# Patient Record
Sex: FEMALE | Race: WHITE | NOT HISPANIC OR LATINO | Employment: UNEMPLOYED | URBAN - METROPOLITAN AREA
[De-identification: names, ages, dates, MRNs, and addresses within clinical notes are randomized per-mention and may not be internally consistent; named-entity substitution may affect disease eponyms.]

---

## 2017-12-06 ENCOUNTER — IMMUNIZATION (OUTPATIENT)
Dept: INTERNAL MEDICINE | Facility: CLINIC | Age: 67
End: 2017-12-06
Payer: MEDICARE

## 2017-12-06 PROCEDURE — G0008 ADMIN INFLUENZA VIRUS VAC: HCPCS | Mod: PBBFAC

## 2018-06-04 ENCOUNTER — OFFICE VISIT (OUTPATIENT)
Dept: OPTOMETRY | Facility: CLINIC | Age: 68
End: 2018-06-04
Payer: MEDICARE

## 2018-06-04 DIAGNOSIS — H35.9 MACULAR DISORDER: Primary | ICD-10-CM

## 2018-06-04 DIAGNOSIS — H52.221 REGULAR ASTIGMATISM OF RIGHT EYE: ICD-10-CM

## 2018-06-04 DIAGNOSIS — H25.13 NUCLEAR SCLEROSIS OF BOTH EYES: ICD-10-CM

## 2018-06-04 DIAGNOSIS — H52.4 PRESBYOPIA: ICD-10-CM

## 2018-06-04 DIAGNOSIS — H31.002 RETINAL SCAR OF LEFT EYE: ICD-10-CM

## 2018-06-04 PROCEDURE — 92014 COMPRE OPH EXAM EST PT 1/>: CPT | Mod: S$PBB,,, | Performed by: OPTOMETRIST

## 2018-06-04 PROCEDURE — 99212 OFFICE O/P EST SF 10 MIN: CPT | Mod: PBBFAC | Performed by: OPTOMETRIST

## 2018-06-04 PROCEDURE — 92015 DETERMINE REFRACTIVE STATE: CPT | Mod: ,,, | Performed by: OPTOMETRIST

## 2018-06-04 PROCEDURE — 99999 PR PBB SHADOW E&M-EST. PATIENT-LVL II: CPT | Mod: PBBFAC,,, | Performed by: OPTOMETRIST

## 2018-06-04 NOTE — PROGRESS NOTES
"HPI     Concerns About Ocular Health    Additional comments: Eye exam            Comments   67 yr old WF    Approximate date of last eye examination: 10/06/2016  Name of last eye doctor seen:  Dr. Gallagher  Wears glasses? yes     If yes, wears full-time or part-time?  Mostly for reading, feels she   can see better driving without them.     Present glasses are bifocal / S V Distance / S V Reading:  progressives  Approximate age of present glasses:  5 yrs  Got new glasses following last exam, or subsequently?:  no   Any problem with VA with glasses?  no  Wears CLs?:  no  Headaches? no  Eye pain/discomfort?  no                                                                                       Flashes?  no  Floaters?  no  Diplopia/Double vision?  no  Patient's Ocular History:         Any eye surgeries?  Retinal laser-OS         Any eye injury?  Hx of Corneal Abrasions OU          Any treatment for eye disease?  H/o histoplasmosis  Family history of eye disease?  Late mom-mac degeneration  Significant patient medical history:         1. Diabetes?  no       If yes, IDDM or NIDDM?  n/a   2. HBP?  no              3. Other (describe):  healthy   ! OTC eyedrops currently using:  Refresh, prn OU   ! Prescription eye meds currently using:  no   ! Any history of allergy/adverse reaction to any eye meds used   previously?  no    ! Any history of allergy/adverse reaction to eyedrops used during prior   eye exam(s)? no    ! Any history of allergy/adverse reaction to Novacaine or similar meds?   no   ! Any history of allergy/adverse reaction to Epinephrine or similar meds?   no    ! Patient okay with use of anesthetic eyedrops to check eye pressure? yes            ! Patient okay with use of eyedrops to dilate pupils today? yes    !  Allergies/Medications/Medical History/Family History reviewed today?    yes    PD =   62/58  Desired reading distance =  15.5"              NOTE HISTORY POOR CORRECTABLE VISION IN THE LEFT EYE.       "                                                Last edited by Ileana Bustillos on 6/4/2018  1:08 PM. (History)            Assessment /Plan     For exam results, see Encounter Report.    1. Macular disorder     2. Retinal scar of left eye     3. Nuclear sclerosis of both eyes     4. Regular astigmatism of right eye     5. Presbyopia                    History of poor correctable vision in the left eye, secondary to macular scarring, presumably secondary to histoplasmosis.  Early nuclear sclerotic lens changes in both eyes. Monitor.  Minimal simple hyperopic astigmatism in the right eye.  Poor VA in the left eye.    Presbyopia consistent with age.   New spectacle lens Rx issued for use as desired. Recommend full-time wear.  Recheck in one year, or prior, if ANY apparent problems or vision changes in the right eye.

## 2018-06-04 NOTE — PATIENT INSTRUCTIONS
History of poor correctable vision in the left eye, secondary to macular scarring, presumably secondary to histoplasmosis.  Early nuclear sclerotic lens changes in both eyes. Monitor.  Minimal simple hyperopic astigmatism in the right eye.  Poor VA in the left eye.    Presbyopia consistent with age.   New spectacle lens Rx issued for use as desired. Recommend full-time wear.  Recheck in one year, or prior, if ANY apparent problems or vision changes in the right eye.

## 2018-07-02 ENCOUNTER — HOSPITAL ENCOUNTER (INPATIENT)
Facility: HOSPITAL | Age: 68
LOS: 3 days | Discharge: HOME | DRG: 189 | End: 2018-07-05
Attending: EMERGENCY MEDICINE | Admitting: HOSPITALIST
Payer: MEDICARE

## 2018-07-02 DIAGNOSIS — R09.02 HYPOXIA: ICD-10-CM

## 2018-07-02 DIAGNOSIS — J44.1 COPD WITH ACUTE EXACERBATION: Primary | ICD-10-CM

## 2018-07-02 DIAGNOSIS — J44.9 COPD (CHRONIC OBSTRUCTIVE PULMONARY DISEASE): ICD-10-CM

## 2018-07-02 DIAGNOSIS — I36.9 NONRHEUMATIC TRICUSPID VALVE DISORDER: ICD-10-CM

## 2018-07-02 PROBLEM — R05.9 COUGH IN ADULT: Status: ACTIVE | Noted: 2018-07-02

## 2018-07-02 PROBLEM — R03.0 ELEVATED BLOOD PRESSURE READING: Status: ACTIVE | Noted: 2018-07-02

## 2018-07-02 PROBLEM — J96.21 ACUTE ON CHRONIC RESPIRATORY FAILURE WITH HYPOXIA: Status: ACTIVE | Noted: 2018-07-02

## 2018-07-02 PROBLEM — Z87.891 FORMER HEAVY TOBACCO SMOKER: Chronic | Status: ACTIVE | Noted: 2018-07-02

## 2018-07-02 LAB
ALBUMIN SERPL BCP-MCNC: 4.2 G/DL
ALLENS TEST: ABNORMAL
ALP SERPL-CCNC: 117 U/L
ALT SERPL W/O P-5'-P-CCNC: 31 U/L
ANION GAP SERPL CALC-SCNC: 10 MMOL/L
ANION GAP SERPL CALC-SCNC: 11 MMOL/L
AST SERPL-CCNC: 27 U/L
BASOPHILS # BLD AUTO: 0.06 K/UL
BASOPHILS NFR BLD: 0.8 %
BILIRUB SERPL-MCNC: 0.4 MG/DL
BILIRUB UR QL STRIP: NEGATIVE
BNP SERPL-MCNC: 29 PG/ML
BUN SERPL-MCNC: 10 MG/DL
BUN SERPL-MCNC: 10 MG/DL
CALCIUM SERPL-MCNC: 10 MG/DL
CALCIUM SERPL-MCNC: 9.5 MG/DL
CHLORIDE SERPL-SCNC: 101 MMOL/L
CHLORIDE SERPL-SCNC: 102 MMOL/L
CLARITY UR REFRACT.AUTO: CLEAR
CO2 SERPL-SCNC: 24 MMOL/L
CO2 SERPL-SCNC: 25 MMOL/L
COLOR UR AUTO: YELLOW
CREAT SERPL-MCNC: 0.7 MG/DL
CREAT SERPL-MCNC: 0.8 MG/DL
DELSYS: ABNORMAL
DIFFERENTIAL METHOD: ABNORMAL
EOSINOPHIL # BLD AUTO: 0.3 K/UL
EOSINOPHIL NFR BLD: 3.7 %
ERYTHROCYTE [DISTWIDTH] IN BLOOD BY AUTOMATED COUNT: 13.2 %
EST. GFR  (AFRICAN AMERICAN): >60 ML/MIN/1.73 M^2
EST. GFR  (AFRICAN AMERICAN): >60 ML/MIN/1.73 M^2
EST. GFR  (NON AFRICAN AMERICAN): >60 ML/MIN/1.73 M^2
EST. GFR  (NON AFRICAN AMERICAN): >60 ML/MIN/1.73 M^2
ESTIMATED AVG GLUCOSE: 114 MG/DL
GLUCOSE SERPL-MCNC: 116 MG/DL
GLUCOSE SERPL-MCNC: 252 MG/DL
GLUCOSE UR QL STRIP: NEGATIVE
HBA1C MFR BLD HPLC: 5.6 %
HCO3 UR-SCNC: 27.3 MMOL/L (ref 24–28)
HCT VFR BLD AUTO: 45.4 %
HGB BLD-MCNC: 14.5 G/DL
HGB UR QL STRIP: NEGATIVE
IMM GRANULOCYTES # BLD AUTO: 0.03 K/UL
IMM GRANULOCYTES NFR BLD AUTO: 0.4 %
INR PPP: 1
KETONES UR QL STRIP: NEGATIVE
LACTATE SERPL-SCNC: 1.1 MMOL/L
LEUKOCYTE ESTERASE UR QL STRIP: NEGATIVE
LYMPHOCYTES # BLD AUTO: 1.2 K/UL
LYMPHOCYTES NFR BLD: 15.9 %
MAGNESIUM SERPL-MCNC: 2.3 MG/DL
MCH RBC QN AUTO: 31.2 PG
MCHC RBC AUTO-ENTMCNC: 31.9 G/DL
MCV RBC AUTO: 98 FL
MODE: ABNORMAL
MONOCYTES # BLD AUTO: 0.8 K/UL
MONOCYTES NFR BLD: 11.6 %
NEUTROPHILS # BLD AUTO: 4.9 K/UL
NEUTROPHILS NFR BLD: 67.6 %
NITRITE UR QL STRIP: NEGATIVE
NRBC BLD-RTO: 0 /100 WBC
PCO2 BLDA: 43.6 MMHG (ref 35–45)
PH SMN: 7.4 [PH] (ref 7.35–7.45)
PH UR STRIP: 6 [PH] (ref 5–8)
PHOSPHATE SERPL-MCNC: 3.2 MG/DL
PLATELET # BLD AUTO: 331 K/UL
PMV BLD AUTO: 8.7 FL
PO2 BLDA: 53 MMHG (ref 80–100)
POC BE: 3 MMOL/L
POC SATURATED O2: 87 % (ref 95–100)
POC TCO2: 29 MMOL/L (ref 23–27)
POCT GLUCOSE: 121 MG/DL (ref 70–110)
POCT GLUCOSE: 208 MG/DL (ref 70–110)
POTASSIUM SERPL-SCNC: 3.7 MMOL/L
POTASSIUM SERPL-SCNC: 3.9 MMOL/L
PROCALCITONIN SERPL IA-MCNC: <0.02 NG/ML
PROT SERPL-MCNC: 7.3 G/DL
PROT UR QL STRIP: NEGATIVE
PROTHROMBIN TIME: 10.4 SEC
PROVIDER CREDENTIALS: ABNORMAL
PROVIDER NOTIFIED: ABNORMAL
RBC # BLD AUTO: 4.65 M/UL
SAMPLE: ABNORMAL
SITE: ABNORMAL
SODIUM SERPL-SCNC: 136 MMOL/L
SODIUM SERPL-SCNC: 137 MMOL/L
SP GR UR STRIP: 1.01 (ref 1–1.03)
SP02: 92
TIME NOTIFIED: 1422
TROPONIN I SERPL DL<=0.01 NG/ML-MCNC: <0.006 NG/ML
TSH SERPL DL<=0.005 MIU/L-ACNC: 0.98 UIU/ML
URN SPEC COLLECT METH UR: NORMAL
UROBILINOGEN UR STRIP-ACNC: NEGATIVE EU/DL
VERBAL RESULT READBACK PERFORMED: YES
WBC # BLD AUTO: 7.23 K/UL

## 2018-07-02 PROCEDURE — 25000003 PHARM REV CODE 250: Performed by: HOSPITALIST

## 2018-07-02 PROCEDURE — 83880 ASSAY OF NATRIURETIC PEPTIDE: CPT

## 2018-07-02 PROCEDURE — 84145 PROCALCITONIN (PCT): CPT

## 2018-07-02 PROCEDURE — 93010 ELECTROCARDIOGRAM REPORT: CPT | Mod: ,,, | Performed by: INTERNAL MEDICINE

## 2018-07-02 PROCEDURE — 81003 URINALYSIS AUTO W/O SCOPE: CPT

## 2018-07-02 PROCEDURE — 36415 COLL VENOUS BLD VENIPUNCTURE: CPT

## 2018-07-02 PROCEDURE — 84484 ASSAY OF TROPONIN QUANT: CPT

## 2018-07-02 PROCEDURE — 83036 HEMOGLOBIN GLYCOSYLATED A1C: CPT

## 2018-07-02 PROCEDURE — 36600 WITHDRAWAL OF ARTERIAL BLOOD: CPT

## 2018-07-02 PROCEDURE — 93005 ELECTROCARDIOGRAM TRACING: CPT

## 2018-07-02 PROCEDURE — 80053 COMPREHEN METABOLIC PANEL: CPT

## 2018-07-02 PROCEDURE — 99223 1ST HOSP IP/OBS HIGH 75: CPT | Mod: AI,,, | Performed by: HOSPITALIST

## 2018-07-02 PROCEDURE — 25000242 PHARM REV CODE 250 ALT 637 W/ HCPCS: Performed by: HOSPITALIST

## 2018-07-02 PROCEDURE — 83735 ASSAY OF MAGNESIUM: CPT

## 2018-07-02 PROCEDURE — 99285 EMERGENCY DEPT VISIT HI MDM: CPT | Mod: ,,, | Performed by: EMERGENCY MEDICINE

## 2018-07-02 PROCEDURE — 27000221 HC OXYGEN, UP TO 24 HOURS

## 2018-07-02 PROCEDURE — 25000242 PHARM REV CODE 250 ALT 637 W/ HCPCS: Performed by: EMERGENCY MEDICINE

## 2018-07-02 PROCEDURE — 80048 BASIC METABOLIC PNL TOTAL CA: CPT

## 2018-07-02 PROCEDURE — 11000001 HC ACUTE MED/SURG PRIVATE ROOM

## 2018-07-02 PROCEDURE — 63600175 PHARM REV CODE 636 W HCPCS: Performed by: HOSPITALIST

## 2018-07-02 PROCEDURE — 99285 EMERGENCY DEPT VISIT HI MDM: CPT | Mod: 25

## 2018-07-02 PROCEDURE — 82803 BLOOD GASES ANY COMBINATION: CPT

## 2018-07-02 PROCEDURE — 84443 ASSAY THYROID STIM HORMONE: CPT

## 2018-07-02 PROCEDURE — 63600175 PHARM REV CODE 636 W HCPCS: Performed by: EMERGENCY MEDICINE

## 2018-07-02 PROCEDURE — 82962 GLUCOSE BLOOD TEST: CPT

## 2018-07-02 PROCEDURE — 94640 AIRWAY INHALATION TREATMENT: CPT

## 2018-07-02 PROCEDURE — 85025 COMPLETE CBC W/AUTO DIFF WBC: CPT

## 2018-07-02 PROCEDURE — 84100 ASSAY OF PHOSPHORUS: CPT

## 2018-07-02 PROCEDURE — 85610 PROTHROMBIN TIME: CPT

## 2018-07-02 PROCEDURE — 83605 ASSAY OF LACTIC ACID: CPT

## 2018-07-02 PROCEDURE — 96374 THER/PROPH/DIAG INJ IV PUSH: CPT

## 2018-07-02 PROCEDURE — 99900035 HC TECH TIME PER 15 MIN (STAT)

## 2018-07-02 PROCEDURE — 94761 N-INVAS EAR/PLS OXIMETRY MLT: CPT

## 2018-07-02 RX ORDER — ACETAMINOPHEN 325 MG/1
650 TABLET ORAL EVERY 8 HOURS PRN
Status: DISCONTINUED | OUTPATIENT
Start: 2018-07-02 | End: 2018-07-05 | Stop reason: HOSPADM

## 2018-07-02 RX ORDER — AMOXICILLIN 250 MG
1 CAPSULE ORAL 2 TIMES DAILY
Status: DISCONTINUED | OUTPATIENT
Start: 2018-07-02 | End: 2018-07-05 | Stop reason: HOSPADM

## 2018-07-02 RX ORDER — IPRATROPIUM BROMIDE AND ALBUTEROL SULFATE 2.5; .5 MG/3ML; MG/3ML
3 SOLUTION RESPIRATORY (INHALATION)
Status: COMPLETED | OUTPATIENT
Start: 2018-07-02 | End: 2018-07-02

## 2018-07-02 RX ORDER — ASCORBIC ACID 500 MG
500 TABLET ORAL DAILY
Status: ON HOLD | COMMUNITY
End: 2023-10-18 | Stop reason: HOSPADM

## 2018-07-02 RX ORDER — TIOTROPIUM BROMIDE 18 UG/1
1 CAPSULE ORAL; RESPIRATORY (INHALATION) DAILY
Status: DISCONTINUED | OUTPATIENT
Start: 2018-07-03 | End: 2018-07-05 | Stop reason: HOSPADM

## 2018-07-02 RX ORDER — METHYLPREDNISOLONE SOD SUCC 125 MG
125 VIAL (EA) INJECTION
Status: COMPLETED | OUTPATIENT
Start: 2018-07-02 | End: 2018-07-02

## 2018-07-02 RX ORDER — IBUPROFEN 200 MG
16 TABLET ORAL
Status: DISCONTINUED | OUTPATIENT
Start: 2018-07-02 | End: 2018-07-05 | Stop reason: HOSPADM

## 2018-07-02 RX ORDER — MOXIFLOXACIN HYDROCHLORIDE 400 MG/1
400 TABLET ORAL DAILY
Status: DISCONTINUED | OUTPATIENT
Start: 2018-07-02 | End: 2018-07-05 | Stop reason: HOSPADM

## 2018-07-02 RX ORDER — PREDNISONE 20 MG/1
40 TABLET ORAL DAILY
Status: DISCONTINUED | OUTPATIENT
Start: 2018-07-03 | End: 2018-07-05 | Stop reason: HOSPADM

## 2018-07-02 RX ORDER — GLUCAGON 1 MG
1 KIT INJECTION
Status: DISCONTINUED | OUTPATIENT
Start: 2018-07-02 | End: 2018-07-05 | Stop reason: HOSPADM

## 2018-07-02 RX ORDER — CALCIUM CARBONATE 600 MG
600 TABLET ORAL DAILY
Status: ON HOLD | COMMUNITY
End: 2018-11-07 | Stop reason: SDUPTHER

## 2018-07-02 RX ORDER — RAMELTEON 8 MG/1
8 TABLET ORAL NIGHTLY PRN
Status: DISCONTINUED | OUTPATIENT
Start: 2018-07-02 | End: 2018-07-05 | Stop reason: HOSPADM

## 2018-07-02 RX ORDER — FLUTICASONE FUROATE AND VILANTEROL 100; 25 UG/1; UG/1
1 POWDER RESPIRATORY (INHALATION) DAILY
Status: DISCONTINUED | OUTPATIENT
Start: 2018-07-03 | End: 2018-07-05 | Stop reason: HOSPADM

## 2018-07-02 RX ORDER — PROMETHAZINE HYDROCHLORIDE 25 MG/1
25 TABLET ORAL EVERY 6 HOURS PRN
Status: DISCONTINUED | OUTPATIENT
Start: 2018-07-02 | End: 2018-07-05 | Stop reason: HOSPADM

## 2018-07-02 RX ORDER — IBUPROFEN 200 MG
24 TABLET ORAL
Status: DISCONTINUED | OUTPATIENT
Start: 2018-07-02 | End: 2018-07-05 | Stop reason: HOSPADM

## 2018-07-02 RX ORDER — ONDANSETRON 8 MG/1
8 TABLET, ORALLY DISINTEGRATING ORAL EVERY 8 HOURS PRN
Status: DISCONTINUED | OUTPATIENT
Start: 2018-07-02 | End: 2018-07-05 | Stop reason: HOSPADM

## 2018-07-02 RX ORDER — IPRATROPIUM BROMIDE AND ALBUTEROL SULFATE 2.5; .5 MG/3ML; MG/3ML
3 SOLUTION RESPIRATORY (INHALATION) EVERY 6 HOURS
Status: DISCONTINUED | OUTPATIENT
Start: 2018-07-02 | End: 2018-07-05 | Stop reason: HOSPADM

## 2018-07-02 RX ORDER — ENOXAPARIN SODIUM 100 MG/ML
40 INJECTION SUBCUTANEOUS EVERY 24 HOURS
Status: DISCONTINUED | OUTPATIENT
Start: 2018-07-02 | End: 2018-07-05 | Stop reason: HOSPADM

## 2018-07-02 RX ADMIN — MOXIFLOXACIN HYDROCHLORIDE 400 MG: 400 TABLET, FILM COATED ORAL at 06:07

## 2018-07-02 RX ADMIN — METHYLPREDNISOLONE SODIUM SUCCINATE 125 MG: 125 INJECTION, POWDER, FOR SOLUTION INTRAMUSCULAR; INTRAVENOUS at 02:07

## 2018-07-02 RX ADMIN — IPRATROPIUM BROMIDE AND ALBUTEROL SULFATE 3 ML: .5; 3 SOLUTION RESPIRATORY (INHALATION) at 06:07

## 2018-07-02 RX ADMIN — ENOXAPARIN SODIUM 40 MG: 100 INJECTION SUBCUTANEOUS at 06:07

## 2018-07-02 RX ADMIN — IPRATROPIUM BROMIDE AND ALBUTEROL SULFATE 3 ML: .5; 3 SOLUTION RESPIRATORY (INHALATION) at 02:07

## 2018-07-02 RX ADMIN — IPRATROPIUM BROMIDE AND ALBUTEROL SULFATE 3 ML: .5; 3 SOLUTION RESPIRATORY (INHALATION) at 07:07

## 2018-07-02 NOTE — HPI
This is a 67 y.o. female with hx of retinal hemorrhage who is being admitted to hospital medicine for shortness of breath, suspect COPD exacerbation. She presented to the ED with a complaint of worsening SOB for the past two weeks. The patient reports at benign, she is active, able to walk her dog, until two weeks ago she began having breathing difficulty. Patient states SOB is worsening and associated with some chest discomfort. Report sleeping on 2-3 pillows at night. Per , patient gets SOB when getting out of the car and when going to use the restroom. He notes of patient's decreased physical activity due to SOB. She notes of loss of appetite, congestion, and cough, but denies sore throat, vision changes, fever, chills, dizziness, weakness, or any URI symptoms in the past 2 weeks. Denies any recent travels. Patient has no cardiac history in the past, denies DM, HTN, Asthma hx. Patient is a former smoker for 30 years, 1-2 packs a day, stop 2 years ago. Does not take any medications beside from OTC vitamins.

## 2018-07-02 NOTE — ED NOTES
Pt returned to ED stretcher from restroom, respirations even and unlabored. Stretcher locked and in lowest position, side rails x 2, call bell within reach. Cardiac monitor, pulse ox and BP cuff applied cycling Q 30 minute vitals.  and son at the bedside, updated on wait for further orders. Will continue to monitor and update pt on plan of care.

## 2018-07-02 NOTE — ED PROVIDER NOTES
Encounter Date: 7/2/2018    SCRIBE #1 NOTE: I, Danielle Garcia, am scribing for, and in the presence of,  Dr. Garcia. I have scribed the entire note.       History     Chief Complaint   Patient presents with    Shortness of Breath     Pt presented to the ED via pov. Pt c/o cough, congestion, sob, pt denies fever x 2 weeks.     Cough     Time patient was seen by the provider: 11:39 AM      The patient is a 67 y.o. female with co-morbidities including: retinal hemorrhage, who presents to the ED with a complaint of worsening SOB for the past two weeks. The patient reports at benign, she is active, able to walk her dog, until two weeks ago she began having breathing difficulty. Patient states SOB is worsening and associated with some chest discomfort. Per , patient gets SOB when getting out of the car and when going to use the restroom. He notes of patient's decreased physical activity due to SOB. She notes of loss of appetite, congestion, and cough, but denies sore throat, vision changes, fever, chills, dizziness, weakness, or any URI symptoms in the past 2 weeks. Denies any recent travels. Patient has no cardiac history in the past. Patient is a former smoker for 20 years, 1-2 packs a day.         The history is provided by the patient, the spouse and medical records.     Review of patient's allergies indicates:  No Known Allergies  Past Medical History:   Diagnosis Date    Cataract     History of retinal hemorrhage      Past Surgical History:   Procedure Laterality Date    HAND SURGERY       Family History   Problem Relation Age of Onset    Cancer Mother         colon    Macular degeneration Mother     Stroke Father     Amblyopia Neg Hx     Blindness Neg Hx     Cataracts Neg Hx     Diabetes Neg Hx     Glaucoma Neg Hx     Hypertension Neg Hx     Retinal detachment Neg Hx     Strabismus Neg Hx     Thyroid disease Neg Hx      Social History   Substance Use Topics    Smoking status: Former  Smoker    Smokeless tobacco: Never Used    Alcohol use 1.2 oz/week     2 Glasses of wine per week      Comment: 1-2 glasses a day      Review of Systems   Constitutional: Positive for appetite change (decreased). Negative for chills and fever.   HENT: Positive for congestion. Negative for sore throat.    Eyes: Negative for visual disturbance.   Respiratory: Positive for cough and shortness of breath.    Cardiovascular: Negative for chest pain.   Gastrointestinal: Negative for nausea.   Genitourinary: Negative for dysuria.   Musculoskeletal: Negative for back pain.   Skin: Negative for rash.   Neurological: Negative for dizziness and weakness.   Hematological: Does not bruise/bleed easily.       Physical Exam     Initial Vitals [07/02/18 1124]   BP Pulse Resp Temp SpO2   (!) 143/79 109 17 98.5 °F (36.9 °C) (!) 86 %      MAP       --         Physical Exam    Nursing note and vitals reviewed.  Constitutional: She appears well-developed and well-nourished. No distress.   HENT:   Head: Normocephalic and atraumatic.   Eyes: EOM are normal. Pupils are equal, round, and reactive to light.   Neck: Normal range of motion. Neck supple.   Cardiovascular: Normal rate and regular rhythm.   Pulmonary/Chest: Tachypnea noted. She is in respiratory distress (mild). She has wheezes (bilaterally).   Can not finish complete sentences.    Abdominal: Soft. Bowel sounds are normal. She exhibits no distension. There is no tenderness.   Musculoskeletal: Normal range of motion. She exhibits no edema or tenderness.   Neurological: She is alert and oriented to person, place, and time.   Skin: Skin is warm and dry. No rash noted.   Psychiatric: She has a normal mood and affect.         ED Course   Procedures  Labs Reviewed   CBC W/ AUTO DIFFERENTIAL - Abnormal; Notable for the following:        Result Value    MCH 31.2 (*)     MCHC 31.9 (*)     MPV 8.7 (*)     Lymph% 15.9 (*)     All other components within normal limits   COMPREHENSIVE  METABOLIC PANEL - Abnormal; Notable for the following:     Glucose 116 (*)     All other components within normal limits   ISTAT PROCEDURE - Abnormal; Notable for the following:     POC PO2 53 (*)     POC SATURATED O2 87 (*)     POC TCO2 29 (*)     All other components within normal limits   POCT GLUCOSE - Abnormal; Notable for the following:     POCT Glucose 121 (*)     All other components within normal limits   LACTIC ACID, PLASMA   URINALYSIS, REFLEX TO URINE CULTURE    Narrative:     Preferred Collection Type->Urine, Clean Catch   TROPONIN I   B-TYPE NATRIURETIC PEPTIDE   PROTIME-INR   TSH   MAGNESIUM   PHOSPHORUS   PROCALCITONIN   HEMOGLOBIN A1C   HEMOGLOBIN A1C    Narrative:     ADD ON GHGB PER DR. ELIZABETH GAITAN AT  07/02/2018  16:53 ([facility])  ADD ON PCAL PER DR. ELIZABETH GAITAN AT  07/02/2018  16:55 ([facility])   PROCALCITONIN    Narrative:     ADD ON GHGB PER DR. ELIZABETH GAITAN AT  07/02/2018  16:53 ([facility])  ADD ON PCAL PER DR. ELIZABETH GAITAN AT  07/02/2018  16:55 ([facility]X)   D DIMER, QUANTITATIVE     EKG Readings: (Independently Interpreted)   Initial Reading: No STEMI. Rhythm: Normal Sinus Rhythm. Heart Rate: 99.       Imaging Results          X-Ray Chest PA And Lateral (Final result)  Result time 07/02/18 12:49:09    Final result by Osvaldo Barrera MD (07/02/18 12:49:09)                 Narrative:    EXAMINATION:  XR CHEST PA AND LATERAL    CLINICAL HISTORY:  Cough;    TECHNIQUE:  PA and lateral views of the chest were performed.    COMPARISON:  None    FINDINGS:  Heart size normal.  Ill-defined opacities in the lung apices probably related to parenchymal scarring.  No significant airspace consolidation or pleural effusion identified.  Suggestion of underlying COPD.      Electronically signed by: Osvaldo Barrera MD  Date:    07/02/2018  Time:    12:49                            X-Rays:   Independently Interpreted Readings:   Chest X-Ray: No infiltrates.     Medical Decision Making:   History:   Old Medical Records: I decided  to obtain old medical records.  Initial Assessment:   67 y.o. Female patient with significant PMHx of smoking for 20 years up to 1-2 packs a day, also with second hand smoke from patient's .   Independently Interpreted Test(s):   I have ordered and independently interpreted EKG Reading(s) - see prior notes  Clinical Tests:   Lab Tests: Ordered and Reviewed  Radiological Study: Ordered and Reviewed  Medical Tests: Ordered and Reviewed            Scribe Attestation:   Scribe #1: I performed the above scribed service and the documentation accurately describes the services I performed. I attest to the accuracy of the note.    Attending Attestation:             Attending ED Notes:   2:45 PM  Patient ambulated around ED with pulse O2, patient became short of breath. Pulse O2 dropped to 84%, she became uncomfortable and had to walk slowly back to her ED room.     ABG RA  pH  7.4  pCO2 43  PO2 53   BE 3  Pulse ox 87%    Patient presenting with shortness of breath for the last several weeks though her spouse states that this has been going on longer; patient has not been followed by a physician and has not gone to see 1 for these complaints it is felt that now she is becoming hypoxic and is felt that this is secondary to lung findings which are suggestive of emphysema /COPD it is felt that she is having a probable COPD with exacerbation will treat with breathing treatments and steroids but we will have to admit the patient to the hospital because of her significant hypoxia               Clinical Impression:   The primary encounter diagnosis was COPD with acute exacerbation. Diagnoses of Hypoxia, COPD (chronic obstructive pulmonary disease), and Nonrheumatic tricuspid valve disorder were also pertinent to this visit.      Disposition:   Disposition: Admitted  Condition: Serious                        Kaiden Garcia MD  07/08/18 1959

## 2018-07-02 NOTE — ED NOTES
Hourly rounding completed on patient. Patient has no complaints or questions. Pt is in bed awake and alert. Respirations are even and unlabored. Pt denies chest pain or SOB.    The bed is in low, locked position with side rails upx2. Call light is in reach, and patient is oriented to call light use. Pt states they will call nurse if they need assistance.    Pt remains on 2 L NC and is comfortably

## 2018-07-02 NOTE — ED NOTES
Pt resting on stretcher in NAD, respirations even and unlabored. Stretcher locked and in lowest position, side rails x 2, call bell within reach. Cardiac monitor, pulse ox and BP cuff applied cycling Q 30 minute vitals. Pt offered restroom assistance, pt states no needs at this time.  at the bedside, diet menu provided to patient. Will continue to monitor and update pt on plan of care.

## 2018-07-02 NOTE — SUBJECTIVE & OBJECTIVE
Past Medical History:   Diagnosis Date    Cataract     History of retinal hemorrhage        Past Surgical History:   Procedure Laterality Date    HAND SURGERY         Review of patient's allergies indicates:  No Known Allergies    No current facility-administered medications on file prior to encounter.      Current Outpatient Prescriptions on File Prior to Encounter   Medication Sig    [DISCONTINUED] multivitamin (ONE DAILY MULTIVITAMIN) per tablet Take 1 tablet by mouth once daily.     Family History     Problem Relation (Age of Onset)    Cancer Mother    Macular degeneration Mother    Stroke Father        Social History Main Topics    Smoking status: Former Smoker    Smokeless tobacco: Never Used    Alcohol use 1.2 oz/week     2 Glasses of wine per week      Comment: 1-2 glasses a day     Drug use: No    Sexual activity: Not on file     Review of Systems   Constitutional: Negative for appetite change, chills, fatigue and fever.   HENT: Positive for congestion and sore throat. Negative for sinus pressure and trouble swallowing.    Eyes: Negative for visual disturbance.   Respiratory: Positive for cough, chest tightness and shortness of breath. Negative for wheezing.    Cardiovascular: Negative for chest pain and leg swelling.   Gastrointestinal: Negative for abdominal distention, diarrhea, nausea and vomiting.   Genitourinary: Negative for dysuria and frequency.   Musculoskeletal: Negative for arthralgias, back pain, myalgias and neck pain.   Skin: Negative for pallor and rash.   Neurological: Positive for weakness. Negative for tremors, facial asymmetry and speech difficulty.   Hematological: Negative.    Psychiatric/Behavioral: Negative.  Negative for confusion. The patient is not nervous/anxious.      Objective:     Vital Signs (Most Recent):  Temp: 98.5 °F (36.9 °C) (07/02/18 1124)  Pulse: 86 (07/02/18 1701)  Resp: (!) 22 (07/02/18 1659)  BP: (!) 162/85 (07/02/18 1701)  SpO2: 96 % (07/02/18 1701) Vital  Signs (24h Range):  Temp:  [98.5 °F (36.9 °C)] 98.5 °F (36.9 °C)  Pulse:  [] 86  Resp:  [17-31] 22  SpO2:  [86 %-98 %] 96 %  BP: (140-162)/(79-86) 162/85     Weight: 52.2 kg (115 lb)  Body mass index is 17.49 kg/m².    Physical Exam   Constitutional: She is oriented to person, place, and time. She appears well-developed and well-nourished. No distress.   HENT:   Head: Normocephalic.   Nose: Nose normal.   Mouth/Throat: Oropharynx is clear and moist. No oropharyngeal exudate.   Eyes: Pupils are equal, round, and reactive to light. Right eye exhibits no discharge. Left eye exhibits no discharge. No scleral icterus.   Neck: Normal range of motion. No tracheal deviation present.   Cardiovascular: Normal rate, regular rhythm and normal heart sounds.  Exam reveals no friction rub.    No murmur heard.  Pulmonary/Chest: She is in respiratory distress. She has wheezes. She has rales. She exhibits no tenderness.   On 2L NC   Abdominal: Soft. Bowel sounds are normal. She exhibits no distension. There is no tenderness. There is no guarding.   Musculoskeletal: Normal range of motion. She exhibits no edema, tenderness or deformity.   Lymphadenopathy:     She has no cervical adenopathy.   Neurological: She is alert and oriented to person, place, and time.   Skin: Skin is warm. She is not diaphoretic. No erythema. No pallor.   Psychiatric: She has a normal mood and affect. Judgment and thought content normal.         CRANIAL NERVES     CN III, IV, VI   Pupils are equal, round, and reactive to light.       Significant Labs:   CBC:   Recent Labs  Lab 07/02/18  1205   WBC 7.23   HGB 14.5   HCT 45.4        CMP:   Recent Labs  Lab 07/02/18  1205      K 3.9      CO2 24   *   BUN 10   CREATININE 0.7   CALCIUM 10.0   PROT 7.3   ALBUMIN 4.2   BILITOT 0.4   ALKPHOS 117   AST 27   ALT 31   ANIONGAP 10   EGFRNONAA >60.0     Cardiac Markers:   Recent Labs  Lab 07/02/18  1205   BNP 29     All pertinent labs within  the past 24 hours have been reviewed.  Imaging Results          X-Ray Chest PA And Lateral (Final result)  Result time 07/02/18 12:49:09    Final result by Osvaldo Barrera MD (07/02/18 12:49:09)                 Narrative:    EXAMINATION:  XR CHEST PA AND LATERAL    CLINICAL HISTORY:  Cough;    TECHNIQUE:  PA and lateral views of the chest were performed.    COMPARISON:  None    FINDINGS:  Heart size normal.  Ill-defined opacities in the lung apices probably related to parenchymal scarring.  No significant airspace consolidation or pleural effusion identified.  Suggestion of underlying COPD.      Electronically signed by: Osvaldo Barrera MD  Date:    07/02/2018  Time:    12:49                              Significant Imaging: I have reviewed all pertinent imaging results/findings within the past 24 hours.

## 2018-07-02 NOTE — H&P
[facility] Hospital Medicine  History & Physical    Patient Name: Estefani Fam  MRN: [id number]  Admission Date: 7/2/2018  Attending Physician: Johny Garcia MD  Primary Care Provider: Juan Truong MD    [facility] Medicine Team: [facility] MED A Johny Garcia MD     Patient information was obtained from patient, spouse/SO and ER records.     Subjective:     Principal Problem:Acute on chronic respiratory failure with hypoxia    Chief Complaint:   Chief Complaint   Patient presents with    Shortness of Breath     Pt presented to the ED via pov. Pt c/o cough, congestion, sob, pt denies fever x 2 weeks.     Cough        HPI: This is a 67 y.o. female with hx of retinal hemorrhage who is being admitted to hospital medicine for shortness of breath, suspect COPD exacerbation. She presented to the ED with a complaint of worsening SOB for the past two weeks. The patient reports at benign, she is active, able to walk her dog, until two weeks ago she began having breathing difficulty. Patient states SOB is worsening and associated with some chest discomfort. Report sleeping on 2-3 pillows at night. Per , patient gets SOB when getting out of the car and when going to use the restroom. He notes of patient's decreased physical activity due to SOB. She notes of loss of appetite, congestion, and cough, but denies sore throat, vision changes, fever, chills, dizziness, weakness, or any URI symptoms in the past 2 weeks. Denies any recent travels. Patient has no cardiac history in the past, denies DM, HTN, Asthma hx. Patient is a former smoker for 30 years, 1-2 packs a day, stop 2 years ago. Does not take any medications beside from OTC vitamins.        Past Medical History:   Diagnosis Date    Cataract     History of retinal hemorrhage        Past Surgical History:   Procedure Laterality Date    HAND SURGERY         Review of patient's allergies indicates:  No Known Allergies    No current  facility-administered medications on file prior to encounter.      Current Outpatient Prescriptions on File Prior to Encounter   Medication Sig    [DISCONTINUED] multivitamin (ONE DAILY MULTIVITAMIN) per tablet Take 1 tablet by mouth once daily.     Family History     Problem Relation (Age of Onset)    Cancer Mother    Macular degeneration Mother    Stroke Father        Social History Main Topics    Smoking status: Former Smoker    Smokeless tobacco: Never Used    Alcohol use 1.2 oz/week     2 Glasses of wine per week      Comment: 1-2 glasses a day     Drug use: No    Sexual activity: Not on file     Review of Systems   Constitutional: Negative for appetite change, chills, fatigue and fever.   HENT: Positive for congestion and sore throat. Negative for sinus pressure and trouble swallowing.    Eyes: Negative for visual disturbance.   Respiratory: Positive for cough, chest tightness and shortness of breath. Negative for wheezing.    Cardiovascular: Negative for chest pain and leg swelling.   Gastrointestinal: Negative for abdominal distention, diarrhea, nausea and vomiting.   Genitourinary: Negative for dysuria and frequency.   Musculoskeletal: Negative for arthralgias, back pain, myalgias and neck pain.   Skin: Negative for pallor and rash.   Neurological: Positive for weakness. Negative for tremors, facial asymmetry and speech difficulty.   Hematological: Negative.    Psychiatric/Behavioral: Negative.  Negative for confusion. The patient is not nervous/anxious.      Objective:     Vital Signs (Most Recent):  Temp: 98.5 °F (36.9 °C) (07/02/18 1124)  Pulse: 86 (07/02/18 1701)  Resp: (!) 22 (07/02/18 1659)  BP: (!) 162/85 (07/02/18 1701)  SpO2: 96 % (07/02/18 1701) Vital Signs (24h Range):  Temp:  [98.5 °F (36.9 °C)] 98.5 °F (36.9 °C)  Pulse:  [] 86  Resp:  [17-31] 22  SpO2:  [86 %-98 %] 96 %  BP: (140-162)/(79-86) 162/85     Weight: 52.2 kg (115 lb)  Body mass index is 17.49 kg/m².    Physical Exam    Constitutional: She is oriented to person, place, and time. She appears well-developed and well-nourished. No distress.   HENT:   Head: Normocephalic.   Nose: Nose normal.   Mouth/Throat: Oropharynx is clear and moist. No oropharyngeal exudate.   Eyes: Pupils are equal, round, and reactive to light. Right eye exhibits no discharge. Left eye exhibits no discharge. No scleral icterus.   Neck: Normal range of motion. No tracheal deviation present.   Cardiovascular: Normal rate, regular rhythm and normal heart sounds.  Exam reveals no friction rub.    No murmur heard.  Pulmonary/Chest: She is in respiratory distress. She has wheezes. She has rales. She exhibits no tenderness.   On 2L NC   Abdominal: Soft. Bowel sounds are normal. She exhibits no distension. There is no tenderness. There is no guarding.   Musculoskeletal: Normal range of motion. She exhibits no edema, tenderness or deformity.   Lymphadenopathy:     She has no cervical adenopathy.   Neurological: She is alert and oriented to person, place, and time.   Skin: Skin is warm. She is not diaphoretic. No erythema. No pallor.   Psychiatric: She has a normal mood and affect. Judgment and thought content normal.         CRANIAL NERVES     CN III, IV, VI   Pupils are equal, round, and reactive to light.       Significant Labs:   CBC:   Recent Labs  Lab 07/02/18  1205   WBC 7.23   HGB 14.5   HCT 45.4        CMP:   Recent Labs  Lab 07/02/18  1205      K 3.9      CO2 24   *   BUN 10   CREATININE 0.7   CALCIUM 10.0   PROT 7.3   ALBUMIN 4.2   BILITOT 0.4   ALKPHOS 117   AST 27   ALT 31   ANIONGAP 10   EGFRNONAA >60.0     Cardiac Markers:   Recent Labs  Lab 07/02/18  1205   BNP 29     All pertinent labs within the past 24 hours have been reviewed.  Imaging Results          X-Ray Chest PA And Lateral (Final result)  Result time 07/02/18 12:49:09    Final result by Osvaldo Barrera MD (07/02/18 12:49:09)                 Narrative:     EXAMINATION:  XR CHEST PA AND LATERAL    CLINICAL HISTORY:  Cough;    TECHNIQUE:  PA and lateral views of the chest were performed.    COMPARISON:  None    FINDINGS:  Heart size normal.  Ill-defined opacities in the lung apices probably related to parenchymal scarring.  No significant airspace consolidation or pleural effusion identified.  Suggestion of underlying COPD.      Electronically signed by: Osvaldo Barrera MD  Date:    07/02/2018  Time:    12:49                              Significant Imaging: I have reviewed all pertinent imaging results/findings within the past 24 hours.    Assessment/Plan:     * Acute on chronic respiratory failure with hypoxia    COPD with acute exacerbation  Obstructive lung disease (generalized)  Hypoxia  Cough in adult  - Admitted for shortness of breath, diffuse wheezing, cough that is mildly productive, denies fever. No formal COPD testing but CXR: Suggestion of underlying COPD and long hx of tobacco use lead to COPD/Obstructive lung disease as the leading diagnosis.  - low BNP, no hx of CAD, no LE swell, so CHF exacerbation is low suspicion   - Low suspicion for PE, given no LE swelling no chest pain. Checking D-Dimer  - COPD pathway started advance as tolerated, Nebs Q6, PO steroid, Avalox given productive cough  - Started pt on Breo and Spiriva HERE  - wean down oxygen as tolerated  - suspect will need at least spiriva and albuterol inhalers on D/C  - will need to see pulm in clinic for formal PFT test when stable         Former heavy tobacco smoker    - pt report stopped over the last 2 years  - 60 pack year hx        Elevated blood pressure reading    - BP elevated in ED  - will monitor  - hold anti-hypertensive for now            VTE Risk Mitigation         Ordered     enoxaparin injection 40 mg  Daily      07/02/18 1637     IP VTE HIGH RISK PATIENT  Once      07/02/18 1637             Johny Garcia MD  Department of Hospital Medicine   [facility]

## 2018-07-02 NOTE — ED NOTES
"Pt identifiers checked and accurate with Estefani Fam     Pt reports to ED with complaints of SOB x 2 weeks. Pt reports productive cough, chest tightness and "feeling hot". Pt reports sleeping on cough cushion x 2 weeks, lying down "makes me feel like I am drowning". Pt reports increased swelling to bilateral lower extremities. Pt denies headache, dizziness, N/V/D, fever and chills.     LOC: The patient is awake, alert and aware of environment with an appropriate affect, the patient is oriented x 3 and speaking appropriately.  APPEARANCE: Patient resting comfortably and in no acute distress, patient is clean and well groomed  SKIN: The skin is warm and dry, color consistent with ethnicity, patient has normal skin turgor and moist mucus membranes, skin intact, no breakdown or bruising noted.  MUSCULOSKELETAL: Patient moving all extremities well, no obvious swelling or deformities noted. Pt ambulates unassisted, steady gait   RESPIRATORY: Airway is open and patent, breath sounds coarse throughout all lung fields; respirations are spontaneous, patient has a normal effort and rate, no accessory muscle use noted. Pt oxygen saturation 89% on room air, pt placed on 2 L NC oxygen saturation increased to 93%  CARDIAC: Patient has trace peripheral edema noted to bilateral lower extremities, capillary refill < 3 seconds. Pt reports "chest heaviness"  ABDOMEN: Soft and non tender to palpation, no distention noted. Bowel sounds present x 4  NEUROLOGIC: PERRL, 3 mm bilaterally, eyes open spontaneously, behavior appropriate to situation, follows commands, facial expression symmetrical, purposeful motor response noted, normal sensation in all extremities when touched with a finger.    "

## 2018-07-02 NOTE — ASSESSMENT & PLAN NOTE
COPD with acute exacerbation  Obstructive lung disease (generalized)  Hypoxia  Cough in adult  - Admitted for shortness of breath, diffuse wheezing, cough that is mildly productive, denies fever. No formal COPD testing but CXR: Suggestion of underlying COPD and long hx of tobacco use lead to COPD/Obstructive lung disease as the leading diagnosis.  - low BNP, no hx of CAD, no LE swell, so CHF exacerbation is low suspicion   - Low suspicion for PE, given no LE swelling no chest pain. Checking D-Dimer  - COPD pathway started advance as tolerated, Nebs Q6, PO steroid, Avalox given productive cough  - Started pt on Breo and Spiriva HERE  - wean down oxygen as tolerated  - suspect will need at least spiriva and albuterol inhalers on D/C  - will need to see pulm in clinic for formal PFT test when stable

## 2018-07-02 NOTE — ED NOTES
Hourly rounding completed on patient.  Pt is in bed awake and alert. Pt is resting comfortably and is in no acute distress.  Pt denies chest pain or SOB. Pt has no complaints at this time.     The bed is in low, locked position with side rails upx2. Call light is in reach, and patient is oriented to call light use. Pt states they will call nurse if they need assistance.    Pt remains on 2 L NC

## 2018-07-03 ENCOUNTER — TELEPHONE (OUTPATIENT)
Dept: PHARMACY | Facility: CLINIC | Age: 68
End: 2018-07-03

## 2018-07-03 PROBLEM — I27.20 PULMONARY HTN: Status: ACTIVE | Noted: 2018-07-03

## 2018-07-03 LAB
BASOPHILS # BLD AUTO: 0.03 K/UL
BASOPHILS NFR BLD: 0.4 %
DIASTOLIC DYSFUNCTION: NO
DIFFERENTIAL METHOD: ABNORMAL
EOSINOPHIL # BLD AUTO: 0.1 K/UL
EOSINOPHIL NFR BLD: 0.9 %
ERYTHROCYTE [DISTWIDTH] IN BLOOD BY AUTOMATED COUNT: 13.2 %
ESTIMATED PA SYSTOLIC PRESSURE: 45.45
HCT VFR BLD AUTO: 41 %
HGB BLD-MCNC: 13.2 G/DL
IMM GRANULOCYTES # BLD AUTO: 0.04 K/UL
IMM GRANULOCYTES NFR BLD AUTO: 0.5 %
LYMPHOCYTES # BLD AUTO: 1 K/UL
LYMPHOCYTES NFR BLD: 13 %
MCH RBC QN AUTO: 31.4 PG
MCHC RBC AUTO-ENTMCNC: 32.2 G/DL
MCV RBC AUTO: 98 FL
MONOCYTES # BLD AUTO: 1.1 K/UL
MONOCYTES NFR BLD: 13.7 %
NEUTROPHILS # BLD AUTO: 5.8 K/UL
NEUTROPHILS NFR BLD: 71.5 %
NRBC BLD-RTO: 0 /100 WBC
PLATELET # BLD AUTO: 313 K/UL
PMV BLD AUTO: 9.3 FL
POCT GLUCOSE: 128 MG/DL (ref 70–110)
POCT GLUCOSE: 135 MG/DL (ref 70–110)
POCT GLUCOSE: 99 MG/DL (ref 70–110)
RBC # BLD AUTO: 4.2 M/UL
RETIRED EF AND QEF - SEE NOTES: 65 (ref 55–65)
TRICUSPID VALVE REGURGITATION: ABNORMAL
WBC # BLD AUTO: 8.03 K/UL

## 2018-07-03 PROCEDURE — 97165 OT EVAL LOW COMPLEX 30 MIN: CPT

## 2018-07-03 PROCEDURE — 93306 TTE W/DOPPLER COMPLETE: CPT

## 2018-07-03 PROCEDURE — 99232 SBSQ HOSP IP/OBS MODERATE 35: CPT | Mod: ,,, | Performed by: HOSPITALIST

## 2018-07-03 PROCEDURE — G8988 SELF CARE GOAL STATUS: HCPCS | Mod: CH

## 2018-07-03 PROCEDURE — 36415 COLL VENOUS BLD VENIPUNCTURE: CPT

## 2018-07-03 PROCEDURE — G8978 MOBILITY CURRENT STATUS: HCPCS | Mod: CH

## 2018-07-03 PROCEDURE — G8979 MOBILITY GOAL STATUS: HCPCS | Mod: CH

## 2018-07-03 PROCEDURE — 25000003 PHARM REV CODE 250: Performed by: PHYSICIAN ASSISTANT

## 2018-07-03 PROCEDURE — 85025 COMPLETE CBC W/AUTO DIFF WBC: CPT

## 2018-07-03 PROCEDURE — 94640 AIRWAY INHALATION TREATMENT: CPT

## 2018-07-03 PROCEDURE — G8980 MOBILITY D/C STATUS: HCPCS | Mod: CH

## 2018-07-03 PROCEDURE — 94761 N-INVAS EAR/PLS OXIMETRY MLT: CPT

## 2018-07-03 PROCEDURE — G8989 SELF CARE D/C STATUS: HCPCS | Mod: CH

## 2018-07-03 PROCEDURE — 11000001 HC ACUTE MED/SURG PRIVATE ROOM

## 2018-07-03 PROCEDURE — 63600175 PHARM REV CODE 636 W HCPCS: Performed by: HOSPITALIST

## 2018-07-03 PROCEDURE — 93306 TTE W/DOPPLER COMPLETE: CPT | Mod: 26,,, | Performed by: INTERNAL MEDICINE

## 2018-07-03 PROCEDURE — G8987 SELF CARE CURRENT STATUS: HCPCS | Mod: CH

## 2018-07-03 PROCEDURE — 25000003 PHARM REV CODE 250: Performed by: HOSPITALIST

## 2018-07-03 PROCEDURE — 97161 PT EVAL LOW COMPLEX 20 MIN: CPT

## 2018-07-03 PROCEDURE — 27000221 HC OXYGEN, UP TO 24 HOURS

## 2018-07-03 PROCEDURE — 25000242 PHARM REV CODE 250 ALT 637 W/ HCPCS: Performed by: HOSPITALIST

## 2018-07-03 RX ORDER — BENZONATATE 100 MG/1
100 CAPSULE ORAL 3 TIMES DAILY PRN
Status: DISCONTINUED | OUTPATIENT
Start: 2018-07-03 | End: 2018-07-05 | Stop reason: HOSPADM

## 2018-07-03 RX ADMIN — PREDNISONE 40 MG: 20 TABLET ORAL at 11:07

## 2018-07-03 RX ADMIN — IPRATROPIUM BROMIDE AND ALBUTEROL SULFATE 3 ML: .5; 3 SOLUTION RESPIRATORY (INHALATION) at 12:07

## 2018-07-03 RX ADMIN — IPRATROPIUM BROMIDE AND ALBUTEROL SULFATE 3 ML: .5; 3 SOLUTION RESPIRATORY (INHALATION) at 08:07

## 2018-07-03 RX ADMIN — BENZONATATE 100 MG: 100 CAPSULE ORAL at 09:07

## 2018-07-03 RX ADMIN — FLUTICASONE FUROATE AND VILANTEROL TRIFENATATE 1 PUFF: 100; 25 POWDER RESPIRATORY (INHALATION) at 11:07

## 2018-07-03 RX ADMIN — IPRATROPIUM BROMIDE AND ALBUTEROL SULFATE 3 ML: .5; 3 SOLUTION RESPIRATORY (INHALATION) at 07:07

## 2018-07-03 RX ADMIN — TIOTROPIUM BROMIDE 18 MCG: 18 CAPSULE ORAL; RESPIRATORY (INHALATION) at 11:07

## 2018-07-03 RX ADMIN — MOXIFLOXACIN HYDROCHLORIDE 400 MG: 400 TABLET, FILM COATED ORAL at 05:07

## 2018-07-03 NOTE — PT/OT/SLP EVAL
Occupational Therapy   Evaluation    Name: Estefani Fam  MRN: [id number]  Admitting Diagnosis:  Acute on chronic respiratory failure with hypoxia      Recommendations:     Discharge Recommendations: home  Discharge Equipment Recommendations:  none  Barriers to discharge:  None    History:     Occupational Profile:  Living Environment: Pt lives with her  in a 2 story home (~20 steps to second floor) with 5 steps to enter, bedroom upstairs and bath downstairs. She uses a walk in shower without a seat.   Previous level of function: Pt was independent with all ADL and IADL tasks including driving. She has assist with cleaning home 2x/week. She does not work. She enjoys cooking and taking her dog for a walk.   Roles and Routines: caretaker to self and home, , community dweller, wife, mother, caretaker to pet  Equipment Owned:  none  Assistance upon Discharge: son checks in occasionally, assist from  other than working hours     Past Medical History:   Diagnosis Date    Cataract     History of retinal hemorrhage        Past Surgical History:   Procedure Laterality Date    HAND SURGERY         Subjective     Chief Complaint: SOB  Patient/Family stated goals: to get better  Communicated with: RN prior to session. Alexaal completed with PT.  present for session.   Pain/Comfort:  · Pain Rating 1: 0/10    Patients cultural, spiritual, Restorationist conflicts given the current situation:      Objective:     Patient found with: oxygen, peripheral IV (visi unit)    General Precautions: Standard, fall   Orthopedic Precautions:N/A   Braces: N/A     Occupational Performance:    Bed Mobility:    · Patient completed Rolling/Turning to Left with  independence  · Patient completed Scooting/Bridging with independence  · Patient completed Supine to Sit with independence  · Patient completed Sit to Supine with independence    Functional Mobility/Transfers:  · Patient completed Sit <> Stand Transfer with  independence  with  no assistive device   · Functional Mobility: independent for short household distance without AD, independent or ~10 stairs    Activities of Daily Living:  · Upper Body Dressing: independence to don gown like jacket while seated EOB  · Lower Body Dressing: independence to don socks while seated EOB    Cognitive/Visual Perceptual:  Cognitive/Psychosocial Skills:     -       Oriented to: Person, Place, Time and Situation   -       Follows Commands/attention:Follows multistep  commands  -       Communication: clear/fluent  -       Memory: No Deficits noted  -       Safety awareness/insight to disability: intact   -       Mood/Affect/Coping skills/emotional control: Appropriate to situation  Visual/Perceptual:      -Intact    Physical Exam:  Balance:    -       independent with sitting and standing balance  Postural examination/scapula alignment:    -       Kyphosis  Skin integrity: Visible skin intact  Edema:  None noted  Sensation:    -       Intact  Motor Planning:    -       intact   Dominant hand:    -       R  Upper Extremity Range of Motion (tested in standing):     -       Right Upper Extremity: WFL  -       Left Upper Extremity: WFL  Upper Extremity Strength (tested in standing):    -       Right Upper Extremity: WFL  -       Left Upper Extremity: WFL   Strength:    -       Right Upper Extremity: WFL  -       Left Upper Extremity: WFL  Fine Motor Coordination:    -       Intact  Left hand thumb/finger opposition skills and Right hand thumb/finger opposition skills    Patient left sitting EOB with all lines intact, call button in reach and  present    AMPA 6 Click:  AMPA Total Score: 24    Treatment & Education:  -Edu for OT role and POC  -Edu and demo for pursed lip breathing  -Edu and demo for leaning forearms onto thighs or stable surface when SOB for better lung capacity   -ROM and MMT performed in standing without O2 on--Noted O2 was at 90 after this activity, so seated rest  "break was needed until O2 increased    -rest of session and functional mobility/stairs performed with O2 donned due to this--O2 remained 93-96 for session  -Whiteboard updated   -Questions and concerns addressed within OT scope of practice   Education:    Assessment:     Estefani Fam is a 67 y.o. female with a medical diagnosis of Acute on chronic respiratory failure with hypoxia.  She presents with functioning at her baseline other than SOB with activity--education provided for pursed lip breathing and position compensation technique to avoid SOB. Pt does not have any OT needs at this time.         Clinical Decision Makin.  OT Low:  "Pt evaluation falls under low complexity for evaluation coding due to performance deficits noted in 1-3 areas as stated above and 0 co-morbities affecting current functional status. Data obtained from problem focused assessments. No modifications or assistance was required for completion of evaluation. Only brief occupational profile and history review completed."     Plan:     No OT needs at this time. Please re-consult if status changes.     Time Tracking:     OT Date of Treatment: 18  OT Start Time: 844  OT Stop Time: 903  OT Total Time (min): 19 min    Billable Minutes:Evaluation 19    Sally Orellana OT  7/3/2018    "

## 2018-07-03 NOTE — ASSESSMENT & PLAN NOTE
Pulm htn  Noted on echo with RVH and EMELY  May be contributing to SOB   F/u with pulmonary - may need outpt cardiology as well for RHC

## 2018-07-03 NOTE — PLAN OF CARE
Problem: Physical Therapy Goal  Goal: Physical Therapy Goal  Outcome: Outcome(s) achieved Date Met: 07/03/18  PT eval complete, no goals set, no skilled need for PT at this time

## 2018-07-03 NOTE — PT/OT/SLP EVAL
Physical Therapy Evaluation and Discharge Note    Patient Name:  Estefani Fam   MRN:  [id number]    Recommendations:     Discharge Recommendations:  home   Discharge Equipment Recommendations: none   Barriers to discharge: None    Assessment:     Estefani Fam is a 67 y.o. female admitted with a medical diagnosis of Acute on chronic respiratory failure with hypoxia. .  At this time, patient is functioning at their prior level of function and does not require further acute PT services.     Recent Surgery: * No surgery found *      Plan:     During this hospitalization, patient does not require further acute PT services.  Please re-consult if situation changes.     Plan of Care Reviewed with: patient, spouse    Subjective     Communicated with pt and nurse prior to session.  Patient found supine in bed with spouse present upon PT entry to room, agreeable to evaluation.      Chief Complaint: SOB with activity   Patient comments/goals: to return home  Pain/Comfort:  · Pain Rating 1: 0/10  · Pain Rating Post-Intervention 1: 0/10    Patients cultural, spiritual, Amish conflicts given the current situation: no    Living Environment:  Pt lives with spouse in 2SH with BR/BA on 2nd floor. 5 DEONTE with no HR. 20 steps to 2nd floor with RHR. Walk in shower.   Prior to admission, patients level of function was independent without AD including driving.  Patient has the following equipment: none.  DME owned (not currently used): none.  Upon discharge, patient will have assistance from spouse.    Objective:     Patient found with: oxygen, telemetry, pulse ox (continuous)     O2 sat: at rest on RA 95-97%   With activity on RA 90%   2L with ambulation 92-93%    General Precautions: Standard, fall   Orthopedic Precautions:N/A   Braces: N/A     Exams:  · Cognitive Exam:  Patient is oriented to Person, Place, Time and Situation and follows 100% of multi step commands   · Gross Motor Coordination:  WFL  · Postural Exam:   Patient presented with the following abnormalities:    · -       Rounded shoulders  · -       Forward head  · Sensation:    · -       Intact  · Skin Integrity/Edema:      · -       Skin integrity: Visible skin intact  · -       Edema: None noted BLE  · RLE ROM: WNL  · RLE Strength: WNL  · LLE ROM: WNL  · LLE Strength: WNL    Functional Mobility:  · Bed Mobility:     · Supine to Sit: independence  · Sit to Supine: independence  · Transfers:     · Sit to Stand:  independence with no AD  · Gait: 100 feet without AD with independence with no deviations and no LOB noted  · Balance: pt is able to accept mod to max challenges to standing balance without LOB  · Stairs:  Pt ascended/descended 10 stair(s) with No Assistive Device with right handrail with Independent.     AM-PAC 6 CLICK MOBILITY  Total Score:24       Therapeutic Activities and Exercises:   PT provided pt education on:   -role of PT and POC/DC   -importance of OOB activity   -LE exercises to perform independently   -encourage amb in hallway     Patient left sitting EOB  with call button in reach and spouse present.    GOALS:    Physical Therapy Goals     Not on file          Multidisciplinary Problems (Resolved)        Problem: Physical Therapy Goal    Goal Priority Disciplines Outcome Goal Variances Interventions   Physical Therapy Goal   (Resolved)     PT/OT, PT Outcome(s) achieved                     History:     Past Medical History:   Diagnosis Date    Cataract     History of retinal hemorrhage        Past Surgical History:   Procedure Laterality Date    HAND SURGERY         Clinical Decision Making:     History  Co-morbidities and personal factors that may impact the plan of care Examination  Body Structures and Functions, activity limitations and participation restrictions that may impact the plan of care Clinical Presentation   Decision Making/ Complexity Score   Co-morbidities:   [] Time since onset of injury / illness / exacerbation  [] Status of  current condition  []Patient's cognitive status and safety concerns    [x] Multiple Medical Problems (see med hx)  Personal Factors:   [] Patient's age  [] Prior Level of function   [] Patient's home situation (environment and family support)  [] Patient's level of motivation  [] Expected progression of patient      HISTORY:(criteria)    [x] 89729 - no personal factors/history    [] 16993 - has 1-2 personal factor/comorbidity     [] 79046 - has >3 personal factor/comorbidity     Body Regions:  [] Objective examination findings  [] Head     []  Neck  [x] Trunk   [] Upper Extremity  [x] Lower Extremity    Body Systems:  [] For communication ability, affect, cognition, language, and learning style: the assessment of the ability to make needs known, consciousness, orientation (person, place, and time), expected emotional /behavioral responses, and learning preferences (eg, learning barriers, education  needs)  [] For the neuromuscular system: a general assessment of gross coordinated movement (eg, balance, gait, locomotion, transfers, and transitions) and motor function  (motor control and motor learning)  [] For the musculoskeletal system: the assessment of gross symmetry, gross range of motion, gross strength, height, and weight  [] For the integumentary system: the assessment of pliability(texture), presence of scar formation, skin color, and skin integrity  [x] For cardiovascular/pulmonary system: the assessment of heart rate, respiratory rate, blood pressure, and edema     Activity limitations:    [] Patient's cognitive status and saf ety concerns          [] Status of current condition      [] Weight bearing restriction  [x] Cardiopulmunary Restriction    Participation Restrictions:   [] Goals and goal agreement with the patient     [] Rehab potential (prognosis) and probable outcome      Examination of Body System: (criteria)    [x] 15992 - addressing 1-2 elements    [] 87349 - addressing a total of 3 or more  elements     [] 49574 -  Addressing a total of 4 or more elements         Clinical Presentation: (criteria)  Stable - 76622     On examination of body system using standardized tests and measures patient presents with 1-2 elements from any of the following: body structures and functions, activity limitations, and/or participation restrictions.  Leading to a clinical presentation that is considered stable and/or uncomplicated                              Clinical Decision Making  (Eval Complexity):  Low- 98415     Time Tracking:     PT Received On: 07/03/18  PT Start Time: 0846     PT Stop Time: 0905  PT Total Time (min): 19 min     Billable Minutes: Evaluation 19      Pratima Barroso, PT  07/03/2018

## 2018-07-03 NOTE — ASSESSMENT & PLAN NOTE
COPD with acute exacerbation  Obstructive lung disease (generalized)  Hypoxia  Cough in adult  - Admitted for shortness of breath, diffuse wheezing, cough that is mildly productive, denies fever. No formal COPD testing but CXR: Suggestion of underlying COPD and long hx of tobacco use lead to COPD/Obstructive lung disease as the leading diagnosis. Pt feels improved today   - low BNP, no hx of CAD, no LE swell, so CHF exacerbation is low suspicion   - Low suspicion for PE, given no LE swelling no chest pain.  - COPD pathway started advance as tolerated, Nebs Q6, PO steroid, Avalox given productive cough  - Started pt on Breo and Spiriva   - wean down oxygen as tolerated  - suspect will need at least spiriva and albuterol inhalers on D/C  - will need to see pulm in clinic for formal PFT test when stable; possibly tomorrow

## 2018-07-03 NOTE — ASSESSMENT & PLAN NOTE
- pt report stopped over the last 2 years  - 60 pack year hx  - will need pulm f/u given concern for COPD

## 2018-07-03 NOTE — PLAN OF CARE
ONLY need is possible oxygen  Cleared by therapy  No dme recommended  Future Appointments  Date Time Provider Department Center   7/3/2018 10:15 AM [facility] YASMIN Naqvi     Has ride home when needed    Future Appointments  Date Time Provider Department Center   7/3/2018 10:15 AM [facility] YASMIN Naqvi   7/13/2018 2:00 PM Sharmaine Qureshi, NP [facility] Luc [facility] PCW     PCP booked but able to make NP appt     07/03/18 0947   Discharge Assessment   Assessment Type Discharge Planning Assessment   Confirmed/corrected address and phone number on facesheet? Yes   Assessment information obtained from? Caregiver;Patient   Expected Length of Stay (days) 4   Communicated expected length of stay with patient/caregiver yes   Prior to hospitilization cognitive status: Alert/Oriented   Prior to hospitalization functional status: Independent   Current cognitive status: Alert/Oriented   Current Functional Status: Independent   Lives With spouse   Able to Return to Prior Arrangements yes   Is patient able to care for self after discharge? Yes   Patient's perception of discharge disposition home or selfcare   Readmission Within The Last 30 Days no previous admission in last 30 days   Patient currently being followed by outpatient case management? No   Patient currently receives any other outside agency services? No   Equipment Currently Used at Home none   Do you have any problems affording any of your prescribed medications? No   Is the patient taking medications as prescribed? yes   Does the patient have transportation home? Yes   Does the patient receive services at the [facility]? No   Discharge Plan A Home with family   Discharge Plan B Home with family   Patient/Family In Agreement With Plan yes

## 2018-07-03 NOTE — PROGRESS NOTES
Pt finished eating breakfast before glucose could be taken.  Pt instructed to notify nurse when tray arrives to room so sugar could be taken before eating.

## 2018-07-03 NOTE — PLAN OF CARE
Problem: Occupational Therapy Goal  Goal: Occupational Therapy Goal  Outcome: Outcome(s) achieved Date Met: 07/03/18  OT eval and DC this date. Home, no needs. Sally Orellana OT 7/3/2018

## 2018-07-03 NOTE — PROGRESS NOTES
[facility] Hospital Medicine  Progress Note    Patient Name: Estefani Fam  MRN: [id number]  Patient Class: IP- Inpatient   Admission Date: 7/2/2018  Length of Stay: 1 days  Attending Physician: Jennie Kc MD  Primary Care Provider: Juan Truong MD    [facility] [facility] Jennie Kc MD    Subjective:     Principal Problem:Acute on chronic respiratory failure with hypoxia    HPI:  This is a 67 y.o. female with hx of retinal hemorrhage who is being admitted to hospital medicine for shortness of breath, suspect COPD exacerbation. She presented to the ED with a complaint of worsening SOB for the past two weeks. The patient reports at benign, she is active, able to walk her dog, until two weeks ago she began having breathing difficulty. Patient states SOB is worsening and associated with some chest discomfort. Report sleeping on 2-3 pillows at night. Per , patient gets SOB when getting out of the car and when going to use the restroom. He notes of patient's decreased physical activity due to SOB. She notes of loss of appetite, congestion, and cough, but denies sore throat, vision changes, fever, chills, dizziness, weakness, or any URI symptoms in the past 2 weeks. Denies any recent travels. Patient has no cardiac history in the past, denies DM, HTN, Asthma hx. Patient is a former smoker for 30 years, 1-2 packs a day, stop 2 years ago. Does not take any medications beside from OTC vitamins.        Hospital Course:  No notes on file    Interval History: Pt feels much improved but not quite at baseline. Walking to and from bathroom w/o incident or SOB. Worked with physical therapy - with mild SOB. 93% on RA this AM.   Review of Systems   Constitutional: Negative for appetite change, chills, fatigue and fever.   HENT: Positive for congestion and sore throat. Negative for sinus pressure and trouble swallowing.    Eyes: Negative for visual disturbance.    Respiratory: Positive for cough, chest tightness and shortness of breath. Negative for wheezing.    Cardiovascular: Negative for chest pain and leg swelling.   Gastrointestinal: Negative for abdominal distention, diarrhea, nausea and vomiting.   Genitourinary: Negative for dysuria and frequency.   Musculoskeletal: Negative for arthralgias, back pain, myalgias and neck pain.   Skin: Negative for pallor and rash.   Neurological: Positive for weakness. Negative for tremors, facial asymmetry and speech difficulty.   Hematological: Negative.    Psychiatric/Behavioral: Negative.  Negative for confusion. The patient is not nervous/anxious.      Objective:     Vital Signs (Most Recent):  Temp: 98.2 °F (36.8 °C) (07/03/18 1614)  Pulse: 78 (07/03/18 1614)  Resp: 17 (07/03/18 1614)  BP: (!) 101/48 (07/03/18 1614)  SpO2: 100 % (07/03/18 1614) Vital Signs (24h Range):  Temp:  [97.5 °F (36.4 °C)-98.5 °F (36.9 °C)] 98.2 °F (36.8 °C)  Pulse:  [] 78  Resp:  [14-22] 17  SpO2:  [92 %-100 %] 100 %  BP: (100-156)/(48-86) 101/48     Weight: 52.7 kg (116 lb 2.9 oz)  Body mass index is 17.67 kg/m².  No intake or output data in the 24 hours ending 07/03/18 1744   Physical Exam   Constitutional: She is oriented to person, place, and time. She appears well-developed and well-nourished.   Cardiovascular: Normal rate, regular rhythm and normal heart sounds.  Exam reveals no gallop and no friction rub.    No murmur heard.  Pulmonary/Chest: Effort normal and breath sounds normal. No respiratory distress. She has no wheezes. She has no rales.   Abdominal: Soft. Bowel sounds are normal. She exhibits no distension. There is no tenderness.   Musculoskeletal: Normal range of motion.   Neurological: She is alert and oriented to person, place, and time. She displays normal reflexes. No cranial nerve deficit. Coordination normal.   Skin: Skin is warm and dry.       MELD-Na score: 6 at 7/2/2018  7:13 PM  MELD score: 6 at 7/2/2018  7:13  PM  Calculated from:  Serum Creatinine: 0.8 mg/dL (Rounded to 1) at 7/2/2018  7:13 PM  Serum Sodium: 137 mmol/L at 7/2/2018  7:13 PM  Total Bilirubin: 0.4 mg/dL (Rounded to 1) at 7/2/2018 12:05 PM  INR(ratio): 1 at 7/2/2018 12:05 PM  Age: 67 years    Significant Labs:  CBC:    Recent Labs  Lab 07/02/18  1205 07/03/18  0644   WBC 7.23 8.03   HGB 14.5 13.2   HCT 45.4 41.0    313     CMP:    Recent Labs  Lab 07/02/18  1205 07/02/18  1913    137   K 3.9 3.7    101   CO2 24 25   * 252*   BUN 10 10   CREATININE 0.7 0.8   CALCIUM 10.0 9.5   PROT 7.3  --    ALBUMIN 4.2  --    BILITOT 0.4  --    ALKPHOS 117  --    AST 27  --    ALT 31  --    ANIONGAP 10 11   EGFRNONAA >60.0 >60.0     PTINR:    Recent Labs  Lab 07/02/18  1205   INR 1.0       Significant Procedures:   Dobutamine Stress Test with Color Flow: No results found for this or any previous visit.    2D Echo: No results found for this or any previous visit.    Assessment/Plan:      * Acute on chronic respiratory failure with hypoxia    COPD with acute exacerbation  Obstructive lung disease (generalized)  Hypoxia  Cough in adult  - Admitted for shortness of breath, diffuse wheezing, cough that is mildly productive, denies fever. No formal COPD testing but CXR: Suggestion of underlying COPD and long hx of tobacco use lead to COPD/Obstructive lung disease as the leading diagnosis. Pt feels improved today   - low BNP, no hx of CAD, no LE swell, so CHF exacerbation is low suspicion   - Low suspicion for PE, given no LE swelling no chest pain.  - COPD pathway started advance as tolerated, Nebs Q6, PO steroid, Avalox given productive cough  - Started pt on Breo and Spiriva   - wean down oxygen as tolerated  - suspect will need at least spiriva and albuterol inhalers on D/C  - will need to see pulm in clinic for formal PFT test when stable; possibly tomorrow         Pulmonary HTN    Pulm htn  Noted on echo with RVH and EMELY  May be contributing to SOB    F/u with pulmonary - may need outpt cardiology as well for RHC         Former heavy tobacco smoker    - pt report stopped over the last 2 years  - 60 pack year hx  - will need pulm f/u given concern for COPD        Elevated blood pressure reading    - BP elevated in ED - Now normalized   - will monitor  - hold anti-hypertensive for now            VTE Risk Mitigation         Ordered     enoxaparin injection 40 mg  Daily      07/02/18 1637     IP VTE HIGH RISK PATIENT  Once      07/02/18 1637              Jennie Kc MD  Department of Hospital Medicine   [facility]

## 2018-07-03 NOTE — SUBJECTIVE & OBJECTIVE
Interval History: Pt feels much improved but not quite at baseline. Walking to and from bathroom w/o incident or SOB. Worked with physical therapy - with mild SOB. 93% on RA this AM.   Review of Systems   Constitutional: Negative for appetite change, chills, fatigue and fever.   HENT: Positive for congestion and sore throat. Negative for sinus pressure and trouble swallowing.    Eyes: Negative for visual disturbance.   Respiratory: Positive for cough, chest tightness and shortness of breath. Negative for wheezing.    Cardiovascular: Negative for chest pain and leg swelling.   Gastrointestinal: Negative for abdominal distention, diarrhea, nausea and vomiting.   Genitourinary: Negative for dysuria and frequency.   Musculoskeletal: Negative for arthralgias, back pain, myalgias and neck pain.   Skin: Negative for pallor and rash.   Neurological: Positive for weakness. Negative for tremors, facial asymmetry and speech difficulty.   Hematological: Negative.    Psychiatric/Behavioral: Negative.  Negative for confusion. The patient is not nervous/anxious.      Objective:     Vital Signs (Most Recent):  Temp: 98.2 °F (36.8 °C) (07/03/18 1614)  Pulse: 78 (07/03/18 1614)  Resp: 17 (07/03/18 1614)  BP: (!) 101/48 (07/03/18 1614)  SpO2: 100 % (07/03/18 1614) Vital Signs (24h Range):  Temp:  [97.5 °F (36.4 °C)-98.5 °F (36.9 °C)] 98.2 °F (36.8 °C)  Pulse:  [] 78  Resp:  [14-22] 17  SpO2:  [92 %-100 %] 100 %  BP: (100-156)/(48-86) 101/48     Weight: 52.7 kg (116 lb 2.9 oz)  Body mass index is 17.67 kg/m².  No intake or output data in the 24 hours ending 07/03/18 1744   Physical Exam   Constitutional: She is oriented to person, place, and time. She appears well-developed and well-nourished.   Cardiovascular: Normal rate, regular rhythm and normal heart sounds.  Exam reveals no gallop and no friction rub.    No murmur heard.  Pulmonary/Chest: Effort normal and breath sounds normal. No respiratory distress. She has no wheezes.  She has no rales.   Abdominal: Soft. Bowel sounds are normal. She exhibits no distension. There is no tenderness.   Musculoskeletal: Normal range of motion.   Neurological: She is alert and oriented to person, place, and time. She displays normal reflexes. No cranial nerve deficit. Coordination normal.   Skin: Skin is warm and dry.       MELD-Na score: 6 at 7/2/2018  7:13 PM  MELD score: 6 at 7/2/2018  7:13 PM  Calculated from:  Serum Creatinine: 0.8 mg/dL (Rounded to 1) at 7/2/2018  7:13 PM  Serum Sodium: 137 mmol/L at 7/2/2018  7:13 PM  Total Bilirubin: 0.4 mg/dL (Rounded to 1) at 7/2/2018 12:05 PM  INR(ratio): 1 at 7/2/2018 12:05 PM  Age: 67 years    Significant Labs:  CBC:    Recent Labs  Lab 07/02/18  1205 07/03/18  0644   WBC 7.23 8.03   HGB 14.5 13.2   HCT 45.4 41.0    313     CMP:    Recent Labs  Lab 07/02/18  1205 07/02/18  1913    137   K 3.9 3.7    101   CO2 24 25   * 252*   BUN 10 10   CREATININE 0.7 0.8   CALCIUM 10.0 9.5   PROT 7.3  --    ALBUMIN 4.2  --    BILITOT 0.4  --    ALKPHOS 117  --    AST 27  --    ALT 31  --    ANIONGAP 10 11   EGFRNONAA >60.0 >60.0     PTINR:    Recent Labs  Lab 07/02/18  1205   INR 1.0       Significant Procedures:   Dobutamine Stress Test with Color Flow: No results found for this or any previous visit.    2D Echo: No results found for this or any previous visit.

## 2018-07-04 LAB
ANION GAP SERPL CALC-SCNC: 7 MMOL/L
BASOPHILS # BLD AUTO: 0.06 K/UL
BASOPHILS NFR BLD: 0.7 %
BUN SERPL-MCNC: 14 MG/DL
CALCIUM SERPL-MCNC: 9.2 MG/DL
CHLORIDE SERPL-SCNC: 104 MMOL/L
CO2 SERPL-SCNC: 27 MMOL/L
CREAT SERPL-MCNC: 0.7 MG/DL
DIFFERENTIAL METHOD: ABNORMAL
EOSINOPHIL # BLD AUTO: 0.3 K/UL
EOSINOPHIL NFR BLD: 4.1 %
ERYTHROCYTE [DISTWIDTH] IN BLOOD BY AUTOMATED COUNT: 13.2 %
EST. GFR  (AFRICAN AMERICAN): >60 ML/MIN/1.73 M^2
EST. GFR  (NON AFRICAN AMERICAN): >60 ML/MIN/1.73 M^2
GLUCOSE SERPL-MCNC: 82 MG/DL
HCT VFR BLD AUTO: 42.4 %
HGB BLD-MCNC: 13.5 G/DL
IMM GRANULOCYTES # BLD AUTO: 0.03 K/UL
IMM GRANULOCYTES NFR BLD AUTO: 0.4 %
LYMPHOCYTES # BLD AUTO: 2.1 K/UL
LYMPHOCYTES NFR BLD: 26 %
MCH RBC QN AUTO: 31 PG
MCHC RBC AUTO-ENTMCNC: 31.8 G/DL
MCV RBC AUTO: 98 FL
MONOCYTES # BLD AUTO: 1.1 K/UL
MONOCYTES NFR BLD: 13.1 %
NEUTROPHILS # BLD AUTO: 4.6 K/UL
NEUTROPHILS NFR BLD: 55.7 %
NRBC BLD-RTO: 0 /100 WBC
PLATELET # BLD AUTO: 277 K/UL
PMV BLD AUTO: 9.7 FL
POCT GLUCOSE: 104 MG/DL (ref 70–110)
POTASSIUM SERPL-SCNC: 4.1 MMOL/L
RBC # BLD AUTO: 4.35 M/UL
SODIUM SERPL-SCNC: 138 MMOL/L
WBC # BLD AUTO: 8.23 K/UL

## 2018-07-04 PROCEDURE — 25000003 PHARM REV CODE 250: Performed by: PHYSICIAN ASSISTANT

## 2018-07-04 PROCEDURE — 11000001 HC ACUTE MED/SURG PRIVATE ROOM

## 2018-07-04 PROCEDURE — 25000003 PHARM REV CODE 250: Performed by: HOSPITALIST

## 2018-07-04 PROCEDURE — 85025 COMPLETE CBC W/AUTO DIFF WBC: CPT

## 2018-07-04 PROCEDURE — 25000242 PHARM REV CODE 250 ALT 637 W/ HCPCS: Performed by: HOSPITALIST

## 2018-07-04 PROCEDURE — 27000221 HC OXYGEN, UP TO 24 HOURS

## 2018-07-04 PROCEDURE — 94640 AIRWAY INHALATION TREATMENT: CPT

## 2018-07-04 PROCEDURE — 80048 BASIC METABOLIC PNL TOTAL CA: CPT

## 2018-07-04 PROCEDURE — 99232 SBSQ HOSP IP/OBS MODERATE 35: CPT | Mod: ,,, | Performed by: HOSPITALIST

## 2018-07-04 PROCEDURE — 63600175 PHARM REV CODE 636 W HCPCS: Performed by: HOSPITALIST

## 2018-07-04 PROCEDURE — 36415 COLL VENOUS BLD VENIPUNCTURE: CPT

## 2018-07-04 PROCEDURE — 94761 N-INVAS EAR/PLS OXIMETRY MLT: CPT

## 2018-07-04 RX ADMIN — BENZONATATE 100 MG: 100 CAPSULE ORAL at 08:07

## 2018-07-04 RX ADMIN — IPRATROPIUM BROMIDE AND ALBUTEROL SULFATE 3 ML: .5; 3 SOLUTION RESPIRATORY (INHALATION) at 07:07

## 2018-07-04 RX ADMIN — ENOXAPARIN SODIUM 40 MG: 100 INJECTION SUBCUTANEOUS at 05:07

## 2018-07-04 RX ADMIN — IPRATROPIUM BROMIDE AND ALBUTEROL SULFATE 3 ML: .5; 3 SOLUTION RESPIRATORY (INHALATION) at 01:07

## 2018-07-04 RX ADMIN — FLUTICASONE FUROATE AND VILANTEROL TRIFENATATE 1 PUFF: 100; 25 POWDER RESPIRATORY (INHALATION) at 11:07

## 2018-07-04 RX ADMIN — MOXIFLOXACIN HYDROCHLORIDE 400 MG: 400 TABLET, FILM COATED ORAL at 05:07

## 2018-07-04 RX ADMIN — SENNOSIDES AND DOCUSATE SODIUM 1 TABLET: 8.6; 5 TABLET ORAL at 08:07

## 2018-07-04 RX ADMIN — BENZONATATE 100 MG: 100 CAPSULE ORAL at 11:07

## 2018-07-04 RX ADMIN — PREDNISONE 40 MG: 20 TABLET ORAL at 08:07

## 2018-07-04 RX ADMIN — TIOTROPIUM BROMIDE 18 MCG: 18 CAPSULE ORAL; RESPIRATORY (INHALATION) at 11:07

## 2018-07-04 RX ADMIN — IPRATROPIUM BROMIDE AND ALBUTEROL SULFATE 3 ML: .5; 3 SOLUTION RESPIRATORY (INHALATION) at 06:07

## 2018-07-04 RX ADMIN — IPRATROPIUM BROMIDE AND ALBUTEROL SULFATE 3 ML: .5; 3 SOLUTION RESPIRATORY (INHALATION) at 02:07

## 2018-07-04 NOTE — SUBJECTIVE & OBJECTIVE
Interval History: Pt feels much improved but still SOb with moving around.     Review of Systems   Constitutional: Negative for appetite change, chills, fatigue and fever.   HENT: Positive for congestion and sore throat. Negative for sinus pressure and trouble swallowing.    Eyes: Negative for visual disturbance.   Respiratory: Positive for cough, chest tightness and shortness of breath. Negative for wheezing.    Cardiovascular: Negative for chest pain and leg swelling.   Gastrointestinal: Negative for abdominal distention, diarrhea, nausea and vomiting.   Genitourinary: Negative for dysuria and frequency.   Musculoskeletal: Negative for arthralgias, back pain, myalgias and neck pain.   Skin: Negative for pallor and rash.   Neurological: Positive for weakness. Negative for tremors, facial asymmetry and speech difficulty.   Hematological: Negative.    Psychiatric/Behavioral: Negative.  Negative for confusion. The patient is not nervous/anxious.      Objective:     Vital Signs (Most Recent):  Temp: 97.6 °F (36.4 °C) (07/04/18 0400)  Pulse: 106 (07/04/18 1441)  Resp: 18 (07/04/18 1441)  BP: 119/67 (07/04/18 0400)  SpO2: (!) 93 % (07/04/18 1441) Vital Signs (24h Range):  Temp:  [97.6 °F (36.4 °C)-98.2 °F (36.8 °C)] 97.6 °F (36.4 °C)  Pulse:  [] 106  Resp:  [16-24] 18  SpO2:  [93 %-100 %] 93 %  BP: (101-141)/(48-79) 119/67     Weight: 52.7 kg (116 lb 2.9 oz)  Body mass index is 17.67 kg/m².  No intake or output data in the 24 hours ending 07/04/18 1444   Physical Exam   Constitutional: She is oriented to person, place, and time. She appears well-developed and well-nourished.   Cardiovascular: Normal rate, regular rhythm and normal heart sounds.  Exam reveals no gallop and no friction rub.    No murmur heard.  Pulmonary/Chest: Effort normal and breath sounds normal. No respiratory distress. She has no wheezes. She has no rales.   Abdominal: Soft. Bowel sounds are normal. She exhibits no distension. There is no  tenderness.   Musculoskeletal: Normal range of motion.   Neurological: She is alert and oriented to person, place, and time. She displays normal reflexes. No cranial nerve deficit. Coordination normal.   Skin: Skin is warm and dry.       MELD-Na score: 6 at 7/4/2018  6:40 AM  MELD score: 6 at 7/4/2018  6:40 AM  Calculated from:  Serum Creatinine: 0.7 mg/dL (Rounded to 1) at 7/4/2018  6:40 AM  Serum Sodium: 138 mmol/L (Rounded to 137) at 7/4/2018  6:40 AM  Total Bilirubin: 0.4 mg/dL (Rounded to 1) at 7/2/2018 12:05 PM  INR(ratio): 1 at 7/2/2018 12:05 PM  Age: 67 years    Significant Labs:  CBC:    Recent Labs  Lab 07/03/18  0644 07/04/18  0640   WBC 8.03 8.23   HGB 13.2 13.5   HCT 41.0 42.4    277     CMP:    Recent Labs  Lab 07/02/18  1913 07/04/18  0640    138   K 3.7 4.1    104   CO2 25 27   * 82   BUN 10 14   CREATININE 0.8 0.7   CALCIUM 9.5 9.2   ANIONGAP 11 7*   EGFRNONAA >60.0 >60.0     PTINR:  No results for input(s): INR in the last 48 hours.    Significant Procedures:   Dobutamine Stress Test with Color Flow: No results found for this or any previous visit.    2D Echo: No results found for this or any previous visit.

## 2018-07-04 NOTE — PROGRESS NOTES
[facility] Hospital Medicine  Progress Note    Patient Name: Estefani Fam  MRN: [id number]  Patient Class: IP- Inpatient   Admission Date: 7/2/2018  Length of Stay: 2 days  Attending Physician: Jennie Kc MD  Primary Care Provider: Juan Truong MD    [facility] Medicine Team: [facility] A Jennie Kc MD    Subjective:     Principal Problem:Acute on chronic respiratory failure with hypoxia    HPI:  This is a 67 y.o. female with hx of retinal hemorrhage who is being admitted to hospital medicine for shortness of breath, suspect COPD exacerbation. She presented to the ED with a complaint of worsening SOB for the past two weeks. The patient reports at benign, she is active, able to walk her dog, until two weeks ago she began having breathing difficulty. Patient states SOB is worsening and associated with some chest discomfort. Report sleeping on 2-3 pillows at night. Per , patient gets SOB when getting out of the car and when going to use the restroom. He notes of patient's decreased physical activity due to SOB. She notes of loss of appetite, congestion, and cough, but denies sore throat, vision changes, fever, chills, dizziness, weakness, or any URI symptoms in the past 2 weeks. Denies any recent travels. Patient has no cardiac history in the past, denies DM, HTN, Asthma hx. Patient is a former smoker for 30 years, 1-2 packs a day, stop 2 years ago. Does not take any medications beside from OTC vitamins.        Hospital Course:  No notes on file    Interval History: Pt feels much improved but still SOb with moving around.     Review of Systems   Constitutional: Negative for appetite change, chills, fatigue and fever.   HENT: Positive for congestion and sore throat. Negative for sinus pressure and trouble swallowing.    Eyes: Negative for visual disturbance.   Respiratory: Positive for cough, chest tightness and shortness of breath. Negative for wheezing.    Cardiovascular:  Negative for chest pain and leg swelling.   Gastrointestinal: Negative for abdominal distention, diarrhea, nausea and vomiting.   Genitourinary: Negative for dysuria and frequency.   Musculoskeletal: Negative for arthralgias, back pain, myalgias and neck pain.   Skin: Negative for pallor and rash.   Neurological: Positive for weakness. Negative for tremors, facial asymmetry and speech difficulty.   Hematological: Negative.    Psychiatric/Behavioral: Negative.  Negative for confusion. The patient is not nervous/anxious.      Objective:     Vital Signs (Most Recent):  Temp: 97.6 °F (36.4 °C) (07/04/18 0400)  Pulse: 106 (07/04/18 1441)  Resp: 18 (07/04/18 1441)  BP: 119/67 (07/04/18 0400)  SpO2: (!) 93 % (07/04/18 1441) Vital Signs (24h Range):  Temp:  [97.6 °F (36.4 °C)-98.2 °F (36.8 °C)] 97.6 °F (36.4 °C)  Pulse:  [] 106  Resp:  [16-24] 18  SpO2:  [93 %-100 %] 93 %  BP: (101-141)/(48-79) 119/67     Weight: 52.7 kg (116 lb 2.9 oz)  Body mass index is 17.67 kg/m².  No intake or output data in the 24 hours ending 07/04/18 1444   Physical Exam   Constitutional: She is oriented to person, place, and time. She appears well-developed and well-nourished.   Cardiovascular: Normal rate, regular rhythm and normal heart sounds.  Exam reveals no gallop and no friction rub.    No murmur heard.  Pulmonary/Chest: Effort normal and breath sounds normal. No respiratory distress. She has no wheezes. She has no rales.   Abdominal: Soft. Bowel sounds are normal. She exhibits no distension. There is no tenderness.   Musculoskeletal: Normal range of motion.   Neurological: She is alert and oriented to person, place, and time. She displays normal reflexes. No cranial nerve deficit. Coordination normal.   Skin: Skin is warm and dry.       MELD-Na score: 6 at 7/4/2018  6:40 AM  MELD score: 6 at 7/4/2018  6:40 AM  Calculated from:  Serum Creatinine: 0.7 mg/dL (Rounded to 1) at 7/4/2018  6:40 AM  Serum Sodium: 138 mmol/L (Rounded to 137)  at 7/4/2018  6:40 AM  Total Bilirubin: 0.4 mg/dL (Rounded to 1) at 7/2/2018 12:05 PM  INR(ratio): 1 at 7/2/2018 12:05 PM  Age: 67 years    Significant Labs:  CBC:    Recent Labs  Lab 07/03/18  0644 07/04/18  0640   WBC 8.03 8.23   HGB 13.2 13.5   HCT 41.0 42.4    277     CMP:    Recent Labs  Lab 07/02/18  1913 07/04/18  0640    138   K 3.7 4.1    104   CO2 25 27   * 82   BUN 10 14   CREATININE 0.8 0.7   CALCIUM 9.5 9.2   ANIONGAP 11 7*   EGFRNONAA >60.0 >60.0     PTINR:  No results for input(s): INR in the last 48 hours.    Significant Procedures:   Dobutamine Stress Test with Color Flow: No results found for this or any previous visit.    2D Echo: No results found for this or any previous visit.    Assessment/Plan:      * Acute on chronic respiratory failure with hypoxia    COPD with acute exacerbation  Obstructive lung disease (generalized)  Hypoxia  Cough in adult  - Admitted for shortness of breath, diffuse wheezing, cough that is mildly productive, denies fever. No formal COPD testing but CXR: Suggestion of underlying COPD and long hx of tobacco use lead to COPD/Obstructive lung disease as the leading diagnosis. Pt feels improved today   - low BNP, no hx of CAD, no LE swell, so CHF exacerbation is low suspicion   - Low suspicion for PE, given no LE swelling no chest pain.  - COPD pathway started advance as tolerated, Nebs Q6, PO steroid, Avalox given productive cough  - Started pt on Breo and Spiriva   - wean down oxygen as tolerated  - suspect will need at least spiriva and albuterol inhalers on D/C  - will need to see pulm in clinic for formal PFT test when stable; possibly tomorrow         Pulmonary HTN    Pulm htn  Noted on echo with RVH and EMELY  May be contributing to SOB   F/u with pulmonary - may need outpt cardiology as well for RHC         Former heavy tobacco smoker    - pt report stopped over the last 2 years  - 60 pack year hx  - will need pulm f/u given concern for COPD         Elevated blood pressure reading    - BP elevated in ED - Now normalized   - will monitor  - hold anti-hypertensive for now            VTE Risk Mitigation         Ordered     enoxaparin injection 40 mg  Daily      07/02/18 1637     IP VTE HIGH RISK PATIENT  Once      07/02/18 1637              Jennie Kc MD  Department of Hospital Medicine   [facility]

## 2018-07-04 NOTE — PLAN OF CARE
Problem: Patient Care Overview  Goal: Plan of Care Review  Outcome: Ongoing (interventions implemented as appropriate)  pt able to turn independently while in bed, no new skin breakdown noted. pt lying in bed resting, VSS, no acute distress noted. instructed to call for assistance if needed, call light in reach. will continue to monitor.

## 2018-07-05 VITALS
WEIGHT: 116.19 LBS | TEMPERATURE: 98 F | DIASTOLIC BLOOD PRESSURE: 92 MMHG | RESPIRATION RATE: 23 BRPM | SYSTOLIC BLOOD PRESSURE: 136 MMHG | BODY MASS INDEX: 17.61 KG/M2 | OXYGEN SATURATION: 91 % | HEART RATE: 99 BPM | HEIGHT: 68 IN

## 2018-07-05 PROBLEM — R09.02 HYPOXIA: Status: RESOLVED | Noted: 2018-07-02 | Resolved: 2018-07-05

## 2018-07-05 PROBLEM — J96.21 ACUTE ON CHRONIC RESPIRATORY FAILURE WITH HYPOXIA: Status: RESOLVED | Noted: 2018-07-02 | Resolved: 2018-07-05

## 2018-07-05 LAB
ANION GAP SERPL CALC-SCNC: 7 MMOL/L
BASOPHILS # BLD AUTO: 0.06 K/UL
BASOPHILS NFR BLD: 0.8 %
BUN SERPL-MCNC: 16 MG/DL
CALCIUM SERPL-MCNC: 9.2 MG/DL
CHLORIDE SERPL-SCNC: 102 MMOL/L
CO2 SERPL-SCNC: 27 MMOL/L
CREAT SERPL-MCNC: 0.7 MG/DL
DIFFERENTIAL METHOD: ABNORMAL
EOSINOPHIL # BLD AUTO: 0.3 K/UL
EOSINOPHIL NFR BLD: 4.3 %
ERYTHROCYTE [DISTWIDTH] IN BLOOD BY AUTOMATED COUNT: 13.2 %
EST. GFR  (AFRICAN AMERICAN): >60 ML/MIN/1.73 M^2
EST. GFR  (NON AFRICAN AMERICAN): >60 ML/MIN/1.73 M^2
GLUCOSE SERPL-MCNC: 87 MG/DL
HCT VFR BLD AUTO: 41.2 %
HGB BLD-MCNC: 13.1 G/DL
IMM GRANULOCYTES # BLD AUTO: 0.04 K/UL
IMM GRANULOCYTES NFR BLD AUTO: 0.5 %
LYMPHOCYTES # BLD AUTO: 2.1 K/UL
LYMPHOCYTES NFR BLD: 27.8 %
MCH RBC QN AUTO: 30.5 PG
MCHC RBC AUTO-ENTMCNC: 31.8 G/DL
MCV RBC AUTO: 96 FL
MONOCYTES # BLD AUTO: 0.9 K/UL
MONOCYTES NFR BLD: 12.4 %
NEUTROPHILS # BLD AUTO: 4.1 K/UL
NEUTROPHILS NFR BLD: 54.2 %
NRBC BLD-RTO: 0 /100 WBC
PLATELET # BLD AUTO: 330 K/UL
PMV BLD AUTO: 9.1 FL
POCT GLUCOSE: 106 MG/DL (ref 70–110)
POCT GLUCOSE: 132 MG/DL (ref 70–110)
POTASSIUM SERPL-SCNC: 3.2 MMOL/L
RBC # BLD AUTO: 4.29 M/UL
SODIUM SERPL-SCNC: 136 MMOL/L
WBC # BLD AUTO: 7.51 K/UL

## 2018-07-05 PROCEDURE — 36415 COLL VENOUS BLD VENIPUNCTURE: CPT

## 2018-07-05 PROCEDURE — 25000242 PHARM REV CODE 250 ALT 637 W/ HCPCS: Performed by: HOSPITALIST

## 2018-07-05 PROCEDURE — 94640 AIRWAY INHALATION TREATMENT: CPT

## 2018-07-05 PROCEDURE — 85025 COMPLETE CBC W/AUTO DIFF WBC: CPT

## 2018-07-05 PROCEDURE — 63600175 PHARM REV CODE 636 W HCPCS: Performed by: HOSPITALIST

## 2018-07-05 PROCEDURE — 99239 HOSP IP/OBS DSCHRG MGMT >30: CPT | Mod: ,,, | Performed by: HOSPITALIST

## 2018-07-05 PROCEDURE — 94761 N-INVAS EAR/PLS OXIMETRY MLT: CPT

## 2018-07-05 PROCEDURE — 25000003 PHARM REV CODE 250: Performed by: HOSPITALIST

## 2018-07-05 PROCEDURE — 80048 BASIC METABOLIC PNL TOTAL CA: CPT

## 2018-07-05 RX ORDER — FLUTICASONE FUROATE AND VILANTEROL 100; 25 UG/1; UG/1
1 POWDER RESPIRATORY (INHALATION) DAILY
Qty: 1 EACH | Refills: 1 | Status: SHIPPED | OUTPATIENT
Start: 2018-07-06 | End: 2018-09-12 | Stop reason: SDUPTHER

## 2018-07-05 RX ORDER — IPRATROPIUM BROMIDE AND ALBUTEROL SULFATE 2.5; .5 MG/3ML; MG/3ML
3 SOLUTION RESPIRATORY (INHALATION) EVERY 4 HOURS PRN
Qty: 1 BOX | Refills: 3 | Status: SHIPPED | OUTPATIENT
Start: 2018-07-05 | End: 2018-08-31 | Stop reason: SDUPTHER

## 2018-07-05 RX ORDER — BENZONATATE 100 MG/1
100 CAPSULE ORAL 3 TIMES DAILY PRN
Qty: 30 CAPSULE | Refills: 3 | Status: SHIPPED | OUTPATIENT
Start: 2018-07-05 | End: 2018-07-15

## 2018-07-05 RX ORDER — PREDNISONE 20 MG/1
40 TABLET ORAL DAILY
Qty: 4 TABLET | Refills: 0 | Status: SHIPPED | OUTPATIENT
Start: 2018-07-06 | End: 2018-07-08

## 2018-07-05 RX ORDER — MOXIFLOXACIN HYDROCHLORIDE 400 MG/1
400 TABLET ORAL DAILY
Qty: 2 TABLET | Refills: 0 | Status: SHIPPED | OUTPATIENT
Start: 2018-07-05 | End: 2018-07-06

## 2018-07-05 RX ORDER — TIOTROPIUM BROMIDE 18 UG/1
1 CAPSULE ORAL; RESPIRATORY (INHALATION) DAILY
Qty: 30 CAPSULE | Refills: 3 | Status: SHIPPED | OUTPATIENT
Start: 2018-07-06 | End: 2018-07-06

## 2018-07-05 RX ORDER — ALBUTEROL SULFATE 90 UG/1
2 AEROSOL, METERED RESPIRATORY (INHALATION) EVERY 4 HOURS PRN
Qty: 1 EACH | Refills: 3 | Status: ON HOLD | OUTPATIENT
Start: 2018-07-05 | End: 2019-01-14

## 2018-07-05 RX ADMIN — PREDNISONE 40 MG: 20 TABLET ORAL at 09:07

## 2018-07-05 RX ADMIN — IPRATROPIUM BROMIDE AND ALBUTEROL SULFATE 3 ML: .5; 3 SOLUTION RESPIRATORY (INHALATION) at 12:07

## 2018-07-05 RX ADMIN — SENNOSIDES AND DOCUSATE SODIUM 1 TABLET: 8.6; 5 TABLET ORAL at 09:07

## 2018-07-05 RX ADMIN — FLUTICASONE FUROATE AND VILANTEROL TRIFENATATE 1 PUFF: 100; 25 POWDER RESPIRATORY (INHALATION) at 09:07

## 2018-07-05 RX ADMIN — IPRATROPIUM BROMIDE AND ALBUTEROL SULFATE 3 ML: .5; 3 SOLUTION RESPIRATORY (INHALATION) at 07:07

## 2018-07-05 RX ADMIN — TIOTROPIUM BROMIDE 18 MCG: 18 CAPSULE ORAL; RESPIRATORY (INHALATION) at 09:07

## 2018-07-05 NOTE — PLAN OF CARE
"Per joe at [facility], regarding nebulizer  I sent it to [facility], it will be  mailed to patients home."  md to order oxygen, paged dr butt but got disconnected    88% sat on room air per nurse's note. Oxygen to be ordered-copd asthma, hypoxia, acute on chronic resp failure as dx    Future Appointments  Date Time Provider Department Center   7/13/2018 2:00 PM Sharmaine Qureshi NP [facility] FAJARDO       "  "

## 2018-07-05 NOTE — PROGRESS NOTES
COPD consult- medication assistance for patient without medical insurance     Patient currently started on Breo and Spiriva inh for COPD management/exacerbation    Unable to afford new inhalers due to lack of medication coverage   Spoke with Iliana Liang, pharmacy patient assistance technician, who has contacted the patient and I will mail out cards and information about the program for future assistance with obtaining inhalers.     Please feel free to call me with any additional questions.    Marietta Olivo, Pharm.D ext: [phone]

## 2018-07-05 NOTE — PROGRESS NOTES
Home Oxygen Evaluation    Date Performed: 7/5/2018    1) Patient's Home O2 Sat on room air, while at rest: 88%        If O2 sats on room air at rest are 88% or below, patient qualifies. No additional testing needed. Document N/A in steps 2 and 3. If 89% or above, complete steps 2.      2) Patient's O2 Sat on room air while exercising: n/a        If O2 sats on room air while exercising remain 89% or above patient does not qualify, no further testing needed Document N/A in step 3. If O2 sats on room air while exercising are 88% or below, continue to step 3.      3) Patient's O2 Sat while exercising on O2: n/a at n/a LPM         (Must show improvement from #2 for patients to qualify)    If O2 sats improve on oxygen, patient qualifies for portable oxygen. If not, the patient does not qualify.

## 2018-07-05 NOTE — PROGRESS NOTES
IV removed.  Bleeding controlled.  All belongings gathered by pt.  Discharge papers reviewed and given to pt.  Pt verbalizes understanding.  Awaiting delivery of home O2 to bedside before discharge.

## 2018-07-05 NOTE — PLAN OF CARE
BROOKE assigned to case today 7/5/2018. SW will assist team with DC needs. SW in communication with CM.    BROOKE spoke to Liane at [facility] who stated that the nebulizer orders and O2 order was sent to [facility].     Ly Reina, GOOD  [facility]  [id number]

## 2018-07-06 ENCOUNTER — NURSE TRIAGE (OUTPATIENT)
Dept: ADMINISTRATIVE | Facility: CLINIC | Age: 68
End: 2018-07-06

## 2018-07-06 RX ORDER — LEVOFLOXACIN 500 MG/1
750 TABLET, FILM COATED ORAL DAILY
Qty: 2 TABLET | Refills: 0 | Status: SHIPPED | OUTPATIENT
Start: 2018-07-06 | End: 2018-07-08

## 2018-07-06 NOTE — TELEPHONE ENCOUNTER
Reason for Disposition   Caller has NON-URGENT medication question about med that PCP prescribed and triager unable to answer question    Protocols used: ST MEDICATION QUESTION CALL-A-AH    Pt  states that he is unable to fill Spiriva, avelox, and duoneb because insurance is not covering it. Spoke with Layne at [facility] states that she needs prior authorization or different medicines for Spiriva and Avelox.  Duoneb is covered and will cost $12.22. Osvaldo meyers Transferred to [address] [place] [address] to assist.

## 2018-07-06 NOTE — TELEPHONE ENCOUNTER
"  Reason for Disposition   [1] Prescription not at pharmacy AND [2] was prescribed today by PCP    Answer Assessment - Initial Assessment Questions  1. REASON FOR CALL or QUESTION: "What is your reason for calling today?" or "How can I best  help you?" or "What question do you have that I can help answer?"      Need rx sent to the pharmacy for patient   2. CALLER: Document the source of call. (e.g., laboratory, patient).       Mr. Osvaldo Fam    Protocols used:  PCP CALL - NO TRIAGE-A-    Patient's  called with concerns her spiriva, Duo Neb and flomax are not at the pharmacy. The pharmacy told him that the pcp would have to prescribe them and a nebulizer machine with paper prescriptions. Call transferred to the  to contact hospital team a in hopes of speaking with Dr. Kc or one of her staff.   "

## 2018-07-06 NOTE — PLAN OF CARE
Future Appointments  Date Time Provider Department Center   7/13/2018 2:00 PM Sharmaine Qureshi NP [facility] Luc Naqvi PCW     See all previous notes, oxygen delivered per nurse's note     07/06/18 0781   Final Note   Assessment Type Final Discharge Note   Discharge Disposition Home   Hospital Follow Up  Appt(s) scheduled? Yes   Discharge plans and expectations educations in teach back method with documentation complete? Yes   Right Care Referral Info   Post Acute Recommendation No Care

## 2018-07-07 NOTE — DISCHARGE SUMMARY
[facility] Hospital Medicine  Discharge Summary      Patient Name: Estefani Fam  MRN: [id number]  Admission Date: 7/2/2018  Hospital Length of Stay: 3 days  Discharge Date and Time: 7/5/2018  2:52 PM  Attending Physician: No att. providers found   Discharging Provider: Jennie Kc MD  Primary Care Provider: Juan Truong MD    [facility] Medicine Team: [facility] A Jennie Kc MD    Principal Problem:Acute on chronic respiratory failure with hypoxia     HPI:  This is a 67 y.o. female with hx of retinal hemorrhage who is being admitted to hospital medicine for shortness of breath, suspect COPD exacerbation. She presented to the ED with a complaint of worsening SOB for the past two weeks. The patient reports at benign, she is active, able to walk her dog, until two weeks ago she began having breathing difficulty. Patient states SOB is worsening and associated with some chest discomfort. Report sleeping on 2-3 pillows at night. Per , patient gets SOB when getting out of the car and when going to use the restroom. He notes of patient's decreased physical activity due to SOB. She notes of loss of appetite, congestion, and cough, but denies sore throat, vision changes, fever, chills, dizziness, weakness, or any URI symptoms in the past 2 weeks. Denies any recent travels. Patient has no cardiac history in the past, denies DM, HTN, Asthma hx. Patient is a former smoker for 30 years, 1-2 packs a day, stop 2 years ago. Does not take any medications beside from OTC vitamins.       * No surgery found *      Hospital Course:    * Acute on chronic respiratory failure with hypoxia     COPD with acute exacerbation  Obstructive lung disease (generalized)  Hypoxia  Cough in adult  - Pt was admitted for shortness of breath, diffuse wheezing, cough that is mildly productive, denies fever. No formal COPD testing but CXR: Suggestion of underlying COPD and long hx of tobacco use lead to  "COPD/Obstructive lung disease as the leading diagnosis. Low BNP, no hx of CAD, no LE swell, so CHF exacerbation is low suspicion.  Low suspicion for PE, given no LE swelling no chest pain. COPD pathway started. Pt was started on DuoNebs Q6, prednisone , and avalox given productive cough. Also started pt on Breo and Spiriva. Pt improved close to baseline but noted to desat on walk test thus sent home on oxygen. Pt  will need to see pulm in clinic for formal PFT test when stable. Pt to f/u with pulmonary.         Pulmonary HTN     Pulm htn  Noted on echo with RVH and EMELY  May be contributing to SOB   F/u with pulmonary - may need outpt cardiology as well for RHC           Former heavy tobacco smoker     - pt report stopped over the last 2 years  - 60 pack year hx  - will need pulm f/u given concern for COPD          Elevated blood pressure reading     - BP elevated in ED - Now normalized   - will monitor  - hold anti-hypertensive for now            Consults:     Final Active Diagnoses:    Diagnosis Date Noted POA    Pulmonary HTN [I27.20] 07/03/2018 Unknown    COPD with acute exacerbation [J44.1] 07/02/2018 Yes    Elevated blood pressure reading [R03.0] 07/02/2018 Yes    Former heavy tobacco smoker [Z87.891] 07/02/2018 Not Applicable     Chronic    Cough in adult [R05] 07/02/2018 Yes    Obstructive lung disease (generalized) [J44.9] 07/02/2018 Yes      Problems Resolved During this Admission:    Diagnosis Date Noted Date Resolved POA    PRINCIPAL PROBLEM:  Acute on chronic respiratory failure with hypoxia [J96.21] 07/02/2018 07/05/2018 Yes    Hypoxia [R09.02] 07/02/2018 07/05/2018 Yes      Discharged Condition: good    Disposition: Home or Self Care    Follow Up:    Patient Instructions:     NEBULIZER FOR HOME USE   Order Specific Question Answer Comments   Height: 5' 8" (1.727 m)    Weight: 52.7 kg (116 lb 2.9 oz)    Does patient have medical equipment at home? none    Length of need (1-99 months): 99  " "  Vendor: Other (use comments) [facility]    Expected Date of Delivery: 7/5/2018      NEBULIZER KIT (SUPPLIES) FOR HOME USE   Order Specific Question Answer Comments   Height: 5' 8" (1.727 m)    Weight: 52.7 kg (116 lb 2.9 oz)    Does patient have medical equipment at home? none    Length of need (1-99 months): 99    Mask or Mouthpiece? Mouthpiece    Vendor: Other (use comments) [facility]    Expected Date of Delivery: 7/5/2018      OXYGEN FOR HOME USE   Order Specific Question Answer Comments   Liter Flow 2    Duration With activity    Qualifying SpO2: 88    Testing done at: [facility]    Device home concentrator with portable unit    Length of need (in months): 99 mos    Patient condition with qualifying saturation COPD Pulm htn, acute on chronic repsiratory failure    Height: 5' 8" (1.727 m)    Weight: 52.7 kg (116 lb 2.9 oz)    Does patient have medical equipment at home? none    Alternative treatment measures have been tried or considered and deemed clinically ineffective. Yes    Vendor: Other (use comments) [facility]    Expected Date of Delivery: 7/5/2018      Ambulatory referral to Internal Medicine   Referral Priority: Routine Referral Type: Consultation   Referral Reason: Specialty Services Required    Requested Specialty: Internal Medicine    Number of Visits Requested: 1      Ambulatory Referral to [facility]   Referral Priority: Routine Referral Type: Consultation   Referral Reason: Specialty Services Required    Number of Visits Requested: 1      Ambulatory referral to Pulmonology   Referral Priority: Routine Referral Type: Consultation   Referral Reason: Specialty Services Required    Requested Specialty: Pulmonary Disease    Number of Visits Requested: 1      Ambulatory Referral to Pulmonary Rehab   Referral Priority: Routine Referral Type: Consultation   Referral Reason: Specialty Services Required    Requested Specialty: Pulmonary Disease    Number of Visits Requested: 1  "     Ambulatory referral to Pulmonology   Referral Priority: Routine Referral Type: Consultation   Referral Reason: Specialty Services Required    Requested Specialty: Pulmonary Disease    Number of Visits Requested: 1      Diet Adult Regular     Notify your health care provider if you experience any of the following:  difficulty breathing or increased cough     Activity as tolerated       Medications:  Reconciled Home Medications:      Medication List      START taking these medications    albuterol 90 mcg/actuation inhaler  Inhale 2 puffs into the lungs every 4 (four) hours as needed for Wheezing or Shortness of Breath. Rescue     albuterol-ipratropium 2.5 mg-0.5 mg/3 mL nebulizer solution  Commonly known as:  DUO-NEB  Take 3 mLs by nebulization every 4 (four) hours as needed for Wheezing or Shortness of Breath. Rescue     benzonatate 100 MG capsule  Commonly known as:  TESSALON  Take 1 capsule (100 mg total) by mouth 3 (three) times daily as needed for Cough.     fluticasone-vilanterol 100-25 mcg/dose diskus inhaler  Commonly known as:  BREO  Inhale 1 puff into the lungs once daily. Controller     predniSONE 20 MG tablet  Commonly known as:  DELTASONE  Take 2 tablets (40 mg total) by mouth once daily. for 2 days        CONTINUE taking these medications    calcium carbonate 600 mg calcium (1,500 mg) Tab  Commonly known as:  OS-STEPHANIE  Take 600 mg by mouth once.     CENTRUM 3,500-18-0.4 unit-mg-mg Chew  Generic drug:  multivit-iron-min-folic acid  Take 1 tablet by mouth once daily.     VITAMIN C 1000 MG tablet  Generic drug:  ascorbic acid (vitamin C)  Take 1,000 mg by mouth once daily.        STOP taking these medications    ONE DAILY MULTIVITAMIN per tablet  Generic drug:  multivitamin            Significant Diagnostic Studies: Labs:   BMP:   Recent Labs  Lab 07/05/18 0605   GLU 87      K 3.2*      CO2 27   BUN 16   CREATININE 0.7   CALCIUM 9.2   , CMP   Recent Labs  Lab 07/05/18 0605      K 3.2*       CO2 27   GLU 87   BUN 16   CREATININE 0.7   CALCIUM 9.2   ANIONGAP 7*   ESTGFRAFRICA >60.0   EGFRNONAA >60.0    and CBC   Recent Labs  Lab 07/05/18  0605   WBC 7.51   HGB 13.1   HCT 41.2          Pending Diagnostic Studies:     None        Indwelling Lines/Drains at time of discharge:   Lines/Drains/Airways          No matching active lines, drains, or airways          Time spent on the discharge of patient: >30 minutes  Patient was seen and examined on the date of discharge and determined to be suitable for discharge.         Jennie Kc MD  Department of Hospital Medicine  [facility]

## 2018-07-13 ENCOUNTER — OFFICE VISIT (OUTPATIENT)
Dept: INTERNAL MEDICINE | Facility: CLINIC | Age: 68
End: 2018-07-13
Payer: MEDICARE

## 2018-07-13 VITALS
HEIGHT: 68 IN | SYSTOLIC BLOOD PRESSURE: 110 MMHG | WEIGHT: 128.06 LBS | BODY MASS INDEX: 19.41 KG/M2 | DIASTOLIC BLOOD PRESSURE: 64 MMHG | HEART RATE: 89 BPM | OXYGEN SATURATION: 92 %

## 2018-07-13 DIAGNOSIS — J44.1 COPD WITH ACUTE EXACERBATION: Primary | ICD-10-CM

## 2018-07-13 DIAGNOSIS — I27.20 PULMONARY HTN: ICD-10-CM

## 2018-07-13 DIAGNOSIS — Z87.891 FORMER HEAVY TOBACCO SMOKER: Chronic | ICD-10-CM

## 2018-07-13 DIAGNOSIS — J44.9 CHRONIC OBSTRUCTIVE PULMONARY DISEASE, UNSPECIFIED COPD TYPE: ICD-10-CM

## 2018-07-13 PROCEDURE — 99214 OFFICE O/P EST MOD 30 MIN: CPT | Mod: PBBFAC | Performed by: NURSE PRACTITIONER

## 2018-07-13 PROCEDURE — 99999 PR PBB SHADOW E&M-EST. PATIENT-LVL IV: CPT | Mod: PBBFAC,,, | Performed by: NURSE PRACTITIONER

## 2018-07-13 PROCEDURE — 99214 OFFICE O/P EST MOD 30 MIN: CPT | Mod: S$PBB,,, | Performed by: NURSE PRACTITIONER

## 2018-07-13 RX ORDER — TIOTROPIUM BROMIDE 18 UG/1
18 CAPSULE ORAL; RESPIRATORY (INHALATION) DAILY
Qty: 90 CAPSULE | Refills: 3 | Status: SHIPPED | OUTPATIENT
Start: 2018-07-13 | End: 2018-11-05

## 2018-07-13 NOTE — PROGRESS NOTES
INTERNAL MEDICINE PROGRESS NOTE    CHIEF COMPLAINT     Chief Complaint   Patient presents with    Hospital Follow Up       HPI     Estefani Fam is a 67 y.o. female with COPD, pulmonary HTN, macular disorder, and former smoker who presents for a hospital follow up visit today.    [facility]ation with acute on chronic resp failure.  She presented to the ED with a complaint of worsening SOB for the past two weeks. The patient reports at benign, she is active, able to walk her dog, until two weeks ago she began having breathing difficulty. Patient states SOB is worsening and associated with some chest discomfort. Report sleeping on 2-3 pillows at night. Per , patient gets SOB when getting out of the car and when going to use the restroom. He notes of patient's decreased physical activity due to SOB.Patient is a former smoker for 30 years, 1-2 packs a day, stop 2 years ago.     Acute on chronic respiratory failure with hypoxia     COPD with acute exacerbation  Obstructive lung disease (generalized)  Hypoxia  Cough in adult   - CXR- Suggestion of underlying COPD and long hx of tobacco use lead to COPD/Obstructive lung disease as the leading diagnosis.  -Low BNP, no hx of CAD, no LE swell, so CHF exacerbation is low suspicion.  - Low suspicion for PE, given no LE swelling no chest pain.   - DuoNebs Q6, prednisone , and avalox given productive cough.  - started pt on Breo and Spiriva  -Pt improved close to baseline but noted to desat on walk test thus sent home on oxygen.   -needs pulmonary follow up   Continues nebs 4 times per day.Using Breo and Incruse- does not think it is working as well as spiriva.    Completed antibiotics and prednisone   Feeling much better. Using O2 as needed.     Pulmonary HTN  -Pulm htn  Noted on echo with RVH and EMELY  -may need outpt cardiology as well for RHC     Former heavy tobacco smoker     - pt report stopped over the last 2  years  - 60 pack year hx  - will need pulm f/u given concern for COPD        Nebs at home   O2 at home       Past Medical History:  Past Medical History:   Diagnosis Date    Cataract     History of retinal hemorrhage        Home Medications:  Prior to Admission medications    Medication Sig Start Date End Date Taking? Authorizing Provider   albuterol 90 mcg/actuation inhaler Inhale 2 puffs into the lungs every 4 (four) hours as needed for Wheezing or Shortness of Breath. Rescue 7/5/18   Jennie Kc MD   albuterol-ipratropium (DUO-NEB) 2.5 mg-0.5 mg/3 mL nebulizer solution Take 3 mLs by nebulization every 4 (four) hours as needed for Wheezing or Shortness of Breath. Rescue 7/5/18 7/5/19  Jennie Kc MD   ascorbic acid, vitamin C, (VITAMIN C) 1000 MG tablet Take 1,000 mg by mouth once daily.    Historical Provider, MD   benzonatate (TESSALON) 100 MG capsule Take 1 capsule (100 mg total) by mouth 3 (three) times daily as needed for Cough. 7/5/18 7/15/18  Jennie Kc MD   calcium carbonate (OS-STEPHANIE) 600 mg calcium (1,500 mg) Tab Take 600 mg by mouth once.    Historical Provider, MD   fluticasone-vilanterol (BREO) 100-25 mcg/dose diskus inhaler Inhale 1 puff into the lungs once daily. Controller 7/6/18   Jennie Kc MD   MULTIVIT-IRON-MIN-FOLIC ACID 3,500-18-0.4 UNIT-MG-MG ORAL CHEW Take 1 tablet by mouth once daily.    Historical Provider, MD   umeclidinium (INCRUSE ELLIPTA) 62.5 mcg/actuation DsDv Inhale 1 ampule into the lungs once daily. Controller 7/6/18 8/5/18  Jennie Kc MD       Review of Systems:  Review of Systems   Constitutional: Negative for chills, fatigue and fever.   HENT: Negative for congestion, postnasal drip, rhinorrhea, sinus pain, sinus pressure and sore throat.    Respiratory: Positive for cough, shortness of breath and wheezing.         Feels phlegm in throat - much improved from prior to d/c    Cardiovascular: Negative for chest pain, palpitations and leg swelling.  "  Gastrointestinal: Negative for abdominal pain, blood in stool, constipation, diarrhea, nausea and vomiting.   Genitourinary: Negative for difficulty urinating, pelvic pain, urgency and vaginal pain.   Skin: Negative for rash.   Neurological: Negative for dizziness, light-headedness and headaches.       Health Maintainence:   Immunizations:  Health Maintenance       Date Due Completion Date    Hepatitis C Screening 1950 ---    Lipid Panel 1950 ---    TETANUS VACCINE 07/23/1968 ---    Mammogram 07/23/1990 ---    DEXA SCAN 07/23/1990 ---    Colonoscopy 07/23/2000 ---    Zoster Vaccine 07/23/2010 ---    Pneumococcal (65+) (1 of 2 - PCV13) 07/23/2015 ---    Influenza Vaccine 08/01/2018 12/6/2017           PHYSICAL EXAM     /64 (BP Location: Left arm, Patient Position: Sitting, BP Method: Medium (Manual))   Pulse 89   Ht 5' 8" (1.727 m)   Wt 58.1 kg (128 lb 1.4 oz)   SpO2 (!) 92%   BMI 19.48 kg/m²     Physical Exam   Constitutional: She is oriented to person, place, and time. She appears well-developed and well-nourished.   HENT:   Head: Normocephalic.   Right Ear: External ear normal.   Left Ear: External ear normal.   Nose: Nose normal.   Mouth/Throat: Oropharynx is clear and moist. No oropharyngeal exudate.   Eyes: Pupils are equal, round, and reactive to light.   Neck: Neck supple. No JVD present. No tracheal deviation present. No thyromegaly present.   Cardiovascular: Normal rate, regular rhythm, normal heart sounds and intact distal pulses.  Exam reveals no gallop and no friction rub.    No murmur heard.  Pulmonary/Chest: Effort normal and breath sounds normal. No respiratory distress. She has no wheezes. She has no rales.   Abdominal: Soft. Bowel sounds are normal. She exhibits no distension. There is no tenderness.   Musculoskeletal: Normal range of motion. She exhibits no edema or tenderness.   Lymphadenopathy:     She has no cervical adenopathy.   Neurological: She is alert and oriented " to person, place, and time.   Skin: Skin is warm and dry. No rash noted.   Psychiatric: She has a normal mood and affect. Her behavior is normal.   Vitals reviewed.      LABS     Lab Results   Component Value Date    HGBA1C 5.6 07/02/2018     CMP  Sodium   Date Value Ref Range Status   07/05/2018 136 136 - 145 mmol/L Final     Potassium   Date Value Ref Range Status   07/05/2018 3.2 (L) 3.5 - 5.1 mmol/L Final     Chloride   Date Value Ref Range Status   07/05/2018 102 95 - 110 mmol/L Final     CO2   Date Value Ref Range Status   07/05/2018 27 23 - 29 mmol/L Final     Glucose   Date Value Ref Range Status   07/05/2018 87 70 - 110 mg/dL Final     BUN, Bld   Date Value Ref Range Status   07/05/2018 16 8 - 23 mg/dL Final     Creatinine   Date Value Ref Range Status   07/05/2018 0.7 0.5 - 1.4 mg/dL Final     Calcium   Date Value Ref Range Status   07/05/2018 9.2 8.7 - 10.5 mg/dL Final     Total Protein   Date Value Ref Range Status   07/02/2018 7.3 6.0 - 8.4 g/dL Final     Albumin   Date Value Ref Range Status   07/02/2018 4.2 3.5 - 5.2 g/dL Final     Total Bilirubin   Date Value Ref Range Status   07/02/2018 0.4 0.1 - 1.0 mg/dL Final     Comment:     For infants and newborns, interpretation of results should be based  on gestational age, weight and in agreement with clinical  observations.  Premature Infant recommended reference ranges:  Up to 24 hours.............<8.0 mg/dL  Up to 48 hours............<12.0 mg/dL  3-5 days..................<15.0 mg/dL  6-29 days.................<15.0 mg/dL       Alkaline Phosphatase   Date Value Ref Range Status   07/02/2018 117 55 - 135 U/L Final     AST   Date Value Ref Range Status   07/02/2018 27 10 - 40 U/L Final     ALT   Date Value Ref Range Status   07/02/2018 31 10 - 44 U/L Final     Anion Gap   Date Value Ref Range Status   07/05/2018 7 (L) 8 - 16 mmol/L Final     eGFR if    Date Value Ref Range Status   07/05/2018 >60.0 >60 mL/min/1.73 m^2 Final     eGFR if  non    Date Value Ref Range Status   07/05/2018 >60.0 >60 mL/min/1.73 m^2 Final     Comment:     Calculation used to obtain the estimated glomerular filtration  rate (eGFR) is the CKD-EPI equation.        Lab Results   Component Value Date    WBC 7.51 07/05/2018    HGB 13.1 07/05/2018    HCT 41.2 07/05/2018    MCV 96 07/05/2018     07/05/2018     No results found for: CHOL  No results found for: HDL  No results found for: LDLCALC  No results found for: TRIG  No results found for: CHOLHDL  Lab Results   Component Value Date    TSH 0.984 07/02/2018       ASSESSMENT/PLAN     Estefani Fam is a 67 y.o. female with  Past Medical History:   Diagnosis Date    Cataract     History of retinal hemorrhage      COPD with acute exacerbation- resolved exacerbation. COPD is stable. Cont breo and incruse (will attempt to get insurance coverage of spiriva). May use albuterol inh or neb as needed. O2 as needed. Discussed s/s of exacerbation and action to take. Will refer to pulmonary Dr Harris per pt's request.     -     tiotropium (SPIRIVA) 18 mcg inhalation capsule; Inhale 1 capsule (18 mcg total) into the lungs once daily. Controller  Dispense: 90 capsule; Refill: 3  -     Ambulatory Referral to Pulmonology    Chronic obstructive pulmonary disease, unspecified COPD type- see above. Stable. Cont breo and incruse     Pulmonary HTN- per echo. SOB resolved with treatment of COPD. Will defer cards referral until after Pulmonary testing     Former heavy tobacco smoker- quit years ago. Encouraged to continue cessation     Follow up as needed and with PCP     Patient education provided from Juanita. Patient was counseled on when and how to seek emergent care.       Sharmaine PERAZA, APRN, FNP-c   Department of Internal Medicine - [facility]  1:48 PM

## 2018-08-27 DIAGNOSIS — J44.9 CHRONIC OBSTRUCTIVE PULMONARY DISEASE, UNSPECIFIED COPD TYPE: Primary | ICD-10-CM

## 2018-08-28 ENCOUNTER — HOSPITAL ENCOUNTER (OUTPATIENT)
Dept: PULMONOLOGY | Facility: CLINIC | Age: 68
Discharge: HOME | End: 2018-08-28
Payer: MEDICARE

## 2018-08-28 ENCOUNTER — HOSPITAL ENCOUNTER (OUTPATIENT)
Dept: RADIOLOGY | Facility: HOSPITAL | Age: 68
Discharge: HOME | End: 2018-08-28
Attending: INTERNAL MEDICINE
Payer: MEDICARE

## 2018-08-28 ENCOUNTER — OFFICE VISIT (OUTPATIENT)
Dept: PULMONOLOGY | Facility: CLINIC | Age: 68
End: 2018-08-28
Payer: MEDICARE

## 2018-08-28 VITALS
HEART RATE: 88 BPM | HEIGHT: 67 IN | BODY MASS INDEX: 18.99 KG/M2 | OXYGEN SATURATION: 93 % | WEIGHT: 121 LBS | DIASTOLIC BLOOD PRESSURE: 74 MMHG | SYSTOLIC BLOOD PRESSURE: 122 MMHG

## 2018-08-28 DIAGNOSIS — J44.9 CHRONIC OBSTRUCTIVE PULMONARY DISEASE, UNSPECIFIED COPD TYPE: ICD-10-CM

## 2018-08-28 DIAGNOSIS — J43.2 CENTRILOBULAR EMPHYSEMA: Primary | ICD-10-CM

## 2018-08-28 LAB
PRE FEV1 FVC: 35
PRE FEV1: 0.88
PRE FVC: 2.52
PREDICTED FEV1 FVC: 77
PREDICTED FEV1: 2.49
PREDICTED FVC: 3.21

## 2018-08-28 PROCEDURE — 94010 BREATHING CAPACITY TEST: CPT | Mod: PBBFAC | Performed by: INTERNAL MEDICINE

## 2018-08-28 PROCEDURE — 71046 X-RAY EXAM CHEST 2 VIEWS: CPT | Mod: 26,,, | Performed by: RADIOLOGY

## 2018-08-28 PROCEDURE — 94729 DIFFUSING CAPACITY: CPT | Mod: PBBFAC | Performed by: INTERNAL MEDICINE

## 2018-08-28 PROCEDURE — 99213 OFFICE O/P EST LOW 20 MIN: CPT | Mod: PBBFAC,25 | Performed by: INTERNAL MEDICINE

## 2018-08-28 PROCEDURE — 99999 PR PBB SHADOW E&M-EST. PATIENT-LVL III: CPT | Mod: PBBFAC,,, | Performed by: INTERNAL MEDICINE

## 2018-08-28 PROCEDURE — 71046 X-RAY EXAM CHEST 2 VIEWS: CPT | Mod: TC,FY

## 2018-08-28 RX ORDER — AZITHROMYCIN 250 MG/1
TABLET, FILM COATED ORAL
Qty: 6 TABLET | Refills: 1 | Status: ON HOLD | OUTPATIENT
Start: 2018-08-28 | End: 2018-11-06

## 2018-08-28 RX ORDER — PREDNISONE 10 MG/1
TABLET ORAL
Qty: 60 TABLET | Refills: 0 | Status: SHIPPED | OUTPATIENT
Start: 2018-08-28 | End: 2018-11-05

## 2018-08-28 NOTE — PROGRESS NOTES
Subjective:       Patient ID: Estefani Fam is a 68 y.o. female.    Chief Complaint: COPD and Shortness of Breath    HPI  69 yo ex smoker  (quit 2 years ago) comes for assessment of COPD. Hospitalized with acute COPD exacerbation and says that until 6 months ago she was not aware of any breathing difficulties during her normal activities.  Was hospitalized July 2nd for the first time and has respiratory failure for oxygen. Today she had her first PFT's and they are consistent with severe emphysema: FVC: 2.52 .liters 79% of predicted FEV:0.88 liters 35% of FVC and DLCO: 35%. Chest x-ray shows marked hyper inflation   Review of Systems   Constitutional: Negative.    HENT: Negative.    Eyes: Negative.    Respiratory: Positive for cough and shortness of breath.         Severe COPD newly diagnosed.   Cardiovascular: Negative.    Genitourinary: Negative.    Musculoskeletal: Negative.    Skin: Negative.    Gastrointestinal: Negative.    Neurological: Negative.    Psychiatric/Behavioral: Negative.        Objective:      Physical Exam   Constitutional: She is oriented to person, place, and time. She appears well-developed and well-nourished. No distress.   HENT:   Head: Normocephalic and atraumatic.   Right Ear: External ear normal.   Left Ear: External ear normal.   Nose: Nose normal.   Mouth/Throat: Oropharynx is clear and moist.   Eyes: Conjunctivae and EOM are normal. Pupils are equal, round, and reactive to light.   Neck: Normal range of motion. Neck supple. No JVD present. No thyromegaly present.   Cardiovascular: Normal rate, regular rhythm, normal heart sounds and intact distal pulses. Exam reveals no gallop.   No murmur heard.  Normal 2-D Echo has moderate secondary Pulmonary Hypertension: PAP: 45   Pulmonary/Chest: Breath sounds normal. No stridor. No respiratory distress. She has no wheezes. She has no rales. She exhibits no tenderness.   Decreased air entry    Sa02: 93%   Abdominal: Soft. Bowel sounds are  normal. She exhibits no distension and no mass. There is no tenderness. There is no rebound and no guarding.   Musculoskeletal: Normal range of motion. She exhibits no edema.   Lymphadenopathy:     She has no cervical adenopathy.   Neurological: She is alert and oriented to person, place, and time. She has normal reflexes. She displays normal reflexes. No cranial nerve deficit.   Skin: Skin is warm and dry. No rash noted.   Psychiatric: She has a normal mood and affect. Her behavior is normal. Judgment and thought content normal.   Nursing note and vitals reviewed.        Assessment:       No diagnosis found.    Outpatient Encounter Medications as of 8/28/2018   Medication Sig Dispense Refill    albuterol 90 mcg/actuation inhaler Inhale 2 puffs into the lungs every 4 (four) hours as needed for Wheezing or Shortness of Breath. Rescue 1 each 3    albuterol-ipratropium (DUO-NEB) 2.5 mg-0.5 mg/3 mL nebulizer solution Take 3 mLs by nebulization every 4 (four) hours as needed for Wheezing or Shortness of Breath. Rescue 1 Box 3    ascorbic acid, vitamin C, (VITAMIN C) 1000 MG tablet Take 1,000 mg by mouth once daily.      calcium carbonate (OS-STEPHANIE) 600 mg calcium (1,500 mg) Tab Take 600 mg by mouth once.      fluticasone-vilanterol (BREO) 100-25 mcg/dose diskus inhaler Inhale 1 puff into the lungs once daily. Controller 1 each 1    MULTIVIT-IRON-MIN-FOLIC ACID 3,500-18-0.4 UNIT-MG-MG ORAL CHEW Take 1 tablet by mouth once daily.      tiotropium (SPIRIVA) 18 mcg inhalation capsule Inhale 1 capsule (18 mcg total) into the lungs once daily. Controller 90 capsule 3     No facility-administered encounter medications on file as of 8/28/2018.      No orders of the defined types were placed in this encounter.    Plan:            Pulmonary Emphysema. (Pink Puffer). It is hard to believe that she only presented to medical care in July.  Have suggested a short burst of prednisone, 20 mg x 7 days then 10 mg and encouraged  enrolled to in the pulmonary rehab at [facility]

## 2018-08-31 DIAGNOSIS — J44.1 COPD WITH ACUTE EXACERBATION: Primary | ICD-10-CM

## 2018-08-31 RX ORDER — IPRATROPIUM BROMIDE AND ALBUTEROL SULFATE 2.5; .5 MG/3ML; MG/3ML
3 SOLUTION RESPIRATORY (INHALATION) EVERY 4 HOURS PRN
Qty: 120 VIAL | Refills: 11 | Status: ON HOLD | OUTPATIENT
Start: 2018-08-31 | End: 2019-01-14

## 2018-09-12 DIAGNOSIS — J44.9 CHRONIC OBSTRUCTIVE PULMONARY DISEASE, UNSPECIFIED COPD TYPE: Primary | ICD-10-CM

## 2018-09-12 NOTE — TELEPHONE ENCOUNTER
----- Message from Sherrie Gilmore sent at 9/12/2018 12:31 PM CDT -----  Contact: Pt   [phone]  Rx Refill/Request     Is this a Refill or New Rx:    Refill    Rx Name and Strength:     BREO) 100-25 mcg/dose diskus inhaler  Preferred Pharmacy with phone number:   [facility] [phone]    Communication Preference:   Phone   Additional Information:

## 2018-09-13 RX ORDER — FLUTICASONE FUROATE AND VILANTEROL 100; 25 UG/1; UG/1
1 POWDER RESPIRATORY (INHALATION) DAILY
Qty: 1 EACH | Refills: 11 | Status: ON HOLD | OUTPATIENT
Start: 2018-09-13 | End: 2019-01-18 | Stop reason: HOSPADM

## 2018-10-18 RX ORDER — UMECLIDINIUM 62.5 UG/1
AEROSOL, POWDER ORAL
Qty: 1 EACH | Refills: 6 | Status: ON HOLD | OUTPATIENT
Start: 2018-10-18 | End: 2019-01-18 | Stop reason: SDUPTHER

## 2018-11-05 ENCOUNTER — HOSPITAL ENCOUNTER (INPATIENT)
Facility: HOSPITAL | Age: 68
LOS: 2 days | Discharge: HOME | DRG: 190 | End: 2018-11-07
Attending: EMERGENCY MEDICINE | Admitting: HOSPITALIST
Payer: MEDICARE

## 2018-11-05 ENCOUNTER — TELEPHONE (OUTPATIENT)
Dept: PULMONOLOGY | Facility: CLINIC | Age: 68
End: 2018-11-05

## 2018-11-05 DIAGNOSIS — J44.1 COPD EXACERBATION: ICD-10-CM

## 2018-11-05 DIAGNOSIS — J44.0 COPD (CHRONIC OBSTRUCTIVE PULMONARY DISEASE) WITH ACUTE BRONCHITIS: Primary | ICD-10-CM

## 2018-11-05 DIAGNOSIS — N30.00 ACUTE CYSTITIS WITHOUT HEMATURIA: ICD-10-CM

## 2018-11-05 DIAGNOSIS — R06.02 SHORTNESS OF BREATH: ICD-10-CM

## 2018-11-05 DIAGNOSIS — J20.9 COPD (CHRONIC OBSTRUCTIVE PULMONARY DISEASE) WITH ACUTE BRONCHITIS: Primary | ICD-10-CM

## 2018-11-05 DIAGNOSIS — J20.9 ACUTE BRONCHITIS, UNSPECIFIED ORGANISM: ICD-10-CM

## 2018-11-05 PROBLEM — N39.0 UTI (URINARY TRACT INFECTION): Status: ACTIVE | Noted: 2018-11-05

## 2018-11-05 LAB
ALBUMIN SERPL BCP-MCNC: 4.1 G/DL
ALLENS TEST: ABNORMAL
ALP SERPL-CCNC: 98 U/L
ALT SERPL W/O P-5'-P-CCNC: 39 U/L
ANION GAP SERPL CALC-SCNC: 11 MMOL/L
AST SERPL-CCNC: 42 U/L
BACTERIA #/AREA URNS AUTO: ABNORMAL /HPF
BACTERIA #/AREA URNS AUTO: ABNORMAL /HPF
BASOPHILS # BLD AUTO: 0.08 K/UL
BASOPHILS NFR BLD: 0.9 %
BILIRUB SERPL-MCNC: 0.5 MG/DL
BILIRUB UR QL STRIP: NEGATIVE
BILIRUB UR QL STRIP: NEGATIVE
BUN SERPL-MCNC: 10 MG/DL
CALCIUM SERPL-MCNC: 9.9 MG/DL
CHLORIDE SERPL-SCNC: 102 MMOL/L
CLARITY UR REFRACT.AUTO: ABNORMAL
CLARITY UR REFRACT.AUTO: ABNORMAL
CO2 SERPL-SCNC: 23 MMOL/L
COLOR UR AUTO: YELLOW
COLOR UR AUTO: YELLOW
CREAT SERPL-MCNC: 0.7 MG/DL
CTP QC/QA: YES
DIFFERENTIAL METHOD: ABNORMAL
EOSINOPHIL # BLD AUTO: 0.7 K/UL
EOSINOPHIL NFR BLD: 7.8 %
ERYTHROCYTE [DISTWIDTH] IN BLOOD BY AUTOMATED COUNT: 13.5 %
EST. GFR  (AFRICAN AMERICAN): >60 ML/MIN/1.73 M^2
EST. GFR  (NON AFRICAN AMERICAN): >60 ML/MIN/1.73 M^2
GLUCOSE SERPL-MCNC: 101 MG/DL
GLUCOSE UR QL STRIP: ABNORMAL
GLUCOSE UR QL STRIP: NEGATIVE
HCO3 UR-SCNC: 26.4 MMOL/L (ref 24–28)
HCT VFR BLD AUTO: 42.5 %
HGB BLD-MCNC: 13.9 G/DL
HGB UR QL STRIP: NEGATIVE
HGB UR QL STRIP: NEGATIVE
IMM GRANULOCYTES # BLD AUTO: 0.02 K/UL
IMM GRANULOCYTES NFR BLD AUTO: 0.2 %
KETONES UR QL STRIP: ABNORMAL
KETONES UR QL STRIP: ABNORMAL
LEUKOCYTE ESTERASE UR QL STRIP: ABNORMAL
LEUKOCYTE ESTERASE UR QL STRIP: ABNORMAL
LYMPHOCYTES # BLD AUTO: 1.5 K/UL
LYMPHOCYTES NFR BLD: 17.3 %
MCH RBC QN AUTO: 31.4 PG
MCHC RBC AUTO-ENTMCNC: 32.7 G/DL
MCV RBC AUTO: 96 FL
MICROSCOPIC COMMENT: ABNORMAL
MICROSCOPIC COMMENT: ABNORMAL
MONOCYTES # BLD AUTO: 0.9 K/UL
MONOCYTES NFR BLD: 10.3 %
NEUTROPHILS # BLD AUTO: 5.4 K/UL
NEUTROPHILS NFR BLD: 63.5 %
NITRITE UR QL STRIP: NEGATIVE
NITRITE UR QL STRIP: POSITIVE
NRBC BLD-RTO: 0 /100 WBC
PCO2 BLDA: 40 MMHG (ref 35–45)
PH SMN: 7.43 [PH] (ref 7.35–7.45)
PH UR STRIP: 6 [PH] (ref 5–8)
PH UR STRIP: 7 [PH] (ref 5–8)
PLATELET # BLD AUTO: 360 K/UL
PMV BLD AUTO: 9.7 FL
PO2 BLDA: 69 MMHG (ref 80–100)
POC BE: 2 MMOL/L
POC MOLECULAR INFLUENZA A AGN: NEGATIVE
POC MOLECULAR INFLUENZA B AGN: NEGATIVE
POC SATURATED O2: 94 % (ref 95–100)
POC TCO2: 28 MMOL/L (ref 23–27)
POTASSIUM SERPL-SCNC: 4.4 MMOL/L
PROT SERPL-MCNC: 7.6 G/DL
PROT UR QL STRIP: NEGATIVE
PROT UR QL STRIP: NEGATIVE
RBC # BLD AUTO: 4.43 M/UL
RBC #/AREA URNS AUTO: 1 /HPF (ref 0–4)
SAMPLE: ABNORMAL
SITE: ABNORMAL
SODIUM SERPL-SCNC: 136 MMOL/L
SP GR UR STRIP: 1 (ref 1–1.03)
SP GR UR STRIP: 1.01 (ref 1–1.03)
SQUAMOUS #/AREA URNS AUTO: 2 /HPF
SQUAMOUS #/AREA URNS AUTO: 2 /HPF
URN SPEC COLLECT METH UR: ABNORMAL
URN SPEC COLLECT METH UR: ABNORMAL
WBC # BLD AUTO: 8.51 K/UL
WBC #/AREA URNS AUTO: 82 /HPF (ref 0–5)
WBC #/AREA URNS AUTO: 98 /HPF (ref 0–5)
WBC CLUMPS UR QL AUTO: ABNORMAL

## 2018-11-05 PROCEDURE — 27000221 HC OXYGEN, UP TO 24 HOURS

## 2018-11-05 PROCEDURE — 87186 SC STD MICRODIL/AGAR DIL: CPT

## 2018-11-05 PROCEDURE — 99900035 HC TECH TIME PER 15 MIN (STAT)

## 2018-11-05 PROCEDURE — 85025 COMPLETE CBC W/AUTO DIFF WBC: CPT

## 2018-11-05 PROCEDURE — 25000003 PHARM REV CODE 250: Performed by: STUDENT IN AN ORGANIZED HEALTH CARE EDUCATION/TRAINING PROGRAM

## 2018-11-05 PROCEDURE — 25000242 PHARM REV CODE 250 ALT 637 W/ HCPCS: Performed by: STUDENT IN AN ORGANIZED HEALTH CARE EDUCATION/TRAINING PROGRAM

## 2018-11-05 PROCEDURE — 81001 URINALYSIS AUTO W/SCOPE: CPT

## 2018-11-05 PROCEDURE — 87086 URINE CULTURE/COLONY COUNT: CPT

## 2018-11-05 PROCEDURE — 80053 COMPREHEN METABOLIC PANEL: CPT

## 2018-11-05 PROCEDURE — 96374 THER/PROPH/DIAG INJ IV PUSH: CPT

## 2018-11-05 PROCEDURE — 99285 EMERGENCY DEPT VISIT HI MDM: CPT | Mod: GC,,, | Performed by: EMERGENCY MEDICINE

## 2018-11-05 PROCEDURE — 87077 CULTURE AEROBIC IDENTIFY: CPT

## 2018-11-05 PROCEDURE — 87088 URINE BACTERIA CULTURE: CPT

## 2018-11-05 PROCEDURE — 93005 ELECTROCARDIOGRAM TRACING: CPT

## 2018-11-05 PROCEDURE — 94761 N-INVAS EAR/PLS OXIMETRY MLT: CPT

## 2018-11-05 PROCEDURE — 36600 WITHDRAWAL OF ARTERIAL BLOOD: CPT

## 2018-11-05 PROCEDURE — 94640 AIRWAY INHALATION TREATMENT: CPT

## 2018-11-05 PROCEDURE — 82803 BLOOD GASES ANY COMBINATION: CPT

## 2018-11-05 PROCEDURE — 87086 URINE CULTURE/COLONY COUNT: CPT | Mod: 59

## 2018-11-05 PROCEDURE — 99285 EMERGENCY DEPT VISIT HI MDM: CPT | Mod: 25

## 2018-11-05 PROCEDURE — 81001 URINALYSIS AUTO W/SCOPE: CPT | Mod: 91

## 2018-11-05 PROCEDURE — 11000001 HC ACUTE MED/SURG PRIVATE ROOM

## 2018-11-05 PROCEDURE — 99223 1ST HOSP IP/OBS HIGH 75: CPT | Mod: AI,GC,, | Performed by: HOSPITALIST

## 2018-11-05 PROCEDURE — 63600175 PHARM REV CODE 636 W HCPCS: Performed by: STUDENT IN AN ORGANIZED HEALTH CARE EDUCATION/TRAINING PROGRAM

## 2018-11-05 PROCEDURE — 93010 ELECTROCARDIOGRAM REPORT: CPT | Mod: ,,, | Performed by: INTERNAL MEDICINE

## 2018-11-05 RX ORDER — GLUCAGON 1 MG
1 KIT INJECTION
Status: DISCONTINUED | OUTPATIENT
Start: 2018-11-05 | End: 2018-11-07 | Stop reason: HOSPADM

## 2018-11-05 RX ORDER — IBUPROFEN 200 MG
24 TABLET ORAL
Status: DISCONTINUED | OUTPATIENT
Start: 2018-11-05 | End: 2018-11-07 | Stop reason: HOSPADM

## 2018-11-05 RX ORDER — ENOXAPARIN SODIUM 100 MG/ML
40 INJECTION SUBCUTANEOUS EVERY 24 HOURS
Status: DISCONTINUED | OUTPATIENT
Start: 2018-11-05 | End: 2018-11-07 | Stop reason: HOSPADM

## 2018-11-05 RX ORDER — CIPROFLOXACIN 500 MG/1
500 TABLET ORAL EVERY 12 HOURS
Status: DISCONTINUED | OUTPATIENT
Start: 2018-11-05 | End: 2018-11-06

## 2018-11-05 RX ORDER — IBUPROFEN 200 MG
16 TABLET ORAL
Status: DISCONTINUED | OUTPATIENT
Start: 2018-11-05 | End: 2018-11-07 | Stop reason: HOSPADM

## 2018-11-05 RX ORDER — PREDNISONE 20 MG/1
40 TABLET ORAL DAILY
Status: DISCONTINUED | OUTPATIENT
Start: 2018-11-06 | End: 2018-11-07 | Stop reason: HOSPADM

## 2018-11-05 RX ORDER — CALCIUM CARBONATE 500(1250)
600 TABLET ORAL ONCE
Status: COMPLETED | OUTPATIENT
Start: 2018-11-05 | End: 2018-11-05

## 2018-11-05 RX ORDER — IPRATROPIUM BROMIDE AND ALBUTEROL SULFATE 2.5; .5 MG/3ML; MG/3ML
3 SOLUTION RESPIRATORY (INHALATION) EVERY 6 HOURS
Status: DISCONTINUED | OUTPATIENT
Start: 2018-11-05 | End: 2018-11-07 | Stop reason: HOSPADM

## 2018-11-05 RX ORDER — MOXIFLOXACIN HYDROCHLORIDE 400 MG/1
400 TABLET ORAL DAILY
Status: DISCONTINUED | OUTPATIENT
Start: 2018-11-05 | End: 2018-11-05

## 2018-11-05 RX ORDER — ACETAMINOPHEN 325 MG/1
650 TABLET ORAL EVERY 8 HOURS PRN
Status: DISCONTINUED | OUTPATIENT
Start: 2018-11-05 | End: 2018-11-07 | Stop reason: HOSPADM

## 2018-11-05 RX ORDER — SODIUM CHLORIDE 0.9 % (FLUSH) 0.9 %
5 SYRINGE (ML) INJECTION
Status: DISCONTINUED | OUTPATIENT
Start: 2018-11-05 | End: 2018-11-07 | Stop reason: HOSPADM

## 2018-11-05 RX ORDER — ASCORBIC ACID 500 MG
1000 TABLET ORAL DAILY
Status: DISCONTINUED | OUTPATIENT
Start: 2018-11-06 | End: 2018-11-06

## 2018-11-05 RX ORDER — IPRATROPIUM BROMIDE AND ALBUTEROL SULFATE 2.5; .5 MG/3ML; MG/3ML
3 SOLUTION RESPIRATORY (INHALATION)
Status: COMPLETED | OUTPATIENT
Start: 2018-11-05 | End: 2018-11-05

## 2018-11-05 RX ORDER — FLUTICASONE FUROATE AND VILANTEROL 100; 25 UG/1; UG/1
1 POWDER RESPIRATORY (INHALATION) DAILY
Status: DISCONTINUED | OUTPATIENT
Start: 2018-11-06 | End: 2018-11-07 | Stop reason: HOSPADM

## 2018-11-05 RX ORDER — ALBUTEROL SULFATE 90 UG/1
2 AEROSOL, METERED RESPIRATORY (INHALATION) EVERY 4 HOURS PRN
Status: DISCONTINUED | OUTPATIENT
Start: 2018-11-05 | End: 2018-11-07 | Stop reason: HOSPADM

## 2018-11-05 RX ORDER — METHYLPREDNISOLONE SOD SUCC 125 MG
125 VIAL (EA) INJECTION
Status: COMPLETED | OUTPATIENT
Start: 2018-11-05 | End: 2018-11-05

## 2018-11-05 RX ADMIN — IPRATROPIUM BROMIDE AND ALBUTEROL SULFATE 3 ML: .5; 3 SOLUTION RESPIRATORY (INHALATION) at 07:11

## 2018-11-05 RX ADMIN — MOXIFLOXACIN HYDROCHLORIDE 400 MG: 400 TABLET, FILM COATED ORAL at 04:11

## 2018-11-05 RX ADMIN — IPRATROPIUM BROMIDE AND ALBUTEROL SULFATE 3 ML: .5; 3 SOLUTION RESPIRATORY (INHALATION) at 12:11

## 2018-11-05 RX ADMIN — ENOXAPARIN SODIUM 40 MG: 100 INJECTION SUBCUTANEOUS at 06:11

## 2018-11-05 RX ADMIN — CIPROFLOXACIN 500 MG: 500 TABLET, FILM COATED ORAL at 08:11

## 2018-11-05 RX ADMIN — METHYLPREDNISOLONE SODIUM SUCCINATE 125 MG: 125 INJECTION, POWDER, FOR SOLUTION INTRAMUSCULAR; INTRAVENOUS at 01:11

## 2018-11-05 RX ADMIN — CALCIUM 500 MG: 500 TABLET ORAL at 04:11

## 2018-11-05 RX ADMIN — IPRATROPIUM BROMIDE AND ALBUTEROL SULFATE 3 ML: .5; 3 SOLUTION RESPIRATORY (INHALATION) at 01:11

## 2018-11-05 NOTE — ED NOTES
Estefani Fam, a 68 y.o. female presents to the ED via personal vehicle with CC SOB      Patient identifiers verified verbally with Estefani and correct for Estefani Fam.    LOC/ APPEARANCE: The patient is awake, alert and oriented x 4. Pt is speaking appropriately, no slurred speech. Patient resting comfortably and in no acute distress. Pt is clean and well groomed. No JVD visible. Pt reports pain level of 0. Pt updated on POC. Bed low and locked with side rails up x2, call bell in pt reach.  SKIN: Skin is warm dry and intact, and color is consistent with ethnicity. Capillary refill <3 seconds. No breakdown or brusing visible and mucus membranes moist and acyanotic.  MUSCULOSKELETAL: Full range of motion present in all extremities. Hand  equal and leg strength strong +5 bilaterally.  RESPIRATORY: Airway is open and patent. Respirations-unlabored, regular rate, equal bilaterally on inspiration and expiration. No accessory muscle use noted. Lungs diminished to auscultation in all fields bilaterally anterior and posterior. Cough for one week. Pt states productive yellow sputum. Pt does not require home 02 all the time but PRN. She states SOB has gotten increasingly worse over the last week.   CARDIAC: Patient has regular heart rate.  +1 peripheral edema noted to bilateral feet, and patient has no c/o chest pain. Peripheral pulses present equal and strong throughout.  ABDOMEN: Soft and non-tender to palpation with no distention noted. Normoactive bowel sounds x4 quadrants. Pt has no complaints of abnormal bowel movements, denies blood in stool. Pt reports normal appetite.   NEUROLOGIC: Eyes open spontaneously and facial expression symmetrical. Pt behavior appropriate to situation, and pt follows commands.  Pt reports sensation present in all extremities when touched with a finger. PERRLA  : No complaints of burning but c/o frequency and urgency. No complaints of incontinence.

## 2018-11-05 NOTE — ASSESSMENT & PLAN NOTE
- UA shoes 3+ leukocytes and many bacteria   - UCx pending  - Moxifloxacin for empiric lung infection in COPD exacerbation should also cover   - Will continue to monitor  - Cr 0.7

## 2018-11-05 NOTE — ED PROVIDER NOTES
"Encounter Date: 2018    SCRIBE #1 NOTE: I, Son Emma, am scribing for, and in the presence of,  Dr. Chinchilla . I have scribed the following portions of the note - the EKG reading.       History     Chief Complaint   Patient presents with    Shortness of Breath     hx copd, prn oxygen at home, tx's not working,     Urinary Tract Infection     ????     HPI   67yo F with COPD presents with shortness of breath and cough.  Patient said symptoms gradually started one week ago and acutely worsened early this morning.  She reports cough productive of yellow sputum.  She contacted her pulmonologist's office who recommended she go to the ED for evaluation.  She feels like she has an infection in her lungs or "the beginning of a UTI."  She uses oxygen at home as needed for shortness of breath.  She has had to use her home oxygen more frequently over the past week.  She also reports having to use her home rescue nebulizers more frequently.  She denies chest pain, nausea, vomiting, or diaphoresis.  She denies tobacco use in the past month.      Review of patient's allergies indicates:  No Known Allergies  Past Medical History:   Diagnosis Date    Cataract     History of retinal hemorrhage      Past Surgical History:   Procedure Laterality Date    HAND SURGERY       Family History   Problem Relation Age of Onset    Cancer Mother         colon    Macular degeneration Mother     Stroke Father     Amblyopia Neg Hx     Blindness Neg Hx     Cataracts Neg Hx     Diabetes Neg Hx     Glaucoma Neg Hx     Hypertension Neg Hx     Retinal detachment Neg Hx     Strabismus Neg Hx     Thyroid disease Neg Hx      Social History     Tobacco Use    Smoking status: Former Smoker     Packs/day: 1.00     Years: 36.00     Pack years: 36.00     Types: Cigarettes     Last attempt to quit: 2016     Years since quittin.1    Smokeless tobacco: Never Used   Substance Use Topics    Alcohol use: Yes     Alcohol/week: 1.2 oz "     Types: 2 Glasses of wine per week     Comment: 1-2 glasses a day     Drug use: No     Review of Systems   Constitutional: Negative for chills, fatigue and fever.   HENT: Negative for postnasal drip, rhinorrhea, sore throat and voice change.    Eyes: Negative for visual disturbance.   Respiratory: Positive for cough, shortness of breath and wheezing. Negative for chest tightness and stridor.    Cardiovascular: Negative for chest pain, palpitations and leg swelling.   Gastrointestinal: Negative for abdominal distention, constipation, diarrhea, nausea and vomiting.   Genitourinary: Negative for dysuria.   Musculoskeletal: Negative for back pain and myalgias.   Skin: Negative for color change, rash and wound.   Neurological: Negative for dizziness, syncope, weakness, light-headedness and headaches.   Hematological: Does not bruise/bleed easily.   Psychiatric/Behavioral: The patient is not nervous/anxious.        Physical Exam     Initial Vitals [11/05/18 1147]   BP Pulse Resp Temp SpO2   (!) 145/78 100 (!) 24 97.9 °F (36.6 °C) (!) 91 %      MAP       --         Physical Exam    Nursing note and vitals reviewed.  Constitutional: She appears well-developed and well-nourished. She is not diaphoretic. No distress.   HENT:   Head: Normocephalic and atraumatic.   Right Ear: External ear normal.   Left Ear: External ear normal.   Nose: Nose normal.   Mouth/Throat: Oropharynx is clear and moist. No oropharyngeal exudate.   Eyes: Conjunctivae and EOM are normal. Pupils are equal, round, and reactive to light. No scleral icterus.   Neck: Normal range of motion. Neck supple. No tracheal deviation present.   Cardiovascular: Normal rate, regular rhythm, normal heart sounds and intact distal pulses. Exam reveals no gallop and no friction rub.    No murmur heard.  Pulmonary/Chest: Accessory muscle usage present. No stridor. Tachypnea noted. She is in respiratory distress. She has decreased breath sounds in the right lower field  and the left lower field. She has wheezes in the right upper field, the right middle field, the right lower field, the left upper field and the left middle field. She has no rhonchi. She has no rales.   Abdominal: Soft. Bowel sounds are normal. She exhibits no distension and no mass. There is no tenderness. There is no guarding.   Musculoskeletal: Normal range of motion. She exhibits no edema or tenderness.   Neurological: She is alert and oriented to person, place, and time. She has normal strength. No cranial nerve deficit. GCS score is 15. GCS eye subscore is 4. GCS verbal subscore is 5. GCS motor subscore is 6.   Skin: Skin is warm and dry. Capillary refill takes less than 2 seconds. No erythema. No pallor.   Psychiatric: She has a normal mood and affect. Her behavior is normal. Judgment and thought content normal.         ED Course   Procedures  Labs Reviewed - No data to display  EKG Readings: (Independently Interpreted)   Rhythm: Normal Sinus Rhythm. Heart Rate: 92 bpm . ST Segments: Normal ST Segments. T Waves Flipped: AVL.   Right atrial enlargement, LVH        Imaging Results    None                APC / Resident Notes:   HO-4 MDM  69yo F with COPD presenting with shortness of breath  DDx: COPD exacerbation, pneumonia, pneumothorax, influenza, viral URI, UTI  A/P: Based on history and physical, I suspect the patient is suffering from a COPD exacerbation. The trigger may be infectious vs. Allergy vs. Environmental.  I will obtain a chest x-ray to assess for focal consolidation or other abnormality that would explain the shortness of breath.  I have low suspicion for pneumothorax as the patient has bilateral breath sounds and chest rise.  Her symptoms of productive cough raise suspicion for infectious source.  Likely viral compared to bacterial as with pneumonia one would expect her to be toxic appearing, febrile, and have focal crackles on exam.  I will obtain basic labs as well as a UA to assess for UTI  based on the patient's report she feels symptoms consistent with previous diagnosed UTIs.  We will treat her COPD exacerbation with duonebs and solumedrol.  Will obtain influenza screen to determine if she warrants treatment for flu.  Will follow-up labs and imaging and re-evaluate after steroids and duonebs.  Boogie Richadrs MD, PGY-4  [facility]/P[facility] Resident  12:30 PM 11/05/2018    HO-4 update  Patient feels better on re-evaluation after breathing treatments.  Breath sounds improved, still mild expiratory wheeze but better overall air movement.  Will walk test with portable O2 probe to determine if patient needs admission for further treatment of her COPD exacerbation.  Patient has evidence of UTI based on UA.  No previous urine cultures for susceptibilities.  Based on age and comorbidities, will treat with Keflex.    Boogie Richards MD, PGY-4  2:15 PM 11/05/2018    -4 update  Patient had abrupt and sustained desaturation to 85% while ambulating on home O2.  Will admit for COPD exacerbation requiring further medical management.  Will change antibiotic to moxifloxacin to cover for UTI and COPD exacerbation.  Patient will be admitted to -4 with Dr. Jeffers.  Boogie Richards MD, PGY-4  2:36 PM 11/05/2018           Scribe Attestation:   Scribe #1: I performed the above scribed service and the documentation accurately describes the services I performed. I attest to the accuracy of the note.    Attending Attestation:   Physician Attestation Statement for Resident:  As the supervising MD   Physician Attestation Statement: I have personally seen and examined this patient.   I agree with the above history. -:   As the supervising MD I agree with the above PE.   -: 69 yo W with pmhx COPD on intermittent home O2 presents with productive cough and shortness of breath. On exam patient has tight wheezing but nontoxic.  Chest x-ray without acute process.  She will be treated for acute COPD exacerbation and  bronchitis with steroids and doxycycline.   As the supervising MD I agree with the above treatment, course, plan, and disposition.                       Clinical Impression:   The primary encounter diagnosis was COPD (chronic obstructive pulmonary disease) with acute bronchitis. Diagnoses of Shortness of breath, Acute bronchitis, unspecified organism, COPD exacerbation, and Acute cystitis without hematuria were also pertinent to this visit.                             Boogie Richards MD  Resident  11/05/18 3197

## 2018-11-05 NOTE — ED TRIAGE NOTES
Patient has hx of COPD and has had increase of SOB with cough for past week. Patient reports no change in medication and was told to come in by pulmonologist.

## 2018-11-05 NOTE — HPI
Estefani Fam is a 68 year old female with a medical history of COPD (previous smoker, 30 pack year, quit 2016) who presents to [facility] with complaint of progressive SOB. Pt states that over the last week she has increasingly felt short of breath one exertion that acutely worsened this am. Pt states that this am she noted increased yellow sputum with cough and more shortness of breath at rest. Pt denies any color change in her sputum or blood, just increased amount. Pt states that she has needed to use her home oxygen (2L PRN) almost everyday and her albuterol nebulizer 2-3 times a day over the last week, up fron 1-2 times on a normal day. Pt also states that she has been using her rescue inhaler more often. Pt notes that she does get short term relief with these treatments. Pt also notes that she has had dysuria and minor abd pain over the last few days and feels that she has a UTI.     Pt denies any CP, N/V, fever/chills, sick contacts or any recent changes ta home. Pt denies smoking or second hand smoke exposure.     In ED, pt was started on oxygen, 2L via NC and given 3 duoneb treatments. She states that these have given her relief. She desaturated to 85% on her home oxygen on ambulation and was therefore admitted to  3 for further work up and evaluation. CXR was unremarkable except for chronic COPD related change.

## 2018-11-05 NOTE — ASSESSMENT & PLAN NOTE
- Chronic COPD in ex smoker with 30 pack year history, quit 2016  - Known to pulmonology  - Current home medications include albuterol inhaler, Breo inahler and Ellipta  - Does not take Spiriva 2/2 increased cost   - Home oxygen 2L via NC as needed (normally 10-15 minutes a day after exertion)  - CXR shows no sign of PNA, chronic COPD changes     - Continue scheduled duo-nebs Q6   - Oxygen as needed  - Continue home Breo and Ellipta   - Moxifloxacin   - ABG pending   - Prednisone 40mg PO for 5 days

## 2018-11-05 NOTE — ASSESSMENT & PLAN NOTE
- Chronic COPD in ex smoker with 30 pack year history, quit 2016  - Known to pulmonology  - Current home medications include albuterol inhaler, Breo inahler and Ellipta  - Does not take Spiriva 2/2 increased cost   - Home oxygen 2L via NC as needed (normally 10-15 minutes a day after exertion)  - CXR shows no sign of PNA, chronic COPD changes     - Continue scheduled duo-nebs Q6   - Oxygen as needed  - Continue home Breo and Ellipta   - Levofloxacin   - Prednisone 40mg PO for 5 days

## 2018-11-05 NOTE — TELEPHONE ENCOUNTER
----- Message from Farzad Thao sent at 11/5/2018  9:03 AM CST -----  Contact: pt   Pt would like a call back regarding scheduling same day appt  Pt having difficulty breathing insisted on  Message being sent decline speaking with triage nurse       Pt can be reached at [phone]   11-5-18 I called Mrs Fam to let her know Dr Harris is off Mondays and that his schedule for tomorrow is overbooked. She is coughing and very SOB despite her medicines and oxygen. She feels she may have an infection though she is afebrile. Mrs Fam is thinking about going to ER  and will keep in touch. Cammy Rucker LPN.

## 2018-11-05 NOTE — ED NOTES
Patient walked down to CPOD on RA SATS dropped to 85-86%. MD notified and he states patient will be admitted.

## 2018-11-05 NOTE — SUBJECTIVE & OBJECTIVE
Past Medical History:   Diagnosis Date    Cataract     COPD (chronic obstructive pulmonary disease)     History of retinal hemorrhage        Past Surgical History:   Procedure Laterality Date    HAND SURGERY         Review of patient's allergies indicates:  No Known Allergies    No current facility-administered medications on file prior to encounter.      Current Outpatient Medications on File Prior to Encounter   Medication Sig    albuterol 90 mcg/actuation inhaler Inhale 2 puffs into the lungs every 4 (four) hours as needed for Wheezing or Shortness of Breath. Rescue    albuterol-ipratropium (DUO-NEB) 2.5 mg-0.5 mg/3 mL nebulizer solution Take 3 mLs by nebulization every 4 (four) hours as needed for Wheezing or Shortness of Breath. Rescue    ascorbic acid, vitamin C, (VITAMIN C) 1000 MG tablet Take 1,000 mg by mouth once daily.    azithromycin (ZITHROMAX Z-JERSEY) 250 MG tablet Two Tablets initially then one tablet daily    calcium carbonate (OS-STEPHANIE) 600 mg calcium (1,500 mg) Tab Take 600 mg by mouth once.    fluticasone-vilanterol (BREO) 100-25 mcg/dose diskus inhaler Inhale 1 puff into the lungs once daily. Controller    INCRUSE ELLIPTA 62.5 mcg/actuation DsDv INHALE 1 PUFF BY MOUTH INTO LUNGS ONCE DAILY    MULTIVIT-IRON-MIN-FOLIC ACID 3,500-18-0.4 UNIT-MG-MG ORAL CHEW Take 1 tablet by mouth once daily.    [DISCONTINUED] predniSONE (DELTASONE) 10 MG tablet Use tablets x tablets in AM for 7 days then one table Q AM    [DISCONTINUED] tiotropium (SPIRIVA) 18 mcg inhalation capsule Inhale 1 capsule (18 mcg total) into the lungs once daily. Controller     Family History     Problem Relation (Age of Onset)    Cancer Mother    Macular degeneration Mother    Stroke Father        Tobacco Use    Smoking status: Former Smoker     Packs/day: 1.00     Years: 36.00     Pack years: 36.00     Types: Cigarettes     Last attempt to quit: 2016     Years since quittin.1    Smokeless tobacco: Never Used    Substance and Sexual Activity    Alcohol use: Yes     Alcohol/week: 1.2 oz     Types: 2 Glasses of wine per week     Comment: 1-2 glasses a day     Drug use: No    Sexual activity: Not on file     Review of Systems   Constitutional: Negative for activity change, chills, diaphoresis, fatigue and fever.   HENT: Negative for rhinorrhea, sinus pressure, sinus pain, sneezing, sore throat and voice change.    Eyes: Positive for redness. Negative for photophobia and visual disturbance.   Respiratory: Positive for cough, shortness of breath and wheezing. Negative for chest tightness.    Cardiovascular: Negative for chest pain, palpitations and leg swelling.   Gastrointestinal: Negative for abdominal pain, constipation, diarrhea, nausea and vomiting.   Endocrine: Negative for cold intolerance and heat intolerance.   Genitourinary: Positive for dysuria. Negative for difficulty urinating, frequency and urgency.   Musculoskeletal: Negative for neck pain and neck stiffness.   Skin: Positive for pallor. Negative for color change, rash and wound.   Neurological: Negative for dizziness, weakness, light-headedness and headaches.   Psychiatric/Behavioral: Negative for agitation and confusion.     Objective:     Vital Signs (Most Recent):  Temp: 97.9 °F (36.6 °C) (11/05/18 1147)  Pulse: (!) 115 (11/05/18 1532)  Resp: 20 (11/05/18 1532)  BP: 124/64 (11/05/18 1532)  SpO2: (!) 91 % (11/05/18 1532) Vital Signs (24h Range):  Temp:  [97.9 °F (36.6 °C)] 97.9 °F (36.6 °C)  Pulse:  [] 115  Resp:  [19-29] 20  SpO2:  [91 %-100 %] 91 %  BP: (124-161)/(64-96) 124/64     Weight: 53.1 kg (117 lb)  Body mass index is 17.79 kg/m².    Physical Exam   Constitutional: She appears well-developed and well-nourished. No distress.   HENT:   Head: Normocephalic and atraumatic.   Eyes: Conjunctivae and EOM are normal. Pupils are equal, round, and reactive to light.   Neck: Normal range of motion. Neck supple. No tracheal deviation present.    Cardiovascular: Regular rhythm, normal heart sounds and intact distal pulses.   No murmur heard.  Tachycardic   Pulmonary/Chest: No respiratory distress. She has wheezes. She exhibits no tenderness.   Decreased breath sounds in all lung fields, diffuse wheezes. No acute distress, no use of accessory muscles    Skin: She is not diaphoretic.   Nursing note and vitals reviewed.        CRANIAL NERVES     CN III, IV, VI   Pupils are equal, round, and reactive to light.  Extraocular motions are normal.        Significant Labs:   ABGs: No results for input(s): PH, PCO2, HCO3, POCSATURATED, BE, TOTALHB, COHB, METHB, O2HB, POCFIO2 in the last 48 hours.  BMP:   Recent Labs   Lab 11/05/18  1305         K 4.4      CO2 23   BUN 10   CREATININE 0.7   CALCIUM 9.9     CBC:   Recent Labs   Lab 11/05/18  1305   WBC 8.51   HGB 13.9   HCT 42.5   *     CMP:   Recent Labs   Lab 11/05/18  1305      K 4.4      CO2 23      BUN 10   CREATININE 0.7   CALCIUM 9.9   PROT 7.6   ALBUMIN 4.1   BILITOT 0.5   ALKPHOS 98   AST 42*   ALT 39   ANIONGAP 11   EGFRNONAA >60.0     Lactic Acid: No results for input(s): LACTATE in the last 48 hours.  Respiratory Culture: No results for input(s): GSRESP, RESPIRATORYC in the last 48 hours.  Urine Culture: No results for input(s): LABURIN in the last 48 hours.  Urine Studies:   Recent Labs   Lab 11/05/18  1305   COLORU Yellow   APPEARANCEUA Hazy*   PHUR 7.0   SPECGRAV 1.005   PROTEINUA Negative   GLUCUA Negative   KETONESU Trace*   BILIRUBINUA Negative   OCCULTUA Negative   NITRITE Negative   LEUKOCYTESUR 3+*   WBCUA 98*   BACTERIA Moderate*   SQUAMEPITHEL 2     All pertinent labs within the past 24 hours have been reviewed.    Significant Imaging: CXR: I have reviewed all pertinent results/findings within the past 24 hours and my personal findings are:  chronic changes associated with COPD

## 2018-11-05 NOTE — H&P
[facility] Hospital Medicine  History & Physical    Patient Name: Estefani Fam  MRN: [id number]  Admission Date: 11/5/2018  Attending Physician: Ludwig Jeffers, *   Primary Care Provider: Juan Truong MD    [place] [facility] 4 Farhat Ho MD     Patient information was obtained from patient, spouse/SO, past medical records and ER records.     Subjective:     Principal Problem:COPD (chronic obstructive pulmonary disease) with acute bronchitis    Chief Complaint:   Chief Complaint   Patient presents with    Shortness of Breath     hx copd, prn oxygen at home, tx's not working,     Urinary Tract Infection     ????        HPI: Estefani Fam is a 68 year old female with a medical history of COPD (previous smoker, 30 pack year, quit 2016) who [place] [facility] [place] of [place]B. Pt states that over the last week she has increasingly felt short of breath one exertion that acutely worsened this am. Pt states that this am she noted increased yellow sputum with cough and more shortness of breath at rest. Pt denies any color change in her sputum or blood, just increased amount. Pt states that she has needed to use her home oxygen (2L PRN) almost everyday and her albuterol nebulizer 2-3 times a day over the last week, up fron 1-2 times on a normal day. Pt also states that she has been using her rescue inhaler more often. Pt notes that she does get short term relief with these treatments. Pt also notes that she has had dysuria and minor abd pain over the last few days and feels that she has a UTI.     Pt denies any CP, N/V, fever/chills, sick contacts or any recent changes ta home. Pt denies smoking or second hand smoke exposure.     In ED, pt was started on oxygen, 2L via NC and given 3 duoneb treatments. She states that these have given her relief. She desaturated to 85% on her home oxygen on ambulation and was therefore admitted to  3 for further  work up and evaluation. CXR was unremarkable except for chronic COPD related change.      Past Medical History:   Diagnosis Date    Cataract     COPD (chronic obstructive pulmonary disease)     History of retinal hemorrhage        Past Surgical History:   Procedure Laterality Date    HAND SURGERY         Review of patient's allergies indicates:  No Known Allergies    No current facility-administered medications on file prior to encounter.      Current Outpatient Medications on File Prior to Encounter   Medication Sig    albuterol 90 mcg/actuation inhaler Inhale 2 puffs into the lungs every 4 (four) hours as needed for Wheezing or Shortness of Breath. Rescue    albuterol-ipratropium (DUO-NEB) 2.5 mg-0.5 mg/3 mL nebulizer solution Take 3 mLs by nebulization every 4 (four) hours as needed for Wheezing or Shortness of Breath. Rescue    ascorbic acid, vitamin C, (VITAMIN C) 1000 MG tablet Take 1,000 mg by mouth once daily.    azithromycin (ZITHROMAX Z-JERSEY) 250 MG tablet Two Tablets initially then one tablet daily    calcium carbonate (OS-STEPHANIE) 600 mg calcium (1,500 mg) Tab Take 600 mg by mouth once.    fluticasone-vilanterol (BREO) 100-25 mcg/dose diskus inhaler Inhale 1 puff into the lungs once daily. Controller    INCRUSE ELLIPTA 62.5 mcg/actuation DsDv INHALE 1 PUFF BY MOUTH INTO LUNGS ONCE DAILY    MULTIVIT-IRON-MIN-FOLIC ACID 3,500-18-0.4 UNIT-MG-MG ORAL CHEW Take 1 tablet by mouth once daily.    [DISCONTINUED] predniSONE (DELTASONE) 10 MG tablet Use tablets x tablets in AM for 7 days then one table Q AM    [DISCONTINUED] tiotropium (SPIRIVA) 18 mcg inhalation capsule Inhale 1 capsule (18 mcg total) into the lungs once daily. Controller     Family History     Problem Relation (Age of Onset)    Cancer Mother    Macular degeneration Mother    Stroke Father        Tobacco Use    Smoking status: Former Smoker     Packs/day: 1.00     Years: 36.00     Pack years: 36.00     Types: Cigarettes     Last attempt  to quit: 2016     Years since quittin.1    Smokeless tobacco: Never Used   Substance and Sexual Activity    Alcohol use: Yes     Alcohol/week: 1.2 oz     Types: 2 Glasses of wine per week     Comment: 1-2 glasses a day     Drug use: No    Sexual activity: Not on file     Review of Systems   Constitutional: Negative for activity change, chills, diaphoresis, fatigue and fever.   HENT: Negative for rhinorrhea, sinus pressure, sinus pain, sneezing, sore throat and voice change.    Eyes: Positive for redness. Negative for photophobia and visual disturbance.   Respiratory: Positive for cough, shortness of breath and wheezing. Negative for chest tightness.    Cardiovascular: Negative for chest pain, palpitations and leg swelling.   Gastrointestinal: Negative for abdominal pain, constipation, diarrhea, nausea and vomiting.   Endocrine: Negative for cold intolerance and heat intolerance.   Genitourinary: Positive for dysuria. Negative for difficulty urinating, frequency and urgency.   Musculoskeletal: Negative for neck pain and neck stiffness.   Skin: Positive for pallor. Negative for color change, rash and wound.   Neurological: Negative for dizziness, weakness, light-headedness and headaches.   Psychiatric/Behavioral: Negative for agitation and confusion.     Objective:     Vital Signs (Most Recent):  Temp: 97.9 °F (36.6 °C) (18 1147)  Pulse: (!) 115 (18 1532)  Resp: 20 (18 1532)  BP: 124/64 (18 1532)  SpO2: (!) 91 % (18 1532) Vital Signs (24h Range):  Temp:  [97.9 °F (36.6 °C)] 97.9 °F (36.6 °C)  Pulse:  [] 115  Resp:  [19-29] 20  SpO2:  [91 %-100 %] 91 %  BP: (124-161)/(64-96) 124/64     Weight: 53.1 kg (117 lb)  Body mass index is 17.79 kg/m².    Physical Exam   Constitutional: She appears well-developed and well-nourished. No distress.   HENT:   Head: Normocephalic and atraumatic.   Eyes: Conjunctivae and EOM are normal. Pupils are equal, round, and reactive to light.    Neck: Normal range of motion. Neck supple. No tracheal deviation present.   Cardiovascular: Regular rhythm, normal heart sounds and intact distal pulses.   No murmur heard.  Tachycardic   Pulmonary/Chest: No respiratory distress. She has wheezes. She exhibits no tenderness.   Decreased breath sounds in all lung fields, diffuse wheezes. No acute distress, no use of accessory muscles    Skin: She is not diaphoretic.   Nursing note and vitals reviewed.        CRANIAL NERVES     CN III, IV, VI   Pupils are equal, round, and reactive to light.  Extraocular motions are normal.        Significant Labs:   ABGs: No results for input(s): PH, PCO2, HCO3, POCSATURATED, BE, TOTALHB, COHB, METHB, O2HB, POCFIO2 in the last 48 hours.  BMP:   Recent Labs   Lab 11/05/18  1305         K 4.4      CO2 23   BUN 10   CREATININE 0.7   CALCIUM 9.9     CBC:   Recent Labs   Lab 11/05/18  1305   WBC 8.51   HGB 13.9   HCT 42.5   *     CMP:   Recent Labs   Lab 11/05/18  1305      K 4.4      CO2 23      BUN 10   CREATININE 0.7   CALCIUM 9.9   PROT 7.6   ALBUMIN 4.1   BILITOT 0.5   ALKPHOS 98   AST 42*   ALT 39   ANIONGAP 11   EGFRNONAA >60.0     Lactic Acid: No results for input(s): LACTATE in the last 48 hours.  Respiratory Culture: No results for input(s): GSRESP, RESPIRATORYC in the last 48 hours.  Urine Culture: No results for input(s): LABURIN in the last 48 hours.  Urine Studies:   Recent Labs   Lab 11/05/18  1305   COLORU Yellow   APPEARANCEUA Hazy*   PHUR 7.0   SPECGRAV 1.005   PROTEINUA Negative   GLUCUA Negative   KETONESU Trace*   BILIRUBINUA Negative   OCCULTUA Negative   NITRITE Negative   LEUKOCYTESUR 3+*   WBCUA 98*   BACTERIA Moderate*   SQUAMEPITHEL 2     All pertinent labs within the past 24 hours have been reviewed.    Significant Imaging: CXR: I have reviewed all pertinent results/findings within the past 24 hours and my personal findings are:  chronic changes associated with  COPD    Assessment/Plan:     * COPD (chronic obstructive pulmonary disease) with acute bronchitis    - Chronic COPD in ex smoker with 30 pack year history, quit 2016  - Known to pulmonology  - Current home medications include albuterol inhaler, Breo inahler and Ellipta  - Does not take Spiriva 2/2 increased cost   - Home oxygen 2L via NC as needed (normally 10-15 minutes a day after exertion)  - CXR shows no sign of PNA, chronic COPD changes     - Continue scheduled duo-nebs Q6   - Oxygen as needed  - Continue home Breo and Ellipta   - Ciprofloxacin   - ABG pending   - Prednisone 40mg PO for 5 days       UTI (urinary tract infection)    - UA shoes 3+ leukocytes and many bacteria   - UCx pending  - Cipro for empiric lung infection in COPD exacerbation should also cover   - Will continue to monitor  - Cr 0.7       Pulmonary HTN    - Estimated PA Systolic Pressure: 45.45    7/3/18        Obstructive lung disease (generalized)    - See COPD       Former heavy tobacco smoker    - former smoker, quit 2016         VTE Risk Mitigation (From admission, onward)        Ordered     IP VTE LOW RISK PATIENT  Once      11/05/18 5656             Farhat Ho MD  Department of Hospital Medicine   [facility]

## 2018-11-05 NOTE — ASSESSMENT & PLAN NOTE
- UA shoes 3+ leukocytes and many bacteria   - UCx pending  - Levofloxacin for empiric lung infection in COPD exacerbation should also cover UTI  - Will continue to monitor  - Cr 0.6

## 2018-11-06 LAB
ALBUMIN SERPL BCP-MCNC: 3.5 G/DL
ALP SERPL-CCNC: 89 U/L
ALT SERPL W/O P-5'-P-CCNC: 28 U/L
ANION GAP SERPL CALC-SCNC: 8 MMOL/L
AST SERPL-CCNC: 23 U/L
BACTERIA UR CULT: NORMAL
BACTERIA UR CULT: NORMAL
BASOPHILS # BLD AUTO: 0.03 K/UL
BASOPHILS NFR BLD: 0.3 %
BILIRUB SERPL-MCNC: 0.3 MG/DL
BUN SERPL-MCNC: 10 MG/DL
CALCIUM SERPL-MCNC: 9.7 MG/DL
CHLORIDE SERPL-SCNC: 105 MMOL/L
CO2 SERPL-SCNC: 25 MMOL/L
CREAT SERPL-MCNC: 0.6 MG/DL
DIFFERENTIAL METHOD: ABNORMAL
EOSINOPHIL # BLD AUTO: 0 K/UL
EOSINOPHIL NFR BLD: 0.3 %
ERYTHROCYTE [DISTWIDTH] IN BLOOD BY AUTOMATED COUNT: 13.6 %
EST. GFR  (AFRICAN AMERICAN): >60 ML/MIN/1.73 M^2
EST. GFR  (NON AFRICAN AMERICAN): >60 ML/MIN/1.73 M^2
GLUCOSE SERPL-MCNC: 123 MG/DL
HCT VFR BLD AUTO: 39 %
HGB BLD-MCNC: 12.9 G/DL
IMM GRANULOCYTES # BLD AUTO: 0.04 K/UL
IMM GRANULOCYTES NFR BLD AUTO: 0.4 %
LYMPHOCYTES # BLD AUTO: 1.1 K/UL
LYMPHOCYTES NFR BLD: 10.1 %
MAGNESIUM SERPL-MCNC: 2.1 MG/DL
MCH RBC QN AUTO: 31.6 PG
MCHC RBC AUTO-ENTMCNC: 33.1 G/DL
MCV RBC AUTO: 96 FL
MONOCYTES # BLD AUTO: 1.2 K/UL
MONOCYTES NFR BLD: 11.6 %
NEUTROPHILS # BLD AUTO: 8.3 K/UL
NEUTROPHILS NFR BLD: 77.3 %
NRBC BLD-RTO: 0 /100 WBC
PHOSPHATE SERPL-MCNC: 3.3 MG/DL
PLATELET # BLD AUTO: 340 K/UL
PMV BLD AUTO: 9.2 FL
POTASSIUM SERPL-SCNC: 4.2 MMOL/L
PROT SERPL-MCNC: 6.4 G/DL
RBC # BLD AUTO: 4.08 M/UL
SODIUM SERPL-SCNC: 138 MMOL/L
WBC # BLD AUTO: 10.72 K/UL

## 2018-11-06 PROCEDURE — 83735 ASSAY OF MAGNESIUM: CPT

## 2018-11-06 PROCEDURE — 25000003 PHARM REV CODE 250: Performed by: STUDENT IN AN ORGANIZED HEALTH CARE EDUCATION/TRAINING PROGRAM

## 2018-11-06 PROCEDURE — 94640 AIRWAY INHALATION TREATMENT: CPT

## 2018-11-06 PROCEDURE — 27000221 HC OXYGEN, UP TO 24 HOURS

## 2018-11-06 PROCEDURE — 25000242 PHARM REV CODE 250 ALT 637 W/ HCPCS: Performed by: STUDENT IN AN ORGANIZED HEALTH CARE EDUCATION/TRAINING PROGRAM

## 2018-11-06 PROCEDURE — 94150 VITAL CAPACITY TEST: CPT

## 2018-11-06 PROCEDURE — 94761 N-INVAS EAR/PLS OXIMETRY MLT: CPT

## 2018-11-06 PROCEDURE — 85025 COMPLETE CBC W/AUTO DIFF WBC: CPT

## 2018-11-06 PROCEDURE — 80053 COMPREHEN METABOLIC PANEL: CPT

## 2018-11-06 PROCEDURE — 11000001 HC ACUTE MED/SURG PRIVATE ROOM

## 2018-11-06 PROCEDURE — 36415 COLL VENOUS BLD VENIPUNCTURE: CPT

## 2018-11-06 PROCEDURE — 84100 ASSAY OF PHOSPHORUS: CPT

## 2018-11-06 PROCEDURE — 99232 SBSQ HOSP IP/OBS MODERATE 35: CPT | Mod: ,,, | Performed by: HOSPITALIST

## 2018-11-06 PROCEDURE — 63600175 PHARM REV CODE 636 W HCPCS: Performed by: STUDENT IN AN ORGANIZED HEALTH CARE EDUCATION/TRAINING PROGRAM

## 2018-11-06 PROCEDURE — 94010 BREATHING CAPACITY TEST: CPT

## 2018-11-06 RX ORDER — POLYETHYLENE GLYCOL 3350 17 G/17G
17 POWDER, FOR SOLUTION ORAL DAILY
Status: DISCONTINUED | OUTPATIENT
Start: 2018-11-06 | End: 2018-11-07 | Stop reason: HOSPADM

## 2018-11-06 RX ORDER — TIOTROPIUM BROMIDE 18 UG/1
1 CAPSULE ORAL; RESPIRATORY (INHALATION) DAILY
Status: DISCONTINUED | OUTPATIENT
Start: 2018-11-06 | End: 2018-11-07 | Stop reason: HOSPADM

## 2018-11-06 RX ORDER — ASCORBIC ACID 500 MG
500 TABLET ORAL 2 TIMES DAILY
Status: DISCONTINUED | OUTPATIENT
Start: 2018-11-06 | End: 2018-11-07 | Stop reason: HOSPADM

## 2018-11-06 RX ADMIN — Medication 1 TABLET: at 09:11

## 2018-11-06 RX ADMIN — LEVOFLOXACIN 750 MG: 500 TABLET, FILM COATED ORAL at 02:11

## 2018-11-06 RX ADMIN — PREDNISONE 40 MG: 20 TABLET ORAL at 09:11

## 2018-11-06 RX ADMIN — CIPROFLOXACIN 500 MG: 500 TABLET, FILM COATED ORAL at 09:11

## 2018-11-06 RX ADMIN — IPRATROPIUM BROMIDE AND ALBUTEROL SULFATE 3 ML: .5; 3 SOLUTION RESPIRATORY (INHALATION) at 07:11

## 2018-11-06 RX ADMIN — ENOXAPARIN SODIUM 40 MG: 100 INJECTION SUBCUTANEOUS at 04:11

## 2018-11-06 RX ADMIN — TIOTROPIUM BROMIDE 18 MCG: 18 CAPSULE ORAL; RESPIRATORY (INHALATION) at 04:11

## 2018-11-06 RX ADMIN — IPRATROPIUM BROMIDE AND ALBUTEROL SULFATE 3 ML: .5; 3 SOLUTION RESPIRATORY (INHALATION) at 12:11

## 2018-11-06 RX ADMIN — OXYCODONE HYDROCHLORIDE AND ACETAMINOPHEN 500 MG: 500 TABLET ORAL at 08:11

## 2018-11-06 RX ADMIN — POLYETHYLENE GLYCOL 3350 17 G: 17 POWDER, FOR SOLUTION ORAL at 10:11

## 2018-11-06 RX ADMIN — OXYCODONE HYDROCHLORIDE AND ACETAMINOPHEN 1000 MG: 500 TABLET ORAL at 09:11

## 2018-11-06 RX ADMIN — FLUTICASONE FUROATE AND VILANTEROL TRIFENATATE 1 PUFF: 100; 25 POWDER RESPIRATORY (INHALATION) at 10:11

## 2018-11-06 NOTE — PLAN OF CARE
Problem: Patient Care Overview  Goal: Plan of Care Review  Outcome: Ongoing (interventions implemented as appropriate)  COPD: Pt. Remained on 2L O2-sats wnl. SOB on exertion. Getting Nebs.   VSS. Pt. offered no c/o for this shift.   Skin: no pressure injury or skin breakdown noted.   Activity: Pt. ambulating with a steady gait.   Safety and pt. Care rounds maintained. Wctm.

## 2018-11-06 NOTE — PHARMACY MED REC
"Admission Medication Reconciliation - Pharmacy Consult Note    The home medication history was taken by Tari Macedo, Pharmacy Technician.  Based on information gathered and subsequent review by the clinical pharmacist, the items below may need attention.     You may go to "Admission" then "Reconcile Home Medications" tabs to review and/or act upon these items.     No discrepancies with current MAR and MedRec.     Please address this information as you see fit.  Feel free to contact us if you have any questions or require assistance.    Qian Rosario, PharmD, BCPS, Internal Medicine Clinical Pharmacy Specialist  [phone]                    .    .          "

## 2018-11-06 NOTE — PROGRESS NOTES
[facility] Hospital Medicine  Progress Note    Patient Name: Estefani Fam  MRN: [id number]  Patient Class: IP- Inpatient   Admission Date: 11/5/2018  Length of Stay: 1 days  Attending Physician: Karina Virgen MD  Primary Care Provider: Juan Truong MD    [facility] Medicine Team: [facility] 4 Farhat Ho MD    Subjective:     Principal Problem:COPD (chronic obstructive pulmonary disease) with acute bronchitis    HPI:  Estefani Fam is a 68 year old female with a medical history of COPD (previous smoker, 30 pack year, quit 2016) who presents to [facility] with complaint of progressive SOB. Pt states that over the last week she has increasingly felt short of breath one exertion that acutely worsened this am. Pt states that this am she noted increased yellow sputum with cough and more shortness of breath at rest. Pt denies any color change in her sputum or blood, just increased amount. Pt states that she has needed to use her home oxygen (2L PRN) almost everyday and her albuterol nebulizer 2-3 times a day over the last week, up fron 1-2 times on a normal day. Pt also states that she has been using her rescue inhaler more often. Pt notes that she does get short term relief with these treatments. Pt also notes that she has had dysuria and minor abd pain over the last few days and feels that she has a UTI.     Pt denies any CP, N/V, fever/chills, sick contacts or any recent changes ta home. Pt denies smoking or second hand smoke exposure.     In ED, pt was started on oxygen, 2L via NC and given 3 duoneb treatments. She states that these have given her relief. She desaturated to 85% on her home oxygen on ambulation and was therefore admitted to  3 for further work up and evaluation. CXR was unremarkable except for chronic COPD related change.      Hospital Course:  No notes on file    Interval History: NAEON. States that she is feeling better with less shortness of breath and improved cough  with less production of sputum in the am. Pt states that the duonebs have been helping, will continue papito monitor. Stating well on 2L via NC.     Review of Systems   Constitutional: Negative for activity change, chills, diaphoresis, fatigue and fever.   HENT: Negative for rhinorrhea, sinus pressure, sinus pain, sneezing, sore throat and voice change.    Eyes: Positive for redness. Negative for photophobia and visual disturbance.   Respiratory: Positive for cough, shortness of breath and wheezing. Negative for chest tightness.    Cardiovascular: Negative for chest pain, palpitations and leg swelling.   Gastrointestinal: Negative for abdominal pain, constipation, diarrhea, nausea and vomiting.   Endocrine: Negative for cold intolerance and heat intolerance.   Genitourinary: Positive for dysuria. Negative for difficulty urinating, frequency and urgency.   Musculoskeletal: Negative for neck pain and neck stiffness.   Skin: Positive for pallor. Negative for color change, rash and wound.   Neurological: Negative for dizziness, weakness, light-headedness and headaches.   Psychiatric/Behavioral: Negative for agitation and confusion.     Objective:     Vital Signs (Most Recent):  Temp: 98.2 °F (36.8 °C) (11/06/18 1154)  Pulse: 94 (11/06/18 1236)  Resp: 18 (11/06/18 1236)  BP: 112/67 (11/06/18 1154)  SpO2: (!) 93 % (11/06/18 1236) Vital Signs (24h Range):  Temp:  [98 °F (36.7 °C)-98.2 °F (36.8 °C)] 98.2 °F (36.8 °C)  Pulse:  [] 94  Resp:  [14-22] 18  SpO2:  [91 %-99 %] 93 %  BP: (112-194)/(64-83) 112/67     Weight: 60.8 kg (134 lb 0.6 oz)  Body mass index is 20.38 kg/m².    Physical Exam   Constitutional: She appears well-developed and well-nourished. No distress.   HENT:   Head: Normocephalic and atraumatic.   Eyes: Conjunctivae and EOM are normal. Pupils are equal, round, and reactive to light.   Neck: Normal range of motion. Neck supple. No tracheal deviation present.   Cardiovascular: Regular rhythm, normal heart  sounds and intact distal pulses.   No murmur heard.  Tachycardic   Pulmonary/Chest: No respiratory distress. She has wheezes. She exhibits no tenderness.   Decreased breath sounds in all lung fields, diffuse wheezes. No acute distress, no use of accessory muscles    Skin: She is not diaphoretic.   Nursing note and vitals reviewed.        CRANIAL NERVES     CN III, IV, VI   Pupils are equal, round, and reactive to light.  Extraocular motions are normal.        Significant Labs:   ABGs:   Recent Labs   Lab 11/05/18  1622   PH 7.428   PCO2 40.0   HCO3 26.4   POCSATURATED 94*   BE 2     BMP:   Recent Labs   Lab 11/06/18  0637   *      K 4.2      CO2 25   BUN 10   CREATININE 0.6   CALCIUM 9.7   MG 2.1     CBC:   Recent Labs   Lab 11/05/18  1305 11/06/18  0637   WBC 8.51 10.72   HGB 13.9 12.9   HCT 42.5 39.0   * 340     CMP:   Recent Labs   Lab 11/05/18  1305 11/06/18  0637    138   K 4.4 4.2    105   CO2 23 25    123*   BUN 10 10   CREATININE 0.7 0.6   CALCIUM 9.9 9.7   PROT 7.6 6.4   ALBUMIN 4.1 3.5   BILITOT 0.5 0.3   ALKPHOS 98 89   AST 42* 23   ALT 39 28   ANIONGAP 11 8   EGFRNONAA >60.0 >60.0     Lactic Acid: No results for input(s): LACTATE in the last 48 hours.  Respiratory Culture: No results for input(s): GSRESP, RESPIRATORYC in the last 48 hours.  Urine Culture: No results for input(s): LABURIN in the last 48 hours.  Urine Studies:   Recent Labs   Lab 11/05/18  1715   COLORU Yellow   APPEARANCEUA Hazy*   PHUR 6.0   SPECGRAV 1.010   PROTEINUA Negative   GLUCUA 2+*   KETONESU 1+*   BILIRUBINUA Negative   OCCULTUA Negative   NITRITE Positive*   LEUKOCYTESUR 3+*   RBCUA 1   WBCUA 82*   BACTERIA Few*   SQUAMEPITHEL 2     All pertinent labs within the past 24 hours have been reviewed.    Significant Imaging: CXR: I have reviewed all pertinent results/findings within the past 24 hours and my personal findings are:  chronic changes associated with COPD    Assessment/Plan:       * COPD (chronic obstructive pulmonary disease) with acute bronchitis    - Chronic COPD in ex smoker with 30 pack year history, quit 2016  - Known to pulmonology  - Current home medications include albuterol inhaler, Breo inahler and Ellipta  - Does not take Spiriva 2/2 increased cost   - Home oxygen 2L via NC as needed (normally 10-15 minutes a day after exertion)  - CXR shows no sign of PNA, chronic COPD changes     - Continue scheduled duo-nebs Q6   - Oxygen as needed  - Continue home Breo and Ellipta   - Levofloxacin   - Prednisone 40mg PO for 5 days       UTI (urinary tract infection)    - UA shoes 3+ leukocytes and many bacteria   - UCx pending  - Levofloxacin for empiric lung infection in COPD exacerbation should also cover UTI  - Will continue to monitor  - Cr 0.6       Pulmonary HTN    - Estimated PA Systolic Pressure: 45.45    7/3/18        Obstructive lung disease (generalized)    - See COPD       Former heavy tobacco smoker    - former smoker, quit 2016         VTE Risk Mitigation (From admission, onward)        Ordered     enoxaparin injection 40 mg  Daily      11/05/18 1645     IP VTE LOW RISK PATIENT  Once      11/05/18 1734     Place CHLOÉ hose  Until discontinued      11/05/18 1734     Place sequential compression device  Until discontinued      11/05/18 1734              Farhat Ho MD  Department of Hospital Medicine   [facility]

## 2018-11-06 NOTE — SUBJECTIVE & OBJECTIVE
Interval History: NAEON. States that she is feeling better with less shortness of breath and improved cough with less production of sputum in the am. Pt states that the duonebs have been helping, will continue papito monitor. Stating well on 2L via NC.     Review of Systems   Constitutional: Negative for activity change, chills, diaphoresis, fatigue and fever.   HENT: Negative for rhinorrhea, sinus pressure, sinus pain, sneezing, sore throat and voice change.    Eyes: Positive for redness. Negative for photophobia and visual disturbance.   Respiratory: Positive for cough, shortness of breath and wheezing. Negative for chest tightness.    Cardiovascular: Negative for chest pain, palpitations and leg swelling.   Gastrointestinal: Negative for abdominal pain, constipation, diarrhea, nausea and vomiting.   Endocrine: Negative for cold intolerance and heat intolerance.   Genitourinary: Positive for dysuria. Negative for difficulty urinating, frequency and urgency.   Musculoskeletal: Negative for neck pain and neck stiffness.   Skin: Positive for pallor. Negative for color change, rash and wound.   Neurological: Negative for dizziness, weakness, light-headedness and headaches.   Psychiatric/Behavioral: Negative for agitation and confusion.     Objective:     Vital Signs (Most Recent):  Temp: 98.2 °F (36.8 °C) (11/06/18 1154)  Pulse: 94 (11/06/18 1236)  Resp: 18 (11/06/18 1236)  BP: 112/67 (11/06/18 1154)  SpO2: (!) 93 % (11/06/18 1236) Vital Signs (24h Range):  Temp:  [98 °F (36.7 °C)-98.2 °F (36.8 °C)] 98.2 °F (36.8 °C)  Pulse:  [] 94  Resp:  [14-22] 18  SpO2:  [91 %-99 %] 93 %  BP: (112-194)/(64-83) 112/67     Weight: 60.8 kg (134 lb 0.6 oz)  Body mass index is 20.38 kg/m².    Physical Exam   Constitutional: She appears well-developed and well-nourished. No distress.   HENT:   Head: Normocephalic and atraumatic.   Eyes: Conjunctivae and EOM are normal. Pupils are equal, round, and reactive to light.   Neck: Normal  range of motion. Neck supple. No tracheal deviation present.   Cardiovascular: Regular rhythm, normal heart sounds and intact distal pulses.   No murmur heard.  Tachycardic   Pulmonary/Chest: No respiratory distress. She has wheezes. She exhibits no tenderness.   Decreased breath sounds in all lung fields, diffuse wheezes. No acute distress, no use of accessory muscles    Skin: She is not diaphoretic.   Nursing note and vitals reviewed.        CRANIAL NERVES     CN III, IV, VI   Pupils are equal, round, and reactive to light.  Extraocular motions are normal.        Significant Labs:   ABGs:   Recent Labs   Lab 11/05/18  1622   PH 7.428   PCO2 40.0   HCO3 26.4   POCSATURATED 94*   BE 2     BMP:   Recent Labs   Lab 11/06/18  0637   *      K 4.2      CO2 25   BUN 10   CREATININE 0.6   CALCIUM 9.7   MG 2.1     CBC:   Recent Labs   Lab 11/05/18  1305 11/06/18  0637   WBC 8.51 10.72   HGB 13.9 12.9   HCT 42.5 39.0   * 340     CMP:   Recent Labs   Lab 11/05/18  1305 11/06/18  0637    138   K 4.4 4.2    105   CO2 23 25    123*   BUN 10 10   CREATININE 0.7 0.6   CALCIUM 9.9 9.7   PROT 7.6 6.4   ALBUMIN 4.1 3.5   BILITOT 0.5 0.3   ALKPHOS 98 89   AST 42* 23   ALT 39 28   ANIONGAP 11 8   EGFRNONAA >60.0 >60.0     Lactic Acid: No results for input(s): LACTATE in the last 48 hours.  Respiratory Culture: No results for input(s): GSRESP, RESPIRATORYC in the last 48 hours.  Urine Culture: No results for input(s): LABURIN in the last 48 hours.  Urine Studies:   Recent Labs   Lab 11/05/18  1715   COLORU Yellow   APPEARANCEUA Hazy*   PHUR 6.0   SPECGRAV 1.010   PROTEINUA Negative   GLUCUA 2+*   KETONESU 1+*   BILIRUBINUA Negative   OCCULTUA Negative   NITRITE Positive*   LEUKOCYTESUR 3+*   RBCUA 1   WBCUA 82*   BACTERIA Few*   SQUAMEPITHEL 2     All pertinent labs within the past 24 hours have been reviewed.    Significant Imaging: CXR: I have reviewed all pertinent results/findings within  the past 24 hours and my personal findings are:  chronic changes associated with COPD

## 2018-11-06 NOTE — PLAN OF CARE
Juan Truong MD     Payor: MEDICARE / Plan: MEDICARE PART A & B / Product Type: Government /      Extended Emergency Contact Information  Primary Emergency Contact: Osvaldo Fam  Address: [address] [place] [id number] [facility]  Home Phone: [phone]  Relation: Spouse        11/06/18 3026   Discharge Assessment   Assessment Type Discharge Planning Assessment   Confirmed/corrected address and phone number on facesheet? Yes   Assessment information obtained from? Patient;Medical Record   Expected Length of Stay (days) 3   Communicated expected length of stay with patient/caregiver yes   Prior to hospitilization cognitive status: Alert/Oriented   Prior to hospitalization functional status: Independent   Current cognitive status: Alert/Oriented   Current Functional Status: Needs Assistance   Lives With spouse   Able to Return to Prior Arrangements yes   Is patient able to care for self after discharge? Yes   Patient's perception of discharge disposition home or selfcare;home health   Readmission Within The Last 30 Days no previous admission in last 30 days   Patient currently being followed by outpatient case management? No   Patient currently receives any other outside agency services? No   Equipment Currently Used at Home oxygen   Do you have any problems affording any of your prescribed medications? No   Is the patient taking medications as prescribed? yes   Does the patient have transportation home? Yes   Transportation Available family or friend will provide   Does the patient receive services at the Coumadin Clinic? No   Discharge Plan A Home with family   Discharge Plan B Home with family;Home Health   Patient/Family In Agreement With Plan yes

## 2018-11-07 VITALS
BODY MASS INDEX: 20.32 KG/M2 | HEART RATE: 96 BPM | DIASTOLIC BLOOD PRESSURE: 80 MMHG | TEMPERATURE: 98 F | RESPIRATION RATE: 15 BRPM | WEIGHT: 134.06 LBS | SYSTOLIC BLOOD PRESSURE: 133 MMHG | OXYGEN SATURATION: 98 % | HEIGHT: 68 IN

## 2018-11-07 LAB
ALBUMIN SERPL BCP-MCNC: 3.6 G/DL
ALP SERPL-CCNC: 85 U/L
ALT SERPL W/O P-5'-P-CCNC: 27 U/L
ANION GAP SERPL CALC-SCNC: 10 MMOL/L
AST SERPL-CCNC: 25 U/L
BACTERIA UR CULT: NORMAL
BASOPHILS # BLD AUTO: 0.07 K/UL
BASOPHILS NFR BLD: 0.7 %
BILIRUB SERPL-MCNC: 0.4 MG/DL
BUN SERPL-MCNC: 17 MG/DL
CALCIUM SERPL-MCNC: 9.4 MG/DL
CHLORIDE SERPL-SCNC: 104 MMOL/L
CO2 SERPL-SCNC: 24 MMOL/L
CREAT SERPL-MCNC: 0.8 MG/DL
DIFFERENTIAL METHOD: ABNORMAL
EOSINOPHIL # BLD AUTO: 0.3 K/UL
EOSINOPHIL NFR BLD: 2.8 %
ERYTHROCYTE [DISTWIDTH] IN BLOOD BY AUTOMATED COUNT: 13.6 %
EST. GFR  (AFRICAN AMERICAN): >60 ML/MIN/1.73 M^2
EST. GFR  (NON AFRICAN AMERICAN): >60 ML/MIN/1.73 M^2
GLUCOSE SERPL-MCNC: 98 MG/DL
HCT VFR BLD AUTO: 40.8 %
HGB BLD-MCNC: 13.1 G/DL
IMM GRANULOCYTES # BLD AUTO: 0.06 K/UL
IMM GRANULOCYTES NFR BLD AUTO: 0.6 %
LYMPHOCYTES # BLD AUTO: 2.5 K/UL
LYMPHOCYTES NFR BLD: 24.9 %
MAGNESIUM SERPL-MCNC: 2.4 MG/DL
MCH RBC QN AUTO: 31.7 PG
MCHC RBC AUTO-ENTMCNC: 32.1 G/DL
MCV RBC AUTO: 99 FL
MONOCYTES # BLD AUTO: 1.2 K/UL
MONOCYTES NFR BLD: 11.5 %
NEUTROPHILS # BLD AUTO: 5.9 K/UL
NEUTROPHILS NFR BLD: 59.5 %
NRBC BLD-RTO: 0 /100 WBC
PHOSPHATE SERPL-MCNC: 3.7 MG/DL
PLATELET # BLD AUTO: 365 K/UL
PMV BLD AUTO: 9.2 FL
POTASSIUM SERPL-SCNC: 3.8 MMOL/L
PROT SERPL-MCNC: 6.5 G/DL
RBC # BLD AUTO: 4.13 M/UL
SODIUM SERPL-SCNC: 138 MMOL/L
WBC # BLD AUTO: 9.96 K/UL

## 2018-11-07 PROCEDURE — 25000242 PHARM REV CODE 250 ALT 637 W/ HCPCS: Performed by: STUDENT IN AN ORGANIZED HEALTH CARE EDUCATION/TRAINING PROGRAM

## 2018-11-07 PROCEDURE — 63600175 PHARM REV CODE 636 W HCPCS: Performed by: STUDENT IN AN ORGANIZED HEALTH CARE EDUCATION/TRAINING PROGRAM

## 2018-11-07 PROCEDURE — 85025 COMPLETE CBC W/AUTO DIFF WBC: CPT

## 2018-11-07 PROCEDURE — 63600175 PHARM REV CODE 636 W HCPCS: Performed by: HOSPITALIST

## 2018-11-07 PROCEDURE — 90471 IMMUNIZATION ADMIN: CPT | Performed by: HOSPITALIST

## 2018-11-07 PROCEDURE — 3E02340 INTRODUCTION OF INFLUENZA VACCINE INTO MUSCLE, PERCUTANEOUS APPROACH: ICD-10-PCS | Performed by: HOSPITALIST

## 2018-11-07 PROCEDURE — 36415 COLL VENOUS BLD VENIPUNCTURE: CPT

## 2018-11-07 PROCEDURE — 84100 ASSAY OF PHOSPHORUS: CPT

## 2018-11-07 PROCEDURE — 25000003 PHARM REV CODE 250: Performed by: STUDENT IN AN ORGANIZED HEALTH CARE EDUCATION/TRAINING PROGRAM

## 2018-11-07 PROCEDURE — G0008 ADMIN INFLUENZA VIRUS VAC: HCPCS | Performed by: HOSPITALIST

## 2018-11-07 PROCEDURE — 27000221 HC OXYGEN, UP TO 24 HOURS

## 2018-11-07 PROCEDURE — 94761 N-INVAS EAR/PLS OXIMETRY MLT: CPT

## 2018-11-07 PROCEDURE — 94640 AIRWAY INHALATION TREATMENT: CPT

## 2018-11-07 PROCEDURE — 99239 HOSP IP/OBS DSCHRG MGMT >30: CPT | Mod: GC,,, | Performed by: HOSPITALIST

## 2018-11-07 PROCEDURE — 83735 ASSAY OF MAGNESIUM: CPT

## 2018-11-07 PROCEDURE — 80053 COMPREHEN METABOLIC PANEL: CPT

## 2018-11-07 PROCEDURE — 90662 IIV NO PRSV INCREASED AG IM: CPT | Performed by: HOSPITALIST

## 2018-11-07 RX ORDER — PREDNISONE 20 MG/1
40 TABLET ORAL DAILY
Qty: 6 TABLET | Refills: 0 | Status: ON HOLD | OUTPATIENT
Start: 2018-11-08 | End: 2018-12-30 | Stop reason: HOSPADM

## 2018-11-07 RX ORDER — LEVOFLOXACIN 750 MG/1
750 TABLET ORAL DAILY
Qty: 4 TABLET | Refills: 0 | Status: ON HOLD | OUTPATIENT
Start: 2018-11-08 | End: 2018-12-30 | Stop reason: SDUPTHER

## 2018-11-07 RX ORDER — CALCIUM CARBONATE 600 MG
600 TABLET ORAL DAILY
Refills: 0 | COMMUNITY
Start: 2018-11-07 | End: 2023-10-10

## 2018-11-07 RX ADMIN — Medication 1 TABLET: at 08:11

## 2018-11-07 RX ADMIN — POLYETHYLENE GLYCOL 3350 17 G: 17 POWDER, FOR SOLUTION ORAL at 08:11

## 2018-11-07 RX ADMIN — IPRATROPIUM BROMIDE AND ALBUTEROL SULFATE 3 ML: .5; 3 SOLUTION RESPIRATORY (INHALATION) at 12:11

## 2018-11-07 RX ADMIN — TIOTROPIUM BROMIDE 18 MCG: 18 CAPSULE ORAL; RESPIRATORY (INHALATION) at 08:11

## 2018-11-07 RX ADMIN — IPRATROPIUM BROMIDE AND ALBUTEROL SULFATE 3 ML: .5; 3 SOLUTION RESPIRATORY (INHALATION) at 07:11

## 2018-11-07 RX ADMIN — INFLUENZA A VIRUS A/MICHIGAN/45/2015 X-275 (H1N1) ANTIGEN (FORMALDEHYDE INACTIVATED), INFLUENZA A VIRUS A/SINGAPORE/INFIMH-16-0019/2016 IVR-186 (H3N2) ANTIGEN (FORMALDEHYDE INACTIVATED), AND INFLUENZA B VIRUS B/MARYLAND/15/2016 BX-69A (A B/COLORADO/6/2017-LIKE VIRUS) ANTIGEN (FORMALDEHYDE INACTIVATED) 0.5 ML: 60; 60; 60 INJECTION, SUSPENSION INTRAMUSCULAR at 02:11

## 2018-11-07 RX ADMIN — PREDNISONE 40 MG: 20 TABLET ORAL at 08:11

## 2018-11-07 RX ADMIN — IPRATROPIUM BROMIDE AND ALBUTEROL SULFATE 3 ML: .5; 3 SOLUTION RESPIRATORY (INHALATION) at 01:11

## 2018-11-07 RX ADMIN — LEVOFLOXACIN 750 MG: 500 TABLET, FILM COATED ORAL at 08:11

## 2018-11-07 RX ADMIN — OXYCODONE HYDROCHLORIDE AND ACETAMINOPHEN 500 MG: 500 TABLET ORAL at 08:11

## 2018-11-07 RX ADMIN — FLUTICASONE FUROATE AND VILANTEROL TRIFENATATE 1 PUFF: 100; 25 POWDER RESPIRATORY (INHALATION) at 08:11

## 2018-11-07 NOTE — NURSING
Resting/Walking p. Ox completed-see previous note.   Pt. has O2 at home. However, pt. does not have a portable O2. MD made aware.    Will DC pt. once the O2 arrangements are complete.

## 2018-11-07 NOTE — DISCHARGE SUMMARY
[facility] Hospital Medicine  Discharge Summary      Patient Name: Estefani Fam  MRN: [id number]  Admission Date: 11/5/2018  Hospital Length of Stay: 2 days  Discharge Date and Time:  11/07/2018 4:00 PM  Attending Physician: No att. providers found   Discharging Provider: Black Ramon MD  Primary Care Provider: Juan Truong MD  [facility] Medicine Team: [facility] HOSP MED 4 Black Ramon MD    HPI:   Estefani Fam is a 68 year old female with a medical history of COPD (previous smoker, 30 pack year, quit 2016) who presents to [facility] with complaint of progressive SOB. Pt states that over the last week she has increasingly felt short of breath one exertion that acutely worsened this am. Pt states that this am she noted increased yellow sputum with cough and more shortness of breath at rest. Pt denies any color change in her sputum or blood, just increased amount. Pt states that she has needed to use her home oxygen (2L PRN) almost everyday and her albuterol nebulizer 2-3 times a day over the last week, up fron 1-2 times on a normal day. Pt also states that she has been using her rescue inhaler more often. Pt notes that she does get short term relief with these treatments. Pt also notes that she has had dysuria and minor abd pain over the last few days and feels that she has a UTI.     Pt denies any CP, N/V, fever/chills, sick contacts or any recent changes ta home. Pt denies smoking or second hand smoke exposure.     In ED, pt was started on oxygen, 2L via NC and given 3 duoneb treatments. She states that these have given her relief. She desaturated to 85% on her home oxygen on ambulation and was therefore admitted to  3 for further work up and evaluation. CXR was unremarkable except for chronic COPD related change.      * No surgery found *      Hospital Course:   Admitted for COPD exacerbation and UTI. Treated with Prednisone and Levaquin. Pt already has home O2.     At day of discharge, Pt.  Was seen and examine   Respiratory: + dyspnea on exertion. denies cough and pleurisy  Cardiovascular: denies chest pain, chest pressure/discomfort, dyspnea, exertional chest Gastrointestinal: denies nausea or vomiting, abdominal pain or change in bowel habits  Genitourinary: denies hematuria or dysuria  Musculoskeletal: denies arthralgias or myalgias  Neurological: denies seizures or tremors     PE:  CV: Normal S1, S2  Resp: Lungs CTA Bilaterally, Unlabored. No wheezing   Abdomen: NTND, BS normoactive x4 quads, soft  Extrem: No cyanosis, clubbing, edema.  Skin: No rashes, lesions, ulcers  Neuro: motor strength in tact. No focal deficit   PSYCH: Oriented x3          * COPD (chronic obstructive pulmonary disease) with acute bronchitis    - Chronic COPD in ex smoker with 30 pack year history, quit 2016  - Known to pulmonology  - Current home medications include albuterol inhaler, Breo inahler and Ellipta  - Does not take Spiriva 2/2 increased cost   - Home oxygen 2L via NC as needed (normally 10-15 minutes a day after exertion)  - CXR shows no sign of PNA, chronic COPD changes     -  duo-nebs PRN at home   - Oxygen as needed at home   - Continue home Breo and Ellipta   - Levofloxacin total of 5 days   - Prednisone 40mg PO for 5 days  - f/u in Pulmonary clinic with Dr Harris        UTI (urinary tract infection)    - UA shows 3+ leukocytes and many bacteria   - UCx presumptive e.coli.    - Levofloxacin for empiric lung infection in COPD exacerbation should also cover UTI  - Will continue to monitor       Final Active Diagnoses:    Diagnosis Date Noted POA    PRINCIPAL PROBLEM:  COPD (chronic obstructive pulmonary disease) with acute bronchitis [J44.0, J20.9] 11/05/2018 Yes    UTI (urinary tract infection) [N39.0] 11/05/2018 Unknown    Pulmonary HTN [I27.20] 07/03/2018 Yes    Former heavy tobacco smoker [Z87.891] 07/02/2018 Not Applicable     Chronic    Obstructive lung disease (generalized) [J44.9] 07/02/2018 Yes       Problems Resolved During this Admission:       Discharged Condition: good    Disposition: Home or Self Care    Follow Up:  Follow-up Information     Juan Truong MD In 1 week.    Specialty:  Internal Medicine  Contact information:  [address]  [facility] [address]  [phone]             Jareth Harris MD In 1 week.    Specialty:  Pulmonary Disease  Contact information:  [address] [place] [id number] [phone]             [employer].    Specialty:  DME Provider  Why:  For oxygen needs  Contact information:  [address]  [place] [address] [phone]                 Patient Instructions:      Ambulatory consult to Pulmonology   Referral Priority: Routine Referral Type: Consultation   Referral Reason: Specialty Services Required   Requested Specialty: Pulmonary Disease   Number of Visits Requested: 1       Significant Diagnostic Studies: Labs:   CMP   Recent Labs   Lab 11/06/18  0637 11/07/18  0630    138   K 4.2 3.8    104   CO2 25 24   * 98   BUN 10 17   CREATININE 0.6 0.8   CALCIUM 9.7 9.4   PROT 6.4 6.5   ALBUMIN 3.5 3.6   BILITOT 0.3 0.4   ALKPHOS 89 85   AST 23 25   ALT 28 27   ANIONGAP 8 10   ESTGFRAFRICA >60.0 >60.0   EGFRNONAA >60.0 >60.0    and CBC   Recent Labs   Lab 11/06/18  0637 11/07/18  0630   WBC 10.72 9.96   HGB 12.9 13.1   HCT 39.0 40.8    365*       Pending Diagnostic Studies:     None         Medications:  Reconciled Home Medications:      Medication List      START taking these medications    levoFLOXacin 750 MG tablet  Commonly known as:  LEVAQUIN  Take 1 tablet (750 mg total) by mouth once daily.  Start taking on:  11/8/2018     predniSONE 20 MG tablet  Commonly known as:  DELTASONE  Take 2 tablets (40 mg total) by mouth once daily.  Start taking on:  11/8/2018        CHANGE how you take these medications    albuterol-ipratropium 2.5 mg-0.5 mg/3 mL nebulizer solution  Commonly known as:  DUO-NEB  Take 3 mLs by  nebulization every 4 (four) hours as needed for Wheezing or Shortness of Breath. Rescue  What changed:    · when to take this  · additional instructions     calcium carbonate 600 mg calcium (1,500 mg) Tab  Commonly known as:  OS-STEPHANIE  Take 1 tablet (600 mg total) by mouth once daily. Take Levofloxacin antibiotic at least 2 hours before calcium.  What changed:  additional instructions     multivit-iron-min-folic acid 3,500-18-0.4 unit-mg-mg Chew  Commonly known as:  CENTRUM  Take 1 tablet by mouth once daily. Take Levofloxacin antibiotic at least 2 hours before multivitamin.  What changed:  additional instructions        CONTINUE taking these medications    albuterol 90 mcg/actuation inhaler  Commonly known as:  PROVENTIL/VENTOLIN HFA  Inhale 2 puffs into the lungs every 4 (four) hours as needed for Wheezing or Shortness of Breath. Rescue     fluticasone-vilanterol 100-25 mcg/dose diskus inhaler  Commonly known as:  BREO  Inhale 1 puff into the lungs once daily. Controller     INCRUSE ELLIPTA 62.5 mcg/actuation Dsdv  Generic drug:  umeclidinium  INHALE 1 PUFF BY MOUTH INTO LUNGS ONCE DAILY     VITAMIN C 500 MG tablet  Generic drug:  ascorbic acid (vitamin C)  Take 500 mg by mouth 2 (two) times daily.            Indwelling Lines/Drains at time of discharge:   Lines/Drains/Airways          None          Time spent on the discharge of patient: >35 minutes  Patient was seen and examined on the date of discharge and determined to be suitable for discharge.         Black Ramon MD  Department of Hospital Medicine  [facility]

## 2018-11-07 NOTE — PROGRESS NOTES
Home Oxygen Evaluation    Date Performed: 2018    1) Patient's Home O2 Sat on room air, while at rest: 91%         If O2 sats on room air at rest are 88% or below, patient qualifies. No additional testing needed. Document N/A in steps 2 and 3. If 89% or above, complete steps 2.      2) Patient's O2 Sat on room air while exercisin%         If O2 sats on room air while exercising remain 89% or above patient does not qualify, no further testing needed Document N/A in step 3. If O2 sats on room air while exercising are 88% or below, continue to step 3.      3) Patient's O2 Sat while exercising on O2: 92%  at 2 LPM         (Must show improvement from #2 for patients to qualify)    If O2 sats improve on oxygen, patient qualifies for portable oxygen. If not, the patient does not qualify.

## 2018-11-07 NOTE — PLAN OF CARE
Pt remains in stable condition. VSS. Denied pain. Pt stated she did not have a BM since Sunday and would like a stool softner. MD notified, Miralax ordered daily, dose started on night shift. Free of fall/injury. No acute distress noted at this time. Bed in lowest position, call light in reach. Will cont to monitor, safety maintained.

## 2018-11-07 NOTE — PLAN OF CARE
"Patient ready for discharge,notified by IM4 team that patient needs portable oxygen, spoke with patient in detail, patient stated that she has portable and home oxygen at home but she wants smaller portable tanks.  Notified patient to contact her oxygen provider to see if they can supply her with the smaller tanks but until then I instructed patient to use the portable and home oxygen units she already has.  I asked patient can someone bring her portable oxygen for discharge, patient stated that her  can't handle it.  Offered patient transportation with oxygen, patient refused and stated " I only live 10 minutes away, I will be fine," notified IM4 team, will follow.  "

## 2018-11-07 NOTE — PLAN OF CARE
11/07/18 1332   Final Note   Assessment Type Final Discharge Note   Anticipated Discharge Disposition Home   Hospital Follow Up  Appt(s) scheduled? Yes   Discharge plans and expectations educations in teach back method with documentation complete? Yes   Right Care Referral Info   Post Acute Recommendation No Care

## 2018-11-07 NOTE — NURSING
"Pt. DC to home/self care.   DC instructions reviewed, pt. verbalized the understanding.   IV and Visi removed before the DC.   Pt. left unit on WC with O2 and all personal belongings.   Pts. spouse will provide ride home.   Per pt. "spouse is bringing the O2 tank for ride home"        "

## 2018-11-28 ENCOUNTER — TELEPHONE (OUTPATIENT)
Dept: PULMONOLOGY | Facility: CLINIC | Age: 68
End: 2018-11-28

## 2018-11-28 DIAGNOSIS — J44.1 COPD WITH ACUTE EXACERBATION: Primary | ICD-10-CM

## 2018-11-28 NOTE — TELEPHONE ENCOUNTER
----- Message from Nataly Tian sent at 11/28/2018  9:08 AM CST -----  Contact: Self  .Patient Requesting Sooner Appointment.     Reason for sooner appt.:  [facility]  When is the first available appointment?  Communication Preference: [phone]  Additional Information:  11-28-18 I called Mrs Fam's house and left a message on her machine that Dr Harris can see her tomrrow 11-29 at 11AM. I asked her to call back if she cannot come so I can offer the appt. to someone else.  Cammy Rucker LPN.

## 2018-11-29 ENCOUNTER — HOSPITAL ENCOUNTER (OUTPATIENT)
Dept: PULMONOLOGY | Facility: CLINIC | Age: 68
Discharge: HOME | End: 2018-11-29
Payer: MEDICARE

## 2018-11-29 ENCOUNTER — OFFICE VISIT (OUTPATIENT)
Dept: PULMONOLOGY | Facility: CLINIC | Age: 68
End: 2018-11-29
Payer: MEDICARE

## 2018-11-29 VITALS
OXYGEN SATURATION: 87 % | SYSTOLIC BLOOD PRESSURE: 102 MMHG | DIASTOLIC BLOOD PRESSURE: 74 MMHG | HEART RATE: 103 BPM | WEIGHT: 134 LBS | HEIGHT: 67 IN | BODY MASS INDEX: 21.03 KG/M2

## 2018-11-29 DIAGNOSIS — J44.1 COPD WITH ACUTE EXACERBATION: ICD-10-CM

## 2018-11-29 LAB
PRE FEV1 FVC: 38
PRE FEV1: 0.97
PRE FVC: 2.53
PREDICTED FEV1 FVC: 77
PREDICTED FEV1: 2.49
PREDICTED FVC: 3.21

## 2018-11-29 PROCEDURE — 94010 BREATHING CAPACITY TEST: CPT | Mod: PBBFAC | Performed by: INTERNAL MEDICINE

## 2018-11-29 PROCEDURE — 99213 OFFICE O/P EST LOW 20 MIN: CPT | Mod: PBBFAC,25 | Performed by: INTERNAL MEDICINE

## 2018-11-29 PROCEDURE — 99214 OFFICE O/P EST MOD 30 MIN: CPT | Mod: 25,S$PBB,, | Performed by: INTERNAL MEDICINE

## 2018-11-29 PROCEDURE — 99999 PR PBB SHADOW E&M-EST. PATIENT-LVL III: CPT | Mod: PBBFAC,,, | Performed by: INTERNAL MEDICINE

## 2018-11-29 PROCEDURE — 94010 BREATHING CAPACITY TEST: CPT | Mod: 26,S$PBB,, | Performed by: INTERNAL MEDICINE

## 2018-11-29 NOTE — PROGRESS NOTES
Subjective:      Patient ID: Estefani Fam is a 68 y.o. female.    Chief Complaint: COPD and Shortness of Breath    HPI Follow up for [facility] discharge after being admitted for COPD exacerbation. She feels and looks well. Cooked her own Thanksgiving dinnner  On BREO and Incruse      No flowsheet data found.  Review of Systems   Constitutional: Negative.    HENT: Negative.    Eyes: Negative.    Respiratory: Negative.         Severe Pulmonary Emphysema    Fev-1; 0.97 liters  Just developed clinical symptoms this summer.   Cardiovascular: Negative.    Genitourinary: Negative.    Musculoskeletal: Negative.    Skin: Negative.    Gastrointestinal: Negative.    Neurological: Negative.    Psychiatric/Behavioral: Negative.      Objective:     Physical Exam   Constitutional: She is oriented to person, place, and time. She appears well-developed and well-nourished. No distress.   HENT:   Head: Normocephalic and atraumatic.   Right Ear: External ear normal.   Left Ear: External ear normal.   Nose: Nose normal.   Mouth/Throat: Oropharynx is clear and moist.   Eyes: Conjunctivae and EOM are normal. Pupils are equal, round, and reactive to light.   Neck: Normal range of motion. Neck supple. No JVD present. No thyromegaly present.   Cardiovascular: Normal rate, regular rhythm, normal heart sounds and intact distal pulses. Exam reveals no gallop.   No murmur heard.  Pulmonary/Chest: Breath sounds normal. No stridor. No respiratory distress. She has no wheezes. She has no rales. She exhibits no tenderness.   Markedly decreased air entry No rales or wheezes.    Abdominal: Soft. Bowel sounds are normal. She exhibits no distension and no mass. There is no tenderness. There is no rebound and no guarding.   Musculoskeletal: Normal range of motion. She exhibits no edema.   Lymphadenopathy:     She has no cervical adenopathy.   Neurological: She is alert and oriented to person, place, and time. She has normal reflexes. She displays normal  reflexes. No cranial nerve deficit.   Skin: Skin is warm and dry. No rash noted.   Psychiatric: She has a normal mood and affect. Her behavior is normal. Judgment and thought content normal.   Nursing note and vitals reviewed.      Assessment:     1. COPD with acute exacerbation      Outpatient Encounter Medications as of 11/29/2018   Medication Sig Dispense Refill    albuterol 90 mcg/actuation inhaler Inhale 2 puffs into the lungs every 4 (four) hours as needed for Wheezing or Shortness of Breath. Rescue 1 each 3    albuterol-ipratropium (DUO-NEB) 2.5 mg-0.5 mg/3 mL nebulizer solution Take 3 mLs by nebulization every 4 (four) hours as needed for Wheezing or Shortness of Breath. Rescue (Patient taking differently: Take 3 mLs by nebulization 2 (two) times daily. Rescue) 120 vial 11    ascorbic acid, vitamin C, (VITAMIN C) 500 MG tablet Take 500 mg by mouth 2 (two) times daily.       calcium carbonate (OS-STEPHANIE) 600 mg calcium (1,500 mg) Tab Take 1 tablet (600 mg total) by mouth once daily. Take Levofloxacin antibiotic at least 2 hours before calcium.  0    fluticasone-vilanterol (BREO) 100-25 mcg/dose diskus inhaler Inhale 1 puff into the lungs once daily. Controller 1 each 11    INCRUSE ELLIPTA 62.5 mcg/actuation DsDv INHALE 1 PUFF BY MOUTH INTO LUNGS ONCE DAILY 1 each 6    levoFLOXacin (LEVAQUIN) 750 MG tablet Take 1 tablet (750 mg total) by mouth once daily. 4 tablet 0    MULTIVIT-IRON-MIN-FOLIC ACID 3,500-18-0.4 UNIT-MG-MG ORAL CHEW Take 1 tablet by mouth once daily. Take Levofloxacin antibiotic at least 2 hours before multivitamin.  0    predniSONE (DELTASONE) 20 MG tablet Take 2 tablets (40 mg total) by mouth once daily. 6 tablet 0     No facility-administered encounter medications on file as of 11/29/2018.        Plan:   Stable Pulmonary Emphysema. Encouraged to join the pulmonary rehab program at [facility]. Will sent the latest PFT's and my note  Problem List Items Addressed This Visit     COPD  with acute exacerbation    Overview     67 yo female with emphysema (pink puffer) was hospitalized in early November for exacerbation. The admitting chest x-ray was clear and her WBC was normal. This would favor a viral illness. She is looking well today, cooked her Thanksgiving Dinner and is wanting to go to the [facility] Re[facility]. She has home oxygen and sleeps with 2 liters but does not use on a regular basis when ambulating. It is sitting in the car with her . And she walked from the garage without stopping.          Current Assessment & Plan     Alert and in no distress Resting Sa02; 93%  Dropped to 87% after walking in from the waiting room. PFT;s today; Severe Obstruction Fev-1: 0.97 liters 38% of a normal FVC:2.53 liters. Was0.88 liters on August 28. Stable Severe COPD, Encouraged to use her oxygen for ambulation and attend rehab class.

## 2018-11-29 NOTE — ASSESSMENT & PLAN NOTE
Alert and in no distress Resting Sa02; 93%  Dropped to 87% after walking in from the waiting room. PFT;s today; Severe Obstruction Fev-1: 0.97 liters 38% of a normal FVC:2.53 liters. Was0.88 liters on August 28. Stable Severe COPD, Encouraged to use her oxygen for ambulation and attend rehab class.

## 2018-12-04 DIAGNOSIS — R06.09 DOE (DYSPNEA ON EXERTION): Primary | ICD-10-CM

## 2018-12-17 ENCOUNTER — TELEPHONE (OUTPATIENT)
Dept: INTERNAL MEDICINE | Facility: CLINIC | Age: 68
End: 2018-12-17

## 2018-12-17 NOTE — TELEPHONE ENCOUNTER
----- Message from Josefa Mooney sent at 12/17/2018  3:26 PM CST -----  Contact: [phone]  Patient is returning a call from the office.    Please advise, thanks

## 2018-12-26 ENCOUNTER — TELEPHONE (OUTPATIENT)
Dept: PULMONOLOGY | Facility: CLINIC | Age: 68
End: 2018-12-26

## 2018-12-27 ENCOUNTER — HOSPITAL ENCOUNTER (INPATIENT)
Facility: HOSPITAL | Age: 68
LOS: 4 days | Discharge: HOME | DRG: 192 | End: 2019-01-01
Attending: EMERGENCY MEDICINE | Admitting: HOSPITALIST
Payer: MEDICARE

## 2018-12-27 DIAGNOSIS — J44.1 COPD EXACERBATION: Primary | ICD-10-CM

## 2018-12-27 DIAGNOSIS — R06.02 SOB (SHORTNESS OF BREATH): ICD-10-CM

## 2018-12-27 PROCEDURE — 83880 ASSAY OF NATRIURETIC PEPTIDE: CPT

## 2018-12-27 PROCEDURE — 82962 GLUCOSE BLOOD TEST: CPT

## 2018-12-27 PROCEDURE — 99284 PR EMERGENCY DEPT VISIT,LEVEL IV: ICD-10-PCS | Mod: GC,,, | Performed by: EMERGENCY MEDICINE

## 2018-12-27 PROCEDURE — 99285 EMERGENCY DEPT VISIT HI MDM: CPT | Mod: 25

## 2018-12-27 PROCEDURE — 85025 COMPLETE CBC W/AUTO DIFF WBC: CPT

## 2018-12-27 PROCEDURE — 99284 EMERGENCY DEPT VISIT MOD MDM: CPT | Mod: GC,,, | Performed by: EMERGENCY MEDICINE

## 2018-12-27 PROCEDURE — 80053 COMPREHEN METABOLIC PANEL: CPT

## 2018-12-27 RX ORDER — PREDNISONE 20 MG/1
60 TABLET ORAL
Status: COMPLETED | OUTPATIENT
Start: 2018-12-28 | End: 2018-12-27

## 2018-12-27 RX ORDER — IPRATROPIUM BROMIDE AND ALBUTEROL SULFATE 2.5; .5 MG/3ML; MG/3ML
3 SOLUTION RESPIRATORY (INHALATION)
Status: COMPLETED | OUTPATIENT
Start: 2018-12-28 | End: 2018-12-28

## 2018-12-27 RX ORDER — LEVOFLOXACIN 750 MG/1
750 TABLET ORAL ONCE
Status: COMPLETED | OUTPATIENT
Start: 2018-12-28 | End: 2018-12-28

## 2018-12-27 RX ADMIN — PREDNISONE 60 MG: 20 TABLET ORAL at 11:12

## 2018-12-28 PROBLEM — J44.1 COPD EXACERBATION: Status: ACTIVE | Noted: 2018-12-28

## 2018-12-28 PROBLEM — J44.1 COPD WITH ACUTE EXACERBATION: Status: RESOLVED | Noted: 2018-07-02 | Resolved: 2018-12-28

## 2018-12-28 LAB
ALBUMIN SERPL BCP-MCNC: 3.8 G/DL
ALLENS TEST: NORMAL
ALP SERPL-CCNC: 98 U/L
ALT SERPL W/O P-5'-P-CCNC: 23 U/L
ANION GAP SERPL CALC-SCNC: 9 MMOL/L
ANION GAP SERPL CALC-SCNC: 9 MMOL/L
AST SERPL-CCNC: 24 U/L
BASOPHILS # BLD AUTO: 0.07 K/UL
BASOPHILS NFR BLD: 0.8 %
BILIRUB SERPL-MCNC: 0.3 MG/DL
BNP SERPL-MCNC: 25 PG/ML
BUN SERPL-MCNC: 12 MG/DL
BUN SERPL-MCNC: 14 MG/DL
CALCIUM SERPL-MCNC: 9.3 MG/DL
CALCIUM SERPL-MCNC: 9.5 MG/DL
CHLORIDE SERPL-SCNC: 100 MMOL/L
CHLORIDE SERPL-SCNC: 102 MMOL/L
CO2 SERPL-SCNC: 26 MMOL/L
CO2 SERPL-SCNC: 29 MMOL/L
CREAT SERPL-MCNC: 0.6 MG/DL
CREAT SERPL-MCNC: 0.7 MG/DL
DELSYS: NORMAL
DIFFERENTIAL METHOD: ABNORMAL
EOSINOPHIL # BLD AUTO: 0.5 K/UL
EOSINOPHIL NFR BLD: 6 %
ERYTHROCYTE [DISTWIDTH] IN BLOOD BY AUTOMATED COUNT: 12.8 %
EST. GFR  (AFRICAN AMERICAN): >60 ML/MIN/1.73 M^2
EST. GFR  (AFRICAN AMERICAN): >60 ML/MIN/1.73 M^2
EST. GFR  (NON AFRICAN AMERICAN): >60 ML/MIN/1.73 M^2
EST. GFR  (NON AFRICAN AMERICAN): >60 ML/MIN/1.73 M^2
ESTIMATED AVG GLUCOSE: 108 MG/DL
FLOW: 2
GLUCOSE SERPL-MCNC: 118 MG/DL
GLUCOSE SERPL-MCNC: 198 MG/DL
HBA1C MFR BLD HPLC: 5.4 %
HCO3 UR-SCNC: 24.9 MMOL/L (ref 24–28)
HCT VFR BLD AUTO: 43.4 %
HGB BLD-MCNC: 13.7 G/DL
IMM GRANULOCYTES # BLD AUTO: 0.02 K/UL
IMM GRANULOCYTES NFR BLD AUTO: 0.2 %
LYMPHOCYTES # BLD AUTO: 0.9 K/UL
LYMPHOCYTES NFR BLD: 9.9 %
MAGNESIUM SERPL-MCNC: 1.9 MG/DL
MCH RBC QN AUTO: 31.5 PG
MCHC RBC AUTO-ENTMCNC: 31.6 G/DL
MCV RBC AUTO: 100 FL
MODE: NORMAL
MONOCYTES # BLD AUTO: 0.4 K/UL
MONOCYTES NFR BLD: 4.7 %
NEUTROPHILS # BLD AUTO: 6.7 K/UL
NEUTROPHILS NFR BLD: 78.4 %
NRBC BLD-RTO: 0 /100 WBC
PCO2 BLDA: 44.1 MMHG (ref 35–45)
PH SMN: 7.36 [PH] (ref 7.35–7.45)
PHOSPHATE SERPL-MCNC: 2.4 MG/DL
PLATELET # BLD AUTO: 305 K/UL
PMV BLD AUTO: 9.4 FL
PO2 BLDA: 81 MMHG (ref 80–100)
POC BE: -1 MMOL/L
POC SATURATED O2: 95 % (ref 95–100)
POC TCO2: 26 MMOL/L (ref 23–27)
POCT GLUCOSE: 119 MG/DL (ref 70–110)
POCT GLUCOSE: 163 MG/DL (ref 70–110)
POCT GLUCOSE: 167 MG/DL (ref 70–110)
POCT GLUCOSE: 172 MG/DL (ref 70–110)
POCT GLUCOSE: 174 MG/DL (ref 70–110)
POTASSIUM SERPL-SCNC: 3.4 MMOL/L
POTASSIUM SERPL-SCNC: 4 MMOL/L
PROCALCITONIN SERPL IA-MCNC: 0.02 NG/ML
PROT SERPL-MCNC: 7 G/DL
RBC # BLD AUTO: 4.35 M/UL
SAMPLE: NORMAL
SITE: NORMAL
SODIUM SERPL-SCNC: 137 MMOL/L
SODIUM SERPL-SCNC: 138 MMOL/L
SP02: 97
WBC # BLD AUTO: 8.57 K/UL

## 2018-12-28 PROCEDURE — 36415 COLL VENOUS BLD VENIPUNCTURE: CPT

## 2018-12-28 PROCEDURE — 96361 HYDRATE IV INFUSION ADD-ON: CPT

## 2018-12-28 PROCEDURE — 99223 PR INITIAL HOSPITAL CARE,LEVL III: ICD-10-PCS | Mod: ,,, | Performed by: PHYSICIAN ASSISTANT

## 2018-12-28 PROCEDURE — 63600175 PHARM REV CODE 636 W HCPCS: Performed by: PHYSICIAN ASSISTANT

## 2018-12-28 PROCEDURE — 94640 AIRWAY INHALATION TREATMENT: CPT

## 2018-12-28 PROCEDURE — 83735 ASSAY OF MAGNESIUM: CPT

## 2018-12-28 PROCEDURE — 63600175 PHARM REV CODE 636 W HCPCS: Performed by: STUDENT IN AN ORGANIZED HEALTH CARE EDUCATION/TRAINING PROGRAM

## 2018-12-28 PROCEDURE — 99900035 HC TECH TIME PER 15 MIN (STAT)

## 2018-12-28 PROCEDURE — 84145 PROCALCITONIN (PCT): CPT

## 2018-12-28 PROCEDURE — 25000003 PHARM REV CODE 250: Performed by: PHYSICIAN ASSISTANT

## 2018-12-28 PROCEDURE — 25000003 PHARM REV CODE 250: Performed by: STUDENT IN AN ORGANIZED HEALTH CARE EDUCATION/TRAINING PROGRAM

## 2018-12-28 PROCEDURE — 82803 BLOOD GASES ANY COMBINATION: CPT

## 2018-12-28 PROCEDURE — 36600 WITHDRAWAL OF ARTERIAL BLOOD: CPT

## 2018-12-28 PROCEDURE — 84100 ASSAY OF PHOSPHORUS: CPT

## 2018-12-28 PROCEDURE — 96374 THER/PROPH/DIAG INJ IV PUSH: CPT

## 2018-12-28 PROCEDURE — 25000242 PHARM REV CODE 250 ALT 637 W/ HCPCS: Performed by: PHYSICIAN ASSISTANT

## 2018-12-28 PROCEDURE — 93010 EKG 12-LEAD: ICD-10-PCS | Mod: ,,, | Performed by: INTERNAL MEDICINE

## 2018-12-28 PROCEDURE — 80048 BASIC METABOLIC PNL TOTAL CA: CPT

## 2018-12-28 PROCEDURE — 25000003 PHARM REV CODE 250: Performed by: HOSPITALIST

## 2018-12-28 PROCEDURE — 83036 HEMOGLOBIN GLYCOSYLATED A1C: CPT

## 2018-12-28 PROCEDURE — 99223 1ST HOSP IP/OBS HIGH 75: CPT | Mod: ,,, | Performed by: PHYSICIAN ASSISTANT

## 2018-12-28 PROCEDURE — 25000242 PHARM REV CODE 250 ALT 637 W/ HCPCS: Performed by: STUDENT IN AN ORGANIZED HEALTH CARE EDUCATION/TRAINING PROGRAM

## 2018-12-28 PROCEDURE — 93005 ELECTROCARDIOGRAM TRACING: CPT

## 2018-12-28 PROCEDURE — 20600001 HC STEP DOWN PRIVATE ROOM

## 2018-12-28 PROCEDURE — 93010 ELECTROCARDIOGRAM REPORT: CPT | Mod: ,,, | Performed by: INTERNAL MEDICINE

## 2018-12-28 RX ORDER — SODIUM,POTASSIUM PHOSPHATES 280-250MG
1 POWDER IN PACKET (EA) ORAL
Status: COMPLETED | OUTPATIENT
Start: 2018-12-28 | End: 2018-12-29

## 2018-12-28 RX ORDER — IBUPROFEN 200 MG
16 TABLET ORAL
Status: DISCONTINUED | OUTPATIENT
Start: 2018-12-28 | End: 2019-01-01 | Stop reason: HOSPADM

## 2018-12-28 RX ORDER — LEVOFLOXACIN 750 MG/1
750 TABLET ORAL DAILY
Status: COMPLETED | OUTPATIENT
Start: 2018-12-28 | End: 2019-01-01

## 2018-12-28 RX ORDER — IPRATROPIUM BROMIDE AND ALBUTEROL SULFATE 2.5; .5 MG/3ML; MG/3ML
3 SOLUTION RESPIRATORY (INHALATION) EVERY 4 HOURS
Status: DISCONTINUED | OUTPATIENT
Start: 2018-12-28 | End: 2018-12-28

## 2018-12-28 RX ORDER — ACETAMINOPHEN 325 MG/1
650 TABLET ORAL EVERY 8 HOURS PRN
Status: DISCONTINUED | OUTPATIENT
Start: 2018-12-28 | End: 2019-01-01 | Stop reason: HOSPADM

## 2018-12-28 RX ORDER — DEXTROSE MONOHYDRATE 25 G/50ML
25 INJECTION, SOLUTION INTRAVENOUS
Status: DISCONTINUED | OUTPATIENT
Start: 2018-12-28 | End: 2019-01-01 | Stop reason: HOSPADM

## 2018-12-28 RX ORDER — ACETAMINOPHEN 500 MG
1000 TABLET ORAL EVERY 8 HOURS PRN
Status: DISCONTINUED | OUTPATIENT
Start: 2018-12-28 | End: 2019-01-01 | Stop reason: HOSPADM

## 2018-12-28 RX ORDER — IPRATROPIUM BROMIDE AND ALBUTEROL SULFATE 2.5; .5 MG/3ML; MG/3ML
3 SOLUTION RESPIRATORY (INHALATION) EVERY 4 HOURS PRN
Status: DISCONTINUED | OUTPATIENT
Start: 2018-12-28 | End: 2019-01-01 | Stop reason: HOSPADM

## 2018-12-28 RX ORDER — PANTOPRAZOLE SODIUM 40 MG/1
40 TABLET, DELAYED RELEASE ORAL DAILY
Status: DISCONTINUED | OUTPATIENT
Start: 2018-12-28 | End: 2019-01-01 | Stop reason: HOSPADM

## 2018-12-28 RX ORDER — LANOLIN ALCOHOL/MO/W.PET/CERES
400 CREAM (GRAM) TOPICAL ONCE
Status: COMPLETED | OUTPATIENT
Start: 2018-12-28 | End: 2018-12-28

## 2018-12-28 RX ORDER — DEXTROSE MONOHYDRATE 25 G/50ML
12.5 INJECTION, SOLUTION INTRAVENOUS
Status: DISCONTINUED | OUTPATIENT
Start: 2018-12-28 | End: 2019-01-01 | Stop reason: HOSPADM

## 2018-12-28 RX ORDER — ENOXAPARIN SODIUM 100 MG/ML
40 INJECTION SUBCUTANEOUS EVERY 24 HOURS
Status: CANCELLED | OUTPATIENT
Start: 2018-12-28

## 2018-12-28 RX ORDER — BISACODYL 10 MG
10 SUPPOSITORY, RECTAL RECTAL DAILY PRN
Status: DISCONTINUED | OUTPATIENT
Start: 2018-12-28 | End: 2019-01-01 | Stop reason: HOSPADM

## 2018-12-28 RX ORDER — POLYETHYLENE GLYCOL 3350 17 G/17G
17 POWDER, FOR SOLUTION ORAL DAILY
Status: DISCONTINUED | OUTPATIENT
Start: 2018-12-28 | End: 2019-01-01 | Stop reason: HOSPADM

## 2018-12-28 RX ORDER — PREDNISONE 20 MG/1
60 TABLET ORAL DAILY
Status: COMPLETED | OUTPATIENT
Start: 2018-12-28 | End: 2019-01-01

## 2018-12-28 RX ORDER — LEVALBUTEROL 1.25 MG/.5ML
1.25 SOLUTION, CONCENTRATE RESPIRATORY (INHALATION) EVERY 8 HOURS
Status: DISCONTINUED | OUTPATIENT
Start: 2018-12-28 | End: 2018-12-29

## 2018-12-28 RX ORDER — POTASSIUM CHLORIDE 20 MEQ/15ML
40 SOLUTION ORAL
Status: COMPLETED | OUTPATIENT
Start: 2018-12-28 | End: 2018-12-28

## 2018-12-28 RX ORDER — FLUTICASONE FUROATE AND VILANTEROL 100; 25 UG/1; UG/1
1 POWDER RESPIRATORY (INHALATION) DAILY
Status: DISCONTINUED | OUTPATIENT
Start: 2018-12-28 | End: 2019-01-01 | Stop reason: HOSPADM

## 2018-12-28 RX ORDER — RAMELTEON 8 MG/1
8 TABLET ORAL NIGHTLY PRN
Status: DISCONTINUED | OUTPATIENT
Start: 2018-12-28 | End: 2019-01-01 | Stop reason: HOSPADM

## 2018-12-28 RX ORDER — IBUPROFEN 200 MG
24 TABLET ORAL
Status: DISCONTINUED | OUTPATIENT
Start: 2018-12-28 | End: 2019-01-01 | Stop reason: HOSPADM

## 2018-12-28 RX ORDER — TIOTROPIUM BROMIDE 18 UG/1
1 CAPSULE ORAL; RESPIRATORY (INHALATION) DAILY
Status: DISCONTINUED | OUTPATIENT
Start: 2018-12-28 | End: 2019-01-01 | Stop reason: HOSPADM

## 2018-12-28 RX ORDER — GLUCAGON 1 MG
1 KIT INJECTION
Status: DISCONTINUED | OUTPATIENT
Start: 2018-12-28 | End: 2019-01-01 | Stop reason: HOSPADM

## 2018-12-28 RX ORDER — IPRATROPIUM BROMIDE AND ALBUTEROL SULFATE 2.5; .5 MG/3ML; MG/3ML
3 SOLUTION RESPIRATORY (INHALATION)
Status: COMPLETED | OUTPATIENT
Start: 2018-12-28 | End: 2018-12-28

## 2018-12-28 RX ORDER — ONDANSETRON 8 MG/1
8 TABLET, ORALLY DISINTEGRATING ORAL EVERY 8 HOURS PRN
Status: DISCONTINUED | OUTPATIENT
Start: 2018-12-28 | End: 2019-01-01 | Stop reason: HOSPADM

## 2018-12-28 RX ORDER — ACETAMINOPHEN 325 MG/1
650 TABLET ORAL EVERY 4 HOURS PRN
Status: DISCONTINUED | OUTPATIENT
Start: 2018-12-28 | End: 2019-01-01 | Stop reason: HOSPADM

## 2018-12-28 RX ADMIN — POTASSIUM & SODIUM PHOSPHATES POWDER PACK 280-160-250 MG 1 PACKET: 280-160-250 PACK at 08:12

## 2018-12-28 RX ADMIN — METHYLPREDNISOLONE SODIUM SUCCINATE 60 MG: 40 INJECTION, POWDER, FOR SOLUTION INTRAMUSCULAR; INTRAVENOUS at 03:12

## 2018-12-28 RX ADMIN — IPRATROPIUM BROMIDE AND ALBUTEROL SULFATE 3 ML: .5; 3 SOLUTION RESPIRATORY (INHALATION) at 12:12

## 2018-12-28 RX ADMIN — LEVOFLOXACIN 750 MG: 750 TABLET, FILM COATED ORAL at 12:12

## 2018-12-28 RX ADMIN — LEVALBUTEROL 1.25 MG: 1.25 SOLUTION, CONCENTRATE RESPIRATORY (INHALATION) at 09:12

## 2018-12-28 RX ADMIN — TIOTROPIUM BROMIDE 18 MCG: 18 CAPSULE ORAL; RESPIRATORY (INHALATION) at 12:12

## 2018-12-28 RX ADMIN — LEVOFLOXACIN 750 MG: 750 TABLET, FILM COATED ORAL at 09:12

## 2018-12-28 RX ADMIN — POLYETHYLENE GLYCOL 3350 17 G: 17 POWDER, FOR SOLUTION ORAL at 09:12

## 2018-12-28 RX ADMIN — PREDNISONE 60 MG: 20 TABLET ORAL at 09:12

## 2018-12-28 RX ADMIN — SODIUM CHLORIDE, SODIUM LACTATE, POTASSIUM CHLORIDE, AND CALCIUM CHLORIDE 1000 ML: .6; .31; .03; .02 INJECTION, SOLUTION INTRAVENOUS at 01:12

## 2018-12-28 RX ADMIN — MAGNESIUM OXIDE TAB 400 MG (241.3 MG ELEMENTAL MG) 400 MG: 400 (241.3 MG) TAB at 03:12

## 2018-12-28 RX ADMIN — IPRATROPIUM BROMIDE AND ALBUTEROL SULFATE 3 ML: .5; 3 SOLUTION RESPIRATORY (INHALATION) at 01:12

## 2018-12-28 RX ADMIN — LEVALBUTEROL 1.25 MG: 1.25 SOLUTION, CONCENTRATE RESPIRATORY (INHALATION) at 03:12

## 2018-12-28 RX ADMIN — POTASSIUM & SODIUM PHOSPHATES POWDER PACK 280-160-250 MG 1 PACKET: 280-160-250 PACK at 12:12

## 2018-12-28 RX ADMIN — POTASSIUM CHLORIDE 40 MEQ: 20 SOLUTION ORAL at 02:12

## 2018-12-28 RX ADMIN — POTASSIUM & SODIUM PHOSPHATES POWDER PACK 280-160-250 MG 1 PACKET: 280-160-250 PACK at 05:12

## 2018-12-28 RX ADMIN — PANTOPRAZOLE SODIUM 40 MG: 40 TABLET, DELAYED RELEASE ORAL at 09:12

## 2018-12-28 RX ADMIN — FLUTICASONE FUROATE AND VILANTEROL TRIFENATATE 1 PUFF: 100; 25 POWDER RESPIRATORY (INHALATION) at 12:12

## 2018-12-28 NOTE — HPI
Estefani Fam is a 68F with COPD who presents for evaluation of SOB x 1 week. She took a previously prescribed prescription for azithromycin, but hasn't taken steroids. Her SOB is also refractory to home nebulizer treatments. She has been needing oxygen ATC, prior to this she used it PRN. She has SOB with minimal exertion, no wheeze, but feels very tight. Cough is productive especially in the AM, her symptoms are consistent with prior COPD flares. She has never been intubated or needed BiPAP during prior admissions. She denies fever, LE edema, orthopnea.    ED: CXR clear, no WBC, on 2L NC, BNP 25, given prednisone 60 mg PO with minimal improvement

## 2018-12-28 NOTE — ED PROVIDER NOTES
Encounter Date: 2018    SCRIBE #1 NOTE: I, Marimar Hanson, am scribing for, and in the presence of,  Vinicio Kruse MD. I have scribed the following portions of the note - the Resident attestation.       History     Chief Complaint   Patient presents with    Shortness of Breath     Pt reports SOB X1 week, hx of COPD. Pt also reports productive cough. Pt used home breathing treatment with no relief. Pt's O2 sats 88% on home 2L NC. Pt now at 100% on nebulizer. Pt denies chest pain.      68yoF w/COPD on 2L home O2 presenting with one week of cough productive of thick yellow sputum (change from baseline) and shortness of breath unreleaved by home neb treatments. Patient reports that she completed a z-pantera today that she had at home previously given to her by her pulmonologist but that she did not take any steroids. Denies fever, chills.          Review of patient's allergies indicates:  No Known Allergies  Past Medical History:   Diagnosis Date    Cataract     COPD (chronic obstructive pulmonary disease)     History of retinal hemorrhage      Past Surgical History:   Procedure Laterality Date    HAND SURGERY       Family History   Problem Relation Age of Onset    Cancer Mother         colon    Macular degeneration Mother     Stroke Father     Amblyopia Neg Hx     Blindness Neg Hx     Cataracts Neg Hx     Diabetes Neg Hx     Glaucoma Neg Hx     Hypertension Neg Hx     Retinal detachment Neg Hx     Strabismus Neg Hx     Thyroid disease Neg Hx      Social History     Tobacco Use    Smoking status: Former Smoker     Packs/day: 1.00     Years: 36.00     Pack years: 36.00     Types: Cigarettes     Last attempt to quit: 2016     Years since quittin.3    Smokeless tobacco: Never Used   Substance Use Topics    Alcohol use: Yes     Alcohol/week: 1.2 oz     Types: 2 Glasses of wine per week     Comment: 1-2 glasses a day     Drug use: No     Review of Systems   Constitutional: Negative for  chills and fever.   HENT: Negative for ear pain and sore throat.    Eyes: Negative for pain and visual disturbance.   Respiratory: Positive for cough, shortness of breath and wheezing.    Cardiovascular: Negative for chest pain and leg swelling.   Gastrointestinal: Negative for abdominal pain and nausea.   Genitourinary: Negative for dysuria and hematuria.   Musculoskeletal: Negative for back pain and neck pain.   Skin: Negative for pallor and rash.   Allergic/Immunologic: Negative for environmental allergies and food allergies.   Neurological: Negative for weakness and numbness.   Hematological: Does not bruise/bleed easily.   Psychiatric/Behavioral: Negative for agitation and confusion.       Physical Exam     Initial Vitals [12/27/18 2211]   BP Pulse Resp Temp SpO2   (!) 176/100 (!) 116 16 98.6 °F (37 °C) 100 %      MAP       --         Physical Exam    Nursing note and vitals reviewed.  Constitutional: She appears well-developed and well-nourished.   HENT:   Head: Normocephalic and atraumatic.   Eyes: Conjunctivae and EOM are normal. Pupils are equal, round, and reactive to light.   Neck: Normal range of motion. Neck supple.   Cardiovascular: Regular rhythm, normal heart sounds and intact distal pulses.   Tachycardic to 110s   Pulmonary/Chest: No respiratory distress.   Markedly decreased aeration bilaterally with scant wheezes bilaterally; no crackles heard   Abdominal: Soft. Bowel sounds are normal.   Neurological: She is alert and oriented to person, place, and time.   Skin: Skin is warm and dry.   Psychiatric: She has a normal mood and affect.         ED Course   Procedures  Labs Reviewed   CBC W/ AUTO DIFFERENTIAL - Abnormal; Notable for the following components:       Result Value     (*)     MCH 31.5 (*)     MCHC 31.6 (*)     Lymph # 0.9 (*)     Gran% 78.4 (*)     Lymph% 9.9 (*)     All other components within normal limits   COMPREHENSIVE METABOLIC PANEL - Abnormal; Notable for the following  "components:    Potassium 3.4 (*)     Glucose 118 (*)     All other components within normal limits   B-TYPE NATRIURETIC PEPTIDE          Imaging Results          X-Ray Chest PA And Lateral (Final result)  Result time 12/28/18 00:48:13    Final result by Bong Owens MD (12/28/18 00:48:13)                 Impression:      Stable chronic findings.  No acute abnormality or detrimental change when compared with 11/05/2018.      Electronically signed by: Bong Owens MD  Date:    12/28/2018  Time:    00:48             Narrative:    EXAMINATION:  XR CHEST PA AND LATERAL    CLINICAL HISTORY:  Provided history is "Asthma;  ".    TECHNIQUE:  Frontal and lateral views of the chest were performed.    COMPARISON:  11/05/2018.    FINDINGS:  Cardiac wires overlie the chest.  Lungs are hyperinflated.  There is biapical scarring, similar prior.  Nipple shadow overlies the right lung base.  Lungs are otherwise clear.  No focal consolidation.  No sizable effusion.  No pneumothorax.  No detrimental change.                                       APC / Resident Notes:   HO4 MDM:  68yoF presenting with shortness of breath and productive cough with yellow sputum x1 weeks. Lung exam with decreased aeration with scant wheezes bilaterally diffuseslyly concerning for COPD exacerbation. Given that patient is still having thick sputum despite a z-pantera, will start levaquin and give duonnebs and steroids. Will also check BNP to and CXR to r/o evidence of pulmonary edema.   Polly Chu MD  [facility] PGY-4  12/27/2018 11:50 PM    HO4 Update:  CXR with  No acute abnormality. BNP wnl. WBC also wnl. Repeat lung exam only with minimal improvement, patient still feeling short of breath. Will order more duonebs and admit to medicine for COPD exacerbation.  Polly Chu MD  [facility] Medicine PGY-4  12/28/2018 1:36 AM         Scribe Attestation:   Scribe #1: I performed the above scribed " service and the documentation accurately describes the services I performed. I attest to the accuracy of the note.    Attending Attestation:   Physician Attestation Statement for Resident:  As the supervising MD   Physician Attestation Statement: I have personally seen and examined this patient.   I agree with the above history. -: 68 y.o. female with co-morbidities of COPD presents by EMS for worsening of shortness of breath despite home O2 and Neb treatment   As the supervising MD I agree with the above PE.    As the supervising MD I agree with the above treatment, course, plan, and disposition.          Physician Attestation for Scribe:      Comments: I, Dr. Vinicio Kruse, personally performed the services described in this documentation. All medical record entries made by the scribe were at my direction and in my presence.  I have reviewed the chart and agree that the record reflects my personal performance and is accurate and complete. Vinicio Kruse MD.  1:56 AM 12/28/2018                 Clinical Impression:   The encounter diagnosis was COPD exacerbation.      Disposition:   Disposition: Admitted  Condition: Fair  IM                        Polly Chu MD  Resident  12/28/18 0136       Vinicio Kruse MD  12/28/18 0156

## 2018-12-28 NOTE — ED NOTES
Telemetry Verification   Patient placed on Telemetry Box  Verified with War Room  Box # [id number]   Monitor Tech Nelly   Rate 125   Rhythm ST

## 2018-12-28 NOTE — ED TRIAGE NOTES
Pt presents to ed cc of shortness of breath x 1 week. Pt has hx of copd uses home oxygen prn but over the last three days pt has been using oxygen continuously. Pt denies chest pain, n/v, fever, chills. Received duoneb per ems prior to arrival which pt states greatly improved her work of breathing. Wheezing noted bilaterally. Occasional cough.

## 2018-12-28 NOTE — H&P
[facility] Hospital Medicine  History & Physical    Patient Name: Estefani Fam  MRN: [id number]  Admission Date: 12/27/2018  Attending Physician: Gume Majano MD   Primary Care Provider: Juan Truong MD    [facility] Medicine Team: [facility]  Arian Ramachandran PA-C     Patient information was obtained from patient, past medical records and ER records.     Subjective:     Principal Problem:COPD exacerbation    Chief Complaint:   Chief Complaint   Patient presents with    Shortness of Breath     Pt reports SOB X1 week, hx of COPD. Pt also reports productive cough. Pt used home breathing treatment with no relief. Pt's O2 sats 88% on home 2L NC. Pt now at 100% on nebulizer. Pt denies chest pain.         HPI: Estefani Fam is a 68F with COPD who presents for evaluation of SOB x 1 week. She took a previously prescribed prescription for azithromycin, but hasn't taken steroids. Her SOB is also refractory to home nebulizer treatments. She has been needing oxygen ATC, prior to this she used it PRN. She has SOB with minimal exertion, no wheeze, but feels very tight. Cough is productive especially in the AM, her symptoms are consistent with prior COPD flares. She has never been intubated or needed BiPAP during prior admissions. She denies fever, LE edema, orthopnea.    ED: CXR clear, no WBC, on 2L NC, BNP 25, given prednisone 60 mg PO with minimal improvement     Past Medical History:   Diagnosis Date    Cataract     COPD (chronic obstructive pulmonary disease)     History of retinal hemorrhage        Past Surgical History:   Procedure Laterality Date    HAND SURGERY         Review of patient's allergies indicates:  No Known Allergies    No current facility-administered medications on file prior to encounter.      Current Outpatient Medications on File Prior to Encounter   Medication Sig    albuterol 90 mcg/actuation inhaler Inhale 2 puffs into the lungs every 4 (four) hours as needed for  Wheezing or Shortness of Breath. Rescue    albuterol-ipratropium (DUO-NEB) 2.5 mg-0.5 mg/3 mL nebulizer solution Take 3 mLs by nebulization every 4 (four) hours as needed for Wheezing or Shortness of Breath. Rescue (Patient taking differently: Take 3 mLs by nebulization 2 (two) times daily. Rescue)    ascorbic acid, vitamin C, (VITAMIN C) 500 MG tablet Take 500 mg by mouth 2 (two) times daily.     calcium carbonate (OS-STEPHANIE) 600 mg calcium (1,500 mg) Tab Take 1 tablet (600 mg total) by mouth once daily. Take Levofloxacin antibiotic at least 2 hours before calcium.    fluticasone-vilanterol (BREO) 100-25 mcg/dose diskus inhaler Inhale 1 puff into the lungs once daily. Controller    INCRUSE ELLIPTA 62.5 mcg/actuation DsDv INHALE 1 PUFF BY MOUTH INTO LUNGS ONCE DAILY    levoFLOXacin (LEVAQUIN) 750 MG tablet Take 1 tablet (750 mg total) by mouth once daily.    MULTIVIT-IRON-MIN-FOLIC ACID 3,500-18-0.4 UNIT-MG-MG ORAL CHEW Take 1 tablet by mouth once daily. Take Levofloxacin antibiotic at least 2 hours before multivitamin.    predniSONE (DELTASONE) 20 MG tablet Take 2 tablets (40 mg total) by mouth once daily.     Family History     Problem Relation (Age of Onset)    Cancer Mother    Macular degeneration Mother    Stroke Father        Tobacco Use    Smoking status: Former Smoker     Packs/day: 1.00     Years: 36.00     Pack years: 36.00     Types: Cigarettes     Last attempt to quit: 2016     Years since quittin.3    Smokeless tobacco: Never Used   Substance and Sexual Activity    Alcohol use: Yes     Alcohol/week: 1.2 oz     Types: 2 Glasses of wine per week     Comment: 1-2 glasses a day     Drug use: No    Sexual activity: Not on file     Review of Systems   Constitutional: Negative for chills, fatigue and fever.   HENT: Positive for congestion.    Respiratory: Positive for cough, chest tightness, shortness of breath and wheezing.    Cardiovascular: Negative for chest pain, palpitations and leg  swelling.   Gastrointestinal: Negative for abdominal distention, abdominal pain, nausea and vomiting.   Genitourinary: Negative for difficulty urinating.   Musculoskeletal: Negative for arthralgias and back pain.   Skin: Negative for rash and wound.   Neurological: Negative for dizziness, weakness and headaches.   Psychiatric/Behavioral: Negative for agitation and confusion.     Objective:     Vital Signs (Most Recent):  Temp: 97.8 °F (36.6 °C) (12/28/18 0056)  Pulse: (!) 128 (12/28/18 0236)  Resp: (!) 32 (12/28/18 0236)  BP: 133/60 (12/28/18 0232)  SpO2: (!) 93 % (12/28/18 0236) Vital Signs (24h Range):  Temp:  [97.8 °F (36.6 °C)-98.6 °F (37 °C)] 97.8 °F (36.6 °C)  Pulse:  [105-128] 128  Resp:  [16-32] 32  SpO2:  [91 %-100 %] 93 %  BP: (125-176)/() 133/60        Body mass index is 20.99 kg/m².    Physical Exam   Constitutional: She is oriented to person, place, and time. She appears well-developed and well-nourished. No distress.   HENT:   Head: Normocephalic and atraumatic.   Eyes: EOM are normal.   Cardiovascular: Normal rate and regular rhythm.   No murmur heard.  Pulmonary/Chest: She is in respiratory distress. She has no wheezes.   Pursed lip breathing, SOB with talking, diminished breath sound   Abdominal: Soft. Bowel sounds are normal. She exhibits no distension. There is no tenderness.   Musculoskeletal: Normal range of motion. She exhibits no edema or deformity.   Neurological: She is alert and oriented to person, place, and time. No cranial nerve deficit.   Skin: Skin is warm and dry. She is not diaphoretic. No erythema. No pallor.   Psychiatric: She has a normal mood and affect. Her behavior is normal. Judgment and thought content normal.   Nursing note and vitals reviewed.        CRANIAL NERVES     CN III, IV, VI   Extraocular motions are normal.        Significant Labs:   CBC:   Recent Labs   Lab 12/27/18  2351   WBC 8.57   HGB 13.7   HCT 43.4        CMP:   Recent Labs   Lab 12/27/18  2353       K 3.4*      CO2 29   *   BUN 14   CREATININE 0.6   CALCIUM 9.5   PROT 7.0   ALBUMIN 3.8   BILITOT 0.3   ALKPHOS 98   AST 24   ALT 23   ANIONGAP 9   EGFRNONAA >60.0     Cardiac Markers:   Recent Labs   Lab 12/27/18  2351   BNP 25     Lactic Acid: No results for input(s): LACTATE in the last 48 hours.  Lipase: No results for input(s): LIPASE in the last 48 hours.  TSH:   Recent Labs   Lab 07/02/18  1205   TSH 0.984       Significant Imaging: I have reviewed all pertinent imaging results/findings within the past 24 hours.    Assessment/Plan:     * COPD exacerbation    - COPD pathway initiated  - 1/4 SIRS for HR   - receiving fluids, may need to switch to xopenex given HR  - Satting 97% on 2L NC  -  Prednisone 60mg PO daily to complete a 5 day course   - give additional 60 mg IVP x 1 now  - Start Levaquin 250 mg PO daily to complete a 5 day course  - Continue home breo  - Duonebs q4h while awake and prn  - Incentive spirometry  - Check ABG to establish baseline    Recent Labs   Lab 12/28/18  0235   PH 7.360   PCO2 44.1   PO2 81   HCO3 24.9   POCSATURATED 95   BE -1          VTE Risk Mitigation (From admission, onward)        Ordered     IP VTE LOW RISK PATIENT  Once      12/28/18 0154     Place sequential compression device  Until discontinued      12/28/18 0154     Place CHLOÉ hose  Until discontinued      12/28/18 0154             Arian Ramachandran PA-C  Department of Hospital Medicine   [facility]

## 2018-12-28 NOTE — SUBJECTIVE & OBJECTIVE
Past Medical History:   Diagnosis Date    Cataract     COPD (chronic obstructive pulmonary disease)     History of retinal hemorrhage        Past Surgical History:   Procedure Laterality Date    HAND SURGERY         Review of patient's allergies indicates:  No Known Allergies    No current facility-administered medications on file prior to encounter.      Current Outpatient Medications on File Prior to Encounter   Medication Sig    albuterol 90 mcg/actuation inhaler Inhale 2 puffs into the lungs every 4 (four) hours as needed for Wheezing or Shortness of Breath. Rescue    albuterol-ipratropium (DUO-NEB) 2.5 mg-0.5 mg/3 mL nebulizer solution Take 3 mLs by nebulization every 4 (four) hours as needed for Wheezing or Shortness of Breath. Rescue (Patient taking differently: Take 3 mLs by nebulization 2 (two) times daily. Rescue)    ascorbic acid, vitamin C, (VITAMIN C) 500 MG tablet Take 500 mg by mouth 2 (two) times daily.     calcium carbonate (OS-STEPHANIE) 600 mg calcium (1,500 mg) Tab Take 1 tablet (600 mg total) by mouth once daily. Take Levofloxacin antibiotic at least 2 hours before calcium.    fluticasone-vilanterol (BREO) 100-25 mcg/dose diskus inhaler Inhale 1 puff into the lungs once daily. Controller    INCRUSE ELLIPTA 62.5 mcg/actuation DsDv INHALE 1 PUFF BY MOUTH INTO LUNGS ONCE DAILY    levoFLOXacin (LEVAQUIN) 750 MG tablet Take 1 tablet (750 mg total) by mouth once daily.    MULTIVIT-IRON-MIN-FOLIC ACID 3,500-18-0.4 UNIT-MG-MG ORAL CHEW Take 1 tablet by mouth once daily. Take Levofloxacin antibiotic at least 2 hours before multivitamin.    predniSONE (DELTASONE) 20 MG tablet Take 2 tablets (40 mg total) by mouth once daily.     Family History     Problem Relation (Age of Onset)    Cancer Mother    Macular degeneration Mother    Stroke Father        Tobacco Use    Smoking status: Former Smoker     Packs/day: 1.00     Years: 36.00     Pack years: 36.00     Types: Cigarettes     Last attempt to  quit: 2016     Years since quittin.3    Smokeless tobacco: Never Used   Substance and Sexual Activity    Alcohol use: Yes     Alcohol/week: 1.2 oz     Types: 2 Glasses of wine per week     Comment: 1-2 glasses a day     Drug use: No    Sexual activity: Not on file     Review of Systems   Constitutional: Negative for chills, fatigue and fever.   HENT: Positive for congestion.    Respiratory: Positive for cough, chest tightness, shortness of breath and wheezing.    Cardiovascular: Negative for chest pain, palpitations and leg swelling.   Gastrointestinal: Negative for abdominal distention, abdominal pain, nausea and vomiting.   Genitourinary: Negative for difficulty urinating.   Musculoskeletal: Negative for arthralgias and back pain.   Skin: Negative for rash and wound.   Neurological: Negative for dizziness, weakness and headaches.   Psychiatric/Behavioral: Negative for agitation and confusion.     Objective:     Vital Signs (Most Recent):  Temp: 97.8 °F (36.6 °C) (18 0056)  Pulse: (!) 128 (18 0236)  Resp: (!) 32 (18)  BP: 133/60 (18 0232)  SpO2: (!) 93 % (18) Vital Signs (24h Range):  Temp:  [97.8 °F (36.6 °C)-98.6 °F (37 °C)] 97.8 °F (36.6 °C)  Pulse:  [105-128] 128  Resp:  [16-32] 32  SpO2:  [91 %-100 %] 93 %  BP: (125-176)/() 133/60        Body mass index is 20.99 kg/m².    Physical Exam   Constitutional: She is oriented to person, place, and time. She appears well-developed and well-nourished. No distress.   HENT:   Head: Normocephalic and atraumatic.   Eyes: EOM are normal.   Cardiovascular: Normal rate and regular rhythm.   No murmur heard.  Pulmonary/Chest: She is in respiratory distress. She has no wheezes.   Pursed lip breathing, SOB with talking, diminished breath sound   Abdominal: Soft. Bowel sounds are normal. She exhibits no distension. There is no tenderness.   Musculoskeletal: Normal range of motion. She exhibits no edema or deformity.    Neurological: She is alert and oriented to person, place, and time. No cranial nerve deficit.   Skin: Skin is warm and dry. She is not diaphoretic. No erythema. No pallor.   Psychiatric: She has a normal mood and affect. Her behavior is normal. Judgment and thought content normal.   Nursing note and vitals reviewed.        CRANIAL NERVES     CN III, IV, VI   Extraocular motions are normal.        Significant Labs:   CBC:   Recent Labs   Lab 12/27/18  2351   WBC 8.57   HGB 13.7   HCT 43.4        CMP:   Recent Labs   Lab 12/27/18  2351      K 3.4*      CO2 29   *   BUN 14   CREATININE 0.6   CALCIUM 9.5   PROT 7.0   ALBUMIN 3.8   BILITOT 0.3   ALKPHOS 98   AST 24   ALT 23   ANIONGAP 9   EGFRNONAA >60.0     Cardiac Markers:   Recent Labs   Lab 12/27/18  2351   BNP 25     Lactic Acid: No results for input(s): LACTATE in the last 48 hours.  Lipase: No results for input(s): LIPASE in the last 48 hours.  TSH:   Recent Labs   Lab 07/02/18  1205   TSH 0.984       Significant Imaging: I have reviewed all pertinent imaging results/findings within the past 24 hours.

## 2018-12-28 NOTE — ED NOTES
Pt resting in bed. NAD noted. Side rails up x2. Needs addressed. Wheezing remains noted to right lung fields. Left lung fields diminished.

## 2018-12-28 NOTE — UM SECONDARY REVIEW
Physician Advisor External    Level of Care Issue    Approved Inpatient- 12/28/2018 @ 0822- Per Dr. Sainz, EHR, determination is Inpatient appropriate..

## 2018-12-28 NOTE — ED NOTES
Pt up to bathroom without oxygen. Became extremely short of breath. Room air sat after toileting was 88. Placed back on oxygen with sat returning to 92.

## 2018-12-28 NOTE — PROGRESS NOTES
Pt arrived the floor #353 from the ED. AAOX4. Denies any pain or nausea. C/o SOB with minimal walking. Prn breathing treatment as needed. Bed rest applied. HR: 110s-120s. 2L NC continued, SpO2 96%, RR: 20s-30s. Sepsis alert- paged MARANDA G.  Skin intact. No home meds at the bedside. Fall risk reviewed with pt. Call bell within reach. Side rails X2 up. Spouse at the bedside. No issues or concerns at this moment. Will follow the MD orders and continue to monitor the pt closely.

## 2018-12-28 NOTE — ED NOTES
Patient identifiers for Estefani Fam checked and correct.  LOC: Patient is awake, alert, and aware of environment with an appropriate affect. Patient is oriented x 3 and speaking appropriately.  APPEARANCE: Patient resting comfortably and in no acute distress. Patient is clean and well groomed, patient's clothing is properly fastened.  SKIN: The skin is warm and dry. Patient has normal skin turgor and moist mucus membrances. Skin is intact; no bruising or breakdown noted.  MUSKULOSKELETAL: Patient is moving all extremities well, no obvious deformities noted. Pulses intact.   RESPIRATORY: Airway is open and patent. Respirations are spontaneous and non-labored. Wheezing noted bilaterally and diminished breath sounds bilaterally. Pt complaining of shortness of breath.   CARDIAC: Patient has a normal rate and rhythm. No peripheral edema noted. Capillary refill < 3 seconds.  ABDOMEN: No distention noted. Bowel sounds active in all 4 quadrants. Soft and non-tender upon palpation.  NEUROLOGICAL: PERRL. Facial expression is symmetrical. Hand grasps are equal bilaterally. Normal sensation in all extremities when touched with finger.  Allergies reported: Review of patient's allergies indicates:  No Known Allergies

## 2018-12-28 NOTE — PLAN OF CARE
CM met with patient and son for discharge planning.  Patient states she lives in a two story house with her spouse with 20 steps to climb to get to second floor.  Patient is independent with ADL's.  Patient normally wears O2 PRN only but just prior to hospitalization the patient was wearing O2 24 hours a day for several days.  Plan is to discharge home with continuation of home O2.    Pharmacy:    [id number]  [place] [address] [place] [address] [facility] [place] [address]  Phone: [phone] Fax: [phone]    PCP:  Juan Turong MD    Payor: MEDICARE / Plan: MEDICARE PART A & B / Product Type: [facility] /      12/28/18 1122   Discharge Assessment   Assessment Type Discharge Planning Assessment   Confirmed/corrected address and phone number on facesheet? Yes   Assessment information obtained from? Patient   Expected Length of Stay (days) 3   Communicated expected length of stay with patient/caregiver yes   Prior to hospitilization cognitive status: Alert/Oriented   Prior to hospitalization functional status: Independent   Current cognitive status: Alert/Oriented   Current Functional Status: Independent   Facility Arrived From: Emergency room   Lives With spouse   Able to Return to Prior Arrangements yes   Is patient able to care for self after discharge? Yes   Who are your caregiver(s) and their phone number(s)? Osvaldo Williamson - spouse [phone]   Patient's perception of discharge disposition home or selfcare   Readmission Within the Last 30 Days no previous admission in last 30 days   Patient currently being followed by outpatient case management? No   Patient currently receives any other outside agency services? No   Equipment Currently Used at Home nebulizer;oxygen   Do you have any problems affording any of your prescribed medications? No   Is the patient taking medications as prescribed? yes   Does the patient have transportation  home? Yes   Transportation Anticipated family or friend will provide   Does the patient receive services at the [facility]? No   Discharge Plan A Home with family   Discharge Plan B Home Health   Patient/Family in Agreement with Plan yes

## 2018-12-28 NOTE — ASSESSMENT & PLAN NOTE
- COPD pathway initiated  - 1/4 SIRS for HR   - receiving fluids, may need to switch to xopenex given HR  - Satting 97% on 2L NC  -  Prednisone 60mg PO daily to complete a 5 day course   - give additional 60 mg IVP x 1 now  - Start Levaquin 250 mg PO daily to complete a 5 day course  - Continue home breo  - Duonebs q4h while awake and prn  - Incentive spirometry  - Check ABG to establish baseline    Recent Labs   Lab 12/28/18  0235   PH 7.360   PCO2 44.1   PO2 81   HCO3 24.9   POCSATURATED 95   BE -1

## 2018-12-29 LAB
ANION GAP SERPL CALC-SCNC: 6 MMOL/L
BASOPHILS # BLD AUTO: 0.02 K/UL
BASOPHILS NFR BLD: 0.2 %
BUN SERPL-MCNC: 12 MG/DL
CALCIUM SERPL-MCNC: 9.1 MG/DL
CHLORIDE SERPL-SCNC: 102 MMOL/L
CO2 SERPL-SCNC: 31 MMOL/L
CREAT SERPL-MCNC: 0.6 MG/DL
DIFFERENTIAL METHOD: ABNORMAL
EOSINOPHIL # BLD AUTO: 0 K/UL
EOSINOPHIL NFR BLD: 0.4 %
ERYTHROCYTE [DISTWIDTH] IN BLOOD BY AUTOMATED COUNT: 13.2 %
EST. GFR  (AFRICAN AMERICAN): >60 ML/MIN/1.73 M^2
EST. GFR  (NON AFRICAN AMERICAN): >60 ML/MIN/1.73 M^2
GLUCOSE SERPL-MCNC: 102 MG/DL
HCT VFR BLD AUTO: 38 %
HGB BLD-MCNC: 12.4 G/DL
IMM GRANULOCYTES # BLD AUTO: 0.04 K/UL
IMM GRANULOCYTES NFR BLD AUTO: 0.4 %
LYMPHOCYTES # BLD AUTO: 1.7 K/UL
LYMPHOCYTES NFR BLD: 15.3 %
MAGNESIUM SERPL-MCNC: 2.3 MG/DL
MCH RBC QN AUTO: 31.2 PG
MCHC RBC AUTO-ENTMCNC: 32.6 G/DL
MCV RBC AUTO: 96 FL
MONOCYTES # BLD AUTO: 1.5 K/UL
MONOCYTES NFR BLD: 13.2 %
NEUTROPHILS # BLD AUTO: 7.7 K/UL
NEUTROPHILS NFR BLD: 70.5 %
NRBC BLD-RTO: 0 /100 WBC
PHOSPHATE SERPL-MCNC: 3.9 MG/DL
PLATELET # BLD AUTO: 303 K/UL
PMV BLD AUTO: 9.3 FL
POCT GLUCOSE: 110 MG/DL (ref 70–110)
POCT GLUCOSE: 162 MG/DL (ref 70–110)
POCT GLUCOSE: 170 MG/DL (ref 70–110)
POCT GLUCOSE: 82 MG/DL (ref 70–110)
POTASSIUM SERPL-SCNC: 3.7 MMOL/L
RBC # BLD AUTO: 3.98 M/UL
SODIUM SERPL-SCNC: 139 MMOL/L
WBC # BLD AUTO: 10.97 K/UL

## 2018-12-29 PROCEDURE — G8979 MOBILITY GOAL STATUS: HCPCS | Mod: CH

## 2018-12-29 PROCEDURE — 99232 PR SUBSEQUENT HOSPITAL CARE,LEVL II: ICD-10-PCS | Mod: ,,, | Performed by: HOSPITALIST

## 2018-12-29 PROCEDURE — G8980 MOBILITY D/C STATUS: HCPCS | Mod: CH

## 2018-12-29 PROCEDURE — 80048 BASIC METABOLIC PNL TOTAL CA: CPT

## 2018-12-29 PROCEDURE — 97165 OT EVAL LOW COMPLEX 30 MIN: CPT

## 2018-12-29 PROCEDURE — 25000003 PHARM REV CODE 250: Performed by: HOSPITALIST

## 2018-12-29 PROCEDURE — 25000003 PHARM REV CODE 250: Performed by: PHYSICIAN ASSISTANT

## 2018-12-29 PROCEDURE — 83735 ASSAY OF MAGNESIUM: CPT

## 2018-12-29 PROCEDURE — 94640 AIRWAY INHALATION TREATMENT: CPT

## 2018-12-29 PROCEDURE — 84100 ASSAY OF PHOSPHORUS: CPT

## 2018-12-29 PROCEDURE — 97161 PT EVAL LOW COMPLEX 20 MIN: CPT

## 2018-12-29 PROCEDURE — 99232 SBSQ HOSP IP/OBS MODERATE 35: CPT | Mod: ,,, | Performed by: HOSPITALIST

## 2018-12-29 PROCEDURE — 63600175 PHARM REV CODE 636 W HCPCS: Performed by: PHYSICIAN ASSISTANT

## 2018-12-29 PROCEDURE — 36415 COLL VENOUS BLD VENIPUNCTURE: CPT

## 2018-12-29 PROCEDURE — 94761 N-INVAS EAR/PLS OXIMETRY MLT: CPT

## 2018-12-29 PROCEDURE — 20600001 HC STEP DOWN PRIVATE ROOM

## 2018-12-29 PROCEDURE — G8978 MOBILITY CURRENT STATUS: HCPCS | Mod: CH

## 2018-12-29 PROCEDURE — 25000242 PHARM REV CODE 250 ALT 637 W/ HCPCS: Performed by: PHYSICIAN ASSISTANT

## 2018-12-29 PROCEDURE — 25000242 PHARM REV CODE 250 ALT 637 W/ HCPCS: Performed by: HOSPITALIST

## 2018-12-29 PROCEDURE — 85025 COMPLETE CBC W/AUTO DIFF WBC: CPT

## 2018-12-29 PROCEDURE — 27000221 HC OXYGEN, UP TO 24 HOURS

## 2018-12-29 RX ORDER — GUAIFENESIN 100 MG/5ML
200 SOLUTION ORAL EVERY 4 HOURS PRN
Status: DISCONTINUED | OUTPATIENT
Start: 2018-12-29 | End: 2019-01-01 | Stop reason: HOSPADM

## 2018-12-29 RX ORDER — IPRATROPIUM BROMIDE AND ALBUTEROL SULFATE 2.5; .5 MG/3ML; MG/3ML
3 SOLUTION RESPIRATORY (INHALATION)
Status: DISCONTINUED | OUTPATIENT
Start: 2018-12-29 | End: 2019-01-01 | Stop reason: HOSPADM

## 2018-12-29 RX ORDER — GUAIFENESIN 600 MG/1
600 TABLET, EXTENDED RELEASE ORAL 2 TIMES DAILY
Status: DISCONTINUED | OUTPATIENT
Start: 2018-12-29 | End: 2019-01-01 | Stop reason: HOSPADM

## 2018-12-29 RX ADMIN — LEVALBUTEROL 1.25 MG: 1.25 SOLUTION, CONCENTRATE RESPIRATORY (INHALATION) at 08:12

## 2018-12-29 RX ADMIN — TIOTROPIUM BROMIDE 18 MCG: 18 CAPSULE ORAL; RESPIRATORY (INHALATION) at 08:12

## 2018-12-29 RX ADMIN — PANTOPRAZOLE SODIUM 40 MG: 40 TABLET, DELAYED RELEASE ORAL at 08:12

## 2018-12-29 RX ADMIN — LEVALBUTEROL 1.25 MG: 1.25 SOLUTION, CONCENTRATE RESPIRATORY (INHALATION) at 12:12

## 2018-12-29 RX ADMIN — IPRATROPIUM BROMIDE AND ALBUTEROL SULFATE 3 ML: .5; 3 SOLUTION RESPIRATORY (INHALATION) at 04:12

## 2018-12-29 RX ADMIN — PREDNISONE 60 MG: 20 TABLET ORAL at 08:12

## 2018-12-29 RX ADMIN — POTASSIUM & SODIUM PHOSPHATES POWDER PACK 280-160-250 MG 1 PACKET: 280-160-250 PACK at 08:12

## 2018-12-29 RX ADMIN — FLUTICASONE FUROATE AND VILANTEROL TRIFENATATE 1 PUFF: 100; 25 POWDER RESPIRATORY (INHALATION) at 08:12

## 2018-12-29 RX ADMIN — LEVOFLOXACIN 750 MG: 750 TABLET, FILM COATED ORAL at 08:12

## 2018-12-29 RX ADMIN — GUAIFENESIN 600 MG: 600 TABLET, EXTENDED RELEASE ORAL at 09:12

## 2018-12-29 RX ADMIN — POLYETHYLENE GLYCOL 3350 17 G: 17 POWDER, FOR SOLUTION ORAL at 08:12

## 2018-12-29 RX ADMIN — IPRATROPIUM BROMIDE AND ALBUTEROL SULFATE 3 ML: .5; 3 SOLUTION RESPIRATORY (INHALATION) at 07:12

## 2018-12-29 NOTE — PLAN OF CARE
Problem: Physical Therapy Goal  Goal: Physical Therapy Goal  Outcome: Outcome(s) achieved Date Met: 12/29/18  Pt is independent with mobility and ADLs, does not require further acute skilled therapy intervention. Discharge from PT services and re-consult if pt experiences a change in status.

## 2018-12-29 NOTE — PT/OT/SLP EVAL
Occupational Therapy   Evaluation and Discharge Note    Name: Estefani Fam  MRN: [id number]  Admitting Diagnosis:  COPD exacerbation      Recommendations:     Discharge Recommendations: (home; no OT)  Discharge Equipment Recommendations:  none  Barriers to discharge:  None    History:     Occupational Profile:  Living Environment: lives with spouse in 2  with bed/bath upstairs; full flight of steps to second floor with R HR  Previous level of function: (I) with ADL and ambulation  Roles and Routines: homemaker  Equipment Used at home:  nebulizer, oxygen  Assistance upon Discharge:  can assist as needed    Past Medical History:   Diagnosis Date    Cataract     COPD (chronic obstructive pulmonary disease)     History of retinal hemorrhage        Past Surgical History:   Procedure Laterality Date    HAND SURGERY         Subjective     Chief Complaint: denies  Patient/Family Comments/goals: to go home    Pain/Comfort:  · Pain Rating 1: 0/10  · Pain Rating Post-Intervention 1: 0/10    Patients cultural, spiritual, Hindu conflicts given the current situation: no    Objective:     Communicated with: RN prior to session.  Patient found  telemetry, oxygen upon OT entry to room.    General Precautions: Standard, fall   Orthopedic Precautions:    Braces:       Occupational Performance:    Bed Mobility:    · Independent    Functional Mobility/Transfers:  · Patient completed Sit <> Stand Transfer with independence  with  no assistive device   · Functional Mobility: Independent in room    Activities of Daily Living:  · Feeding:  independence    · Grooming: independence    · Upper Body Dressing: independence    · Lower Body Dressing: independence      Cognitive/Visual Perceptual:  Oriented to: Person, Place, Time and Situation  Follows Commands/attention: Follows multistep  commands  Communication: clear/fluent  Memory:  No Deficits noted  Safety awareness/insight to disability: intact  Coping skills/emotional  "control: Appropriate to situation    Physical Exam:  Postural examination/scapula alignment:    -       No postural abnormalities identified  Sensation:    -       Intact  Upper Extremity Range of Motion:     -       Right Upper Extremity: WFL  -       Left Upper Extremity: WFL  Upper Extremity Strength:    -       Right Upper Extremity: WFL  -       Left Upper Extremity: WFL   Strength:    -       Right Upper Extremity: WFL  -       Left Upper Extremity: WFL  Fine Motor Coordination:    -       Intact  Gross motor coordination:   WFL    AMPAC 6 Click ADL:  AMPAC Total Score: 24    Treatment & Education:  Pt ed on OT POC and discharge  Education:    Patient left up in chair with all lines intact, call button in reach and RN notified    Assessment:     Estefani Fam is a 68 y.o. female with a medical diagnosis of COPD exacerbation. At this time, patient is functioning at their prior level of function and does not require further acute OT services.     Clinical Decision Makin.  OT Low:  "Pt evaluation falls under low complexity for evaluation coding due to performance deficits noted in 1-3 areas as stated above and 0 co-morbities affecting current functional status. Data obtained from problem focused assessments. No modifications or assistance was required for completion of evaluation. Only brief occupational profile and history review completed."     Plan:     During this hospitalization, patient does not require further acute OT services.  Please re-consult if situation changes.    · Plan of Care Reviewed with: patient    This Plan of care has been discussed with the patient who was involved in its development and understands and is in agreement with the identified goals and treatment plan    GOALS:   Multidisciplinary Problems     Occupational Therapy Goals     Not on file          Multidisciplinary Problems (Resolved)        Problem: Occupational Therapy Goal    Goal Priority Disciplines Outcome " Interventions   Occupational Therapy Goal   (Resolved)     OT, PT/OT Outcome(s) achieved                    Time Tracking:     OT Date of Treatment: 12/29/18  OT Start Time: 1136  OT Stop Time: 1144  OT Total Time (min): 8 min    Billable Minutes:Evaluation 8    ALESSIO Harrell  12/29/2018

## 2018-12-29 NOTE — PLAN OF CARE
Problem: Occupational Therapy Goal  Goal: Occupational Therapy Goal  Outcome: Outcome(s) achieved Date Met: 12/29/18  OT bruna completed; no goals established as pt is performing ADL safely and without A. ALESSIO Harrell  12/29/2018

## 2018-12-29 NOTE — ASSESSMENT & PLAN NOTE
- COPD pathway initiated  - improving  - continue current management  - added antitussive therapy prn and scheduled  - continue nebs

## 2018-12-29 NOTE — PT/OT/SLP EVAL
Physical Therapy Evaluation and Discharge    Patient Name:  Estefani Fam   MRN:  [id number]    Co-Eval with OT   Recommendations:     Discharge Recommendations:  (home no needs )   Discharge Equipment Recommendations: none   Barriers to discharge: None    Assessment:     Estefani Fam is a 68 y.o. female admitted with a medical diagnosis of COPD exacerbation.  She presents with the following impairments/functional limitations:  impaired endurance, impaired cardiopulmonary response to activity. Pt is independent with mobility and ADLs. Pt does not require further acute skilled therapy intervention. Discharge from PT services and re-consult if pt experiences a change in status.       Rehab Prognosis: Good;   Recent Surgery: * No surgery found *      Plan:     · Plan of Care: Discharge from acute PT 12/29/2018    Subjective     Chief Complaint: Mild increase in SOB with prolonged ambulation   Patient/Family Comments/goals: to get better and return home   Pain/Comfort:  · Pain Rating 1: 0/10  · Pain Rating Post-Intervention 1: 0/10    Patients cultural, spiritual, Alevism conflicts given the current situation: no    Living Environment:  Pt lives with her  in a 2 STH with bed and bath on 2nd floor, 16 steps to second floor with R HR.   Prior to admission, patients level of function was independent with mobility and ADLs, drives independently .  Equipment used at home: oxygen, nebulizer.  DME owned (not currently used): none.  Upon discharge, patient will have assistance from .    Objective:     Communicated with RN prior to session.  Patient found sitting EOB with  telemetry, oxygen  upon PT entry to room.    General Precautions: Standard, fall   Orthopedic Precautions:N/A   Braces: N/A     Exams:  · Cognitive Exam:  Patient is AAOx4, followed all commands, communicates clearly and fluently  · Gross Motor Coordination:  WFL  · RLE ROM: WFL  · RLE Strength: WFL  · LLE ROM: WFL   · LLE Strength:  WFL    Functional Mobility:  · Bed Mobility:  NT 2/2 pt sitting EOB upon arrival   · Transfers:     · Sit to Stand:  independence with no AD  · Gait: Pt ambulated 300 feet with no AD and independence. Pt with no LOB, no pain, mild increase in SOB with prolonged ambulation on 3 L portable O2. Pt reports 8/10 RPE following ambulation.       Therapeutic Activities and Exercises:   Pt educated on role of PT/POC. Pt verbalized understanding.     AM-PAC 6 CLICK MOBILITY  Total Score:24     Patient left sitting EOB  with all lines intact and call button in reach.    GOALS:   Multidisciplinary Problems     Physical Therapy Goals     Not on file          Multidisciplinary Problems (Resolved)        Problem: Physical Therapy Goal    Goal Priority Disciplines Outcome Goal Variances Interventions   Physical Therapy Goal   (Resolved)     PT, PT/OT Outcome(s) achieved                     History:     Past Medical History:   Diagnosis Date    Cataract     COPD (chronic obstructive pulmonary disease)     History of retinal hemorrhage        Past Surgical History:   Procedure Laterality Date    HAND SURGERY         Clinical Decision Making:     History  Co-morbidities and personal factors that may impact the plan of care Examination  Body Structures and Functions, activity limitations and participation restrictions that may impact the plan of care Clinical Presentation   Decision Making/ Complexity Score   Co-morbidities:   [] Time since onset of injury / illness / exacerbation  [] Status of current condition  []Patient's cognitive status and safety concerns    [] Multiple Medical Problems (see med hx)  Personal Factors:   [] Patient's age  [] Prior Level of function   [] Patient's home situation (environment and family support)  [] Patient's level of motivation  [] Expected progression of patient      HISTORY:(criteria)    [] 50417 - no personal factors/history    [] 56351 - has 1-2 personal factor/comorbidity     [] 54608 -  has >3 personal factor/comorbidity     Body Regions:  [] Objective examination findings  [] Head     []  Neck  [] Trunk   [] Upper Extremity  [] Lower Extremity    Body Systems:  [] For communication ability, affect, cognition, language, and learning style: the assessment of the ability to make needs known, consciousness, orientation (person, place, and time), expected emotional /behavioral responses, and learning preferences (eg, learning barriers, education  needs)  [] For the neuromuscular system: a general assessment of gross coordinated movement (eg, balance, gait, locomotion, transfers, and transitions) and motor function  (motor control and motor learning)  [] For the musculoskeletal system: the assessment of gross symmetry, gross range of motion, gross strength, height, and weight  [] For the integumentary system: the assessment of pliability(texture), presence of scar formation, skin color, and skin integrity  [] For cardiovascular/pulmonary system: the assessment of heart rate, respiratory rate, blood pressure, and edema     Activity limitations:    [] Patient's cognitive status and saf ety concerns          [] Status of current condition      [] Weight bearing restriction  [] Cardiopulmunary Restriction    Participation Restrictions:   [] Goals and goal agreement with the patient     [] Rehab potential (prognosis) and probable outcome      Examination of Body System: (criteria)    [] 93739 - addressing 1-2 elements    [] 51400 - addressing a total of 3 or more elements     [] 39028 -  Addressing a total of 4 or more elements         Clinical Presentation: (criteria)  Choose one     On examination of body system using standardized tests and measures patient presents with 1-2 elements from any of the following: body structures and functions, activity limitations, and/or participation restrictions.  Leading to a clinical presentation that is considered stable and/or  uncomplicated                              Clinical Decision Making  (Eval Complexity):  Low- 08036     Time Tracking:     PT Received On: 12/29/18  PT Start Time: 1135     PT Stop Time: 1143  PT Total Time (min): 8 min     Billable Minutes: Evaluation 8 mins      Amy Sheppard, PT  12/29/2018

## 2018-12-29 NOTE — SUBJECTIVE & OBJECTIVE
Interval History: SOB improved. Able to ambulate without significant SOB but still feels weak. Cough bothersome with deep inspiration. Son at bedside.     Review of Systems   Constitutional: Positive for fatigue. Negative for fever.   Respiratory: Positive for cough and shortness of breath.    Cardiovascular: Negative for chest pain.   Gastrointestinal: Negative for abdominal pain.   Genitourinary: Negative for difficulty urinating.     Objective:     Vital Signs (Most Recent):  Temp: 97.9 °F (36.6 °C) (12/29/18 1148)  Pulse: 88 (12/29/18 1519)  Resp: 18 (12/29/18 1148)  BP: 130/67 (12/29/18 1148)  SpO2: (!) 93 % (12/29/18 1245) Vital Signs (24h Range):  Temp:  [96.6 °F (35.9 °C)-97.9 °F (36.6 °C)] 97.9 °F (36.6 °C)  Pulse:  [] 88  Resp:  [16-24] 18  SpO2:  [91 %-98 %] 93 %  BP: (119-133)/(67-72) 130/67     Weight: 62.6 kg (137 lb 14.4 oz)  Body mass index is 21.6 kg/m².    Intake/Output Summary (Last 24 hours) at 12/29/2018 1537  Last data filed at 12/29/2018 0700  Gross per 24 hour   Intake 960 ml   Output 500 ml   Net 460 ml      Physical Exam   Constitutional: She is oriented to person, place, and time. She appears well-developed and well-nourished. No distress.   Cardiovascular: Normal rate.   Pulmonary/Chest: Effort normal. No respiratory distress.   + wheezes in bases    Abdominal: Soft.   Neurological: She is alert and oriented to person, place, and time.       Significant Labs: All pertinent labs within the past 24 hours have been reviewed.    Significant Imaging: I have reviewed all pertinent imaging results/findings within the past 24 hours.

## 2018-12-29 NOTE — PROGRESS NOTES
[facility] Hospital Medicine  Progress Note    Patient Name: Estefani Fam  MRN: [id number]  Patient Class: IP- Inpatient   Admission Date: 12/27/2018  Length of Stay: 1 days  Attending Physician: Adam Majano MD  Primary Care Provider: Juan Truong MD    [facility] Medicine Team: [facility] HOSP MED G Adam Majano MD    Subjective:     Principal Problem:COPD exacerbation    HPI:  Estefani Fam is a 68F with COPD who presents for evaluation of SOB x 1 week. She took a previously prescribed prescription for azithromycin, but hasn't taken steroids. Her SOB is also refractory to home nebulizer treatments. She has been needing oxygen ATC, prior to this she used it PRN. She has SOB with minimal exertion, no wheeze, but feels very tight. Cough is productive especially in the AM, her symptoms are consistent with prior COPD flares. She has never been intubated or needed BiPAP during prior admissions. She denies fever, LE edema, orthopnea.    ED: CXR clear, no WBC, on 2L NC, BNP 25, given prednisone 60 mg PO with minimal improvement     Hospital Course:  No notes on file    Interval History: SOB improved. Able to ambulate without significant SOB but still feels weak. Cough bothersome with deep inspiration. Son at bedside.     Review of Systems   Constitutional: Positive for fatigue. Negative for fever.   Respiratory: Positive for cough and shortness of breath.    Cardiovascular: Negative for chest pain.   Gastrointestinal: Negative for abdominal pain.   Genitourinary: Negative for difficulty urinating.     Objective:     Vital Signs (Most Recent):  Temp: 97.9 °F (36.6 °C) (12/29/18 1148)  Pulse: 88 (12/29/18 1519)  Resp: 18 (12/29/18 1148)  BP: 130/67 (12/29/18 1148)  SpO2: (!) 93 % (12/29/18 1245) Vital Signs (24h Range):  Temp:  [96.6 °F (35.9 °C)-97.9 °F (36.6 °C)] 97.9 °F (36.6 °C)  Pulse:  [] 88  Resp:  [16-24] 18  SpO2:  [91 %-98 %] 93 %  BP: (119-133)/(67-72) 130/67     Weight: 62.6 kg  (137 lb 14.4 oz)  Body mass index is 21.6 kg/m².    Intake/Output Summary (Last 24 hours) at 12/29/2018 1537  Last data filed at 12/29/2018 0700  Gross per 24 hour   Intake 960 ml   Output 500 ml   Net 460 ml      Physical Exam   Constitutional: She is oriented to person, place, and time. She appears well-developed and well-nourished. No distress.   Cardiovascular: Normal rate.   Pulmonary/Chest: Effort normal. No respiratory distress.   + wheezes in bases    Abdominal: Soft.   Neurological: She is alert and oriented to person, place, and time.       Significant Labs: All pertinent labs within the past 24 hours have been reviewed.    Significant Imaging: I have reviewed all pertinent imaging results/findings within the past 24 hours.    Assessment/Plan:      * COPD exacerbation    - COPD pathway initiated  - improving  - continue current management  - added antitussive therapy prn and scheduled  - continue nebs          VTE Risk Mitigation (From admission, onward)        Ordered     IP VTE LOW RISK PATIENT  Once      12/28/18 0154     Place sequential compression device  Until discontinued      12/28/18 0154     Place CHLOÉ hose  Until discontinued      12/28/18 0154              Adam Majano MD  Department of Hospital Medicine   [facility]

## 2018-12-30 LAB
ANION GAP SERPL CALC-SCNC: 6 MMOL/L
BASOPHILS # BLD AUTO: 0.04 K/UL
BASOPHILS NFR BLD: 0.4 %
BUN SERPL-MCNC: 14 MG/DL
CALCIUM SERPL-MCNC: 8.9 MG/DL
CHLORIDE SERPL-SCNC: 101 MMOL/L
CO2 SERPL-SCNC: 30 MMOL/L
CREAT SERPL-MCNC: 0.6 MG/DL
DIFFERENTIAL METHOD: ABNORMAL
EOSINOPHIL # BLD AUTO: 0.2 K/UL
EOSINOPHIL NFR BLD: 2.1 %
ERYTHROCYTE [DISTWIDTH] IN BLOOD BY AUTOMATED COUNT: 12.9 %
EST. GFR  (AFRICAN AMERICAN): >60 ML/MIN/1.73 M^2
EST. GFR  (NON AFRICAN AMERICAN): >60 ML/MIN/1.73 M^2
GLUCOSE SERPL-MCNC: 100 MG/DL
HCT VFR BLD AUTO: 38.6 %
HGB BLD-MCNC: 12.4 G/DL
IMM GRANULOCYTES # BLD AUTO: 0.03 K/UL
IMM GRANULOCYTES NFR BLD AUTO: 0.3 %
LYMPHOCYTES # BLD AUTO: 2 K/UL
LYMPHOCYTES NFR BLD: 20.8 %
MAGNESIUM SERPL-MCNC: 2.2 MG/DL
MCH RBC QN AUTO: 31.7 PG
MCHC RBC AUTO-ENTMCNC: 32.1 G/DL
MCV RBC AUTO: 99 FL
MONOCYTES # BLD AUTO: 1 K/UL
MONOCYTES NFR BLD: 10.5 %
NEUTROPHILS # BLD AUTO: 6.2 K/UL
NEUTROPHILS NFR BLD: 65.9 %
NRBC BLD-RTO: 0 /100 WBC
PHOSPHATE SERPL-MCNC: 3.7 MG/DL
PLATELET # BLD AUTO: 311 K/UL
PMV BLD AUTO: 9.2 FL
POCT GLUCOSE: 109 MG/DL (ref 70–110)
POCT GLUCOSE: 112 MG/DL (ref 70–110)
POCT GLUCOSE: 163 MG/DL (ref 70–110)
POCT GLUCOSE: 176 MG/DL (ref 70–110)
POTASSIUM SERPL-SCNC: 3.7 MMOL/L
RBC # BLD AUTO: 3.91 M/UL
SODIUM SERPL-SCNC: 137 MMOL/L
WBC # BLD AUTO: 9.43 K/UL

## 2018-12-30 PROCEDURE — 27000221 HC OXYGEN, UP TO 24 HOURS

## 2018-12-30 PROCEDURE — 84100 ASSAY OF PHOSPHORUS: CPT

## 2018-12-30 PROCEDURE — 94640 AIRWAY INHALATION TREATMENT: CPT

## 2018-12-30 PROCEDURE — 25000003 PHARM REV CODE 250: Performed by: HOSPITALIST

## 2018-12-30 PROCEDURE — 85025 COMPLETE CBC W/AUTO DIFF WBC: CPT

## 2018-12-30 PROCEDURE — 94799 UNLISTED PULMONARY SVC/PX: CPT

## 2018-12-30 PROCEDURE — 94761 N-INVAS EAR/PLS OXIMETRY MLT: CPT

## 2018-12-30 PROCEDURE — 99232 PR SUBSEQUENT HOSPITAL CARE,LEVL II: ICD-10-PCS | Mod: ,,, | Performed by: HOSPITALIST

## 2018-12-30 PROCEDURE — 80048 BASIC METABOLIC PNL TOTAL CA: CPT

## 2018-12-30 PROCEDURE — 25000242 PHARM REV CODE 250 ALT 637 W/ HCPCS: Performed by: PHYSICIAN ASSISTANT

## 2018-12-30 PROCEDURE — 99232 SBSQ HOSP IP/OBS MODERATE 35: CPT | Mod: ,,, | Performed by: HOSPITALIST

## 2018-12-30 PROCEDURE — 63600175 PHARM REV CODE 636 W HCPCS: Performed by: PHYSICIAN ASSISTANT

## 2018-12-30 PROCEDURE — 83735 ASSAY OF MAGNESIUM: CPT

## 2018-12-30 PROCEDURE — 25000003 PHARM REV CODE 250: Performed by: PHYSICIAN ASSISTANT

## 2018-12-30 PROCEDURE — 36415 COLL VENOUS BLD VENIPUNCTURE: CPT

## 2018-12-30 PROCEDURE — 20600001 HC STEP DOWN PRIVATE ROOM

## 2018-12-30 PROCEDURE — 99900035 HC TECH TIME PER 15 MIN (STAT)

## 2018-12-30 PROCEDURE — 25000242 PHARM REV CODE 250 ALT 637 W/ HCPCS: Performed by: HOSPITALIST

## 2018-12-30 RX ORDER — PREDNISONE 20 MG/1
60 TABLET ORAL DAILY
Qty: 6 TABLET | Refills: 0 | Status: SHIPPED | OUTPATIENT
Start: 2018-12-31 | End: 2019-01-03

## 2018-12-30 RX ORDER — GUAIFENESIN 600 MG/1
600 TABLET, EXTENDED RELEASE ORAL 2 TIMES DAILY
Status: ON HOLD | COMMUNITY
Start: 2018-12-30 | End: 2019-01-18 | Stop reason: SDUPTHER

## 2018-12-30 RX ORDER — TIOTROPIUM BROMIDE 18 UG/1
1 CAPSULE ORAL; RESPIRATORY (INHALATION) DAILY
Qty: 30 CAPSULE | Refills: 3 | Status: ON HOLD | OUTPATIENT
Start: 2018-12-31 | End: 2019-01-18 | Stop reason: HOSPADM

## 2018-12-30 RX ORDER — GUAIFENESIN 100 MG/5ML
200 SOLUTION ORAL EVERY 4 HOURS PRN
Refills: 0 | COMMUNITY
Start: 2018-12-30 | End: 2019-01-09

## 2018-12-30 RX ORDER — LEVOFLOXACIN 750 MG/1
750 TABLET ORAL DAILY
Qty: 3 TABLET | Refills: 0 | Status: SHIPPED | OUTPATIENT
Start: 2018-12-30 | End: 2019-01-03

## 2018-12-30 RX ADMIN — IPRATROPIUM BROMIDE AND ALBUTEROL SULFATE 3 ML: .5; 3 SOLUTION RESPIRATORY (INHALATION) at 08:12

## 2018-12-30 RX ADMIN — GUAIFENESIN 200 MG: 200 SOLUTION ORAL at 02:12

## 2018-12-30 RX ADMIN — FLUTICASONE FUROATE AND VILANTEROL TRIFENATATE 1 PUFF: 100; 25 POWDER RESPIRATORY (INHALATION) at 08:12

## 2018-12-30 RX ADMIN — IPRATROPIUM BROMIDE AND ALBUTEROL SULFATE 3 ML: .5; 3 SOLUTION RESPIRATORY (INHALATION) at 04:12

## 2018-12-30 RX ADMIN — GUAIFENESIN 600 MG: 600 TABLET, EXTENDED RELEASE ORAL at 08:12

## 2018-12-30 RX ADMIN — PANTOPRAZOLE SODIUM 40 MG: 40 TABLET, DELAYED RELEASE ORAL at 08:12

## 2018-12-30 RX ADMIN — LEVOFLOXACIN 750 MG: 750 TABLET, FILM COATED ORAL at 08:12

## 2018-12-30 RX ADMIN — POLYETHYLENE GLYCOL 3350 17 G: 17 POWDER, FOR SOLUTION ORAL at 08:12

## 2018-12-30 RX ADMIN — TIOTROPIUM BROMIDE 18 MCG: 18 CAPSULE ORAL; RESPIRATORY (INHALATION) at 08:12

## 2018-12-30 RX ADMIN — IPRATROPIUM BROMIDE AND ALBUTEROL SULFATE 3 ML: .5; 3 SOLUTION RESPIRATORY (INHALATION) at 12:12

## 2018-12-30 RX ADMIN — PREDNISONE 60 MG: 20 TABLET ORAL at 08:12

## 2018-12-30 NOTE — SUBJECTIVE & OBJECTIVE
Interval History: SOB improved but still weak with ambulating at times; needs to stop to catch her breath. Feels less congested with scheduled nebs and mucinex.  Son at bedside.     Review of Systems   Constitutional: Positive for fatigue. Negative for fever.   Respiratory: Positive for cough (improved) and shortness of breath.    Cardiovascular: Negative for chest pain.   Gastrointestinal: Negative for abdominal pain.   Genitourinary: Negative for difficulty urinating.     Objective:     Vital Signs (Most Recent):  Temp: 97.6 °F (36.4 °C) (12/30/18 1141)  Pulse: 105 (12/30/18 1400)  Resp: 19 (12/30/18 1220)  BP: 125/66 (12/30/18 1141)  SpO2: 96 % (12/30/18 1220) Vital Signs (24h Range):  Temp:  [97.6 °F (36.4 °C)-98.9 °F (37.2 °C)] 97.6 °F (36.4 °C)  Pulse:  [] 105  Resp:  [16-20] 19  SpO2:  [90 %-98 %] 96 %  BP: (125-144)/(64-94) 125/66     Weight: 62.6 kg (137 lb 14.4 oz)  Body mass index is 21.6 kg/m².    Intake/Output Summary (Last 24 hours) at 12/30/2018 1511  Last data filed at 12/30/2018 0500  Gross per 24 hour   Intake 960 ml   Output --   Net 960 ml      Physical Exam   Constitutional: She is oriented to person, place, and time. She appears well-developed and well-nourished. No distress.   Cardiovascular: Normal rate.   Pulmonary/Chest: Effort normal. No respiratory distress. She has no wheezes. She has no rales.   Less congested. appears more comfortable. No dyspneic with speech.    Abdominal: Soft.   Neurological: She is alert and oriented to person, place, and time.       Significant Labs: All pertinent labs within the past 24 hours have been reviewed.    Significant Imaging: I have reviewed all pertinent imaging results/findings within the past 24 hours.

## 2018-12-30 NOTE — HOSPITAL COURSE
Patient was admitted to hospital medicine for COPD exacerbation. She was unsure of inciting events. She and her son thought that perhaps she waited too long to seek help. She was started on the COPD pathway. IV steroids given in the ER. She improved slowly on levaquin and prednisone. She wears O2 at home at 2L intermittently and has a nebulizer. Patient requiring 3L of NC at the time of dc and advised to use nebulizer PRN. Patient and son advised to seek help sooner in the future should she note similar symptoms starting to arise.

## 2018-12-30 NOTE — PROGRESS NOTES
[facility] Hospital Medicine  Progress Note    Patient Name: Estefani Fam  MRN: [id number]  Patient Class: IP- Inpatient   Admission Date: 12/27/2018  Length of Stay: 2 days  Attending Physician: Adam Majano MD  Primary Care Provider: Juan Truong MD    [facility] Medicine Team: [facility] G Adam Majano MD    Subjective:     Principal Problem:COPD exacerbation    HPI:  Estefani Fam is a 68F with COPD who presents for evaluation of SOB x 1 week. She took a previously prescribed prescription for azithromycin, but hasn't taken steroids. Her SOB is also refractory to home nebulizer treatments. She has been needing oxygen ATC, prior to this she used it PRN. She has SOB with minimal exertion, no wheeze, but feels very tight. Cough is productive especially in the AM, her symptoms are consistent with prior COPD flares. She has never been intubated or needed BiPAP during prior admissions. She denies fever, LE edema, orthopnea.    ED: CXR clear, no WBC, on 2L NC, BNP 25, given prednisone 60 mg PO with minimal improvement     Hospital Course:  No notes on file    Interval History: SOB improved but still weak with ambulating at times; needs to stop to catch her breath. Feels less congested with scheduled nebs and mucinex.  Son at bedside.     Review of Systems   Constitutional: Positive for fatigue. Negative for fever.   Respiratory: Positive for cough (improved) and shortness of breath.    Cardiovascular: Negative for chest pain.   Gastrointestinal: Negative for abdominal pain.   Genitourinary: Negative for difficulty urinating.     Objective:     Vital Signs (Most Recent):  Temp: 97.6 °F (36.4 °C) (12/30/18 1141)  Pulse: 105 (12/30/18 1400)  Resp: 19 (12/30/18 1220)  BP: 125/66 (12/30/18 1141)  SpO2: 96 % (12/30/18 1220) Vital Signs (24h Range):  Temp:  [97.6 °F (36.4 °C)-98.9 °F (37.2 °C)] 97.6 °F (36.4 °C)  Pulse:  [] 105  Resp:  [16-20] 19  SpO2:  [90 %-98 %] 96 %  BP:  (125-144)/(64-94) 125/66     Weight: 62.6 kg (137 lb 14.4 oz)  Body mass index is 21.6 kg/m².    Intake/Output Summary (Last 24 hours) at 12/30/2018 1511  Last data filed at 12/30/2018 0500  Gross per 24 hour   Intake 960 ml   Output --   Net 960 ml      Physical Exam   Constitutional: She is oriented to person, place, and time. She appears well-developed and well-nourished. No distress.   Cardiovascular: Normal rate.   Pulmonary/Chest: Effort normal. No respiratory distress. She has no wheezes. She has no rales.   Less congested. appears more comfortable. No dyspneic with speech.    Abdominal: Soft.   Neurological: She is alert and oriented to person, place, and time.       Significant Labs: All pertinent labs within the past 24 hours have been reviewed.    Significant Imaging: I have reviewed all pertinent imaging results/findings within the past 24 hours.    Assessment/Plan:      * COPD exacerbation    - COPD pathway initiated  - improving  - continue current management  - added antitussive therapy prn and scheduled with improvement in congestion  - patient has O2 at home and a nebulizer; advised to use up to q4h prn; she does require continuous O2 on dc at this time; patient and son aware  - plan to dc with continuous O2 at 3L  - anticipate dc on 12/31         VTE Risk Mitigation (From admission, onward)        Ordered     IP VTE LOW RISK PATIENT  Once      12/28/18 0154     Place sequential compression device  Until discontinued      12/28/18 0154     Place CHLOÉ hose  Until discontinued      12/28/18 0154              Adam Majano MD  Department of Hospital Medicine   [facility]

## 2018-12-30 NOTE — NURSING
Estefani Fam is a 68 y.o.   female patient, who presents for a 6 minute walk test ordered by DR NGUYEN   .  The diagnosis is COPD W/EXACERBATION  .  The patient's BMI is 21.6kg/m2.    Test Results:    The test was  COMPLETED.  The patient stopped  1 times for a total of 10 seconds.  The total time walked was 3 MINUTES.  During walking, the patient reported:   SOB. The patient used  NO OXYGEN during testing.     12/30/2018---------Distance: AROUND MAIN BERYL     O2 Sat % Supplemental Oxygen Heart Rate Blood Pressure Jason Scale   Pre-exercise  (Resting)  94  3  96       During Exercise  74  0  107       Post-exercise    80   0  110         Recovery Time:      Oxygen Qualification:     O2 Sat % Supplemental Oxygen Heart Rate Blood Pressure Jason Scale   Pre-exercise  (Resting)                 During Exercise                  Post-exercise                      Recovery Time:      Performing nurse/tech:      PREVIOUS STUDY:   The patient       CLINICAL INTERPRETATION:  Six minute walk distance is   ( ) with  dyspnea.     Oxygen saturation  improve while breathing supplemental oxygen.

## 2018-12-30 NOTE — ASSESSMENT & PLAN NOTE
- COPD pathway initiated  - improving  - continue current management  - added antitussive therapy prn and scheduled with improvement in congestion  - patient has O2 at home and a nebulizer; advised to use up to q4h prn; she does require continuous O2 on dc at this time; patient and son aware  - plan to dc with continuous O2 at 3L  - anticipate dc on 12/31

## 2018-12-30 NOTE — PLAN OF CARE
Problem: Adult Inpatient Plan of Care  Goal: Plan of Care Review  Outcome: Ongoing (interventions implemented as appropriate)  Plan of care discussed with patient, patient is free form fall, trauma or injury, denies chest pain, sob,and or pain/discomfort. Pt continues to receive breathing tx, abt therapy and is receiving 3 liters of o2 nasal cannula for lower resp infection we also did a 6 min walk today and pt became sob and had to stop once during the walk. All questions addressed. Will continue to monitor.

## 2018-12-30 NOTE — NURSING
Pt had orders to d/c telemetry on 12/28/18 and today I verified those orders with dr. Majano did d/c telemetry.

## 2018-12-31 LAB
ANION GAP SERPL CALC-SCNC: 8 MMOL/L
BASOPHILS # BLD AUTO: 0.04 K/UL
BASOPHILS NFR BLD: 0.5 %
BUN SERPL-MCNC: 13 MG/DL
CALCIUM SERPL-MCNC: 9.1 MG/DL
CHLORIDE SERPL-SCNC: 101 MMOL/L
CO2 SERPL-SCNC: 29 MMOL/L
CREAT SERPL-MCNC: 0.7 MG/DL
DIFFERENTIAL METHOD: ABNORMAL
EOSINOPHIL # BLD AUTO: 0.3 K/UL
EOSINOPHIL NFR BLD: 3.9 %
ERYTHROCYTE [DISTWIDTH] IN BLOOD BY AUTOMATED COUNT: 12.9 %
EST. GFR  (AFRICAN AMERICAN): >60 ML/MIN/1.73 M^2
EST. GFR  (NON AFRICAN AMERICAN): >60 ML/MIN/1.73 M^2
GLUCOSE SERPL-MCNC: 85 MG/DL
HCT VFR BLD AUTO: 39.4 %
HGB BLD-MCNC: 12.4 G/DL
IMM GRANULOCYTES # BLD AUTO: 0.04 K/UL
IMM GRANULOCYTES NFR BLD AUTO: 0.5 %
LYMPHOCYTES # BLD AUTO: 2.4 K/UL
LYMPHOCYTES NFR BLD: 28.1 %
MAGNESIUM SERPL-MCNC: 2.5 MG/DL
MCH RBC QN AUTO: 30.7 PG
MCHC RBC AUTO-ENTMCNC: 31.5 G/DL
MCV RBC AUTO: 98 FL
MONOCYTES # BLD AUTO: 1.1 K/UL
MONOCYTES NFR BLD: 12.7 %
NEUTROPHILS # BLD AUTO: 4.6 K/UL
NEUTROPHILS NFR BLD: 54.3 %
NRBC BLD-RTO: 0 /100 WBC
PHOSPHATE SERPL-MCNC: 3.6 MG/DL
PLATELET # BLD AUTO: 310 K/UL
PMV BLD AUTO: 9 FL
POCT GLUCOSE: 108 MG/DL (ref 70–110)
POCT GLUCOSE: 117 MG/DL (ref 70–110)
POCT GLUCOSE: 141 MG/DL (ref 70–110)
POCT GLUCOSE: 83 MG/DL (ref 70–110)
POTASSIUM SERPL-SCNC: 3.8 MMOL/L
RBC # BLD AUTO: 4.04 M/UL
SODIUM SERPL-SCNC: 138 MMOL/L
WBC # BLD AUTO: 8.52 K/UL

## 2018-12-31 PROCEDURE — 80048 BASIC METABOLIC PNL TOTAL CA: CPT

## 2018-12-31 PROCEDURE — 25000003 PHARM REV CODE 250: Performed by: PHYSICIAN ASSISTANT

## 2018-12-31 PROCEDURE — 36415 COLL VENOUS BLD VENIPUNCTURE: CPT

## 2018-12-31 PROCEDURE — 20600001 HC STEP DOWN PRIVATE ROOM

## 2018-12-31 PROCEDURE — 84100 ASSAY OF PHOSPHORUS: CPT

## 2018-12-31 PROCEDURE — 99232 SBSQ HOSP IP/OBS MODERATE 35: CPT | Mod: ,,, | Performed by: HOSPITALIST

## 2018-12-31 PROCEDURE — 94761 N-INVAS EAR/PLS OXIMETRY MLT: CPT

## 2018-12-31 PROCEDURE — 25000242 PHARM REV CODE 250 ALT 637 W/ HCPCS: Performed by: HOSPITALIST

## 2018-12-31 PROCEDURE — 25000242 PHARM REV CODE 250 ALT 637 W/ HCPCS: Performed by: PHYSICIAN ASSISTANT

## 2018-12-31 PROCEDURE — 83735 ASSAY OF MAGNESIUM: CPT

## 2018-12-31 PROCEDURE — 85025 COMPLETE CBC W/AUTO DIFF WBC: CPT

## 2018-12-31 PROCEDURE — 99232 PR SUBSEQUENT HOSPITAL CARE,LEVL II: ICD-10-PCS | Mod: ,,, | Performed by: HOSPITALIST

## 2018-12-31 PROCEDURE — 25000003 PHARM REV CODE 250: Performed by: HOSPITALIST

## 2018-12-31 PROCEDURE — 63600175 PHARM REV CODE 636 W HCPCS: Performed by: PHYSICIAN ASSISTANT

## 2018-12-31 PROCEDURE — 27000221 HC OXYGEN, UP TO 24 HOURS

## 2018-12-31 PROCEDURE — 94640 AIRWAY INHALATION TREATMENT: CPT

## 2018-12-31 RX ADMIN — FLUTICASONE FUROATE AND VILANTEROL TRIFENATATE 1 PUFF: 100; 25 POWDER RESPIRATORY (INHALATION) at 08:12

## 2018-12-31 RX ADMIN — PANTOPRAZOLE SODIUM 40 MG: 40 TABLET, DELAYED RELEASE ORAL at 08:12

## 2018-12-31 RX ADMIN — LEVOFLOXACIN 750 MG: 750 TABLET, FILM COATED ORAL at 08:12

## 2018-12-31 RX ADMIN — TIOTROPIUM BROMIDE 18 MCG: 18 CAPSULE ORAL; RESPIRATORY (INHALATION) at 08:12

## 2018-12-31 RX ADMIN — PREDNISONE 60 MG: 20 TABLET ORAL at 08:12

## 2018-12-31 RX ADMIN — IPRATROPIUM BROMIDE AND ALBUTEROL SULFATE 3 ML: .5; 3 SOLUTION RESPIRATORY (INHALATION) at 07:12

## 2018-12-31 RX ADMIN — GUAIFENESIN 600 MG: 600 TABLET, EXTENDED RELEASE ORAL at 08:12

## 2018-12-31 RX ADMIN — IPRATROPIUM BROMIDE AND ALBUTEROL SULFATE 3 ML: .5; 3 SOLUTION RESPIRATORY (INHALATION) at 08:12

## 2018-12-31 RX ADMIN — POLYETHYLENE GLYCOL 3350 17 G: 17 POWDER, FOR SOLUTION ORAL at 08:12

## 2018-12-31 RX ADMIN — IPRATROPIUM BROMIDE AND ALBUTEROL SULFATE 3 ML: .5; 3 SOLUTION RESPIRATORY (INHALATION) at 11:12

## 2018-12-31 RX ADMIN — IPRATROPIUM BROMIDE AND ALBUTEROL SULFATE 3 ML: .5; 3 SOLUTION RESPIRATORY (INHALATION) at 04:12

## 2018-12-31 NOTE — PROGRESS NOTES
[facility] Hospital Medicine  Progress Note    Patient Name: Estefani Fam  MRN: [id number]  Patient Class: IP- Inpatient   Admission Date: 12/27/2018  Length of Stay: 3 days  Attending Physician: Adam Majano MD  Primary Care Provider: Juan Truong MD    [facility] Medicine Team: [facility] [place] Gume [place] MD    Subjective:     Principal Problem:COPD exacerbation    HPI:  Estefani Fam is a 68F with COPD who presents for evaluation of SOB x 1 week. She took a previously prescribed prescription for azithromycin, but hasn't taken steroids. Her SOB is also refractory to home nebulizer treatments. She has been needing oxygen ATC, prior to this she used it PRN. She has SOB with minimal exertion, no wheeze, but feels very tight. Cough is productive especially in the AM, her symptoms are consistent with prior COPD flares. She has never been intubated or needed BiPAP during prior admissions. She denies fever, LE edema, orthopnea.    ED: CXR clear, no WBC, on 2L NC, BNP 25, given prednisone 60 mg PO with minimal improvement     Hospital Course:  Patient was admitted to hospital medicine for COPD exacerbation. She was unsure of inciting events. She and her son thought that perhaps she waited too long to seek help. She was started on the COPD pathway. IV steroids given in the ER. She improved slowly on levaquin and prednisone. She wears O2 at home at 2L intermittently and has a nebulizer. Patient requiring 3L of NC at the time of dc and advised to use nebulizer PRN. Patient and son advised to seek help sooner in the future should she note similar symptoms starting to arise.     Interval History: SOB improved but still weak with ambulating at times; needs to stop to catch her breath. Feels less congested with scheduled nebs and mucinex. She is asking to stay another day as she feels she is too fatigued to go home. PT/OT recommend home with no needs.     Review of Systems   Constitutional:  Positive for fatigue. Negative for fever.   Respiratory: Positive for cough (improved) and shortness of breath.    Cardiovascular: Negative for chest pain.   Gastrointestinal: Negative for abdominal pain.   Genitourinary: Negative for difficulty urinating.     Objective:     Vital Signs (Most Recent):  Temp: 98.3 °F (36.8 °C) (12/31/18 1124)  Pulse: 76 (12/31/18 1128)  Resp: 18 (12/31/18 1128)  BP: 137/68 (12/31/18 1124)  SpO2: 96 % (12/31/18 1128) Vital Signs (24h Range):  Temp:  [97.8 °F (36.6 °C)-98.6 °F (37 °C)] 98.3 °F (36.8 °C)  Pulse:  [] 76  Resp:  [18-20] 18  SpO2:  [89 %-98 %] 96 %  BP: (123-138)/(66-84) 137/68     Weight: 62.6 kg (137 lb 14.4 oz)  Body mass index is 21.6 kg/m².    Intake/Output Summary (Last 24 hours) at 12/31/2018 1158  Last data filed at 12/31/2018 0800  Gross per 24 hour   Intake 1860 ml   Output 800 ml   Net 1060 ml      Physical Exam   Constitutional: She is oriented to person, place, and time. She appears well-developed and well-nourished. No distress.   Cardiovascular: Normal rate.   Pulmonary/Chest: Effort normal. No respiratory distress. She has no wheezes. She has no rales.   Less congested. appears more comfortable. No dyspneic with speech.    Abdominal: Soft.   Neurological: She is alert and oriented to person, place, and time.       Significant Labs: All pertinent labs within the past 24 hours have been reviewed.    Significant Imaging: I have reviewed all pertinent imaging results/findings within the past 24 hours.    Assessment/Plan:      * COPD exacerbation    - COPD pathway initiated  - improving  - continue current management  - added antitussive therapy prn and scheduled with improvement in congestion  - patient has O2 at home and a nebulizer; advised to use up to q4h prn; she does require continuous O2 on dc at this time; patient and son aware  - plan to dc with continuous O2 at 3L  - anticipate dc tomorrow.          VTE Risk Mitigation (From admission, onward)         Ordered     IP VTE LOW RISK PATIENT  Once      12/28/18 0154     Place sequential compression device  Until discontinued      12/28/18 0154     Place CHLOÉ hose  Until discontinued      12/28/18 0154              Gume Angeles MD  Department of Hospital Medicine   [facility]

## 2018-12-31 NOTE — SUBJECTIVE & OBJECTIVE
Interval History: SOB improved but still weak with ambulating at times; needs to stop to catch her breath. Feels less congested with scheduled nebs and mucinex. She is asking to stay another day as she feels she is too fatigued to go home. PT/OT recommend home with no needs.     Review of Systems   Constitutional: Positive for fatigue. Negative for fever.   Respiratory: Positive for cough (improved) and shortness of breath.    Cardiovascular: Negative for chest pain.   Gastrointestinal: Negative for abdominal pain.   Genitourinary: Negative for difficulty urinating.     Objective:     Vital Signs (Most Recent):  Temp: 98.3 °F (36.8 °C) (12/31/18 1124)  Pulse: 76 (12/31/18 1128)  Resp: 18 (12/31/18 1128)  BP: 137/68 (12/31/18 1124)  SpO2: 96 % (12/31/18 1128) Vital Signs (24h Range):  Temp:  [97.8 °F (36.6 °C)-98.6 °F (37 °C)] 98.3 °F (36.8 °C)  Pulse:  [] 76  Resp:  [18-20] 18  SpO2:  [89 %-98 %] 96 %  BP: (123-138)/(66-84) 137/68     Weight: 62.6 kg (137 lb 14.4 oz)  Body mass index is 21.6 kg/m².    Intake/Output Summary (Last 24 hours) at 12/31/2018 1158  Last data filed at 12/31/2018 0800  Gross per 24 hour   Intake 1860 ml   Output 800 ml   Net 1060 ml      Physical Exam   Constitutional: She is oriented to person, place, and time. She appears well-developed and well-nourished. No distress.   Cardiovascular: Normal rate.   Pulmonary/Chest: Effort normal. No respiratory distress. She has no wheezes. She has no rales.   Less congested. appears more comfortable. No dyspneic with speech.    Abdominal: Soft.   Neurological: She is alert and oriented to person, place, and time.       Significant Labs: All pertinent labs within the past 24 hours have been reviewed.    Significant Imaging: I have reviewed all pertinent imaging results/findings within the past 24 hours.

## 2018-12-31 NOTE — PLAN OF CARE
Problem: Adult Inpatient Plan of Care  Goal: Plan of Care Review  Outcome: Ongoing (interventions implemented as appropriate)  Ongoing (interventions implemented as appropriate)  Plan of care discussed with patient, patient is free form fall, trauma or injury, denies chest pain, sob,and or pain/discomfort. Pt continues to receive breathing tx, abt therapy and is receiving 3 liters of o2 nasal cannula for lower resp infection. All questions addressed. Will continue to monitor

## 2018-12-31 NOTE — ASSESSMENT & PLAN NOTE
- COPD pathway initiated  - improving  - continue current management  - added antitussive therapy prn and scheduled with improvement in congestion  - patient has O2 at home and a nebulizer; advised to use up to q4h prn; she does require continuous O2 on dc at this time; patient and son aware  - plan to dc with continuous O2 at 3L  - anticipate dc tomorrow.

## 2019-01-01 VITALS
HEIGHT: 67 IN | HEART RATE: 120 BPM | DIASTOLIC BLOOD PRESSURE: 76 MMHG | WEIGHT: 137.88 LBS | RESPIRATION RATE: 20 BRPM | BODY MASS INDEX: 21.64 KG/M2 | OXYGEN SATURATION: 96 % | TEMPERATURE: 98 F | SYSTOLIC BLOOD PRESSURE: 132 MMHG

## 2019-01-01 LAB
POCT GLUCOSE: 121 MG/DL (ref 70–110)
POCT GLUCOSE: 93 MG/DL (ref 70–110)

## 2019-01-01 PROCEDURE — 94640 AIRWAY INHALATION TREATMENT: CPT

## 2019-01-01 PROCEDURE — 94761 N-INVAS EAR/PLS OXIMETRY MLT: CPT

## 2019-01-01 PROCEDURE — 25000242 PHARM REV CODE 250 ALT 637 W/ HCPCS: Performed by: PHYSICIAN ASSISTANT

## 2019-01-01 PROCEDURE — 63600175 PHARM REV CODE 636 W HCPCS: Performed by: PHYSICIAN ASSISTANT

## 2019-01-01 PROCEDURE — 25000242 PHARM REV CODE 250 ALT 637 W/ HCPCS: Performed by: HOSPITALIST

## 2019-01-01 PROCEDURE — 25000003 PHARM REV CODE 250: Performed by: HOSPITALIST

## 2019-01-01 PROCEDURE — 99232 PR SUBSEQUENT HOSPITAL CARE,LEVL II: ICD-10-PCS | Mod: ,,, | Performed by: HOSPITALIST

## 2019-01-01 PROCEDURE — 25000003 PHARM REV CODE 250: Performed by: PHYSICIAN ASSISTANT

## 2019-01-01 PROCEDURE — 99232 SBSQ HOSP IP/OBS MODERATE 35: CPT | Mod: ,,, | Performed by: HOSPITALIST

## 2019-01-01 RX ADMIN — IPRATROPIUM BROMIDE AND ALBUTEROL SULFATE 3 ML: .5; 3 SOLUTION RESPIRATORY (INHALATION) at 09:01

## 2019-01-01 RX ADMIN — LEVOFLOXACIN 750 MG: 750 TABLET, FILM COATED ORAL at 08:01

## 2019-01-01 RX ADMIN — TIOTROPIUM BROMIDE 18 MCG: 18 CAPSULE ORAL; RESPIRATORY (INHALATION) at 08:01

## 2019-01-01 RX ADMIN — PREDNISONE 60 MG: 20 TABLET ORAL at 08:01

## 2019-01-01 RX ADMIN — FLUTICASONE FUROATE AND VILANTEROL TRIFENATATE 1 PUFF: 100; 25 POWDER RESPIRATORY (INHALATION) at 08:01

## 2019-01-01 RX ADMIN — IPRATROPIUM BROMIDE AND ALBUTEROL SULFATE 3 ML: .5; 3 SOLUTION RESPIRATORY (INHALATION) at 03:01

## 2019-01-01 RX ADMIN — GUAIFENESIN 600 MG: 600 TABLET, EXTENDED RELEASE ORAL at 08:01

## 2019-01-01 RX ADMIN — POLYETHYLENE GLYCOL 3350 17 G: 17 POWDER, FOR SOLUTION ORAL at 08:01

## 2019-01-01 RX ADMIN — IPRATROPIUM BROMIDE AND ALBUTEROL SULFATE 3 ML: .5; 3 SOLUTION RESPIRATORY (INHALATION) at 11:01

## 2019-01-01 RX ADMIN — PANTOPRAZOLE SODIUM 40 MG: 40 TABLET, DELAYED RELEASE ORAL at 08:01

## 2019-01-01 NOTE — PLAN OF CARE
Problem: Adult Inpatient Plan of Care  Goal: Plan of Care Review  Outcome: Ongoing (interventions implemented as appropriate)  Patient remains free from falls and injury this shift. Bed in low, locked position with call bell in reach. Patient encouraged to call for assistance when getting out of bed. Patient verbalized understanding. All belongings within reach. VS stable. Afebrile. O2 94-97% on 3L NC. Plan for d/c today with home oxygen. Will continue to monitor.

## 2019-01-01 NOTE — NURSING
D/C instructions and given to pt and family. RXs escribed to pharmacy of choice. Instructions also given on diet, mobility and follow up care. Pt and family verbalizes understandings of instructions. PIV dc'ed per MD order, catheter tip intact. NADMACARIO. VSS. Pt waiting on on pt escort to leave the unit with family.

## 2019-01-01 NOTE — ASSESSMENT & PLAN NOTE
- COPD pathway initiated  - improving  - continue current management  - added antitussive therapy prn and scheduled with improvement in congestion  - patient has O2 at home and a nebulizer; advised to use up to q4h prn; she does require continuous O2 on dc at this time; patient and son aware  - plan to dc with continuous O2 at 3L  - Patient feels back to baseline. Stable for discharge.

## 2019-01-02 NOTE — PLAN OF CARE
Plan is to discharge home with family.       01/02/19 0700   Final Note   Assessment Type Final Discharge Note   Anticipated Discharge Disposition Home   Right Care Referral Info   Post Acute Recommendation No Care

## 2019-01-03 ENCOUNTER — PATIENT OUTREACH (OUTPATIENT)
Dept: ADMINISTRATIVE | Facility: CLINIC | Age: 69
End: 2019-01-03

## 2019-01-03 NOTE — PATIENT INSTRUCTIONS
Discharge Instructions: COPD  You have been diagnosed with chronic obstructive pulmonary disease (COPD). This is a name given to a group of diseases that limit the flow of air in and out of your lungs. This makes it harder to breathe. With COPD, you are also more likely to get lung infections. COPD includes chronic bronchitis and emphysema. COPD is most often caused by heavy, long-term cigarette smoking.  Home care  Quit smoking  · If you smoke, quit. It is the best thing you can do for your COPD and your overall health.  · Join a stop-smoking program. There are even telephone, text message, and Internet programs to help you quit.  · Ask your healthcare provider about medicines or other methods to help you quit.  · Ask family members to quit smoking as well.  · Don't allow people to smoke in your home, in your car, or when they are around you.  Protect yourself from infection  · Wash your hands often. Do your best to keep your hands away from your face. Most germs are spread from your hands to your mouth.  · Get a flu shot every year. Also ask your provider about pneumonia vaccines.  · Avoid crowds. It's especially important to do this in the winter when more people have colds and flu.  · To stay healthy, get enough sleep, exercise regularly, and eat a balanced diet. You should:  ¨ Get about 8 hours of sleep every night.  ¨ Try to exercise for at least 30 minutes on most days.  ¨ Have healthy foods including fruits and vegetables, 100% whole grains, lean meats and fish, and low-fat dairy products. Try to stay away from foods high in fats and sugar.  Take your medicines  Take your medicines exactly as directed. Don't skip doses.  Manage your stress  Stress can make COPD worse. Use this stress management technique:  · Find a quiet place and sit or lie in a comfortable position.  · Close your eyes and perform breathing exercises for several minutes. Ask your provider about the best way to breathe.  Pulmonary  rehabilitation  · Pulmonary rehab can help you feel better. These programs include exercise, breathing techniques, information about COPD, counseling, and help for smokers.  · Ask your provider or your local hospital about programs in your area.  When to call your healthcare provider  Call your provider immediately if you have any of the following:  · Shortness of breath, wheezing, or coughing  · Increased mucus  · Yellow, green, bloody, or smelly mucus  · Fever or chills  · Tightness in your chest that does not go away with rest or medicine  · An irregular heartbeat or a feeling that your heart is beating very fast  · Swollen ankles   Date Last Reviewed: 5/1/2016  © 3093-8322 [employer]. [address] [place] [address], [place] [address]. All rights reserved. This information is not intended as a substitute for professional medical care. Always follow your healthcare professional's instructions.

## 2019-01-08 ENCOUNTER — OFFICE VISIT (OUTPATIENT)
Dept: INTERNAL MEDICINE | Facility: CLINIC | Age: 69
End: 2019-01-08
Payer: MEDICARE

## 2019-01-08 VITALS
HEART RATE: 107 BPM | WEIGHT: 132.06 LBS | BODY MASS INDEX: 20.73 KG/M2 | DIASTOLIC BLOOD PRESSURE: 70 MMHG | OXYGEN SATURATION: 94 % | SYSTOLIC BLOOD PRESSURE: 118 MMHG | HEIGHT: 67 IN

## 2019-01-08 DIAGNOSIS — J44.1 COPD EXACERBATION: Primary | ICD-10-CM

## 2019-01-08 PROCEDURE — 99213 OFFICE O/P EST LOW 20 MIN: CPT | Mod: S$PBB,GC,, | Performed by: STUDENT IN AN ORGANIZED HEALTH CARE EDUCATION/TRAINING PROGRAM

## 2019-01-08 PROCEDURE — 99999 PR PBB SHADOW E&M-EST. PATIENT-LVL IV: CPT | Mod: PBBFAC,GC,, | Performed by: STUDENT IN AN ORGANIZED HEALTH CARE EDUCATION/TRAINING PROGRAM

## 2019-01-08 PROCEDURE — 99214 OFFICE O/P EST MOD 30 MIN: CPT | Mod: PBBFAC | Performed by: STUDENT IN AN ORGANIZED HEALTH CARE EDUCATION/TRAINING PROGRAM

## 2019-01-08 PROCEDURE — 99213 PR OFFICE/OUTPT VISIT, EST, LEVL III, 20-29 MIN: ICD-10-PCS | Mod: S$PBB,GC,, | Performed by: STUDENT IN AN ORGANIZED HEALTH CARE EDUCATION/TRAINING PROGRAM

## 2019-01-08 PROCEDURE — 99999 PR PBB SHADOW E&M-EST. PATIENT-LVL IV: ICD-10-PCS | Mod: PBBFAC,GC,, | Performed by: STUDENT IN AN ORGANIZED HEALTH CARE EDUCATION/TRAINING PROGRAM

## 2019-01-08 NOTE — PROGRESS NOTES
Subjective     Chief Complaint: Hospital Follow Up    History of Present Illness:  Ms. Estefani Fam is a 68 y.o. female with PMHx of severe COPD who presents to clinic for hospital follow up. She was evaluated in August 2018 by pulmonology and had PFT's that are consistent with severe emphysema: FVC: 2.52 .liters 79% of predicted FEV:0.88 liters 35% of FVC and DLCO: 35%. Chest x-ray showed marked hyper inflation. She had one hospitalization prior to PFT's and then twice since with most recent discharge one week ago for COPD exacerbation. She was discharged to complete a 5 day course of levofloxacin and prednisone. She reports using 2L of oxygen at baseline, she has a pulse oximeter at home and states it will drop to 88% but otherwise will stay in the 90's, sometimes up to 99%. She is using her duonebs q6h, incruse ellipta (LAMA) once daily, and her breo once daily. She is on triple therapy and is followed by Dr. Harris in pulmonology. She is up to date with her influenza vaccinations as well as her pneumonia vaccinations.       Review of Systems   Constitutional: Negative for malaise/fatigue and weight loss.   HENT: Negative for congestion and sore throat.    Respiratory: Positive for shortness of breath. Negative for cough, sputum production and wheezing.    Cardiovascular: Negative for chest pain, palpitations, orthopnea, leg swelling and PND.   Musculoskeletal: Negative for back pain and myalgias.   Skin: Negative for rash.   Neurological: Negative for dizziness, weakness and headaches.       PAST HISTORY:     Past Medical History:   Diagnosis Date    Cataract     COPD (chronic obstructive pulmonary disease)     History of retinal hemorrhage        Past Surgical History:   Procedure Laterality Date    HAND SURGERY         Family History   Problem Relation Age of Onset    Cancer Mother         colon    Macular degeneration Mother     Stroke Father     Amblyopia Neg Hx     Blindness Neg Hx      Cataracts Neg Hx     Diabetes Neg Hx     Glaucoma Neg Hx     Hypertension Neg Hx     Retinal detachment Neg Hx     Strabismus Neg Hx     Thyroid disease Neg Hx        Social History     Socioeconomic History    Marital status:      Spouse name: None    Number of children: None    Years of education: None    Highest education level: None   Social Needs    Financial resource strain: None    Food insecurity - worry: None    Food insecurity - inability: None    Transportation needs - medical: None    Transportation needs - non-medical: None   Occupational History    None   Tobacco Use    Smoking status: Former Smoker     Packs/day: 1.00     Years: 36.00     Pack years: 36.00     Types: Cigarettes     Last attempt to quit: 2016     Years since quittin.3    Smokeless tobacco: Never Used   Substance and Sexual Activity    Alcohol use: Yes     Alcohol/week: 1.2 oz     Types: 2 Glasses of wine per week     Comment: 1-2 glasses a day     Drug use: No    Sexual activity: None   Other Topics Concern    None   Social History Narrative    None       MEDICATIONS & ALLERGIES:     Current Outpatient Medications on File Prior to Visit   Medication Sig    albuterol 90 mcg/actuation inhaler Inhale 2 puffs into the lungs every 4 (four) hours as needed for Wheezing or Shortness of Breath. Rescue    albuterol-ipratropium (DUO-NEB) 2.5 mg-0.5 mg/3 mL nebulizer solution Take 3 mLs by nebulization every 4 (four) hours as needed for Wheezing or Shortness of Breath. Rescue (Patient taking differently: Take 3 mLs by nebulization 2 (two) times daily. Rescue)    ascorbic acid, vitamin C, (VITAMIN C) 500 MG tablet Take 500 mg by mouth 2 (two) times daily.     calcium carbonate (OS-STEPHANIE) 600 mg calcium (1,500 mg) Tab Take 1 tablet (600 mg total) by mouth once daily. Take Levofloxacin antibiotic at least 2 hours before calcium.    fluticasone-vilanterol (BREO) 100-25 mcg/dose diskus inhaler Inhale 1 puff  "into the lungs once daily. Controller    guaiFENesin (MUCINEX) 600 mg 12 hr tablet Take 1 tablet (600 mg total) by mouth 2 (two) times daily.    guaifenesin 100 mg/5 ml (ROBITUSSIN) 100 mg/5 mL syrup Take 10 mLs (200 mg total) by mouth every 4 (four) hours as needed for Cough or Congestion.    INCRUSE ELLIPTA 62.5 mcg/actuation DsDv INHALE 1 PUFF BY MOUTH INTO LUNGS ONCE DAILY    MULTIVIT-IRON-MIN-FOLIC ACID 3,500-18-0.4 UNIT-MG-MG ORAL CHEW Take 1 tablet by mouth once daily. Take Levofloxacin antibiotic at least 2 hours before multivitamin.    tiotropium (SPIRIVA) 18 mcg inhalation capsule Inhale 1 capsule (18 mcg total) into the lungs once daily. Controller     No current facility-administered medications on file prior to visit.        Review of patient's allergies indicates:  No Known Allergies    OBJECTIVE:     Vital Signs:  Vitals:    01/08/19 1353   BP: 118/70   BP Location: Left arm   Patient Position: Sitting   BP Method: Medium (Manual)   Pulse: 107   SpO2: (!) 94%   Weight: 59.9 kg (132 lb 0.9 oz)   Height: 5' 7" (1.702 m)       Body mass index is 20.68 kg/m².     Physical Exam:  General:  Well developed, well nourished, no acute distress  Head: Normocephalic, atraumatic  Eyes: PERRL, EOMI, clear sclera  Throat: No posterior pharyngeal erythema or exudate, no tonsillar exudate  Neck: supple, normal ROM, no thyromegaly   CVS:  RRR, S1 and S2 normal, no murmurs, rubs, gallops, 2+ peripheral pulses  Resp:  + bilateral expiratory wheeze  GI:  Abdomen soft, non-tender, non-distended, normoactive bowel sounds  MSK:  No muscle atrophy, cyanosis, peripheral edema   Skin:  No rashes, ulcers, erythema  Neuro:  CNII-XII grossly intact, no focal deficits noted  Psych:  Appropriate mood and affect, normal judgement    Laboratory  Lab Results   Component Value Date    WBC 8.52 12/31/2018    HGB 12.4 12/31/2018    HCT 39.4 12/31/2018    MCV 98 12/31/2018     12/31/2018 "     @[id number](GLU,NA,K,Cl,CO2,BUN,Creatinine,Calcium,MG)@  Lab Results   Component Value Date    INR 1.0 07/02/2018     Lab Results   Component Value Date    HGBA1C 5.4 12/28/2018       ASSESSMENT & PLAN:   Ms. Estefani Fam is a 68 y.o. female  with PMHx of severe COPD who presents to clinic for hospital follow up.     Estefani was seen today for hospital follow up and establish care.    Diagnoses and all orders for this visit:    COPD exacerbation  -     Ambulatory Referral to Pulmonology, will need to follow up with Dr. Harris again  -     Has had 3 exacerbations in the past six months but is currently on triple therapy -- using her duonebs q6h, incruse ellipta (LAMA) once daily, and her breo once daily. She may need daily prednisone if she continues to have frequent exacerbations  -     Discussed goal oxygen saturation of 88 - 92%  -     She is up to date with influenza vaccination as well as both pneumonia vaccinations (states received by Dr. Truong in the last few years)      William Montelongo MD  Internal Medicine PGY3  [facility]  [address] [place] [address]  [phone]  Attending Physician: Dr. Gabriel Pedro

## 2019-01-14 ENCOUNTER — HOSPITAL ENCOUNTER (INPATIENT)
Facility: HOSPITAL | Age: 69
LOS: 2 days | Discharge: HOME | DRG: 190 | End: 2019-01-18
Attending: EMERGENCY MEDICINE | Admitting: INTERNAL MEDICINE
Payer: MEDICARE

## 2019-01-14 DIAGNOSIS — Z87.891 FORMER HEAVY TOBACCO SMOKER: Chronic | ICD-10-CM

## 2019-01-14 DIAGNOSIS — J20.9 COPD (CHRONIC OBSTRUCTIVE PULMONARY DISEASE) WITH ACUTE BRONCHITIS: ICD-10-CM

## 2019-01-14 DIAGNOSIS — J44.9 CHRONIC OBSTRUCTIVE PULMONARY DISEASE, UNSPECIFIED COPD TYPE: ICD-10-CM

## 2019-01-14 DIAGNOSIS — J44.0 COPD (CHRONIC OBSTRUCTIVE PULMONARY DISEASE) WITH ACUTE BRONCHITIS: ICD-10-CM

## 2019-01-14 DIAGNOSIS — Z00.00 HEALTHCARE MAINTENANCE: ICD-10-CM

## 2019-01-14 DIAGNOSIS — R06.02 SHORTNESS OF BREATH: Primary | ICD-10-CM

## 2019-01-14 LAB
ALBUMIN SERPL BCP-MCNC: 4.1 G/DL
ALP SERPL-CCNC: 95 U/L
ALT SERPL W/O P-5'-P-CCNC: 29 U/L
ANION GAP SERPL CALC-SCNC: 8 MMOL/L
AST SERPL-CCNC: 26 U/L
BASOPHILS # BLD AUTO: 0.09 K/UL
BASOPHILS NFR BLD: 0.9 %
BILIRUB SERPL-MCNC: 0.5 MG/DL
BUN SERPL-MCNC: 12 MG/DL
CALCIUM SERPL-MCNC: 10.1 MG/DL
CHLORIDE SERPL-SCNC: 95 MMOL/L
CO2 SERPL-SCNC: 31 MMOL/L
CREAT SERPL-MCNC: 0.6 MG/DL
DIFFERENTIAL METHOD: ABNORMAL
EOSINOPHIL # BLD AUTO: 0.2 K/UL
EOSINOPHIL NFR BLD: 2.1 %
ERYTHROCYTE [DISTWIDTH] IN BLOOD BY AUTOMATED COUNT: 12.6 %
EST. GFR  (AFRICAN AMERICAN): >60 ML/MIN/1.73 M^2
EST. GFR  (NON AFRICAN AMERICAN): >60 ML/MIN/1.73 M^2
ESTIMATED AVG GLUCOSE: 114 MG/DL
GLUCOSE SERPL-MCNC: 127 MG/DL
HBA1C MFR BLD HPLC: 5.6 %
HCT VFR BLD AUTO: 46.4 %
HGB BLD-MCNC: 14.5 G/DL
IMM GRANULOCYTES # BLD AUTO: 0.04 K/UL
IMM GRANULOCYTES NFR BLD AUTO: 0.4 %
LYMPHOCYTES # BLD AUTO: 1.3 K/UL
LYMPHOCYTES NFR BLD: 13.4 %
MCH RBC QN AUTO: 31.3 PG
MCHC RBC AUTO-ENTMCNC: 31.3 G/DL
MCV RBC AUTO: 100 FL
MONOCYTES # BLD AUTO: 0.9 K/UL
MONOCYTES NFR BLD: 8.8 %
NEUTROPHILS # BLD AUTO: 7.2 K/UL
NEUTROPHILS NFR BLD: 74.4 %
NRBC BLD-RTO: 0 /100 WBC
PLATELET # BLD AUTO: 316 K/UL
PMV BLD AUTO: 9.4 FL
POTASSIUM SERPL-SCNC: 4.1 MMOL/L
PROT SERPL-MCNC: 7.4 G/DL
RBC # BLD AUTO: 4.63 M/UL
SODIUM SERPL-SCNC: 134 MMOL/L
WBC # BLD AUTO: 9.65 K/UL

## 2019-01-14 PROCEDURE — 83036 HEMOGLOBIN GLYCOSYLATED A1C: CPT

## 2019-01-14 PROCEDURE — 93010 ELECTROCARDIOGRAM REPORT: CPT | Mod: ,,, | Performed by: INTERNAL MEDICINE

## 2019-01-14 PROCEDURE — 94640 AIRWAY INHALATION TREATMENT: CPT

## 2019-01-14 PROCEDURE — 99291 CRITICAL CARE FIRST HOUR: CPT | Mod: ,,, | Performed by: EMERGENCY MEDICINE

## 2019-01-14 PROCEDURE — 27000221 HC OXYGEN, UP TO 24 HOURS

## 2019-01-14 PROCEDURE — 94644 CONT INHLJ TX 1ST HOUR: CPT

## 2019-01-14 PROCEDURE — 63600175 PHARM REV CODE 636 W HCPCS: Performed by: HOSPITALIST

## 2019-01-14 PROCEDURE — 36600 WITHDRAWAL OF ARTERIAL BLOOD: CPT

## 2019-01-14 PROCEDURE — 93010 EKG 12-LEAD: ICD-10-PCS | Mod: ,,, | Performed by: INTERNAL MEDICINE

## 2019-01-14 PROCEDURE — 94660 CPAP INITIATION&MGMT: CPT

## 2019-01-14 PROCEDURE — 25000242 PHARM REV CODE 250 ALT 637 W/ HCPCS: Performed by: EMERGENCY MEDICINE

## 2019-01-14 PROCEDURE — 85025 COMPLETE CBC W/AUTO DIFF WBC: CPT

## 2019-01-14 PROCEDURE — 99291 CRITICAL CARE FIRST HOUR: CPT | Mod: 25

## 2019-01-14 PROCEDURE — 99223 1ST HOSP IP/OBS HIGH 75: CPT | Mod: AI,,, | Performed by: HOSPITALIST

## 2019-01-14 PROCEDURE — 27000190 HC CPAP FULL FACE MASK W/VALVE

## 2019-01-14 PROCEDURE — 99223 PR INITIAL HOSPITAL CARE,LEVL III: ICD-10-PCS | Mod: AI,,, | Performed by: HOSPITALIST

## 2019-01-14 PROCEDURE — 80053 COMPREHEN METABOLIC PANEL: CPT

## 2019-01-14 PROCEDURE — 82803 BLOOD GASES ANY COMBINATION: CPT

## 2019-01-14 PROCEDURE — G0378 HOSPITAL OBSERVATION PER HR: HCPCS

## 2019-01-14 PROCEDURE — 25000003 PHARM REV CODE 250: Performed by: HOSPITALIST

## 2019-01-14 PROCEDURE — 99291 PR CRITICAL CARE, E/M 30-74 MINUTES: ICD-10-PCS | Mod: ,,, | Performed by: EMERGENCY MEDICINE

## 2019-01-14 PROCEDURE — 93005 ELECTROCARDIOGRAM TRACING: CPT

## 2019-01-14 PROCEDURE — 99900035 HC TECH TIME PER 15 MIN (STAT)

## 2019-01-14 PROCEDURE — 25000242 PHARM REV CODE 250 ALT 637 W/ HCPCS: Performed by: STUDENT IN AN ORGANIZED HEALTH CARE EDUCATION/TRAINING PROGRAM

## 2019-01-14 RX ORDER — SODIUM CHLORIDE 0.9 % (FLUSH) 0.9 %
5 SYRINGE (ML) INJECTION
Status: DISCONTINUED | OUTPATIENT
Start: 2019-01-14 | End: 2019-01-18 | Stop reason: HOSPADM

## 2019-01-14 RX ORDER — GLUCAGON 1 MG
1 KIT INJECTION
Status: DISCONTINUED | OUTPATIENT
Start: 2019-01-14 | End: 2019-01-18 | Stop reason: HOSPADM

## 2019-01-14 RX ORDER — FLUTICASONE FUROATE AND VILANTEROL 100; 25 UG/1; UG/1
1 POWDER RESPIRATORY (INHALATION) DAILY
Status: DISCONTINUED | OUTPATIENT
Start: 2019-01-15 | End: 2019-01-15

## 2019-01-14 RX ORDER — IBUPROFEN 200 MG
24 TABLET ORAL
Status: DISCONTINUED | OUTPATIENT
Start: 2019-01-14 | End: 2019-01-18 | Stop reason: HOSPADM

## 2019-01-14 RX ORDER — DEXTROSE 50 % IN WATER (D50W) INTRAVENOUS SYRINGE
25
Status: DISCONTINUED | OUTPATIENT
Start: 2019-01-14 | End: 2019-01-18 | Stop reason: HOSPADM

## 2019-01-14 RX ORDER — IBUPROFEN 200 MG
16 TABLET ORAL
Status: DISCONTINUED | OUTPATIENT
Start: 2019-01-14 | End: 2019-01-18 | Stop reason: HOSPADM

## 2019-01-14 RX ORDER — GUAIFENESIN 600 MG/1
600 TABLET, EXTENDED RELEASE ORAL 2 TIMES DAILY
Status: DISCONTINUED | OUTPATIENT
Start: 2019-01-14 | End: 2019-01-18 | Stop reason: HOSPADM

## 2019-01-14 RX ORDER — ALBUTEROL SULFATE 2.5 MG/.5ML
15 SOLUTION RESPIRATORY (INHALATION)
Status: COMPLETED | OUTPATIENT
Start: 2019-01-14 | End: 2019-01-14

## 2019-01-14 RX ORDER — DEXTROSE 50 % IN WATER (D50W) INTRAVENOUS SYRINGE
12.5
Status: DISCONTINUED | OUTPATIENT
Start: 2019-01-14 | End: 2019-01-18 | Stop reason: HOSPADM

## 2019-01-14 RX ORDER — ENOXAPARIN SODIUM 100 MG/ML
40 INJECTION SUBCUTANEOUS EVERY 24 HOURS
Status: DISCONTINUED | OUTPATIENT
Start: 2019-01-14 | End: 2019-01-18 | Stop reason: HOSPADM

## 2019-01-14 RX ORDER — DOXYCYCLINE HYCLATE 100 MG
100 TABLET ORAL EVERY 12 HOURS
Status: DISCONTINUED | OUTPATIENT
Start: 2019-01-14 | End: 2019-01-15

## 2019-01-14 RX ORDER — LEVALBUTEROL INHALATION SOLUTION 0.63 MG/3ML
0.63 SOLUTION RESPIRATORY (INHALATION) EVERY 8 HOURS
Status: DISCONTINUED | OUTPATIENT
Start: 2019-01-15 | End: 2019-01-15

## 2019-01-14 RX ORDER — LEVALBUTEROL 1.25 MG/.5ML
1.25 SOLUTION, CONCENTRATE RESPIRATORY (INHALATION) CONTINUOUS
Status: DISCONTINUED | OUTPATIENT
Start: 2019-01-14 | End: 2019-01-14

## 2019-01-14 RX ORDER — TIOTROPIUM BROMIDE 18 UG/1
1 CAPSULE ORAL; RESPIRATORY (INHALATION) DAILY
Status: DISCONTINUED | OUTPATIENT
Start: 2019-01-15 | End: 2019-01-15

## 2019-01-14 RX ORDER — IPRATROPIUM BROMIDE 0.5 MG/2.5ML
1 SOLUTION RESPIRATORY (INHALATION)
Status: COMPLETED | OUTPATIENT
Start: 2019-01-14 | End: 2019-01-14

## 2019-01-14 RX ADMIN — ENOXAPARIN SODIUM 40 MG: 100 INJECTION SUBCUTANEOUS at 11:01

## 2019-01-14 RX ADMIN — LEVALBUTEROL 1.25 MG: 1.25 SOLUTION, CONCENTRATE RESPIRATORY (INHALATION) at 05:01

## 2019-01-14 RX ADMIN — IPRATROPIUM BROMIDE 1 MG: 0.5 SOLUTION RESPIRATORY (INHALATION) at 12:01

## 2019-01-14 RX ADMIN — ALBUTEROL SULFATE 15 MG: 2.5 SOLUTION RESPIRATORY (INHALATION) at 12:01

## 2019-01-14 RX ADMIN — DOXYCYCLINE HYCLATE 100 MG: 100 TABLET, COATED ORAL at 11:01

## 2019-01-14 RX ADMIN — GUAIFENESIN 600 MG: 600 TABLET, EXTENDED RELEASE ORAL at 11:01

## 2019-01-14 NOTE — ED PROVIDER NOTES
Encounter Date: 2019    SCRIBE #1 NOTE: I, Danielle Garcia, am scribing for, and in the presence of,  Dr. Hurst. I have scribed the following portions of the note - the Resident attestation. Other sections scribed: Critcal care.       History     Chief Complaint   Patient presents with    Shortness of Breath     Pt c/o SOB x 5 days ago-gradual worsening.      68F with COPD and recent discharge for COPD exacerbation on 19 presents with shortness of breath. This is her fourth admission for COPD exacerbation in 6 months. During most admission, she was treated with steroids and discharged on levaquin. She wears 2L O2 at home intermittently, is on triple therapy, and uses nebulizer as needed. Does not use BiPAP at home. Patient and  at bedside state that she takes her medications reliably. Since last discharge, she says that her symptoms have not improved significantly and have gotten slightly worse; her  says that he has been trying to get her to come to the hospital for a couple of days. She denies fever, edema, orthopnea, sneezing, sinus pain, hemoptysis.          Review of patient's allergies indicates:  No Known Allergies  Past Medical History:   Diagnosis Date    Cataract     COPD (chronic obstructive pulmonary disease)     History of retinal hemorrhage      Past Surgical History:   Procedure Laterality Date    HAND SURGERY       Family History   Problem Relation Age of Onset    Cancer Mother         colon    Macular degeneration Mother     Stroke Father     Amblyopia Neg Hx     Blindness Neg Hx     Cataracts Neg Hx     Diabetes Neg Hx     Glaucoma Neg Hx     Hypertension Neg Hx     Retinal detachment Neg Hx     Strabismus Neg Hx     Thyroid disease Neg Hx      Social History     Tobacco Use    Smoking status: Former Smoker     Packs/day: 1.00     Years: 36.00     Pack years: 36.00     Types: Cigarettes     Last attempt to quit: 2016     Years since quittin.3     Smokeless tobacco: Never Used   Substance Use Topics    Alcohol use: Yes     Alcohol/week: 1.2 oz     Types: 2 Glasses of wine per week     Comment: 1-2 glasses a day     Drug use: No     Review of Systems   Constitutional: Negative for chills and fever.   HENT: Negative for sneezing, sore throat and voice change.    Eyes: Negative for itching.   Respiratory: Positive for cough and shortness of breath.    Cardiovascular: Negative for chest pain and palpitations.   Gastrointestinal: Negative for abdominal pain, nausea and vomiting.   Genitourinary: Negative for dysuria.   Musculoskeletal: Positive for back pain (Diffuse lower back pain).   Skin: Negative for rash.   Neurological: Negative for seizures, weakness and headaches.   Hematological: Does not bruise/bleed easily.   Psychiatric/Behavioral: Negative for agitation and behavioral problems.       Physical Exam     Initial Vitals [01/14/19 1229]   BP Pulse Resp Temp SpO2   (!) 163/85 (!) 116 (!) 24 97.7 °F (36.5 °C) 97 %      MAP       --         Physical Exam    Constitutional: She appears well-developed and well-nourished. She appears distressed.   HENT:   Head: Normocephalic and atraumatic.   Mouth/Throat: Oropharynx is clear and moist. No oropharyngeal exudate.   Eyes: EOM are normal. Pupils are equal, round, and reactive to light.   Neck: Normal range of motion. No JVD present.   Cardiovascular: Tachycardia present.    No murmur heard.  Pulmonary/Chest: She is in respiratory distress. She has wheezes. She has no rhonchi. She has no rales.   Abdominal: Soft. She exhibits no distension. There is no tenderness.   Musculoskeletal: She exhibits no edema or tenderness.   Neurological: She is alert and oriented to person, place, and time.   Skin: Skin is warm and dry.   Psychiatric: She has a normal mood and affect. Her behavior is normal.         ED Course   Procedures  Labs Reviewed   CBC W/ AUTO DIFFERENTIAL - Abnormal; Notable for the following components:        Result Value     (*)     MCH 31.3 (*)     MCHC 31.3 (*)     Gran% 74.4 (*)     Lymph% 13.4 (*)     All other components within normal limits   COMPREHENSIVE METABOLIC PANEL - Abnormal; Notable for the following components:    Sodium 134 (*)     CO2 31 (*)     Glucose 127 (*)     All other components within normal limits   HEMOGLOBIN A1C   HEMOGLOBIN A1C    Narrative:     add on [facility] #[id number] per Jayson Allen MD @ 15:22  01/14/2019           Imaging Results          X-Ray Chest 1 View (Final result)  Result time 01/14/19 14:20:30   Procedure changed from X-Ray Chest PA And Lateral     Final result by Osvaldo Barrera MD (01/14/19 14:20:30)                 Impression:      See above      Electronically signed by: Osvaldo Barrera MD  Date:    01/14/2019  Time:    14:20             Narrative:    EXAMINATION:  XR CHEST 1 VIEW    CLINICAL HISTORY:  Chest Pain;    TECHNIQUE:  Single frontal view of the chest was performed.    COMPARISON:  No 12/28/2018 ne    FINDINGS:  Heart size normal.  Chronic changes in the lung apices as before.  No significant airspace consolidation or pleural effusion identified.                                 Medical Decision Making:   History:   Old Medical Records: I decided to obtain old medical records.  Initial Assessment:   68F with multiple recent admissions for COPD exacerbation presents with SOB. Likely COPD exacerbation, unclear inciting event. No obvious signs of infection, low suspicion for aspiration. Compliant with medications. Will treat with BiPAP and continuous nebs and order CBC, CMP, CXR.  Clinical Tests:   Lab Tests: Ordered and Reviewed  Radiological Study: Ordered and Reviewed  Medical Tests: Ordered and Reviewed       APC / Resident Notes:   2:58 PM Patient has improved on BiPAP with continuous nebulizers and is now on nasal cannula. Labs and CXR unconcerning for PNA or other acute intrathoracic process. Desatted into the low 80s when ambulating to use commode.  Will order more nebulizer treatments and place in observation.       Scribe Attestation:   Scribe #1: I performed the above scribed service and the documentation accurately describes the services I performed. I attest to the accuracy of the note.    Attending Attestation:   Physician Attestation Statement for Resident:  As the supervising MD   Physician Attestation Statement: I have personally seen and examined this patient.   I agree with the above history. -: COPD exacerbation. Short of breath when taking a couple of steps. She was recently in the hospital for the same. Placed on BIPAP and on continuous nebs. Will anticipate admission. Given 125 mg of solumedrol from EMS.    As the supervising MD I agree with the above PE.    As the supervising MD I agree with the above treatment, course, plan, and disposition.  I have reviewed and agree with the residents interpretation of the following: lab data, x-rays and EKG.        Attending Critical Care:   Critical Care Times:   ==============================================================  · Total Critical Care Time - exclusive of procedural time: 60 minutes.  ==============================================================                 Clinical Impression:   The encounter diagnosis was Shortness of breath.                             Chu Teixeira MD  Resident  01/14/19 8102       Chu Teixeira MD  Resident  01/14/19 1602       Elizabeth Stewart MD  01/14/19 1643

## 2019-01-14 NOTE — ED NOTES
Hourly rounding complete. Patient resting in stretcher and is in NAD at this time. Pt is awake alert and oriented x4, VSS, respirations even and unlabored at this time, remains on NC, tolerating well at this time. Pt denies pain at this time. Pt updated on POC. Family at bedside. Bed low and locked with side rails up x2, call bell in pt reach. Pt voices no needs at this time.

## 2019-01-14 NOTE — ED NOTES
Hourly rounding complete. Patient resting in stretcher and is in NAD at this time. Pt is awake alert and oriented x4, VSS, respirations even and unlabored at this time, taken off BIPAP and switched to 2L NC per ED RT, tolerating well at this time. Pt denies pain at this time. Pt updated on POC. Family at bedside. Bed low and locked with side rails up x2, call bell in pt reach. Pt voices no needs at this time.

## 2019-01-14 NOTE — ED NOTES
Hourly rounding complete. Patient resting in stretcher and is in NAD at this time. Pt is awake alert and oriented x4, VSS, respirations even and unlabored at this time, remains on BIPAP, tolerating well at this time. Pt denies pain at this time. Pt updated on POC. Family at bedside. Bed low and locked with side rails up x2, call bell in pt reach. Pt voices no needs at this time.

## 2019-01-14 NOTE — ED TRIAGE NOTES
"Pt arrived by EMS for shortness of breath. Per , pt has been short of breath x 5 days and has progressively gotten worse, pt on home oxygen. Pt arrived to room 01 on cpap.  states pt is unable to walk without getting short of breath and "gasping" for air. Denies chest pain. 91% on room air, pt placed on bipap, tolerating well. Pt states she has been doing breathing treatments at home and not working   "

## 2019-01-14 NOTE — ED NOTES
ED resident notified of pt SpO2 decreased to 84% while using BSC, O2 incresaed to 4L via NC, SpO2 increased and maintained >94% on 4L O2, tolerating well at this time

## 2019-01-15 LAB
ALLENS TEST: ABNORMAL
ANION GAP SERPL CALC-SCNC: 4 MMOL/L
BASOPHILS # BLD AUTO: 0.03 K/UL
BASOPHILS NFR BLD: 0.3 %
BNP SERPL-MCNC: 25 PG/ML
BUN SERPL-MCNC: 13 MG/DL
CALCIUM SERPL-MCNC: 9.8 MG/DL
CHLORIDE SERPL-SCNC: 97 MMOL/L
CO2 SERPL-SCNC: 36 MMOL/L
CREAT SERPL-MCNC: 0.7 MG/DL
DELSYS: ABNORMAL
DIFFERENTIAL METHOD: ABNORMAL
EOSINOPHIL # BLD AUTO: 0.1 K/UL
EOSINOPHIL NFR BLD: 0.9 %
ERYTHROCYTE [DISTWIDTH] IN BLOOD BY AUTOMATED COUNT: 12.6 %
EST. GFR  (AFRICAN AMERICAN): >60 ML/MIN/1.73 M^2
EST. GFR  (NON AFRICAN AMERICAN): >60 ML/MIN/1.73 M^2
FLOW: 3
GLUCOSE SERPL-MCNC: 86 MG/DL
HCO3 UR-SCNC: 32.9 MMOL/L (ref 24–28)
HCT VFR BLD AUTO: 41.6 %
HGB BLD-MCNC: 12.9 G/DL
IMM GRANULOCYTES # BLD AUTO: 0.07 K/UL
IMM GRANULOCYTES NFR BLD AUTO: 0.8 %
LYMPHOCYTES # BLD AUTO: 1.2 K/UL
LYMPHOCYTES NFR BLD: 13.5 %
MAGNESIUM SERPL-MCNC: 1.9 MG/DL
MCH RBC QN AUTO: 30.4 PG
MCHC RBC AUTO-ENTMCNC: 31 G/DL
MCV RBC AUTO: 98 FL
MODE: ABNORMAL
MONOCYTES # BLD AUTO: 1.3 K/UL
MONOCYTES NFR BLD: 15.5 %
NEUTROPHILS # BLD AUTO: 5.9 K/UL
NEUTROPHILS NFR BLD: 69 %
NRBC BLD-RTO: 0 /100 WBC
PCO2 BLDA: 57.8 MMHG (ref 35–45)
PH SMN: 7.36 [PH] (ref 7.35–7.45)
PLATELET # BLD AUTO: 306 K/UL
PMV BLD AUTO: 9.3 FL
PO2 BLDA: 74 MMHG (ref 80–100)
POC BE: 8 MMOL/L
POC SATURATED O2: 94 % (ref 95–100)
POC TCO2: 35 MMOL/L (ref 23–27)
POTASSIUM SERPL-SCNC: 4 MMOL/L
RBC # BLD AUTO: 4.25 M/UL
SAMPLE: ABNORMAL
SITE: ABNORMAL
SODIUM SERPL-SCNC: 137 MMOL/L
TROPONIN I SERPL DL<=0.01 NG/ML-MCNC: <0.006 NG/ML
WBC # BLD AUTO: 8.58 K/UL

## 2019-01-15 PROCEDURE — 94761 N-INVAS EAR/PLS OXIMETRY MLT: CPT

## 2019-01-15 PROCEDURE — G0378 HOSPITAL OBSERVATION PER HR: HCPCS

## 2019-01-15 PROCEDURE — 85025 COMPLETE CBC W/AUTO DIFF WBC: CPT

## 2019-01-15 PROCEDURE — 83880 ASSAY OF NATRIURETIC PEPTIDE: CPT

## 2019-01-15 PROCEDURE — 99233 SBSQ HOSP IP/OBS HIGH 50: CPT | Mod: ,,, | Performed by: INTERNAL MEDICINE

## 2019-01-15 PROCEDURE — 94640 AIRWAY INHALATION TREATMENT: CPT

## 2019-01-15 PROCEDURE — 99233 PR SUBSEQUENT HOSPITAL CARE,LEVL III: ICD-10-PCS | Mod: ,,, | Performed by: INTERNAL MEDICINE

## 2019-01-15 PROCEDURE — 63600175 PHARM REV CODE 636 W HCPCS: Performed by: HOSPITALIST

## 2019-01-15 PROCEDURE — 25000242 PHARM REV CODE 250 ALT 637 W/ HCPCS: Performed by: HOSPITALIST

## 2019-01-15 PROCEDURE — 83735 ASSAY OF MAGNESIUM: CPT

## 2019-01-15 PROCEDURE — 25000003 PHARM REV CODE 250: Performed by: HOSPITALIST

## 2019-01-15 PROCEDURE — 36415 COLL VENOUS BLD VENIPUNCTURE: CPT

## 2019-01-15 PROCEDURE — 27000221 HC OXYGEN, UP TO 24 HOURS

## 2019-01-15 PROCEDURE — 80048 BASIC METABOLIC PNL TOTAL CA: CPT

## 2019-01-15 PROCEDURE — 84484 ASSAY OF TROPONIN QUANT: CPT

## 2019-01-15 PROCEDURE — 25000003 PHARM REV CODE 250: Performed by: PHYSICIAN ASSISTANT

## 2019-01-15 PROCEDURE — 25000003 PHARM REV CODE 250: Performed by: INTERNAL MEDICINE

## 2019-01-15 RX ORDER — PREDNISONE 20 MG/1
40 TABLET ORAL DAILY
Status: DISCONTINUED | OUTPATIENT
Start: 2019-01-16 | End: 2019-01-18 | Stop reason: HOSPADM

## 2019-01-15 RX ORDER — IPRATROPIUM BROMIDE AND ALBUTEROL SULFATE 2.5; .5 MG/3ML; MG/3ML
3 SOLUTION RESPIRATORY (INHALATION) EVERY 6 HOURS
Status: DISCONTINUED | OUTPATIENT
Start: 2019-01-15 | End: 2019-01-15

## 2019-01-15 RX ORDER — IPRATROPIUM BROMIDE AND ALBUTEROL SULFATE 2.5; .5 MG/3ML; MG/3ML
3 SOLUTION RESPIRATORY (INHALATION) EVERY 6 HOURS
Status: DISCONTINUED | OUTPATIENT
Start: 2019-01-15 | End: 2019-01-16

## 2019-01-15 RX ORDER — POLYETHYLENE GLYCOL 3350 17 G/17G
17 POWDER, FOR SOLUTION ORAL 2 TIMES DAILY PRN
Status: DISCONTINUED | OUTPATIENT
Start: 2019-01-15 | End: 2019-01-18 | Stop reason: HOSPADM

## 2019-01-15 RX ORDER — FLUTICASONE FUROATE AND VILANTEROL 200; 25 UG/1; UG/1
1 POWDER RESPIRATORY (INHALATION) DAILY
Status: DISCONTINUED | OUTPATIENT
Start: 2019-01-16 | End: 2019-01-18 | Stop reason: HOSPADM

## 2019-01-15 RX ORDER — PREDNISONE 20 MG/1
40 TABLET ORAL DAILY
Status: DISCONTINUED | OUTPATIENT
Start: 2019-01-16 | End: 2019-01-15

## 2019-01-15 RX ORDER — AZITHROMYCIN 250 MG/1
500 TABLET, FILM COATED ORAL DAILY
Status: COMPLETED | OUTPATIENT
Start: 2019-01-15 | End: 2019-01-17

## 2019-01-15 RX ADMIN — GUAIFENESIN 600 MG: 600 TABLET, EXTENDED RELEASE ORAL at 09:01

## 2019-01-15 RX ADMIN — LEVALBUTEROL HYDROCHLORIDE 0.63 MG: 0.63 SOLUTION RESPIRATORY (INHALATION) at 04:01

## 2019-01-15 RX ADMIN — DOXYCYCLINE HYCLATE 100 MG: 100 TABLET, COATED ORAL at 09:01

## 2019-01-15 RX ADMIN — LEVALBUTEROL HYDROCHLORIDE 0.63 MG: 0.63 SOLUTION RESPIRATORY (INHALATION) at 09:01

## 2019-01-15 RX ADMIN — AZITHROMYCIN 500 MG: 250 TABLET, FILM COATED ORAL at 05:01

## 2019-01-15 RX ADMIN — LEVALBUTEROL HYDROCHLORIDE 0.63 MG: 0.63 SOLUTION RESPIRATORY (INHALATION) at 01:01

## 2019-01-15 RX ADMIN — POLYETHYLENE GLYCOL 3350 17 G: 17 POWDER, FOR SOLUTION ORAL at 09:01

## 2019-01-15 RX ADMIN — ENOXAPARIN SODIUM 40 MG: 100 INJECTION SUBCUTANEOUS at 05:01

## 2019-01-15 RX ADMIN — FLUTICASONE FUROATE AND VILANTEROL TRIFENATATE 1 PUFF: 100; 25 POWDER RESPIRATORY (INHALATION) at 09:01

## 2019-01-15 RX ADMIN — PREDNISONE 50 MG: 20 TABLET ORAL at 09:01

## 2019-01-15 NOTE — PLAN OF CARE
Problem: Adult Inpatient Plan of Care  Goal: Plan of Care Review  No acute changes on shift. Pt remains on 2 L NC. Sats WNL. Pt continues to complain of GRUBER. Inhalation treatments continued as ordered. Pt denies any other issues. Pt and  updated on POC. WCTM.

## 2019-01-15 NOTE — PLAN OF CARE
01/15/19 1420   Discharge Assessment   Assessment Type Discharge Planning Assessment   Confirmed/corrected address and phone number on facesheet? Yes   Assessment information obtained from? Patient   Expected Length of Stay (days) 3   Communicated expected length of stay with patient/caregiver yes   Prior to hospitilization cognitive status: Alert/Oriented   Prior to hospitalization functional status: Independent   Current cognitive status: Alert/Oriented   Current Functional Status: Independent   Lives With spouse   Able to Return to Prior Arrangements yes   Readmission Within the Last 30 Days no previous admission in last 30 days   Patient currently being followed by outpatient case management? No   Patient currently receives any other outside agency services? No   Equipment Currently Used at Home oxygen;nebulizer   Do you have any problems affording any of your prescribed medications? No   Is the patient taking medications as prescribed? yes   Does the patient have transportation home? Yes   Transportation Anticipated family or friend will provide   Discharge Plan A Home with family   Discharge Plan B Home Health   Patient/Family in Agreement with Plan yes

## 2019-01-15 NOTE — PLAN OF CARE
Problem: Respiratory Compromise COPD (Chronic Obstructive Pulmonary Disease)  Goal: Effective Oxygenation and Ventilation    Intervention: Optimize Oxygenation and Ventilation  Pt has dyspnea & shortness of breath on exertion. Needs O2 & needs to remain calm & rested. Resp Txs done.

## 2019-01-15 NOTE — H&P
Hospital Medicine  History and Physical Exam    Team: [facility] HOSP MED R Jayson Allen MD  Admit Date: 1/14/2019  RENARD 01/17/2018  Principal Problem:  COPD exacerbation   Patient information was obtained from patient, spouse/SO, past medical records and ER records.   Primary care Physician: Juan Truong MD  Code status: Full Code    HPI:   68 F with a PMH of COPD (on continuous home O2) on BREO and ellipta, former smoker with a 50 PPY history, quit 3 years ago presents with recurrent shortness of breath and dry cough. This is her fourth admission in the past 6 months, always presenting with similar symptoms which include shortness of breath, overall fatigue, dry cough and decreased energy while performing ADLs. She denies any fever, chills, recent sick contacts, recent travel. She was discharged from [facility] on 01/1/2019. She was supposed to present today for pulmonary rehab in [facility] but decided to come here due to worsening symptoms. Follow up with Dr. Harris in Pulmonology clinic.       Past Medical History: Patient has a past medical history of Cataract, COPD (chronic obstructive pulmonary disease), and History of retinal hemorrhage.    Past Surgical History: Patient has a past surgical history that includes Hand surgery.    Social History: Patient reports that she quit smoking about 2 years ago. Her smoking use included cigarettes. She has a 36.00 pack-year smoking history. she has never used smokeless tobacco. She reports that she drinks about 1.2 oz of alcohol per week. She reports that she does not use drugs.    Family History: family history includes Cancer in her mother; Macular degeneration in her mother; Stroke in her father.    Medications:   Prior to Admission medications    Medication Sig Start Date End Date Taking? Authorizing Provider   ascorbic acid, vitamin C, (VITAMIN C) 500 MG tablet Take 500 mg by mouth 2 (two) times daily.     Historical Provider, MD   calcium carbonate (OS-STEPHANIE) 600 mg calcium  (1,500 mg) Tab Take 1 tablet (600 mg total) by mouth once daily. Take Levofloxacin antibiotic at least 2 hours before calcium. 11/7/18   Black Ramon MD   fluticasone-vilanterol (BREO) 100-25 mcg/dose diskus inhaler Inhale 1 puff into the lungs once daily. Controller 9/13/18   Jaime Harris MD   guaiFENesin (MUCINEX) 600 mg 12 hr tablet Take 1 tablet (600 mg total) by mouth 2 (two) times daily. 12/30/18   Adam Majano MD   INCRUSE ELLIPTA 62.5 mcg/actuation DsDv INHALE 1 PUFF BY MOUTH INTO LUNGS ONCE DAILY 10/18/18   Jaime Harris MD   MULTIVIT-IRON-MIN-FOLIC ACID 3,500-18-0.4 UNIT-MG-MG ORAL CHEW Take 1 tablet by mouth once daily. Take Levofloxacin antibiotic at least 2 hours before multivitamin. 11/7/18   Black Ramon MD   tiotropium (SPIRIVA) 18 mcg inhalation capsule Inhale 1 capsule (18 mcg total) into the lungs once daily. Controller 12/31/18 12/31/19  Adam Majano MD   albuterol 90 mcg/actuation inhaler Inhale 2 puffs into the lungs every 4 (four) hours as needed for Wheezing or Shortness of Breath. Rescue 7/5/18 1/14/19  Jennie Kc MD   albuterol-ipratropium (DUO-NEB) 2.5 mg-0.5 mg/3 mL nebulizer solution Take 3 mLs by nebulization every 4 (four) hours as needed for Wheezing or Shortness of Breath. Rescue  Patient taking differently: Take 3 mLs by nebulization 2 (two) times daily. Rescue 8/31/18 1/14/19  Jaime Harris MD       Allergies: Patient has No Known Allergies.    ROS    Constitutional: Negative for chills and fever.   HENT: Negative for sneezing, sore throat and voice change.    Eyes: Negative for itching.   Respiratory: Positive for cough and shortness of breath.    Cardiovascular: Negative for chest pain and palpitations.   Gastrointestinal: Negative for abdominal pain, nausea and vomiting.   Genitourinary: Negative for dysuria.   Musculoskeletal: Positive for back pain (Diffuse lower back pain).   Skin: Negative for rash.   Neurological:  Negative for seizures, weakness and headaches.   Hematological: Does not bruise/bleed easily.   Psychiatric/Behavioral: Negative for agitation and behavioral problems.         PEx  Temp:  [97.5 °F (36.4 °C)-97.9 °F (36.6 °C)]   Pulse:  [104-122]   Resp:  [17-26]   BP: (109-173)/(55-85)   SpO2:  [84 %-100 %]   Body mass index is 18.55 kg/m².       Constitutional: She appears well-developed and well-nourished. She appears distressed.   HENT:   Head: Normocephalic and atraumatic.   Mouth/Throat: Oropharynx is clear and moist. No oropharyngeal exudate.   Eyes: EOM are normal. Pupils are equal, round, and reactive to light.   Neck: Normal range of motion. No JVD present.   Cardiovascular: Tachycardia present.    No murmur heard.  Pulmonary/Chest: She is in respiratory distress. She has wheezes. She has no rhonchi. She has no rales.   Abdominal: Soft. She exhibits no distension. There is no tenderness.   Musculoskeletal: She exhibits no edema or tenderness.   Neurological: She is alert and oriented to person, place, and time.   Skin: Skin is warm and dry.   Psychiatric: She has a normal mood and affect. Her behavior is normal.         Recent Labs   Lab 01/14/19  1249   WBC 9.65   HGB 14.5   HCT 46.4        Recent Labs   Lab 01/14/19  1249   *   K 4.1   CL 95   CO2 31*   BUN 12   CREATININE 0.6   *   CALCIUM 10.1     Recent Labs   Lab 01/14/19  1249   ALKPHOS 95   ALT 29   AST 26   ALBUMIN 4.1   PROT 7.4   BILITOT 0.5      No results for input(s): POCTGLUCOSE in the last 168 hours.  No results for input(s): LACTATE in the last 72 hours.     @LABRCNT(CPK:3,CPKMB:3,mb:3,TroponinI:3)  )@  Hemoglobin A1C   Date Value Ref Range Status   01/14/2019 5.6 4.0 - 5.6 % Final     Comment:     ADA Screening Guidelines:  5.7-6.4%  Consistent with prediabetes  >or=6.5%  Consistent with diabetes  High levels of fetal hemoglobin interfere with the HbA1C  assay. Heterozygous hemoglobin variants (HbS, HgC, etc)do  not  "significantly interfere with this assay.   However, presence of multiple variants may affect accuracy.     12/28/2018 5.4 4.0 - 5.6 % Final     Comment:     ADA Screening Guidelines:  5.7-6.4%  Consistent with prediabetes  >or=6.5%  Consistent with diabetes  High levels of fetal hemoglobin interfere with the HbA1C  assay. Heterozygous hemoglobin variants (HbS, HgC, etc)do  not significantly interfere with this assay.   However, presence of multiple variants may affect accuracy.     07/02/2018 5.6 4.0 - 5.6 % Final     Comment:     ADA Screening Guidelines:  5.7-6.4%  Consistent with prediabetes  >or=6.5%  Consistent with diabetes  High levels of fetal hemoglobin interfere with the HbA1C  assay. Heterozygous hemoglobin variants (HbS, HgC, etc)do  not significantly interfere with this assay.   However, presence of multiple variants may affect accuracy.         Active Hospital Problems    Diagnosis  POA    Shortness of breath [R06.02]  Yes    COPD exacerbation [J44.1]  Yes    Pulmonary HTN [I27.20]  Yes      Resolved Hospital Problems   No resolved problems to display.       Overview:      Assessment and Plan:  COPD Exacerbation  - CXR unremarkable  - PO Prednisone   - Started on PO Doxy to prevent recurrent QT prolonging fluroquinolones   - CT chest wo contrast  - pulmonology consult due to   - Xopenex PRN (patiaent reports albuterol makes her "jittery")  - ABG to determine CO2 retention  - cont tiotropium   - cont BREO      High Risk Conditions  COPD Exacerbation     Diet:  regular diet   GI PPx:   DVT PPx:    Anticoagulants   Medication Route Frequency    enoxaparin injection 40 mg Subcutaneous Daily       Goals of Care:   Discharge plan: pending clinical improvement and Pulmonology recs     Time (35 minutes) spent in care of the patient (Greater than 1/2 spent in direct face-to-face contact)   Jayson Allen MD      "

## 2019-01-15 NOTE — PROGRESS NOTES
I have reviewed the notes, assessments, and/or procedures performed by Dr. Montelongo, I concur with her documentation of Estefani Fam.

## 2019-01-16 PROBLEM — J96.21 ACUTE ON CHRONIC RESPIRATORY FAILURE WITH HYPOXIA AND HYPERCAPNIA: Status: ACTIVE | Noted: 2019-01-16

## 2019-01-16 PROBLEM — J96.22 ACUTE ON CHRONIC RESPIRATORY FAILURE WITH HYPOXIA AND HYPERCAPNIA: Status: ACTIVE | Noted: 2019-01-16

## 2019-01-16 LAB
ANION GAP SERPL CALC-SCNC: 5 MMOL/L
BASOPHILS # BLD AUTO: 0.02 K/UL
BASOPHILS NFR BLD: 0.3 %
BUN SERPL-MCNC: 13 MG/DL
CALCIUM SERPL-MCNC: 9.2 MG/DL
CHLORIDE SERPL-SCNC: 99 MMOL/L
CO2 SERPL-SCNC: 32 MMOL/L
CREAT SERPL-MCNC: 0.6 MG/DL
DIFFERENTIAL METHOD: ABNORMAL
EOSINOPHIL # BLD AUTO: 0 K/UL
EOSINOPHIL NFR BLD: 0.3 %
ERYTHROCYTE [DISTWIDTH] IN BLOOD BY AUTOMATED COUNT: 12.6 %
EST. GFR  (AFRICAN AMERICAN): >60 ML/MIN/1.73 M^2
EST. GFR  (NON AFRICAN AMERICAN): >60 ML/MIN/1.73 M^2
GLUCOSE SERPL-MCNC: 116 MG/DL
HCT VFR BLD AUTO: 39.7 %
HGB BLD-MCNC: 12.9 G/DL
IMM GRANULOCYTES # BLD AUTO: 0.04 K/UL
IMM GRANULOCYTES NFR BLD AUTO: 0.7 %
LYMPHOCYTES # BLD AUTO: 0.4 K/UL
LYMPHOCYTES NFR BLD: 6.6 %
MCH RBC QN AUTO: 31.1 PG
MCHC RBC AUTO-ENTMCNC: 32.5 G/DL
MCV RBC AUTO: 96 FL
MONOCYTES # BLD AUTO: 0.2 K/UL
MONOCYTES NFR BLD: 3.5 %
NEUTROPHILS # BLD AUTO: 5.1 K/UL
NEUTROPHILS NFR BLD: 88.6 %
NRBC BLD-RTO: 0 /100 WBC
PLATELET # BLD AUTO: 296 K/UL
PMV BLD AUTO: 9.3 FL
POTASSIUM SERPL-SCNC: 4 MMOL/L
RBC # BLD AUTO: 4.15 M/UL
SODIUM SERPL-SCNC: 136 MMOL/L
WBC # BLD AUTO: 5.79 K/UL

## 2019-01-16 PROCEDURE — 27000221 HC OXYGEN, UP TO 24 HOURS

## 2019-01-16 PROCEDURE — 80048 BASIC METABOLIC PNL TOTAL CA: CPT

## 2019-01-16 PROCEDURE — 99223 1ST HOSP IP/OBS HIGH 75: CPT | Mod: GC,,, | Performed by: INTERNAL MEDICINE

## 2019-01-16 PROCEDURE — 94640 AIRWAY INHALATION TREATMENT: CPT

## 2019-01-16 PROCEDURE — 63600175 PHARM REV CODE 636 W HCPCS: Performed by: PHYSICIAN ASSISTANT

## 2019-01-16 PROCEDURE — 99233 SBSQ HOSP IP/OBS HIGH 50: CPT | Mod: ,,, | Performed by: INTERNAL MEDICINE

## 2019-01-16 PROCEDURE — 99223 PR INITIAL HOSPITAL CARE,LEVL III: ICD-10-PCS | Mod: GC,,, | Performed by: INTERNAL MEDICINE

## 2019-01-16 PROCEDURE — 25000242 PHARM REV CODE 250 ALT 637 W/ HCPCS: Performed by: INTERNAL MEDICINE

## 2019-01-16 PROCEDURE — 85025 COMPLETE CBC W/AUTO DIFF WBC: CPT

## 2019-01-16 PROCEDURE — 20600001 HC STEP DOWN PRIVATE ROOM

## 2019-01-16 PROCEDURE — 63600175 PHARM REV CODE 636 W HCPCS: Performed by: HOSPITALIST

## 2019-01-16 PROCEDURE — 25000003 PHARM REV CODE 250: Performed by: INTERNAL MEDICINE

## 2019-01-16 PROCEDURE — 92610 EVALUATE SWALLOWING FUNCTION: CPT

## 2019-01-16 PROCEDURE — 99233 PR SUBSEQUENT HOSPITAL CARE,LEVL III: ICD-10-PCS | Mod: ,,, | Performed by: INTERNAL MEDICINE

## 2019-01-16 PROCEDURE — 94761 N-INVAS EAR/PLS OXIMETRY MLT: CPT

## 2019-01-16 PROCEDURE — 25000003 PHARM REV CODE 250: Performed by: HOSPITALIST

## 2019-01-16 PROCEDURE — 36415 COLL VENOUS BLD VENIPUNCTURE: CPT

## 2019-01-16 PROCEDURE — 63600175 PHARM REV CODE 636 W HCPCS: Performed by: INTERNAL MEDICINE

## 2019-01-16 RX ORDER — LEVALBUTEROL INHALATION SOLUTION 0.63 MG/3ML
0.63 SOLUTION RESPIRATORY (INHALATION) EVERY 8 HOURS
Status: DISCONTINUED | OUTPATIENT
Start: 2019-01-16 | End: 2019-01-18 | Stop reason: HOSPADM

## 2019-01-16 RX ORDER — TIOTROPIUM BROMIDE 18 UG/1
1 CAPSULE ORAL; RESPIRATORY (INHALATION) DAILY
Status: DISCONTINUED | OUTPATIENT
Start: 2019-01-16 | End: 2019-01-18 | Stop reason: HOSPADM

## 2019-01-16 RX ORDER — PANTOPRAZOLE SODIUM 40 MG/1
40 TABLET, DELAYED RELEASE ORAL DAILY
Status: DISCONTINUED | OUTPATIENT
Start: 2019-01-16 | End: 2019-01-18 | Stop reason: HOSPADM

## 2019-01-16 RX ORDER — FLUTICASONE PROPIONATE 50 MCG
2 SPRAY, SUSPENSION (ML) NASAL DAILY
Status: DISCONTINUED | OUTPATIENT
Start: 2019-01-16 | End: 2019-01-18 | Stop reason: HOSPADM

## 2019-01-16 RX ORDER — LORAZEPAM 2 MG/ML
1 INJECTION INTRAMUSCULAR ONCE
Status: COMPLETED | OUTPATIENT
Start: 2019-01-16 | End: 2019-01-16

## 2019-01-16 RX ADMIN — AZITHROMYCIN 500 MG: 250 TABLET, FILM COATED ORAL at 09:01

## 2019-01-16 RX ADMIN — LORAZEPAM 1 MG: 2 INJECTION, SOLUTION INTRAMUSCULAR; INTRAVENOUS at 01:01

## 2019-01-16 RX ADMIN — IPRATROPIUM BROMIDE AND ALBUTEROL SULFATE 3 ML: .5; 3 SOLUTION RESPIRATORY (INHALATION) at 12:01

## 2019-01-16 RX ADMIN — TIOTROPIUM BROMIDE 18 MCG: 18 CAPSULE ORAL; RESPIRATORY (INHALATION) at 05:01

## 2019-01-16 RX ADMIN — PREDNISONE 40 MG: 20 TABLET ORAL at 09:01

## 2019-01-16 RX ADMIN — IPRATROPIUM BROMIDE AND ALBUTEROL SULFATE 3 ML: .5; 3 SOLUTION RESPIRATORY (INHALATION) at 07:01

## 2019-01-16 RX ADMIN — FLUTICASONE PROPIONATE 100 MCG: 50 SPRAY, METERED NASAL at 02:01

## 2019-01-16 RX ADMIN — GUAIFENESIN 600 MG: 600 TABLET, EXTENDED RELEASE ORAL at 09:01

## 2019-01-16 RX ADMIN — FLUTICASONE FUROATE AND VILANTEROL TRIFENATATE 1 PUFF: 200; 25 POWDER RESPIRATORY (INHALATION) at 09:01

## 2019-01-16 RX ADMIN — ENOXAPARIN SODIUM 40 MG: 100 INJECTION SUBCUTANEOUS at 04:01

## 2019-01-16 RX ADMIN — PANTOPRAZOLE SODIUM 40 MG: 40 TABLET, DELAYED RELEASE ORAL at 03:01

## 2019-01-16 RX ADMIN — METHYLPREDNISOLONE SODIUM SUCCINATE 80 MG: 40 INJECTION, POWDER, FOR SOLUTION INTRAMUSCULAR; INTRAVENOUS at 01:01

## 2019-01-16 RX ADMIN — LEVALBUTEROL HYDROCHLORIDE 0.63 MG: 0.63 SOLUTION RESPIRATORY (INHALATION) at 11:01

## 2019-01-16 RX ADMIN — LEVALBUTEROL HYDROCHLORIDE 0.63 MG: 0.63 SOLUTION RESPIRATORY (INHALATION) at 03:01

## 2019-01-16 RX ADMIN — GUAIFENESIN 600 MG: 600 TABLET, EXTENDED RELEASE ORAL at 08:01

## 2019-01-16 RX ADMIN — LEVALBUTEROL HYDROCHLORIDE 0.63 MG: 0.63 SOLUTION RESPIRATORY (INHALATION) at 09:01

## 2019-01-16 NOTE — PLAN OF CARE
Problem: Respiratory Compromise COPD (Chronic Obstructive Pulmonary Disease)  Goal: Effective Oxygenation and Ventilation  Outcome: Ongoing (interventions implemented as appropriate)  Pt had a breathing treatment & right after it, she had  Large increase in her dyspnea. Breath sounds very muffled & pt was using accessory muscles to breath. Obviously struggling. Turned O2 up to 5, called respiratory tech to come back up & called team MD on call. Received new orders for Solu-Medrol & Ativan/ Pt felt a lot better after that.

## 2019-01-16 NOTE — SUBJECTIVE & OBJECTIVE
Past Medical History:   Diagnosis Date    Cataract     COPD (chronic obstructive pulmonary disease)     History of retinal hemorrhage        Past Surgical History:   Procedure Laterality Date    HAND SURGERY         Review of patient's allergies indicates:  No Known Allergies    Family History     Problem Relation (Age of Onset)    Cancer Mother    Macular degeneration Mother    Stroke Father        Tobacco Use    Smoking status: Former Smoker     Packs/day: 1.00     Years: 36.00     Pack years: 36.00     Types: Cigarettes     Last attempt to quit: 2016     Years since quittin.3    Smokeless tobacco: Never Used   Substance and Sexual Activity    Alcohol use: Yes     Alcohol/week: 1.2 oz     Types: 2 Glasses of wine per week     Comment: 1-2 glasses a day     Drug use: No    Sexual activity: Not on file         Review of Systems   Constitutional: Positive for activity change and appetite change. Negative for fever and unexpected weight change.   HENT: Negative for rhinorrhea.    Respiratory: Positive for cough, shortness of breath and wheezing.    Cardiovascular: Negative for chest pain and leg swelling.   Gastrointestinal: Negative for abdominal distention, nausea and vomiting.        No GERD   Genitourinary: Negative for difficulty urinating and hematuria.   Skin: Negative for rash and wound.   Neurological:        Tremors from albuterol     Objective:     Vital Signs (Most Recent):  Temp: 97.5 °F (36.4 °C) (19)  Pulse: 97 (19)  Resp: 18 (19)  BP: 125/66 (19)  SpO2: (!) 89 % (19) Vital Signs (24h Range):  Temp:  [97.5 °F (36.4 °C)-97.9 °F (36.6 °C)] 97.5 °F (36.4 °C)  Pulse:  [] 97  Resp:  [16-20] 18  SpO2:  [89 %-98 %] 89 %  BP: (125-147)/(66-82) 125/66     Weight: 60.4 kg (133 lb 2.5 oz)  Body mass index is 20.25 kg/m².      Intake/Output Summary (Last 24 hours) at 2019 0854  Last data filed at 1/15/2019 1758  Gross per 24 hour    Intake 450 ml   Output 0 ml   Net 450 ml       Physical Exam   Constitutional: She appears well-developed and well-nourished.   Frail-appearing elderly woman sitting up in bed in NAD   HENT:   Head: Normocephalic and atraumatic.   Mallampati 4   Eyes: No scleral icterus.   Neck: Neck supple.   Cardiovascular: Normal rate and regular rhythm.   No murmur heard.  Pulmonary/Chest: Effort normal.   Mild tachypnea, able to speak in full sentences. Moving minimal air, but no wheezing. Left basilar rhales. 3L O2   Abdominal: Soft. She exhibits no distension.   Lymphadenopathy:     She has no cervical adenopathy.   Skin: Skin is warm and dry. No rash noted.   Vitals reviewed.      O2:  Oxygen Concentration (%): 28 (01/14/19 1245)    Lines/Drains/Airways     Peripheral Intravenous Line                 Peripheral IV - Single Lumen 01/16/19 0100 Anterior;Right Forearm less than 1 day                Significant Labs:    CBC/Anemia Profile:  Recent Labs   Lab 01/14/19  1249 01/15/19  0937 01/16/19  0612   WBC 9.65 8.58 5.79   HGB 14.5 12.9 12.9   HCT 46.4 41.6 39.7    306 296   * 98 96   RDW 12.6 12.6 12.6        Chemistries:  Recent Labs   Lab 01/14/19  1249 01/15/19  0937 01/16/19  0612   * 137 136   K 4.1 4.0 4.0   CL 95 97 99   CO2 31* 36* 32*   BUN 12 13 13   CREATININE 0.6 0.7 0.6   CALCIUM 10.1 9.8 9.2   ALBUMIN 4.1  --   --    PROT 7.4  --   --    BILITOT 0.5  --   --    ALKPHOS 95  --   --    ALT 29  --   --    AST 26  --   --    MG  --  1.9  --      arterial blood gas from admission: 7.36/58/74/33, 94% on 3L    review of BMPs indicates metabolic alkalosis since late December    No respiratory cultures on file    All pertinent labs within the past 24 hours have been reviewed.    Significant Imaging:   CT: I have reviewed all pertinent results/findings within the past 24 hours and my personal findings are:  Emphysematous changes, no focal opacities. Airway thickening. Small hilar and mediastinal  calcifications, but no large adenopathy. Apical scars. Lingular peripheral nodule.  CXR: I have reviewed all pertinent results/findings within the past 24 hours and my personal findings are:  no significant changes since 1/14, flattened hemidiaphragms, no opacities     Echo 7/2018: normal LV & LA size, LVEF 60-65% and normal diastolic function. Enlarged RV with normal function, enlarged RA. TAPSE 2.3cm. Estimated PASP 45mm Hg.    FVC 2.53 (79%)  FEV1  0.97 (39%)  FEV1/FVC 38%

## 2019-01-16 NOTE — ASSESSMENT & PLAN NOTE
COPD with acute exacerbation- GOLD D. FEV1 0.97 (39% predicted). She has had significant decline in functional status over the past few months with difficult to resolve exacerbations which have likely contributed to worsening of her baseline lung function. Of concern is that she has a chronic infection that has failed to be treated with a traditional regimen of antibiotics or chronic aspiration given secretions seen on CT in her large airways.   -Recommend obtaining RVP & sputum cultures. Sputum induction ordered given her inability to expectorate sputum.  -Con't guaifenesin.  -Con't prednisone and azithromycin to complete a 5-day course. Given her failure to improve in the past, she may require a longer than traditional course of prednisone.  -Con't scheduled nebulizers. It is unclear why she struggled with one episode of a Duoneb unless she mucus plugged while mobilizing secretions.   -Recommend swallow evaluation to evaluate for silent aspiration.  -Con't LABA, LAMA, ICS at discharge.

## 2019-01-16 NOTE — NURSING
01/16/19- 0045- Pt called & stated that she just received a breathing treatment & she feels unable to breathe. Breath sounds diminished & tight throughout. Pt states that they changed her treatment & it was a different medicine. Called Resp tech to come back up, Turned O2 up to 5 LPM. Placed call to Team. O2 sat was 94 on 5 liters, but pt was using a lot of effort to breathe. Talker to Dr Garner . New orders received.

## 2019-01-16 NOTE — ASSESSMENT & PLAN NOTE
Acute on chronic respiratory failure due to COPD exacerbation. On 2L HOT.  -Goal SpO2 88-92%  -titrate down O2 as able

## 2019-01-16 NOTE — CONSULTS
[facility]  Pulmonology  Consult Note    Patient Name: Estefani Fam  MRN: [id number]  Admission Date: 1/14/2019  Hospital Length of Stay: 0 days  Code Status: Full Code  Attending Physician: Kyle Ansari MD  Primary Care Provider: Juan Truong MD   Principal Problem: COPD exacerbation    Consults  Subjective:     HPI:  Ms. Fam is a 69 y/o woman admitted with acute on chronic hypercapneic and hypoxic respiratory failure due to COPD. She has had multiple recent admissions, 3 since July. She has had COPD for a few years, but prior to July had never required hospitalization. She has been on 2L HOT for a few months since a recent hospitalization and reports compliance with her LABA, LAMA, ICS regimen at home. She also uses duonebs at home 2-4 times daily, more recently. She has been trying to attend pulmonary rehab as an outpatient but has not been well enough to start. She reports that she feels like her symptoms are unresolved after each exacerbation. Per review of her records, her last 2 exacerbations in November and early January have been treated with prednisone for 5 days and levofloxacin. In July she received prednisone but no antibiotics. In November LAMA was added to her LABA-ICS regimen. She reports dyspnea as soon as she begins to walk and poor PO intake due to dyspnea with eating. She denies fevers, chills, sweats, weight loss despite poor PO intake, and exposure to sick contacts. Her sputum is thick and yellow, which she does not have at baseline, but her cough is mild and at her baseline. She quit smoking about 3 years ago after 1 ppd x 45 years. She is UTD on flu and pneumonia vaccines.    Past Medical History:   Diagnosis Date    Cataract     COPD (chronic obstructive pulmonary disease)     History of retinal hemorrhage        Past Surgical History:   Procedure Laterality Date    HAND SURGERY         Review of patient's allergies indicates:  No Known Allergies    Family  History     Problem Relation (Age of Onset)    Cancer Mother    Macular degeneration Mother    Stroke Father        Tobacco Use    Smoking status: Former Smoker     Packs/day: 1.00     Years: 36.00     Pack years: 36.00     Types: Cigarettes     Last attempt to quit: 2016     Years since quittin.3    Smokeless tobacco: Never Used   Substance and Sexual Activity    Alcohol use: Yes     Alcohol/week: 1.2 oz     Types: 2 Glasses of wine per week     Comment: 1-2 glasses a day     Drug use: No    Sexual activity: Not on file         Review of Systems   Constitutional: Positive for activity change and appetite change. Negative for fever and unexpected weight change.   HENT: Negative for rhinorrhea.    Respiratory: Positive for cough, shortness of breath and wheezing.    Cardiovascular: Negative for chest pain and leg swelling.   Gastrointestinal: Negative for abdominal distention, nausea and vomiting.        No GERD   Genitourinary: Negative for difficulty urinating and hematuria.   Skin: Negative for rash and wound.   Neurological:        Tremors from albuterol     Objective:     Vital Signs (Most Recent):  Temp: 97.5 °F (36.4 °C) (19 08)  Pulse: 97 (19 08)  Resp: 18 (19 08)  BP: 125/66 (19 08)  SpO2: (!) 89 % (19 08) Vital Signs (24h Range):  Temp:  [97.5 °F (36.4 °C)-97.9 °F (36.6 °C)] 97.5 °F (36.4 °C)  Pulse:  [] 97  Resp:  [16-20] 18  SpO2:  [89 %-98 %] 89 %  BP: (125-147)/(66-82) 125/66     Weight: 60.4 kg (133 lb 2.5 oz)  Body mass index is 20.25 kg/m².      Intake/Output Summary (Last 24 hours) at 2019 0845  Last data filed at 1/15/2019 1758  Gross per 24 hour   Intake 450 ml   Output 0 ml   Net 450 ml       Physical Exam   Constitutional: She appears well-developed and well-nourished.   Frail-appearing elderly woman sitting up in bed in NAD   HENT:   Head: Normocephalic and atraumatic.   Mallampati 4   Eyes: No scleral icterus.   Neck: Neck  supple.   Cardiovascular: Normal rate and regular rhythm.   No murmur heard.  Pulmonary/Chest: Effort normal.   Mild tachypnea, able to speak in full sentences. Moving minimal air, but no wheezing. Left basilar rhales. 3L O2   Abdominal: Soft. She exhibits no distension.   Lymphadenopathy:     She has no cervical adenopathy.   Skin: Skin is warm and dry. No rash noted.   Vitals reviewed.      O2:  Oxygen Concentration (%): 28 (01/14/19 1245)    Lines/Drains/Airways     Peripheral Intravenous Line                 Peripheral IV - Single Lumen 01/16/19 0100 Anterior;Right Forearm less than 1 day                Significant Labs:    CBC/Anemia Profile:  Recent Labs   Lab 01/14/19  1249 01/15/19  0937 01/16/19  0612   WBC 9.65 8.58 5.79   HGB 14.5 12.9 12.9   HCT 46.4 41.6 39.7    306 296   * 98 96   RDW 12.6 12.6 12.6        Chemistries:  Recent Labs   Lab 01/14/19  1249 01/15/19  0937 01/16/19  0612   * 137 136   K 4.1 4.0 4.0   CL 95 97 99   CO2 31* 36* 32*   BUN 12 13 13   CREATININE 0.6 0.7 0.6   CALCIUM 10.1 9.8 9.2   ALBUMIN 4.1  --   --    PROT 7.4  --   --    BILITOT 0.5  --   --    ALKPHOS 95  --   --    ALT 29  --   --    AST 26  --   --    MG  --  1.9  --      arterial blood gas from admission: 7.36/58/74/33, 94% on 3L    review of BMPs indicates metabolic alkalosis since late December    No respiratory cultures on file    All pertinent labs within the past 24 hours have been reviewed.    Significant Imaging:   CT: I have reviewed all pertinent results/findings within the past 24 hours and my personal findings are:  Emphysematous changes, no focal opacities. Airway thickening. Small hilar and mediastinal calcifications, but no large adenopathy. Apical scars. Lingular peripheral nodule.  CXR: I have reviewed all pertinent results/findings within the past 24 hours and my personal findings are:  no significant changes since 1/14, flattened hemidiaphragms, no opacities     Echo 7/2018: normal  LV & LA size, LVEF 60-65% and normal diastolic function. Enlarged RV with normal function, enlarged RA. TAPSE 2.3cm. Estimated PASP 45mm Hg.    FVC 2.53 (79%)  FEV1  0.97 (39%)  FEV1/FVC 38%      Assessment/Plan:     * COPD exacerbation    COPD with acute exacerbation- GOLD D. FEV1 0.97 (39% predicted). She has had significant decline in functional status over the past few months with difficult to resolve exacerbations which have likely contributed to worsening of her baseline lung function. Of concern is that she has a chronic infection that has failed to be treated with a traditional regimen of antibiotics or chronic aspiration given secretions seen on CT in her large airways.   -Recommend obtaining RVP & sputum cultures. Sputum induction ordered given her inability to expectorate sputum.  -Con't guaifenesin.  -Con't prednisone and azithromycin to complete a 5-day course. Given her failure to improve in the past, she may require a longer than traditional course of prednisone.  -Con't scheduled nebulizers. It is unclear why she struggled with one episode of a Duoneb unless she mucus plugged while mobilizing secretions.   -Recommend swallow evaluation to evaluate for silent aspiration.  -Con't LABA, LAMA, ICS at discharge.     Acute on chronic respiratory failure with hypoxia and hypercapnia    Acute on chronic respiratory failure due to COPD exacerbation. On 2L HOT.  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  -No PH-specific therapies indicated   -optimize COPD management           Thank you for your consult. I will follow-up with patient. Please contact us if you have any additional questions.     Libby Richards, DO  Pulmonology  [facility]

## 2019-01-16 NOTE — PT/OT/SLP EVAL
"Speech Language Pathology Evaluation  Bedside Swallow    Patient Name:  Estefani Fam   MRN:  [id number]   [id number] A    Admitting Diagnosis: COPD exacerbation    Recommendations:                 General Recommendations:  Modified barium swallow study  Diet recommendations:  Regular, Thin   Aspiration Precautions:  · Fully awake and alert for PO intake  · Fully upright position for PO intake  · Small bites/ sips  · Slow rate of eating/ drinking  · 1 bite/ sip @ a time  · Refrain from talking prior to swallow completion   · Discontinue PO upon: coughing, throat clearing, choking, wet vocal quality, wet breath sounds, watery eyes, reddened facial features  General Precautions: Standard, fall, respiratory    History:     Past Medical History:   Diagnosis Date    Cataract     COPD (chronic obstructive pulmonary disease)     History of retinal hemorrhage      Past Surgical History:   Procedure Laterality Date    HAND SURGERY       Intubation: Per review of pt's medical chart, pt without hx of intubation.     CT Chest: Per review of pt's medical chart, results of CT chest taken 1/14/19 remarkable for the following:  "small volume of endobronchial material within the distal trachea and bilateral main bronchi, left greater than right. Findings may relate to retained mucous secretions, bronchitis, or aspiration."     Chest Xray: Per review of pt's medical chart, results of chest xray taken this service date unremarkable for concern for aspiration.     Prior diet: Per pt, regular consistency solids and thin liquids. Per review of pt's medical chart, medical team ordered pt regular consistency solid diet and thin liquids prior to initiation of this evaluation.     Subjective     "Sometimes it does feel like something is coming back up."     Pain/Comfort:  · Pain Rating 1: 0/10  · Pain Rating Post-Intervention 1: 0/10    Objective:     Oral Musculature Evaluation  · Oral Musculature: WFL  · Dentition: present and " "adequate  · Secretion Management: adequate  · Mucosal Quality: good  · Mandibular Strength and Mobility: WFL  · Oral Labial Strength and Mobility: WFL  · Lingual Strength and Mobility: WFL  · Velar Elevation: WFL  · Buccal Strength and Mobility: WFL  · Volitional Cough: WFL  · Volitional Swallow: WFL  · Voice Prior to PO Intake: Clear, dry     Bedside Swallow Eval:   Consistencies Assessed:  · Thin water- ~6oz via multiple cup and straw sips in isolation and as regular consistency solid liquid wash  · Regular consistency solid henrry cracker- 1 cracker via bites x~4    Oral Phase:   · Appeared WFL    Pharyngeal Phase:   · Spontaneous dry coughing/ throat clearing intermittently noted prior to initiation of PO trials, appeared likely 2/2 underlying medical dx of COPD and pt with "recurrent..dry cough."   · Dry coughing/ throat clearing intermittently appreciated between PO trials. However appeared likely to be additional spontaneous coughing (vs overt clinical sign aspiration), given inc'd frequency of occurrence compared to that noted prior to initiation of PO trials unappreciated and quality remained dry   · No additional overt clinical signs aspiration observed     Treatment:   Prior to initiation of evaluation, review of pt's medical chart remarkable for this hospitalization as pt's 4th admit since July 2018 for COPD exacerbations. Additionally, chest xrays taken during prior admits unremarkable for concern for aspiration. However chest CTs unappreciated during prior admits. Although chest xray taken this hospitalization unremarkable for concern for aspiration, results of chest CT include such concern as potential differential dx.     Pt awake and alert upon entry, sitting fully upright on EOB.  and son present. Education provided re: role of SLP, definition/ risk/ overt clinical signs aspiration, aspiration precautions listed above, and SLP POC pending results of conversation with Dr. Ansari. Pt and " "family verbalized understanding of all education provided and agreement with SLP POC. No further questions.     Results discussed with Dr. Ansari, including concern for reflux given pt's report of "sometimes it does feel like something is coming back up," and pt without hx of intubation and/ or neurological injury potentially concerning for inc'd risk for aspiration. However verbal order received for completion of MBSS to rule out silent aspiration contributing to pt's SOB given results of chest CT as documented above and this as pt's 4th hospitalization since July.     Clinician returned to pt's bedside to provide education re: updated SLP POC and MBSS. Understanding of all education provided and agreement with SLP POC again verbalized. No further questions.     Assessment:     Estefani Fam is a 68 y.o. female with an SLP diagnosis of concern for aspiration per results of chest CT taken 1/14/19.     Goals:   Multidisciplinary Problems     SLP Goals        Problem: SLP Goal    Goal Priority Disciplines Outcome   SLP Goal     SLP Ongoing (interventions implemented as appropriate)   Description:  Speech Language Pathology  Goals expected to be met by 1/23:  1. Pt will participate in MBSS to determine safest, least restrictive diet.                   Plan:     · Patient to be seen:  4 x/week   · Plan of Care expires:  02/14/19  · Plan of Care reviewed with:  patient, spouse, son   · SLP Follow-Up:  Yes       Discharge recommendations:  other (see comments)(Pending progress)     Time Tracking:     SLP Treatment Date:   01/16/19  Speech Start Time:  1152  Speech Stop Time:  1217     Speech Total Time (min):  25 min    Billable Minutes: Eval Swallow and Oral Function 25    ALAN Hummel, CCC-SLP  [phone]  1/16/2019             "

## 2019-01-16 NOTE — PROGRESS NOTES
[facility] Hospital Medicine                                                                     Progress Note     Team: [facility]OS[facility] MED R Kyle Ansari MD   Admit Date: 1/14/2019   Hospital Day: 0  RENARD:  1/17/19  Code status: Full Code   Principal Problem: COPD exacerbation     SUMMARY:     68 F with a hx of COPD (on continuous home O2) on BREO and ellipta, former smoker with a 50 PPY history, quit 3 years ago presents with progressively worsening dyspnea and non productive cough. This is her fourth admission in the past 6 months, always presenting with similar symptoms which include shortness of breath, overall fatigue, dry cough and decreased energy while performing ADLs. She denies any fever, chills, recent sick contacts, recent travel. She was discharged from [facility] on 01/1/2019. Follows up with Dr. Harris in Pulmonology clinic. Acute on chronic hypoxic hypercapnic respiratory failure secondary to COPD exacerbation.     SUBJECTIVE:     Pt was seen and examined at bedside. Apparently had dyspnea overnight post breathing tx. Had to turn O2 to 5L to maintain saturations. Remained HD stable and afebrile. This AM patient in good spirit, and back to her home O2 lvl. Stated she didn't want duonebs and prefers the levalbuterol as this has happened to her in the past. Overall her dyspnea as improving since admission. No nasea or emesis today. Family at bedside updated.     ROS (Positive in Bold, otherwise negative)  Pain Scale: 0 /10   Constitutional: fever, chills, night sweats  CV: chest pain, edema, palpitations  Resp: SOB, cough, sputum production  GI: changes in appetite, nausea, VDC, pain, melena, hematochezia, GERD, hematemesis  : Dysuria, hematuria, urinary urgency, frequency  MSK: arthralgia/myalgia, joint swelling  SKIN: rashes, pruritis, petechiae   Neuro/Psych:  FND, anxiety, depression    OBJECTIVE:     Vitals:  Temp:  [97.5 °F (36.4 °C)-97.9 °F (36.6 °C)]   Pulse:  []   Resp:  [16-20]   BP: (125-142)/(66-82)   SpO2:  [89 %-98 %]      I & O (Last 24H):     Intake/Output Summary (Last 24 hours) at 2019 1433  Last data filed at 1/15/2019 1758  Gross per 24 hour   Intake 450 ml   Output 0 ml   Net 450 ml       GEN:  female  in no acute distress. Nontoxic. Resting in bed. Cooperative.  HEENT: NCAT. PERRL. EOMI. Conjunctivae/corneas clear, sclera Anicteric.  CVS: RRR. Normal s1 s2 no murmur, click, rub or gallop  LUNG: CTAB. Normal respiratory effort.  BS throughout. No wheeze rhochi rales noted. 2L NC.   ABD: Normoactive BS, soft, NT, ND, no masses or organomegaly.  EXT: No edema. No cyanosis. Full ROM.  SKIN: color, texture, turgor normal. No rashes or lesions. No clubbing.  NEURO: Alert, oriented x 4, Spont mvt of all extremities with no focal deficits noted.      All recent labs and imaging has been reviewed.     Recent Results (from the past 24 hour(s))   CBC auto differential    Collection Time: 19  6:12 AM   Result Value Ref Range    WBC 5.79 3.90 - 12.70 K/uL    RBC 4.15 4.00 - 5.40 M/uL    Hemoglobin 12.9 12.0 - 16.0 g/dL    Hematocrit 39.7 37.0 - 48.5 %    MCV 96 82 - 98 fL    MCH 31.1 (H) 27.0 - 31.0 pg    MCHC 32.5 32.0 - 36.0 g/dL    RDW 12.6 11.5 - 14.5 %    Platelets 296 150 - 350 K/uL    MPV 9.3 9.2 - 12.9 fL    Immature Granulocytes 0.7 (H) 0.0 - 0.5 %    Gran # (ANC) 5.1 1.8 - 7.7 K/uL    Immature Grans (Abs) 0.04 0.00 - 0.04 K/uL    Lymph # 0.4 (L) 1.0 - 4.8 K/uL    Mono # 0.2 (L) 0.3 - 1.0 K/uL    Eos # 0.0 0.0 - 0.5 K/uL    Baso # 0.02 0.00 - 0.20 K/uL    nRBC 0 0 /100 WBC    Gran% 88.6 (H) 38.0 - 73.0 %    Lymph% 6.6 (L) 18.0 - 48.0 %    Mono% 3.5 (L) 4.0 - 15.0 %    Eosinophil% 0.3 0.0 - 8.0 %    Basophil% 0.3 0.0 - 1.9 %    Differential Method Automated    Basic metabolic panel    Collection Time: 19  6:12 AM    Result Value Ref Range    Sodium 136 136 - 145 mmol/L    Potassium 4.0 3.5 - 5.1 mmol/L    Chloride 99 95 - 110 mmol/L    CO2 32 (H) 23 - 29 mmol/L    Glucose 116 (H) 70 - 110 mg/dL    BUN, Bld 13 8 - 23 mg/dL    Creatinine 0.6 0.5 - 1.4 mg/dL    Calcium 9.2 8.7 - 10.5 mg/dL    Anion Gap 5 (L) 8 - 16 mmol/L    eGFR if African American >60.0 >60 mL/min/1.73 m^2    eGFR if non African American >60.0 >60 mL/min/1.73 m^2       No results for input(s): POCTGLUCOSE in the last 168 hours.    Hemoglobin A1C   Date Value Ref Range Status   01/14/2019 5.6 4.0 - 5.6 % Final     Comment:     ADA Screening Guidelines:  5.7-6.4%  Consistent with prediabetes  >or=6.5%  Consistent with diabetes  High levels of fetal hemoglobin interfere with the HbA1C  assay. Heterozygous hemoglobin variants (HbS, HgC, etc)do  not significantly interfere with this assay.   However, presence of multiple variants may affect accuracy.     12/28/2018 5.4 4.0 - 5.6 % Final     Comment:     ADA Screening Guidelines:  5.7-6.4%  Consistent with prediabetes  >or=6.5%  Consistent with diabetes  High levels of fetal hemoglobin interfere with the HbA1C  assay. Heterozygous hemoglobin variants (HbS, HgC, etc)do  not significantly interfere with this assay.   However, presence of multiple variants may affect accuracy.     07/02/2018 5.6 4.0 - 5.6 % Final     Comment:     ADA Screening Guidelines:  5.7-6.4%  Consistent with prediabetes  >or=6.5%  Consistent with diabetes  High levels of fetal hemoglobin interfere with the HbA1C  assay. Heterozygous hemoglobin variants (HbS, HgC, etc)do  not significantly interfere with this assay.   However, presence of multiple variants may affect accuracy.          Active Hospital Problems    Diagnosis  POA    *COPD exacerbation [J44.1]  Yes    Acute on chronic respiratory failure with hypoxia and hypercapnia [J96.21, J96.22]  Unknown    Shortness of breath [R06.02]  Yes    Pulmonary HTN [I27.20]  Yes      Resolved  Hospital Problems   No resolved problems to display.        ASSESSMENT AND PLAN:     Acute on chronic hypoxic hypercapnic respiratory failure secondary to COPD exacerbation  Presented now for the 4th time since July 2018 for COPD exacerbation. CXR unremarkable.   - Remains HD stable and afebrile   - Gradually improving since admission  - Continue around the clock levalbuterol neb, patient refused duo-nebs  - Continue ICS / LABA / LAMA  -  to bring him her home inhalers tomorrow to verify   - Prednisone PO, needs a longer course this time  - Azithromycin 500 mg PO for 5 days  - Continue guaifenesin   - CT chest done - no overt consolidations, secretions in trachea, multiple small pulmonary nodules (needs 12 month repeat CT)  - SLP to r/o aspiration, MBSS pending     - Continue O2 therapy, currently back on HOT, goal oxygen 88-92%  - Pulmonary consulted for further mgmt since this is her 4th visit   - Appreciate recs    - RVP and sputum cultures  - Pulmonary rehab when able on d/c with close pulmonary follow up with Dr. Harris   - Cont to avoid triggers for future exacerbation (smoke, mold, allergens etc)      Prophylaxis- Lovenox    Code Status- Full Code     Discharge plan and follow up - d/c home once medically stable    Kyle Ansari MD  Hospital Medicine Staff  Pager [phone]

## 2019-01-16 NOTE — CONSULTS
Consult received. Please collect RVP & respiratory cultures that have been ordered. Full consult note to follow.    Libby Richrads 01/16/2019 6:35 AM  [employer] Pulmonary [facility] Critical Care Fellow

## 2019-01-16 NOTE — PROGRESS NOTES
[facility] Hospital Medicine                                                                     Progress Note     Team: [facility] [place] HOSP MED R Kyle Ansari MD   Admit Date: 1/14/2019   Hospital Day: 0  RENARD:  1/17/19  Code status: Full Code   Principal Problem: COPD exacerbation     SUMMARY:     68 F with a PMH of COPD (on continuous home O2) on BREO and ellipta, former smoker with a 50 PPY history, quit 3 years ago presents with recurrent shortness of breath and dry cough. This is her fourth admission in the past 6 months, always presenting with similar symptoms which include shortness of breath, overall fatigue, dry cough and decreased energy while performing ADLs. She denies any fever, chills, recent sick contacts, recent travel. She was discharged from [facility] on 01/1/2019. Follows up with Dr. Harris in Pulmonology clinic. Admitted for COPD exacerbation.     SUBJECTIVE:     Pt was seen and examined at bedside. Pt had no acute events overnight, and no new complaints this morning. Pt remained hemodynamically stable and afebrile. Dyspnea improving. Continues to have nausea no emesis. Pt denies any fevers, chills, malaise, headaches, chest pain. Family at bedside updated.    ROS (Positive in Bold, otherwise negative)  Pain Scale: 0 /10   Constitutional: fever, chills, night sweats  CV: chest pain, edema, palpitations  Resp: SOB, cough, sputum production  GI: changes in appetite, Nausea, VDC, pain, melena, hematochezia, GERD, hematemesis  : Dysuria, hematuria, urinary urgency, frequency  MSK: arthralgia/myalgia, joint swelling  SKIN: rashes, pruritis, petechiae   Neuro/Psych: FND, anxiety, depression    OBJECTIVE:     Vitals:  Temp:  [97.5 °F (36.4 °C)-98.1 °F (36.7 °C)]   Pulse:  []   Resp:  [16-20]   BP: (112-147)/(56-81)   SpO2:  [91 %-99 %]      I & O (Last 24H):      Intake/Output Summary (Last 24 hours) at 1/15/2019 2132  Last data filed at 1/15/2019 1758  Gross per 24 hour   Intake 450 ml   Output 0 ml   Net 450 ml         GEN:  female  in no acute distress. Nontoxic. Resting in bed. Cooperative.  HEENT: NCAT. PERRL. EOMI. Conjunctivae/corneas clear, sclera Anicteric.  CVS: Tachy RR. Normal s1 s2 no murmur, click, rub or gallop  LUNG: CTAB. Normal respiratory effort.  BS throughout. Mild wheeze and rales noted. No rhonchi. 2L NC.   ABD: Normoactive BS, soft, NT, ND, no masses or organomegaly.  EXT: No edema. No cyanosis. Full ROM.  SKIN: color, texture, turgor normal. No rashes or lesions. No clubbing.  NEURO: Alert, oriented x 4, Spont mvt of all extremities with no focal deficits noted.      All recent labs and imaging has been reviewed.     Recent Results (from the past 24 hour(s))   ISTAT PROCEDURE    Collection Time: 19 10:47 PM   Result Value Ref Range    POC PH 7.364 7.35 - 7.45    POC PCO2 57.8 (HH) 35 - 45 mmHg    POC PO2 74 (L) 80 - 100 mmHg    POC HCO3 32.9 (H) 24 - 28 mmol/L    POC BE 8 -2 to 2 mmol/L    POC SATURATED O2 94 (L) 95 - 100 %    POC TCO2 35 (H) 23 - 27 mmol/L    Sample ARTERIAL     Site RR     Allens Test Pass     DelSys Nasal Can     Mode SPONT     Flow 3    CBC auto differential    Collection Time: 01/15/19  9:37 AM   Result Value Ref Range    WBC 8.58 3.90 - 12.70 K/uL    RBC 4.25 4.00 - 5.40 M/uL    Hemoglobin 12.9 12.0 - 16.0 g/dL    Hematocrit 41.6 37.0 - 48.5 %    MCV 98 82 - 98 fL    MCH 30.4 27.0 - 31.0 pg    MCHC 31.0 (L) 32.0 - 36.0 g/dL    RDW 12.6 11.5 - 14.5 %    Platelets 306 150 - 350 K/uL    MPV 9.3 9.2 - 12.9 fL    Immature Granulocytes 0.8 (H) 0.0 - 0.5 %    Gran # (ANC) 5.9 1.8 - 7.7 K/uL    Immature Grans (Abs) 0.07 (H) 0.00 - 0.04 K/uL    Lymph # 1.2 1.0 - 4.8 K/uL    Mono # 1.3 (H) 0.3 - 1.0 K/uL    Eos # 0.1 0.0 - 0.5 K/uL    Baso # 0.03 0.00 - 0.20 K/uL    nRBC 0 0 /100 WBC    Gran% 69.0 38.0 - 73.0 %     Lymph% 13.5 (L) 18.0 - 48.0 %    Mono% 15.5 (H) 4.0 - 15.0 %    Eosinophil% 0.9 0.0 - 8.0 %    Basophil% 0.3 0.0 - 1.9 %    Differential Method Automated    Basic metabolic panel    Collection Time: 01/15/19  9:37 AM   Result Value Ref Range    Sodium 137 136 - 145 mmol/L    Potassium 4.0 3.5 - 5.1 mmol/L    Chloride 97 95 - 110 mmol/L    CO2 36 (H) 23 - 29 mmol/L    Glucose 86 70 - 110 mg/dL    BUN, Bld 13 8 - 23 mg/dL    Creatinine 0.7 0.5 - 1.4 mg/dL    Calcium 9.8 8.7 - 10.5 mg/dL    Anion Gap 4 (L) 8 - 16 mmol/L    eGFR if African American >60.0 >60 mL/min/1.73 m^2    eGFR if non African American >60.0 >60 mL/min/1.73 m^2   Magnesium    Collection Time: 01/15/19  9:37 AM   Result Value Ref Range    Magnesium 1.9 1.6 - 2.6 mg/dL       No results for input(s): POCTGLUCOSE in the last 168 hours.    Hemoglobin A1C   Date Value Ref Range Status   01/14/2019 5.6 4.0 - 5.6 % Final     Comment:     ADA Screening Guidelines:  5.7-6.4%  Consistent with prediabetes  >or=6.5%  Consistent with diabetes  High levels of fetal hemoglobin interfere with the HbA1C  assay. Heterozygous hemoglobin variants (HbS, HgC, etc)do  not significantly interfere with this assay.   However, presence of multiple variants may affect accuracy.     12/28/2018 5.4 4.0 - 5.6 % Final     Comment:     ADA Screening Guidelines:  5.7-6.4%  Consistent with prediabetes  >or=6.5%  Consistent with diabetes  High levels of fetal hemoglobin interfere with the HbA1C  assay. Heterozygous hemoglobin variants (HbS, HgC, etc)do  not significantly interfere with this assay.   However, presence of multiple variants may affect accuracy.     07/02/2018 5.6 4.0 - 5.6 % Final     Comment:     ADA Screening Guidelines:  5.7-6.4%  Consistent with prediabetes  >or=6.5%  Consistent with diabetes  High levels of fetal hemoglobin interfere with the HbA1C  assay. Heterozygous hemoglobin variants (HbS, HgC, etc)do  not significantly interfere with this assay.   However,  presence of multiple variants may affect accuracy.          Active Hospital Problems    Diagnosis  POA    *COPD exacerbation [J44.1]  Yes    Shortness of breath [R06.02]  Yes    Pulmonary HTN [I27.20]  Yes      Resolved Hospital Problems   No resolved problems to display.        ASSESSMENT AND PLAN:     COPD Exacerbation  Presented now for the 4th time since July 2018 for COPD exacerbation. CXR unremarkable. Gradually improving since admission.   - Continue around the clock duo-nebs  - ICS / LABA  - Prednisone PO, may need a longer course this time  - Azithromycin 500 mg PO for 3 days  - CT chest done - no overt consolidations, secretions in trachea, multiple small pulmonary nodules (needs 12 month repeat CT)  - SLP to r/o aspiration     - Continue O2 therapy, currently back on HOT  - Pulmonary consulted for further mgmt since this is her 4th visit  - Pulmonary rehab on d/c with close pulmonary follow up  - Cont to avoid triggers for exacerbation (smoke, mold, allergens etc)      Prophylaxis- Lovenox    Code Status- Full Code     Discharge plan and follow up - d/c home once medically stable    Kyle Ansari MD  Hospital Medicine Staff  Pager [phone]

## 2019-01-16 NOTE — ASSESSMENT & PLAN NOTE
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  -No PH-specific therapies indicated   -optimize COPD management

## 2019-01-16 NOTE — HPI
Ms. Fam is a 67 y/o woman admitted with acute on chronic hypercapneic and hypoxic respiratory failure due to COPD. She has had multiple recent admissions, 3 since July. She has had COPD for a few years, but prior to July had never required hospitalization. She has been on 2L HOT for a few months since a recent hospitalization and reports compliance with her LABA, LAMA, ICS regimen at home. She also uses duonebs at home 2-4 times daily, more recently. She has been trying to attend pulmonary rehab as an outpatient but has not been well enough to start. She reports that she feels like her symptoms are unresolved after each exacerbation. Per review of her records, her last 2 exacerbations in November and early January have been treated with prednisone for 5 days and levofloxacin. In July she received prednisone but no antibiotics. In November LAMA was added to her LABA-ICS regimen. She reports dyspnea as soon as she begins to walk and poor PO intake due to dyspnea with eating. She denies fevers, chills, sweats, weight loss despite poor PO intake, and exposure to sick contacts. Her sputum is thick and yellow, which she does not have at baseline, but her cough is mild and at her baseline. She quit smoking about 3 years ago after 1 ppd x 45 years. She is UTD on flu and pneumonia vaccines.

## 2019-01-16 NOTE — PLAN OF CARE
Problem: SLP Goal  Goal: SLP Goal  Outcome: Ongoing (interventions implemented as appropriate)  Clinical evaluation of swallow complete. Recommend ongoing regular consistency solids and thin liquids. Per verbal order received from Dr. Ansari, MBSS scheduled for completion tomorrow, 1/17/19, to rule out silent aspiration.     ALAN Hummel, CCC-SLP  [phone]  1/16/2019

## 2019-01-17 LAB
ANION GAP SERPL CALC-SCNC: 7 MMOL/L
BASOPHILS # BLD AUTO: 0.03 K/UL
BASOPHILS NFR BLD: 0.4 %
BUN SERPL-MCNC: 18 MG/DL
CALCIUM SERPL-MCNC: 9 MG/DL
CHLORIDE SERPL-SCNC: 101 MMOL/L
CO2 SERPL-SCNC: 29 MMOL/L
CREAT SERPL-MCNC: 0.6 MG/DL
DIFFERENTIAL METHOD: ABNORMAL
EOSINOPHIL # BLD AUTO: 0.1 K/UL
EOSINOPHIL NFR BLD: 1.5 %
ERYTHROCYTE [DISTWIDTH] IN BLOOD BY AUTOMATED COUNT: 12.7 %
EST. GFR  (AFRICAN AMERICAN): >60 ML/MIN/1.73 M^2
EST. GFR  (NON AFRICAN AMERICAN): >60 ML/MIN/1.73 M^2
GLUCOSE SERPL-MCNC: 87 MG/DL
HCT VFR BLD AUTO: 37.8 %
HGB BLD-MCNC: 12 G/DL
IMM GRANULOCYTES # BLD AUTO: 0.04 K/UL
IMM GRANULOCYTES NFR BLD AUTO: 0.5 %
LYMPHOCYTES # BLD AUTO: 1.8 K/UL
LYMPHOCYTES NFR BLD: 22.3 %
MCH RBC QN AUTO: 31.3 PG
MCHC RBC AUTO-ENTMCNC: 31.7 G/DL
MCV RBC AUTO: 99 FL
MONOCYTES # BLD AUTO: 1 K/UL
MONOCYTES NFR BLD: 12.2 %
NEUTROPHILS # BLD AUTO: 5.1 K/UL
NEUTROPHILS NFR BLD: 63.1 %
NRBC BLD-RTO: 0 /100 WBC
PLATELET # BLD AUTO: 294 K/UL
PMV BLD AUTO: 9.5 FL
POTASSIUM SERPL-SCNC: 3.6 MMOL/L
RBC # BLD AUTO: 3.83 M/UL
SODIUM SERPL-SCNC: 137 MMOL/L
WBC # BLD AUTO: 8.06 K/UL

## 2019-01-17 PROCEDURE — 63600175 PHARM REV CODE 636 W HCPCS: Performed by: INTERNAL MEDICINE

## 2019-01-17 PROCEDURE — 92611 MOTION FLUOROSCOPY/SWALLOW: CPT

## 2019-01-17 PROCEDURE — 63600175 PHARM REV CODE 636 W HCPCS: Performed by: HOSPITALIST

## 2019-01-17 PROCEDURE — 25000242 PHARM REV CODE 250 ALT 637 W/ HCPCS: Performed by: INTERNAL MEDICINE

## 2019-01-17 PROCEDURE — 99233 SBSQ HOSP IP/OBS HIGH 50: CPT | Mod: GC,,, | Performed by: INTERNAL MEDICINE

## 2019-01-17 PROCEDURE — 80048 BASIC METABOLIC PNL TOTAL CA: CPT

## 2019-01-17 PROCEDURE — 94640 AIRWAY INHALATION TREATMENT: CPT

## 2019-01-17 PROCEDURE — 85025 COMPLETE CBC W/AUTO DIFF WBC: CPT

## 2019-01-17 PROCEDURE — 36415 COLL VENOUS BLD VENIPUNCTURE: CPT

## 2019-01-17 PROCEDURE — 99900035 HC TECH TIME PER 15 MIN (STAT)

## 2019-01-17 PROCEDURE — 99233 SBSQ HOSP IP/OBS HIGH 50: CPT | Mod: ,,, | Performed by: INTERNAL MEDICINE

## 2019-01-17 PROCEDURE — 27000221 HC OXYGEN, UP TO 24 HOURS

## 2019-01-17 PROCEDURE — 99233 PR SUBSEQUENT HOSPITAL CARE,LEVL III: ICD-10-PCS | Mod: GC,,, | Performed by: INTERNAL MEDICINE

## 2019-01-17 PROCEDURE — 25000003 PHARM REV CODE 250: Performed by: INTERNAL MEDICINE

## 2019-01-17 PROCEDURE — 94761 N-INVAS EAR/PLS OXIMETRY MLT: CPT

## 2019-01-17 PROCEDURE — 20600001 HC STEP DOWN PRIVATE ROOM

## 2019-01-17 PROCEDURE — 99233 PR SUBSEQUENT HOSPITAL CARE,LEVL III: ICD-10-PCS | Mod: ,,, | Performed by: INTERNAL MEDICINE

## 2019-01-17 PROCEDURE — 25000003 PHARM REV CODE 250: Performed by: HOSPITALIST

## 2019-01-17 RX ORDER — AZITHROMYCIN 250 MG/1
250 TABLET, FILM COATED ORAL DAILY
Status: DISCONTINUED | OUTPATIENT
Start: 2019-01-18 | End: 2019-01-18 | Stop reason: HOSPADM

## 2019-01-17 RX ADMIN — GUAIFENESIN 600 MG: 600 TABLET, EXTENDED RELEASE ORAL at 09:01

## 2019-01-17 RX ADMIN — TIOTROPIUM BROMIDE 18 MCG: 18 CAPSULE ORAL; RESPIRATORY (INHALATION) at 09:01

## 2019-01-17 RX ADMIN — SALINE NASAL SPRAY 1 SPRAY: 1.5 SOLUTION NASAL at 05:01

## 2019-01-17 RX ADMIN — PREDNISONE 40 MG: 20 TABLET ORAL at 09:01

## 2019-01-17 RX ADMIN — FLUTICASONE PROPIONATE 100 MCG: 50 SPRAY, METERED NASAL at 09:01

## 2019-01-17 RX ADMIN — PANTOPRAZOLE SODIUM 40 MG: 40 TABLET, DELAYED RELEASE ORAL at 09:01

## 2019-01-17 RX ADMIN — FLUTICASONE FUROATE AND VILANTEROL TRIFENATATE 1 PUFF: 200; 25 POWDER RESPIRATORY (INHALATION) at 09:01

## 2019-01-17 RX ADMIN — ENOXAPARIN SODIUM 40 MG: 100 INJECTION SUBCUTANEOUS at 05:01

## 2019-01-17 RX ADMIN — LEVALBUTEROL HYDROCHLORIDE 0.63 MG: 0.63 SOLUTION RESPIRATORY (INHALATION) at 08:01

## 2019-01-17 RX ADMIN — SALINE NASAL SPRAY 1 SPRAY: 1.5 SOLUTION NASAL at 09:01

## 2019-01-17 RX ADMIN — AZITHROMYCIN 500 MG: 250 TABLET, FILM COATED ORAL at 09:01

## 2019-01-17 NOTE — PLAN OF CARE
Problem: SLP Goal  Goal: SLP Goal  Speech Language Pathology  Goals expected to be met by 1/24:  1. Pt will tolerate regular consistency solid diet and thin liquids with no overt clinical signs aspiration.   Outcome: Ongoing (interventions implemented as appropriate)    MBSS complete. Recommend ongoing regular consistency solids and thin liquids. Strict aspiration precautions.     ALAN Hummel, CCC-SLP  [phone]  1/17/2019

## 2019-01-17 NOTE — ASSESSMENT & PLAN NOTE
COPD with acute exacerbation- GOLD D. FEV1 0.97 (39% predicted). She has had significant decline in functional status over the past few months with difficult to resolve exacerbations which have likely contributed to worsening of her baseline lung function and exercise tolerance. Of concern is that she has a chronic infection that has failed to be treated with a traditional regimen of antibiotics or chronic aspiration given secretions seen on CT in her large airways.   -Recommend obtaining RVP & sputum cultures if possible.   -Con't guaifenesin.  -Con't azithromycin to complete a 5-day course.   -Recommend a longer than traditional course of prednisone- 40mg daily x 1 week followed by 20mg daily x 1 week, followed by 10mg daily for 1 week.  -Con't scheduled nebulizers. It is unclear why she struggled with one episode of a Duoneb unless she mucus plugged while mobilizing secretions.   -Con't LABA, LAMA, ICS at discharge. Can switch to Trelegy if she prefers.  -Swallow eval pending  -Con't flonase and NAC (patient's  was going to buy OTC per Dr. Ruiz's recommendation)  -Needs CT as an outpatient. Likely would benefit from PT & OT while here and potentially SNF as an outpatient. We discussed with the patient and her  the importance of trying to maximize her exercise capacity as deconditioning is certainly contributing to her dyspnea. Likely she will be stable for discharge tomorrow from a pulmonary standpoint.  -As an outpatient consideration for chronic macrolide or daliresp can be considered to reduce exacerbations.  -Follow up in clinic with Dr. Ruiz in about 4 weeks.

## 2019-01-17 NOTE — PLAN OF CARE
Problem: Adult Inpatient Plan of Care  Goal: Plan of Care Review  Pt remains AAOx4, vital signs are stable. No acute events over night. Pt on 2L per NC satting <92%. Call light and personal belongings within reach. Plan of care reviewed with pt. Will continue to monitor.

## 2019-01-17 NOTE — PLAN OF CARE
Problem: Adult Inpatient Plan of Care  Goal: Plan of Care Review  Outcome: Ongoing (interventions implemented as appropriate)  POC reviewed with the pt. AAO x 4. Pt states she can not produce sputum for culture at this time. No acute events. SR on monitor. Pt on 2L NC sating upper 90's. Will continue to monitor.

## 2019-01-17 NOTE — PLAN OF CARE
Problem: Adult Inpatient Plan of Care  Goal: Plan of Care Review  Outcome: Ongoing (interventions implemented as appropriate)  POC reviewed with the pt and family. Pt remains on 2L NC sating upper 90's. V/S stable. AAO x 4. Pt unable to obtain sputum culture. Will continue to monitor.

## 2019-01-17 NOTE — PROCEDURES
Modified Barium Swallow    Patient Name:  Estefani Fam   8099/8099 A    MRN:  [id number]    Recommendations:     Recommendations:                General Recommendations:  Dysphagia therapy  Diet recommendations:  Regular, Thin   Aspiration Precautions:   · Fully awake and alert for PO intake  · Fully upright position for PO intake  · Small bites/ sips  · Slow rate of eating/ drinking  · 1 bite/ sip @ a time  · Refrain from talking prior to swallow completion   · Discontinue PO upon: coughing, throat clearing, choking, wet vocal quality, wet breath sounds, watery eyes, reddened facial features  General Precautions: Standard, fall, respiratory    Referral     Reason for Referral  Patient was referred for a Modified Barium Swallow Study to assess the efficiency of his/her swallow function, rule out aspiration and make recommendations regarding safe dietary consistencies, effective compensatory strategies, and safe eating environment.     Diagnosis: COPD exacerbation     History:     Past Medical History:   Diagnosis Date    Cataract     COPD (chronic obstructive pulmonary disease)     History of retinal hemorrhage      Objective:     Current Respiratory Status: 01/17/19    Alert: yes    Cooperative: yes    Follows Directions: yes    Visualization  · Patient was seen in the lateral view  · Supplement O2 in place via nasal cannula    Oral Peripheral Examination  · Oral Musculature: WFL  · Dentition: present and adequate  · Secretion Management: adequate  · Mucosal Quality: good  · Mandibular Strength and Mobility: WFL  · Oral Labial Strength and Mobility: WFL  · Lingual Strength and Mobility: WFL  · Velar Elevation: WFL  · Buccal Strength and Mobility: WFL  · Volitional Cough: WFL  · Volitional Swallow: WFL  · Voice Prior to PO Intake: Clear, dry     Consistencies Assessed  · Thin liquid- tsp x1, cup sip x2, straw sip x3 (x1 in isolation, x1 as pureed solid liquid wash, x1 as regular consistency solid liquid  wash)  · Pureed apple sauce- tsp x1  · Regular consistency solid saltine cracker- bite x1 (~1/4 cracker)    Oral Preparation/Oral Phase  · Adequate bolus acceptance without anterior loss  · Timely initiation of bolus manipulation  · Timely mastication of regular consistency solid   · Dec'd bolus control and formation characterized premature spillage to the oropharynx and mild-mod oral cavity residue post swallow  · Adequate BOT retraction without BOT residue    Pharyngeal Phase   · Pooling to the vallecula with pureed and regular consistency solids, with additional spillover to the pyriform with thin liquids, and delayed swallow initiation  · Adequate hyolaryngeal excursion/ elevation and epiglottic inversion   · Thin liquid- Flash penetration during the swallow on cup sip x1 and straw sip x1 appeared 2/2 larger bolus size. Spontaneous double swallow per bolus appeared 2/2 mild-mod oral cavity residue post swallow, beneficial to eliminate intermittent mild residue coating the bottom pyriform surface, immediately lateral to the UES   · Pureed and regular consistency solids- No penetration/ aspiration  · Coughing/ throat clearing noted following majority, if not all, boluses, in the absence of penetration/ aspiration     Cervical Esophageal Phase  · Retrograde flow unappreciated  · Intermittent mild residue coating the bottom pyriform surface, immediately lateral to the UES concerning for atypical UES relaxation for bolus acceptance     Assessment:     Impressions  ·  Pt with mild oropharyngeal dysphagia characterized by intermittent flash penetration of thin liquids upon larger boluses appearing 2/2 dec'd bolus control and formation. However no aspiration observed across consistencies.     Prognosis: Good with consistent implementation of all aspiration precautions and independent completion of dysphagia exercises.     Plan  SLP to follow-up with pt x1 to provide additional education and handout for independent  completion of dysphagia exercises.     Education  Education provided to pt and  re: results of MBSS and ongoing SLP POC.     Goals:   Multidisciplinary Problems     SLP Goals        Problem: SLP Goal    Goal Priority Disciplines Outcome   SLP Goal     SLP Ongoing (interventions implemented as appropriate)   Description:  Speech Language Pathology  Goals expected to be met by 1/24:  1. Pt will tolerate regular consistency solid diet and thin liquids with no overt clinical signs aspiration.                   Plan:   · Patient to be seen:  Therapy Frequency: 4 x/week   · Plan of Care expires:  02/14/19  · Plan of Care reviewed with:  patient, spouse        Discharge recommendations:  other (see comments)(Pending progress)     Time Tracking:   SLP Treatment Date:   01/17/19  Speech Start Time:  1557  Speech Stop Time:  1619     Speech Total Time (min):  22 min    ALAN Hummel, CCC-SLP  [phone]  1/17/2019

## 2019-01-17 NOTE — PROGRESS NOTES
[facility]  Pulmonology  Progress Note    Patient Name: Estefani Fam  MRN: [id number]  Admission Date: 1/14/2019  Hospital Length of Stay: 1 days  Code Status: Full Code  Attending Provider: Kyle Ansari MD  Primary Care Provider: Juan Truong MD   Principal Problem: COPD exacerbation    Subjective:     Interval History: Coughing is a little better, less sputum. Still very dyspneic with any exertion.    Objective:     Vital Signs (Most Recent):  Temp: 97.6 °F (36.4 °C) (01/17/19 1102)  Pulse: 91 (01/17/19 1102)  Resp: 18 (01/17/19 1102)  BP: 127/60 (01/17/19 1102)  SpO2: 95 % (01/17/19 1102) Vital Signs (24h Range):  Temp:  [97.6 °F (36.4 °C)-98.1 °F (36.7 °C)] 97.6 °F (36.4 °C)  Pulse:  [] 91  Resp:  [16-18] 18  SpO2:  [67 %-98 %] 95 %  BP: (113-137)/(56-78) 127/60     Weight: 60.4 kg (133 lb 2.5 oz)  Body mass index is 20.25 kg/m².      Intake/Output Summary (Last 24 hours) at 1/17/2019 1221  Last data filed at 1/16/2019 1943  Gross per 24 hour   Intake 700 ml   Output --   Net 700 ml       Physical Exam   Constitutional: She appears well-developed and well-nourished.   HENT:   Head: Normocephalic and atraumatic.   Eyes: No scleral icterus.   Neck: Neck supple.   Cardiovascular: Normal rate and regular rhythm.   Pulmonary/Chest:   No conversational dyspnea or accessory muscle use, 2L O2, purse-lip breathing at rest. No wheezing or rhales.    Abdominal: Soft. She exhibits no distension.   Musculoskeletal: She exhibits no edema or deformity.   Neurological: She is alert. Coordination normal.   Skin: Skin is warm and dry. No rash noted.   Vitals reviewed.      O2:  Oxygen Concentration (%): 28 (01/14/19 1245)    Lines/Drains/Airways     Peripheral Intravenous Line                 Peripheral IV - Single Lumen 01/16/19 0100 Anterior;Right Forearm 1 day                Significant Labs:    CBC/Anemia Profile:  Recent Labs   Lab 01/16/19  0612 01/17/19  0343   WBC 5.79 8.06   HGB 12.9 12.0    HCT 39.7 37.8    294   MCV 96 99*   RDW 12.6 12.7        Chemistries:  Recent Labs   Lab 01/16/19  0612 01/17/19  0343    137   K 4.0 3.6   CL 99 101   CO2 32* 29   BUN 13 18   CREATININE 0.6 0.6   CALCIUM 9.2 9.0       Respiratory Culture: No results for input(s): GSRESP, RESPIRATORYC in the last 48 hours.  All pertinent labs within the past 24 hours have been reviewed.        Assessment/Plan:     * COPD exacerbation    COPD with acute exacerbation- GOLD D. FEV1 0.97 (39% predicted). She has had significant decline in functional status over the past few months with difficult to resolve exacerbations which have likely contributed to worsening of her baseline lung function and exercise tolerance. Of concern is that she has a chronic infection that has failed to be treated with a traditional regimen of antibiotics or chronic aspiration given secretions seen on CT in her large airways.   -Recommend obtaining RVP & sputum cultures if possible.   -Con't guaifenesin.  -Con't azithromycin to complete a 5-day course.   -Recommend a longer than traditional course of prednisone- 40mg daily x 1 week followed by 20mg daily x 1 week, followed by 10mg daily for 1 week.  -Con't scheduled nebulizers. It is unclear why she struggled with one episode of a Duoneb unless she mucus plugged while mobilizing secretions.   -Con't LABA, LAMA, ICS at discharge. Can switch to Trelegy if she prefers.  -Swallow eval pending  -Con't flonase and NAC (patient's  was going to buy OTC per Dr. Ruiz's recommendation)  -Needs ME as an outpatient. Likely would benefit from PT & OT while here and potentially SNF as an outpatient. We discussed with the patient and her  the importance of trying to maximize her exercise capacity as deconditioning is certainly contributing to her dyspnea. Likely she will be stable for discharge tomorrow from a pulmonary standpoint.  -As an outpatient consideration for chronic macrolide or  daliresp can be considered to reduce exacerbations.  -Follow up in clinic with Dr. Ruiz in about 4 weeks.       Please call with questions.     Libby Richards,   Pulmonology  [facility]

## 2019-01-17 NOTE — SUBJECTIVE & OBJECTIVE
Interval History: Coughing is a little better, less sputum. Still very dyspneic with any exertion.    Objective:     Vital Signs (Most Recent):  Temp: 97.6 °F (36.4 °C) (01/17/19 1102)  Pulse: 91 (01/17/19 1102)  Resp: 18 (01/17/19 1102)  BP: 127/60 (01/17/19 1102)  SpO2: 95 % (01/17/19 1102) Vital Signs (24h Range):  Temp:  [97.6 °F (36.4 °C)-98.1 °F (36.7 °C)] 97.6 °F (36.4 °C)  Pulse:  [] 91  Resp:  [16-18] 18  SpO2:  [67 %-98 %] 95 %  BP: (113-137)/(56-78) 127/60     Weight: 60.4 kg (133 lb 2.5 oz)  Body mass index is 20.25 kg/m².      Intake/Output Summary (Last 24 hours) at 1/17/2019 1221  Last data filed at 1/16/2019 1943  Gross per 24 hour   Intake 700 ml   Output --   Net 700 ml       Physical Exam   Constitutional: She appears well-developed and well-nourished.   HENT:   Head: Normocephalic and atraumatic.   Eyes: No scleral icterus.   Neck: Neck supple.   Cardiovascular: Normal rate and regular rhythm.   Pulmonary/Chest:   No conversational dyspnea or accessory muscle use, 2L O2, purse-lip breathing at rest. No wheezing or rhales.    Abdominal: Soft. She exhibits no distension.   Musculoskeletal: She exhibits no edema or deformity.   Neurological: She is alert. Coordination normal.   Skin: Skin is warm and dry. No rash noted.   Vitals reviewed.      O2:  Oxygen Concentration (%): 28 (01/14/19 1245)    Lines/Drains/Airways     Peripheral Intravenous Line                 Peripheral IV - Single Lumen 01/16/19 0100 Anterior;Right Forearm 1 day                Significant Labs:    CBC/Anemia Profile:  Recent Labs   Lab 01/16/19  0612 01/17/19  0343   WBC 5.79 8.06   HGB 12.9 12.0   HCT 39.7 37.8    294   MCV 96 99*   RDW 12.6 12.7        Chemistries:  Recent Labs   Lab 01/16/19  0612 01/17/19  0343    137   K 4.0 3.6   CL 99 101   CO2 32* 29   BUN 13 18   CREATININE 0.6 0.6   CALCIUM 9.2 9.0       Respiratory Culture: No results for input(s): GSRESP, RESPIRATORYC in the last 48 hours.  All  pertinent labs within the past 24 hours have been reviewed.

## 2019-01-17 NOTE — PROGRESS NOTES
[facility] Hospital Medicine                                                                     Progress Note     Team: [facility] HOSP MED R Kyle Ansari MD   Admit Date: 1/14/2019   Hospital Day: 1  RENARD:  1/17/19  Code status: Full Code   Principal Problem: COPD exacerbation     SUMMARY:     68 F with a hx of COPD (on continuous home O2) on BREO and ellipta, former smoker with a 50 PPY history, quit 3 years ago presents with progressively worsening dyspnea and non productive cough. This is her fourth admission in the past 6 months, always presenting with similar symptoms which include shortness of breath, overall fatigue, dry cough and decreased energy while performing ADLs. She denies any fever, chills, recent sick contacts, recent travel. She was discharged from [facility] on 01/1/2019. Follows up with Dr. Harris in Pulmonology clinic. Acute on chronic hypoxic hypercapnic respiratory failure secondary to COPD exacerbation.     SUBJECTIVE:     Pt was seen and examined at bedside. No issues overnight. HD stable and afebrile. Dyspnea and cough improving. Continues to have GRUBER with minimal exertion. Currently on home O2. MBSS planned for today. Family at bedside updated.     ROS (Positive in Bold, otherwise negative)  Pain Scale: 0 /10   Constitutional: fever, chills, night sweats  CV: chest pain, edema, palpitations  Resp: SOB, cough, GRUBER sputum production  GI: changes in appetite, nausea, VDC, pain, melena, hematochezia, GERD, hematemesis  : Dysuria, hematuria, urinary urgency, frequency  MSK: arthralgia/myalgia, joint swelling  SKIN: rashes, pruritis, petechiae   Neuro/Psych: FND, anxiety, depression    OBJECTIVE:     Vitals:  Temp:  [97.6 °F (36.4 °C)-98.1 °F (36.7 °C)]   Pulse:  []   Resp:  [16-18]   BP: (126-143)/(60-80)   SpO2:  [67 %-97 %]      I & O (Last 24H):      Intake/Output Summary (Last 24 hours) at 2019 0236  Last data filed at 2019 1943  Gross per 24 hour   Intake 700 ml   Output --   Net 700 ml       GEN:  female  in no acute distress. Nontoxic. Resting in bed. Cooperative.  HEENT: NCAT. PERRL. EOMI. Conjunctivae/corneas clear, sclera Anicteric.  CVS: RRR. Normal s1 s2 no murmur, click, rub or gallop  LUNG: CTAB. Normal respiratory effort.  BS throughout. No wheeze rhochi rales noted. 2L NC.   ABD: Normoactive BS, soft, NT, ND, no masses or organomegaly.  EXT: No edema. No cyanosis. Full ROM.  SKIN: color, texture, turgor normal. No rashes or lesions. No clubbing.  NEURO: Alert, oriented x 4, Spont mvt of all extremities with no focal deficits noted.      All recent labs and imaging has been reviewed.     Recent Results (from the past 24 hour(s))   CBC auto differential    Collection Time: 19  3:43 AM   Result Value Ref Range    WBC 8.06 3.90 - 12.70 K/uL    RBC 3.83 (L) 4.00 - 5.40 M/uL    Hemoglobin 12.0 12.0 - 16.0 g/dL    Hematocrit 37.8 37.0 - 48.5 %    MCV 99 (H) 82 - 98 fL    MCH 31.3 (H) 27.0 - 31.0 pg    MCHC 31.7 (L) 32.0 - 36.0 g/dL    RDW 12.7 11.5 - 14.5 %    Platelets 294 150 - 350 K/uL    MPV 9.5 9.2 - 12.9 fL    Immature Granulocytes 0.5 0.0 - 0.5 %    Gran # (ANC) 5.1 1.8 - 7.7 K/uL    Immature Grans (Abs) 0.04 0.00 - 0.04 K/uL    Lymph # 1.8 1.0 - 4.8 K/uL    Mono # 1.0 0.3 - 1.0 K/uL    Eos # 0.1 0.0 - 0.5 K/uL    Baso # 0.03 0.00 - 0.20 K/uL    nRBC 0 0 /100 WBC    Gran% 63.1 38.0 - 73.0 %    Lymph% 22.3 18.0 - 48.0 %    Mono% 12.2 4.0 - 15.0 %    Eosinophil% 1.5 0.0 - 8.0 %    Basophil% 0.4 0.0 - 1.9 %    Differential Method Automated    Basic metabolic panel    Collection Time: 19  3:43 AM   Result Value Ref Range    Sodium 137 136 - 145 mmol/L    Potassium 3.6 3.5 - 5.1 mmol/L    Chloride 101 95 - 110 mmol/L    CO2 29 23 - 29 mmol/L    Glucose 87 70 - 110 mg/dL    BUN, Bld 18 8 - 23 mg/dL    Creatinine  0.6 0.5 - 1.4 mg/dL    Calcium 9.0 8.7 - 10.5 mg/dL    Anion Gap 7 (L) 8 - 16 mmol/L    eGFR if African American >60.0 >60 mL/min/1.73 m^2    eGFR if non African American >60.0 >60 mL/min/1.73 m^2       No results for input(s): POCTGLUCOSE in the last 168 hours.    Hemoglobin A1C   Date Value Ref Range Status   01/14/2019 5.6 4.0 - 5.6 % Final     Comment:     ADA Screening Guidelines:  5.7-6.4%  Consistent with prediabetes  >or=6.5%  Consistent with diabetes  High levels of fetal hemoglobin interfere with the HbA1C  assay. Heterozygous hemoglobin variants (HbS, HgC, etc)do  not significantly interfere with this assay.   However, presence of multiple variants may affect accuracy.     12/28/2018 5.4 4.0 - 5.6 % Final     Comment:     ADA Screening Guidelines:  5.7-6.4%  Consistent with prediabetes  >or=6.5%  Consistent with diabetes  High levels of fetal hemoglobin interfere with the HbA1C  assay. Heterozygous hemoglobin variants (HbS, HgC, etc)do  not significantly interfere with this assay.   However, presence of multiple variants may affect accuracy.     07/02/2018 5.6 4.0 - 5.6 % Final     Comment:     ADA Screening Guidelines:  5.7-6.4%  Consistent with prediabetes  >or=6.5%  Consistent with diabetes  High levels of fetal hemoglobin interfere with the HbA1C  assay. Heterozygous hemoglobin variants (HbS, HgC, etc)do  not significantly interfere with this assay.   However, presence of multiple variants may affect accuracy.          Active Hospital Problems    Diagnosis  POA    *COPD exacerbation [J44.1]  Yes    Acute on chronic respiratory failure with hypoxia and hypercapnia [J96.21, J96.22]  Unknown    Shortness of breath [R06.02]  Yes    Pulmonary HTN [I27.20]  Yes      Resolved Hospital Problems   No resolved problems to display.        ASSESSMENT AND PLAN:     Acute on chronic hypoxic hypercapnic respiratory failure secondary to COPD exacerbation  Presented now for the 4th time since July 2018 for COPD  exacerbation. CXR unremarkable.   - Remains HD stable and afebrile   - Gradually improving since admission  - Pulmonary consulted for further mgmt since this is her 4th visit, appreciate recs   - Continue around the clock levalbuterol neb, patient refused duo-nebs  - Continue ICS / LABA / LAMA  - Checked home meds, on duo-neb nebs, patient doesn't want this anymore   - Prednisone PO, needs a longer course this time - 40mg daily x 1 week followed by 20mg daily x 1 week, followed by 10mg daily for 1 week.  - Azithromycin 500 mg PO for 5 days - end date 1/19  - Continue guaifenesin and Flonase   - CT chest done - no overt consolidations, secretions in trachea, multiple small pulmonary nodules (needs 12 month repeat CT)  - SLP to r/o aspiration, MBSS pending     - Continue O2 therapy, currently back on HOT, goal oxygen 88-92%   - RVP and sputum cultures pending  - PT OT ordered , significant deconditioning from recurrent respiratory failures  - Pulmonary rehab when able on d/c  - Follow up with Dr. Ruiz in 4 weeks  - Cont to avoid triggers for future exacerbation (smoke, mold, allergens etc)  - High risk for osteoporosis, never had DEXA, will need this outpatient       Prophylaxis- Lovenox  Code Status- Full Code     Discharge plan and follow up - d/c home once medically stable    Kyle Ansari MD  Hospital Medicine Staff  Pager [phone]

## 2019-01-18 VITALS
RESPIRATION RATE: 18 BRPM | HEIGHT: 68 IN | DIASTOLIC BLOOD PRESSURE: 71 MMHG | SYSTOLIC BLOOD PRESSURE: 124 MMHG | OXYGEN SATURATION: 96 % | TEMPERATURE: 98 F | BODY MASS INDEX: 20.18 KG/M2 | HEART RATE: 95 BPM | WEIGHT: 133.19 LBS

## 2019-01-18 PROBLEM — J44.1 COPD EXACERBATION: Status: RESOLVED | Noted: 2018-12-28 | Resolved: 2019-01-18

## 2019-01-18 PROBLEM — J96.22 ACUTE ON CHRONIC RESPIRATORY FAILURE WITH HYPOXIA AND HYPERCAPNIA: Status: RESOLVED | Noted: 2019-01-16 | Resolved: 2019-01-18

## 2019-01-18 PROBLEM — J96.21 ACUTE ON CHRONIC RESPIRATORY FAILURE WITH HYPOXIA AND HYPERCAPNIA: Status: RESOLVED | Noted: 2019-01-16 | Resolved: 2019-01-18

## 2019-01-18 PROBLEM — R06.02 SHORTNESS OF BREATH: Status: RESOLVED | Noted: 2019-01-14 | Resolved: 2019-01-18

## 2019-01-18 LAB
ANION GAP SERPL CALC-SCNC: 5 MMOL/L
BASOPHILS # BLD AUTO: 0.05 K/UL
BASOPHILS NFR BLD: 0.7 %
BUN SERPL-MCNC: 12 MG/DL
CALCIUM SERPL-MCNC: 8.8 MG/DL
CHLORIDE SERPL-SCNC: 102 MMOL/L
CO2 SERPL-SCNC: 31 MMOL/L
CREAT SERPL-MCNC: 0.6 MG/DL
DIFFERENTIAL METHOD: ABNORMAL
EOSINOPHIL # BLD AUTO: 0.3 K/UL
EOSINOPHIL NFR BLD: 4.2 %
ERYTHROCYTE [DISTWIDTH] IN BLOOD BY AUTOMATED COUNT: 12.4 %
EST. GFR  (AFRICAN AMERICAN): >60 ML/MIN/1.73 M^2
EST. GFR  (NON AFRICAN AMERICAN): >60 ML/MIN/1.73 M^2
GLUCOSE SERPL-MCNC: 92 MG/DL
HCT VFR BLD AUTO: 39.3 %
HGB BLD-MCNC: 12.7 G/DL
IMM GRANULOCYTES # BLD AUTO: 0.03 K/UL
IMM GRANULOCYTES NFR BLD AUTO: 0.4 %
LYMPHOCYTES # BLD AUTO: 2.4 K/UL
LYMPHOCYTES NFR BLD: 31.1 %
MCH RBC QN AUTO: 31.6 PG
MCHC RBC AUTO-ENTMCNC: 32.3 G/DL
MCV RBC AUTO: 98 FL
MONOCYTES # BLD AUTO: 0.8 K/UL
MONOCYTES NFR BLD: 10.5 %
NEUTROPHILS # BLD AUTO: 4 K/UL
NEUTROPHILS NFR BLD: 53.1 %
NRBC BLD-RTO: 0 /100 WBC
PLATELET # BLD AUTO: 288 K/UL
PMV BLD AUTO: 9.4 FL
POTASSIUM SERPL-SCNC: 3.5 MMOL/L
RBC # BLD AUTO: 4.02 M/UL
SODIUM SERPL-SCNC: 138 MMOL/L
WBC # BLD AUTO: 7.55 K/UL

## 2019-01-18 PROCEDURE — 80048 BASIC METABOLIC PNL TOTAL CA: CPT

## 2019-01-18 PROCEDURE — 87070 CULTURE OTHR SPECIMN AEROBIC: CPT

## 2019-01-18 PROCEDURE — 63600175 PHARM REV CODE 636 W HCPCS: Performed by: INTERNAL MEDICINE

## 2019-01-18 PROCEDURE — 87205 SMEAR GRAM STAIN: CPT

## 2019-01-18 PROCEDURE — 25000003 PHARM REV CODE 250: Performed by: HOSPITALIST

## 2019-01-18 PROCEDURE — 25000003 PHARM REV CODE 250: Performed by: INTERNAL MEDICINE

## 2019-01-18 PROCEDURE — 87632 RESP VIRUS 6-11 TARGETS: CPT

## 2019-01-18 PROCEDURE — 27000221 HC OXYGEN, UP TO 24 HOURS

## 2019-01-18 PROCEDURE — 25000242 PHARM REV CODE 250 ALT 637 W/ HCPCS: Performed by: INTERNAL MEDICINE

## 2019-01-18 PROCEDURE — 97165 OT EVAL LOW COMPLEX 30 MIN: CPT

## 2019-01-18 PROCEDURE — 94761 N-INVAS EAR/PLS OXIMETRY MLT: CPT

## 2019-01-18 PROCEDURE — 85025 COMPLETE CBC W/AUTO DIFF WBC: CPT

## 2019-01-18 PROCEDURE — 36415 COLL VENOUS BLD VENIPUNCTURE: CPT

## 2019-01-18 PROCEDURE — 92526 ORAL FUNCTION THERAPY: CPT

## 2019-01-18 PROCEDURE — 99239 PR HOSPITAL DISCHARGE DAY,>30 MIN: ICD-10-PCS | Mod: ,,, | Performed by: INTERNAL MEDICINE

## 2019-01-18 PROCEDURE — 99239 HOSP IP/OBS DSCHRG MGMT >30: CPT | Mod: ,,, | Performed by: INTERNAL MEDICINE

## 2019-01-18 PROCEDURE — 94640 AIRWAY INHALATION TREATMENT: CPT

## 2019-01-18 RX ORDER — FLUTICASONE FUROATE AND VILANTEROL 200; 25 UG/1; UG/1
1 POWDER RESPIRATORY (INHALATION) DAILY
Qty: 60 EACH | Refills: 11 | Status: SHIPPED | OUTPATIENT
Start: 2019-01-19 | End: 2020-02-17 | Stop reason: SDUPTHER

## 2019-01-18 RX ORDER — POTASSIUM CHLORIDE 750 MG/1
50 CAPSULE, EXTENDED RELEASE ORAL ONCE
Status: COMPLETED | OUTPATIENT
Start: 2019-01-18 | End: 2019-01-18

## 2019-01-18 RX ORDER — FLUTICASONE PROPIONATE 50 MCG
2 SPRAY, SUSPENSION (ML) NASAL DAILY
Qty: 16 G | Refills: 11 | Status: SHIPPED | OUTPATIENT
Start: 2019-01-19 | End: 2020-06-03 | Stop reason: SDUPTHER

## 2019-01-18 RX ORDER — LEVALBUTEROL INHALATION SOLUTION 0.63 MG/3ML
0.63 SOLUTION RESPIRATORY (INHALATION) EVERY 8 HOURS
Qty: 288 ML | Refills: 11 | Status: SHIPPED | OUTPATIENT
Start: 2019-01-18 | End: 2019-01-18

## 2019-01-18 RX ORDER — PREDNISONE 10 MG/1
TABLET ORAL
Qty: 56 TABLET | Refills: 0 | Status: SHIPPED | OUTPATIENT
Start: 2019-01-18 | End: 2019-06-20 | Stop reason: ALTCHOICE

## 2019-01-18 RX ORDER — LEVALBUTEROL INHALATION SOLUTION 0.63 MG/3ML
0.63 SOLUTION RESPIRATORY (INHALATION) EVERY 8 HOURS
Qty: 288 ML | Refills: 11 | Status: SHIPPED | OUTPATIENT
Start: 2019-01-18 | End: 2019-03-07 | Stop reason: ALTCHOICE

## 2019-01-18 RX ORDER — AZITHROMYCIN 250 MG/1
250 TABLET, FILM COATED ORAL DAILY
Qty: 1 TABLET | Refills: 0 | Status: SHIPPED | OUTPATIENT
Start: 2019-01-19 | End: 2019-03-22

## 2019-01-18 RX ORDER — PANTOPRAZOLE SODIUM 40 MG/1
40 TABLET, DELAYED RELEASE ORAL DAILY
Qty: 30 TABLET | Refills: 11 | Status: SHIPPED | OUTPATIENT
Start: 2019-01-19 | End: 2020-02-05 | Stop reason: SDUPTHER

## 2019-01-18 RX ORDER — GUAIFENESIN 600 MG/1
600 TABLET, EXTENDED RELEASE ORAL 2 TIMES DAILY
Qty: 60 TABLET | Refills: 5 | Status: SHIPPED | OUTPATIENT
Start: 2019-01-18 | End: 2021-08-09

## 2019-01-18 RX ADMIN — PREDNISONE 40 MG: 20 TABLET ORAL at 08:01

## 2019-01-18 RX ADMIN — AZITHROMYCIN 250 MG: 250 TABLET, FILM COATED ORAL at 08:01

## 2019-01-18 RX ADMIN — LEVALBUTEROL HYDROCHLORIDE 0.63 MG: 0.63 SOLUTION RESPIRATORY (INHALATION) at 04:01

## 2019-01-18 RX ADMIN — SALINE NASAL SPRAY 1 SPRAY: 1.5 SOLUTION NASAL at 08:01

## 2019-01-18 RX ADMIN — POTASSIUM CHLORIDE 50 MEQ: 750 CAPSULE, EXTENDED RELEASE ORAL at 10:01

## 2019-01-18 RX ADMIN — GUAIFENESIN 600 MG: 600 TABLET, EXTENDED RELEASE ORAL at 08:01

## 2019-01-18 RX ADMIN — TIOTROPIUM BROMIDE 18 MCG: 18 CAPSULE ORAL; RESPIRATORY (INHALATION) at 08:01

## 2019-01-18 RX ADMIN — FLUTICASONE PROPIONATE 100 MCG: 50 SPRAY, METERED NASAL at 08:01

## 2019-01-18 RX ADMIN — FLUTICASONE FUROATE AND VILANTEROL TRIFENATATE 1 PUFF: 200; 25 POWDER RESPIRATORY (INHALATION) at 08:01

## 2019-01-18 RX ADMIN — LEVALBUTEROL HYDROCHLORIDE 0.63 MG: 0.63 SOLUTION RESPIRATORY (INHALATION) at 08:01

## 2019-01-18 RX ADMIN — PANTOPRAZOLE SODIUM 40 MG: 40 TABLET, DELAYED RELEASE ORAL at 08:01

## 2019-01-18 RX ADMIN — LEVALBUTEROL HYDROCHLORIDE 0.63 MG: 0.63 SOLUTION RESPIRATORY (INHALATION) at 12:01

## 2019-01-18 NOTE — PLAN OF CARE
Discharge planning: Called pulmonary clinic to schedule follow up with Dr. Ruiz in 4 weeks. No appointment available.  sent message to nurse requesting appointment in 4 weeks. Fellow clinic appointment scheduled with first available 3/22 as back up plan.

## 2019-01-18 NOTE — PLAN OF CARE
Problem: Adult Inpatient Plan of Care  Goal: Plan of Care Review  Outcome: Ongoing (interventions implemented as appropriate)   01/18/19 9257   Plan of Care Review   Plan of Care Reviewed With patient   Progress no change   Pt AAOx4. No acute events overnight. Denied pain or discomfort. Pt on 2L O2 via NC, sats in 90%'s. Sputum cx collected. Ambulates to bathroom independently. Vitals stable. Safety maintained. Will continue to monitor.

## 2019-01-18 NOTE — PLAN OF CARE
Problem: Occupational Therapy Goal  Goal: Occupational Therapy Goal  Outcome: Outcome(s) achieved Date Met: 01/18/19  Eval and D/C - no needs.    ALESSIO Dominguez

## 2019-01-18 NOTE — PT/OT/SLP EVAL
Occupational Therapy   Evaluation and Discharge Note    Name: Estefani Fam  MRN: [id number]  Admitting Diagnosis:  COPD exacerbation      Recommendations:     Discharge Recommendations: (Home)  Discharge Equipment Recommendations:  none  Barriers to discharge:  None    Assessment:     Estefani Fam is a 68 y.o. female with a medical diagnosis of COPD exacerbation. At this time, patient is functioning at their prior level of function and does not require further acute OT services.     Plan:     During this hospitalization, patient does not require further acute OT services.  Please re-consult if situation changes.    · Plan of Care Reviewed with: patient    Subjective     Chief Complaint: SOB    Occupational Profile:  Living Environment: Pt lives in a 2SH with  - B/B upstairs with ~ 20 steps.  Previous level of function: (I) with ADLs/walking/driving but gets SOB easily.  Equipment Used at home:  oxygen  Assistance upon Discharge:     Pain/Comfort:  · Pain Rating 1: 0/10    Patients cultural, spiritual, Adventism conflicts given the current situation:      Objective:     Communicated with: RN prior to session.  Patient found supine with   upon OT entry to room.    General Precautions: Standard, respiratory   Orthopedic Precautions:    Braces:       Occupational Performance:    Bed Mobility:    · Independent    Functional Mobility/Transfers:  Independent    Activities of Daily Living:  Independent    Physical Exam:  BUE AROM/MMT: WNL    AMPAC 6 Click ADL:  AMPAC Total Score: 24    Treatment & Education:  UE ROM/MMT  Bed mobility training / assessment  Functional mobility assessment  Sit/standing balance assessment  Educated on importance of sitting OOB in bedside chair to promote increased strength, endurance & breathing.  Discussed D/C of OT  Education:    Patient left supine with all lines intact and call button in reach    GOALS:   Multidisciplinary Problems     Occupational Therapy Goals      "Not on file          Multidisciplinary Problems (Resolved)        Problem: Occupational Therapy Goal    Goal Priority Disciplines Outcome Interventions   Occupational Therapy Goal   (Resolved)     OT, PT/OT Outcome(s) achieved                    History:     Past Medical History:   Diagnosis Date    Cataract     COPD (chronic obstructive pulmonary disease)     History of retinal hemorrhage        Past Surgical History:   Procedure Laterality Date    HAND SURGERY         Clinical Decision Makin.  OT Low:  "Pt evaluation falls under low complexity for evaluation coding due to performance deficits noted in 1-3 areas as stated above and 0 co-morbities affecting current functional status. Data obtained from problem focused assessments. No modifications or assistance was required for completion of evaluation. Only brief occupational profile and history review completed."     Time Tracking:     OT Date of Treatment: 19  OT Start Time: 1004  OT Stop Time: 1014  OT Total Time (min): 10 min    Billable Minutes:Evaluation 10    ALESSIO Dominguez  2019    "

## 2019-01-18 NOTE — PLAN OF CARE
Problem: SLP Goal  Goal: SLP Goal  Speech Language Pathology  Goals expected to be met by 1/24:  1. Pt will tolerate regular consistency solid diet and thin liquids with no overt clinical signs aspiration.    Outcome: Outcome(s) achieved Date Met: 01/18/19    Recommend ongoing regular consistency solids and thin liquids. Strict aspiration precautions. No further acute SLP needs at this time. Please re-consult should changes occur.     ALAN Hummel, CCC-SLP  [phone]  1/18/2019

## 2019-01-18 NOTE — NURSING
Discharge paperwork reviewed with pt and spouse. Pt's IV d/c'd; intact. Pt states she has all her personal belongings with her. Transported to front of hospital via wheelchair.

## 2019-01-18 NOTE — PT/OT/SLP PROGRESS
"Speech Language Pathology Treatment/  Discharge Summary    Patient Name:  Estefani Fam   MRN:  [id number]   [id number] A    Admitting Diagnosis: COPD exacerbation    Recommendations:                 General Recommendations:  Follow-up not indicated  Diet recommendations:  Regular, Liquid Diet Level: Thin   Aspiration Precautions:  · Fully awake and alert for PO intake  · Fully upright position for PO intake  · Small bites/ sips  · Slow rate of eating/ drinking  · 1 bite/ sip @ a time  · Refrain from talking prior to swallow completion   · Discontinue PO upon: coughing, throat clearing, choking, wet vocal quality, wet breath sounds, watery eyes, reddened facial features  General Precautions: Standard, respiratory    Subjective     "I didn't sleep last night."     Pain/Comfort:  · Pain Rating 1: 0/10  · Pain Rating Post-Intervention 1: 0/10    Objective:     Has the patient been evaluated by SLP for swallowing?   Yes  Keep patient NPO? No   Current Respiratory Status: nasal cannula      Pt awake and alert upon entry, sitting fully upright on EOB with  present at bedside. Frequent dry, spontaneous throat clearing observed throughout session. Extensive education provided re: definition/ risk of aspiration and penetration and results of MBSS completed yesterday, 1/17/19, including pt without aspiration across thin liquid and regular consistency solid PO trials. However consistent and strict implementation of all aspiration precautions warranted 2/2 mildly dec'd bolus control and flash penetration on 2/6 thin liquid PO trials. Additionally handout for independent completion of dysphagia exercises provided and reviewed should pt wish to attempt to strengthen the swallowing musculature to potentially improve bolus control to reduce/ eliminate occurrence of flash penetration. Additional education provided re: consistent and strict implementation of all aspiration precautions listed above and SLP POC. Encouragement " provided to request SLP re-consult should pt experience any swallowing changes. Pt and  verbalized understanding of all education provided and agreement with SLP POC. No further questions.     Results discussed with Dr. Ansari who verbalized understanding of- and agreement with- SLP POC.     Assessment:     Estefani Fam is a 68 y.o. female with no further acute SLP needs at this time. Please re-consult should changes occur.     Goals:   Multidisciplinary Problems     SLP Goals     Not on file          Multidisciplinary Problems (Resolved)        Problem: SLP Goal    Goal Priority Disciplines Outcome   SLP Goal   (Resolved)     SLP Outcome(s) achieved   Description:  Speech Language Pathology  Goals expected to be met by 1/24:  1. Pt will tolerate regular consistency solid diet and thin liquids with no overt clinical signs aspiration.                   Plan:     · Plan of Care reviewed with:  patient, spouse   · SLP Follow-Up:  No       Discharge recommendations:  other (see comments)(Home)     Time Tracking:     SLP Treatment Date:   01/18/19  Speech Start Time:  1130  Speech Stop Time:  1141     Speech Total Time (min):  11 min    Billable Minutes: Treatment Swallowing Dysfunction 11    ALAN Hummel, CCC-SLP  [phone]  1/18/2019

## 2019-01-21 LAB
BACTERIA SPEC AEROBE CULT: NORMAL
BACTERIA SPEC AEROBE CULT: NORMAL
ENTEROVIRUS: NOT DETECTED
GRAM STN SPEC: NORMAL
HUMAN BOCAVIRUS: NOT DETECTED
HUMAN CORONAVIRUS, COMMON COLD VIRUS: NOT DETECTED
INFLUENZA A - H1N1-09: NOT DETECTED
PARAINFLUENZA: NOT DETECTED
RVP - ADENOVIRUS: NOT DETECTED
RVP - HUMAN METAPNEUMOVIRUS (HMPV): NOT DETECTED
RVP - INFLUENZA A: NOT DETECTED
RVP - INFLUENZA B: NOT DETECTED
RVP - RESPIRATORY SYNCTIAL VIRUS (RSV) A: NOT DETECTED
RVP - RESPIRATORY VIRAL PANEL, SOURCE: NORMAL
RVP - RHINOVIRUS: NOT DETECTED

## 2019-01-21 NOTE — DISCHARGE SUMMARY
[facility]  Discharge Summary  Hospital Medicine    Patient Name: Estefani Fam  YOB: 1950    Admit Date: 1/14/2019    Discharge Date and Time: 1/18/2019  5:41 PM    Discharge Attending Physician: Kyle Ansari MD     Team: [facility] HOSP MED R    Reason for Admission:   Chief Complaint   Patient presents with    Shortness of Breath     Pt c/o SOB x 5 days ago-gradual worsening.        Active Hospital Problems    Diagnosis  POA    Pulmonary HTN [I27.20]  Yes      Resolved Hospital Problems    Diagnosis Date Resolved POA    *COPD exacerbation [J44.1] 01/18/2019 Yes    Acute on chronic respiratory failure with hypoxia and hypercapnia [J96.21, J96.22] 01/18/2019 Yes    Shortness of breath [R06.02] 01/18/2019 Yes       HPI:   68 F with a PMH of COPD (on continuous home O2) on BREO and ellipta, former smoker with a 50 PPY history, quit 3 years ago presents with recurrent shortness of breath and dry cough. This is her fourth admission in the past 6 months, always presenting with similar symptoms which include shortness of breath, overall fatigue, dry cough and decreased energy while performing ADLs. She denies any fever, chills, recent sick contacts, recent travel. She was discharged from [facility] on 01/1/2019. She was supposed to present today for pulmonary rehab in [facility] but decided to come here due to worsening symptoms. Follow up with Dr. Harris in Pulmonology clinic.     Hospital Course:   Admitted to inpatient service for Acute on chronic hypoxic hypercapnic respiratory failure secondary to COPD exacerbation. Presented now for the 4th time since July 2018 for COPD exacerbation. CXR unremarkable. Remains HD stable and afebrile. Started on levalbuterol nebs (she refused duonebs), ICS / LABA / LAMA, prednisone, guaifenesin and Flonase. CT chest done - no overt consolidations, secretions in trachea, multiple small pulmonary nodules (needs 12 month repeat CT). Pulmonary consulted for  further mgmt since this is her 4th visit. Gradually improved with above therapy. Finished Azithromycin 500 mg PO for 5 days. SLP to r/o aspiration, MBSS neg for aspiration. Started PPI for reflux as this can cause COPD exacerbations. Urine cultures, RVP and sputum cultures were neg. PT OT seen patient and rec HH. Overall on d/c day patient feels well. Refilled all her medications and given levalbuterol nebs. Given prolonged steroid taper ( 40mg daily x 1 week followed by 20mg daily x 1 week, followed by 10mg daily for 1 week ). Follow up with Dr. Ruiz in 4.  Weeks. Pulmonary rehab when able on d/c. She needs to avoid triggers for future exacerbation (smoke, mold, allergens etc). High risk for osteoporosis, never had DEXA, will need this outpatient.     Principal Problem: Acute on chronic hypoxic hypercapnic respiratory failure secondary to COPD exacerbation    Other Problems Addressed:  - Chronic hypoxic respiratory failure  - COPD  - Generalized weakness     Procedures Performed: * No surgery found *    Special Care, Treatment, and Services Provided: Na    Consults: Pulmonary     Significant Diagnostic Studies:  No results found for: EF  Hemoglobin A1C   Date Value Ref Range Status   01/14/2019 5.6 4.0 - 5.6 % Final     Comment:     ADA Screening Guidelines:  5.7-6.4%  Consistent with prediabetes  >or=6.5%  Consistent with diabetes  High levels of fetal hemoglobin interfere with the HbA1C  assay. Heterozygous hemoglobin variants (HbS, HgC, etc)do  not significantly interfere with this assay.   However, presence of multiple variants may affect accuracy.     12/28/2018 5.4 4.0 - 5.6 % Final     Comment:     ADA Screening Guidelines:  5.7-6.4%  Consistent with prediabetes  >or=6.5%  Consistent with diabetes  High levels of fetal hemoglobin interfere with the HbA1C  assay. Heterozygous hemoglobin variants (HbS, HgC, etc)do  not significantly interfere with this assay.   However, presence of multiple variants may affect  "accuracy.       CBC:   Recent Labs   Lab 19  0454   WBC 7.55   RBC 4.02   HGB 12.7   HCT 39.3      MCV 98   MCH 31.6*   MCHC 32.3     BMP:   Recent Labs   Lab 01/15/19  0937  19  0454   GLU 86   < > 92      < > 138   K 4.0   < > 3.5   CL 97   < > 102   CO2 36*   < > 31*   BUN 13   < > 12   CREATININE 0.7   < > 0.6   CALCIUM 9.8   < > 8.8   MG 1.9  --   --     < > = values in this interval not displayed.     Microbiology Results (last 7 days)     ** No results found for the last 168 hours. **          Final Diagnoses: Same as principal problem.    Discharged Condition: good  Face to face services were provided on 2019   Time Spent:  I spent > 30 minutes on the discharge, which included reviewing hospital course with patient/family, reviewing discharge medications, and arranging follow-up care.  Physical Exam on 2019:  /71 (BP Location: Left arm, Patient Position: Sitting)   Pulse 95   Temp 98.2 °F (36.8 °C) (Oral)   Resp 18   Ht 5' 8" (1.727 m)   Wt 60.4 kg (133 lb 2.5 oz)   SpO2 96%   Breastfeeding? No   BMI 20.25 kg/m²     GEN:  female  in no acute distress. Nontoxic. Resting in bed. Cooperative.  HEENT: NCAT. PERRL. EOMI. Conjunctivae/corneas clear, sclera Anicteric.  CVS: RRR. Normal s1 s2 no murmur, click, rub or gallop  LUNG: CTAB. Normal respiratory effort.  BS throughout. No wheeze rhochi rales noted. 2L NC.   ABD: Normoactive BS, soft, NT, ND, no masses or organomegaly.  EXT: No edema. No cyanosis. Full ROM.  SKIN: color, texture, turgor normal. No rashes or lesions. No clubbing.  NEURO: Alert, oriented x 4, Spont mvt of all extremities with no focal deficits noted.    Disposition: Home or Self Care    Follow Up Instructions:   Follow-up Information     [facility] In 1 week.    Specialty:  Orthopedics  Contact information:  [address]  [facility] [id number] [phone]  Additional " information:  [facility] [address]           Brooklynn Ruiz MD. Schedule an appointment as soon as possible for a visit on 2/15/2019.    Specialties:  Intensive Care, Pulmonary Disease  Why:  [facility] with Dr. Ruiz also works  Contact information:  [address]  [place] [address]  [phone]             Juan Truong MD In 1 week.    Specialty:  Internal Medicine  Why:  Post hospital follow up   Contact information:  [address]  [place] [address]  [phone]                 Future Appointments   Date Time Provider Department Center   1/31/2019  3:00 PM Miles Jackson MD [facility] IM [facility]   3/22/2019  2:00 PM Mike Mcfarland MD [facility] PULMS[facility]       Medications:  Reconciled Home Medications:      Medication List      START taking these medications    azithromycin 250 MG tablet  Commonly known as:  Z-JERSEY  Take 1 tablet (250 mg total) by mouth once daily.     fluticasone 50 mcg/actuation nasal spray  Commonly known as:  FLONASE  Spray twice (100 mcg total) by Each Nare route once daily.     fluticasone-vilanterol 200-25 mcg/dose Dsdv diskus inhaler  Commonly known as:  BREO  Inhale 1 puff into the lungs once daily. Controller  Replaces:  fluticasone-vilanterol 100-25 mcg/dose diskus inhaler     levalbuterol 0.63 mg/3 mL nebulizer solution  Commonly known as:  XOPENEX  Take 3 mLs (0.63 mg total) by nebulization every 8 (eight) hours. Rescue     pantoprazole 40 MG tablet  Commonly known as:  PROTONIX  Take 1 tablet (40 mg total) by mouth once daily.     predniSONE 10 MG tablet  Commonly known as:  DELTASONE  Take 4 tablets (40 mg) by mouth for 1 week, Then 2 tablets (20 mg) by mouth for 1 week, Then 1 tablet (10 mg) for 1 week, Then ½ tablet (5 mg) for 1 week   Then STOP     sodium chloride 0.65 % nasal spray  Commonly known as:  OCEAN  1 spray by Nasal route 2 (two) times daily.        CONTINUE taking these medications    calcium carbonate 600 mg calcium (1,500 mg)  Tab  Commonly known as:  OS-STEPHANIE  Take 1 tablet (600 mg total) by mouth once daily. Take Levofloxacin antibiotic at least 2 hours before calcium.     guaiFENesin 600 mg 12 hr tablet  Commonly known as:  MUCINEX  Take 1 tablet (600 mg total) by mouth 2 (two) times daily.     multivit-iron-min-folic acid 3,500-18-0.4 unit-mg-mg Chew  Commonly known as:  CENTRUM  Take 1 tablet by mouth once daily. Take Levofloxacin antibiotic at least 2 hours before multivitamin.     umeclidinium 62.5 mcg/actuation Dsdv  Commonly known as:  INCRUSE ELLIPTA  INHALE 1 PUFF BY MOUTH INTO LUNGS ONCE DAILY     VITAMIN C 500 MG tablet  Generic drug:  ascorbic acid (vitamin C)  Take 500 mg by mouth 2 (two) times daily.        STOP taking these medications    albuterol 90 mcg/actuation inhaler  Commonly known as:  PROVENTIL/VENTOLIN HFA     albuterol-ipratropium 2.5 mg-0.5 mg/3 mL nebulizer solution  Commonly known as:  DUO-NEB     fluticasone-vilanterol 100-25 mcg/dose diskus inhaler  Commonly known as:  BREO  Replaced by:  fluticasone-vilanterol 200-25 mcg/dose Dsdv diskus inhaler     tiotropium 18 mcg inhalation capsule  Commonly known as:  SPIRIVA          Discharge Medication List as of 1/18/2019  4:11 PM      START taking these medications    Details   azithromycin (Z-JERSEY) 250 MG tablet Take 1 tablet (250 mg total) by mouth once daily., Starting Sat 1/19/2019, Normal      fluticasone (FLONASE) 50 mcg/actuation nasal spray Spray twice (100 mcg total) by Each Nare route once daily., Starting Sat 1/19/2019, Normal      fluticasone-vilanterol (BREO) 200-25 mcg/dose DsDv diskus inhaler Inhale 1 puff into the lungs once daily. Controller, Starting Sat 1/19/2019, Normal      pantoprazole (PROTONIX) 40 MG tablet Take 1 tablet (40 mg total) by mouth once daily., Starting Sat 1/19/2019, Until Sun 1/19/2020, Normal      predniSONE (DELTASONE) 10 MG tablet Take 4 tablets (40 mg) by mouth for 1 week, Then 2 tablets (20 mg) by mouth for 1 week, Then 1  tablet (10 mg) for 1 week, Then ½ tablet (5 mg) for 1 week   Then STOP, Normal      sodium chloride (OCEAN) 0.65 % nasal spray 1 spray by Nasal route 2 (two) times daily., Starting Fri 1/18/2019, Normal      levalbuterol (XOPENEX) 0.63 mg/3 mL nebulizer solution Take 3 mLs (0.63 mg total) by nebulization every 8 (eight) hours. Rescue, Starting Fri 1/18/2019, Until Sat 1/18/2020, Normal         CONTINUE these medications which have CHANGED    Details   guaiFENesin (MUCINEX) 600 mg 12 hr tablet Take 1 tablet (600 mg total) by mouth 2 (two) times daily., Starting Fri 1/18/2019, Normal      umeclidinium (INCRUSE ELLIPTA) 62.5 mcg/actuation DsDv INHALE 1 PUFF BY MOUTH INTO LUNGS ONCE DAILY, Normal         CONTINUE these medications which have NOT CHANGED    Details   ascorbic acid, vitamin C, (VITAMIN C) 500 MG tablet Take 500 mg by mouth 2 (two) times daily. , Historical Med      calcium carbonate (OS-STEPHANIE) 600 mg calcium (1,500 mg) Tab Take 1 tablet (600 mg total) by mouth once daily. Take Levofloxacin antibiotic at least 2 hours before calcium., Starting Wed 11/7/2018, OTC      MULTIVIT-IRON-MIN-FOLIC ACID 3,500-18-0.4 UNIT-MG-MG ORAL CHEW Take 1 tablet by mouth once daily. Take Levofloxacin antibiotic at least 2 hours before multivitamin., Starting Wed 11/7/2018, No Print         STOP taking these medications       albuterol 90 mcg/actuation inhaler Comments:   Reason for Stopping:         albuterol-ipratropium (DUO-NEB) 2.5 mg-0.5 mg/3 mL nebulizer solution Comments:   Reason for Stopping:         fluticasone-vilanterol (BREO) 100-25 mcg/dose diskus inhaler Comments:   Reason for Stopping:         tiotropium (SPIRIVA) 18 mcg inhalation capsule Comments:   Reason for Stopping:               Discharge Instructions:  - Dicussed with patient > 45 mins    Kyle Ansari MD  Department of Hospital Medicine

## 2019-01-22 ENCOUNTER — PATIENT OUTREACH (OUTPATIENT)
Dept: ADMINISTRATIVE | Facility: CLINIC | Age: 69
End: 2019-01-22

## 2019-01-22 NOTE — PATIENT INSTRUCTIONS
Discharge Instructions: COPD  You have been diagnosed with chronic obstructive pulmonary disease (COPD). This is a name given to a group of diseases that limit the flow of air in and out of your lungs. This makes it harder to breathe. With COPD, you are also more likely to get lung infections. COPD includes chronic bronchitis and emphysema. COPD is most often caused by heavy, long-term cigarette smoking.  Home care  Quit smoking  · If you smoke, quit. It is the best thing you can do for your COPD and your overall health.  · Join a stop-smoking program. There are even telephone, text message, and Internet programs to help you quit.  · Ask your healthcare provider about medicines or other methods to help you quit.  · Ask family members to quit smoking as well.  · Don't allow people to smoke in your home, in your car, or when they are around you.  Protect yourself from infection  · Wash your hands often. Do your best to keep your hands away from your face. Most germs are spread from your hands to your mouth.  · Get a flu shot every year. Also ask your provider about pneumonia vaccines.  · Avoid crowds. It's especially important to do this in the winter when more people have colds and flu.  · To stay healthy, get enough sleep, exercise regularly, and eat a balanced diet. You should:  ¨ Get about 8 hours of sleep every night.  ¨ Try to exercise for at least 30 minutes on most days.  ¨ Have healthy foods including fruits and vegetables, 100% whole grains, lean meats and fish, and low-fat dairy products. Try to stay away from foods high in fats and sugar.  Take your medicines  Take your medicines exactly as directed. Don't skip doses.  Manage your stress  Stress can make COPD worse. Use this stress management technique:  · Find a quiet place and sit or lie in a comfortable position.  · Close your eyes and perform breathing exercises for several minutes. Ask your provider about the best way to breathe.  Pulmonary  rehabilitation  · Pulmonary rehab can help you feel better. These programs include exercise, breathing techniques, information about COPD, counseling, and help for smokers.  · Ask your provider or your local hospital about programs in your area.  When to call your healthcare provider  Call your provider immediately if you have any of the following:  · Shortness of breath, wheezing, or coughing  · Increased mucus  · Yellow, green, bloody, or smelly mucus  · Fever or chills  · Tightness in your chest that does not go away with rest or medicine  · An irregular heartbeat or a feeling that your heart is beating very fast  · Swollen ankles   Date Last Reviewed: 5/1/2016  © 4079-0544 [employer]. [address] [place] [address], [place] [address]. All rights reserved. This information is not intended as a substitute for professional medical care. Always follow your healthcare professional's instructions.

## 2019-01-31 ENCOUNTER — OFFICE VISIT (OUTPATIENT)
Dept: INTERNAL MEDICINE | Facility: CLINIC | Age: 69
End: 2019-01-31
Payer: MEDICARE

## 2019-01-31 ENCOUNTER — IMMUNIZATION (OUTPATIENT)
Dept: PHARMACY | Facility: CLINIC | Age: 69
End: 2019-01-31
Payer: MEDICARE

## 2019-01-31 VITALS
OXYGEN SATURATION: 95 % | DIASTOLIC BLOOD PRESSURE: 60 MMHG | WEIGHT: 134 LBS | HEIGHT: 68 IN | SYSTOLIC BLOOD PRESSURE: 118 MMHG | BODY MASS INDEX: 20.31 KG/M2 | TEMPERATURE: 97 F | HEART RATE: 63 BPM

## 2019-01-31 DIAGNOSIS — Z87.891 FORMER HEAVY TOBACCO SMOKER: Chronic | ICD-10-CM

## 2019-01-31 DIAGNOSIS — J20.9 COPD (CHRONIC OBSTRUCTIVE PULMONARY DISEASE) WITH ACUTE BRONCHITIS: ICD-10-CM

## 2019-01-31 DIAGNOSIS — Z13.6 SCREENING FOR CARDIOVASCULAR CONDITION: ICD-10-CM

## 2019-01-31 DIAGNOSIS — Z12.31 SCREENING MAMMOGRAM, ENCOUNTER FOR: ICD-10-CM

## 2019-01-31 DIAGNOSIS — J44.0 COPD (CHRONIC OBSTRUCTIVE PULMONARY DISEASE) WITH ACUTE BRONCHITIS: ICD-10-CM

## 2019-01-31 DIAGNOSIS — Z78.0 POST-MENOPAUSE: ICD-10-CM

## 2019-01-31 DIAGNOSIS — Z12.11 SCREENING FOR COLON CANCER: ICD-10-CM

## 2019-01-31 DIAGNOSIS — Z23 NEED FOR TETANUS BOOSTER: ICD-10-CM

## 2019-01-31 DIAGNOSIS — I27.20 PULMONARY HTN: ICD-10-CM

## 2019-01-31 PROCEDURE — 90471 IMMUNIZATION ADMIN: CPT | Mod: PBBFAC

## 2019-01-31 PROCEDURE — 90715 TDAP VACCINE 7 YRS/> IM: CPT | Mod: PBBFAC

## 2019-01-31 PROCEDURE — 99214 PR OFFICE/OUTPT VISIT, EST, LEVL IV, 30-39 MIN: ICD-10-PCS | Mod: S$PBB,,, | Performed by: FAMILY MEDICINE

## 2019-01-31 PROCEDURE — 99214 OFFICE O/P EST MOD 30 MIN: CPT | Mod: S$PBB,,, | Performed by: FAMILY MEDICINE

## 2019-01-31 PROCEDURE — 99999 PR PBB SHADOW E&M-EST. PATIENT-LVL V: ICD-10-PCS | Mod: PBBFAC,,, | Performed by: FAMILY MEDICINE

## 2019-01-31 PROCEDURE — 99496 TRANSJ CARE MGMT HIGH F2F 7D: CPT | Mod: PBBFAC | Performed by: FAMILY MEDICINE

## 2019-01-31 PROCEDURE — 99215 OFFICE O/P EST HI 40 MIN: CPT | Mod: PBBFAC | Performed by: FAMILY MEDICINE

## 2019-01-31 PROCEDURE — 99999 PR PBB SHADOW E&M-EST. PATIENT-LVL V: CPT | Mod: PBBFAC,,, | Performed by: FAMILY MEDICINE
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
## 2019-03-04 ENCOUNTER — TELEPHONE (OUTPATIENT)
Dept: INTERNAL MEDICINE | Facility: CLINIC | Age: 69
End: 2019-03-04

## 2019-03-04 NOTE — TELEPHONE ENCOUNTER
"----- Message from Iglesia Jorge sent at 3/4/2019  8:13 AM CST -----  Contact: Patient [phone]  Patient calling stating is needing office to fill out a form for the Medicare Part B, for Insurance, would like to give the information to office that is needed.    Is needing Diagnosis code, "D W O" for for Medicare Part B, fax to josé miguel [phone] and then José Miguel will fax to medicare. Rx  levalbuterol (XOPENEX) 0.63 mg/3 mL nebulizer solution.     Please call an advise  Thank you            "

## 2019-03-07 ENCOUNTER — TELEPHONE (OUTPATIENT)
Dept: INTERNAL MEDICINE | Facility: CLINIC | Age: 69
End: 2019-03-07

## 2019-03-07 DIAGNOSIS — J44.1 COPD EXACERBATION: Primary | ICD-10-CM

## 2019-03-07 RX ORDER — LEVALBUTEROL TARTRATE 45 UG/1
1-2 AEROSOL, METERED ORAL EVERY 4 HOURS PRN
Qty: 15 G | Refills: 3 | Status: SHIPPED | OUTPATIENT
Start: 2019-03-07 | End: 2019-03-21 | Stop reason: ALTCHOICE

## 2019-03-07 NOTE — TELEPHONE ENCOUNTER
"----- Message from Lalita Esquivel sent at 3/7/2019  8:11 AM CST -----  Contact: [phone]  Patient is checking the status of previous message : needing office to fill out a form for the Medicare Part B, for Insurance, would like to give the information to office that is needed.     Is needing Diagnosis code, "D W O" for for Medicare Part B, fax to josé miguel [phone] and then José Miguel will fax to medicare. Rx  levalbuterol (XOPENEX) 0.63 mg/3 mL nebulizer solution.     Please advise, thank you   "

## 2019-03-07 NOTE — TELEPHONE ENCOUNTER
Spoke with pharmacist,  Xopenex nebulizer treatment is not covered, however, Xopenex inhaler is covered with $30 copay.

## 2019-03-07 NOTE — TELEPHONE ENCOUNTER
Rx changed to Xopenex inhaler instead of nebulizer.  Patient's insurance will cover inhaler but not nebulizer.  Rx sent in electronically.  Please notify patient.

## 2019-03-07 NOTE — TELEPHONE ENCOUNTER
Spoke with pt, she is now saying that shew never wanted Xopenex( pt states that she's not even on this meidcation). Pt states that she will get back home and call back.

## 2019-03-08 RX ORDER — PREDNISONE 20 MG/1
40 TABLET ORAL DAILY
Qty: 14 TABLET | Refills: 0 | Status: SHIPPED | OUTPATIENT
Start: 2019-03-08 | End: 2019-03-15

## 2019-03-08 RX ORDER — DOXYCYCLINE HYCLATE 100 MG
100 TABLET ORAL 2 TIMES DAILY
Qty: 10 TABLET | Refills: 0 | Status: SHIPPED | OUTPATIENT
Start: 2019-03-08 | End: 2019-03-13

## 2019-03-08 NOTE — TELEPHONE ENCOUNTER
Rx sent in for doxycycline and prednisone.  Needs urgent care appointment as well.  Please notify.

## 2019-03-08 NOTE — TELEPHONE ENCOUNTER
"----- Message from Irma Becerra sent at 3/8/2019  1:02 PM CST -----  Contact: pt [phone]  Pt called in regards to getting a [employer] form for solution nebulizer. Pt is havingPatient would like to get medical advice.  Symptoms (please be specific):  Copd flare up  How long has patient had these symptoms:    Pharmacy name and phone # (DON'T enter "on file" or "in chart"):    Any drug allergies:    Would the patient rather a call back or a response via [facility]?:    Comments:  Pt would like predniSONE (DELTASONE) 10 MG tablet   [facility] [address] [place] [address] [place] - [address] [facility] [address] [phone] (Phone)  [phone] (Fax)        ----- Message from Lalita Esquivel sent at 3/7/2019  8:11 AM CST -----  Contact: [phone]  Patient is checking the status of previous message : needing office to fill out a form for the Medicare Part B, for Insurance, would like to give the information to office that is needed.     Is needing Diagnosis code, "D W O" for for Medicare Part B, fax to adrián [phone] and then [facility] will fax to medicare. Rx  levalbuterol (XOPENEX) 0.63 mg/3 mL nebulizer solution.      Please advise, thank you   "

## 2019-03-08 NOTE — TELEPHONE ENCOUNTER
Message left for pt to call office  Rx sent in for doxycycline and prednisone.  Needs urgent care appointment as well.  Please notify.

## 2019-03-08 NOTE — TELEPHONE ENCOUNTER
Spoke with pt and she stated that she needs a steroid or antibiotic because shes is have a flare up copd   Pt also states that a form was faxed over from [facility]  will look through paperwork to verify

## 2019-03-12 ENCOUNTER — TELEPHONE (OUTPATIENT)
Dept: INTERNAL MEDICINE | Facility: CLINIC | Age: 69
End: 2019-03-12

## 2019-03-12 NOTE — TELEPHONE ENCOUNTER
----- Message from Mindi Wick sent at 3/12/2019 10:09 AM CDT -----  Contact: [phone]  Patient requesting a call from the office in regards to her solution for  Nebulizer. Please call and advise,Thanks

## 2019-03-12 NOTE — TELEPHONE ENCOUNTER
Spoke with pt notified her we didn't receive anything from the pharmacy she stated she will give pharmacy a call in regards

## 2019-03-15 ENCOUNTER — TELEPHONE (OUTPATIENT)
Dept: INTERNAL MEDICINE | Facility: CLINIC | Age: 69
End: 2019-03-15

## 2019-03-15 NOTE — TELEPHONE ENCOUNTER
Pt spouse stopped by in regards to pts nebulizer med..she states that the pharmacy says they faxed over the form   Attempted to reach out to pts pharmacy to see why we havent received the fax for medicare part b ...was left on hold for 10 minutes   Will try again

## 2019-03-21 ENCOUNTER — OFFICE VISIT (OUTPATIENT)
Dept: INTERNAL MEDICINE | Facility: CLINIC | Age: 69
End: 2019-03-21
Payer: MEDICARE

## 2019-03-21 ENCOUNTER — HOSPITAL ENCOUNTER (OUTPATIENT)
Dept: RADIOLOGY | Facility: HOSPITAL | Age: 69
Discharge: HOME | End: 2019-03-21
Attending: FAMILY MEDICINE
Payer: MEDICARE

## 2019-03-21 VITALS
OXYGEN SATURATION: 96 % | WEIGHT: 136 LBS | HEART RATE: 95 BPM | TEMPERATURE: 98 F | BODY MASS INDEX: 20.61 KG/M2 | SYSTOLIC BLOOD PRESSURE: 110 MMHG | HEIGHT: 68 IN | DIASTOLIC BLOOD PRESSURE: 68 MMHG

## 2019-03-21 DIAGNOSIS — J44.0 COPD (CHRONIC OBSTRUCTIVE PULMONARY DISEASE) WITH ACUTE BRONCHITIS: ICD-10-CM

## 2019-03-21 DIAGNOSIS — J20.9 COPD (CHRONIC OBSTRUCTIVE PULMONARY DISEASE) WITH ACUTE BRONCHITIS: ICD-10-CM

## 2019-03-21 DIAGNOSIS — J44.0 COPD (CHRONIC OBSTRUCTIVE PULMONARY DISEASE) WITH ACUTE BRONCHITIS: Primary | ICD-10-CM

## 2019-03-21 DIAGNOSIS — J20.9 COPD (CHRONIC OBSTRUCTIVE PULMONARY DISEASE) WITH ACUTE BRONCHITIS: Primary | ICD-10-CM

## 2019-03-21 PROCEDURE — 71046 X-RAY EXAM CHEST 2 VIEWS: CPT | Mod: TC

## 2019-03-21 PROCEDURE — 71046 XR CHEST PA AND LATERAL: ICD-10-PCS | Mod: 26,,, | Performed by: RADIOLOGY

## 2019-03-21 PROCEDURE — 99213 OFFICE O/P EST LOW 20 MIN: CPT | Mod: PBBFAC,25 | Performed by: FAMILY MEDICINE

## 2019-03-21 PROCEDURE — 99213 PR OFFICE/OUTPT VISIT, EST, LEVL III, 20-29 MIN: ICD-10-PCS | Mod: S$PBB,,, | Performed by: FAMILY MEDICINE

## 2019-03-21 PROCEDURE — 71046 X-RAY EXAM CHEST 2 VIEWS: CPT | Mod: 26,,, | Performed by: RADIOLOGY

## 2019-03-21 PROCEDURE — 99999 PR PBB SHADOW E&M-EST. PATIENT-LVL III: CPT | Mod: PBBFAC,,, | Performed by: FAMILY MEDICINE

## 2019-03-21 PROCEDURE — 99999 PR PBB SHADOW E&M-EST. PATIENT-LVL III: ICD-10-PCS | Mod: PBBFAC,,, | Performed by: FAMILY MEDICINE

## 2019-03-21 PROCEDURE — 99213 OFFICE O/P EST LOW 20 MIN: CPT | Mod: S$PBB,,, | Performed by: FAMILY MEDICINE

## 2019-03-21 RX ORDER — LEVALBUTEROL INHALATION SOLUTION 0.63 MG/3ML
3 SOLUTION RESPIRATORY (INHALATION) EVERY 8 HOURS PRN
Qty: 3 ML | Refills: 11 | Status: SHIPPED | OUTPATIENT
Start: 2019-03-21 | End: 2019-09-11 | Stop reason: SDUPTHER
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
## 2019-03-22 ENCOUNTER — OFFICE VISIT (OUTPATIENT)
Dept: PULMONOLOGY | Facility: CLINIC | Age: 69
End: 2019-03-22
Payer: MEDICARE

## 2019-03-22 VITALS — WEIGHT: 138.44 LBS | BODY MASS INDEX: 20.98 KG/M2 | HEIGHT: 68 IN | HEART RATE: 94 BPM | OXYGEN SATURATION: 90 %

## 2019-03-22 DIAGNOSIS — J44.0 COPD (CHRONIC OBSTRUCTIVE PULMONARY DISEASE) WITH ACUTE BRONCHITIS: Primary | ICD-10-CM

## 2019-03-22 DIAGNOSIS — J20.9 COPD (CHRONIC OBSTRUCTIVE PULMONARY DISEASE) WITH ACUTE BRONCHITIS: Primary | ICD-10-CM

## 2019-03-22 DIAGNOSIS — R09.82 POST-NASAL DRIP: ICD-10-CM

## 2019-03-22 DIAGNOSIS — J96.11 CHRONIC RESPIRATORY FAILURE WITH HYPOXIA: ICD-10-CM

## 2019-03-22 PROCEDURE — 99214 PR OFFICE/OUTPT VISIT, EST, LEVL IV, 30-39 MIN: ICD-10-PCS | Mod: S$PBB,GC,, | Performed by: HOSPITALIST

## 2019-03-22 PROCEDURE — 99213 OFFICE O/P EST LOW 20 MIN: CPT | Mod: PBBFAC | Performed by: HOSPITALIST

## 2019-03-22 PROCEDURE — 99999 PR PBB SHADOW E&M-EST. PATIENT-LVL III: ICD-10-PCS | Mod: PBBFAC,GC,, | Performed by: HOSPITALIST

## 2019-03-22 PROCEDURE — 99214 OFFICE O/P EST MOD 30 MIN: CPT | Mod: S$PBB,GC,, | Performed by: HOSPITALIST

## 2019-03-22 PROCEDURE — 99999 PR PBB SHADOW E&M-EST. PATIENT-LVL III: CPT | Mod: PBBFAC,GC,, | Performed by: HOSPITALIST

## 2019-03-22 RX ORDER — AZELASTINE 1 MG/ML
1 SPRAY, METERED NASAL 2 TIMES DAILY
Qty: 30 ML | Refills: 3 | Status: SHIPPED | OUTPATIENT
Start: 2019-03-22 | End: 2020-10-05 | Stop reason: SDUPTHER

## 2019-03-22 RX ORDER — AZITHROMYCIN 250 MG/1
250 TABLET, FILM COATED ORAL
Qty: 36 TABLET | Refills: 3 | Status: SHIPPED | OUTPATIENT
Start: 2019-03-22 | End: 2020-02-17 | Stop reason: SDUPTHER

## 2019-03-22 RX ORDER — ACETYLCYSTEINE 600 MG
600 CAPSULE ORAL 2 TIMES DAILY
Qty: 90 CAPSULE | Refills: 3 | COMMUNITY
Start: 2019-03-22 | End: 2023-01-23

## 2019-03-22 NOTE — PROGRESS NOTES
Subjective:       Patient ID: Estefani Fam is a 68 y.o. female.    Chief Complaint: Cough; COPD; and Shortness of Breath    68yoF with hx of COPD (GOLD D) on triple therapy and 2LNC of home O2 presenting to follow up after a hospitalization.  Reports she has been hospitalized 4 times in the last 8 months and usually it was because she was not back to baseline when she was discharge.  During her most recent admission she was discharged on a steroid taper for a month.  She reports feeling back to her baseline.  Two weeks ago, she had some increased dyspnea and she was prescribed Prednisone and Doxycycline with improvement.  She reports a significant component of post nasal drip to her symptoms.  Does not have a significant cough or sputum production even with exacerbations.  She is currently in Pulmonary Rehab.  Had an SLP evaluation during her recent hospitalization, cleared for regular/thin diet.      Review of Systems   Constitutional: Negative for chills, activity change, fatigue and weakness.   HENT: Positive for postnasal drip and rhinorrhea. Negative for nosebleeds, sore throat and trouble swallowing.    Eyes: Negative for redness and itching.   Respiratory: Positive for shortness of breath and dyspnea on extertion. Negative for cough, sputum production, chest tightness, previous hospitialization due to pulmonary problems, pleurisy and somnolence.    Genitourinary: Negative for difficulty urinating and hematuria.   Musculoskeletal: Negative for arthralgias, back pain and gait problem.   Skin: Negative for rash.   Gastrointestinal: Negative for abdominal distention.   Neurological: Negative for dizziness and headaches.   Hematological: Negative for adenopathy. Does not bruise/bleed easily.   Psychiatric/Behavioral: Negative for confusion.       Objective:      Physical Exam   Constitutional: She is oriented to person, place, and time. She appears well-developed and well-nourished.   HENT:   Mouth/Throat:  No oropharyngeal exudate.   Neck: Neck supple. No JVD present.   Cardiovascular: Normal rate.   No murmur heard.  Pulmonary/Chest: Normal expansion and hyperinflation. No stridor. She has decreased breath sounds. She has wheezes. She has no rhonchi.   Abdominal: Soft. There is no guarding.   Musculoskeletal: Normal range of motion. She exhibits no edema.   Neurological: She is alert and oriented to person, place, and time.   Skin: Skin is warm. She is not diaphoretic. No erythema.     Personal Diagnostic Review  Prior PFTs with severe obstruction.    No flowsheet data found.      Assessment:       1. COPD (chronic obstructive pulmonary disease) with acute bronchitis    2. Post-nasal drip        Outpatient Encounter Medications as of 3/22/2019   Medication Sig Dispense Refill    ascorbic acid, vitamin C, (VITAMIN C) 500 MG tablet Take 500 mg by mouth 2 (two) times daily.       calcium carbonate (OS-STEPHANIE) 600 mg calcium (1,500 mg) Tab Take 1 tablet (600 mg total) by mouth once daily. Take Levofloxacin antibiotic at least 2 hours before calcium.  0    fluticasone (FLONASE) 50 mcg/actuation nasal spray Spray twice (100 mcg total) by Each Nare route once daily. 16 g 11    fluticasone-vilanterol (BREO) 200-25 mcg/dose DsDv diskus inhaler Inhale 1 puff into the lungs once daily. Controller 60 each 11    guaiFENesin (MUCINEX) 600 mg 12 hr tablet Take 1 tablet (600 mg total) by mouth 2 (two) times daily. 60 tablet 5    levalbuterol (XOPENEX) 0.63 mg/3 mL nebulizer solution Take 3 mLs (0.63 mg total) by nebulization every 8 (eight) hours as needed for Wheezing. Rescue 3 mL 11    MULTIVIT-IRON-MIN-FOLIC ACID 3,500-18-0.4 UNIT-MG-MG ORAL CHEW Take 1 tablet by mouth once daily. Take Levofloxacin antibiotic at least 2 hours before multivitamin.  0    pantoprazole (PROTONIX) 40 MG tablet Take 1 tablet (40 mg total) by mouth once daily. 30 tablet 11    predniSONE (DELTASONE) 10 MG tablet Take 4 tablets (40 mg) by mouth for  1 week, Then 2 tablets (20 mg) by mouth for 1 week, Then 1 tablet (10 mg) for 1 week, Then ½ tablet (5 mg) for 1 week   Then STOP 56 tablet 0    sodium chloride (OCEAN) 0.65 % nasal spray 1 spray by Nasal route 2 (two) times daily. 88 mL 12    umeclidinium (INCRUSE ELLIPTA) 62.5 mcg/actuation DsDv INHALE 1 PUFF BY MOUTH INTO LUNGS ONCE DAILY 30 each 20    [DISCONTINUED] azithromycin (Z-JERSEY) 250 MG tablet Take 1 tablet (250 mg total) by mouth once daily. 1 tablet 0     No facility-administered encounter medications on file as of 3/22/2019.      No orders of the defined types were placed in this encounter.      Plan:       COPD (chronic obstructive pulmonary disease) with acute bronchitis  GOLD D on triple therapy.  Recurrent exacerbations despite this.  Aggressive control of PND as above.  No GERD.  Will add Azithromycin thrice weekly.  On NAC.  Refer to Pulm Rehab.    Post-nasal drip  On Flonase.  Add Azelastin.    Discussed w/ Dr. Ruiz.  Follow up with Dr. Ruiz in 4 months.    Mike Mcfarland MD  [facility]/Critical [facility] Fellow [facility]

## 2019-03-22 NOTE — ASSESSMENT & PLAN NOTE
GOLD D on triple therapy.  Recurrent exacerbations despite this.  Aggressive control of PND as above.  No GERD.  Will add Azithromycin thrice weekly.  Will also add NAC.  Refer to Pulm Rehab.

## 2019-03-23 PROBLEM — J96.11 CHRONIC RESPIRATORY FAILURE WITH HYPOXIA: Status: ACTIVE | Noted: 2019-03-23

## 2019-03-23 NOTE — PROGRESS NOTES
Problem List Items Addressed This Visit     COPD (chronic obstructive pulmonary disease) with acute bronchitis - Primary    Overview     Continue Incruse, breo, NAC and oxygen for maintenance, control  Add azithromycin as had near exacerbation recently.  Albuterol for rescue and prevention.    [facility]         Current Assessment & Plan     GOLD D on triple therapy.  Recurrent exacerbations despite this.  Aggressive control of PND as above.  No GERD.  Will add Azithromycin thrice weekly.  Will also add NAC.  Refer to Pulm Rehab.         Relevant Orders    Ambulatory Referral to Pulmonary Rehab    Post-nasal drip    Current Assessment & Plan     On Flonase.  Add Azelastin.         Chronic respiratory failure with hypoxia    Overview     Continue supplemental oxygen

## 2019-03-25 ENCOUNTER — TELEPHONE (OUTPATIENT)
Dept: INTERNAL MEDICINE | Facility: CLINIC | Age: 69
End: 2019-03-25

## 2019-03-25 NOTE — TELEPHONE ENCOUNTER
----- Message from Luh Brady sent at 3/25/2019  8:51 AM CDT -----  Contact: self/[phone]  Patient called in regards needing to talk with Dr Jackson medical assistant about a form that [facility] send to the office. Patient would like to know if it has been receive and send back. Please call and advise. Thank you!!!

## 2019-03-25 NOTE — TELEPHONE ENCOUNTER
Spoke with jen from pharmacy and she stated that she will be faxing over the forms pharmacy again had the wrong fax number our direct fax line was given   Spoke with pt and she stated verbalized understanding

## 2019-03-28 ENCOUNTER — TELEPHONE (OUTPATIENT)
Dept: INTERNAL MEDICINE | Facility: CLINIC | Age: 69
End: 2019-03-28

## 2019-03-28 NOTE — TELEPHONE ENCOUNTER
Spoke with pt and she stated verbalized understanding  Notified pt that form had been completed

## 2019-03-28 NOTE — TELEPHONE ENCOUNTER
----- Message from Dorothy Esposito sent at 3/28/2019  8:35 AM CDT -----  Contact: Patient [phone]  Please send completed form back to [facility]    Please call and advise  Thank you

## 2019-04-01 ENCOUNTER — TELEPHONE (OUTPATIENT)
Dept: INTERNAL MEDICINE | Facility: CLINIC | Age: 69
End: 2019-04-01

## 2019-04-01 RX ORDER — METHYLPREDNISOLONE 4 MG/1
TABLET ORAL
Qty: 1 PACKAGE | Refills: 0 | Status: SHIPPED | OUTPATIENT
Start: 2019-04-01 | End: 2019-04-22

## 2019-04-01 NOTE — TELEPHONE ENCOUNTER
Message left for pt to call office  Sent in Rx for medrol dosepak; recommend UC appointment for further evaluation as well.  Please notify patient.

## 2019-04-01 NOTE — TELEPHONE ENCOUNTER
Sent in Rx for medrol dosepak; recommend UC appointment for further evaluation as well.  Please notify patient.

## 2019-04-01 NOTE — TELEPHONE ENCOUNTER
----- Message from Sulema Fernandez sent at 4/1/2019  9:14 AM CDT -----  Contact: self/[phone]  Pt called in regards to getting another found of steroids due to her copd is acting up again.       Please advise

## 2019-04-29 ENCOUNTER — TELEPHONE (OUTPATIENT)
Dept: PULMONOLOGY | Facility: CLINIC | Age: 69
End: 2019-04-29

## 2019-04-29 NOTE — TELEPHONE ENCOUNTER
Spoke with Tila informed her that yes we have received the orders that was faxed over on this patient, that once Dr. Ruiz complete and sign forms we will fax them back over to her.

## 2019-04-29 NOTE — TELEPHONE ENCOUNTER
----- Message from Sherrie Gilmore sent at 4/29/2019  3:11 PM CDT -----  Contact:   Tila G[facility]ens [facility]  / [facility]     Reason for call:  Orders was fax over for the pt to start rehab , call back to verify         Communication Preference:   Phone  [phone]    Additional Information:

## 2019-05-16 ENCOUNTER — TELEPHONE (OUTPATIENT)
Dept: PULMONOLOGY | Facility: CLINIC | Age: 69
End: 2019-05-16

## 2019-05-16 NOTE — TELEPHONE ENCOUNTER
I spoke to Ms Fam to let her know I would call  pulmonary rehab. Pt verbalized understanding.  I spoke to  pulmonary rehab and faxed form over with diagnosis code for pt to continue pulmonary rehab.

## 2019-05-16 NOTE — TELEPHONE ENCOUNTER
----- Message from Sherrie Gilmore sent at 5/16/2019  8:29 AM CDT -----  Contact:    Pt  [phone]  Needs Advice    Reason for call:  Pt  Pulmonary rehab nurse needs na diagnose code in order for the pt to continue therapy .... Call back to discuss         Communication Preference:   Phone     Additional Information:

## 2019-06-20 ENCOUNTER — OFFICE VISIT (OUTPATIENT)
Dept: PULMONOLOGY | Facility: CLINIC | Age: 69
End: 2019-06-20
Payer: MEDICARE

## 2019-06-20 VITALS
BODY MASS INDEX: 21.42 KG/M2 | SYSTOLIC BLOOD PRESSURE: 122 MMHG | DIASTOLIC BLOOD PRESSURE: 77 MMHG | OXYGEN SATURATION: 93 % | HEART RATE: 89 BPM | WEIGHT: 141.31 LBS | HEIGHT: 68 IN

## 2019-06-20 DIAGNOSIS — J43.2 CENTRILOBULAR EMPHYSEMA: ICD-10-CM

## 2019-06-20 DIAGNOSIS — J96.11 CHRONIC RESPIRATORY FAILURE WITH HYPOXIA: Primary | ICD-10-CM

## 2019-06-20 DIAGNOSIS — R05.9 COUGH IN ADULT: ICD-10-CM

## 2019-06-20 DIAGNOSIS — I27.20 PULMONARY HTN: ICD-10-CM

## 2019-06-20 PROBLEM — J44.9 OBSTRUCTIVE LUNG DISEASE (GENERALIZED): Status: RESOLVED | Noted: 2018-07-02 | Resolved: 2019-06-20

## 2019-06-20 PROBLEM — J20.9 COPD (CHRONIC OBSTRUCTIVE PULMONARY DISEASE) WITH ACUTE BRONCHITIS: Status: RESOLVED | Noted: 2018-11-05 | Resolved: 2019-06-20

## 2019-06-20 PROBLEM — J44.0 COPD (CHRONIC OBSTRUCTIVE PULMONARY DISEASE) WITH ACUTE BRONCHITIS: Status: RESOLVED | Noted: 2018-11-05 | Resolved: 2019-06-20

## 2019-06-20 PROCEDURE — 99999 PR PBB SHADOW E&M-EST. PATIENT-LVL III: CPT | Mod: PBBFAC,,, | Performed by: INTERNAL MEDICINE

## 2019-06-20 PROCEDURE — 99213 OFFICE O/P EST LOW 20 MIN: CPT | Mod: PBBFAC | Performed by: INTERNAL MEDICINE

## 2019-06-20 PROCEDURE — 99214 PR OFFICE/OUTPT VISIT, EST, LEVL IV, 30-39 MIN: ICD-10-PCS | Mod: S$PBB,,, | Performed by: INTERNAL MEDICINE

## 2019-06-20 PROCEDURE — 99999 PR PBB SHADOW E&M-EST. PATIENT-LVL III: ICD-10-PCS | Mod: PBBFAC,,, | Performed by: INTERNAL MEDICINE

## 2019-06-20 PROCEDURE — 99214 OFFICE O/P EST MOD 30 MIN: CPT | Mod: S$PBB,,, | Performed by: INTERNAL MEDICINE

## 2019-06-20 NOTE — PROGRESS NOTES
"Subjective:       Patient ID: Estefani Fam is a 68 y.o. female.    Chief Complaint: COPD    68 year old with a history of emphysema with frequent exacerbations.  On maximum therapy with incruse and breo.  Azithromycin and NAC has been added since last exacerbation was in January.  Has been doing well since last hospitalization.  Participating in pulmonary rehab.  Cough is present but non-productive.  Weight is stable, appetite is good.      Review of Systems   Constitutional: Positive for weight gain (20 # in the past two years).   HENT: Negative for trouble swallowing.    Respiratory: Negative for sputum production.    Gastrointestinal: Negative for acid reflux.       Objective:       Vitals:    06/20/19 0833   BP: 122/77   BP Location: Left arm   Patient Position: Sitting   Pulse: 89   SpO2: (!) 93%  Comment: 2.0   Weight: 64.1 kg (141 lb 5 oz)   Height: 5' 8" (1.727 m)     Physical Exam   Constitutional: She appears well-developed and well-nourished.   Cardiovascular: Normal rate and regular rhythm.   Pulmonary/Chest: Normal expansion. She has no wheezes. She has no rales.   Neurological: She is alert.   Skin: Skin is warm and dry.   Psychiatric: She has a normal mood and affect.        Personal Diagnostic Review  CT of chest performed on 1/14/2019 without contrast revealed stable pulmonary nodules and emphysemaatous changes..  No flowsheet data found.      Assessment:       1. Chronic respiratory failure with hypoxia    2. Cough in adult    3. Pulmonary HTN    4. Centrilobular emphysema        Outpatient Encounter Medications as of 6/20/2019   Medication Sig Dispense Refill    acetylcysteine 600 mg Cap Take 1 capsule (600 mg total) by mouth 2 (two) times daily. 90 capsule 3    ascorbic acid, vitamin C, (VITAMIN C) 500 MG tablet Take 500 mg by mouth 2 (two) times daily.       azelastine (ASTELIN) 137 mcg (0.1 %) nasal spray 1 spray (137 mcg total) by Nasal route 2 (two) times daily. 30 mL 3    " azithromycin (Z-JERSEY) 250 MG tablet Take 1 tablet (250 mg total) by mouth every Mon, Wed, Fri. 36 tablet 3    calcium carbonate (OS-STEPHANIE) 600 mg calcium (1,500 mg) Tab Take 1 tablet (600 mg total) by mouth once daily. Take Levofloxacin antibiotic at least 2 hours before calcium.  0    fluticasone (FLONASE) 50 mcg/actuation nasal spray Spray twice (100 mcg total) by Each Nare route once daily. 16 g 11    fluticasone-vilanterol (BREO) 200-25 mcg/dose DsDv diskus inhaler Inhale 1 puff into the lungs once daily. Controller 60 each 11    guaiFENesin (MUCINEX) 600 mg 12 hr tablet Take 1 tablet (600 mg total) by mouth 2 (two) times daily. 60 tablet 5    levalbuterol (XOPENEX) 0.63 mg/3 mL nebulizer solution Take 3 mLs (0.63 mg total) by nebulization every 8 (eight) hours as needed for Wheezing. Rescue 3 mL 11    MULTIVIT-IRON-MIN-FOLIC ACID 3,500-18-0.4 UNIT-MG-MG ORAL CHEW Take 1 tablet by mouth once daily. Take Levofloxacin antibiotic at least 2 hours before multivitamin.  0    pantoprazole (PROTONIX) 40 MG tablet Take 1 tablet (40 mg total) by mouth once daily. 30 tablet 11    sodium chloride (OCEAN) 0.65 % nasal spray 1 spray by Nasal route 2 (two) times daily. 88 mL 12    umeclidinium (INCRUSE ELLIPTA) 62.5 mcg/actuation DsDv INHALE 1 PUFF BY MOUTH INTO LUNGS ONCE DAILY 30 each 20    [DISCONTINUED] predniSONE (DELTASONE) 10 MG tablet Take 4 tablets (40 mg) by mouth for 1 week, Then 2 tablets (20 mg) by mouth for 1 week, Then 1 tablet (10 mg) for 1 week, Then ½ tablet (5 mg) for 1 week   Then STOP 56 tablet 0     No facility-administered encounter medications on file as of 6/20/2019.      Orders Placed This Encounter   Procedures    Spirometry without Bronchodilator     Standing Status:   Future     Standing Expiration Date:   6/20/2020    DLCO-Carbon Monoxide Diffusing Capacity     Standing Status:   Future     Standing Expiration Date:   6/19/2020    Stress test, pulmonary     Standing Status:   Future      Standing Expiration Date:   6/19/2020     Plan:     Problem List Items Addressed This Visit     Cough in adult    Overview     Secondary to COPD on NAC and zmax TIW         Pulmonary HTN    Overview     secondary         Chronic respiratory failure with hypoxia - Primary    Overview     Continue supplemental oxygen         Relevant Orders    Spirometry without Bronchodilator    DLCO-Carbon Monoxide Diffusing Capacity    Stress test, pulmonary    Centrilobular emphysema    Overview     Doing well.  Last exacerbation 1/2019.  Continue incruse, BREO, NAC and zmax TIW for control  Albuterol for rescue and prevention         Relevant Orders    Spirometry without Bronchodilator    DLCO-Carbon Monoxide Diffusing Capacity    Stress test, pulmonary

## 2019-07-08 ENCOUNTER — TELEPHONE (OUTPATIENT)
Dept: INTERNAL MEDICINE | Facility: CLINIC | Age: 69
End: 2019-07-08

## 2019-07-08 NOTE — TELEPHONE ENCOUNTER
Spoke with pt, she has excerebration of COPD and would like steroids. She called her pulmonologist and was told to contact PCP. Appt was made for tomorrow morning for UC visit.

## 2019-07-08 NOTE — TELEPHONE ENCOUNTER
----- Message from Yarelis Coreas sent at 7/8/2019  8:29 AM CDT -----  Contact: Pt   Pt would like to be called back regarding health concerns. Pt feels she need to be prescribed steroids for her possible COPD Episode.     Pt can be reached at [phone].

## 2019-07-08 NOTE — TELEPHONE ENCOUNTER
Please call patient and enquire what her concerns are.  Would recommend an urgent care appointment for evaluation prior to prescription of steroids.  Can also follow up with her pulmonologist.  Please notify.

## 2019-07-09 ENCOUNTER — OFFICE VISIT (OUTPATIENT)
Dept: INTERNAL MEDICINE | Facility: CLINIC | Age: 69
End: 2019-07-09
Attending: FAMILY MEDICINE
Payer: MEDICARE

## 2019-07-09 ENCOUNTER — TELEPHONE (OUTPATIENT)
Dept: INTERNAL MEDICINE | Facility: CLINIC | Age: 69
End: 2019-07-09

## 2019-07-09 VITALS
WEIGHT: 140.13 LBS | HEIGHT: 68 IN | OXYGEN SATURATION: 94 % | BODY MASS INDEX: 21.24 KG/M2 | SYSTOLIC BLOOD PRESSURE: 124 MMHG | HEART RATE: 86 BPM | DIASTOLIC BLOOD PRESSURE: 64 MMHG | TEMPERATURE: 98 F

## 2019-07-09 DIAGNOSIS — J44.1 COPD EXACERBATION: Primary | ICD-10-CM

## 2019-07-09 DIAGNOSIS — J43.2 CENTRILOBULAR EMPHYSEMA: ICD-10-CM

## 2019-07-09 DIAGNOSIS — J96.11 CHRONIC RESPIRATORY FAILURE WITH HYPOXIA: ICD-10-CM

## 2019-07-09 PROCEDURE — 99213 PR OFFICE/OUTPT VISIT, EST, LEVL III, 20-29 MIN: ICD-10-PCS | Mod: S$PBB,,, | Performed by: FAMILY MEDICINE

## 2019-07-09 PROCEDURE — 99213 OFFICE O/P EST LOW 20 MIN: CPT | Mod: PBBFAC | Performed by: FAMILY MEDICINE

## 2019-07-09 PROCEDURE — 99999 PR PBB SHADOW E&M-EST. PATIENT-LVL III: ICD-10-PCS | Mod: PBBFAC,,, | Performed by: FAMILY MEDICINE

## 2019-07-09 PROCEDURE — 99213 OFFICE O/P EST LOW 20 MIN: CPT | Mod: S$PBB,,, | Performed by: FAMILY MEDICINE

## 2019-07-09 PROCEDURE — 99999 PR PBB SHADOW E&M-EST. PATIENT-LVL III: CPT | Mod: PBBFAC,,, | Performed by: FAMILY MEDICINE

## 2019-07-09 RX ORDER — METHYLPREDNISOLONE 4 MG/1
TABLET ORAL
Qty: 1 PACKAGE | Refills: 0 | Status: SHIPPED | OUTPATIENT
Start: 2019-07-09 | End: 2019-07-18

## 2019-07-09 NOTE — PROGRESS NOTES
Subjective:       Patient ID: Estefani Fam is a 68 y.o. female.    Chief Complaint: needs steroid for POCD    HPI  Review of Systems   Constitutional: Negative for chills, fatigue, fever and unexpected weight change.   HENT: Negative for congestion and trouble swallowing.    Eyes: Negative for redness and visual disturbance.   Respiratory: Positive for cough, chest tightness and shortness of breath.    Cardiovascular: Negative for chest pain, palpitations and leg swelling.   Gastrointestinal: Negative for abdominal pain and blood in stool.   Genitourinary: Negative for difficulty urinating, hematuria and menstrual problem.   Musculoskeletal: Negative for arthralgias, back pain, gait problem, joint swelling, myalgias and neck pain.   Skin: Negative for color change and rash.   Neurological: Negative for tremors, speech difficulty, weakness, numbness and headaches.   Hematological: Negative for adenopathy. Does not bruise/bleed easily.   Psychiatric/Behavioral: Negative for behavioral problems, confusion and sleep disturbance. The patient is not nervous/anxious.        Objective:      Physical Exam   Constitutional: She is oriented to person, place, and time. She appears well-developed and well-nourished. No distress.   HENT:   Head: Normocephalic.   Right Ear: Tympanic membrane, external ear and ear canal normal.   Left Ear: Tympanic membrane, external ear and ear canal normal.   Nose: Nose normal.   Mouth/Throat: Oropharynx is clear and moist. No oropharyngeal exudate.   Eyes: Conjunctivae are normal. Right eye exhibits no discharge. Left eye exhibits no discharge. No scleral icterus.   Neck: Normal range of motion. Neck supple. No thyromegaly present.   Cardiovascular: Normal rate, regular rhythm and intact distal pulses. Exam reveals distant heart sounds. Exam reveals no gallop and no friction rub.   No murmur heard.  Pulmonary/Chest: Effort normal. No respiratory distress. She has decreased breath sounds.  She has no wheezes. She has no rales. She exhibits no tenderness.   Abdominal: Soft. There is no tenderness.   Musculoskeletal: She exhibits no edema.   Lymphadenopathy:     She has no cervical adenopathy.   Neurological: She is alert and oriented to person, place, and time.   Skin: Skin is warm and dry. No rash noted. She is not diaphoretic.   Nursing note and vitals reviewed.      Assessment:       1. COPD exacerbation    2. Centrilobular emphysema    3. Chronic respiratory failure with hypoxia        Plan:     Medication List with Changes/Refills   New Medications    METHYLPREDNISOLONE (MEDROL DOSEPACK) 4 MG TABLET    use as directed   Current Medications    ACETYLCYSTEINE 600 MG CAP    Take 1 capsule (600 mg total) by mouth 2 (two) times daily.    ASCORBIC ACID, VITAMIN C, (VITAMIN C) 500 MG TABLET    Take 500 mg by mouth 2 (two) times daily.     AZELASTINE (ASTELIN) 137 MCG (0.1 %) NASAL SPRAY    1 spray (137 mcg total) by Nasal route 2 (two) times daily.    AZITHROMYCIN (Z-JERSEY) 250 MG TABLET    Take 1 tablet (250 mg total) by mouth every Mon, Wed, Fri.    CALCIUM CARBONATE (OS-STEPHANIE) 600 MG CALCIUM (1,500 MG) TAB    Take 1 tablet (600 mg total) by mouth once daily. Take Levofloxacin antibiotic at least 2 hours before calcium.    FLUTICASONE (FLONASE) 50 MCG/ACTUATION NASAL SPRAY    Spray twice (100 mcg total) by Each Nare route once daily.    FLUTICASONE-VILANTEROL (BREO) 200-25 MCG/DOSE DSDV DISKUS INHALER    Inhale 1 puff into the lungs once daily. Controller    GUAIFENESIN (MUCINEX) 600 MG 12 HR TABLET    Take 1 tablet (600 mg total) by mouth 2 (two) times daily.    LEVALBUTEROL (XOPENEX) 0.63 MG/3 ML NEBULIZER SOLUTION    Take 3 mLs (0.63 mg total) by nebulization every 8 (eight) hours as needed for Wheezing. Rescue    MULTIVIT-IRON-MIN-FOLIC ACID 3,500-18-0.4 UNIT-MG-MG ORAL CHEW    Take 1 tablet by mouth once daily. Take Levofloxacin antibiotic at least 2 hours before multivitamin.    PANTOPRAZOLE  (PROTONIX) 40 MG TABLET    Take 1 tablet (40 mg total) by mouth once daily.    SODIUM CHLORIDE (OCEAN) 0.65 % NASAL SPRAY    1 spray by Nasal route 2 (two) times daily.    UMECLIDINIUM (INCRUSE ELLIPTA) 62.5 MCG/ACTUATION DSDV    INHALE 1 PUFF BY MOUTH INTO LUNGS ONCE DAILY     Estefani was seen today for needs steroid for pocd.    Diagnoses and all orders for this visit:    COPD exacerbation    Centrilobular emphysema    Chronic respiratory failure with hypoxia    Other orders  -     methylPREDNISolone (MEDROL DOSEPACK) 4 mg tablet; use as directed      See meds, orders, follow up, routing and instructions sections of encounter.  A 68-year-old new patient with a COPD exacerbation.  She called her PCP today   stating she needed steroids.  They told her to go to Urgent Care.  She states   the only thing that would help.  She has known COPD.  She recently saw Dr. Ruiz.    She has some dyspnea today, slight aggravation from her baseline.    I did note on her previous two provider notes in other departments, her SpO2 was   90 and 93.  She is in no respiratory distress today with SpO2 of 94, pulse rate   86 and using an oxygen concentrator at two liters, which is her baseline.    She does not appear to be in any undue respiratory distress at this time, I   think it is reasonable to attempt Medrol Dosepak at her request with a close   followup with her PCP in a couple of days.    I advised her should she have any worsening or prolongation of symptoms, any   concerns whatsoever go straight to the Emergency Room.  She agreed to that   advice.  During that discussion today, she did not feel that she was at the   level of needing Emergency Room care at this time.    She is on several other inhalers and with good compliance and she is going to   Pulmonary Rehabilitation at this time.        RANDA/HN  dd: 07/09/2019 12:33:13 (CDT)  td: 07/10/2019 00:28:03 (CDT)  Doc ID   #[id number]  Job ID #[id number]    CC:

## 2019-07-09 NOTE — TELEPHONE ENCOUNTER
Pt was seen by Dr. Bonilla today and supposed to f/up with PCP in 2-3 days for COPD f/up  Please call pt to schedule the appt

## 2019-07-12 ENCOUNTER — OFFICE VISIT (OUTPATIENT)
Dept: INTERNAL MEDICINE | Facility: CLINIC | Age: 69
End: 2019-07-12
Payer: MEDICARE

## 2019-07-12 VITALS
SYSTOLIC BLOOD PRESSURE: 130 MMHG | BODY MASS INDEX: 21.31 KG/M2 | HEART RATE: 84 BPM | TEMPERATURE: 98 F | DIASTOLIC BLOOD PRESSURE: 74 MMHG | OXYGEN SATURATION: 98 % | WEIGHT: 140.63 LBS | HEIGHT: 68 IN

## 2019-07-12 DIAGNOSIS — J44.1 CHRONIC OBSTRUCTIVE PULMONARY DISEASE WITH ACUTE EXACERBATION: Primary | ICD-10-CM

## 2019-07-12 PROCEDURE — 99213 OFFICE O/P EST LOW 20 MIN: CPT | Mod: PBBFAC | Performed by: FAMILY MEDICINE

## 2019-07-12 PROCEDURE — 99213 OFFICE O/P EST LOW 20 MIN: CPT | Mod: S$PBB,,, | Performed by: FAMILY MEDICINE

## 2019-07-12 PROCEDURE — 99999 PR PBB SHADOW E&M-EST. PATIENT-LVL III: CPT | Mod: PBBFAC,,, | Performed by: FAMILY MEDICINE

## 2019-07-12 PROCEDURE — 99213 PR OFFICE/OUTPT VISIT, EST, LEVL III, 20-29 MIN: ICD-10-PCS | Mod: S$PBB,,, | Performed by: FAMILY MEDICINE

## 2019-07-12 PROCEDURE — 99999 PR PBB SHADOW E&M-EST. PATIENT-LVL III: ICD-10-PCS | Mod: PBBFAC,,, | Performed by: FAMILY MEDICINE

## 2019-07-12 NOTE — PROGRESS NOTES
Subjective:      Patient ID: Estefani Fam is a 68 y.o. female.    Chief Complaint: Follow-up (respitory failure chronic)      HPI:  Estefani Fam is a 68 year old female with cataracts, COPD on 2L home o2, history of elevated blood pressure reading, history of retinal hemorrhage, history of tobacco use, and pulmonary hypertension who presents to clinic today for follow up on COPD.    Patient called 7/8/19 requesting steroids for possible COPD episode.  Recommended patient come in for urgent care appointment.  Patient did see Dr. Bonilla 7/9/19 and prescribed Medrol dosepak.  Prescribed Incruse ellipta, Breo inhaler, NAC, and Zmax TIW for control; follows with Dr. Ruiz, pulmonology.  Also on supplemental O2.  Also participating in pulmonary rehab.  Was having difficulty breathing and associated chest tightness 4-5 days before her call and was not able to attend pulmonary rehab.  Denies associated fevers/chills.  Symptoms improving.  On day 3 of Medrol.  Denies shortness of breath or wheezing currently.  Mild productive cough (clear thick sputum) persisting but not bothersome for the patient.        Past Medical History:   Diagnosis Date    Cataract     COPD (chronic obstructive pulmonary disease)     History of retinal hemorrhage     Retinal histoplasmosis        Past Surgical History:   Procedure Laterality Date    HAND SURGERY         Family History   Problem Relation Age of Onset    Cancer Mother         colon    Macular degeneration Mother     Stroke Father     Amblyopia Neg Hx     Blindness Neg Hx     Cataracts Neg Hx     Diabetes Neg Hx     Glaucoma Neg Hx     Hypertension Neg Hx     Retinal detachment Neg Hx     Strabismus Neg Hx     Thyroid disease Neg Hx        Social History     Socioeconomic History    Marital status:      Spouse name: Not on file    Number of children: Not on file    Years of education: Not on file    Highest education level: Not on file   Occupational  History    Not on file   Social Needs    Financial resource strain: Not on file    Food insecurity:     Worry: Not on file     Inability: Not on file    Transportation needs:     Medical: Not on file     Non-medical: Not on file   Tobacco Use    Smoking status: Former Smoker     Packs/day: 1.00     Years: 36.00     Pack years: 36.00     Types: Cigarettes     Last attempt to quit: 2016     Years since quittin.8    Smokeless tobacco: Never Used   Substance and Sexual Activity    Alcohol use: Yes     Alcohol/week: 1.2 oz     Types: 2 Glasses of wine per week     Frequency: 4 or more times a week     Drinks per session: 1 or 2     Comment: 1-2 glasses a day     Drug use: No    Sexual activity: Yes   Lifestyle    Physical activity:     Days per week: Not on file     Minutes per session: Not on file    Stress: Not on file   Relationships    Social connections:     Talks on phone: Not on file     Gets together: Not on file     Attends [employer] service: Not on file     Active member of club or organization: Not on file     Attends meetings of clubs or organizations: Not on file     Relationship status: Not on file   Other Topics Concern    Not on file   Social History Narrative    Not on file       Review of Systems   Constitutional: Negative for chills, fatigue and fever.   HENT: Negative for congestion, hearing loss, nosebleeds, rhinorrhea, sore throat and trouble swallowing.    Eyes: Negative for pain and visual disturbance.   Respiratory: Positive for cough. Negative for shortness of breath and wheezing.    Cardiovascular: Negative for chest pain and palpitations.   Gastrointestinal: Negative for abdominal distention, abdominal pain, constipation, diarrhea, nausea and vomiting.   Genitourinary: Negative for decreased urine volume, difficulty urinating, dysuria, hematuria and urgency.   Musculoskeletal: Negative for arthralgias, back pain and myalgias.   Skin: Negative for color change and rash.  "  Neurological: Negative for dizziness, tremors, weakness, light-headedness, numbness and headaches.   Psychiatric/Behavioral: Negative for agitation, behavioral problems and confusion. The patient is not nervous/anxious.      Objective:     Vitals:    07/12/19 1123   BP: 130/74   BP Location: Left arm   Patient Position: Sitting   BP Method: Medium (Manual)   Pulse: 84   Temp: 97.9 °F (36.6 °C)   TempSrc: Oral   SpO2: 98%   Weight: 63.8 kg (140 lb 10.5 oz)   Height: 5' 8" (1.727 m)       Physical Exam   Constitutional: She is oriented to person, place, and time. She appears well-developed and well-nourished.   HENT:   Head: Normocephalic and atraumatic.   Right Ear: External ear normal.   Left Ear: External ear normal.   Nose: Nose normal.   Mouth/Throat: Oropharynx is clear and moist.   Nasal canula in place.   Eyes: Pupils are equal, round, and reactive to light. Conjunctivae and EOM are normal.   Neck: Normal range of motion. Neck supple. No tracheal deviation present.   Cardiovascular: Normal rate, regular rhythm and normal heart sounds. Exam reveals no gallop and no friction rub.   No murmur heard.  Pulmonary/Chest: Effort normal. No respiratory distress. She has decreased breath sounds in the right lower field and the left lower field. She has no wheezes. She has no rhonchi. She has no rales.   Abdominal: Soft. Bowel sounds are normal. She exhibits no distension. There is no tenderness. There is no rebound and no guarding.   Musculoskeletal: Normal range of motion. She exhibits no edema or deformity.   Neurological: She is alert and oriented to person, place, and time. Coordination normal.   Skin: Skin is warm and dry.   Psychiatric: She has a normal mood and affect. Her behavior is normal.   Nursing note and vitals reviewed.     Assessment:      1. Chronic obstructive pulmonary disease with acute exacerbation      Plan:   Estefani was seen today for follow-up.    Diagnoses and all orders for this " visit:    Chronic obstructive pulmonary disease with acute exacerbation        -     Improving; continue current regimen, complete Medrol dosepak.  Return to clinic if no improvement or for any worsening after completion of oral steroids.  Continue regular follow up with pulmonology and pulmonary rehab.

## 2019-07-18 ENCOUNTER — OFFICE VISIT (OUTPATIENT)
Dept: INTERNAL MEDICINE | Facility: CLINIC | Age: 69
End: 2019-07-18
Payer: MEDICARE

## 2019-07-18 ENCOUNTER — TELEPHONE (OUTPATIENT)
Dept: INTERNAL MEDICINE | Facility: CLINIC | Age: 69
End: 2019-07-18

## 2019-07-18 ENCOUNTER — HOSPITAL ENCOUNTER (OUTPATIENT)
Dept: RADIOLOGY | Facility: HOSPITAL | Age: 69
Discharge: HOME | End: 2019-07-18
Attending: FAMILY MEDICINE
Payer: MEDICARE

## 2019-07-18 VITALS
HEART RATE: 100 BPM | WEIGHT: 140.63 LBS | BODY MASS INDEX: 21.31 KG/M2 | DIASTOLIC BLOOD PRESSURE: 66 MMHG | HEIGHT: 68 IN | TEMPERATURE: 98 F | OXYGEN SATURATION: 96 % | SYSTOLIC BLOOD PRESSURE: 110 MMHG

## 2019-07-18 DIAGNOSIS — J44.1 COPD EXACERBATION: ICD-10-CM

## 2019-07-18 DIAGNOSIS — J44.1 COPD EXACERBATION: Primary | ICD-10-CM

## 2019-07-18 PROCEDURE — 71046 XR CHEST PA AND LATERAL: ICD-10-PCS | Mod: 26,,, | Performed by: RADIOLOGY

## 2019-07-18 PROCEDURE — 99213 OFFICE O/P EST LOW 20 MIN: CPT | Mod: PBBFAC,25 | Performed by: FAMILY MEDICINE

## 2019-07-18 PROCEDURE — 99213 PR OFFICE/OUTPT VISIT, EST, LEVL III, 20-29 MIN: ICD-10-PCS | Mod: S$PBB,,, | Performed by: FAMILY MEDICINE

## 2019-07-18 PROCEDURE — 99999 PR PBB SHADOW E&M-EST. PATIENT-LVL III: ICD-10-PCS | Mod: PBBFAC,,, | Performed by: FAMILY MEDICINE

## 2019-07-18 PROCEDURE — 71046 X-RAY EXAM CHEST 2 VIEWS: CPT | Mod: 26,,, | Performed by: RADIOLOGY

## 2019-07-18 PROCEDURE — 71046 X-RAY EXAM CHEST 2 VIEWS: CPT | Mod: TC

## 2019-07-18 PROCEDURE — 99213 OFFICE O/P EST LOW 20 MIN: CPT | Mod: S$PBB,,, | Performed by: FAMILY MEDICINE

## 2019-07-18 PROCEDURE — 99999 PR PBB SHADOW E&M-EST. PATIENT-LVL III: CPT | Mod: PBBFAC,,, | Performed by: FAMILY MEDICINE

## 2019-07-18 RX ORDER — PREDNISONE 20 MG/1
40 TABLET ORAL DAILY
Qty: 10 TABLET | Refills: 0 | Status: SHIPPED | OUTPATIENT
Start: 2019-07-18 | End: 2019-07-23

## 2019-07-18 NOTE — TELEPHONE ENCOUNTER
Patient recently prescribed Medrol dosepak on 7/9/18 and should have just recently completed this.  Instructed patient to return to clinic if no improvement after completion of dosepak or for any worsening.  Called patient personally to discuss, no answer; voice mail box full.  Recommend urgent care appointment with any available provider for further evaluation and management and follow up with pulmonology as well.

## 2019-07-18 NOTE — PROGRESS NOTES
"Subjective:      Patient ID: Estefani Fam is a 68 y.o. female.    Chief Complaint: COPD      HPI:   Estefani Fam is a 68 year old female with cataracts, COPD on 2L home o2, history of elevated blood pressure reading, history of retinal hemorrhage, history of tobacco use, and pulmonary hypertension who presents to clinic today for follow up on COPD.     Per clinic visit 7/12/19, "Patient called 7/8/19 requesting steroids for possible COPD episode.  Recommended patient come in for urgent care appointment.  Patient did see Dr. Bonilla 7/9/19 and prescribed Medrol dosepak.  Prescribed Incruse ellipta, Breo inhaler, NAC, and Zmax TIW for control; follows with Dr. Ruiz, pulmonology.  Also on supplemental O2.  Also participating in pulmonary rehab.  Was having difficulty breathing and associated chest tightness 4-5 days before her call and was not able to attend pulmonary rehab.  Denies associated fevers/chills.  Symptoms improving.  On day 3 of Medrol.  Denies shortness of breath or wheezing currently.  Mild productive cough (clear thick sputum) persisting but not bothersome for the patient."    Patient sent message again today requesting another round of steroids for COPD.  Recommended patient come in for urgent care visit.    Patient states she feels somewhat better than when she did previously but that her breathing is still "not where it should be."  Endorses persistent dyspnea on exertion.  Completed Medrol dosepak 7/13/19.    Plans to follow up with Dr. Ruiz in October.    States she does not use her rescue inhaler because she feels it makes her worse (albuterol) instead is using Xopenex nebulized solution twice daily.      Past Medical History:   Diagnosis Date    Cataract     COPD (chronic obstructive pulmonary disease)     History of retinal hemorrhage     Retinal histoplasmosis        Past Surgical History:   Procedure Laterality Date    HAND SURGERY         Family History   Problem Relation Age " of Onset    Cancer Mother         colon    Macular degeneration Mother     Stroke Father     Amblyopia Neg Hx     Blindness Neg Hx     Cataracts Neg Hx     Diabetes Neg Hx     Glaucoma Neg Hx     Hypertension Neg Hx     Retinal detachment Neg Hx     Strabismus Neg Hx     Thyroid disease Neg Hx        Social History     Socioeconomic History    Marital status:      Spouse name: Not on file    Number of children: Not on file    Years of education: Not on file    Highest education level: Not on file   Occupational History    Not on file   Social Needs    Financial resource strain: Not on file    Food insecurity:     Worry: Not on file     Inability: Not on file    Transportation needs:     Medical: Not on file     Non-medical: Not on file   Tobacco Use    Smoking status: Former Smoker     Packs/day: 1.00     Years: 36.00     Pack years: 36.00     Types: Cigarettes     Last attempt to quit: 2016     Years since quittin.8    Smokeless tobacco: Never Used   Substance and Sexual Activity    Alcohol use: Yes     Alcohol/week: 1.2 oz     Types: 2 Glasses of wine per week     Frequency: 4 or more times a week     Drinks per session: 1 or 2     Comment: 1-2 glasses a day     Drug use: No    Sexual activity: Yes   Lifestyle    Physical activity:     Days per week: Not on file     Minutes per session: Not on file    Stress: Not on file   Relationships    Social connections:     Talks on phone: Not on file     Gets together: Not on file     Attends Anabaptism service: Not on file     Active member of club or organization: Not on file     Attends meetings of clubs or organizations: Not on file     Relationship status: Not on file   Other Topics Concern    Not on file   Social History Narrative    Not on file       Review of Systems   Constitutional: Negative for chills, fatigue and fever.   HENT: Negative for congestion, hearing loss, nosebleeds, rhinorrhea, sore throat and trouble  "swallowing.    Eyes: Negative for pain and visual disturbance.   Respiratory: Negative for cough, shortness of breath and wheezing.         + Dyspnea on exertion   Cardiovascular: Negative for chest pain and palpitations.   Gastrointestinal: Negative for abdominal distention, abdominal pain, constipation, diarrhea, nausea and vomiting.   Genitourinary: Negative for decreased urine volume, difficulty urinating, dysuria, hematuria and urgency.   Musculoskeletal: Negative for arthralgias, back pain and myalgias.   Skin: Negative for color change and rash.   Neurological: Negative for dizziness, tremors, weakness, light-headedness, numbness and headaches.   Psychiatric/Behavioral: Negative for agitation, behavioral problems and confusion. The patient is not nervous/anxious.      Objective:     Vitals:    07/18/19 1147   BP: 110/66   BP Location: Right arm   Patient Position: Sitting   BP Method: Medium (Manual)   Pulse: 100   Temp: 98.3 °F (36.8 °C)   TempSrc: Oral   SpO2: 96%   Weight: 63.8 kg (140 lb 10.5 oz)   Height: 5' 8" (1.727 m)       Physical Exam   Constitutional: She is oriented to person, place, and time. She appears well-developed and well-nourished.   HENT:   Head: Normocephalic and atraumatic.   Right Ear: External ear normal.   Left Ear: External ear normal.   Nose: Nose normal.   Mouth/Throat: Oropharynx is clear and moist.   Eyes: Pupils are equal, round, and reactive to light. Conjunctivae and EOM are normal.   Neck: Normal range of motion. Neck supple. No tracheal deviation present.   Cardiovascular: Normal rate, regular rhythm and normal heart sounds. Exam reveals no gallop and no friction rub.   No murmur heard.  Pulmonary/Chest: Effort normal. No respiratory distress. She has decreased breath sounds in the right lower field and the left lower field. She has no wheezes. She has no rhonchi. She has no rales.   Abdominal: Soft. Bowel sounds are normal. She exhibits no distension. There is no " tenderness. There is no rebound and no guarding.   Musculoskeletal: Normal range of motion. She exhibits no edema or deformity.   Neurological: She is alert and oriented to person, place, and time. Coordination normal.   Skin: Skin is warm and dry.   Psychiatric: She has a normal mood and affect. Her behavior is normal.   Nursing note and vitals reviewed.     Assessment:      1. COPD exacerbation      Plan:   Estefani was seen today for copd.    Diagnoses and all orders for this visit:    COPD exacerbation  -     X-Ray Chest PA And Lateral; Future  -     predniSONE (DELTASONE) 20 MG tablet; Take 2 tablets (40 mg total) by mouth once daily. for 5 days  -     Continue current regimen and Xopenex nebs, can increase nebs to every 8 hours  -     Recommended patient follow up with Dr. Ruiz to see if any alterations need to be made with controlling Rx's; recommended patient follow up with me in clinic if her symptoms are not improved after completion of prednisone course

## 2019-07-18 NOTE — TELEPHONE ENCOUNTER
----- Message from Sulema Fernandez sent at 7/18/2019  8:03 AM CDT -----  Contact: self/[phone]  Pt called in regards to getting another round of steroids due to having copd.      Please advise

## 2019-07-30 ENCOUNTER — TELEPHONE (OUTPATIENT)
Dept: INTERNAL MEDICINE | Facility: CLINIC | Age: 69
End: 2019-07-30

## 2019-07-30 NOTE — TELEPHONE ENCOUNTER
----- Message from Francesca Hoyos MA sent at 7/30/2019  1:23 PM CDT -----  Contact: Patient [phone]  FYI do you need to see the pt or dose she need a office visit.   ----- Message -----  From: Brielle Lane  Sent: 7/30/2019   1:20 PM  To: Renetta Aquino Staff    Stated that she has a rash on her face and would like to see you if possible.    Please call and advise.    Thank You

## 2019-07-31 ENCOUNTER — OFFICE VISIT (OUTPATIENT)
Dept: INTERNAL MEDICINE | Facility: CLINIC | Age: 69
End: 2019-07-31
Payer: MEDICARE

## 2019-07-31 VITALS
SYSTOLIC BLOOD PRESSURE: 118 MMHG | WEIGHT: 140 LBS | TEMPERATURE: 98 F | BODY MASS INDEX: 21.22 KG/M2 | OXYGEN SATURATION: 95 % | HEART RATE: 80 BPM | DIASTOLIC BLOOD PRESSURE: 80 MMHG | HEIGHT: 68 IN

## 2019-07-31 DIAGNOSIS — L71.9 ROSACEA: Primary | ICD-10-CM

## 2019-07-31 PROCEDURE — 99999 PR PBB SHADOW E&M-EST. PATIENT-LVL III: ICD-10-PCS | Mod: PBBFAC,,, | Performed by: FAMILY MEDICINE

## 2019-07-31 PROCEDURE — 99213 OFFICE O/P EST LOW 20 MIN: CPT | Mod: S$PBB,,, | Performed by: FAMILY MEDICINE

## 2019-07-31 PROCEDURE — 99999 PR PBB SHADOW E&M-EST. PATIENT-LVL III: CPT | Mod: PBBFAC,,, | Performed by: FAMILY MEDICINE

## 2019-07-31 PROCEDURE — 99213 OFFICE O/P EST LOW 20 MIN: CPT | Mod: PBBFAC | Performed by: FAMILY MEDICINE

## 2019-07-31 PROCEDURE — 99213 PR OFFICE/OUTPT VISIT, EST, LEVL III, 20-29 MIN: ICD-10-PCS | Mod: S$PBB,,, | Performed by: FAMILY MEDICINE

## 2019-07-31 RX ORDER — METRONIDAZOLE 10 MG/G
GEL TOPICAL DAILY
Qty: 60 G | Refills: 2 | Status: ON HOLD | OUTPATIENT
Start: 2019-07-31 | End: 2021-05-10 | Stop reason: HOSPADM

## 2019-07-31 NOTE — PROGRESS NOTES
Subjective:      Patient ID: Estefani Fam is a 69 y.o. female.    Chief Complaint: Rash (in face x2days and neck )      HPI:  Estefani Fam is a 68 year old female with cataracts, COPD on 2L home o2, history of elevated blood pressure reading, history of retinal hemorrhage, history of tobacco use, and pulmonary hypertension who presents to clinic today complaining of facial rash.    Facial rash:  First noticed worsening facial rash 2 days ago.  Endorses history of discoloration to her nose.  Worsened 2 days ago.  Complains of red spots streaking towards her cheek and a red lesion under her chin.  Tried a little hydrogen peroxide yesterday without improvement.  Denies associated exudate, fevers, chills.      Past Medical History:   Diagnosis Date    Cataract     COPD (chronic obstructive pulmonary disease)     History of retinal hemorrhage     Retinal histoplasmosis        Past Surgical History:   Procedure Laterality Date    HAND SURGERY         Family History   Problem Relation Age of Onset    Cancer Mother         colon    Macular degeneration Mother     Stroke Father     Amblyopia Neg Hx     Blindness Neg Hx     Cataracts Neg Hx     Diabetes Neg Hx     Glaucoma Neg Hx     Hypertension Neg Hx     Retinal detachment Neg Hx     Strabismus Neg Hx     Thyroid disease Neg Hx        Social History     Socioeconomic History    Marital status:      Spouse name: Not on file    Number of children: Not on file    Years of education: Not on file    Highest education level: Not on file   Occupational History    Not on file   Social Needs    Financial resource strain: Not on file    Food insecurity:     Worry: Not on file     Inability: Not on file    Transportation needs:     Medical: Not on file     Non-medical: Not on file   Tobacco Use    Smoking status: Former Smoker     Packs/day: 1.00     Years: 36.00     Pack years: 36.00     Types: Cigarettes     Last attempt to quit: 8/28/2016      Years since quittin.9    Smokeless tobacco: Never Used   Substance and Sexual Activity    Alcohol use: Yes     Alcohol/week: 1.2 oz     Types: 2 Glasses of wine per week     Frequency: 4 or more times a week     Drinks per session: 1 or 2     Comment: 1-2 glasses a day     Drug use: No    Sexual activity: Yes   Lifestyle    Physical activity:     Days per week: Not on file     Minutes per session: Not on file    Stress: Not on file   Relationships    Social connections:     Talks on phone: Not on file     Gets together: Not on file     Attends Congregational service: Not on file     Active member of club or organization: Not on file     Attends meetings of clubs or organizations: Not on file     Relationship status: Not on file   Other Topics Concern    Not on file   Social History Narrative    Not on file       Review of Systems   Constitutional: Negative for chills, fatigue and fever.   HENT: Negative for congestion, hearing loss, nosebleeds, rhinorrhea, sore throat and trouble swallowing.    Eyes: Negative for pain and visual disturbance.   Respiratory: Negative for cough, shortness of breath and wheezing.    Cardiovascular: Negative for chest pain and palpitations.   Gastrointestinal: Negative for abdominal distention, abdominal pain, constipation, diarrhea, nausea and vomiting.   Genitourinary: Negative for decreased urine volume, difficulty urinating, dysuria, hematuria and urgency.   Musculoskeletal: Negative for arthralgias, back pain and myalgias.   Skin: Positive for rash. Negative for color change.   Neurological: Negative for dizziness, tremors, weakness, light-headedness, numbness and headaches.   Psychiatric/Behavioral: Negative for agitation, behavioral problems and confusion. The patient is not nervous/anxious.      Objective:     Vitals:    19 1711   BP: 118/80   BP Location: Right arm   Patient Position: Sitting   BP Method: Medium (Manual)   Pulse: 80   Temp: 98.2 °F (36.8 °C)  "  TempSrc: Oral   SpO2: 95%   Weight: 63.5 kg (140 lb)   Height: 5' 8" (1.727 m)       Physical Exam   Constitutional: She is oriented to person, place, and time. She appears well-developed and well-nourished.   HENT:   Head: Normocephalic and atraumatic.   Right Ear: External ear normal.   Left Ear: External ear normal.   Nose: Nose normal.   Mouth/Throat: Oropharynx is clear and moist.   Eyes: Pupils are equal, round, and reactive to light. Conjunctivae and EOM are normal.   Neck: Normal range of motion. Neck supple. No tracheal deviation present.   Cardiovascular: Normal rate, regular rhythm and normal heart sounds. Exam reveals no gallop and no friction rub.   No murmur heard.  Pulmonary/Chest: Effort normal and breath sounds normal. No respiratory distress. She has no wheezes. She has no rales.   Abdominal: Soft. Bowel sounds are normal. She exhibits no distension. There is no tenderness. There is no rebound and no guarding.   Musculoskeletal: Normal range of motion. She exhibits no edema or deformity.   Neurological: She is alert and oriented to person, place, and time. Coordination normal.   Skin: Skin is warm and dry. Rash noted.   Erythematous lesion (2-3 mm in diameter) inferior to chin, no exudate, no crusting.  Telangiectasias and mildly erythematous maculopapular rash to bridge of nose and right cheek.   Psychiatric: She has a normal mood and affect. Her behavior is normal.   Nursing note and vitals reviewed.     Assessment:      1. Rosacea      Plan:   Estefani was seen today for rash.    Diagnoses and all orders for this visit:    Rosacea  -     Start metronidazole 1% (METROGEL) 1 % Gel; Apply topically once daily.  -     Ambulatory Referral to Dermatology  -     Counseled patient on the importance of sun protection; call/return to clinic if no improvement or can follow up with dermatology       "

## 2019-08-02 ENCOUNTER — INITIAL CONSULT (OUTPATIENT)
Dept: DERMATOLOGY | Facility: CLINIC | Age: 69
End: 2019-08-02
Payer: MEDICARE

## 2019-08-02 DIAGNOSIS — L57.0 AK (ACTINIC KERATOSIS): Primary | ICD-10-CM

## 2019-08-02 DIAGNOSIS — L71.9 ROSACEA: ICD-10-CM

## 2019-08-02 PROCEDURE — 17003 DESTRUCT PREMALG LES 2-14: CPT | Mod: PBBFAC | Performed by: NURSE PRACTITIONER

## 2019-08-02 PROCEDURE — 99213 OFFICE O/P EST LOW 20 MIN: CPT | Mod: 25,S$PBB,ICN, | Performed by: NURSE PRACTITIONER

## 2019-08-02 PROCEDURE — 17003 DESTRUCTION, PREMALIGNANT LESIONS; SECOND THROUGH 14 LESIONS: ICD-10-PCS | Mod: S$PBB,ICN,, | Performed by: NURSE PRACTITIONER

## 2019-08-02 PROCEDURE — 99213 OFFICE O/P EST LOW 20 MIN: CPT | Mod: PBBFAC,25 | Performed by: NURSE PRACTITIONER

## 2019-08-02 PROCEDURE — 99999 PR PBB SHADOW E&M-EST. PATIENT-LVL III: ICD-10-PCS | Mod: PBBFAC,,, | Performed by: NURSE PRACTITIONER

## 2019-08-02 PROCEDURE — 17000 PR DESTRUCTION(LASER SURGERY,CRYOSURGERY,CHEMOSURGERY),PREMALIGNANT LESIONS,FIRST LESION: ICD-10-PCS | Mod: S$PBB,ICN,, | Performed by: NURSE PRACTITIONER

## 2019-08-02 PROCEDURE — 99213 PR OFFICE/OUTPT VISIT, EST, LEVL III, 20-29 MIN: ICD-10-PCS | Mod: 25,S$PBB,ICN, | Performed by: NURSE PRACTITIONER

## 2019-08-02 PROCEDURE — 99999 PR PBB SHADOW E&M-EST. PATIENT-LVL III: CPT | Mod: PBBFAC,,, | Performed by: NURSE PRACTITIONER

## 2019-08-02 PROCEDURE — 17000 DESTRUCT PREMALG LESION: CPT | Mod: S$PBB,ICN,, | Performed by: NURSE PRACTITIONER

## 2019-08-02 PROCEDURE — 17003 DESTRUCT PREMALG LES 2-14: CPT | Mod: S$PBB,ICN,, | Performed by: NURSE PRACTITIONER

## 2019-08-02 PROCEDURE — 17000 DESTRUCT PREMALG LESION: CPT | Mod: PBBFAC | Performed by: NURSE PRACTITIONER

## 2019-08-02 RX ORDER — DOXYCYCLINE 100 MG/1
100 CAPSULE ORAL DAILY
Qty: 30 CAPSULE | Refills: 2 | Status: ON HOLD | OUTPATIENT
Start: 2019-08-02 | End: 2021-05-10 | Stop reason: HOSPADM

## 2019-08-02 NOTE — LETTER
August 2, 2019      Miles Jackson MD  [address]  [place] [address]           [facility] - Dermatology  [address]  [place] [address]  Phone: [phone]  Fax: [phone]          Patient: Estefani Fma   MR Number: [id number]   YOB: 1950   Date of Visit: 8/2/2019       Dear Dr. Miles Jackson:    Thank you for referring Estefani Fam to me for evaluation. Attached you will find relevant portions of my assessment and plan of care.    If you have questions, please do not hesitate to call me. I look forward to following Estefani Fam along with you.    Sincerely,    Venessa Brandt, NP    Enclosure  CC:  No Recipients    If you would like to receive this communication electronically, please contact [email or web address] or [phone] to request more information on [email or web address] access.    For providers and/or their staff who would like to refer a patient to [facility], please contact us through our one-stop-shop provider referral line, [facility], at [phone].    If you feel you have received this communication in error or would no longer like to receive these types of communications, please e-mail [email or web address]

## 2019-08-02 NOTE — PROGRESS NOTES
Subjective:       Patient ID:  Estefani Fam is a 69 y.o. female who presents for   Chief Complaint   Patient presents with    Rosacea     Pt c/o rosacea on face x 6 months. Pt has started metro gel yesterday.  Spot  - Initial  Affected locations: face  Duration: 3 weeks  Signs / symptoms: scaling and redness  Severity: mild  Timing: constant  Aggravated by: nothing  Treatments tried: metrogel qhs x1 night.        Review of Systems   HENT: Negative for headaches.    Eyes: Positive for eye irritation. Negative for itching, eye watering and eyelid inflammation.   Gastrointestinal: Negative for nausea, vomiting, diarrhea and Sensitivity to oral antibiotics.        Vascular instability syndrome: rosacea associated with GI sx and HA's   Skin: Positive for daily sunscreen use and activity-related sunscreen use. Negative for recent sunburn.   Neurological: Negative for headaches.        Objective:    Physical Exam   Constitutional: She appears well-developed and well-nourished. No distress.   Neurological: She is alert and oriented to person, place, and time. She is not disoriented.   Psychiatric: She has a normal mood and affect.   Skin:   Areas Examined (abnormalities noted in diagram):   Head / Face Inspection Performed  Neck Inspection Performed              Diagram Legend     Erythematous scaling macule/papule c/w actinic keratosis       Vascular papule c/w angioma      Pigmented verrucoid papule/plaque c/w seborrheic keratosis      Yellow umbilicated papule c/w sebaceous hyperplasia      Irregularly shaped tan macule c/w lentigo     1-2 mm smooth white papules consistent with Milia      Movable subcutaneous cyst with punctum c/w epidermal inclusion cyst      Subcutaneous movable cyst c/w pilar cyst      Firm pink to brown papule c/w dermatofibroma      Pedunculated fleshy papule(s) c/w skin tag(s)      Evenly pigmented macule c/w junctional nevus     Mildly variegated pigmented, slightly irregular-bordered  macule c/w mildly atypical nevus      Flesh colored to evenly pigmented papule c/w intradermal nevus       Pink pearly papule/plaque c/w basal cell carcinoma      Erythematous hyperkeratotic cursted plaque c/w SCC      Surgical scar with no sign of skin cancer recurrence      Open and closed comedones      Inflammatory papules and pustules      Verrucoid papule consistent consistent with wart     Erythematous eczematous patches and plaques     Dystrophic onycholytic nail with subungual debris c/w onychomycosis     Umbilicated papule    Erythematous-base heme-crusted tan verrucoid plaque consistent with inflamed seborrheic keratosis     Erythematous Silvery Scaling Plaque c/w Psoriasis     See annotation      Assessment / Plan:        AK (actinic keratosis)  Cryosurgery Procedure Note    Verbal consent from the patient is obtained and the patient is aware of the precancerous quality and need for treatment of these lesions. Liquid nitrogen cryosurgery is applied to the 2 actinic keratoses, as detailed in the physical exam, to produce a freeze injury. The patient is aware that blisters may form and is instructed on wound care with gentle cleansing and use of vaseline ointment to keep moist until healed. The patient is supplied a handout on cryosurgery and is instructed to call if lesions do not completely resolve.    Rosacea  -     doxycycline (VIBRAMYCIN) 100 MG Cap; Take 1 capsule (100 mg total) by mouth once daily.  Dispense: 30 capsule; Refill: 2    Continue metrogel qhs         Follow up in about 3 months (around 11/2/2019) for skin cancer screening.

## 2019-08-02 NOTE — PATIENT INSTRUCTIONS

## 2019-08-16 ENCOUNTER — TELEPHONE (OUTPATIENT)
Dept: PULMONOLOGY | Facility: CLINIC | Age: 69
End: 2019-08-16

## 2019-08-16 DIAGNOSIS — J44.1 CHRONIC OBSTRUCTIVE PULMONARY DISEASE WITH ACUTE EXACERBATION: Primary | ICD-10-CM

## 2019-08-16 RX ORDER — PREDNISONE 20 MG/1
40 TABLET ORAL DAILY
Qty: 10 TABLET | Refills: 0 | Status: SHIPPED | OUTPATIENT
Start: 2019-08-16 | End: 2019-08-21

## 2019-08-16 NOTE — TELEPHONE ENCOUNTER
----- Message from Sherrie Gilmore sent at 8/16/2019 10:20 AM CDT -----  Contact:      Pt   [phone]  Needs Advice    Reason for call:        Communication Preference:   Phone      Additional Information:   Pt  Has COPD .. Pt is experiencing tightness of the chest , lil sob..  Pt seeking  further advice ..  Give the pt a call back to discuss

## 2019-08-16 NOTE — TELEPHONE ENCOUNTER
I left the pt a voicemail to let her know Np, Jen Marquez sent over an rx to her pharmacy. If pt has any questions to call the office back.

## 2019-08-16 NOTE — TELEPHONE ENCOUNTER
I spoke to Ms Fam. She stated she has copd and is experiencing an exacerbation. Ms Fam stated she is taking her medications as she recommended, but may need an rx of steroids sent to her pharmacy. Please advised.

## 2019-08-19 NOTE — TELEPHONE ENCOUNTER
Patient is feeling better with prednisone burst.  Advised to continue nebulizer TID while clinically improving.

## 2019-09-10 ENCOUNTER — TELEPHONE (OUTPATIENT)
Dept: PULMONOLOGY | Facility: CLINIC | Age: 69
End: 2019-09-10

## 2019-09-10 DIAGNOSIS — J44.1 CHRONIC OBSTRUCTIVE PULMONARY DISEASE WITH ACUTE EXACERBATION: Primary | ICD-10-CM

## 2019-09-10 RX ORDER — PREDNISONE 10 MG/1
TABLET ORAL
Qty: 35 TABLET | Refills: 0 | Status: SHIPPED | OUTPATIENT
Start: 2019-09-10 | End: 2021-03-11

## 2019-09-10 NOTE — TELEPHONE ENCOUNTER
----- Message from Anita Cox sent at 9/10/2019 11:10 AM CDT -----  Pt called having trouble breathing and asking if there's anything you remind that she do. Pt asked for a return call.    Pt contact # [phone].      Thanks

## 2019-09-10 NOTE — TELEPHONE ENCOUNTER
Spoke with patient, informed her that I have received her message. Patient states that Dr. Ruiz prescribed her (5) day Prednisone. Patient states that she is out of the Prednisone and wants to know if Dr. Ruiz recommend that she get some more Prednisone to help her breathing. I advised patient that I would forward her message to Dr. Ruiz to review/advise. Patient verbalizes that she understand.

## 2019-09-11 ENCOUNTER — IMMUNIZATION (OUTPATIENT)
Dept: INTERNAL MEDICINE | Facility: CLINIC | Age: 69
End: 2019-09-11
Payer: MEDICARE

## 2019-09-11 DIAGNOSIS — J44.0 COPD (CHRONIC OBSTRUCTIVE PULMONARY DISEASE) WITH ACUTE BRONCHITIS: ICD-10-CM

## 2019-09-11 DIAGNOSIS — J20.9 COPD (CHRONIC OBSTRUCTIVE PULMONARY DISEASE) WITH ACUTE BRONCHITIS: ICD-10-CM

## 2019-09-11 PROCEDURE — 90662 IIV NO PRSV INCREASED AG IM: CPT | Mod: PBBFAC

## 2019-09-11 RX ORDER — LEVALBUTEROL INHALATION SOLUTION 0.63 MG/3ML
3 SOLUTION RESPIRATORY (INHALATION) EVERY 8 HOURS PRN
Qty: 3 ML | Refills: 11 | Status: SHIPPED | OUTPATIENT
Start: 2019-09-11 | End: 2020-01-30

## 2019-10-15 ENCOUNTER — PATIENT OUTREACH (OUTPATIENT)
Dept: ADMINISTRATIVE | Facility: OTHER | Age: 69
End: 2019-10-15

## 2019-10-15 DIAGNOSIS — Z12.11 SCREENING FOR COLON CANCER: Primary | ICD-10-CM

## 2019-10-17 ENCOUNTER — HOSPITAL ENCOUNTER (OUTPATIENT)
Dept: PULMONOLOGY | Facility: CLINIC | Age: 69
Discharge: HOME | End: 2019-10-17
Payer: MEDICARE

## 2019-10-17 ENCOUNTER — OFFICE VISIT (OUTPATIENT)
Dept: PULMONOLOGY | Facility: CLINIC | Age: 69
End: 2019-10-17
Payer: MEDICARE

## 2019-10-17 VITALS
HEIGHT: 67 IN | DIASTOLIC BLOOD PRESSURE: 66 MMHG | BODY MASS INDEX: 22.6 KG/M2 | SYSTOLIC BLOOD PRESSURE: 136 MMHG | WEIGHT: 144 LBS | OXYGEN SATURATION: 91 % | BODY MASS INDEX: 22.6 KG/M2 | HEIGHT: 67 IN | HEART RATE: 89 BPM | WEIGHT: 144 LBS

## 2019-10-17 DIAGNOSIS — J43.2 CENTRILOBULAR EMPHYSEMA: ICD-10-CM

## 2019-10-17 DIAGNOSIS — J44.1 CHRONIC OBSTRUCTIVE PULMONARY DISEASE WITH ACUTE EXACERBATION: ICD-10-CM

## 2019-10-17 DIAGNOSIS — J96.11 CHRONIC RESPIRATORY FAILURE WITH HYPOXIA: ICD-10-CM

## 2019-10-17 DIAGNOSIS — R05.9 COUGH IN ADULT: ICD-10-CM

## 2019-10-17 DIAGNOSIS — R91.1 LUNG NODULE: Primary | ICD-10-CM

## 2019-10-17 DIAGNOSIS — Z87.891 FORMER HEAVY TOBACCO SMOKER: Chronic | ICD-10-CM

## 2019-10-17 DIAGNOSIS — I27.20 PULMONARY HTN: ICD-10-CM

## 2019-10-17 DIAGNOSIS — R91.1 LUNG NODULE: ICD-10-CM

## 2019-10-17 DIAGNOSIS — J43.2 CENTRILOBULAR EMPHYSEMA: Primary | ICD-10-CM

## 2019-10-17 LAB
ALLENS TEST: ABNORMAL
DELSYS: ABNORMAL
DLCO ADJ PRE: 6.43 ML/(MIN*MMHG) (ref 17.51–28.98)
DLCO SINGLE BREATH LLN: 17.51
DLCO SINGLE BREATH PRE REF: 27.7 %
DLCO SINGLE BREATH REF: 23.25
DLCOC SBVA LLN: 2.97
DLCOC SBVA PRE REF: 46 %
DLCOC SBVA REF: 4.28
DLCOC SINGLE BREATH LLN: 17.51
DLCOC SINGLE BREATH PRE REF: 27.7 %
DLCOC SINGLE BREATH REF: 23.25
DLCOCSBVAULN: 5.59
DLCOCSINGLEBREATHULN: 28.98
DLCOSINGLEBREATHULN: 28.98
DLCOVA LLN: 2.97
DLCOVA PRE REF: 46 %
DLCOVA PRE: 1.97 ML/(MIN*MMHG*L) (ref 2.97–5.59)
DLCOVA REF: 4.28
DLCOVAULN: 5.59
DLVAADJ PRE: 1.97 ML/(MIN*MMHG*L) (ref 2.97–5.59)
FEF 25 75 LLN: 0.94
FEF 25 75 PRE REF: 17.2 %
FEF 25 75 REF: 2.02
FEV05 LLN: 1
FEV05 REF: 1.86
FEV1 FVC LLN: 65
FEV1 FVC PRE REF: 46.3 %
FEV1 FVC REF: 78
FEV1 LLN: 1.79
FEV1 PRE REF: 34.2 %
FEV1 REF: 2.45
FVC LLN: 2.33
FVC PRE REF: 73.3 %
FVC REF: 3.18
HCO3 UR-SCNC: 25.8 MMOL/L (ref 24–28)
IVC PRE: 1.97 L (ref 2.33–4.03)
IVC SINGLE BREATH LLN: 2.33
IVC SINGLE BREATH PRE REF: 61.9 %
IVC SINGLE BREATH REF: 3.18
IVCSINGLEBREATHULN: 4.03
PCO2 BLDA: 38.5 MMHG (ref 35–45)
PEF LLN: 4.31
PEF PRE REF: 34.7 %
PEF REF: 6.18
PH SMN: 7.43 [PH] (ref 7.35–7.45)
PO2 BLDA: 56 MMHG (ref 80–100)
POC BE: 2 MMOL/L
POC SATURATED O2: 90 % (ref 95–100)
POC TCO2: 27 MMOL/L (ref 23–27)
PRE DLCO: 6.43 ML/(MIN*MMHG) (ref 17.51–28.98)
PRE FEF 25 75: 0.35 L/S (ref 0.94–3.1)
PRE FET 100: 8.01 SEC
PRE FEV05 REF: 28.4 %
PRE FEV1 FVC: 36.05 % (ref 64.75–90.95)
PRE FEV1: 0.84 L (ref 1.79–3.11)
PRE FEV5: 0.53 L (ref 1–2.71)
PRE FVC: 2.33 L (ref 2.33–4.03)
PRE PEF: 2.14 L/S (ref 4.31–8.05)
RAW PRE REF: 202.2 %
RAW PRE: 6.19 CMH2O*S/L (ref 3.06–3.06)
RAW REF: 3.06
SAMPLE: ABNORMAL
SGAW REF: 0.1
SITE: ABNORMAL
VA PRE: 3.27 L (ref 5.28–5.28)
VA SINGLE BREATH LLN: 5.28
VA SINGLE BREATH PRE REF: 61.9 %
VA SINGLE BREATH REF: 5.28
VASINGLEBREATHULN: 5.28

## 2019-10-17 PROCEDURE — 94726 PLETHYSMOGRAPHY LUNG VOLUMES: CPT | Mod: 26,S$PBB,, | Performed by: INTERNAL MEDICINE

## 2019-10-17 PROCEDURE — 36600 WITHDRAWAL OF ARTERIAL BLOOD: CPT | Mod: PBBFAC | Performed by: INTERNAL MEDICINE

## 2019-10-17 PROCEDURE — 94726 PLETHYSMOGRAPHY LUNG VOLUMES: CPT | Mod: PBBFAC | Performed by: INTERNAL MEDICINE

## 2019-10-17 PROCEDURE — 94010 BREATHING CAPACITY TEST: CPT | Mod: 26,S$PBB,, | Performed by: INTERNAL MEDICINE

## 2019-10-17 PROCEDURE — 94010 BREATHING CAPACITY TEST: ICD-10-PCS | Mod: 26,S$PBB,, | Performed by: INTERNAL MEDICINE

## 2019-10-17 PROCEDURE — 94729 DIFFUSING CAPACITY: CPT | Mod: 26,S$PBB,, | Performed by: INTERNAL MEDICINE

## 2019-10-17 PROCEDURE — 99999 PR PBB SHADOW E&M-EST. PATIENT-LVL III: CPT | Mod: PBBFAC,,, | Performed by: INTERNAL MEDICINE

## 2019-10-17 PROCEDURE — 94729 DIFFUSING CAPACITY: CPT | Mod: PBBFAC | Performed by: INTERNAL MEDICINE

## 2019-10-17 PROCEDURE — 82803 BLOOD GASES ANY COMBINATION: CPT | Mod: PBBFAC | Performed by: INTERNAL MEDICINE

## 2019-10-17 PROCEDURE — 99213 OFFICE O/P EST LOW 20 MIN: CPT | Mod: PBBFAC | Performed by: INTERNAL MEDICINE

## 2019-10-17 PROCEDURE — 94726 PULM FUNCT TST PLETHYSMOGRAP: ICD-10-PCS | Mod: 26,S$PBB,, | Performed by: INTERNAL MEDICINE

## 2019-10-17 PROCEDURE — 99214 PR OFFICE/OUTPT VISIT, EST, LEVL IV, 30-39 MIN: ICD-10-PCS | Mod: 25,S$PBB,, | Performed by: INTERNAL MEDICINE

## 2019-10-17 PROCEDURE — 36600 WITHDRAWAL OF ARTERIAL BLOOD: CPT | Mod: 59,S$PBB,, | Performed by: INTERNAL MEDICINE

## 2019-10-17 PROCEDURE — 94618 PULMONARY STRESS TESTING: CPT | Mod: 26,S$PBB,, | Performed by: INTERNAL MEDICINE

## 2019-10-17 PROCEDURE — 99999 PR PBB SHADOW E&M-EST. PATIENT-LVL III: ICD-10-PCS | Mod: PBBFAC,,, | Performed by: INTERNAL MEDICINE

## 2019-10-17 PROCEDURE — 94010 BREATHING CAPACITY TEST: CPT | Mod: PBBFAC | Performed by: INTERNAL MEDICINE

## 2019-10-17 PROCEDURE — 99214 OFFICE O/P EST MOD 30 MIN: CPT | Mod: 25,S$PBB,, | Performed by: INTERNAL MEDICINE

## 2019-10-17 PROCEDURE — 94729 PR C02/MEMBANE DIFFUSE CAPACITY: ICD-10-PCS | Mod: 26,S$PBB,, | Performed by: INTERNAL MEDICINE

## 2019-10-17 PROCEDURE — 94727 GAS DIL/WSHOT DETER LNG VOL: CPT | Mod: 53,PBBFAC | Performed by: INTERNAL MEDICINE

## 2019-10-17 PROCEDURE — 94618 PULMONARY STRESS TESTING: ICD-10-PCS | Mod: 26,S$PBB,, | Performed by: INTERNAL MEDICINE

## 2019-10-17 PROCEDURE — 94618 PULMONARY STRESS TESTING: CPT | Mod: PBBFAC | Performed by: INTERNAL MEDICINE

## 2019-10-17 PROCEDURE — 36600 PR WITHDRAWAL OF ARTERIAL BLOOD: ICD-10-PCS | Mod: 59,S$PBB,, | Performed by: INTERNAL MEDICINE

## 2019-10-17 NOTE — H&P (VIEW-ONLY)
"Subjective:       Patient ID: Estefani Fam is a 69 y.o. female.    Chief Complaint: COPD    69 year old with a history of severe COPD and chronic hypoxic respiratory failure.  Patient's  has recently had a exacerbation.  Patient is currently clearing throat and complaining of throat clearing.  Using flonase regularly.  Using NAC, azithromycin, breo and incruse.  No reflux type symptoms.    Review of Systems   Constitutional: Negative for fever.   HENT: Negative for trouble swallowing.    Respiratory: Negative for choking.    Cardiovascular: Negative for chest pain and leg swelling.   Gastrointestinal: Negative for acid reflux.       Participating in pulmonary rehab  Objective:       Vitals:    10/17/19 1047   BP: 136/66   Pulse: 89   SpO2: (!) 91%  Comment: 2.0   Weight: 65.3 kg (144 lb)   Height: 5' 7" (1.702 m)     Physical Exam   Constitutional: She is oriented to person, place, and time. She appears well-developed and well-nourished.   Cardiovascular: Normal rate and regular rhythm.   Pulmonary/Chest: Normal expansion and hyperinflation. She has decreased breath sounds. She has no wheezes.   Musculoskeletal: Normal range of motion. She exhibits no edema.   Neurological: She is alert and oriented to person, place, and time.   Skin: Skin is warm and dry.   Psychiatric: She has a normal mood and affect.        Personal Diagnostic Review  CT of chest performed on 1/2019 without contrast revealed RML cavitary nodule.  Pulmonary function tests: FEV1: 0.84  (31 % predicted), FVC:  2.33 (73 % predicted), FEV1/FVC:  36, DLCO: 6.4 (28 % predicted), severe obstruction and severe decrease in diffusion  No flowsheet data found.      Assessment:       1. Centrilobular emphysema    2. Lung nodule    3. Chronic respiratory failure with hypoxia        Outpatient Encounter Medications as of 10/17/2019   Medication Sig Dispense Refill    acetylcysteine 600 mg Cap Take 1 capsule (600 mg total) by mouth 2 (two) times " daily. 90 capsule 3    ascorbic acid, vitamin C, (VITAMIN C) 500 MG tablet Take 500 mg by mouth 2 (two) times daily.       azelastine (ASTELIN) 137 mcg (0.1 %) nasal spray 1 spray (137 mcg total) by Nasal route 2 (two) times daily. 30 mL 3    azithromycin (Z-JERSEY) 250 MG tablet Take 1 tablet (250 mg total) by mouth every Mon, Wed, Fri. 36 tablet 3    calcium carbonate (OS-STEPHANIE) 600 mg calcium (1,500 mg) Tab Take 1 tablet (600 mg total) by mouth once daily. Take Levofloxacin antibiotic at least 2 hours before calcium.  0    doxycycline (VIBRAMYCIN) 100 MG Cap Take 1 capsule (100 mg total) by mouth once daily. 30 capsule 2    fluticasone (FLONASE) 50 mcg/actuation nasal spray Spray twice (100 mcg total) by Each Nare route once daily. 16 g 11    fluticasone-vilanterol (BREO) 200-25 mcg/dose DsDv diskus inhaler Inhale 1 puff into the lungs once daily. Controller 60 each 11    guaiFENesin (MUCINEX) 600 mg 12 hr tablet Take 1 tablet (600 mg total) by mouth 2 (two) times daily. 60 tablet 5    levalbuterol (XOPENEX) 0.63 mg/3 mL nebulizer solution Take 3 mLs (0.63 mg total) by nebulization every 8 (eight) hours as needed for Wheezing. Rescue 3 mL 11    metronidazole 1% (METROGEL) 1 % Gel Apply topically once daily. 60 g 2    MULTIVIT-IRON-MIN-FOLIC ACID 3,500-18-0.4 UNIT-MG-MG ORAL CHEW Take 1 tablet by mouth once daily. Take Levofloxacin antibiotic at least 2 hours before multivitamin.  0    pantoprazole (PROTONIX) 40 MG tablet Take 1 tablet (40 mg total) by mouth once daily. 30 tablet 11    predniSONE (DELTASONE) 10 MG tablet 4 tabs (40mg) daily for five days, then 2 tabs (20mg) daily for five days, then 1 tab (10mg) daily for five days. 35 tablet 0    sodium chloride (OCEAN) 0.65 % nasal spray 1 spray by Nasal route 2 (two) times daily. 88 mL 12    umeclidinium (INCRUSE ELLIPTA) 62.5 mcg/actuation DsDv INHALE 1 PUFF BY MOUTH INTO LUNGS ONCE DAILY 30 each 20     No facility-administered encounter medications  on file as of 10/17/2019.      Orders Placed This Encounter   Procedures    CT Chest Without Contrast     Standing Status:   Future     Standing Expiration Date:   10/17/2020     Order Specific Question:   May the Radiologist modify the order per protocol to meet the clinical needs of the patient?     Answer:   Yes    Echo     Standing Status:   Future     Standing Expiration Date:   10/17/2020    PULM - Arterial Blood Gases--in addition to PFT only     Standing Status:   Future     Standing Expiration Date:   10/17/2020    LUNG VOLUMES     Standing Status:   Future     Standing Expiration Date:   10/17/2020     Plan:     Problem List Items Addressed This Visit     Chronic respiratory failure with hypoxia    Overview     Continue supplemental oxygen         Centrilobular emphysema - Primary    Overview     Doing well.  Last exacerbation 9/2019.  Continue incruse, BREO, NAC and zmax TIW for control  Albuterol for rescue and prevention  Will evaluate for endobronchial valve with lung volumes, echo and ABG    Doing great with pulmonary rehab         Relevant Orders    PULM - Arterial Blood Gases--in addition to PFT only    LUNG VOLUMES    Lung nodule    Overview     Cavitary area in RML from 1/2019  Will repeat CT of chest.         Relevant Orders    Echo    CT Chest Without Contrast

## 2019-10-18 NOTE — PROCEDURES
Estefani Fam is a 69 y.o.  female patient, who presents for a 6 minute walk test ordered by Brooklynn Ruiz MD.  The diagnosis is Shortness of Breath; COPD/Emphysema.  The patient's BMI is 22.6 kg/m2.  Predicted distance (lower limit of normal) is 334.71 meters.      Test Results:    The test was completed with stops.  The patient stopped 2 times for a total of 29 seconds.  The total time walked was 331 seconds.  During walking, the patient reported:  Dyspnea.  The patient used supplemental oxygen during testing.     10/17/2019---------Distance: 304.8 meters (1000 feet)     O2 Sat % Supplemental Oxygen Heart Rate Blood Pressure Jason Scale   Pre-exercise  (Resting) 94 % 2 L/M 96 bpm 126/65 mmHg 5-6   During Exercise 91 % 2 L/M 114 bpm 136/66 mmHg 7-8   Post-exercise  (Recovery) 93 % 2 L/M  101 bpm       Recovery Time:  73 seconds    Performing nurse/tech:  Hernandez ORTIZ      PREVIOUS STUDY:   The patient has not had a previous study.      CLINICAL INTERPRETATION:  Six minute walk distance is 304.8 meters (1000 feet) with very heavy dyspnea.  During exercise, there was desaturation while breathing supplemental oxygen.  Both blood pressure and heart rate remained stable with walking.  The patient did not report non-pulmonary symptoms during exercise.  No previous study performed.  Based upon age and body mass index, exercise capacity is less than predicted.

## 2019-10-21 ENCOUNTER — HOSPITAL ENCOUNTER (OUTPATIENT)
Dept: CARDIOLOGY | Facility: CLINIC | Age: 69
Discharge: HOME | End: 2019-10-21
Attending: INTERNAL MEDICINE
Payer: MEDICARE

## 2019-10-21 ENCOUNTER — HOSPITAL ENCOUNTER (OUTPATIENT)
Dept: RADIOLOGY | Facility: HOSPITAL | Age: 69
Discharge: HOME | End: 2019-10-21
Attending: INTERNAL MEDICINE
Payer: MEDICARE

## 2019-10-21 VITALS
SYSTOLIC BLOOD PRESSURE: 124 MMHG | WEIGHT: 144 LBS | DIASTOLIC BLOOD PRESSURE: 64 MMHG | BODY MASS INDEX: 22.6 KG/M2 | HEART RATE: 89 BPM | HEIGHT: 67 IN

## 2019-10-21 DIAGNOSIS — R91.1 LUNG NODULE: ICD-10-CM

## 2019-10-21 LAB
ASCENDING AORTA: 3.3 CM
AV INDEX (PROSTH): 0.99
AV MEAN GRADIENT: 3 MMHG
AV PEAK GRADIENT: 7 MMHG
AV VALVE AREA: 4.13 CM2
AV VELOCITY RATIO: 0.86
BSA FOR ECHO PROCEDURE: 1.76 M2
CV ECHO LV RWT: 0.36 CM
DOP CALC AO PEAK VEL: 1.31 M/S
DOP CALC AO VTI: 23.43 CM
DOP CALC LVOT AREA: 4.2 CM2
DOP CALC LVOT DIAMETER: 2.3 CM
DOP CALC LVOT PEAK VEL: 1.13 M/S
DOP CALC LVOT STROKE VOLUME: 96.76 CM3
DOP CALCLVOT PEAK VEL VTI: 23.3 CM
E WAVE DECELERATION TIME: 150.35 MSEC
E/A RATIO: 0.81
E/E' RATIO: 7.7 M/S
ECHO LV POSTERIOR WALL: 0.77 CM (ref 0.6–1.1)
FRACTIONAL SHORTENING: 29 % (ref 28–44)
INTERVENTRICULAR SEPTUM: 0.76 CM (ref 0.6–1.1)
IVC PROX: 1.6 CM
IVRT: 0.06 MSEC
LA MAJOR: 4.24 CM
LA MINOR: 3.83 CM
LA WIDTH: 3.84 CM
LEFT ATRIUM SIZE: 3.71 CM
LEFT ATRIUM VOLUME INDEX: 27.7 ML/M2
LEFT ATRIUM VOLUME: 48.74 CM3
LEFT INTERNAL DIMENSION IN SYSTOLE: 3.03 CM (ref 2.1–4)
LEFT VENTRICLE DIASTOLIC VOLUME INDEX: 46.03 ML/M2
LEFT VENTRICLE DIASTOLIC VOLUME: 80.94 ML
LEFT VENTRICLE MASS INDEX: 55 G/M2
LEFT VENTRICLE SYSTOLIC VOLUME INDEX: 20.4 ML/M2
LEFT VENTRICLE SYSTOLIC VOLUME: 35.81 ML
LEFT VENTRICULAR INTERNAL DIMENSION IN DIASTOLE: 4.25 CM (ref 3.5–6)
LEFT VENTRICULAR MASS: 97.39 G
LV LATERAL E/E' RATIO: 7 M/S
LV SEPTAL E/E' RATIO: 8.56 M/S
MV PEAK A VEL: 0.95 M/S
MV PEAK E VEL: 0.77 M/S
PISA TR MAX VEL: 2.81 M/S
RA MAJOR: 4 CM
RA PRESSURE: 3 MMHG
RA WIDTH: 4.35 CM
RETIRED EF AND QEF - SEE NOTES: 68 %
RIGHT VENTRICULAR END-DIASTOLIC DIMENSION: 3.51 CM
RV TISSUE DOPPLER FREE WALL SYSTOLIC VELOCITY 1 (APICAL 4 CHAMBER VIEW): 10.49 CM/S
SINUS: 3.25 CM
STJ: 2.89 CM
TDI LATERAL: 0.11 M/S
TDI SEPTAL: 0.09 M/S
TDI: 0.1 M/S
TR MAX PG: 32 MMHG
TRICUSPID ANNULAR PLANE SYSTOLIC EXCURSION: 2.01 CM
TV REST PULMONARY ARTERY PRESSURE: 35 MMHG

## 2019-10-21 PROCEDURE — 93306 ECHO (CUPID ONLY): ICD-10-PCS | Mod: 26,S$PBB,, | Performed by: INTERNAL MEDICINE

## 2019-10-21 PROCEDURE — 71250 CT THORAX DX C-: CPT | Mod: TC

## 2019-10-21 PROCEDURE — 71250 CT THORAX DX C-: CPT | Mod: 26,,, | Performed by: RADIOLOGY

## 2019-10-21 PROCEDURE — 93306 TTE W/DOPPLER COMPLETE: CPT | Mod: PBBFAC | Performed by: INTERNAL MEDICINE

## 2019-10-21 PROCEDURE — 71250 CT CHEST WITHOUT CONTRAST: ICD-10-PCS | Mod: 26,,, | Performed by: RADIOLOGY

## 2019-10-22 ENCOUNTER — TELEPHONE (OUTPATIENT)
Dept: PULMONOLOGY | Facility: CLINIC | Age: 69
End: 2019-10-22

## 2019-10-22 NOTE — TELEPHONE ENCOUNTER
Message left regarding her CT results and to call us back.  Will send [employer] message.  Would like to perform bronchoscopy as change in sputum likely indicates a chronic infection.  Will await patient's call back to schedule.

## 2019-10-23 NOTE — TELEPHONE ENCOUNTER
----- Message from Peng Blandon sent at 10/22/2019  2:50 PM CDT -----  Contact: self [phone]  Patient is calling to see if she needs to schedule test on her own and will someone call her?

## 2019-10-23 NOTE — TELEPHONE ENCOUNTER
I spoke to Mrs Fam. Patient scheduled for bronchoscopy with Dr Ruiz on 10/28/19 arrival to [facility] for 8:30am. I went over instructions with patient and answered all questions. Patient verbalized understanding.

## 2019-10-24 DIAGNOSIS — R91.8 ABNORMAL CT SCAN OF LUNG: Primary | ICD-10-CM

## 2019-10-25 ENCOUNTER — TELEPHONE (OUTPATIENT)
Dept: PULMONOLOGY | Facility: CLINIC | Age: 69
End: 2019-10-25

## 2019-10-25 NOTE — TELEPHONE ENCOUNTER
Pre Bronchoscopy instruction given to patient over the phone. Patient was instructed to remain in NPO after midnight. Patient was instructed to take morning medication with a little water and not to take blood thinners. Patient verbalized understanding [facility] and to have a designated .

## 2019-10-28 ENCOUNTER — HOSPITAL ENCOUNTER (OUTPATIENT)
Facility: HOSPITAL | Age: 69
Discharge: HOME | End: 2019-10-28
Attending: INTERNAL MEDICINE | Admitting: INTERNAL MEDICINE
Payer: MEDICARE

## 2019-10-28 VITALS
HEART RATE: 107 BPM | HEIGHT: 67 IN | OXYGEN SATURATION: 90 % | RESPIRATION RATE: 24 BRPM | WEIGHT: 144 LBS | SYSTOLIC BLOOD PRESSURE: 114 MMHG | TEMPERATURE: 99 F | DIASTOLIC BLOOD PRESSURE: 59 MMHG | BODY MASS INDEX: 22.6 KG/M2

## 2019-10-28 DIAGNOSIS — R91.1 LUNG NODULE: Primary | ICD-10-CM

## 2019-10-28 LAB
APPEARANCE FLD: NORMAL
BODY FLD TYPE: NORMAL
COLOR FLD: COLORLESS
EOSINOPHIL NFR FLD MANUAL: 1 %
KOH PREP SPEC: NORMAL
LYMPHOCYTES NFR FLD MANUAL: 1 %
MONOS+MACROS NFR FLD MANUAL: 7 %
NEUTROPHILS NFR FLD MANUAL: 91 %
WBC # FLD: 111 /CU MM

## 2019-10-28 PROCEDURE — 87116 MYCOBACTERIA CULTURE: CPT

## 2019-10-28 PROCEDURE — 88305 TISSUE EXAM BY PATHOLOGIST: CPT | Mod: 26,,, | Performed by: PATHOLOGY

## 2019-10-28 PROCEDURE — 25000003 PHARM REV CODE 250: Performed by: INTERNAL MEDICINE

## 2019-10-28 PROCEDURE — 25000242 PHARM REV CODE 250 ALT 637 W/ HCPCS: Performed by: INTERNAL MEDICINE

## 2019-10-28 PROCEDURE — 87205 SMEAR GRAM STAIN: CPT

## 2019-10-28 PROCEDURE — 31624 DX BRONCHOSCOPE/LAVAGE: CPT

## 2019-10-28 PROCEDURE — 88112 CYTOLOGY SPECIMEN- MEDICAL CYTOLOGY (FLUID/WASH/BRUSH): ICD-10-PCS | Mod: 26,,, | Performed by: PATHOLOGY

## 2019-10-28 PROCEDURE — 87206 SMEAR FLUORESCENT/ACID STAI: CPT

## 2019-10-28 PROCEDURE — 89051 BODY FLUID CELL COUNT: CPT

## 2019-10-28 PROCEDURE — 63600175 PHARM REV CODE 636 W HCPCS: Performed by: INTERNAL MEDICINE

## 2019-10-28 PROCEDURE — 31624 PR BRONCHOSCOPY,DIAG2STIC W LAVAGE: ICD-10-PCS | Mod: RT,GC,, | Performed by: INTERNAL MEDICINE

## 2019-10-28 PROCEDURE — 99152 PR MOD CONSCIOUS SEDATION, SAME PHYS, 5+ YRS, FIRST 15 MIN: ICD-10-PCS | Mod: GC,,, | Performed by: INTERNAL MEDICINE

## 2019-10-28 PROCEDURE — 31624 DX BRONCHOSCOPE/LAVAGE: CPT | Mod: RT,GC,, | Performed by: INTERNAL MEDICINE

## 2019-10-28 PROCEDURE — 88305 CYTOLOGY SPECIMEN- MEDICAL CYTOLOGY (FLUID/WASH/BRUSH): ICD-10-PCS | Mod: 26,,, | Performed by: PATHOLOGY

## 2019-10-28 PROCEDURE — 87632 RESP VIRUS 6-11 TARGETS: CPT

## 2019-10-28 PROCEDURE — 87070 CULTURE OTHR SPECIMN AEROBIC: CPT

## 2019-10-28 PROCEDURE — 87102 FUNGUS ISOLATION CULTURE: CPT

## 2019-10-28 PROCEDURE — 87210 SMEAR WET MOUNT SALINE/INK: CPT

## 2019-10-28 PROCEDURE — 99152 MOD SED SAME PHYS/QHP 5/>YRS: CPT | Mod: GC,,, | Performed by: INTERNAL MEDICINE

## 2019-10-28 PROCEDURE — 88112 CYTOPATH CELL ENHANCE TECH: CPT | Performed by: PATHOLOGY

## 2019-10-28 PROCEDURE — 87015 SPECIMEN INFECT AGNT CONCNTJ: CPT

## 2019-10-28 RX ORDER — FENTANYL CITRATE 50 UG/ML
INJECTION, SOLUTION INTRAMUSCULAR; INTRAVENOUS CODE/TRAUMA/SEDATION MEDICATION
Status: COMPLETED | OUTPATIENT
Start: 2019-10-28 | End: 2019-10-28

## 2019-10-28 RX ORDER — LIDOCAINE HYDROCHLORIDE 10 MG/ML
INJECTION INFILTRATION; PERINEURAL CODE/TRAUMA/SEDATION MEDICATION
Status: COMPLETED | OUTPATIENT
Start: 2019-10-28 | End: 2019-10-28

## 2019-10-28 RX ORDER — MIDAZOLAM HYDROCHLORIDE 5 MG/ML
INJECTION INTRAMUSCULAR; INTRAVENOUS CODE/TRAUMA/SEDATION MEDICATION
Status: COMPLETED | OUTPATIENT
Start: 2019-10-28 | End: 2019-10-28

## 2019-10-28 RX ORDER — ALBUTEROL SULFATE 0.83 MG/ML
SOLUTION RESPIRATORY (INHALATION) CODE/TRAUMA/SEDATION MEDICATION
Status: COMPLETED | OUTPATIENT
Start: 2019-10-28 | End: 2019-10-28

## 2019-10-28 RX ORDER — LIDOCAINE HYDROCHLORIDE 20 MG/ML
INJECTION, SOLUTION INFILTRATION; PERINEURAL CODE/TRAUMA/SEDATION MEDICATION
Status: COMPLETED | OUTPATIENT
Start: 2019-10-28 | End: 2019-10-28

## 2019-10-28 RX ADMIN — FENTANYL CITRATE 50 MCG: 50 INJECTION, SOLUTION INTRAMUSCULAR; INTRAVENOUS at 11:10

## 2019-10-28 RX ADMIN — MIDAZOLAM HYDROCHLORIDE 2 MG: 5 INJECTION, SOLUTION INTRAMUSCULAR; INTRAVENOUS at 11:10

## 2019-10-28 RX ADMIN — LIDOCAINE HYDROCHLORIDE 8 ML: 10 INJECTION, SOLUTION INFILTRATION; PERINEURAL at 11:10

## 2019-10-28 RX ADMIN — LIDOCAINE HYDROCHLORIDE 6 ML: 20 INJECTION, SOLUTION INFILTRATION; PERINEURAL at 11:10

## 2019-10-28 RX ADMIN — TOPICAL ANESTHETIC 0.5 ML: 200 SPRAY DENTAL; PERIODONTAL at 11:10

## 2019-10-28 RX ADMIN — ALBUTEROL SULFATE 2.5 MG: 2.5 SOLUTION RESPIRATORY (INHALATION) at 11:10

## 2019-10-28 NOTE — DISCHARGE SUMMARY
[facility]  Pulmonology  Discharge Summary      Patient Name: Estefani Fam  MRN: [id number]  Admission Date: 10/28/2019  Hospital Length of Stay: 0 days  Discharge Date and Time:  10/28/2019 11:35 AM  Attending Physician: Brooklynn Ruiz MD   Discharging Provider: Brooklynn Ruiz MD  Primary Care Provider: Miles Jackson MD    HPI: Patient with emphysema, chronic cough and congestion.  New infiltrate on the right    Procedure(s) (LRB):  BRONCHOSCOPY, WITH FLUOROSCOPY (N/A)    Indwelling Lines/Drains at Time of Discharge:   Lines/Drains/Airways     None                 Hospital Course: Bronchoscopy with BAL complete        Significant Labs:  All pertinent labs within the past 24 hours have been reviewed.    Significant Imaging:  I have reviewed all pertinent imaging results/findings within the past 24 hours.    Pending Diagnostic Studies:     Procedure Component Value Units Date/Time    Cytology Specimen-Medical Cytology (Fluid/Wash/Brush) [[id number]] Collected:  10/28/19 1129    Order Status:  Sent Lab Status:  No result     Specimen:  Bronchial Alveolar Lavage (BAL)     WBC & Diff,Body Fluid Bronchial Wash [[id number]] Collected:  10/28/19 1129    Order Status:  Sent Lab Status:  In process Updated:  10/28/19 1130    Specimen:  Body Fluid         Final Active Diagnoses:    Diagnosis Date Noted POA    PRINCIPAL PROBLEM:  Lung nodule [R91.1] 10/17/2019 Yes      Problems Resolved During this Admission:       Discharged Condition: stable    Disposition: Home or Self Care    Follow Up:  Follow-up Information     Call in 1 week to follow up.               Patient Instructions:      WBC & Diff,Body Fluid Bronchial Wash     Order Specific Question Answer Comments   Specimen Source Bronchial Wash      Diet general     Medications:  Reconciled Home Medications:      Medication List      CONTINUE taking these medications    acetylcysteine 600 mg Cap  Take 1 capsule (600 mg total) by mouth 2 (two) times  daily.     azelastine 137 mcg (0.1 %) nasal spray  Commonly known as:  ASTELIN  1 spray (137 mcg total) by Nasal route 2 (two) times daily.     azithromycin 250 MG tablet  Commonly known as:  Z-JERSEY  Take 1 tablet (250 mg total) by mouth every Mon, Wed, Fri.     calcium carbonate 600 mg calcium (1,500 mg) Tab  Commonly known as:  OS-STEPHANIE  Take 1 tablet (600 mg total) by mouth once daily. Take Levofloxacin antibiotic at least 2 hours before calcium.     doxycycline 100 MG Cap  Commonly known as:  VIBRAMYCIN  Take 1 capsule (100 mg total) by mouth once daily.     fluticasone furoate-vilanterol 200-25 mcg/dose Dsdv diskus inhaler  Commonly known as:  BREO  Inhale 1 puff into the lungs once daily. Controller     fluticasone propionate 50 mcg/actuation nasal spray  Commonly known as:  FLONASE  Spray twice (100 mcg total) by Each Nare route once daily.     guaiFENesin 600 mg 12 hr tablet  Commonly known as:  MUCINEX  Take 1 tablet (600 mg total) by mouth 2 (two) times daily.     levalbuterol 0.63 mg/3 mL nebulizer solution  Commonly known as:  XOPENEX  Take 3 mLs (0.63 mg total) by nebulization every 8 (eight) hours as needed for Wheezing. Rescue     metronidazole 1% 1 % Gel  Commonly known as:  METROGEL  Apply topically once daily.     multivit-iron-min-folic acid 3,500-18-0.4 unit-mg-mg Chew  Commonly known as:  CENTRUM  Take 1 tablet by mouth once daily. Take Levofloxacin antibiotic at least 2 hours before multivitamin.     pantoprazole 40 MG tablet  Commonly known as:  PROTONIX  Take 1 tablet (40 mg total) by mouth once daily.     predniSONE 10 MG tablet  Commonly known as:  DELTASONE  4 tabs (40mg) daily for five days, then 2 tabs (20mg) daily for five days, then 1 tab (10mg) daily for five days.     sodium chloride 0.65 % nasal spray  Commonly known as:  OCEAN  1 spray by Nasal route 2 (two) times daily.     umeclidinium 62.5 mcg/actuation Dsdv  Commonly known as:  INCRUSE ELLIPTA  INHALE 1 PUFF BY MOUTH INTO LUNGS  ONCE DAILY     VITAMIN C 500 MG tablet  Generic drug:  ascorbic acid (vitamin C)  Take 500 mg by mouth 2 (two) times daily.            Brooklynn Ruiz MD  Pulmonology  [facility]

## 2019-10-28 NOTE — DISCHARGE SUMMARY
[facility]  Pulmonology  Discharge Summary      Patient Name: Estefani Fam  MRN: [id number]  Admission Date: 10/28/2019  Hospital Length of Stay: 0 days  Discharge Date and Time:  10/28/2019 12:57 PM  Attending Physician: Brooklynn Ruiz MD   Discharging Provider: Chu Adhikari MD  Primary Care Provider: Miles Jackson MD    HPI:  Pt had bronchoscopy for new RUL/RML lesion concerning for infection vs malignancy. Pt was seen and consented. Denied changes to symptoms since last being seen.     Procedure(s) (LRB):  BRONCHOSCOPY, WITH FLUOROSCOPY (N/A)    Indwelling Lines/Drains at Time of Discharge:   Lines/Drains/Airways     None                 Hospital Course:  Pt was consented for procedure. Bronchoscopy was performed (see procedure not for further detail.) Pt had episode of desaturation with appropriate fluid return with BAL. Was hypoxic post op, but was weaned off without significant intervention.         Significant Labs:  ABGs: No results for input(s): PH, PCO2, HCO3, POCSATURATED, BE in the last 48 hours.  CMP: No results for input(s): NA, K, CL, CO2, GLU, BUN, CREATININE, CALCIUM, PROT, ALBUMIN, BILITOT, ALKPHOS, AST, ALT, ANIONGAP, EGFRNONAA in the last 48 hours.    Invalid input(s): ESTGFAFRICA  Cardiac Markers: No results for input(s): CKMB, TROPONINT, MYOGLOBIN in the last 48 hours.  Coagulation: No results for input(s): PT, INR, APTT in the last 48 hours.  Lactic Acid: No results for input(s): LACTATE in the last 48 hours.  Respiratory Culture: No results for input(s): GSRESP, RESPIRATORYC in the last 48 hours.    Significant Imaging:  I have reviewed and interpreted all pertinent imaging results/findings within the past 24 hours.    Pending Diagnostic Studies:     Procedure Component Value Units Date/Time    Cytology Specimen-Medical Cytology (Fluid/Wash/Brush) [[id number]] Collected:  10/28/19 1129    Order Status:  Sent Lab Status:  No result     Specimen:  Bronchial Alveolar  Lavage (BAL)     WBC & Diff,Body Fluid Bronchial Wash [[id number]] Collected:  10/28/19 1129    Order Status:  Sent Lab Status:  In process Updated:  10/28/19 1256    Specimen:  Body Fluid         Final Active Diagnoses:    Diagnosis Date Noted POA    PRINCIPAL PROBLEM:  Lung nodule [R91.1] 10/17/2019 Yes      Problems Resolved During this Admission:       Discharged Condition: fair    Disposition: Home or Self Care    Follow Up:  Follow-up Information     Call in 1 week to follow up.               Patient Instructions:      WBC & Diff,Body Fluid Bronchial Wash     Order Specific Question Answer Comments   Specimen Source Bronchial Wash      Diet general     Medications:  Reconciled Home Medications:      Medication List      CONTINUE taking these medications    acetylcysteine 600 mg Cap  Take 1 capsule (600 mg total) by mouth 2 (two) times daily.     azelastine 137 mcg (0.1 %) nasal spray  Commonly known as:  ASTELIN  1 spray (137 mcg total) by Nasal route 2 (two) times daily.     azithromycin 250 MG tablet  Commonly known as:  Z-JERSEY  Take 1 tablet (250 mg total) by mouth every Mon, Wed, Fri.     calcium carbonate 600 mg calcium (1,500 mg) Tab  Commonly known as:  OS-STEPHANIE  Take 1 tablet (600 mg total) by mouth once daily. Take Levofloxacin antibiotic at least 2 hours before calcium.     doxycycline 100 MG Cap  Commonly known as:  VIBRAMYCIN  Take 1 capsule (100 mg total) by mouth once daily.     fluticasone furoate-vilanterol 200-25 mcg/dose Dsdv diskus inhaler  Commonly known as:  BREO  Inhale 1 puff into the lungs once daily. Controller     fluticasone propionate 50 mcg/actuation nasal spray  Commonly known as:  FLONASE  Spray twice (100 mcg total) by Each Nare route once daily.     guaiFENesin 600 mg 12 hr tablet  Commonly known as:  MUCINEX  Take 1 tablet (600 mg total) by mouth 2 (two) times daily.     levalbuterol 0.63 mg/3 mL nebulizer solution  Commonly known as:  XOPENEX  Take 3 mLs (0.63 mg total) by  nebulization every 8 (eight) hours as needed for Wheezing. Rescue     metronidazole 1% 1 % Gel  Commonly known as:  METROGEL  Apply topically once daily.     multivit-iron-min-folic acid 3,500-18-0.4 unit-mg-mg Chew  Commonly known as:  CENTRUM  Take 1 tablet by mouth once daily. Take Levofloxacin antibiotic at least 2 hours before multivitamin.     pantoprazole 40 MG tablet  Commonly known as:  PROTONIX  Take 1 tablet (40 mg total) by mouth once daily.     predniSONE 10 MG tablet  Commonly known as:  DELTASONE  4 tabs (40mg) daily for five days, then 2 tabs (20mg) daily for five days, then 1 tab (10mg) daily for five days.     sodium chloride 0.65 % nasal spray  Commonly known as:  OCEAN  1 spray by Nasal route 2 (two) times daily.     umeclidinium 62.5 mcg/actuation Dsdv  Commonly known as:  INCRUSE ELLIPTA  INHALE 1 PUFF BY MOUTH INTO LUNGS ONCE DAILY     VITAMIN C 500 MG tablet  Generic drug:  ascorbic acid (vitamin C)  Take 500 mg by mouth 2 (two) times daily.            Chu Adhikari MD PGY IV  Pulmonology  [facility]  Pager: [phone]

## 2019-10-28 NOTE — HOSPITAL COURSE
Pt was consented for procedure. Bronchoscopy was performed (see procedure not for further detail.) Pt had episode of desaturation with appropriate fluid return with BAL. Was hypoxic post op, but was weaned off without significant intervention.

## 2019-10-28 NOTE — PROGRESS NOTES
Pt discharged to home . Pt IV removed. Pt has all discharge instructions and personal belongings. Prescriptions delivered to BS. Tolerating clear liquids well. No further questions. Adequate for discharge. Back to baseline SpO2 on home 2L NC. Approved to be d/c'd per FOREST Adhikari MD.

## 2019-10-28 NOTE — HPI
Pt had bronchoscopy for new RUL/RML lesion concerning for infection vs malignancy. Pt was seen and consented. Denied changes to symptoms since last being seen.

## 2019-10-28 NOTE — INTERVAL H&P NOTE
The patient has been examined and the H&P has been reviewed:    I concur with the findings and changes have been noted since the H&P was written: Patient with RML/RLL opacification concerning for infection/mass new since January    Anesthesia/Surgery risks, benefits and alternative options discussed and understood by patient/family.      I have explained the risks, benefits and alternatives of the procedure in detail.  The patient voices understanding and all questions have been answered.  The patient agrees to proceed as planned.      Active Hospital Problems    Diagnosis  POA    Lung nodule [R91.1]  Yes     Cavitary area in RML from 1/2019  Will repeat CT of chest.        Resolved Hospital Problems   No resolved problems to display.     ASA 2   M[place]ampatti 2

## 2019-10-28 NOTE — SEDATION DOCUMENTATION
Specimens obtained during Bronchoscopy:  BAL  ml nacl in 25 ml return.  Verbal report given to DOSC RN at bedside to include documentation charted in procedural sedation documentation.  Patient to be NPO 1 hour post procedure and place in PO tolerance at 1215.  Moderate concious sedation was performed and cardiorespiratory functions were monitored the entire procedure by Lee Ann Sigala RN.  Sedation began at 1106  and concluded at 1136.  The patient tolerated the procedure well.  Lee Ann Sigala RN

## 2019-10-28 NOTE — DISCHARGE INSTRUCTIONS
Discharge Instructions for Bronchoscopy    Chest X-ray completed and MD notified.    ACTIVITY LEVEL:  If you received sedation or an anesthetic, you may feel sleepy for several hours. Do not drive, operate machinery, make critical decisions, or perform activities that require coordination or balance until tomorrow morning. Please have a responsible person stay with you for at least two (2) hours after you leave the hospital.     DIET:  Once you can drink clear liquids without coughing, you can resume your regular diet.     WHAT you may expect over the next 24 hours:  · You may experience a low grade fever.  · You may cough up streaks of blood.  · Take Tylenol as directed for comfort/fever.    Additional Instructions:  · Do not take Aspirin, ibuprofen, naproxen, or any medications containing these items for 2 days after the bronchoscopy.  · If you normally take Coumadin or aspirin, you may restart on 10/30.     COME TO THE EMERGENCY DEPARTMENT IF:  · You cough up more than one (1) tablespoon of blood.  · You have fever over 101F (38.4C) for more than one evening.  · You experience shortness of breath that is of new onset, or that is increased from your usual baseline.  · You experience chest pain.  · You have chills.    RETURN APPOINTMENT:  Follow up as directed.       FOR EMERGENCIES:    If any unusual problems or difficulties occur, contact ***  or the resident at *** or at the Clinic office, ***.

## 2019-11-02 ENCOUNTER — PATIENT OUTREACH (OUTPATIENT)
Dept: ADMINISTRATIVE | Facility: OTHER | Age: 69
End: 2019-11-02

## 2019-11-04 ENCOUNTER — TELEPHONE (OUTPATIENT)
Dept: PULMONOLOGY | Facility: CLINIC | Age: 69
End: 2019-11-04

## 2019-11-04 ENCOUNTER — OFFICE VISIT (OUTPATIENT)
Dept: DERMATOLOGY | Facility: CLINIC | Age: 69
End: 2019-11-04
Payer: MEDICARE

## 2019-11-04 ENCOUNTER — PATIENT MESSAGE (OUTPATIENT)
Dept: PULMONOLOGY | Facility: CLINIC | Age: 69
End: 2019-11-04

## 2019-11-04 DIAGNOSIS — L57.0 AK (ACTINIC KERATOSIS): ICD-10-CM

## 2019-11-04 DIAGNOSIS — J44.1 CHRONIC OBSTRUCTIVE PULMONARY DISEASE WITH ACUTE EXACERBATION: Primary | ICD-10-CM

## 2019-11-04 DIAGNOSIS — Z12.83 SCREENING EXAM FOR SKIN CANCER: Primary | ICD-10-CM

## 2019-11-04 DIAGNOSIS — L85.3 XEROSIS CUTIS: ICD-10-CM

## 2019-11-04 DIAGNOSIS — D18.00 HEMANGIOMA, UNSPECIFIED SITE: ICD-10-CM

## 2019-11-04 DIAGNOSIS — L71.9 ROSACEA: ICD-10-CM

## 2019-11-04 LAB
DLCO ADJ PRE: 6.4 ML/(MIN*MMHG) (ref 17.51–28.98)
DLCO SINGLE BREATH LLN: 17.51
DLCO SINGLE BREATH PRE REF: 27.5 %
DLCO SINGLE BREATH REF: 23.25
DLCOC SBVA LLN: 2.97
DLCOC SBVA PRE REF: 46 %
DLCOC SBVA REF: 4.28
DLCOC SINGLE BREATH LLN: 17.51
DLCOC SINGLE BREATH PRE REF: 27.5 %
DLCOC SINGLE BREATH REF: 23.25
DLCOCSBVAULN: 5.59
DLCOCSINGLEBREATHULN: 28.98
DLCOSINGLEBREATHULN: 28.98
DLCOVA LLN: 2.97
DLCOVA PRE REF: 46 %
DLCOVA PRE: 1.97 ML/(MIN*MMHG*L) (ref 2.97–5.59)
DLCOVA REF: 4.28
DLCOVAULN: 5.59
DLVAADJ PRE: 1.97 ML/(MIN*MMHG*L) (ref 2.97–5.59)
ERV LLN: 0.7
ERV PRE REF: 114.9 %
ERV REF: 0.7
ERVULN: 0.7
FEF 25 75 LLN: 0.94
FEF 25 75 PRE REF: 17.3 %
FEF 25 75 REF: 2.02
FEV05 LLN: 1
FEV05 REF: 1.86
FEV1 FVC LLN: 65
FEV1 FVC PRE REF: 46.3 %
FEV1 FVC REF: 78
FEV1 LLN: 1.79
FEV1 PRE REF: 34.2 %
FEV1 REF: 2.45
FRCPLETH LLN: 2.05
FRCPLETH PREREF: 143.9 %
FRCPLETH REF: 2.88
FRCPLETHULN: 3.7
FVC LLN: 2.33
FVC PRE REF: 73.3 %
FVC REF: 3.18
IVC PRE: 1.97 L (ref 2.33–4.03)
IVC SINGLE BREATH LLN: 2.33
IVC SINGLE BREATH PRE REF: 61.9 %
IVC SINGLE BREATH REF: 3.18
IVCSINGLEBREATHULN: 4.03
PEF LLN: 4.31
PEF PRE REF: 34.6 %
PEF REF: 6.18
PRE DLCO: 6.4 ML/(MIN*MMHG) (ref 17.51–28.98)
PRE ERV: 0.8 L (ref 0.7–0.7)
PRE FEF 25 75: 0.35 L/S (ref 0.94–3.1)
PRE FET 100: 8.01 SEC
PRE FEV05 REF: 28.5 %
PRE FEV1 FVC: 36.05 % (ref 64.75–90.95)
PRE FEV1: 0.84 L (ref 1.79–3.11)
PRE FEV5: 0.53 L (ref 1–2.71)
PRE FRC PL: 4.14 L
PRE FVC: 2.33 L (ref 2.33–4.03)
PRE PEF: 2.14 L/S (ref 4.31–8.05)
PRE RV: 3.34 L (ref 1.61–2.76)
PRE TLC: 5.44 L (ref 4.44–6.42)
PRE VTG: 4.73 L
RAW PRE REF: 223.6 %
RAW PRE: 6.84 CMH2O*S/L (ref 3.06–3.06)
RAW REF: 3.06
RV LLN: 1.61
RV PRE REF: 153.1 %
RV REF: 2.18
RVTLC LLN: 33
RVTLC PRE REF: 144.7 %
RVTLC PRE: 61.4 % (ref 32.83–52.01)
RVTLC REF: 42
RVTLCULN: 52
RVULN: 2.76
SGAW PRE REF: 34.6 %
SGAW PRE: 0.04 1/(CMH2O*S) (ref 0.1–0.1)
SGAW REF: 0.1
TLC LLN: 4.44
TLC PRE REF: 100.2 %
TLC REF: 5.43
TLC ULN: 6.42
VA PRE: 3.27 L (ref 5.28–5.28)
VA SINGLE BREATH LLN: 5.28
VA SINGLE BREATH PRE REF: 61.9 %
VA SINGLE BREATH REF: 5.28
VASINGLEBREATHULN: 5.28
VC LLN: 2.33
VC PRE REF: 66 %
VC PRE: 2.1 L (ref 2.33–4.03)
VC REF: 3.18
VC ULN: 4.03

## 2019-11-04 PROCEDURE — 99999 PR PBB SHADOW E&M-EST. PATIENT-LVL II: CPT | Mod: PBBFAC,,, | Performed by: DERMATOLOGY

## 2019-11-04 PROCEDURE — 99212 OFFICE O/P EST SF 10 MIN: CPT | Mod: PBBFAC,25 | Performed by: DERMATOLOGY

## 2019-11-04 PROCEDURE — 99999 PR PBB SHADOW E&M-EST. PATIENT-LVL II: ICD-10-PCS | Mod: PBBFAC,,, | Performed by: DERMATOLOGY

## 2019-11-04 PROCEDURE — 99214 OFFICE O/P EST MOD 30 MIN: CPT | Mod: 25,S$PBB,, | Performed by: DERMATOLOGY

## 2019-11-04 PROCEDURE — 17000 DESTRUCT PREMALG LESION: CPT | Mod: S$PBB,,, | Performed by: DERMATOLOGY

## 2019-11-04 PROCEDURE — 17000 PR DESTRUCTION(LASER SURGERY,CRYOSURGERY,CHEMOSURGERY),PREMALIGNANT LESIONS,FIRST LESION: ICD-10-PCS | Mod: S$PBB,,, | Performed by: DERMATOLOGY

## 2019-11-04 PROCEDURE — 17000 DESTRUCT PREMALG LESION: CPT | Mod: PBBFAC | Performed by: DERMATOLOGY

## 2019-11-04 PROCEDURE — 99214 PR OFFICE/OUTPT VISIT, EST, LEVL IV, 30-39 MIN: ICD-10-PCS | Mod: 25,S$PBB,, | Performed by: DERMATOLOGY

## 2019-11-04 NOTE — TELEPHONE ENCOUNTER
Initial bronchoscopy results are negative.  Patient's cough has improved with antihistamines.  Will continue to follow cultures.

## 2019-11-04 NOTE — PROGRESS NOTES
Subjective:       Patient ID:  Estefani Fam is a 69 y.o. female who presents for   Chief Complaint   Patient presents with    Skin Check     tbse     Patient is a 68 yo woman present for TBSE Patient last seen NP ON 8/2/19  Treated ak's on nose. Would like to have her nose evaluated to see if lesions have gone completely.  No history of skin cancer. Reports history of bad sunburns in youth. Otherwise, nothing of patient concern today - nothing tender, bleeding, itching, rapidly growing.    She reports a solitary flare of rosacea in the summer time. Has not flared or had recurrence since taking doxycycline and a short course of topical metronidzazole.      Review of Systems   Constitutional: Negative for fever, chills, weight loss, weight gain, fatigue, night sweats and malaise.   Skin: Positive for daily sunscreen use and activity-related sunscreen use. Negative for itching, rash, recent sunburn and wears hat.   Hematologic/Lymphatic: Bruises/bleeds easily.        Objective:    Physical Exam   Constitutional: She appears well-developed and well-nourished. She is on home oxygen.    Neurological: She is alert and oriented to person, place, and time.   Psychiatric: She has a normal mood and affect.   Skin:   Areas Examined (abnormalities noted in diagram):   Scalp / Hair Palpated and Inspected  Head / Face Inspection Performed  Neck Inspection Performed  Chest / Axilla Inspection Performed  Abdomen Inspection Performed  Genitals / Buttocks / Groin Inspection Performed  Back Inspection Performed  RUE Inspected  LUE Inspection Performed  RLE Inspected  LLE Inspection Performed  Nails and Digits Inspection Performed                       Diagram Legend     Erythematous scaling macule/papule c/w actinic keratosis       Vascular papule c/w angioma      Pigmented verrucoid papule/plaque c/w seborrheic keratosis      Yellow umbilicated papule c/w sebaceous hyperplasia      Irregularly shaped tan macule c/w lentigo      1-2 mm smooth white papules consistent with Milia      Movable subcutaneous cyst with punctum c/w epidermal inclusion cyst      Subcutaneous movable cyst c/w pilar cyst      Firm pink to brown papule c/w dermatofibroma      Pedunculated fleshy papule(s) c/w skin tag(s)      Evenly pigmented macule c/w junctional nevus     Mildly variegated pigmented, slightly irregular-bordered macule c/w mildly atypical nevus      Flesh colored to evenly pigmented papule c/w intradermal nevus       Pink pearly papule/plaque c/w basal cell carcinoma      Erythematous hyperkeratotic cursted plaque c/w SCC      Surgical scar with no sign of skin cancer recurrence      Open and closed comedones      Inflammatory papules and pustules      Verrucoid papule consistent consistent with wart     Erythematous eczematous patches and plaques     Dystrophic onycholytic nail with subungual debris c/w onychomycosis     Umbilicated papule    Erythematous-base heme-crusted tan verrucoid plaque consistent with inflamed seborrheic keratosis     Erythematous Silvery Scaling Plaque c/w Psoriasis     See annotation      Assessment / Plan:        Screening exam for skin cancer    Total body skin examination performed today including at least 12 points as noted in physical examination. No lesions suspicious for malignancy noted.    AK (actinic keratosis)  Nose  Cryosurgery Procedure Note    Verbal consent from the patient is obtained including, but not limited to, risk of hypopigmentation/hyperpigmentation, scar, recurrence of lesion. The patient is aware of the precancerous quality and need for treatment of these lesions. Liquid nitrogen cryosurgery is applied to the 1 actinic keratoses, as detailed in the physical exam, to produce a freeze injury. The patient is aware that blisters may form and is instructed on wound care with gentle cleansing and use of vaseline ointment to keep moist until healed. The patient is supplied a handout on cryosurgery and  is instructed to call if lesions do not completely resolve.    Rosacea  metrogel prn    Xerosis cutis  cerave cream daily    Hemangioma, unspecified site  This is a benign vascular lesion. Reassurance given. No treatment required.     Patient instructed in importance in daily sun protection of at least spf 30. Sun avoidance and topical protection discussed.     Patient encouraged to wear hat for all outdoor exposure.              Follow up in about 1 year (around 11/4/2020) for for TBSE.

## 2019-11-11 ENCOUNTER — TELEPHONE (OUTPATIENT)
Dept: PULMONOLOGY | Facility: CLINIC | Age: 69
End: 2019-11-11

## 2019-11-11 NOTE — TELEPHONE ENCOUNTER
I spoke to Ms Fam to let her know she is due back for her 3 month f/u in January, 2020 with Dr Ruiz. Our calendar is not available for January yet, when it becomes available I will schedule Ms Fam for f/u. Patient verbalized understanding.

## 2019-11-27 ENCOUNTER — TELEPHONE (OUTPATIENT)
Dept: PULMONOLOGY | Facility: CLINIC | Age: 69
End: 2019-11-27

## 2019-11-27 LAB — FUNGUS SPEC CULT: NORMAL

## 2019-12-30 LAB
ACID FAST MOD KINY STN SPEC: NORMAL
MYCOBACTERIUM SPEC QL CULT: NORMAL

## 2020-01-15 ENCOUNTER — PATIENT OUTREACH (OUTPATIENT)
Dept: ADMINISTRATIVE | Facility: OTHER | Age: 70
End: 2020-01-15

## 2020-01-16 ENCOUNTER — HOSPITAL ENCOUNTER (OUTPATIENT)
Dept: RADIOLOGY | Facility: HOSPITAL | Age: 70
Discharge: HOME | End: 2020-01-16
Attending: INTERNAL MEDICINE
Payer: MEDICARE

## 2020-01-16 ENCOUNTER — OFFICE VISIT (OUTPATIENT)
Dept: PULMONOLOGY | Facility: CLINIC | Age: 70
End: 2020-01-16
Payer: MEDICARE

## 2020-01-16 VITALS
SYSTOLIC BLOOD PRESSURE: 120 MMHG | OXYGEN SATURATION: 92 % | HEART RATE: 89 BPM | DIASTOLIC BLOOD PRESSURE: 82 MMHG | BODY MASS INDEX: 22 KG/M2 | HEIGHT: 67 IN | WEIGHT: 140.19 LBS

## 2020-01-16 DIAGNOSIS — J43.2 CENTRILOBULAR EMPHYSEMA: ICD-10-CM

## 2020-01-16 DIAGNOSIS — R91.1 LUNG NODULE: Primary | ICD-10-CM

## 2020-01-16 DIAGNOSIS — R91.1 LUNG NODULE: ICD-10-CM

## 2020-01-16 DIAGNOSIS — J96.11 CHRONIC RESPIRATORY FAILURE WITH HYPOXIA: ICD-10-CM

## 2020-01-16 PROCEDURE — 99999 PR PBB SHADOW E&M-EST. PATIENT-LVL IV: ICD-10-PCS | Mod: PBBFAC,,, | Performed by: INTERNAL MEDICINE

## 2020-01-16 PROCEDURE — 99214 OFFICE O/P EST MOD 30 MIN: CPT | Mod: PBBFAC,25 | Performed by: INTERNAL MEDICINE

## 2020-01-16 PROCEDURE — 71250 CT CHEST WITHOUT CONTRAST: ICD-10-PCS | Mod: 26,,, | Performed by: RADIOLOGY

## 2020-01-16 PROCEDURE — 99214 OFFICE O/P EST MOD 30 MIN: CPT | Mod: S$PBB,,, | Performed by: INTERNAL MEDICINE

## 2020-01-16 PROCEDURE — 1159F MED LIST DOCD IN RCRD: CPT | Mod: ,,, | Performed by: INTERNAL MEDICINE

## 2020-01-16 PROCEDURE — 71250 CT THORAX DX C-: CPT | Mod: TC

## 2020-01-16 PROCEDURE — 99999 PR PBB SHADOW E&M-EST. PATIENT-LVL IV: CPT | Mod: PBBFAC,,, | Performed by: INTERNAL MEDICINE

## 2020-01-16 PROCEDURE — 99214 PR OFFICE/OUTPT VISIT, EST, LEVL IV, 30-39 MIN: ICD-10-PCS | Mod: S$PBB,,, | Performed by: INTERNAL MEDICINE

## 2020-01-16 PROCEDURE — 71250 CT THORAX DX C-: CPT | Mod: 26,,, | Performed by: RADIOLOGY

## 2020-01-16 PROCEDURE — 1159F PR MEDICATION LIST DOCUMENTED IN MEDICAL RECORD: ICD-10-PCS | Mod: ,,, | Performed by: INTERNAL MEDICINE

## 2020-01-16 NOTE — PROGRESS NOTES
"Subjective:       Patient ID: Estefani Fam is a 69 y.o. female.    Chief Complaint: Emphysema    69 year old with a history of severe COPD.  Last visit for follow up of cavitary nodule and revealed new right hilar mass. Bronchoscopy is negative for infection or malignancy.  Cough and mucous production has improved.  Doing great with pulmonary rehab.  Occasional chest tightness but improves with nebulization.      Review of Systems   Constitutional: Negative for fever.   HENT: Negative for trouble swallowing.    Respiratory: Positive for sputum production (thick but clear). Negative for hemoptysis.    Cardiovascular: Negative for chest pain and leg swelling.   Neurological: Negative for headaches.   Hematological: Negative for adenopathy.   Psychiatric/Behavioral: Negative for confusion.       Objective:       Vitals:    01/16/20 0953   BP: 120/82   BP Location: Right arm   Patient Position: Sitting   Pulse: 89   SpO2: (!) 92%  Comment: 2.0   Weight: 63.6 kg (140 lb 3.4 oz)   Height: 5' 7" (1.702 m)     Physical Exam   Constitutional: She is oriented to person, place, and time. She appears well-developed and well-nourished.   HENT:   Head: Normocephalic.   Cardiovascular: Normal rate and regular rhythm.   Pulmonary/Chest: Normal expansion and hyperinflation. She has no wheezes. She has no rhonchi.   Musculoskeletal: Normal range of motion.   Lymphadenopathy: No supraclavicular adenopathy is present.     She has no cervical adenopathy.   Neurological: She is alert and oriented to person, place, and time.   Skin: Skin is warm and dry.   Psychiatric: She has a normal mood and affect.        Personal Diagnostic Review  CT of chest performed on 10/21/2019 without contrast revealed right hilar mass.  No flowsheet data found.      Assessment:       1. Lung nodule    2. Chronic respiratory failure with hypoxia    3. Centrilobular emphysema        Outpatient Encounter Medications as of 1/16/2020   Medication Sig " Dispense Refill    acetylcysteine 600 mg Cap Take 1 capsule (600 mg total) by mouth 2 (two) times daily. 90 capsule 3    ascorbic acid, vitamin C, (VITAMIN C) 500 MG tablet Take 500 mg by mouth 2 (two) times daily.       azelastine (ASTELIN) 137 mcg (0.1 %) nasal spray 1 spray (137 mcg total) by Nasal route 2 (two) times daily. 30 mL 3    azithromycin (Z-JERSEY) 250 MG tablet Take 1 tablet (250 mg total) by mouth every Mon, Wed, Fri. 36 tablet 3    calcium carbonate (OS-STEPHANIE) 600 mg calcium (1,500 mg) Tab Take 1 tablet (600 mg total) by mouth once daily. Take Levofloxacin antibiotic at least 2 hours before calcium.  0    doxycycline (VIBRAMYCIN) 100 MG Cap Take 1 capsule (100 mg total) by mouth once daily. 30 capsule 2    fluticasone (FLONASE) 50 mcg/actuation nasal spray Spray twice (100 mcg total) by Each Nare route once daily. 16 g 11    fluticasone-vilanterol (BREO) 200-25 mcg/dose DsDv diskus inhaler Inhale 1 puff into the lungs once daily. Controller 60 each 11    guaiFENesin (MUCINEX) 600 mg 12 hr tablet Take 1 tablet (600 mg total) by mouth 2 (two) times daily. 60 tablet 5    levalbuterol (XOPENEX) 0.63 mg/3 mL nebulizer solution Take 3 mLs (0.63 mg total) by nebulization every 8 (eight) hours as needed for Wheezing. Rescue 3 mL 11    metronidazole 1% (METROGEL) 1 % Gel Apply topically once daily. 60 g 2    MULTIVIT-IRON-MIN-FOLIC ACID 3,500-18-0.4 UNIT-MG-MG ORAL CHEW Take 1 tablet by mouth once daily. Take Levofloxacin antibiotic at least 2 hours before multivitamin.  0    pantoprazole (PROTONIX) 40 MG tablet Take 1 tablet (40 mg total) by mouth once daily. 30 tablet 11    predniSONE (DELTASONE) 10 MG tablet 4 tabs (40mg) daily for five days, then 2 tabs (20mg) daily for five days, then 1 tab (10mg) daily for five days. 35 tablet 0    sodium chloride (OCEAN) 0.65 % nasal spray 1 spray by Nasal route 2 (two) times daily. 88 mL 12    umeclidinium (INCRUSE ELLIPTA) 62.5 mcg/actuation DsDv INHALE 1  PUFF BY MOUTH INTO LUNGS ONCE DAILY 30 each 20     No facility-administered encounter medications on file as of 1/16/2020.      Orders Placed This Encounter   Procedures    CT Chest Without Contrast     Standing Status:   Future     Standing Expiration Date:   1/16/2021     Order Specific Question:   May the Radiologist modify the order per protocol to meet the clinical needs of the patient?     Answer:   Yes     Plan:     Problem List Items Addressed This Visit     Chronic respiratory failure with hypoxia    Overview     Continue supplemental oxygen         Centrilobular emphysema    Overview     Doing well.  Last exacerbation 9/2019.  Continue incruse, BREO, NAC and zmax TIW for control  Albuterol for rescue and prevention    Doing great with pulmonary rehab at [facility]         Lung nodule - Primary    Overview     Hilar mass, bronch negative for malignancy or infection, CT follow up ASAP         Relevant Orders    CT Chest Without Contrast

## 2020-01-21 ENCOUNTER — TELEPHONE (OUTPATIENT)
Dept: PULMONOLOGY | Facility: CLINIC | Age: 70
End: 2020-01-21

## 2020-01-21 DIAGNOSIS — J43.2 CENTRILOBULAR EMPHYSEMA: Primary | ICD-10-CM

## 2020-01-21 RX ORDER — PREDNISONE 20 MG/1
40 TABLET ORAL DAILY
Qty: 10 TABLET | Refills: 0 | Status: SHIPPED | OUTPATIENT
Start: 2020-01-21 | End: 2020-01-26

## 2020-01-21 NOTE — TELEPHONE ENCOUNTER
"Called and reviewed CT reports./   Patient states that she does not have any "get up and go" tried to reassure her without steroids and requested that she increase her albuterol use to every 6 hours.  Brief course of steroids x 5 days prescribed.    ----- Message from Jayda Correa MA sent at 1/21/2020  1:57 PM CST -----  Contact: self [phone]      ----- Message -----  From: La Becerra  Sent: 1/21/2020   1:54 PM CST  To: Joseph [facility]ZU[facility] Staff    Pt states she is having trouble breathing states can she have some steroids called in please called back to discuss     "

## 2020-01-30 DIAGNOSIS — J44.0 COPD (CHRONIC OBSTRUCTIVE PULMONARY DISEASE) WITH ACUTE BRONCHITIS: ICD-10-CM

## 2020-01-30 DIAGNOSIS — J20.9 COPD (CHRONIC OBSTRUCTIVE PULMONARY DISEASE) WITH ACUTE BRONCHITIS: ICD-10-CM

## 2020-01-30 RX ORDER — LEVALBUTEROL INHALATION SOLUTION 0.63 MG/3ML
SOLUTION RESPIRATORY (INHALATION)
Qty: 75 ML | Refills: 3 | Status: SHIPPED | OUTPATIENT
Start: 2020-01-30 | End: 2020-02-03

## 2020-02-03 ENCOUNTER — OFFICE VISIT (OUTPATIENT)
Dept: ORTHOPEDICS | Facility: CLINIC | Age: 70
End: 2020-02-03
Payer: MEDICARE

## 2020-02-03 ENCOUNTER — PATIENT OUTREACH (OUTPATIENT)
Dept: ADMINISTRATIVE | Facility: OTHER | Age: 70
End: 2020-02-03

## 2020-02-03 ENCOUNTER — TELEPHONE (OUTPATIENT)
Dept: ORTHOPEDICS | Facility: CLINIC | Age: 70
End: 2020-02-03

## 2020-02-03 ENCOUNTER — HOSPITAL ENCOUNTER (OUTPATIENT)
Dept: RADIOLOGY | Facility: HOSPITAL | Age: 70
Discharge: HOME | End: 2020-02-03
Attending: PHYSICIAN ASSISTANT
Payer: MEDICARE

## 2020-02-03 VITALS — WEIGHT: 140.13 LBS | BODY MASS INDEX: 21.24 KG/M2 | HEIGHT: 68 IN

## 2020-02-03 DIAGNOSIS — J44.0 COPD (CHRONIC OBSTRUCTIVE PULMONARY DISEASE) WITH ACUTE BRONCHITIS: ICD-10-CM

## 2020-02-03 DIAGNOSIS — S42.032A TRAUMATIC CLOSED FRACTURE OF DISTAL CLAVICLE WITH MINIMAL DISPLACEMENT, LEFT, INITIAL ENCOUNTER: Primary | ICD-10-CM

## 2020-02-03 DIAGNOSIS — M25.512 ACUTE PAIN OF LEFT SHOULDER: ICD-10-CM

## 2020-02-03 DIAGNOSIS — J20.9 COPD (CHRONIC OBSTRUCTIVE PULMONARY DISEASE) WITH ACUTE BRONCHITIS: ICD-10-CM

## 2020-02-03 PROCEDURE — 99213 OFFICE O/P EST LOW 20 MIN: CPT | Mod: PBBFAC,25 | Performed by: PHYSICIAN ASSISTANT

## 2020-02-03 PROCEDURE — 73030 X-RAY EXAM OF SHOULDER: CPT | Mod: 26,LT,, | Performed by: RADIOLOGY

## 2020-02-03 PROCEDURE — 99214 PR OFFICE/OUTPT VISIT, EST, LEVL IV, 30-39 MIN: ICD-10-PCS | Mod: S$PBB,ICN,, | Performed by: PHYSICIAN ASSISTANT

## 2020-02-03 PROCEDURE — 99214 OFFICE O/P EST MOD 30 MIN: CPT | Mod: S$PBB,ICN,, | Performed by: PHYSICIAN ASSISTANT

## 2020-02-03 PROCEDURE — 99999 PR PBB SHADOW E&M-EST. PATIENT-LVL III: ICD-10-PCS | Mod: PBBFAC,,, | Performed by: PHYSICIAN ASSISTANT

## 2020-02-03 PROCEDURE — 99999 PR PBB SHADOW E&M-EST. PATIENT-LVL III: CPT | Mod: PBBFAC,,, | Performed by: PHYSICIAN ASSISTANT

## 2020-02-03 PROCEDURE — 73030 XR SHOULDER TRAUMA 3 VIEW LEFT: ICD-10-PCS | Mod: 26,LT,, | Performed by: RADIOLOGY

## 2020-02-03 PROCEDURE — 73030 X-RAY EXAM OF SHOULDER: CPT | Mod: TC,LT

## 2020-02-03 RX ORDER — LEVALBUTEROL INHALATION SOLUTION 0.63 MG/3ML
SOLUTION RESPIRATORY (INHALATION)
Qty: 750 ML | Refills: 2 | Status: SHIPPED | OUTPATIENT
Start: 2020-02-03 | End: 2020-12-28

## 2020-02-03 NOTE — TELEPHONE ENCOUNTER
Ortho Telephone Triage Message [id number]  Patient C/O: L shoulder pain/bruising/swelling s/p fall after falling asleep in chair at home on 2/1/20. Pt states has been icing L shoulder and able to move LUE with LROM. Requests Ortho appt today.  Triage Advice: Advised NWB to LUE  until seen in Ortho.  Resolution:Pt states understanding. Appt scheduled today with GABRIELE Jimenez PA-C/Ortho Clinic at 2:30pm with arrival at 2:15pm. Pt confirms time and location of appt and active in [facility].

## 2020-02-03 NOTE — TELEPHONE ENCOUNTER
----- Message from Amirah Ng sent at 2/3/2020  8:31 AM CST -----  Contact: pt @ [phone]  Patient fell at home on 2/1 (Saturday) injuring her left shoulder, please call.

## 2020-02-05 RX ORDER — PANTOPRAZOLE SODIUM 40 MG/1
40 TABLET, DELAYED RELEASE ORAL DAILY
Qty: 30 TABLET | Refills: 11 | Status: SHIPPED | OUTPATIENT
Start: 2020-02-05 | End: 2021-04-27 | Stop reason: SDUPTHER

## 2020-02-16 ENCOUNTER — PATIENT OUTREACH (OUTPATIENT)
Dept: ADMINISTRATIVE | Facility: OTHER | Age: 70
End: 2020-02-16

## 2020-02-17 ENCOUNTER — HOSPITAL ENCOUNTER (OUTPATIENT)
Dept: RADIOLOGY | Facility: HOSPITAL | Age: 70
Discharge: HOME | End: 2020-02-17
Attending: PHYSICIAN ASSISTANT
Payer: MEDICARE

## 2020-02-17 ENCOUNTER — OFFICE VISIT (OUTPATIENT)
Dept: ORTHOPEDICS | Facility: CLINIC | Age: 70
End: 2020-02-17
Payer: MEDICARE

## 2020-02-17 VITALS — HEIGHT: 68 IN | WEIGHT: 141.75 LBS | BODY MASS INDEX: 21.48 KG/M2

## 2020-02-17 DIAGNOSIS — S42.032A TRAUMATIC CLOSED FRACTURE OF DISTAL CLAVICLE WITH MINIMAL DISPLACEMENT, LEFT, INITIAL ENCOUNTER: ICD-10-CM

## 2020-02-17 DIAGNOSIS — S42.032D CLOSED DISPLACED FRACTURE OF ACROMIAL END OF LEFT CLAVICLE WITH ROUTINE HEALING, SUBSEQUENT ENCOUNTER: Primary | ICD-10-CM

## 2020-02-17 PROCEDURE — 99213 OFFICE O/P EST LOW 20 MIN: CPT | Mod: PBBFAC,25 | Performed by: PHYSICIAN ASSISTANT

## 2020-02-17 PROCEDURE — 73000 X-RAY EXAM OF COLLAR BONE: CPT | Mod: TC,LT

## 2020-02-17 PROCEDURE — 73000 X-RAY EXAM OF COLLAR BONE: CPT | Mod: 26,LT,, | Performed by: RADIOLOGY

## 2020-02-17 PROCEDURE — 99999 PR PBB SHADOW E&M-EST. PATIENT-LVL III: ICD-10-PCS | Mod: PBBFAC,,, | Performed by: PHYSICIAN ASSISTANT

## 2020-02-17 PROCEDURE — 99213 OFFICE O/P EST LOW 20 MIN: CPT | Mod: S$PBB,,, | Performed by: PHYSICIAN ASSISTANT

## 2020-02-17 PROCEDURE — 99213 PR OFFICE/OUTPT VISIT, EST, LEVL III, 20-29 MIN: ICD-10-PCS | Mod: S$PBB,,, | Performed by: PHYSICIAN ASSISTANT

## 2020-02-17 PROCEDURE — 73000 XR CLAVICLE LEFT: ICD-10-PCS | Mod: 26,LT,, | Performed by: RADIOLOGY

## 2020-02-17 PROCEDURE — 99999 PR PBB SHADOW E&M-EST. PATIENT-LVL III: CPT | Mod: PBBFAC,,, | Performed by: PHYSICIAN ASSISTANT

## 2020-02-17 RX ORDER — AZITHROMYCIN 250 MG/1
250 TABLET, FILM COATED ORAL
Qty: 36 TABLET | Refills: 3 | Status: SHIPPED | OUTPATIENT
Start: 2020-02-17 | End: 2021-01-18

## 2020-02-17 RX ORDER — FLUTICASONE FUROATE AND VILANTEROL 200; 25 UG/1; UG/1
1 POWDER RESPIRATORY (INHALATION) DAILY
Qty: 60 EACH | Refills: 11 | Status: SHIPPED | OUTPATIENT
Start: 2020-02-17 | End: 2021-02-05

## 2020-02-17 NOTE — PROGRESS NOTES
"Patient ID: Estefani Fam is a 69 y.o. female.    Chief Complaint: Pain of the Left Shoulder      HISTORY:  Estefani Fam is a 69 y.o. female who returns to me today for follow up of left clavicle fracture.  She was last seen by me 2/3/2020.  Today she is doing well, and notes her pain has significantly improved.  Her injury occurred 2/1/2020.  She takes advil occasionally for pain.  She has remained NWB in the sling.  She has refrained from pulmonary rehab due to the injury.      PMH/PSH/FamHx/SocHx:    Unchanged from prior visit.    ROS:  Constitution: Negative for chills, fever and weakness.   Cardiovascular: Negative for chest pain.   Respiratory: Negative for cough and shortness of breath.   Hematologic/Lymphatic: Negative for bleeding problem. Does not bruise/bleed easily.   Skin: Negative for color change, itching and poor wound healing.   Musculoskeletal: Positive for left shoulder pain  Gastrointestinal: Negative for heartburn.   Neurological: Negative for dizziness, focal weakness, numbness, paresthesias and sensory change.   Psychiatric/Behavioral: The patient is not nervous/anxious.   Allergic/Immunologic: Negative for environmental allergies and persistent infections.     PHYSICAL EXAM:   Ht 5' 8" (1.727 m)   Wt 64.3 kg (141 lb 12.1 oz)   BMI 21.55 kg/m²   Left shoulder  Swelling along distal clavicle  No erythema, skin intact  Ecchymosis along upper arm and axilla  TTP distal clavicle with mild deformity  FROM elbow and digits  NVI    IMAGING: X-rays of the left clavicle, personally reviewed by me, demonstrate displaced fracture of distal clavicle, satisfactory position.   ASSESSMENT/PLAN:    Estefani was seen today for pain.    Diagnoses and all orders for this visit:    Closed displaced fracture of acromial end of left clavicle with routine healing, subsequent encounter  -     X-Ray Clavicle Left; Future      -  She should remain NWB.  She may begin shoulder PROM exercises.  Follow up in " 2 weeks for x-rays.

## 2020-03-01 ENCOUNTER — PATIENT OUTREACH (OUTPATIENT)
Dept: ADMINISTRATIVE | Facility: OTHER | Age: 70
End: 2020-03-01

## 2020-03-02 ENCOUNTER — HOSPITAL ENCOUNTER (OUTPATIENT)
Dept: RADIOLOGY | Facility: HOSPITAL | Age: 70
Discharge: HOME | End: 2020-03-02
Attending: PHYSICIAN ASSISTANT
Payer: MEDICARE

## 2020-03-02 ENCOUNTER — OFFICE VISIT (OUTPATIENT)
Dept: ORTHOPEDICS | Facility: CLINIC | Age: 70
End: 2020-03-02
Payer: MEDICARE

## 2020-03-02 VITALS — HEIGHT: 68 IN | BODY MASS INDEX: 20.75 KG/M2 | WEIGHT: 136.88 LBS

## 2020-03-02 DIAGNOSIS — S42.032D CLOSED DISPLACED FRACTURE OF ACROMIAL END OF LEFT CLAVICLE WITH ROUTINE HEALING, SUBSEQUENT ENCOUNTER: ICD-10-CM

## 2020-03-02 DIAGNOSIS — S42.032D CLOSED DISPLACED FRACTURE OF ACROMIAL END OF LEFT CLAVICLE WITH ROUTINE HEALING, SUBSEQUENT ENCOUNTER: Primary | ICD-10-CM

## 2020-03-02 PROCEDURE — 99213 PR OFFICE/OUTPT VISIT, EST, LEVL III, 20-29 MIN: ICD-10-PCS | Mod: S$PBB,,, | Performed by: PHYSICIAN ASSISTANT

## 2020-03-02 PROCEDURE — 73000 X-RAY EXAM OF COLLAR BONE: CPT | Mod: 26,LT,, | Performed by: RADIOLOGY

## 2020-03-02 PROCEDURE — 99214 OFFICE O/P EST MOD 30 MIN: CPT | Mod: PBBFAC,25 | Performed by: PHYSICIAN ASSISTANT

## 2020-03-02 PROCEDURE — 99999 PR PBB SHADOW E&M-EST. PATIENT-LVL IV: ICD-10-PCS | Mod: PBBFAC,,, | Performed by: PHYSICIAN ASSISTANT

## 2020-03-02 PROCEDURE — 73000 X-RAY EXAM OF COLLAR BONE: CPT | Mod: TC,LT

## 2020-03-02 PROCEDURE — 99213 OFFICE O/P EST LOW 20 MIN: CPT | Mod: S$PBB,,, | Performed by: PHYSICIAN ASSISTANT

## 2020-03-02 PROCEDURE — 99999 PR PBB SHADOW E&M-EST. PATIENT-LVL IV: CPT | Mod: PBBFAC,,, | Performed by: PHYSICIAN ASSISTANT

## 2020-03-02 PROCEDURE — 73000 XR CLAVICLE LEFT: ICD-10-PCS | Mod: 26,LT,, | Performed by: RADIOLOGY

## 2020-03-02 NOTE — PROGRESS NOTES
"Patient ID: Estefani Fam is a 69 y.o. female.    Chief Complaint: Pain of the Left Shoulder      HISTORY:  Estefani Fam is a 69 y.o. female who returns to me today for follow up of left clavicle fracture.  She was last seen by me 2/17/2020.  Today she is doing well, but notes occasional pain in the shoulder.  Her original injury was 2/1/2020. Overall she has very little pain.  She had been doing pendulum exercises.  No other complaints today.       PMH/PSH/FamHx/SocHx:    Unchanged from prior visit.    ROS:  Constitution: Negative for chills, fever and weakness.   Cardiovascular: Negative for chest pain.   Respiratory: Negative for cough and shortness of breath.   Hematologic/Lymphatic: Negative for bleeding problem. Does not bruise/bleed easily.   Skin: Negative for color change, itching and poor wound healing.   Musculoskeletal: Positive for left shoulder pain  Gastrointestinal: Negative for heartburn.   Neurological: Negative for dizziness, focal weakness, numbness, paresthesias and sensory change.   Psychiatric/Behavioral: The patient is not nervous/anxious.   Allergic/Immunologic: Negative for environmental allergies and persistent infections.     PHYSICAL EXAM:   Ht 5' 8" (1.727 m)   Wt 62.1 kg (136 lb 14.5 oz)   BMI 20.82 kg/m²   Left shoulder  Skin intact, no swelling or ecchymosis  Mild TTP along distal clavicle  Forward flexion to 140  FROM elbow and digits  NVI    IMAGING: X-rays of the left clavicle, personally reviewed by me, demonstrate healing comminuted fracture of the distal clavicle in satisfactory position.     ASSESSMENT/PLAN:    Estefani was seen today for pain.    Diagnoses and all orders for this visit:    Closed displaced fracture of acromial end of left clavicle with routine healing, subsequent encounter  -     X-Ray Clavicle Left; Future  -     Ambulatory referral/consult to Physical Therapy; Future      -  Referral to PT.  She may continue PROM and gentle active ROM " exercises.  No weight or resistance training.  Follow up 4 weeks with x-rays.

## 2020-04-20 ENCOUNTER — TELEPHONE (OUTPATIENT)
Dept: PULMONOLOGY | Facility: CLINIC | Age: 70
End: 2020-04-20

## 2020-04-20 RX ORDER — PREDNISONE 10 MG/1
TABLET ORAL
Qty: 35 TABLET | Refills: 0 | Status: SHIPPED | OUTPATIENT
Start: 2020-04-20 | End: 2021-03-11

## 2020-04-20 NOTE — TELEPHONE ENCOUNTER
I spoke to Mrs Fam. She stated she is sob, no fever, hasn't left the house and last night was very restless due to being sob. This started the last few days. Could you send in steroids to her pharmacy.

## 2020-04-20 NOTE — TELEPHONE ENCOUNTER
I spoke to patient to let her know I will give her a call once Dr Ruiz advises. Patient verbalized understanding.

## 2020-04-20 NOTE — TELEPHONE ENCOUNTER
----- Message from Xenia Contreras sent at 4/20/2020  2:49 PM CDT -----  Contact: pt  Pt just checking     Called earlier       Did her request for prednisone get ok;ed?    Wanted to start today and get med before it got late    Late will keep her awake all nite       Please call  [phone]    Thanks

## 2020-04-20 NOTE — TELEPHONE ENCOUNTER
----- Message from Fina Wick sent at 4/20/2020  8:06 AM CDT -----  Contact: self  Pt states she is having an episode and is in need for steroids Pt ask for a call    Contact info  [phone]

## 2020-04-21 ENCOUNTER — TELEPHONE (OUTPATIENT)
Dept: PULMONOLOGY | Facility: CLINIC | Age: 70
End: 2020-04-21

## 2020-04-21 NOTE — TELEPHONE ENCOUNTER
I spoke to Mrs Fam. Patient did receive her rx of prednisone last night she was notified by José Miguel. Patient verbalized understanding.

## 2020-05-01 ENCOUNTER — TELEPHONE (OUTPATIENT)
Dept: ORTHOPEDICS | Facility: CLINIC | Age: 70
End: 2020-05-01

## 2020-05-01 NOTE — TELEPHONE ENCOUNTER
Spoke to patient  in regards to her wanting to speak to me before she cancel her appointment.  stated that she's unavailable right now. Ask if I can call back. Stated that I will call back in ten min. He sated thank you..

## 2020-05-01 NOTE — TELEPHONE ENCOUNTER
Return call back to patient in regards to rescheduling her appointment with Kelsie Jimenez. Patient has been scheduled for 2:00 on 05/25/2020. Patient stated thank you.

## 2020-05-01 NOTE — TELEPHONE ENCOUNTER
Spoke to patient in regards to message. Patient stated that she would like to cancel her appointment that's scheduled for 3:00 on 05/04/2020. Stated to patient that I will cancel the appointment for her. Patient stated thank you. Appointment has been canceled. Thanks.

## 2020-05-22 ENCOUNTER — PATIENT OUTREACH (OUTPATIENT)
Dept: ADMINISTRATIVE | Facility: OTHER | Age: 70
End: 2020-05-22

## 2020-05-22 DIAGNOSIS — Z12.31 ENCOUNTER FOR SCREENING MAMMOGRAM FOR MALIGNANT NEOPLASM OF BREAST: Primary | ICD-10-CM

## 2020-05-22 NOTE — PROGRESS NOTES
Chart reviewed.   Immunizations: updated  Orders placed: Mammogram   Upcoming appts to satisfy KATIE topics: n/a

## 2020-05-25 ENCOUNTER — OFFICE VISIT (OUTPATIENT)
Dept: ORTHOPEDICS | Facility: CLINIC | Age: 70
End: 2020-05-25
Payer: MEDICARE

## 2020-05-25 ENCOUNTER — HOSPITAL ENCOUNTER (OUTPATIENT)
Dept: RADIOLOGY | Facility: HOSPITAL | Age: 70
Discharge: HOME | End: 2020-05-25
Attending: PHYSICIAN ASSISTANT
Payer: MEDICARE

## 2020-05-25 VITALS
HEART RATE: 87 BPM | BODY MASS INDEX: 21.88 KG/M2 | WEIGHT: 144.38 LBS | SYSTOLIC BLOOD PRESSURE: 137 MMHG | TEMPERATURE: 97 F | DIASTOLIC BLOOD PRESSURE: 80 MMHG | HEIGHT: 68 IN

## 2020-05-25 DIAGNOSIS — S42.032D CLOSED DISPLACED FRACTURE OF ACROMIAL END OF LEFT CLAVICLE WITH ROUTINE HEALING, SUBSEQUENT ENCOUNTER: ICD-10-CM

## 2020-05-25 DIAGNOSIS — S42.032K: Primary | ICD-10-CM

## 2020-05-25 PROCEDURE — 99213 OFFICE O/P EST LOW 20 MIN: CPT | Mod: S$PBB,,, | Performed by: PHYSICIAN ASSISTANT

## 2020-05-25 PROCEDURE — 99213 PR OFFICE/OUTPT VISIT, EST, LEVL III, 20-29 MIN: ICD-10-PCS | Mod: S$PBB,,, | Performed by: PHYSICIAN ASSISTANT

## 2020-05-25 PROCEDURE — 99214 OFFICE O/P EST MOD 30 MIN: CPT | Mod: PBBFAC,25 | Performed by: PHYSICIAN ASSISTANT

## 2020-05-25 PROCEDURE — 73000 XR CLAVICLE LEFT: ICD-10-PCS | Mod: 26,LT,, | Performed by: RADIOLOGY

## 2020-05-25 PROCEDURE — 73000 X-RAY EXAM OF COLLAR BONE: CPT | Mod: TC,LT

## 2020-05-25 PROCEDURE — 99999 PR PBB SHADOW E&M-EST. PATIENT-LVL IV: CPT | Mod: PBBFAC,,, | Performed by: PHYSICIAN ASSISTANT

## 2020-05-25 PROCEDURE — 73000 X-RAY EXAM OF COLLAR BONE: CPT | Mod: 26,LT,, | Performed by: RADIOLOGY

## 2020-05-25 PROCEDURE — 99999 PR PBB SHADOW E&M-EST. PATIENT-LVL IV: ICD-10-PCS | Mod: PBBFAC,,, | Performed by: PHYSICIAN ASSISTANT

## 2020-05-25 NOTE — PROGRESS NOTES
"Patient ID: Estefani Fam is a 69 y.o. female.    Chief Complaint: Injury of the Left Shoulder      HISTORY:  Estefani Fam is a 69 y.o. female who returns to me today for follow up of left clavicle fracture.  The injury occurred about 4 months ago.  She was last seen by me 3/2/2020.  We had postponed follow up due to recent covid pandemic.  She was only able to attend PT once but was given home exercises.  Her range of motion has improved. Today she reports that she is doing very well.  Her pain is minimal.  She has no other complains.       PMH/PSH/FamHx/SocHx:    Unchanged from prior visit.    ROS:  Constitution: Negative for chills, fever and weakness.   Cardiovascular: Negative for chest pain.   Respiratory: Negative for cough and shortness of breath.   Hematologic/Lymphatic: Negative for bleeding problem. Does not bruise/bleed easily.   Skin: Negative for color change, itching and poor wound healing.   Musculoskeletal: Positive for left shoulder pain  Gastrointestinal: Negative for heartburn.   Neurological: Negative for dizziness, focal weakness, numbness, paresthesias and sensory change.   Psychiatric/Behavioral: The patient is not nervous/anxious.   Allergic/Immunologic: Negative for environmental allergies and persistent infections.     PHYSICAL EXAM:   /80 (BP Location: Left arm, Patient Position: Sitting, BP Method: Medium (Automatic))   Pulse 87   Temp 97 °F (36.1 °C) (Oral)   Ht 5' 8" (1.727 m)   Wt 65.5 kg (144 lb 6.4 oz)   BMI 21.96 kg/m²   Left shoulder  The skin is intact, no swelling  Mild deformity, no skin breakdown or tending  No TTP  ROM: , ER 50  She is able to touch back of head    IMAGING: X-rays of the left clavicle, personally reviewed by me, demonstrate displaced fracture of the distal left clavicle with minimal evidence of healing.     ASSESSMENT/PLAN:    Estefani was seen today for injury.    Diagnoses and all orders for this visit:    Traumatic closed " displaced fracture of acromial end of clavicle, left, with nonunion, subsequent encounter  -     X-Ray Clavicle Left; Future    - There is not much evidence of healing on x-ray, but she has no complaints and is doing well.  Her range of motion is good and she will continue home exercises.  We will continue to manage this conservatively.  - Will follow up on referral to Fracture clinic for osteoporosis evaluation  - Follow up 3 months

## 2020-06-03 ENCOUNTER — OFFICE VISIT (OUTPATIENT)
Dept: ORTHOPEDICS | Facility: CLINIC | Age: 70
End: 2020-06-03
Payer: MEDICARE

## 2020-06-03 ENCOUNTER — TELEPHONE (OUTPATIENT)
Dept: INTERNAL MEDICINE | Facility: CLINIC | Age: 70
End: 2020-06-03

## 2020-06-03 ENCOUNTER — LAB VISIT (OUTPATIENT)
Dept: LAB | Facility: HOSPITAL | Age: 70
End: 2020-06-03
Payer: MEDICARE

## 2020-06-03 VITALS — DIASTOLIC BLOOD PRESSURE: 83 MMHG | SYSTOLIC BLOOD PRESSURE: 132 MMHG | HEART RATE: 96 BPM

## 2020-06-03 DIAGNOSIS — M81.0 OSTEOPOROSIS, UNSPECIFIED OSTEOPOROSIS TYPE, UNSPECIFIED PATHOLOGICAL FRACTURE PRESENCE: ICD-10-CM

## 2020-06-03 DIAGNOSIS — M80.80XA PATHOLOGICAL FRACTURE DUE TO OSTEOPOROSIS, UNSPECIFIED FRACTURE SITE, UNSPECIFIED OSTEOPOROSIS TYPE, INITIAL ENCOUNTER: ICD-10-CM

## 2020-06-03 DIAGNOSIS — M81.6 LOCALIZED OSTEOPOROSIS OF LEQUESNE: ICD-10-CM

## 2020-06-03 DIAGNOSIS — M80.80XA PATHOLOGICAL FRACTURE DUE TO OSTEOPOROSIS, UNSPECIFIED FRACTURE SITE, UNSPECIFIED OSTEOPOROSIS TYPE, INITIAL ENCOUNTER: Primary | ICD-10-CM

## 2020-06-03 LAB
25(OH)D3+25(OH)D2 SERPL-MCNC: 47 NG/ML (ref 30–96)
ALBUMIN SERPL BCP-MCNC: 4.2 G/DL (ref 3.5–5.2)
ALP SERPL-CCNC: 118 U/L (ref 55–135)
ALT SERPL W/O P-5'-P-CCNC: 27 U/L (ref 10–44)
ANION GAP SERPL CALC-SCNC: 9 MMOL/L (ref 8–16)
AST SERPL-CCNC: 26 U/L (ref 10–40)
BILIRUB SERPL-MCNC: 0.4 MG/DL (ref 0.1–1)
BUN SERPL-MCNC: 11 MG/DL (ref 8–23)
CALCIUM SERPL-MCNC: 10.1 MG/DL (ref 8.7–10.5)
CHLORIDE SERPL-SCNC: 104 MMOL/L (ref 95–110)
CO2 SERPL-SCNC: 27 MMOL/L (ref 23–29)
CREAT SERPL-MCNC: 0.7 MG/DL (ref 0.5–1.4)
EST. GFR  (AFRICAN AMERICAN): >60 ML/MIN/1.73 M^2
EST. GFR  (NON AFRICAN AMERICAN): >60 ML/MIN/1.73 M^2
GLUCOSE SERPL-MCNC: 96 MG/DL (ref 70–110)
MAGNESIUM SERPL-MCNC: 2.2 MG/DL (ref 1.6–2.6)
POTASSIUM SERPL-SCNC: 4.4 MMOL/L (ref 3.5–5.1)
PROT SERPL-MCNC: 7.4 G/DL (ref 6–8.4)
PTH-INTACT SERPL-MCNC: 69 PG/ML (ref 9–77)
SODIUM SERPL-SCNC: 140 MMOL/L (ref 136–145)
T4 FREE SERPL-MCNC: 0.96 NG/DL (ref 0.71–1.51)

## 2020-06-03 PROCEDURE — 99999 PR PBB SHADOW E&M-EST. PATIENT-LVL III: ICD-10-PCS | Mod: PBBFAC,,, | Performed by: PHYSICIAN ASSISTANT

## 2020-06-03 PROCEDURE — 36415 COLL VENOUS BLD VENIPUNCTURE: CPT

## 2020-06-03 PROCEDURE — 99999 PR PBB SHADOW E&M-EST. PATIENT-LVL III: CPT | Mod: PBBFAC,,, | Performed by: PHYSICIAN ASSISTANT

## 2020-06-03 PROCEDURE — 80053 COMPREHEN METABOLIC PANEL: CPT

## 2020-06-03 PROCEDURE — 99214 OFFICE O/P EST MOD 30 MIN: CPT | Mod: S$PBB,,, | Performed by: PHYSICIAN ASSISTANT

## 2020-06-03 PROCEDURE — 99214 PR OFFICE/OUTPT VISIT, EST, LEVL IV, 30-39 MIN: ICD-10-PCS | Mod: S$PBB,,, | Performed by: PHYSICIAN ASSISTANT

## 2020-06-03 PROCEDURE — 82306 VITAMIN D 25 HYDROXY: CPT

## 2020-06-03 PROCEDURE — 99213 OFFICE O/P EST LOW 20 MIN: CPT | Mod: PBBFAC

## 2020-06-03 PROCEDURE — 84439 ASSAY OF FREE THYROXINE: CPT

## 2020-06-03 PROCEDURE — 83735 ASSAY OF MAGNESIUM: CPT

## 2020-06-03 PROCEDURE — 83970 ASSAY OF PARATHORMONE: CPT

## 2020-06-03 RX ORDER — FLUTICASONE PROPIONATE 50 MCG
2 SPRAY, SUSPENSION (ML) NASAL DAILY
Qty: 16 G | Refills: 11 | Status: SHIPPED | OUTPATIENT
Start: 2020-06-03 | End: 2021-03-28

## 2020-06-03 NOTE — PROGRESS NOTES
SUBJECTIVE:     Chief Complaint & History of Present Illness:  Estefani Fam is a new patient 69 y.o. female who is seen here today for a bone health evaluation for osteoporosis, fracture prevention, and risk evaluation for future fractures.        she was appropriately identified and referred by Kelsie Jimenez PA-C due to concerns for compromised bone quality, and risk of future fractures.  This visit is medically necessary to identify risk, investigate and initiative treatment as appropriate to improve bone quality and strength for the reduction of secondary fractures.     her medical history, medications and fracture history will be reviewed and summarized      Review of patient's allergies indicates:  No Known Allergies      Current Outpatient Medications   Medication Sig Dispense Refill    acetylcysteine 600 mg Cap Take 1 capsule (600 mg total) by mouth 2 (two) times daily. 90 capsule 3    ascorbic acid, vitamin C, (VITAMIN C) 500 MG tablet Take 500 mg by mouth 2 (two) times daily.       azithromycin (Z-JERSEY) 250 MG tablet Take 1 tablet (250 mg total) by mouth every Mon, Wed, Fri. 36 tablet 3    calcium carbonate (OS-STEPHANIE) 600 mg calcium (1,500 mg) Tab Take 1 tablet (600 mg total) by mouth once daily. Take Levofloxacin antibiotic at least 2 hours before calcium.  0    doxycycline (VIBRAMYCIN) 100 MG Cap Take 1 capsule (100 mg total) by mouth once daily. 30 capsule 2    fluticasone (FLONASE) 50 mcg/actuation nasal spray Spray twice (100 mcg total) by Each Nare route once daily. 16 g 11    fluticasone furoate-vilanterol (BREO) 200-25 mcg/dose DsDv diskus inhaler Inhale 1 puff into the lungs once daily. Controller 60 each 11    guaiFENesin (MUCINEX) 600 mg 12 hr tablet Take 1 tablet (600 mg total) by mouth 2 (two) times daily. 60 tablet 5    levalbuterol (XOPENEX) 0.63 mg/3 mL nebulizer solution USE IN NEBULIZER EVERY 8 HOURS AS NEEDED FOR WHEEZING 750 mL 2    metronidazole 1% (METROGEL) 1 % Gel  Apply topically once daily. 60 g 2    MULTIVIT-IRON-MIN-FOLIC ACID 3,500-18-0.4 UNIT-MG-MG ORAL CHEW Take 1 tablet by mouth once daily. Take Levofloxacin antibiotic at least 2 hours before multivitamin.  0    pantoprazole (PROTONIX) 40 MG tablet Take 1 tablet (40 mg total) by mouth once daily. 30 tablet 11    predniSONE (DELTASONE) 10 MG tablet 4 tabs (40mg) daily for five days, then 2 tabs (20mg) daily for five days, then 1 tab (10mg) daily for five days. 35 tablet 0    predniSONE (DELTASONE) 10 MG tablet 4 tabs (40mg) daily for five days, then 2 tabs (20mg) daily for five days, then 1 tab (10mg) daily for five days. 35 tablet 0    sodium chloride (OCEAN) 0.65 % nasal spray 1 spray by Nasal route 2 (two) times daily. 88 mL 12    umeclidinium (INCRUSE ELLIPTA) 62.5 mcg/actuation DsDv INHALE 1 PUFF BY MOUTH INTO LUNGS ONCE DAILY 30 each 20    azelastine (ASTELIN) 137 mcg (0.1 %) nasal spray 1 spray (137 mcg total) by Nasal route 2 (two) times daily. 30 mL 3     No current facility-administered medications for this visit.        Past Medical History:   Diagnosis Date    Cataract     COPD (chronic obstructive pulmonary disease)     History of retinal hemorrhage     Retinal histoplasmosis        Past Surgical History:   Procedure Laterality Date    BRONCHOSCOPY WITH FLUOROSCOPY N/A 10/28/2019    Procedure: BRONCHOSCOPY, WITH FLUOROSCOPY;  Surgeon: Brooklynn Ruiz MD;  Location: [facility] OR [address] [place] [address];  Service: Endoscopy;  Laterality: N/A;    HAND SURGERY         Lab Results   Component Value Date     01/18/2019    K 3.5 01/18/2019     01/18/2019    CO2 31 (H) 01/18/2019    GLU 92 01/18/2019    BUN 12 01/18/2019    CREATININE 0.6 01/18/2019    CALCIUM 8.8 01/18/2019    PROT 7.4 01/14/2019    ALBUMIN 4.1 01/14/2019    BILITOT 0.5 01/14/2019    ALKPHOS 95 01/14/2019    AST 26 01/14/2019    ALT 29 01/14/2019    ANIONGAP 5 (L) 01/18/2019    ESTGFRAFRICA >60.0 01/18/2019    EGFRNONAA >60.0 01/18/2019       Lab Results   Component Value Date    WBC 7.55 01/18/2019    RBC 4.02 01/18/2019    HGB 12.7 01/18/2019    HCT 39.3 01/18/2019    MCV 98 01/18/2019    MCH 31.6 (H) 01/18/2019    MCHC 32.3 01/18/2019    RDW 12.4 01/18/2019     01/18/2019    MPV 9.4 01/18/2019    GRAN 4.0 01/18/2019    GRAN 53.1 01/18/2019    LYMPH 2.4 01/18/2019    LYMPH 31.1 01/18/2019    MONO 0.8 01/18/2019    MONO 10.5 01/18/2019    EOS 1 10/28/2019    BASO 0.05 01/18/2019    EOSINOPHIL 4.2 01/18/2019    BASOPHIL 0.7 01/18/2019    DIFFMETHOD Automated 01/18/2019      Lab Results   Component Value Date    MG 1.9 01/15/2019      Lab Results   Component Value Date    PHOS 3.6 12/31/2018      No results found for: [id number]   No results found for: PTH   Lab Results   Component Value Date    TSH 0.984 07/02/2018      No results found for: FREET4   Lab Results   Component Value Date    HGBA1C 5.6 01/14/2019    ESTIMATEDAVG 114 01/14/2019      No results found for: FREETESTOSTE         Vital Signs (Most Recent)  Vitals:    06/03/20 1450   BP: 132/83   Pulse: 96         Today's Visit and Bone Health History     Have you had any loss of height or gotten shorter since your 20's?  0   Are you postmenopausal?  Yes   Please indicate how menopause occured. Naturally   Please indicate at what age you became postmenopausal. 50   Have you ever taken any type of hormone replacement therapy? No   Do you currently smoke? No   Have you ever smoked in the past? Yes   If yes, how many years? >20   How many alcoholic beverages do you drink per day? 1 to 3   How many caffeinated beverages do you drink per day? 1 to 3   Have you had 2 or more falls in the past 12 months? No   How active have you been in the past 12 months? Somewhat active ( walk up to 1 mile per day )   Did either of your parents have a hip fracture after the age of 50? Yes   Have you ever been diagnosed with any of the following? Gastroesophageal Reflux    COPD    Fracture    Emphysema    GERD    Do you currently have a fracture? Yes   If you currently have a fracture please indicate where and when the fracture occured. clavicale   Have you broken any other bones since you turned 50 or older? Yes   Please list all bones broken since you turned 50 or older. hand   Have you ever had a bone density test or DXA scan? No   Are you currently taking or have you ever taken any of the following medications? Opiods (Oxycodone, Hyrocodone)    PPI's (Nexium, Prilosec)    Steroids (Prednisone, Cortisone)   Do you take Calcium? Yes   If you take Calcium what dose? 600mg   Do you take Vitamin D? Yes   If you take Vitamin D what dose? unknown   Have you ever been diagnosed with cancer? No   Have you ever been treated for cancer with high beam radiation or had radioactive implants? No        she has medical history and medication use known to be associated with bone loss and osteoporosis to include pathological clavicle fracture, COPD, past history of heavy smoking, history of inhaled and oral steroid, PPIs history GERD, opioid pain medications        Review of Systems:  ROS:  Constitutional: no fever or chills  Eyes: no visual changes, Positive for retinal histoplasmosis  ENT: no nasal congestion or sore throat  Respiratory: no respiratory symptoms and COPD, hypertension emphysema  Cardiovascular: no chest pain or palpitations  Gastrointestinal: no nausea or vomiting, tolerating diet  Genitourinary: no hematuria or dysuria  Integument/Breast: no rash or pruritis  Hematologic/Lymphatic: no easy bruising or lymphadenopathy  Musculoskeletal: no arthralgias or myalgias  Neurological: no seizures or tremors  Behavioral/Psych: no auditory or visual hallucinations  Endocrine: no heat or cold intolerance      OBJECTIVE:     PHYSICAL EXAM:     ,                  General: no acute distress  Behavioral/Psych: normal judgment and insight and normal mood/affect  Skin: no rash  Head/Neck: atraumatic, normocephalic, without obvious  abnormality  Respiratory: normal respiratory effort  Cardiac: regular rate and rhythm  Vascular: pulses present  Abdomen: soft, non-tender  Musculoskeletal: no joint tenderness, deformity or swelling               ASSESSMENT/PLAN:     Assessment:    Osteoporosis, at high risk for future fractures, history of pathological clavicle fracture.    Plan:   30-45 minutes spent in face-to-face consultation with patient and her family members today, discussing the disease management of osteoporosis, for the reduction of future fractures.  I have explained that bone strength is equal to bone quality. A bone density / DEXA scan is important to complement her care since her fracture was by definition a fragility fracture/traumatic fracture.  Over half of the encounter was spent (50%) counseling the patient on the disease of osteoporosis evidence-based and best practice treatment options available as well as recommendations for improvement of bone quality, the importance of nutritional supplements to include:  Calcium 0361-4254 mg daily in divided doses   Vitamin D3  3295-2108 units daily in divided doses.   Fall prevention and personal safety for the reduction of future fractures.    Clinicians Guidelines for the treatment of Osteoporosis 2019 as part of the National Osteoporosis Foundation were utilized as the evidence-based information provided.    Discussed pharmaceutical treatment options to include Biphosphatases, Denosumab, Abaloparatide and Teriparatide. Information to include indications for therapy, risks and benefits to treatment, and important safety information related to these treatments was provided and discussed.  Handouts were given to the patient for her review on osteoporosis and other pharmacological treatment information related to other available treatment options.    The importance of diet, impact exercise, and core strengthening with gait and balance exercise, through  both formal physical therapy  programs and home exercise programs was discussed.     Bone density test recommended and ordered  Bone health labs recommended and ordered    Will see her back following testing discuss treatment options

## 2020-06-05 ENCOUNTER — LAB VISIT (OUTPATIENT)
Dept: LAB | Facility: HOSPITAL | Age: 70
End: 2020-06-05
Payer: MEDICARE

## 2020-06-05 DIAGNOSIS — M81.0 OSTEOPOROSIS, UNSPECIFIED OSTEOPOROSIS TYPE, UNSPECIFIED PATHOLOGICAL FRACTURE PRESENCE: ICD-10-CM

## 2020-06-05 DIAGNOSIS — M81.6 LOCALIZED OSTEOPOROSIS OF LEQUESNE: ICD-10-CM

## 2020-06-05 DIAGNOSIS — M80.80XA PATHOLOGICAL FRACTURE DUE TO OSTEOPOROSIS, UNSPECIFIED FRACTURE SITE, UNSPECIFIED OSTEOPOROSIS TYPE, INITIAL ENCOUNTER: ICD-10-CM

## 2020-06-05 PROCEDURE — 82340 ASSAY OF CALCIUM IN URINE: CPT

## 2020-06-05 PROCEDURE — 82570 ASSAY OF URINE CREATININE: CPT

## 2020-06-06 LAB
CALCIUM 24H UR-MRATE: 2 MG/HR (ref 4–12)
CALCIUM UR-MCNC: 5.9 MG/DL (ref 0–15)
CALCIUM URINE (MG/SPEC): 47 MG/SPEC
CREAT 24H UR-MRATE: 24.7 MG/HR (ref 40–75)
CREAT UR-MCNC: 74 MG/DL (ref 15–325)
CREATININE, URINE (MG/SPEC): 592 MG/SPEC
URINE COLLECTION DURATION: 24 HR
URINE COLLECTION DURATION: 24 HR
URINE VOLUME: 800 ML
URINE VOLUME: 800 ML

## 2020-06-08 ENCOUNTER — TELEPHONE (OUTPATIENT)
Dept: INTERNAL MEDICINE | Facility: CLINIC | Age: 70
End: 2020-06-08

## 2020-06-08 DIAGNOSIS — R30.0 DYSURIA: Primary | ICD-10-CM

## 2020-06-08 RX ORDER — SULFAMETHOXAZOLE AND TRIMETHOPRIM 800; 160 MG/1; MG/1
1 TABLET ORAL 2 TIMES DAILY
Qty: 6 TABLET | Refills: 0 | Status: SHIPPED | OUTPATIENT
Start: 2020-06-08 | End: 2020-06-11

## 2020-06-08 NOTE — TELEPHONE ENCOUNTER
----- Message from Sherri Aguilera sent at 6/8/2020  8:03 AM CDT -----  Contact: Pt self Mobile/Home [phone]   Patient is calling in regards to her saying that she have a possible UTI and she would like for you to write her a script for it please. Ms. Fam said that she have burning when she urinate and she's been having this since Friday passed.      [id number] [place] [place] - [address] AT [facility] [address]    [phone] Fax#   [phone]

## 2020-06-08 NOTE — TELEPHONE ENCOUNTER
Ordered UA and urine culture, sent in Rx for Bactrim DS PO BID for 3 days.  Recommend she come to give urine sample prior to starting antibiotics.  Recommend appointment if no improvement after completion of antibiotics or for any worsening.  Please notify patient/process orders.

## 2020-06-10 ENCOUNTER — TELEPHONE (OUTPATIENT)
Dept: PHARMACY | Facility: CLINIC | Age: 70
End: 2020-06-10

## 2020-06-10 ENCOUNTER — OFFICE VISIT (OUTPATIENT)
Dept: ORTHOPEDICS | Facility: CLINIC | Age: 70
End: 2020-06-10
Payer: MEDICARE

## 2020-06-10 ENCOUNTER — HOSPITAL ENCOUNTER (OUTPATIENT)
Dept: RADIOLOGY | Facility: CLINIC | Age: 70
Discharge: HOME | End: 2020-06-10
Attending: PHYSICIAN ASSISTANT
Payer: MEDICARE

## 2020-06-10 VITALS — HEART RATE: 84 BPM | DIASTOLIC BLOOD PRESSURE: 75 MMHG | SYSTOLIC BLOOD PRESSURE: 130 MMHG

## 2020-06-10 DIAGNOSIS — M80.80XA PATHOLOGICAL FRACTURE DUE TO OSTEOPOROSIS, UNSPECIFIED FRACTURE SITE, UNSPECIFIED OSTEOPOROSIS TYPE, INITIAL ENCOUNTER: Primary | ICD-10-CM

## 2020-06-10 DIAGNOSIS — M81.6 LOCALIZED OSTEOPOROSIS OF LEQUESNE: ICD-10-CM

## 2020-06-10 DIAGNOSIS — M80.80XA PATHOLOGICAL FRACTURE DUE TO OSTEOPOROSIS, UNSPECIFIED FRACTURE SITE, UNSPECIFIED OSTEOPOROSIS TYPE, INITIAL ENCOUNTER: ICD-10-CM

## 2020-06-10 DIAGNOSIS — M81.0 OSTEOPOROSIS, UNSPECIFIED OSTEOPOROSIS TYPE, UNSPECIFIED PATHOLOGICAL FRACTURE PRESENCE: ICD-10-CM

## 2020-06-10 PROCEDURE — 99999 PR PBB SHADOW E&M-EST. PATIENT-LVL III: ICD-10-PCS | Mod: PBBFAC,,, | Performed by: PHYSICIAN ASSISTANT

## 2020-06-10 PROCEDURE — 77080 DXA BONE DENSITY AXIAL: CPT | Mod: 26,,, | Performed by: INTERNAL MEDICINE

## 2020-06-10 PROCEDURE — 99213 OFFICE O/P EST LOW 20 MIN: CPT | Mod: PBBFAC,25

## 2020-06-10 PROCEDURE — 77080 DEXA BONE DENSITY SPINE HIP: ICD-10-PCS | Mod: 26,,, | Performed by: INTERNAL MEDICINE

## 2020-06-10 PROCEDURE — 99213 PR OFFICE/OUTPT VISIT, EST, LEVL III, 20-29 MIN: ICD-10-PCS | Mod: S$PBB,,, | Performed by: PHYSICIAN ASSISTANT

## 2020-06-10 PROCEDURE — 99213 OFFICE O/P EST LOW 20 MIN: CPT | Mod: S$PBB,,, | Performed by: PHYSICIAN ASSISTANT

## 2020-06-10 PROCEDURE — 99999 PR PBB SHADOW E&M-EST. PATIENT-LVL III: CPT | Mod: PBBFAC,,, | Performed by: PHYSICIAN ASSISTANT

## 2020-06-10 PROCEDURE — 77080 DXA BONE DENSITY AXIAL: CPT | Mod: TC

## 2020-06-10 NOTE — PROGRESS NOTES
Chief Complaint & History of Present Illness:  Estefani Fam is a established patient 69 y.o. female who is seen here today for review of lab results, DEXA scan results, and determine a treatment plan for her osteoporosis.  she has reviewed the information provided at her initial visit.  After review of treatment options together we has elected to proceed with Tymlos as her treatment option today for her osteoporosis and to reduce risk of future fractures.        Review of patient's allergies indicates:  No Known Allergies      Current Outpatient Medications   Medication Sig Dispense Refill    acetylcysteine 600 mg Cap Take 1 capsule (600 mg total) by mouth 2 (two) times daily. 90 capsule 3    ascorbic acid, vitamin C, (VITAMIN C) 500 MG tablet Take 500 mg by mouth 2 (two) times daily.       azithromycin (Z-JERSEY) 250 MG tablet Take 1 tablet (250 mg total) by mouth every Mon, Wed, Fri. 36 tablet 3    calcium carbonate (OS-STEPHANIE) 600 mg calcium (1,500 mg) Tab Take 1 tablet (600 mg total) by mouth once daily. Take Levofloxacin antibiotic at least 2 hours before calcium.  0    doxycycline (VIBRAMYCIN) 100 MG Cap Take 1 capsule (100 mg total) by mouth once daily. 30 capsule 2    fluticasone furoate-vilanterol (BREO) 200-25 mcg/dose DsDv diskus inhaler Inhale 1 puff into the lungs once daily. Controller 60 each 11    fluticasone propionate (FLONASE) 50 mcg/actuation nasal spray Spray twice (100 mcg total) by Each Nare route once daily. 16 g 11    guaiFENesin (MUCINEX) 600 mg 12 hr tablet Take 1 tablet (600 mg total) by mouth 2 (two) times daily. 60 tablet 5    levalbuterol (XOPENEX) 0.63 mg/3 mL nebulizer solution USE IN NEBULIZER EVERY 8 HOURS AS NEEDED FOR WHEEZING 750 mL 2    metronidazole 1% (METROGEL) 1 % Gel Apply topically once daily. 60 g 2    MULTIVIT-IRON-MIN-FOLIC ACID 3,500-18-0.4 UNIT-MG-MG ORAL CHEW Take 1 tablet by mouth once daily. Take Levofloxacin antibiotic at least 2 hours before  multivitamin.  0    pantoprazole (PROTONIX) 40 MG tablet Take 1 tablet (40 mg total) by mouth once daily. 30 tablet 11    predniSONE (DELTASONE) 10 MG tablet 4 tabs (40mg) daily for five days, then 2 tabs (20mg) daily for five days, then 1 tab (10mg) daily for five days. 35 tablet 0    predniSONE (DELTASONE) 10 MG tablet 4 tabs (40mg) daily for five days, then 2 tabs (20mg) daily for five days, then 1 tab (10mg) daily for five days. 35 tablet 0    sodium chloride (OCEAN) 0.65 % nasal spray 1 spray by Nasal route 2 (two) times daily. 88 mL 12    sulfamethoxazole-trimethoprim 800-160mg (BACTRIM DS) 800-160 mg Tab Take 1 tablet by mouth 2 (two) times daily. for 3 days 6 tablet 0    umeclidinium (INCRUSE ELLIPTA) 62.5 mcg/actuation DsDv INHALE 1 PUFF BY MOUTH INTO LUNGS ONCE DAILY 30 each 20    azelastine (ASTELIN) 137 mcg (0.1 %) nasal spray 1 spray (137 mcg total) by Nasal route 2 (two) times daily. 30 mL 3     No current facility-administered medications for this visit.        Past Medical History:   Diagnosis Date    Cataract     COPD (chronic obstructive pulmonary disease)     History of retinal hemorrhage     Retinal histoplasmosis        Past Surgical History:   Procedure Laterality Date    BRONCHOSCOPY WITH FLUOROSCOPY N/A 10/28/2019    Procedure: BRONCHOSCOPY, WITH FLUOROSCOPY;  Surgeon: Brooklynn Ruiz MD;  Location: [facility] OR [address] [place] [address];  Service: Endoscopy;  Laterality: N/A;    HAND SURGERY         Lab Results   Component Value Date     06/03/2020    K 4.4 06/03/2020     06/03/2020    CO2 27 06/03/2020    GLU 96 06/03/2020    BUN 11 06/03/2020    CREATININE 0.7 06/03/2020    CALCIUM 10.1 06/03/2020    PROT 7.4 06/03/2020    ALBUMIN 4.2 06/03/2020    BILITOT 0.4 06/03/2020    ALKPHOS 118 06/03/2020    AST 26 06/03/2020    ALT 27 06/03/2020    ANIONGAP 9 06/03/2020    ESTGFRAFRICA >60.0 06/03/2020    EGFRNONAA >60.0 06/03/2020      Lab Results   Component Value Date    WBC 7.55 01/18/2019     RBC 4.02 01/18/2019    HGB 12.7 01/18/2019    HCT 39.3 01/18/2019    MCV 98 01/18/2019    MCH 31.6 (H) 01/18/2019    MCHC 32.3 01/18/2019    RDW 12.4 01/18/2019     01/18/2019    MPV 9.4 01/18/2019    GRAN 4.0 01/18/2019    GRAN 53.1 01/18/2019    LYMPH 2.4 01/18/2019    LYMPH 31.1 01/18/2019    MONO 0.8 01/18/2019    MONO 10.5 01/18/2019    EOS 1 10/28/2019    BASO 0.05 01/18/2019    EOSINOPHIL 4.2 01/18/2019    BASOPHIL 0.7 01/18/2019    DIFFMETHOD Automated 01/18/2019      Lab Results   Component Value Date    MG 2.2 06/03/2020      Lab Results   Component Value Date    PHOS 3.6 12/31/2018      Lab Results   Component Value Date    QOWFLHOM07NA 47 06/03/2020      Lab Results   Component Value Date    PTH 69.0 06/03/2020      Lab Results   Component Value Date    TSH 0.984 07/02/2018      Lab Results   Component Value Date    FREET4 0.96 06/03/2020      Lab Results   Component Value Date    HGBA1C 5.6 01/14/2019    ESTIMATEDAVG 114 01/14/2019      No results found for: FREETESTOSTE     DEXA Scan was reviewed and demonstrates :     T-Score Hip: -2.6     T-Score Spine: -3.1      FRAX:      Major Fx.: 30%         Hip Fx.: 8.3%                                 TBS adjusted      T-Score Spine: -3.1      FRAX:      Major Fx.: 31.0%         Hip Fx.: 9.0%          Vital Signs (Most Recent)  Vitals:    06/10/20 1452   BP: 130/75   Pulse: 84         Review of Systems:  ROS:  Constitutional: no fever or chills  Eyes: no visual changes, Positive for retinal histoplasmosis  ENT: no nasal congestion or sore throat  Respiratory: no respiratory symptoms and COPD, hypertension emphysema  Cardiovascular: no chest pain or palpitations  Gastrointestinal: no nausea or vomiting, tolerating diet  Genitourinary: no hematuria or dysuria  Integument/Breast: no rash or pruritis  Hematologic/Lymphatic: no easy bruising or lymphadenopathy  Musculoskeletal: no arthralgias or myalgias  Neurological: no seizures or  tremors  Behavioral/Psych: no auditory or visual hallucinations  Endocrine: no heat or cold intolerance      Estefani Fam was given instructions on administration of Abaloparatide  today.  She will be contacted by the speciality pharmacy when her medication is available, and will be scheduled to receive further detailed  instruction on  administration or when and where to receive her treatment.     She will continue with her calcium and vitamin D.  Fall prevention and personal safety have been reviewed.    We have discussed the need for core strengthening and balance exercises for fall prevention, we may consider formal physical therapy at her next visit.    Assessment and plan:    Osteoporosis    Abaloparatide 80 mcg sub q daily    Follow up 1-2 weeks after initiation of treatment to check Calcium, Vitamin D levels and access tolerance to medication.

## 2020-06-10 NOTE — TELEPHONE ENCOUNTER
DOCUMENTATION ONLY:  Prior authorization for Tymlos 80 mcg Pen #1.56 mL/30 days approved from 06/10/2020 to 06/10/2022  Case ID: [id number]    Co-pay: $519.25    Patient Assistance IS required. Sending to the financial assistance team to investigate assistance options. - MICHAEL

## 2020-06-11 ENCOUNTER — OFFICE VISIT (OUTPATIENT)
Dept: INTERNAL MEDICINE | Facility: CLINIC | Age: 70
End: 2020-06-11
Payer: MEDICARE

## 2020-06-11 ENCOUNTER — TELEPHONE (OUTPATIENT)
Dept: INTERNAL MEDICINE | Facility: CLINIC | Age: 70
End: 2020-06-11

## 2020-06-11 VITALS
OXYGEN SATURATION: 96 % | TEMPERATURE: 98 F | DIASTOLIC BLOOD PRESSURE: 70 MMHG | HEART RATE: 91 BPM | WEIGHT: 144.19 LBS | SYSTOLIC BLOOD PRESSURE: 110 MMHG | HEIGHT: 68 IN | BODY MASS INDEX: 21.85 KG/M2

## 2020-06-11 DIAGNOSIS — R35.0 URINARY FREQUENCY: Primary | ICD-10-CM

## 2020-06-11 DIAGNOSIS — R30.0 DYSURIA: ICD-10-CM

## 2020-06-11 LAB
BACTERIA #/AREA URNS AUTO: ABNORMAL /HPF
BILIRUB SERPL-MCNC: NORMAL MG/DL
BILIRUB UR QL STRIP: NEGATIVE
BLOOD URINE, POC: 250
CLARITY UR REFRACT.AUTO: ABNORMAL
CLARITY, POC UA: CLEAR
COLOR UR AUTO: YELLOW
COLOR, POC UA: NORMAL
GLUCOSE UR QL STRIP: NEGATIVE
GLUCOSE UR QL STRIP: NORMAL
HGB UR QL STRIP: ABNORMAL
KETONES UR QL STRIP: NEGATIVE
KETONES UR QL STRIP: NORMAL
LEUKOCYTE ESTERASE UR QL STRIP: ABNORMAL
LEUKOCYTE ESTERASE URINE, POC: NORMAL
MICROSCOPIC COMMENT: ABNORMAL
NITRITE UR QL STRIP: NEGATIVE
NITRITE, POC UA: NORMAL
PH UR STRIP: 5 [PH] (ref 5–8)
PH, POC UA: 5
PROT UR QL STRIP: NEGATIVE
PROTEIN, POC: NORMAL
RBC #/AREA URNS AUTO: 7 /HPF (ref 0–4)
SP GR UR STRIP: 1.01 (ref 1–1.03)
SPECIFIC GRAVITY, POC UA: 1
SQUAMOUS #/AREA URNS AUTO: 1 /HPF
URN SPEC COLLECT METH UR: ABNORMAL
UROBILINOGEN, POC UA: NORMAL
WBC #/AREA URNS AUTO: >100 /HPF (ref 0–5)
WBC CLUMPS UR QL AUTO: ABNORMAL

## 2020-06-11 PROCEDURE — 99999 PR PBB SHADOW E&M-EST. PATIENT-LVL III: ICD-10-PCS | Mod: PBBFAC,,, | Performed by: FAMILY MEDICINE

## 2020-06-11 PROCEDURE — 99999 PR PBB SHADOW E&M-EST. PATIENT-LVL III: CPT | Mod: PBBFAC,,, | Performed by: FAMILY MEDICINE

## 2020-06-11 PROCEDURE — 99213 OFFICE O/P EST LOW 20 MIN: CPT | Mod: PBBFAC | Performed by: FAMILY MEDICINE

## 2020-06-11 PROCEDURE — 87077 CULTURE AEROBIC IDENTIFY: CPT

## 2020-06-11 PROCEDURE — 81001 URINALYSIS AUTO W/SCOPE: CPT

## 2020-06-11 PROCEDURE — 99213 OFFICE O/P EST LOW 20 MIN: CPT | Mod: S$PBB,,, | Performed by: FAMILY MEDICINE

## 2020-06-11 PROCEDURE — 87186 SC STD MICRODIL/AGAR DIL: CPT

## 2020-06-11 PROCEDURE — 99213 PR OFFICE/OUTPT VISIT, EST, LEVL III, 20-29 MIN: ICD-10-PCS | Mod: S$PBB,,, | Performed by: FAMILY MEDICINE

## 2020-06-11 PROCEDURE — 87086 URINE CULTURE/COLONY COUNT: CPT

## 2020-06-11 PROCEDURE — 87088 URINE BACTERIA CULTURE: CPT

## 2020-06-11 PROCEDURE — 81002 URINALYSIS NONAUTO W/O SCOPE: CPT | Mod: PBBFAC | Performed by: FAMILY MEDICINE

## 2020-06-11 RX ORDER — AMOXICILLIN AND CLAVULANATE POTASSIUM 875; 125 MG/1; MG/1
1 TABLET, FILM COATED ORAL EVERY 12 HOURS
Qty: 14 TABLET | Refills: 0 | Status: SHIPPED | OUTPATIENT
Start: 2020-06-11 | End: 2020-06-18

## 2020-06-11 NOTE — PROGRESS NOTES
Subjective:      Patient ID: Estefani Fam is a 69 y.o. female.    Chief Complaint: Urinary Tract Infection (x1 week )      HPI:  Estefani Fam is a 69 year old female with cataracts, COPD on 2L home o2, history of elevated blood pressure reading, history of retinal hemorrhage, history of tobacco use, and pulmonary hypertension who presents to clinic today with a chief complaint of urinary frequency.    Patient called 6/8/20 saying she had a possible UTI and requested Rx; endorsed dysuria since the previous Friday.  Ordered UA and urine culture and sent in empiric Bactrim DS PO BID for 3 days and recommended she give urine sample prior to starting Abx.  Patient did start Abx and came to lab and gave urine sample but this was collected for 24 urine creatinine and not the urinalysis.    Urine dipstick today shows 2+ leukocytes, pH 5, trace protein, negative nitrites, ~ 250 katt/mL    Has had gross hematuria in the past with UTI in the remote past.  Denies noticing any gross hematuria currently.      Past Medical History:   Diagnosis Date    Cataract     COPD (chronic obstructive pulmonary disease)     History of retinal hemorrhage     Retinal histoplasmosis        Past Surgical History:   Procedure Laterality Date    BRONCHOSCOPY WITH FLUOROSCOPY N/A 10/28/2019    Procedure: BRONCHOSCOPY, WITH FLUOROSCOPY;  Surgeon: Brooklynn Ruiz MD;  Location: [facility] OR [address] [place] [address];  Service: Endoscopy;  Laterality: N/A;    HAND SURGERY         Family History   Problem Relation Age of Onset    Macular degeneration Mother     Colon cancer Mother     Stroke Father     Amblyopia Neg Hx     Blindness Neg Hx     Cataracts Neg Hx     Diabetes Neg Hx     Glaucoma Neg Hx     Hypertension Neg Hx     Retinal detachment Neg Hx     Strabismus Neg Hx     Thyroid disease Neg Hx        Social History     Socioeconomic History    Marital status:      Spouse name: Not on file    Number of children: Not on file    Years  of education: Not on file    Highest education level: Not on file   Occupational History    Not on file   Social Needs    Financial resource strain: Not on file    Food insecurity:     Worry: Not on file     Inability: Not on file    Transportation needs:     Medical: Not on file     Non-medical: Not on file   Tobacco Use    Smoking status: Former Smoker     Packs/day: 1.00     Years: 36.00     Pack years: 36.00     Types: Cigarettes     Last attempt to quit: 8/28/2016     Years since quitting: 3.7    Smokeless tobacco: Never Used   Substance and Sexual Activity    Alcohol use: Yes     Alcohol/week: 2.0 standard drinks     Types: 2 Glasses of wine per week     Frequency: 4 or more times a week     Drinks per session: 1 or 2     Comment: 1-2 glasses a day     Drug use: No    Sexual activity: Yes   Lifestyle    Physical activity:     Days per week: Not on file     Minutes per session: Not on file    Stress: Not on file   Relationships    Social connections:     Talks on phone: Not on file     Gets together: Not on file     Attends [employer] service: Not on file     Active member of club or organization: Not on file     Attends meetings of clubs or organizations: Not on file     Relationship status: Not on file   Other Topics Concern    Not on file   Social History Narrative    Not on file       Review of Systems   Constitutional: Negative for chills, fatigue and fever.   HENT: Negative for congestion, hearing loss, nosebleeds, rhinorrhea, sore throat and trouble swallowing.    Eyes: Negative for pain and visual disturbance.   Respiratory: Negative for cough and wheezing.    Cardiovascular: Negative for chest pain and palpitations.   Gastrointestinal: Negative for abdominal distention, abdominal pain, constipation, diarrhea, nausea and vomiting.   Genitourinary: Positive for dysuria and frequency. Negative for decreased urine volume, difficulty urinating, hematuria and urgency.   Musculoskeletal:  "Negative for arthralgias, back pain and myalgias.   Skin: Negative for color change and rash.   Neurological: Negative for dizziness, tremors, weakness, light-headedness, numbness and headaches.   Psychiatric/Behavioral: Negative for agitation, behavioral problems and confusion. The patient is not nervous/anxious.      Objective:     Vitals:    06/11/20 1643   BP: 110/70   BP Location: Right arm   Patient Position: Sitting   BP Method: Medium (Manual)   Pulse: 91   Temp: 98.3 °F (36.8 °C)   TempSrc: Oral   SpO2: 96%   Weight: 65.4 kg (144 lb 2.9 oz)   Height: 5' 8" (1.727 m)       Physical Exam   Constitutional: She appears well-developed and well-nourished.   HENT:   Head: Normocephalic and atraumatic.   Right Ear: External ear normal.   Left Ear: External ear normal.   Nose: Nose normal.   Eyes: Pupils are equal, round, and reactive to light. Conjunctivae are normal.   Neck: Neck supple. No tracheal deviation present.   Cardiovascular: Normal rate, regular rhythm and normal heart sounds. Exam reveals no gallop and no friction rub.   No murmur heard.  Pulmonary/Chest: Effort normal and breath sounds normal. No respiratory distress. She has no wheezes. She has no rales.   Abdominal: Soft. Bowel sounds are normal. She exhibits no distension. There is no tenderness. There is CVA tenderness. There is no rebound and no guarding.   Musculoskeletal: She exhibits no deformity.   Neurological: She is alert.   Skin: Skin is warm and dry.   Psychiatric: She has a normal mood and affect. Her behavior is normal.   Nursing note and vitals reviewed.     Assessment:      1. Urinary frequency    2. Dysuria      Plan:   Estefani was seen today for urinary tract infection.    Diagnoses and all orders for this visit:    Urinary frequency  -     POCT URINE DIPSTICK WITHOUT MICROSCOPE; to complete UA and urine culture as previously ordered  -     Urinalysis, Reflex to Urine Culture Urine, Clean Catch to be repeated once completed course " of Augmentin to check for resolution of hematuria  -     amoxicillin-clavulanate 875-125mg (AUGMENTIN) 875-125 mg per tablet; Take 1 tablet by mouth every 12 (twelve) hours. for 7 days  -     Instructed patient to notify me for any worsening or if no improvement.  Tailor Abx as appropriate pending sensitivities.    Dysuria  -     Urinalysis, Reflex to Urine Culture Urine, Clean Catch  -     amoxicillin-clavulanate 875-125mg (AUGMENTIN) 875-125 mg per tablet; Take 1 tablet by mouth every 12 (twelve) hours. for 7 days

## 2020-06-11 NOTE — TELEPHONE ENCOUNTER
Recommend urgent care appointment for this with myself or any available provider; would like to collect urine sample for analysis at UC visit.  Please notify patient.

## 2020-06-11 NOTE — TELEPHONE ENCOUNTER
----- Message from Keshav Gilmore sent at 6/11/2020 10:44 AM CDT -----  Contact: self   Would like to get medical advice.  Symptoms (please be specific): Frequent urination   How long has patient had these symptoms:  Over a week   Pharmacy name and phone #: [id number] - [place] [phone] [facility] [phone] (Phone)  [phone] (Fax)   Any drug allergies (copy from chart):      Would the patient rather a call back or a response via [facility]?:  Call back   Comments:

## 2020-06-13 NOTE — TELEPHONE ENCOUNTER
Patient notified of Tymlos approval and ~$500 copay. She is over the income limit for the 's financial assistance program and cannot afford the copay. Advised [facility] will message provider to see if there is an alternative. Patient verbalized understanding.

## 2020-06-14 LAB — BACTERIA UR CULT: ABNORMAL

## 2020-06-23 ENCOUNTER — TELEPHONE (OUTPATIENT)
Dept: PULMONOLOGY | Facility: CLINIC | Age: 70
End: 2020-06-23

## 2020-06-23 RX ORDER — PREDNISONE 10 MG/1
TABLET ORAL
Qty: 35 TABLET | Refills: 0 | Status: SHIPPED | OUTPATIENT
Start: 2020-06-23 | End: 2021-03-11

## 2020-06-23 NOTE — TELEPHONE ENCOUNTER
I spoke to patient to let her know Rx for prednisone has been sent to her pharmacy per Dr Ruiz. Patient stated she just went to pharmacy to  rx.

## 2020-06-23 NOTE — TELEPHONE ENCOUNTER
----- Message from Jayda Correa MA sent at 6/23/2020  9:21 AM CDT -----    ----- Message -----  From: Ronel Gtz  Sent: 6/23/2020   9:00 AM CDT  To: [facility] Staff        The past week has been having trouble breathing w/COPD. States she thinks she could use another round of steriods.    Please contact patient Estefani Fam MRN [id number]  @# [phone]

## 2020-07-06 DIAGNOSIS — M81.6 LOCALIZED OSTEOPOROSIS OF LEQUESNE: ICD-10-CM

## 2020-07-06 DIAGNOSIS — M80.80XA PATHOLOGICAL FRACTURE DUE TO OSTEOPOROSIS, UNSPECIFIED FRACTURE SITE, UNSPECIFIED OSTEOPOROSIS TYPE, INITIAL ENCOUNTER: Primary | ICD-10-CM

## 2020-07-06 DIAGNOSIS — M81.0 OSTEOPOROSIS, UNSPECIFIED OSTEOPOROSIS TYPE, UNSPECIFIED PATHOLOGICAL FRACTURE PRESENCE: ICD-10-CM

## 2020-07-17 DIAGNOSIS — Z71.89 COMPLEX CARE COORDINATION: ICD-10-CM

## 2020-08-11 ENCOUNTER — TELEPHONE (OUTPATIENT)
Dept: PULMONOLOGY | Facility: HOSPITAL | Age: 70
End: 2020-08-11

## 2020-08-11 RX ORDER — PREDNISONE 10 MG/1
TABLET ORAL
Qty: 35 TABLET | Refills: 0 | Status: SHIPPED | OUTPATIENT
Start: 2020-08-11 | End: 2021-03-11

## 2020-08-12 NOTE — TELEPHONE ENCOUNTER
----- Message from Jayda Correa MA sent at 8/11/2020  4:55 PM CDT -----  Regarding: FW: Phone call  Contact: pt  Dr Ruiz,  Patient stated she is having a flare up and has been trying to get over it for about a week. Can you send something to pharmacy.  Also, patient needs to get a shot called Prolia but read it could cause a lung infection. Should she get this?  Thanks, Jayda  ----- Message -----  From: Maria Fernanda Mathews  Sent: 8/11/2020   2:25 PM CDT  To: [facility] Staff  Subject: Phone call                                       Reason:Pt says she left a message this morning and have not heard back no other info provided    Communication:[phone]

## 2020-08-26 ENCOUNTER — PATIENT OUTREACH (OUTPATIENT)
Dept: ADMINISTRATIVE | Facility: OTHER | Age: 70
End: 2020-08-26

## 2020-08-26 NOTE — PROGRESS NOTES
Requested updates within Care Everywhere.  Patient's chart was reviewed for overdue KATIE topics.  Immunizations reconciled.    Orders placed:n/a  Tasked appts:n/a  Labs Linked:n/a

## 2020-08-28 ENCOUNTER — HOSPITAL ENCOUNTER (OUTPATIENT)
Dept: RADIOLOGY | Facility: HOSPITAL | Age: 70
Discharge: HOME | End: 2020-08-28
Attending: PHYSICIAN ASSISTANT
Payer: MEDICARE

## 2020-08-28 ENCOUNTER — OFFICE VISIT (OUTPATIENT)
Dept: ORTHOPEDICS | Facility: CLINIC | Age: 70
End: 2020-08-28
Payer: MEDICARE

## 2020-08-28 VITALS — HEIGHT: 68 IN | BODY MASS INDEX: 19.25 KG/M2 | WEIGHT: 127 LBS

## 2020-08-28 DIAGNOSIS — M89.8X1 CLAVICLE PAIN: ICD-10-CM

## 2020-08-28 DIAGNOSIS — S42.032K: ICD-10-CM

## 2020-08-28 DIAGNOSIS — S42.021A CLOSED DISPLACED FRACTURE OF SHAFT OF RIGHT CLAVICLE, INITIAL ENCOUNTER: ICD-10-CM

## 2020-08-28 DIAGNOSIS — S42.032K: Primary | ICD-10-CM

## 2020-08-28 DIAGNOSIS — M89.8X1 CLAVICLE PAIN: Primary | ICD-10-CM

## 2020-08-28 PROCEDURE — 99214 OFFICE O/P EST MOD 30 MIN: CPT | Mod: PBBFAC,25 | Performed by: PHYSICIAN ASSISTANT

## 2020-08-28 PROCEDURE — 99214 OFFICE O/P EST MOD 30 MIN: CPT | Mod: S$PBB,,, | Performed by: PHYSICIAN ASSISTANT

## 2020-08-28 PROCEDURE — 99999 PR PBB SHADOW E&M-EST. PATIENT-LVL IV: CPT | Mod: PBBFAC,,, | Performed by: PHYSICIAN ASSISTANT

## 2020-08-28 PROCEDURE — 73000 XR CLAVICLE BILATERAL: ICD-10-PCS | Mod: 26,50,, | Performed by: RADIOLOGY

## 2020-08-28 PROCEDURE — 73000 X-RAY EXAM OF COLLAR BONE: CPT | Mod: TC,50

## 2020-08-28 PROCEDURE — 73000 X-RAY EXAM OF COLLAR BONE: CPT | Mod: 26,50,, | Performed by: RADIOLOGY

## 2020-08-28 PROCEDURE — 99999 PR PBB SHADOW E&M-EST. PATIENT-LVL IV: ICD-10-PCS | Mod: PBBFAC,,, | Performed by: PHYSICIAN ASSISTANT

## 2020-08-28 PROCEDURE — 99214 PR OFFICE/OUTPT VISIT, EST, LEVL IV, 30-39 MIN: ICD-10-PCS | Mod: S$PBB,,, | Performed by: PHYSICIAN ASSISTANT

## 2020-08-28 NOTE — PROGRESS NOTES
"Patient ID: Estefani Fam is a 70 y.o. female.    Chief Complaint: Pain of the Left Shoulder and Pain of the Right Shoulder      HISTORY:  Estefani Fam is a 70 y.o. female who returns to me today for follow up of left clavicle fracture.  She was last seen by me 5/25/2020.  Today she is doing well and notes that she has no pain in her left shoulder.  Her range of motion has improved.  She is about 7 months s/p injury.    Today she reports that she fell at home and injured her right clavicle.  The fall occurred 8/13/2020.  She complains of pain, swelling and tenderness along the clavicle that has started to improve.  She had initial bruising that has resolved.  She denies numbness or tingling.  This is the first time she is seeking treatment for this injury.      PMH/PSH/FamHx/SocHx:    Unchanged from prior visit.    ROS:  Constitution: Negative for chills, fever and weakness.   Cardiovascular: Negative for chest pain.   Respiratory: Negative for cough and shortness of breath.   Hematologic/Lymphatic: Negative for bleeding problem. Does not bruise/bleed easily.   Skin: Negative for color change, itching and poor wound healing.   Musculoskeletal: Positive for Right shoulder pain  Gastrointestinal: Negative for heartburn.   Neurological: Negative for dizziness, focal weakness, numbness, paresthesias and sensory change.   Psychiatric/Behavioral: The patient is not nervous/anxious.   Allergic/Immunologic: Negative for environmental allergies and persistent infections.     PHYSICAL EXAM:   Ht 5' 8" (1.727 m)   Wt 57.6 kg (127 lb)   BMI 19.31 kg/m²   Left clavicle  Skin intact, no swelling  Mild deformity distal clavicle  Near full ROM left shoulder   No TTP    Right clavicle  There is mild swelling and deformity   Skin intact, no abrasion or erythema  Mild TTP along mid clavicle  No pain or tenderness proximal humerus  ROM is limited due to pain    IMAGING: X-rays of the bilateral clavicle, personally reviewed " by me, demonstrate mildly displaced fracture shaft of right clavicle and fracture distal left clavicle with nonunion    ASSESSMENT/PLAN:    Estefani was seen today for pain and pain.    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  She was placed in sling today.  No lifting.  She was instructed on elbow ROM.  Follow up 4 weeks with x-rays.  - Left clavicle- she may resume activities as tolerated.  She is not having any pain.  Will follow up as needed

## 2020-09-21 ENCOUNTER — TELEPHONE (OUTPATIENT)
Dept: ORTHOPEDICS | Facility: CLINIC | Age: 70
End: 2020-09-21

## 2020-09-21 NOTE — TELEPHONE ENCOUNTER
Returned patients phone call regarding taking Prolia. Patient is concerned with taking Prolia because she read that it can affect her lungs and eyes. She wants to know if there's anything else that she can take that want affect the her eyes or lungs. Patient aware that D.S. will be back in the office tomorrow, 9/22. She's aware that that he/someone will call back to discuss her concern of the medication.

## 2020-09-25 ENCOUNTER — HOSPITAL ENCOUNTER (OUTPATIENT)
Dept: RADIOLOGY | Facility: HOSPITAL | Age: 70
Discharge: HOME | End: 2020-09-25
Attending: PHYSICIAN ASSISTANT
Payer: MEDICARE

## 2020-09-25 ENCOUNTER — OFFICE VISIT (OUTPATIENT)
Dept: ORTHOPEDICS | Facility: CLINIC | Age: 70
End: 2020-09-25
Payer: MEDICARE

## 2020-09-25 VITALS — BODY MASS INDEX: 22.69 KG/M2 | WEIGHT: 149.69 LBS | HEIGHT: 68 IN

## 2020-09-25 DIAGNOSIS — S42.021D CLOSED DISPLACED FRACTURE OF SHAFT OF RIGHT CLAVICLE WITH ROUTINE HEALING, SUBSEQUENT ENCOUNTER: Primary | ICD-10-CM

## 2020-09-25 DIAGNOSIS — S42.021A CLOSED DISPLACED FRACTURE OF SHAFT OF RIGHT CLAVICLE, INITIAL ENCOUNTER: ICD-10-CM

## 2020-09-25 PROCEDURE — 73000 X-RAY EXAM OF COLLAR BONE: CPT | Mod: 26,RT,, | Performed by: RADIOLOGY

## 2020-09-25 PROCEDURE — 99213 PR OFFICE/OUTPT VISIT, EST, LEVL III, 20-29 MIN: ICD-10-PCS | Mod: S$PBB,,, | Performed by: PHYSICIAN ASSISTANT

## 2020-09-25 PROCEDURE — 73000 XR CLAVICLE RIGHT: ICD-10-PCS | Mod: 26,RT,, | Performed by: RADIOLOGY

## 2020-09-25 PROCEDURE — 73000 X-RAY EXAM OF COLLAR BONE: CPT | Mod: TC,RT

## 2020-09-25 PROCEDURE — 99213 OFFICE O/P EST LOW 20 MIN: CPT | Mod: S$PBB,,, | Performed by: PHYSICIAN ASSISTANT

## 2020-09-25 PROCEDURE — 99999 PR PBB SHADOW E&M-EST. PATIENT-LVL IV: ICD-10-PCS | Mod: PBBFAC,,, | Performed by: PHYSICIAN ASSISTANT

## 2020-09-25 PROCEDURE — 99999 PR PBB SHADOW E&M-EST. PATIENT-LVL IV: CPT | Mod: PBBFAC,,, | Performed by: PHYSICIAN ASSISTANT

## 2020-09-25 PROCEDURE — 99214 OFFICE O/P EST MOD 30 MIN: CPT | Mod: PBBFAC,25 | Performed by: PHYSICIAN ASSISTANT

## 2020-09-25 NOTE — PROGRESS NOTES
"Patient ID: Estefani Fam is a 70 y.o. female.    Chief Complaint: Pain of the Left Shoulder and Pain of the Right Shoulder      HISTORY:  Estefani Fam is a 70 y.o. female who returns to me today for follow up of right clavicle fracture.  She was last seen by me 8/28/2020.  Today she is doing well, but notes only mild occasional pain.  Overall, she has improved.  Her injury occurred 6 weeks ago.  She denies numbness, tingling or weakness.  She is not wearing a sling and had not been doing any overhead reaching or lifting.       PMH/PSH/FamHx/SocHx:    Unchanged from prior visit.    ROS:  Constitution: Negative for chills, fever and weakness.   Cardiovascular: Negative for chest pain.   Respiratory: Negative for cough and shortness of breath.   Hematologic/Lymphatic: Negative for bleeding problem. Does not bruise/bleed easily.   Skin: Negative for color change, itching and poor wound healing.   Musculoskeletal: Positive for right clavicle pain  Gastrointestinal: Negative for heartburn.   Neurological: Negative for dizziness, focal weakness, numbness, paresthesias and sensory change.   Psychiatric/Behavioral: The patient is not nervous/anxious.   Allergic/Immunologic: Negative for environmental allergies and persistent infections.     PHYSICAL EXAM:   Ht 5' 8" (1.727 m)   Wt 67.9 kg (149 lb 11.1 oz)   BMI 22.76 kg/m²   Right clavicle  Skin intact, no erythema   Mild swelling mid clavicle with mild deformity  No TTP clavicle shaft  Forward flexion 140  5/5 biceps, 5/5 triceps  Sensation to light touch intact    IMAGING: X-rays of the right clavicle, personally reviewed by me, demonstrate healing fracture of mid shaft of clavical with callus formation, satisfactory position    ASSESSMENT/PLAN:    Estefani was seen today for pain and pain.    Diagnoses and all orders for this visit:    Closed displaced fracture of shaft of right clavicle with routine healing, subsequent encounter  -     X-Ray Clavicle Right; " Future    - Healing right clavicle fracture, 6 weeks s/p injury.  Recommend no lifting over 1-2 lbs.  She has home exercises for range of motion from prior injury. She may begin exercises for ROM.  Follow up in 6 weeks for x-rays.

## 2020-10-01 ENCOUNTER — IMMUNIZATION (OUTPATIENT)
Dept: PHARMACY | Facility: CLINIC | Age: 70
End: 2020-10-01
Payer: MEDICARE

## 2020-10-01 ENCOUNTER — PATIENT MESSAGE (OUTPATIENT)
Dept: PHARMACY | Facility: CLINIC | Age: 70
End: 2020-10-01

## 2020-10-02 ENCOUNTER — IMMUNIZATION (OUTPATIENT)
Dept: INTERNAL MEDICINE | Facility: CLINIC | Age: 70
End: 2020-10-02
Payer: MEDICARE

## 2020-10-02 PROCEDURE — G0008 ADMIN INFLUENZA VIRUS VAC: HCPCS | Mod: PBBFAC

## 2020-10-02 PROCEDURE — 90694 VACC AIIV4 NO PRSRV 0.5ML IM: CPT | Mod: PBBFAC

## 2020-10-05 RX ORDER — AZELASTINE 1 MG/ML
1 SPRAY, METERED NASAL 2 TIMES DAILY
Qty: 30 ML | Refills: 3 | Status: SHIPPED | OUTPATIENT
Start: 2020-10-05 | End: 2021-05-24

## 2020-10-06 ENCOUNTER — PATIENT MESSAGE (OUTPATIENT)
Dept: INTERNAL MEDICINE | Facility: CLINIC | Age: 70
End: 2020-10-06

## 2020-11-13 ENCOUNTER — TELEPHONE (OUTPATIENT)
Dept: PULMONOLOGY | Facility: CLINIC | Age: 70
End: 2020-11-13

## 2020-11-13 RX ORDER — PREDNISONE 10 MG/1
TABLET ORAL
Qty: 35 TABLET | Refills: 0 | Status: SHIPPED | OUTPATIENT
Start: 2020-11-13 | End: 2021-03-11

## 2020-11-13 NOTE — TELEPHONE ENCOUNTER
----- Message from Xenia Contreras sent at 11/13/2020  8:30 AM CST -----  Regarding: Second request  Contact: pt  Pt would like to be called back regarding  pt needs a 1) f/u appt maybe January    pt said last visit was January    2)  Also needs a round of steroid    [facility]        Pt can be reached at  [phone]   with a status   even if you can't find a work in

## 2020-11-13 NOTE — TELEPHONE ENCOUNTER
I spoke to patient to let her know when I have Dr Ruiz's January, 2021 calendar I can schedule her for follow up. Patient verbalized understanding.       Patient requested steroids be sent to her pharmacy. Mrs Fam stated the last 3 days have been pretty rough. I let her know a message would be sent to Dr Ruiz. She verbalized understanding.

## 2020-11-23 ENCOUNTER — PATIENT OUTREACH (OUTPATIENT)
Dept: ADMINISTRATIVE | Facility: HOSPITAL | Age: 70
End: 2020-11-23

## 2020-11-23 ENCOUNTER — PATIENT MESSAGE (OUTPATIENT)
Dept: ADMINISTRATIVE | Facility: HOSPITAL | Age: 70
End: 2020-11-23

## 2020-11-23 DIAGNOSIS — Z12.11 SCREENING FOR COLON CANCER: Primary | ICD-10-CM

## 2020-11-23 NOTE — PROGRESS NOTES
Health Maintenance Due   Topic Date Due    Hepatitis C Screening  1950    Lipid Panel  1950    Mammogram  07/23/1990    Colorectal Cancer Screening  07/23/2000     Order has been placed for colonoscopy.  Portal message has been sent to patient regarding overdue health maintenance.  Chart review completed.

## 2020-12-02 ENCOUNTER — PATIENT OUTREACH (OUTPATIENT)
Dept: ADMINISTRATIVE | Facility: OTHER | Age: 70
End: 2020-12-02

## 2020-12-03 NOTE — PROGRESS NOTES
Requested updates within Care Everywhere.  Patient's chart was reviewed for overdue KATIE topics.  Open case request for colonoscopy.  Media/[facility] reviewed for outside mammogram  Immunizations reconciled.

## 2020-12-04 ENCOUNTER — HOSPITAL ENCOUNTER (OUTPATIENT)
Dept: RADIOLOGY | Facility: HOSPITAL | Age: 70
Discharge: HOME | End: 2020-12-04
Attending: PHYSICIAN ASSISTANT
Payer: MEDICARE

## 2020-12-04 ENCOUNTER — OFFICE VISIT (OUTPATIENT)
Dept: ORTHOPEDICS | Facility: CLINIC | Age: 70
End: 2020-12-04
Payer: MEDICARE

## 2020-12-04 VITALS — BODY MASS INDEX: 21.95 KG/M2 | WEIGHT: 144.81 LBS | HEIGHT: 68 IN

## 2020-12-04 DIAGNOSIS — S42.021D CLOSED DISPLACED FRACTURE OF SHAFT OF RIGHT CLAVICLE WITH ROUTINE HEALING, SUBSEQUENT ENCOUNTER: ICD-10-CM

## 2020-12-04 DIAGNOSIS — S42.021D CLOSED DISPLACED FRACTURE OF SHAFT OF RIGHT CLAVICLE WITH ROUTINE HEALING, SUBSEQUENT ENCOUNTER: Primary | ICD-10-CM

## 2020-12-04 PROCEDURE — 73000 XR CLAVICLE RIGHT: ICD-10-PCS | Mod: 26,RT,, | Performed by: RADIOLOGY

## 2020-12-04 PROCEDURE — 99999 PR PBB SHADOW E&M-EST. PATIENT-LVL IV: CPT | Mod: PBBFAC,,, | Performed by: PHYSICIAN ASSISTANT

## 2020-12-04 PROCEDURE — 99214 OFFICE O/P EST MOD 30 MIN: CPT | Mod: PBBFAC,25 | Performed by: PHYSICIAN ASSISTANT

## 2020-12-04 PROCEDURE — 73000 X-RAY EXAM OF COLLAR BONE: CPT | Mod: TC,RT

## 2020-12-04 PROCEDURE — 99213 PR OFFICE/OUTPT VISIT, EST, LEVL III, 20-29 MIN: ICD-10-PCS | Mod: S$PBB,,, | Performed by: PHYSICIAN ASSISTANT

## 2020-12-04 PROCEDURE — 99213 OFFICE O/P EST LOW 20 MIN: CPT | Mod: S$PBB,,, | Performed by: PHYSICIAN ASSISTANT

## 2020-12-04 PROCEDURE — 99999 PR PBB SHADOW E&M-EST. PATIENT-LVL IV: ICD-10-PCS | Mod: PBBFAC,,, | Performed by: PHYSICIAN ASSISTANT

## 2020-12-04 PROCEDURE — 73000 X-RAY EXAM OF COLLAR BONE: CPT | Mod: 26,RT,, | Performed by: RADIOLOGY

## 2020-12-04 NOTE — PROGRESS NOTES
"Patient ID: Estefani Fam is a 70 y.o. female.    Chief Complaint: Pain of the Left Shoulder and Pain of the Right Shoulder      HISTORY:  Estefani Fam is a 70 y.o. female who returns to me today for follow up of right clavicle fracture.  She was last seen by me 9/25/2020.  Today she states that she is not having any pain.  Her second injury in which she sustained right clavicle fracture occurred on 8/13/2020.  No other complaints at this time.  She has resumed normal activities.      PMH/PSH/FamHx/SocHx:    Unchanged from prior visit.    ROS:  Constitution: Negative for chills, fever and weakness.   Cardiovascular: Negative for chest pain.   Respiratory: Negative for cough and shortness of breath.   Hematologic/Lymphatic: Negative for bleeding problem. Does not bruise/bleed easily.   Skin: Negative for color change, itching and poor wound healing.   Musculoskeletal: Positive for right clavicle  Gastrointestinal: Negative for heartburn.   Neurological: Negative for dizziness, focal weakness, numbness, paresthesias and sensory change.   Psychiatric/Behavioral: The patient is not nervous/anxious.   Allergic/Immunologic: Negative for environmental allergies and persistent infections.     PHYSICAL EXAM:   Ht 5' 8" (1.727 m)   Wt 65.7 kg (144 lb 13.1 oz)   BMI 22.02 kg/m²   Right clavicle  Skin intact, no swelling  Near full ROM  No TTP clavicle    IMAGING: X-rays of the right clavicle, personally reviewed by me, demonstrate healed mid shaft clavicle fracture.      ASSESSMENT/PLAN:    Estefani was seen today for pain and pain.    Diagnoses and all orders for this visit:    Closed displaced fracture of shaft of right clavicle with routine healing, subsequent encounter  -     X-Ray Clavicle Right; Future    - Resume activity as tolerated  - Follow up as needed  "

## 2020-12-18 ENCOUNTER — TELEPHONE (OUTPATIENT)
Dept: INTERNAL MEDICINE | Facility: CLINIC | Age: 70
End: 2020-12-18

## 2020-12-18 DIAGNOSIS — R91.1 SOLITARY PULMONARY NODULE: ICD-10-CM

## 2020-12-18 NOTE — TELEPHONE ENCOUNTER
----- Message from Constance Zuluaga sent at 12/18/2020  4:00 PM CST -----  Contact: [phone]  Pt needs second dose o booster shot if possible the week after next 12/28

## 2020-12-18 NOTE — TELEPHONE ENCOUNTER
She needs her second dose of Shingrix (not Tdap).  Ok to come in any time for this as she had first dose 10/1/20.  Please leave card for her for this.

## 2020-12-20 ENCOUNTER — CLINICAL SUPPORT (OUTPATIENT)
Dept: URGENT CARE | Facility: CLINIC | Age: 70
End: 2020-12-20
Payer: MEDICARE

## 2020-12-20 DIAGNOSIS — Z03.818 ENCOUNTER FOR OBSERVATION FOR SUSPECTED EXPOSURE TO OTHER BIOLOGICAL AGENTS RULED OUT: Primary | ICD-10-CM

## 2020-12-20 LAB
CTP QC/QA: YES
SARS-COV-2 RDRP RESP QL NAA+PROBE: NEGATIVE

## 2020-12-20 PROCEDURE — U0002: ICD-10-PCS | Mod: QW,CR,S$GLB, | Performed by: NURSE PRACTITIONER

## 2020-12-20 PROCEDURE — 99211 PR OFFICE/OUTPT VISIT, EST, LEVL I: ICD-10-PCS | Mod: S$GLB,,, | Performed by: NURSE PRACTITIONER

## 2020-12-20 PROCEDURE — U0002 COVID-19 LAB TEST NON-CDC: HCPCS | Mod: QW,CR,S$GLB, | Performed by: NURSE PRACTITIONER

## 2020-12-20 PROCEDURE — 99211 OFF/OP EST MAY X REQ PHY/QHP: CPT | Mod: S$GLB,,, | Performed by: NURSE PRACTITIONER

## 2020-12-21 ENCOUNTER — TELEPHONE (OUTPATIENT)
Dept: PULMONOLOGY | Facility: HOSPITAL | Age: 70
End: 2020-12-21

## 2020-12-21 RX ORDER — PREDNISONE 10 MG/1
10 TABLET ORAL DAILY
Qty: 30 TABLET | Refills: 1 | Status: SHIPPED | OUTPATIENT
Start: 2020-12-21 | End: 2021-01-20

## 2020-12-21 NOTE — TELEPHONE ENCOUNTER
----- Message from Jayda Correa MA sent at 12/18/2020  3:29 PM CST -----  Dr Ruzi,  I spoke to patient. Patient requesting steroids be sent to her pharmacy.  I scheduled Ms Fam for her yearly f/u on 1/14/21 with ct prior with you.   Thanks, Jayda

## 2020-12-27 ENCOUNTER — CLINICAL SUPPORT (OUTPATIENT)
Dept: URGENT CARE | Facility: CLINIC | Age: 70
End: 2020-12-27
Payer: MEDICARE

## 2020-12-27 ENCOUNTER — HOSPITAL ENCOUNTER (EMERGENCY)
Facility: HOSPITAL | Age: 70
Discharge: HOME | End: 2020-12-27
Attending: EMERGENCY MEDICINE
Payer: MEDICARE

## 2020-12-27 VITALS
SYSTOLIC BLOOD PRESSURE: 125 MMHG | BODY MASS INDEX: 18.79 KG/M2 | HEIGHT: 68 IN | HEART RATE: 87 BPM | TEMPERATURE: 98 F | RESPIRATION RATE: 17 BRPM | DIASTOLIC BLOOD PRESSURE: 68 MMHG | OXYGEN SATURATION: 96 % | WEIGHT: 124 LBS

## 2020-12-27 DIAGNOSIS — U07.1 COVID-19: Primary | ICD-10-CM

## 2020-12-27 DIAGNOSIS — Z11.59 SCREENING FOR VIRAL DISEASE: Primary | ICD-10-CM

## 2020-12-27 LAB
CTP QC/QA: YES
SARS-COV-2 RDRP RESP QL NAA+PROBE: POSITIVE

## 2020-12-27 PROCEDURE — U0002: ICD-10-PCS | Mod: QW,CR,S$GLB, | Performed by: PHYSICIAN ASSISTANT

## 2020-12-27 PROCEDURE — 63600175 PHARM REV CODE 636 W HCPCS: Performed by: STUDENT IN AN ORGANIZED HEALTH CARE EDUCATION/TRAINING PROGRAM

## 2020-12-27 PROCEDURE — 99284 EMERGENCY DEPT VISIT MOD MDM: CPT | Mod: 25

## 2020-12-27 PROCEDURE — U0002 COVID-19 LAB TEST NON-CDC: HCPCS | Mod: QW,CR,S$GLB, | Performed by: PHYSICIAN ASSISTANT

## 2020-12-27 PROCEDURE — 96372 THER/PROPH/DIAG INJ SC/IM: CPT

## 2020-12-27 PROCEDURE — 99284 EMERGENCY DEPT VISIT MOD MDM: CPT | Mod: ,,, | Performed by: EMERGENCY MEDICINE

## 2020-12-27 PROCEDURE — 99284 PR EMERGENCY DEPT VISIT,LEVEL IV: ICD-10-PCS | Mod: ,,, | Performed by: EMERGENCY MEDICINE

## 2020-12-27 RX ORDER — DEXAMETHASONE SODIUM PHOSPHATE 4 MG/ML
6 INJECTION, SOLUTION INTRA-ARTICULAR; INTRALESIONAL; INTRAMUSCULAR; INTRAVENOUS; SOFT TISSUE
Status: COMPLETED | OUTPATIENT
Start: 2020-12-27 | End: 2020-12-27

## 2020-12-27 RX ADMIN — DEXAMETHASONE SODIUM PHOSPHATE 6 MG: 4 INJECTION INTRA-ARTICULAR; INTRALESIONAL; INTRAMUSCULAR; INTRAVENOUS; SOFT TISSUE at 07:12

## 2020-12-28 ENCOUNTER — NURSE TRIAGE (OUTPATIENT)
Dept: ADMINISTRATIVE | Facility: CLINIC | Age: 70
End: 2020-12-28

## 2020-12-28 ENCOUNTER — PATIENT MESSAGE (OUTPATIENT)
Dept: ADMINISTRATIVE | Facility: CLINIC | Age: 70
End: 2020-12-28

## 2020-12-28 ENCOUNTER — PES CALL (OUTPATIENT)
Dept: ADMINISTRATIVE | Facility: CLINIC | Age: 70
End: 2020-12-28

## 2020-12-28 NOTE — TELEPHONE ENCOUNTER
Pt  requesting info on how to set up outpatient infusions for pt.  Instructed pt to call main campus in a.m. to contact infusion clinic.   He stated understanding.    Reason for Disposition   Health Information question, no triage required and triager able to answer question    Protocols used: INFORMATION ONLY CALL - NO TRIAGE-A-

## 2020-12-28 NOTE — ED NOTES
Ambulated pt around room. Resting pulse ox with her normal 2 L NC was 93%, while ambulating pts oxygen saturation ranged from 94-95%

## 2020-12-28 NOTE — ED TRIAGE NOTES
Pt tested positive for covid today,  tested positive last Sunday. Pt has hx of copd and is on 2L NC at home. Pt states when she takes all of her medications her oxygen sat runs up to 98% but states she has not been able to get her oxygen sat above 93% despite taking all of her medications. Pt reports increased shortness of breath with exertion. Pt reports temperature of 100 at urgent care and did not take anything for it. Pt also reports head congestion.

## 2020-12-28 NOTE — ED PROVIDER NOTES
Encounter Date: 12/27/2020       History     Chief Complaint   Patient presents with    COVID-19 Concerns     COVID + yesterday, hx of COPD, c/o fever and congestion, on 2L O2 NC at baseline     Ms. Fam is a 70-year-old female with past medical history pulmonary hypertension, UTI, COPD, osteoporosis, who presents emergency department  for COVID testing.  She states that last sunday her  was COVID positive, and was hospitalized for 3 days. He was then sent home  on wednesday and she was in contact with him.  She got tested and thursday and was negative at that time. She states that she has not had any chest pain or shortness of breath and she has been comfortable on her 2 L of oxygen.  She states that with ambulation she gets a bit more winded than usual and she came to the emergency department because she was concerned about the effects of COVID given that she has chronic COPD.  She denies fevers, chills, nausea, vomiting, or abdominal pain.         Review of patient's allergies indicates:  No Known Allergies  Past Medical History:   Diagnosis Date    Cataract     COPD (chronic obstructive pulmonary disease)     History of retinal hemorrhage     Retinal histoplasmosis      Past Surgical History:   Procedure Laterality Date    BRONCHOSCOPY WITH FLUOROSCOPY N/A 10/28/2019    Procedure: BRONCHOSCOPY, WITH FLUOROSCOPY;  Surgeon: Brooklynn Ruiz MD;  Location: [facility] OR [address] [place] [address];  Service: Endoscopy;  Laterality: N/A;    HAND SURGERY       Family History   Problem Relation Age of Onset    Macular degeneration Mother     Colon cancer Mother     Stroke Father     Amblyopia Neg Hx     Blindness Neg Hx     Cataracts Neg Hx     Diabetes Neg Hx     Glaucoma Neg Hx     Hypertension Neg Hx     Retinal detachment Neg Hx     Strabismus Neg Hx     Thyroid disease Neg Hx      Social History     Tobacco Use    Smoking status: Former Smoker     Packs/day: 1.00     Years: 36.00     Pack years: 36.00      Types: Cigarettes     Quit date: 2016     Years since quittin.3    Smokeless tobacco: Never Used   Substance Use Topics    Alcohol use: Yes     Alcohol/week: 2.0 standard drinks     Types: 2 Glasses of wine per week     Frequency: 4 or more times a week     Drinks per session: 1 or 2     Comment: 1-2 glasses a day     Drug use: No     Review of Systems   Constitutional: Negative for activity change, appetite change, chills, fatigue and fever.   HENT: Negative for congestion, sinus pressure and sinus pain.    Eyes: Negative for photophobia and visual disturbance.   Respiratory: Positive for shortness of breath. Negative for cough, chest tightness and wheezing.    Cardiovascular: Negative for chest pain, palpitations and leg swelling.   Gastrointestinal: Negative for abdominal distention, constipation, diarrhea, nausea and vomiting.   Genitourinary: Negative for difficulty urinating, dysuria, flank pain, hematuria and urgency.   Musculoskeletal: Negative for arthralgias, back pain, joint swelling, myalgias, neck pain and neck stiffness.   Skin: Negative for color change and wound.   Neurological: Negative for dizziness, facial asymmetry, weakness, light-headedness, numbness and headaches.       Physical Exam     Initial Vitals [20 1732]   BP Pulse Resp Temp SpO2   132/61 100 (!) 24 98.4 °F (36.9 °C) 95 %      MAP       --         Physical Exam    Nursing note and vitals reviewed.  Constitutional: She appears well-developed and well-nourished. She is not diaphoretic. No distress.   HENT:   Head: Normocephalic and atraumatic.   Eyes: Conjunctivae and EOM are normal. Pupils are equal, round, and reactive to light.   Neck: Normal range of motion. No tracheal deviation present. No JVD present.   Cardiovascular: Normal rate, regular rhythm, normal heart sounds and intact distal pulses. Exam reveals no gallop and no friction rub.    No murmur heard.  Pulmonary/Chest: Breath sounds normal. No respiratory  distress. She has no wheezes. She has no rhonchi. She has no rales. She exhibits no tenderness.   Abdominal: Soft. Bowel sounds are normal. She exhibits no distension and no mass. There is no abdominal tenderness. There is no rebound and no guarding.   Musculoskeletal: Normal range of motion. No tenderness or edema.   Neurological: She is alert and oriented to person, place, and time. She has normal strength.   Skin: Skin is warm. Capillary refill takes less than 2 seconds.         ED Course   Procedures  Labs Reviewed - No data to display       Imaging Results    None          Medical Decision Making:   Initial Assessment:   Ms. Fam is a 70-year-old female with past medical history pulmonary hypertension, UTI, COPD, osteoporosis, who presents emergency department  for COVID testing.   Differential Diagnosis:   Covid infection  Covid pneumonia  COPD exacerbation  Clinical Tests:   Lab Tests: Ordered and Reviewed  ED Management:  Patient was examined and was stable on exam. Her Covid test was positive. She was saturating at 95% on 2L, her oxygen saturation did not decrease with ambulation. Given patients non-significant physical exam findings and her stable O2 saturations we felt patient was safe for discharge at this time. She was given 6 mg of IM Decadron. She was then set up to receive the  Monoclonal antibody infusions and advised to return to emergency department if her oxygen saturation falls below 92%.  She is discharged in stable condition and return precautions were given.               Attending Attestation:   Physician Attestation Statement for Resident:  As the supervising MD   Physician Attestation Statement: I have personally seen and examined this patient.   I agree with the above history. -: 71 yo W with pmhx COPD on 2L home O2, Pulm HTN, recently diagnosed COVID-19 presents with concern for COVID-19.  Patient was recently diagnosed COVID positive.  She reports that today she felt a bit more short  of breath than usual.  She reports that she is still a perform her ADLs.  She is still on 2 L of oxygen without difficulty.  She is still able to ambulate as far as normal but just takes a bit longer to catch her breath.  She denies any chest pain.  No immobilization recent travel.  No history of VTE.   As the supervising MD I agree with the above PE.   -: Patient with prolonged expiratory phase on 2 L O2.  She is speaking complete sentences.  No wheezing auscultated.  Breath sounds are diminished at bases but otherwise good air movement.  Normal heart sounds.  Abdomen soft and nontender.  No calf asymmetry or tenderness.   As the supervising MD I agree with the above treatment, course, plan, and disposition.   -: Patient tolerated ambulation and pulse ox remained in the mid 90s.  No hospitalization for hospitalization at this time.  I discussed with patient at length that given her history of advanced COPD, she is at risk for complications from COVID-19, however there is no indication for hospitalization at this time.  Given the hypoxia, will administer IM Decadron.  Patient has a pulse oximeter at home.  Advised to use it and to return to the emergency department should her pulse ox dipped below 92%.  Will provide referral for monoclonal antibody infusion as an outpatient.  Patient is comfortable with discharge.  Extensive return precautions.                              Clinical Impression:     ICD-10-CM ICD-9-CM   1. COVID-19  U07.1 079.89                          ED Disposition Condition    Discharge Stable        ED Prescriptions     Medication Sig Dispense Start Date End Date Auth. Provider    pulse oximeter (PULSE OXIMETER) device by Apply Externally route 2 (two) times a day. Use twice daily at 8 AM and 3 PM and record the value in [facility] as directed. 1 each 12/27/2020  Clara Cho MD        Follow-up Information     Follow up With Specialties Details Why Contact Info    Miles Jackson MD  Family Medicine Schedule an appointment as soon as possible for a visit in 3 days  [address]  [facility] [address]  [phone]                                         Clara Cho MD  Resident  12/28/20 0153

## 2020-12-28 NOTE — DISCHARGE INSTRUCTIONS
It was a pleasure serving you today!    Please use your Pulse Ox at home, return to the emergency department if your oxygen saturation while on 2L oxygen falls below 92%. Please remember to set up time to receive your infusion at the designated [facility] infusion location.

## 2020-12-29 ENCOUNTER — NURSE TRIAGE (OUTPATIENT)
Dept: ADMINISTRATIVE | Facility: CLINIC | Age: 70
End: 2020-12-29

## 2020-12-29 NOTE — TELEPHONE ENCOUNTER
"Pt verified identity by spelling first and last names and verifying . COVID-19 Surveillance Welcome call complete with program explained in great detail and without questions. Pt states that she will go to [employer] and submit consent. Pt speaks in full sentences and is negative for wheezing or stridor. Pt states that she has COPD and that her oxygen level will always be lower. Pt states that she has some rhinitis symptoms and denies and SOB at rest or with activity as she descirbes as "anymore than usual." Pt denies any other symptoms at this time. COVID-19 assessment completed with all data WNLs and doesn't require an escalation at this time. Please see COVID-19 assessment below for further details. An information only protocol is appropriate at this time and pt instructed that symptoms are mild and can be managed at home per Surveillance Program policy; pt voiced verbal understanding and agrees with advice. Instructed pt to consider self as infectious and to follow social distancing guidelines, wear a mask while around others in the home or when planning to leave the home, perform vigorous handwashing, cover cough and sneezes, wipe down cell phone and other hard surfaces several times daily with an antibacterial wipe, use throw away dishes and utensils and follow quarantine procedures; pt voiced verbal understanding and agrees with information. Instructed pt to call [facility] back for further assistance if needed or if symptoms worsened, and to call 911 for all emergencies; pt voiced understanding and agrees with advice. Advised the pt that [facility] is a 24/7 free service and that he can call for any NON-emergent needs any time of the day or night; pt voiced understanding and expresses appreciation for the information. Advised pt that to participate in the Surveillance program that you cannot use a computer or web browser and that you must access the [employer] account through the application on a smartphone; pt voiced " understanding.     Called patient to review COVID-19 Surveillance Program enrollment process.    Smartphone: Yes    [facility]: Yes    Program consent: Pending    Pulse oximeter status: Yes    Verified emergency contacts: Yes    Program Overview: Reviewed , no response process, and importance of correct emergency contacts in event that well-being check is warranted. Patient will call [phone] 24/7 to speak with an OnCall nurse, if needed. Pt aware that if Sp02 <94% or if they have any worsening symptoms, they need to go to the emergency department. If they are having a medical emergency, they will call 911. Otherwise, patient will continue to submit data as scheduled. Reviewed importance of wearing mask if self or family members leave the house. Patient had no further questions.    Sp02: 95% on continuous O2 @ 2 Lpm/NC    Pulse: 86 bpm    Temperature: 97.1 F    SOB at rest (0-5): 0    SOB with activity (0-5): 0    Worsening symptoms: Denies    Fever symptoms: Denies    Increased home oxygen: Yes. No report of increase.      Reason for Disposition   Information only question and nurse able to answer    Protocols used: INFORMATION ONLY CALL - NO TRIAGE-A-[place]

## 2020-12-30 ENCOUNTER — TELEPHONE (OUTPATIENT)
Dept: ADMINISTRATIVE | Facility: CLINIC | Age: 70
End: 2020-12-30

## 2020-12-30 ENCOUNTER — INFUSION (OUTPATIENT)
Dept: INFECTIOUS DISEASES | Facility: HOSPITAL | Age: 70
End: 2020-12-30
Attending: STUDENT IN AN ORGANIZED HEALTH CARE EDUCATION/TRAINING PROGRAM
Payer: MEDICARE

## 2020-12-30 ENCOUNTER — PATIENT MESSAGE (OUTPATIENT)
Dept: ADMINISTRATIVE | Facility: CLINIC | Age: 70
End: 2020-12-30

## 2020-12-30 ENCOUNTER — PATIENT MESSAGE (OUTPATIENT)
Dept: ADMINISTRATIVE | Facility: OTHER | Age: 70
End: 2020-12-30

## 2020-12-30 ENCOUNTER — NURSE TRIAGE (OUTPATIENT)
Dept: ADMINISTRATIVE | Facility: CLINIC | Age: 70
End: 2020-12-30

## 2020-12-30 VITALS
HEART RATE: 92 BPM | TEMPERATURE: 99 F | BODY MASS INDEX: 19.25 KG/M2 | HEIGHT: 68 IN | DIASTOLIC BLOOD PRESSURE: 69 MMHG | RESPIRATION RATE: 20 BRPM | OXYGEN SATURATION: 97 % | WEIGHT: 127 LBS | SYSTOLIC BLOOD PRESSURE: 114 MMHG

## 2020-12-30 DIAGNOSIS — U07.1 COVID-19: ICD-10-CM

## 2020-12-30 PROCEDURE — 25000003 PHARM REV CODE 250: Performed by: INTERNAL MEDICINE

## 2020-12-30 PROCEDURE — 63600175 PHARM REV CODE 636 W HCPCS: Performed by: INTERNAL MEDICINE

## 2020-12-30 PROCEDURE — M0239 BAMLANIVIMAB-XXXX INFUSION: HCPCS | Performed by: INTERNAL MEDICINE

## 2020-12-30 RX ORDER — ACETAMINOPHEN 325 MG/1
650 TABLET ORAL ONCE AS NEEDED
Status: DISCONTINUED | OUTPATIENT
Start: 2020-12-30 | End: 2021-05-10 | Stop reason: HOSPADM

## 2020-12-30 RX ORDER — SODIUM CHLORIDE 0.9 % (FLUSH) 0.9 %
10 SYRINGE (ML) INJECTION
Status: DISCONTINUED | OUTPATIENT
Start: 2020-12-30 | End: 2021-05-10 | Stop reason: HOSPADM

## 2020-12-30 RX ORDER — EPINEPHRINE 0.1 MG/ML
0.3 INJECTION INTRAVENOUS
Status: DISCONTINUED | OUTPATIENT
Start: 2020-12-30 | End: 2021-05-10 | Stop reason: HOSPADM

## 2020-12-30 RX ORDER — DIPHENHYDRAMINE HYDROCHLORIDE 50 MG/ML
25 INJECTION INTRAMUSCULAR; INTRAVENOUS ONCE AS NEEDED
Status: DISCONTINUED | OUTPATIENT
Start: 2020-12-30 | End: 2021-05-10 | Stop reason: HOSPADM

## 2020-12-30 RX ORDER — ONDANSETRON 4 MG/1
4 TABLET, ORALLY DISINTEGRATING ORAL ONCE AS NEEDED
Status: DISCONTINUED | OUTPATIENT
Start: 2020-12-30 | End: 2021-05-10 | Stop reason: HOSPADM

## 2020-12-30 RX ORDER — ALBUTEROL SULFATE 90 UG/1
2 AEROSOL, METERED RESPIRATORY (INHALATION)
Status: DISCONTINUED | OUTPATIENT
Start: 2020-12-30 | End: 2023-10-10

## 2020-12-30 RX ADMIN — SODIUM CHLORIDE 700 MG: 0.9 INJECTION, SOLUTION INTRAVENOUS at 01:12

## 2020-12-30 NOTE — PROGRESS NOTES
Patient arrives for bamlanivimab infusion    Symptoms - congestion sob     Patient received infusion according to FDA recommendations and [facility] SOP without complications noted and left with mask in place and drug fact sheet provided

## 2020-12-30 NOTE — TELEPHONE ENCOUNTER
Called patient due to RN escalation in COVID Surveillance program. Pt escalated due to LOW SPO2 reading and SOB.    Patient location: [facility]    Vitals: Sp02 : 92%. P: 98. Temp: 96.6  Ambulatory Sp02 on the phone (if applicable): 91%     70 y.o. female with pertinent PMHx COPD Pulmonary HTN, Macular disorder, osteoporosis Former heavy smoker on day of Covid symptoms. Positive Covid screen 12/27/20. CXR none recent. Home oxygen: yes.2L COVID-19 Hospitalization History: no    HPI:  71 yo female with PMHX as above of COPD on 2L of O2 normally set for monoclonal infusion today at 130. Pt escalated for low O2 sat and SOB, states not a 3/5 at rest really a 0 at rest and entered in error. States Sob is a 1-2/3 with activity right now. Better today cough still not much coming up    ROS: Denies worsening cough, light headedness, fever, chills, diaphoresis, chest pain, abdominal pain, emesis, diarrhea or further symptoms.     Assessment: Vitals appear as above. During phone call, patient appears alert and oriented. Able to speak in full sentences without difficulty. No audible wheezing heard during call.     Plan:  Continue home care.  Advised if any changes to call us.  Pt states as per pulmonary with COPD must stay 91-94% with the O2.  Pt to go to infusion today.  Reviewed with patient the reasons for seeking emergency care. Pt aware that if Sp02 <94% or if they have any worsening symptoms, they need to go to the emergency department. If they are having a medical emergency, they will call 911. Otherwise, patient will continue to submit data as scheduled. Reviewed importance of wearing mask if self or family members leave the house.     Advised next steps: Continue care at home

## 2020-12-30 NOTE — TELEPHONE ENCOUNTER
Pt escalated for SpO2 of 92 and SOB rated 3 at rest and with activity. Pt states she has COPD so she normally sats at 92-93. The 3 at rest was an accident. Pt states she has no SOB at rest and 3 with activity is her baseline due to COPD. Pt states it has not gotten worse since diagnosed with Covid. Escalated to REINIER, Lisest Templeton PA-C for further eval. Pt is scheduled to get infusion today. Will continue to monitor.    Reason for Disposition   Information only question and nurse able to answer    Protocols used: INFORMATION ONLY CALL - NO TRIAGE-A-OH

## 2020-12-31 ENCOUNTER — TELEPHONE (OUTPATIENT)
Dept: ADMINISTRATIVE | Facility: CLINIC | Age: 70
End: 2020-12-31

## 2021-01-01 ENCOUNTER — TELEPHONE (OUTPATIENT)
Dept: ADMINISTRATIVE | Facility: CLINIC | Age: 71
End: 2021-01-01

## 2021-01-01 ENCOUNTER — NURSE TRIAGE (OUTPATIENT)
Dept: ADMINISTRATIVE | Facility: CLINIC | Age: 71
End: 2021-01-01

## 2021-01-02 ENCOUNTER — TELEPHONE (OUTPATIENT)
Dept: ADMINISTRATIVE | Facility: CLINIC | Age: 71
End: 2021-01-02

## 2021-01-02 ENCOUNTER — TELEPHONE (OUTPATIENT)
Dept: ADMINISTRATIVE | Facility: OTHER | Age: 71
End: 2021-01-02

## 2021-01-04 ENCOUNTER — PATIENT MESSAGE (OUTPATIENT)
Dept: ADMINISTRATIVE | Facility: HOSPITAL | Age: 71
End: 2021-01-04

## 2021-01-14 ENCOUNTER — PATIENT OUTREACH (OUTPATIENT)
Dept: ADMINISTRATIVE | Facility: OTHER | Age: 71
End: 2021-01-14

## 2021-01-14 ENCOUNTER — OFFICE VISIT (OUTPATIENT)
Dept: PULMONOLOGY | Facility: CLINIC | Age: 71
End: 2021-01-14
Payer: MEDICARE

## 2021-01-14 ENCOUNTER — HOSPITAL ENCOUNTER (OUTPATIENT)
Dept: RADIOLOGY | Facility: HOSPITAL | Age: 71
Discharge: HOME | End: 2021-01-14
Attending: INTERNAL MEDICINE
Payer: MEDICARE

## 2021-01-14 VITALS
BODY MASS INDEX: 19.31 KG/M2 | HEART RATE: 100 BPM | OXYGEN SATURATION: 92 % | HEIGHT: 68 IN | DIASTOLIC BLOOD PRESSURE: 82 MMHG | SYSTOLIC BLOOD PRESSURE: 134 MMHG

## 2021-01-14 DIAGNOSIS — I27.21 SECONDARY PULMONARY ARTERIAL HYPERTENSION: ICD-10-CM

## 2021-01-14 DIAGNOSIS — R91.1 SOLITARY PULMONARY NODULE: ICD-10-CM

## 2021-01-14 DIAGNOSIS — J43.2 CENTRILOBULAR EMPHYSEMA: ICD-10-CM

## 2021-01-14 DIAGNOSIS — J96.11 CHRONIC RESPIRATORY FAILURE WITH HYPOXIA: ICD-10-CM

## 2021-01-14 DIAGNOSIS — R91.1 LUNG NODULE: ICD-10-CM

## 2021-01-14 DIAGNOSIS — Z86.16 HISTORY OF COVID-19: ICD-10-CM

## 2021-01-14 PROCEDURE — 71250 CT THORAX DX C-: CPT | Mod: 26,,, | Performed by: RADIOLOGY

## 2021-01-14 PROCEDURE — 71250 CT CHEST WITHOUT CONTRAST: ICD-10-PCS | Mod: 26,,, | Performed by: RADIOLOGY

## 2021-01-14 PROCEDURE — 99214 OFFICE O/P EST MOD 30 MIN: CPT | Mod: PBBFAC,25 | Performed by: INTERNAL MEDICINE

## 2021-01-14 PROCEDURE — 99214 PR OFFICE/OUTPT VISIT, EST, LEVL IV, 30-39 MIN: ICD-10-PCS | Mod: S$PBB,,, | Performed by: INTERNAL MEDICINE

## 2021-01-14 PROCEDURE — 71250 CT THORAX DX C-: CPT | Mod: TC

## 2021-01-14 PROCEDURE — 99214 OFFICE O/P EST MOD 30 MIN: CPT | Mod: S$PBB,,, | Performed by: INTERNAL MEDICINE

## 2021-01-14 PROCEDURE — 99999 PR PBB SHADOW E&M-EST. PATIENT-LVL IV: ICD-10-PCS | Mod: PBBFAC,,, | Performed by: INTERNAL MEDICINE

## 2021-01-14 PROCEDURE — 99999 PR PBB SHADOW E&M-EST. PATIENT-LVL IV: CPT | Mod: PBBFAC,,, | Performed by: INTERNAL MEDICINE

## 2021-01-17 PROBLEM — Z86.16 HISTORY OF COVID-19: Status: ACTIVE | Noted: 2021-01-17

## 2021-01-17 PROBLEM — I27.21 SECONDARY PULMONARY ARTERIAL HYPERTENSION: Status: ACTIVE | Noted: 2018-07-03

## 2021-02-15 RX ORDER — UMECLIDINIUM 62.5 UG/1
AEROSOL, POWDER ORAL
Qty: 30 EACH | Refills: 20 | Status: SHIPPED | OUTPATIENT
Start: 2021-02-15 | End: 2022-03-05

## 2021-02-28 ENCOUNTER — EXTERNAL CHRONIC CARE MANAGEMENT (OUTPATIENT)
Dept: PRIMARY CARE CLINIC | Facility: CLINIC | Age: 71
End: 2021-02-28
Payer: MEDICARE

## 2021-02-28 PROCEDURE — 99490 CHRNC CARE MGMT STAFF 1ST 20: CPT | Mod: S$PBB,,, | Performed by: FAMILY MEDICINE

## 2021-02-28 PROCEDURE — 99490 PR CHRONIC CARE MGMT, 1ST 20 MIN: ICD-10-PCS | Mod: S$PBB,,, | Performed by: FAMILY MEDICINE

## 2021-02-28 PROCEDURE — 99490 CHRNC CARE MGMT STAFF 1ST 20: CPT | Mod: PBBFAC | Performed by: FAMILY MEDICINE

## 2021-03-11 ENCOUNTER — PATIENT MESSAGE (OUTPATIENT)
Dept: PULMONOLOGY | Facility: CLINIC | Age: 71
End: 2021-03-11

## 2021-03-11 RX ORDER — PREDNISONE 20 MG/1
40 TABLET ORAL DAILY
Qty: 10 TABLET | Refills: 0 | Status: SHIPPED | OUTPATIENT
Start: 2021-03-11 | End: 2021-03-16

## 2021-03-30 ENCOUNTER — PATIENT OUTREACH (OUTPATIENT)
Dept: ADMINISTRATIVE | Facility: HOSPITAL | Age: 71
End: 2021-03-30

## 2021-03-30 ENCOUNTER — PATIENT MESSAGE (OUTPATIENT)
Dept: ADMINISTRATIVE | Facility: HOSPITAL | Age: 71
End: 2021-03-30

## 2021-03-31 ENCOUNTER — EXTERNAL CHRONIC CARE MANAGEMENT (OUTPATIENT)
Dept: PRIMARY CARE CLINIC | Facility: CLINIC | Age: 71
End: 2021-03-31
Payer: MEDICARE

## 2021-03-31 PROCEDURE — 99490 CHRNC CARE MGMT STAFF 1ST 20: CPT | Mod: S$PBB,,, | Performed by: FAMILY MEDICINE

## 2021-03-31 PROCEDURE — 99490 PR CHRONIC CARE MGMT, 1ST 20 MIN: ICD-10-PCS | Mod: S$PBB,,, | Performed by: FAMILY MEDICINE

## 2021-03-31 PROCEDURE — 99490 CHRNC CARE MGMT STAFF 1ST 20: CPT | Mod: PBBFAC | Performed by: FAMILY MEDICINE

## 2021-04-05 ENCOUNTER — PATIENT MESSAGE (OUTPATIENT)
Dept: ADMINISTRATIVE | Facility: HOSPITAL | Age: 71
End: 2021-04-05

## 2021-04-16 ENCOUNTER — PATIENT MESSAGE (OUTPATIENT)
Dept: RESEARCH | Facility: HOSPITAL | Age: 71
End: 2021-04-16

## 2021-04-20 ENCOUNTER — INFUSION (OUTPATIENT)
Dept: INFECTIOUS DISEASES | Facility: HOSPITAL | Age: 71
End: 2021-04-20
Attending: INTERNAL MEDICINE
Payer: MEDICARE

## 2021-04-20 VITALS
WEIGHT: 127 LBS | DIASTOLIC BLOOD PRESSURE: 64 MMHG | SYSTOLIC BLOOD PRESSURE: 115 MMHG | HEIGHT: 68 IN | RESPIRATION RATE: 18 BRPM | OXYGEN SATURATION: 95 % | BODY MASS INDEX: 19.25 KG/M2 | HEART RATE: 102 BPM | TEMPERATURE: 98 F

## 2021-04-20 DIAGNOSIS — M80.80XA PATHOLOGICAL FRACTURE DUE TO OSTEOPOROSIS, UNSPECIFIED FRACTURE SITE, UNSPECIFIED OSTEOPOROSIS TYPE, INITIAL ENCOUNTER: Primary | ICD-10-CM

## 2021-04-20 DIAGNOSIS — M81.6 LOCALIZED OSTEOPOROSIS OF LEQUESNE: ICD-10-CM

## 2021-04-20 DIAGNOSIS — M81.0 OSTEOPOROSIS, UNSPECIFIED OSTEOPOROSIS TYPE, UNSPECIFIED PATHOLOGICAL FRACTURE PRESENCE: ICD-10-CM

## 2021-04-20 PROCEDURE — 96372 THER/PROPH/DIAG INJ SC/IM: CPT

## 2021-04-20 PROCEDURE — 63600175 PHARM REV CODE 636 W HCPCS: Mod: JG | Performed by: PHYSICIAN ASSISTANT

## 2021-04-20 RX ADMIN — DENOSUMAB 60 MG: 60 INJECTION SUBCUTANEOUS at 02:04

## 2021-04-26 ENCOUNTER — PATIENT OUTREACH (OUTPATIENT)
Dept: ADMINISTRATIVE | Facility: OTHER | Age: 71
End: 2021-04-26

## 2021-04-27 ENCOUNTER — OFFICE VISIT (OUTPATIENT)
Dept: OPTOMETRY | Facility: CLINIC | Age: 71
End: 2021-04-27
Payer: MEDICARE

## 2021-04-27 DIAGNOSIS — H52.4 PRESBYOPIA: ICD-10-CM

## 2021-04-27 DIAGNOSIS — H52.11 MYOPIA OF RIGHT EYE WITH ASTIGMATISM: ICD-10-CM

## 2021-04-27 DIAGNOSIS — H54.52A1 LOW VISION OF LEFT EYE: ICD-10-CM

## 2021-04-27 DIAGNOSIS — H31.012 MACULAR SCAR OF LEFT EYE: ICD-10-CM

## 2021-04-27 DIAGNOSIS — H25.13 NUCLEAR SCLEROSIS OF BOTH EYES: Primary | ICD-10-CM

## 2021-04-27 DIAGNOSIS — H52.201 MYOPIA OF RIGHT EYE WITH ASTIGMATISM: ICD-10-CM

## 2021-04-27 PROCEDURE — 92015 PR REFRACTION: ICD-10-PCS | Mod: ,,, | Performed by: OPTOMETRIST

## 2021-04-27 PROCEDURE — 99999 PR PBB SHADOW E&M-EST. PATIENT-LVL IV: CPT | Mod: PBBFAC,,, | Performed by: OPTOMETRIST

## 2021-04-27 PROCEDURE — 92014 COMPRE OPH EXAM EST PT 1/>: CPT | Mod: S$PBB,,, | Performed by: OPTOMETRIST

## 2021-04-27 PROCEDURE — 92015 DETERMINE REFRACTIVE STATE: CPT | Mod: ,,, | Performed by: OPTOMETRIST

## 2021-04-27 PROCEDURE — 92014 PR EYE EXAM, EST PATIENT,COMPREHESV: ICD-10-PCS | Mod: S$PBB,,, | Performed by: OPTOMETRIST

## 2021-04-27 PROCEDURE — 99999 PR PBB SHADOW E&M-EST. PATIENT-LVL IV: ICD-10-PCS | Mod: PBBFAC,,, | Performed by: OPTOMETRIST

## 2021-04-27 PROCEDURE — 99214 OFFICE O/P EST MOD 30 MIN: CPT | Mod: PBBFAC | Performed by: OPTOMETRIST

## 2021-04-27 RX ORDER — PANTOPRAZOLE SODIUM 40 MG/1
40 TABLET, DELAYED RELEASE ORAL DAILY
Qty: 30 TABLET | Refills: 11 | Status: SHIPPED | OUTPATIENT
Start: 2021-04-27 | End: 2022-06-01

## 2021-04-30 ENCOUNTER — EXTERNAL CHRONIC CARE MANAGEMENT (OUTPATIENT)
Dept: PRIMARY CARE CLINIC | Facility: CLINIC | Age: 71
End: 2021-04-30
Payer: MEDICARE

## 2021-04-30 PROCEDURE — 99490 PR CHRONIC CARE MGMT, 1ST 20 MIN: ICD-10-PCS | Mod: S$PBB,,, | Performed by: FAMILY MEDICINE

## 2021-04-30 PROCEDURE — 99490 CHRNC CARE MGMT STAFF 1ST 20: CPT | Mod: PBBFAC | Performed by: FAMILY MEDICINE

## 2021-04-30 PROCEDURE — 99490 CHRNC CARE MGMT STAFF 1ST 20: CPT | Mod: S$PBB,,, | Performed by: FAMILY MEDICINE

## 2021-05-02 ENCOUNTER — PATIENT MESSAGE (OUTPATIENT)
Dept: PULMONOLOGY | Facility: CLINIC | Age: 71
End: 2021-05-02

## 2021-05-07 ENCOUNTER — HOSPITAL ENCOUNTER (INPATIENT)
Facility: HOSPITAL | Age: 71
LOS: 3 days | Discharge: HOME | DRG: 190 | End: 2021-05-10
Attending: EMERGENCY MEDICINE | Admitting: HOSPITALIST
Payer: MEDICARE

## 2021-05-07 DIAGNOSIS — S22.42XA CLOSED FRACTURE OF MULTIPLE RIBS OF LEFT SIDE, INITIAL ENCOUNTER: ICD-10-CM

## 2021-05-07 DIAGNOSIS — J44.1 COPD WITH ACUTE EXACERBATION: Primary | ICD-10-CM

## 2021-05-07 DIAGNOSIS — J96.21 ACUTE ON CHRONIC RESPIRATORY FAILURE WITH HYPOXIA AND HYPERCAPNIA: ICD-10-CM

## 2021-05-07 DIAGNOSIS — J96.22 ACUTE ON CHRONIC RESPIRATORY FAILURE WITH HYPOXIA AND HYPERCAPNIA: ICD-10-CM

## 2021-05-07 DIAGNOSIS — R06.02 SHORTNESS OF BREATH: ICD-10-CM

## 2021-05-07 DIAGNOSIS — J96.11 CHRONIC RESPIRATORY FAILURE WITH HYPOXIA: ICD-10-CM

## 2021-05-07 DIAGNOSIS — R05.9 COUGH IN ADULT: ICD-10-CM

## 2021-05-07 DIAGNOSIS — J43.2 CENTRILOBULAR EMPHYSEMA: ICD-10-CM

## 2021-05-07 LAB
ALBUMIN SERPL BCP-MCNC: 3.8 G/DL (ref 3.5–5.2)
ALLENS TEST: ABNORMAL
ALP SERPL-CCNC: 122 U/L (ref 55–135)
ALT SERPL W/O P-5'-P-CCNC: 23 U/L (ref 10–44)
ANION GAP SERPL CALC-SCNC: 8 MMOL/L (ref 8–16)
AST SERPL-CCNC: 26 U/L (ref 10–40)
BASOPHILS # BLD AUTO: 0.06 K/UL (ref 0–0.2)
BASOPHILS NFR BLD: 0.8 % (ref 0–1.9)
BILIRUB SERPL-MCNC: 0.2 MG/DL (ref 0.1–1)
BNP SERPL-MCNC: 33 PG/ML (ref 0–99)
BUN SERPL-MCNC: 14 MG/DL (ref 8–23)
CALCIUM SERPL-MCNC: 9.5 MG/DL (ref 8.7–10.5)
CHLORIDE SERPL-SCNC: 101 MMOL/L (ref 95–110)
CO2 SERPL-SCNC: 29 MMOL/L (ref 23–29)
CREAT SERPL-MCNC: 0.7 MG/DL (ref 0.5–1.4)
CTP QC/QA: YES
DIFFERENTIAL METHOD: ABNORMAL
EOSINOPHIL # BLD AUTO: 0.7 K/UL (ref 0–0.5)
EOSINOPHIL NFR BLD: 8.4 % (ref 0–8)
ERYTHROCYTE [DISTWIDTH] IN BLOOD BY AUTOMATED COUNT: 13.2 % (ref 11.5–14.5)
EST. GFR  (AFRICAN AMERICAN): >60 ML/MIN/1.73 M^2
EST. GFR  (NON AFRICAN AMERICAN): >60 ML/MIN/1.73 M^2
GLUCOSE SERPL-MCNC: 99 MG/DL (ref 70–110)
HCO3 UR-SCNC: 34.9 MMOL/L (ref 24–28)
HCT VFR BLD AUTO: 41.1 % (ref 37–48.5)
HGB BLD-MCNC: 13.1 G/DL (ref 12–16)
IMM GRANULOCYTES # BLD AUTO: 0.02 K/UL (ref 0–0.04)
IMM GRANULOCYTES NFR BLD AUTO: 0.3 % (ref 0–0.5)
LYMPHOCYTES # BLD AUTO: 1.5 K/UL (ref 1–4.8)
LYMPHOCYTES NFR BLD: 18.8 % (ref 18–48)
MCH RBC QN AUTO: 30.9 PG (ref 27–31)
MCHC RBC AUTO-ENTMCNC: 31.9 G/DL (ref 32–36)
MCV RBC AUTO: 97 FL (ref 82–98)
MONOCYTES # BLD AUTO: 0.9 K/UL (ref 0.3–1)
MONOCYTES NFR BLD: 11.8 % (ref 4–15)
NEUTROPHILS # BLD AUTO: 4.7 K/UL (ref 1.8–7.7)
NEUTROPHILS NFR BLD: 59.9 % (ref 38–73)
NRBC BLD-RTO: 0 /100 WBC
PCO2 BLDA: 51.2 MMHG (ref 35–45)
PH SMN: 7.44 [PH] (ref 7.35–7.45)
PLATELET # BLD AUTO: 320 K/UL (ref 150–450)
PMV BLD AUTO: 9.2 FL (ref 9.2–12.9)
PO2 BLDA: 28 MMHG (ref 40–60)
POC BE: 11 MMOL/L
POC SATURATED O2: 53 % (ref 95–100)
POC TCO2: 36 MMOL/L (ref 24–29)
POTASSIUM SERPL-SCNC: 4.3 MMOL/L (ref 3.5–5.1)
PROT SERPL-MCNC: 7 G/DL (ref 6–8.4)
RBC # BLD AUTO: 4.24 M/UL (ref 4–5.4)
SAMPLE: ABNORMAL
SARS-COV-2 RDRP RESP QL NAA+PROBE: NEGATIVE
SITE: ABNORMAL
SODIUM SERPL-SCNC: 138 MMOL/L (ref 136–145)
TROPONIN I SERPL DL<=0.01 NG/ML-MCNC: 0.01 NG/ML (ref 0–0.03)
WBC # BLD AUTO: 7.86 K/UL (ref 3.9–12.7)

## 2021-05-07 PROCEDURE — 99223 PR INITIAL HOSPITAL CARE,LEVL III: ICD-10-PCS | Mod: ,,, | Performed by: HOSPITALIST

## 2021-05-07 PROCEDURE — 96375 TX/PRO/DX INJ NEW DRUG ADDON: CPT

## 2021-05-07 PROCEDURE — 84145 PROCALCITONIN (PCT): CPT | Performed by: HOSPITALIST

## 2021-05-07 PROCEDURE — 96365 THER/PROPH/DIAG IV INF INIT: CPT

## 2021-05-07 PROCEDURE — 99223 1ST HOSP IP/OBS HIGH 75: CPT | Mod: ,,, | Performed by: HOSPITALIST

## 2021-05-07 PROCEDURE — 82803 BLOOD GASES ANY COMBINATION: CPT

## 2021-05-07 PROCEDURE — 93010 EKG 12-LEAD: ICD-10-PCS | Mod: ,,, | Performed by: INTERNAL MEDICINE

## 2021-05-07 PROCEDURE — 12000002 HC ACUTE/MED SURGE SEMI-PRIVATE ROOM

## 2021-05-07 PROCEDURE — 93005 ELECTROCARDIOGRAM TRACING: CPT

## 2021-05-07 PROCEDURE — 80053 COMPREHEN METABOLIC PANEL: CPT | Performed by: STUDENT IN AN ORGANIZED HEALTH CARE EDUCATION/TRAINING PROGRAM

## 2021-05-07 PROCEDURE — 27000190 HC CPAP FULL FACE MASK W/VALVE

## 2021-05-07 PROCEDURE — 84484 ASSAY OF TROPONIN QUANT: CPT | Performed by: STUDENT IN AN ORGANIZED HEALTH CARE EDUCATION/TRAINING PROGRAM

## 2021-05-07 PROCEDURE — 83880 ASSAY OF NATRIURETIC PEPTIDE: CPT | Performed by: STUDENT IN AN ORGANIZED HEALTH CARE EDUCATION/TRAINING PROGRAM

## 2021-05-07 PROCEDURE — 94660 CPAP INITIATION&MGMT: CPT

## 2021-05-07 PROCEDURE — 25000242 PHARM REV CODE 250 ALT 637 W/ HCPCS: Performed by: STUDENT IN AN ORGANIZED HEALTH CARE EDUCATION/TRAINING PROGRAM

## 2021-05-07 PROCEDURE — 93010 ELECTROCARDIOGRAM REPORT: CPT | Mod: ,,, | Performed by: INTERNAL MEDICINE

## 2021-05-07 PROCEDURE — 94761 N-INVAS EAR/PLS OXIMETRY MLT: CPT

## 2021-05-07 PROCEDURE — 99285 EMERGENCY DEPT VISIT HI MDM: CPT | Mod: 25

## 2021-05-07 PROCEDURE — 99285 EMERGENCY DEPT VISIT HI MDM: CPT | Mod: GC,CS,, | Performed by: EMERGENCY MEDICINE

## 2021-05-07 PROCEDURE — U0002 COVID-19 LAB TEST NON-CDC: HCPCS | Performed by: EMERGENCY MEDICINE

## 2021-05-07 PROCEDURE — 99900035 HC TECH TIME PER 15 MIN (STAT)

## 2021-05-07 PROCEDURE — 63600175 PHARM REV CODE 636 W HCPCS: Performed by: STUDENT IN AN ORGANIZED HEALTH CARE EDUCATION/TRAINING PROGRAM

## 2021-05-07 PROCEDURE — 25000003 PHARM REV CODE 250: Performed by: STUDENT IN AN ORGANIZED HEALTH CARE EDUCATION/TRAINING PROGRAM

## 2021-05-07 PROCEDURE — 99285 PR EMERGENCY DEPT VISIT,LEVEL V: ICD-10-PCS | Mod: GC,CS,, | Performed by: EMERGENCY MEDICINE

## 2021-05-07 PROCEDURE — 25500020 PHARM REV CODE 255: Performed by: EMERGENCY MEDICINE

## 2021-05-07 PROCEDURE — 85025 COMPLETE CBC W/AUTO DIFF WBC: CPT | Performed by: STUDENT IN AN ORGANIZED HEALTH CARE EDUCATION/TRAINING PROGRAM

## 2021-05-07 PROCEDURE — 94640 AIRWAY INHALATION TREATMENT: CPT

## 2021-05-07 RX ORDER — ONDANSETRON 2 MG/ML
4 INJECTION INTRAMUSCULAR; INTRAVENOUS EVERY 8 HOURS PRN
Status: DISCONTINUED | OUTPATIENT
Start: 2021-05-07 | End: 2021-05-10 | Stop reason: HOSPADM

## 2021-05-07 RX ORDER — IBUPROFEN 200 MG
16 TABLET ORAL
Status: DISCONTINUED | OUTPATIENT
Start: 2021-05-07 | End: 2021-05-10 | Stop reason: HOSPADM

## 2021-05-07 RX ORDER — PROCHLORPERAZINE EDISYLATE 5 MG/ML
5 INJECTION INTRAMUSCULAR; INTRAVENOUS EVERY 6 HOURS PRN
Status: DISCONTINUED | OUTPATIENT
Start: 2021-05-07 | End: 2021-05-10 | Stop reason: HOSPADM

## 2021-05-07 RX ORDER — ACETAMINOPHEN 325 MG/1
650 TABLET ORAL EVERY 4 HOURS PRN
Status: DISCONTINUED | OUTPATIENT
Start: 2021-05-07 | End: 2021-05-10 | Stop reason: HOSPADM

## 2021-05-07 RX ORDER — SODIUM CHLORIDE 0.9 % (FLUSH) 0.9 %
10 SYRINGE (ML) INJECTION
Status: DISCONTINUED | OUTPATIENT
Start: 2021-05-07 | End: 2021-05-10 | Stop reason: HOSPADM

## 2021-05-07 RX ORDER — DEXAMETHASONE SODIUM PHOSPHATE 4 MG/ML
12 VIAL (ML) INJECTION
Status: COMPLETED | OUTPATIENT
Start: 2021-05-07 | End: 2021-05-07

## 2021-05-07 RX ORDER — LEVALBUTEROL INHALATION SOLUTION 0.63 MG/3ML
0.63 SOLUTION RESPIRATORY (INHALATION) EVERY 8 HOURS
Status: DISCONTINUED | OUTPATIENT
Start: 2021-05-08 | End: 2021-05-10 | Stop reason: HOSPADM

## 2021-05-07 RX ORDER — IBUPROFEN 200 MG
24 TABLET ORAL
Status: DISCONTINUED | OUTPATIENT
Start: 2021-05-07 | End: 2021-05-10 | Stop reason: HOSPADM

## 2021-05-07 RX ORDER — LEVALBUTEROL INHALATION SOLUTION 0.63 MG/3ML
0.63 SOLUTION RESPIRATORY (INHALATION)
Status: COMPLETED | OUTPATIENT
Start: 2021-05-07 | End: 2021-05-07

## 2021-05-07 RX ORDER — TALC
6 POWDER (GRAM) TOPICAL NIGHTLY PRN
Status: DISCONTINUED | OUTPATIENT
Start: 2021-05-07 | End: 2021-05-10 | Stop reason: HOSPADM

## 2021-05-07 RX ORDER — PREDNISONE 20 MG/1
40 TABLET ORAL DAILY
Status: DISCONTINUED | OUTPATIENT
Start: 2021-05-08 | End: 2021-05-10 | Stop reason: HOSPADM

## 2021-05-07 RX ORDER — DEXAMETHASONE SODIUM PHOSPHATE 4 MG/ML
6 INJECTION, SOLUTION INTRA-ARTICULAR; INTRALESIONAL; INTRAMUSCULAR; INTRAVENOUS; SOFT TISSUE
Status: COMPLETED | OUTPATIENT
Start: 2021-05-07 | End: 2021-05-07

## 2021-05-07 RX ORDER — MAGNESIUM SULFATE HEPTAHYDRATE 40 MG/ML
2 INJECTION, SOLUTION INTRAVENOUS
Status: COMPLETED | OUTPATIENT
Start: 2021-05-07 | End: 2021-05-07

## 2021-05-07 RX ORDER — IPRATROPIUM BROMIDE 0.5 MG/2.5ML
0.5 SOLUTION RESPIRATORY (INHALATION)
Status: DISCONTINUED | OUTPATIENT
Start: 2021-05-07 | End: 2021-05-07

## 2021-05-07 RX ORDER — CEFTRIAXONE 1 G/1
1 INJECTION, POWDER, FOR SOLUTION INTRAMUSCULAR; INTRAVENOUS
Status: COMPLETED | OUTPATIENT
Start: 2021-05-07 | End: 2021-05-07

## 2021-05-07 RX ORDER — FLUTICASONE FUROATE AND VILANTEROL 200; 25 UG/1; UG/1
1 POWDER RESPIRATORY (INHALATION) DAILY
Status: DISCONTINUED | OUTPATIENT
Start: 2021-05-08 | End: 2021-05-10 | Stop reason: HOSPADM

## 2021-05-07 RX ORDER — DOXYCYCLINE HYCLATE 100 MG
100 TABLET ORAL EVERY 12 HOURS
Status: DISCONTINUED | OUTPATIENT
Start: 2021-05-08 | End: 2021-05-10 | Stop reason: HOSPADM

## 2021-05-07 RX ORDER — ENOXAPARIN SODIUM 100 MG/ML
40 INJECTION SUBCUTANEOUS EVERY 24 HOURS
Status: DISCONTINUED | OUTPATIENT
Start: 2021-05-08 | End: 2021-05-10 | Stop reason: HOSPADM

## 2021-05-07 RX ADMIN — CEFTRIAXONE 1 G: 1 INJECTION, POWDER, FOR SOLUTION INTRAMUSCULAR; INTRAVENOUS at 09:05

## 2021-05-07 RX ADMIN — IOHEXOL 75 ML: 350 INJECTION, SOLUTION INTRAVENOUS at 10:05

## 2021-05-07 RX ADMIN — MAGNESIUM SULFATE 2 G: 2 INJECTION INTRAVENOUS at 10:05

## 2021-05-07 RX ADMIN — DEXAMETHASONE SODIUM PHOSPHATE 12 MG: 4 INJECTION INTRA-ARTICULAR; INTRALESIONAL; INTRAMUSCULAR; INTRAVENOUS; SOFT TISSUE at 09:05

## 2021-05-07 RX ADMIN — DEXAMETHASONE SODIUM PHOSPHATE 6 MG: 4 INJECTION INTRA-ARTICULAR; INTRALESIONAL; INTRAMUSCULAR; INTRAVENOUS; SOFT TISSUE at 09:05

## 2021-05-07 RX ADMIN — AZITHROMYCIN MONOHYDRATE 500 MG: 500 INJECTION, POWDER, LYOPHILIZED, FOR SOLUTION INTRAVENOUS at 09:05

## 2021-05-07 RX ADMIN — LEVALBUTEROL HYDROCHLORIDE 0.63 MG: 0.63 SOLUTION RESPIRATORY (INHALATION) at 08:05

## 2021-05-08 LAB
ANION GAP SERPL CALC-SCNC: 6 MMOL/L (ref 8–16)
BASOPHILS # BLD AUTO: 0.03 K/UL (ref 0–0.2)
BASOPHILS NFR BLD: 0.7 % (ref 0–1.9)
BUN SERPL-MCNC: 8 MG/DL (ref 8–23)
CALCIUM SERPL-MCNC: 8.3 MG/DL (ref 8.7–10.5)
CHLORIDE SERPL-SCNC: 104 MMOL/L (ref 95–110)
CO2 SERPL-SCNC: 29 MMOL/L (ref 23–29)
CREAT SERPL-MCNC: 0.6 MG/DL (ref 0.5–1.4)
DIFFERENTIAL METHOD: ABNORMAL
EOSINOPHIL # BLD AUTO: 0 K/UL (ref 0–0.5)
EOSINOPHIL NFR BLD: 0 % (ref 0–8)
ERYTHROCYTE [DISTWIDTH] IN BLOOD BY AUTOMATED COUNT: 13 % (ref 11.5–14.5)
EST. GFR  (AFRICAN AMERICAN): >60 ML/MIN/1.73 M^2
EST. GFR  (NON AFRICAN AMERICAN): >60 ML/MIN/1.73 M^2
GLUCOSE SERPL-MCNC: 139 MG/DL (ref 70–110)
HCT VFR BLD AUTO: 40.3 % (ref 37–48.5)
HGB BLD-MCNC: 12.8 G/DL (ref 12–16)
IMM GRANULOCYTES # BLD AUTO: 0.01 K/UL (ref 0–0.04)
IMM GRANULOCYTES NFR BLD AUTO: 0.2 % (ref 0–0.5)
LYMPHOCYTES # BLD AUTO: 0.4 K/UL (ref 1–4.8)
LYMPHOCYTES NFR BLD: 9.7 % (ref 18–48)
MCH RBC QN AUTO: 30.8 PG (ref 27–31)
MCHC RBC AUTO-ENTMCNC: 31.8 G/DL (ref 32–36)
MCV RBC AUTO: 97 FL (ref 82–98)
MONOCYTES # BLD AUTO: 0.2 K/UL (ref 0.3–1)
MONOCYTES NFR BLD: 3.8 % (ref 4–15)
NEUTROPHILS # BLD AUTO: 3.9 K/UL (ref 1.8–7.7)
NEUTROPHILS NFR BLD: 85.6 % (ref 38–73)
NRBC BLD-RTO: 0 /100 WBC
PLATELET # BLD AUTO: 320 K/UL (ref 150–450)
PMV BLD AUTO: 9.5 FL (ref 9.2–12.9)
POTASSIUM SERPL-SCNC: 4.2 MMOL/L (ref 3.5–5.1)
PROCALCITONIN SERPL IA-MCNC: 0.02 NG/ML
RBC # BLD AUTO: 4.16 M/UL (ref 4–5.4)
SODIUM SERPL-SCNC: 139 MMOL/L (ref 136–145)
WBC # BLD AUTO: 4.52 K/UL (ref 3.9–12.7)

## 2021-05-08 PROCEDURE — 85025 COMPLETE CBC W/AUTO DIFF WBC: CPT | Performed by: HOSPITALIST

## 2021-05-08 PROCEDURE — 99900035 HC TECH TIME PER 15 MIN (STAT)

## 2021-05-08 PROCEDURE — 20600001 HC STEP DOWN PRIVATE ROOM

## 2021-05-08 PROCEDURE — 94640 AIRWAY INHALATION TREATMENT: CPT

## 2021-05-08 PROCEDURE — 99223 PR INITIAL HOSPITAL CARE,LEVL III: ICD-10-PCS | Mod: GC,,, | Performed by: INTERNAL MEDICINE

## 2021-05-08 PROCEDURE — 25000242 PHARM REV CODE 250 ALT 637 W/ HCPCS: Performed by: HOSPITALIST

## 2021-05-08 PROCEDURE — 27000221 HC OXYGEN, UP TO 24 HOURS

## 2021-05-08 PROCEDURE — 99223 1ST HOSP IP/OBS HIGH 75: CPT | Mod: GC,,, | Performed by: INTERNAL MEDICINE

## 2021-05-08 PROCEDURE — 63600175 PHARM REV CODE 636 W HCPCS: Performed by: HOSPITALIST

## 2021-05-08 PROCEDURE — 80048 BASIC METABOLIC PNL TOTAL CA: CPT | Performed by: HOSPITALIST

## 2021-05-08 PROCEDURE — 99232 SBSQ HOSP IP/OBS MODERATE 35: CPT | Mod: ,,, | Performed by: HOSPITALIST

## 2021-05-08 PROCEDURE — 36415 COLL VENOUS BLD VENIPUNCTURE: CPT | Performed by: HOSPITALIST

## 2021-05-08 PROCEDURE — 99232 PR SUBSEQUENT HOSPITAL CARE,LEVL II: ICD-10-PCS | Mod: ,,, | Performed by: HOSPITALIST

## 2021-05-08 PROCEDURE — 94761 N-INVAS EAR/PLS OXIMETRY MLT: CPT

## 2021-05-08 PROCEDURE — 25000003 PHARM REV CODE 250: Performed by: HOSPITALIST

## 2021-05-08 RX ADMIN — LEVALBUTEROL HYDROCHLORIDE 0.63 MG: 0.63 SOLUTION RESPIRATORY (INHALATION) at 08:05

## 2021-05-08 RX ADMIN — FLUTICASONE FUROATE AND VILANTEROL TRIFENATATE 1 PUFF: 200; 25 POWDER RESPIRATORY (INHALATION) at 03:05

## 2021-05-08 RX ADMIN — DOXYCYCLINE HYCLATE 100 MG: 100 TABLET, COATED ORAL at 08:05

## 2021-05-08 RX ADMIN — PREDNISONE 40 MG: 20 TABLET ORAL at 08:05

## 2021-05-08 RX ADMIN — ENOXAPARIN SODIUM 40 MG: 40 INJECTION SUBCUTANEOUS at 06:05

## 2021-05-09 LAB
ANION GAP SERPL CALC-SCNC: 7 MMOL/L (ref 8–16)
BASOPHILS # BLD AUTO: 0.07 K/UL (ref 0–0.2)
BASOPHILS NFR BLD: 0.8 % (ref 0–1.9)
BUN SERPL-MCNC: 16 MG/DL (ref 8–23)
CALCIUM SERPL-MCNC: 8.3 MG/DL (ref 8.7–10.5)
CHLORIDE SERPL-SCNC: 104 MMOL/L (ref 95–110)
CO2 SERPL-SCNC: 29 MMOL/L (ref 23–29)
CREAT SERPL-MCNC: 0.6 MG/DL (ref 0.5–1.4)
DIFFERENTIAL METHOD: ABNORMAL
EOSINOPHIL # BLD AUTO: 0.2 K/UL (ref 0–0.5)
EOSINOPHIL NFR BLD: 1.8 % (ref 0–8)
ERYTHROCYTE [DISTWIDTH] IN BLOOD BY AUTOMATED COUNT: 13.2 % (ref 11.5–14.5)
EST. GFR  (AFRICAN AMERICAN): >60 ML/MIN/1.73 M^2
EST. GFR  (NON AFRICAN AMERICAN): >60 ML/MIN/1.73 M^2
GLUCOSE SERPL-MCNC: 99 MG/DL (ref 70–110)
HCT VFR BLD AUTO: 39.2 % (ref 37–48.5)
HGB BLD-MCNC: 12.4 G/DL (ref 12–16)
IMM GRANULOCYTES # BLD AUTO: 0.01 K/UL (ref 0–0.04)
IMM GRANULOCYTES NFR BLD AUTO: 0.1 % (ref 0–0.5)
LYMPHOCYTES # BLD AUTO: 1.9 K/UL (ref 1–4.8)
LYMPHOCYTES NFR BLD: 21.1 % (ref 18–48)
MCH RBC QN AUTO: 30.2 PG (ref 27–31)
MCHC RBC AUTO-ENTMCNC: 31.6 G/DL (ref 32–36)
MCV RBC AUTO: 95 FL (ref 82–98)
MONOCYTES # BLD AUTO: 1.1 K/UL (ref 0.3–1)
MONOCYTES NFR BLD: 12.4 % (ref 4–15)
NEUTROPHILS # BLD AUTO: 5.7 K/UL (ref 1.8–7.7)
NEUTROPHILS NFR BLD: 63.8 % (ref 38–73)
NRBC BLD-RTO: 0 /100 WBC
PLATELET # BLD AUTO: 319 K/UL (ref 150–450)
PMV BLD AUTO: 9.3 FL (ref 9.2–12.9)
POTASSIUM SERPL-SCNC: 3.7 MMOL/L (ref 3.5–5.1)
RBC # BLD AUTO: 4.11 M/UL (ref 4–5.4)
SODIUM SERPL-SCNC: 140 MMOL/L (ref 136–145)
WBC # BLD AUTO: 8.87 K/UL (ref 3.9–12.7)

## 2021-05-09 PROCEDURE — 99232 SBSQ HOSP IP/OBS MODERATE 35: CPT | Mod: ,,, | Performed by: HOSPITALIST

## 2021-05-09 PROCEDURE — 25000242 PHARM REV CODE 250 ALT 637 W/ HCPCS: Performed by: HOSPITALIST

## 2021-05-09 PROCEDURE — 20600001 HC STEP DOWN PRIVATE ROOM

## 2021-05-09 PROCEDURE — 99900035 HC TECH TIME PER 15 MIN (STAT)

## 2021-05-09 PROCEDURE — 63600175 PHARM REV CODE 636 W HCPCS: Performed by: HOSPITALIST

## 2021-05-09 PROCEDURE — 85025 COMPLETE CBC W/AUTO DIFF WBC: CPT | Performed by: HOSPITALIST

## 2021-05-09 PROCEDURE — 80048 BASIC METABOLIC PNL TOTAL CA: CPT | Performed by: HOSPITALIST

## 2021-05-09 PROCEDURE — 94640 AIRWAY INHALATION TREATMENT: CPT

## 2021-05-09 PROCEDURE — 27000221 HC OXYGEN, UP TO 24 HOURS

## 2021-05-09 PROCEDURE — 36415 COLL VENOUS BLD VENIPUNCTURE: CPT | Performed by: HOSPITALIST

## 2021-05-09 PROCEDURE — 94761 N-INVAS EAR/PLS OXIMETRY MLT: CPT

## 2021-05-09 PROCEDURE — 99232 PR SUBSEQUENT HOSPITAL CARE,LEVL II: ICD-10-PCS | Mod: ,,, | Performed by: HOSPITALIST

## 2021-05-09 PROCEDURE — 25000003 PHARM REV CODE 250: Performed by: HOSPITALIST

## 2021-05-09 RX ADMIN — TIOTROPIUM BROMIDE INHALATION SPRAY 2 PUFF: 3.12 SPRAY, METERED RESPIRATORY (INHALATION) at 07:05

## 2021-05-09 RX ADMIN — DOXYCYCLINE HYCLATE 100 MG: 100 TABLET, COATED ORAL at 09:05

## 2021-05-09 RX ADMIN — FLUTICASONE FUROATE AND VILANTEROL TRIFENATATE 1 PUFF: 200; 25 POWDER RESPIRATORY (INHALATION) at 07:05

## 2021-05-09 RX ADMIN — LEVALBUTEROL HYDROCHLORIDE 0.63 MG: 0.63 SOLUTION RESPIRATORY (INHALATION) at 03:05

## 2021-05-09 RX ADMIN — PREDNISONE 40 MG: 20 TABLET ORAL at 09:05

## 2021-05-09 RX ADMIN — LEVALBUTEROL HYDROCHLORIDE 0.63 MG: 0.63 SOLUTION RESPIRATORY (INHALATION) at 11:05

## 2021-05-09 RX ADMIN — ENOXAPARIN SODIUM 40 MG: 40 INJECTION SUBCUTANEOUS at 04:05

## 2021-05-09 RX ADMIN — LEVALBUTEROL HYDROCHLORIDE 0.63 MG: 0.63 SOLUTION RESPIRATORY (INHALATION) at 07:05

## 2021-05-10 VITALS
HEIGHT: 68 IN | OXYGEN SATURATION: 96 % | WEIGHT: 127.5 LBS | BODY MASS INDEX: 19.32 KG/M2 | SYSTOLIC BLOOD PRESSURE: 126 MMHG | DIASTOLIC BLOOD PRESSURE: 88 MMHG | HEART RATE: 82 BPM | TEMPERATURE: 98 F | RESPIRATION RATE: 16 BRPM

## 2021-05-10 LAB
ANION GAP SERPL CALC-SCNC: 7 MMOL/L (ref 8–16)
BASOPHILS # BLD AUTO: 0.07 K/UL (ref 0–0.2)
BASOPHILS NFR BLD: 0.8 % (ref 0–1.9)
BUN SERPL-MCNC: 18 MG/DL (ref 8–23)
CALCIUM SERPL-MCNC: 8.1 MG/DL (ref 8.7–10.5)
CHLORIDE SERPL-SCNC: 104 MMOL/L (ref 95–110)
CO2 SERPL-SCNC: 26 MMOL/L (ref 23–29)
CREAT SERPL-MCNC: 0.6 MG/DL (ref 0.5–1.4)
DIFFERENTIAL METHOD: NORMAL
EOSINOPHIL # BLD AUTO: 0.2 K/UL (ref 0–0.5)
EOSINOPHIL NFR BLD: 2.8 % (ref 0–8)
ERYTHROCYTE [DISTWIDTH] IN BLOOD BY AUTOMATED COUNT: 13.3 % (ref 11.5–14.5)
EST. GFR  (AFRICAN AMERICAN): >60 ML/MIN/1.73 M^2
EST. GFR  (NON AFRICAN AMERICAN): >60 ML/MIN/1.73 M^2
GLUCOSE SERPL-MCNC: 92 MG/DL (ref 70–110)
HCT VFR BLD AUTO: 38 % (ref 37–48.5)
HGB BLD-MCNC: 12.2 G/DL (ref 12–16)
IMM GRANULOCYTES # BLD AUTO: 0.03 K/UL (ref 0–0.04)
IMM GRANULOCYTES NFR BLD AUTO: 0.4 % (ref 0–0.5)
LYMPHOCYTES # BLD AUTO: 2.5 K/UL (ref 1–4.8)
LYMPHOCYTES NFR BLD: 30 % (ref 18–48)
MCH RBC QN AUTO: 30.3 PG (ref 27–31)
MCHC RBC AUTO-ENTMCNC: 32.1 G/DL (ref 32–36)
MCV RBC AUTO: 94 FL (ref 82–98)
MONOCYTES # BLD AUTO: 0.9 K/UL (ref 0.3–1)
MONOCYTES NFR BLD: 11.2 % (ref 4–15)
NEUTROPHILS # BLD AUTO: 4.5 K/UL (ref 1.8–7.7)
NEUTROPHILS NFR BLD: 54.8 % (ref 38–73)
NRBC BLD-RTO: 0 /100 WBC
PLATELET # BLD AUTO: 320 K/UL (ref 150–450)
PMV BLD AUTO: 9.5 FL (ref 9.2–12.9)
POTASSIUM SERPL-SCNC: 3.6 MMOL/L (ref 3.5–5.1)
RBC # BLD AUTO: 4.03 M/UL (ref 4–5.4)
SODIUM SERPL-SCNC: 137 MMOL/L (ref 136–145)
WBC # BLD AUTO: 8.24 K/UL (ref 3.9–12.7)

## 2021-05-10 PROCEDURE — 99900035 HC TECH TIME PER 15 MIN (STAT)

## 2021-05-10 PROCEDURE — 94761 N-INVAS EAR/PLS OXIMETRY MLT: CPT

## 2021-05-10 PROCEDURE — 94640 AIRWAY INHALATION TREATMENT: CPT

## 2021-05-10 PROCEDURE — 63600175 PHARM REV CODE 636 W HCPCS: Performed by: HOSPITALIST

## 2021-05-10 PROCEDURE — 85025 COMPLETE CBC W/AUTO DIFF WBC: CPT | Performed by: HOSPITALIST

## 2021-05-10 PROCEDURE — 25000003 PHARM REV CODE 250: Performed by: HOSPITALIST

## 2021-05-10 PROCEDURE — 80048 BASIC METABOLIC PNL TOTAL CA: CPT | Performed by: HOSPITALIST

## 2021-05-10 PROCEDURE — 99238 PR HOSPITAL DISCHARGE DAY,<30 MIN: ICD-10-PCS | Mod: ,,, | Performed by: HOSPITALIST

## 2021-05-10 PROCEDURE — 99238 HOSP IP/OBS DSCHRG MGMT 30/<: CPT | Mod: ,,, | Performed by: HOSPITALIST

## 2021-05-10 PROCEDURE — 36415 COLL VENOUS BLD VENIPUNCTURE: CPT | Performed by: HOSPITALIST

## 2021-05-10 PROCEDURE — 25000242 PHARM REV CODE 250 ALT 637 W/ HCPCS: Performed by: HOSPITALIST

## 2021-05-10 PROCEDURE — 27000221 HC OXYGEN, UP TO 24 HOURS

## 2021-05-10 RX ORDER — PREDNISONE 20 MG/1
20 TABLET ORAL DAILY
Qty: 30 TABLET | Refills: 0 | OUTPATIENT
Start: 2021-05-10 | End: 2021-10-09

## 2021-05-10 RX ORDER — DOXYCYCLINE HYCLATE 100 MG
100 TABLET ORAL EVERY 12 HOURS
Qty: 10 TABLET | Refills: 0 | Status: SHIPPED | OUTPATIENT
Start: 2021-05-10 | End: 2021-05-15

## 2021-05-10 RX ADMIN — DOXYCYCLINE HYCLATE 100 MG: 100 TABLET, COATED ORAL at 09:05

## 2021-05-10 RX ADMIN — PREDNISONE 40 MG: 20 TABLET ORAL at 09:05

## 2021-05-10 RX ADMIN — FLUTICASONE FUROATE AND VILANTEROL TRIFENATATE 1 PUFF: 200; 25 POWDER RESPIRATORY (INHALATION) at 08:05

## 2021-05-10 RX ADMIN — LEVALBUTEROL HYDROCHLORIDE 0.63 MG: 0.63 SOLUTION RESPIRATORY (INHALATION) at 08:05

## 2021-05-10 RX ADMIN — TIOTROPIUM BROMIDE INHALATION SPRAY 2 PUFF: 3.12 SPRAY, METERED RESPIRATORY (INHALATION) at 08:05

## 2021-05-12 ENCOUNTER — PATIENT OUTREACH (OUTPATIENT)
Dept: ADMINISTRATIVE | Facility: CLINIC | Age: 71
End: 2021-05-12

## 2021-05-17 ENCOUNTER — PATIENT MESSAGE (OUTPATIENT)
Dept: ADMINISTRATIVE | Facility: HOSPITAL | Age: 71
End: 2021-05-17

## 2021-05-17 ENCOUNTER — PATIENT OUTREACH (OUTPATIENT)
Dept: ADMINISTRATIVE | Facility: HOSPITAL | Age: 71
End: 2021-05-17

## 2021-05-18 ENCOUNTER — PATIENT MESSAGE (OUTPATIENT)
Dept: PULMONOLOGY | Facility: CLINIC | Age: 71
End: 2021-05-18

## 2021-05-24 ENCOUNTER — PATIENT MESSAGE (OUTPATIENT)
Dept: PULMONOLOGY | Facility: CLINIC | Age: 71
End: 2021-05-24

## 2021-05-27 ENCOUNTER — PATIENT OUTREACH (OUTPATIENT)
Dept: ADMINISTRATIVE | Facility: OTHER | Age: 71
End: 2021-05-27

## 2021-05-27 ENCOUNTER — OFFICE VISIT (OUTPATIENT)
Dept: PULMONOLOGY | Facility: CLINIC | Age: 71
End: 2021-05-27
Payer: MEDICARE

## 2021-05-27 DIAGNOSIS — J96.11 CHRONIC RESPIRATORY FAILURE WITH HYPOXIA: ICD-10-CM

## 2021-05-27 DIAGNOSIS — J44.1 COPD WITH ACUTE EXACERBATION: Primary | ICD-10-CM

## 2021-05-27 DIAGNOSIS — J43.2 CENTRILOBULAR EMPHYSEMA: ICD-10-CM

## 2021-05-27 DIAGNOSIS — J44.1 COPD EXACERBATION: Primary | ICD-10-CM

## 2021-05-27 PROCEDURE — 99499 NO LOS: ICD-10-PCS | Mod: 95,,, | Performed by: INTERNAL MEDICINE

## 2021-05-27 PROCEDURE — 99213 OFFICE O/P EST LOW 20 MIN: CPT | Mod: 95,,, | Performed by: INTERNAL MEDICINE

## 2021-05-27 PROCEDURE — 99499 UNLISTED E&M SERVICE: CPT | Mod: 95,,, | Performed by: INTERNAL MEDICINE

## 2021-05-27 PROCEDURE — 99213 PR OFFICE/OUTPT VISIT, EST, LEVL III, 20-29 MIN: ICD-10-PCS | Mod: 95,,, | Performed by: INTERNAL MEDICINE

## 2021-05-27 RX ORDER — ROFLUMILAST 250 UG/1
1 TABLET ORAL DAILY
Qty: 30 TABLET | Refills: 11 | Status: ON HOLD | OUTPATIENT
Start: 2021-05-27 | End: 2021-11-18 | Stop reason: SDUPTHER

## 2021-05-31 ENCOUNTER — EXTERNAL CHRONIC CARE MANAGEMENT (OUTPATIENT)
Dept: PRIMARY CARE CLINIC | Facility: CLINIC | Age: 71
End: 2021-05-31
Payer: MEDICARE

## 2021-05-31 PROCEDURE — 99490 PR CHRONIC CARE MGMT, 1ST 20 MIN: ICD-10-PCS | Mod: S$PBB,,, | Performed by: FAMILY MEDICINE

## 2021-05-31 PROCEDURE — 99490 CHRNC CARE MGMT STAFF 1ST 20: CPT | Mod: PBBFAC | Performed by: FAMILY MEDICINE

## 2021-05-31 PROCEDURE — 99490 CHRNC CARE MGMT STAFF 1ST 20: CPT | Mod: S$PBB,,, | Performed by: FAMILY MEDICINE

## 2021-06-01 ENCOUNTER — PATIENT MESSAGE (OUTPATIENT)
Dept: PULMONOLOGY | Facility: CLINIC | Age: 71
End: 2021-06-01

## 2021-06-01 ENCOUNTER — TELEPHONE (OUTPATIENT)
Dept: PULMONOLOGY | Facility: CLINIC | Age: 71
End: 2021-06-01

## 2021-06-04 ENCOUNTER — PATIENT OUTREACH (OUTPATIENT)
Dept: ADMINISTRATIVE | Facility: HOSPITAL | Age: 71
End: 2021-06-04

## 2021-06-04 ENCOUNTER — PATIENT MESSAGE (OUTPATIENT)
Dept: ADMINISTRATIVE | Facility: HOSPITAL | Age: 71
End: 2021-06-04

## 2021-06-04 DIAGNOSIS — Z12.31 ENCOUNTER FOR SCREENING MAMMOGRAM FOR BREAST CANCER: Primary | ICD-10-CM

## 2021-06-30 ENCOUNTER — EXTERNAL CHRONIC CARE MANAGEMENT (OUTPATIENT)
Dept: PRIMARY CARE CLINIC | Facility: CLINIC | Age: 71
End: 2021-06-30
Payer: MEDICARE

## 2021-06-30 PROCEDURE — 99490 CHRNC CARE MGMT STAFF 1ST 20: CPT | Mod: PBBFAC | Performed by: FAMILY MEDICINE

## 2021-06-30 PROCEDURE — 99490 CHRNC CARE MGMT STAFF 1ST 20: CPT | Mod: S$PBB,,, | Performed by: FAMILY MEDICINE

## 2021-06-30 PROCEDURE — 99490 PR CHRONIC CARE MGMT, 1ST 20 MIN: ICD-10-PCS | Mod: S$PBB,,, | Performed by: FAMILY MEDICINE

## 2021-07-06 ENCOUNTER — PATIENT MESSAGE (OUTPATIENT)
Dept: ADMINISTRATIVE | Facility: HOSPITAL | Age: 71
End: 2021-07-06

## 2021-07-14 RX ORDER — AZITHROMYCIN 250 MG/1
TABLET, FILM COATED ORAL
Qty: 36 TABLET | Refills: 0 | OUTPATIENT
Start: 2021-07-14

## 2021-07-31 ENCOUNTER — EXTERNAL CHRONIC CARE MANAGEMENT (OUTPATIENT)
Dept: PRIMARY CARE CLINIC | Facility: CLINIC | Age: 71
End: 2021-07-31
Payer: MEDICARE

## 2021-07-31 PROCEDURE — 99490 CHRNC CARE MGMT STAFF 1ST 20: CPT | Mod: S$PBB,,, | Performed by: FAMILY MEDICINE

## 2021-07-31 PROCEDURE — 99490 CHRNC CARE MGMT STAFF 1ST 20: CPT | Mod: PBBFAC | Performed by: FAMILY MEDICINE

## 2021-07-31 PROCEDURE — 99490 PR CHRONIC CARE MGMT, 1ST 20 MIN: ICD-10-PCS | Mod: S$PBB,,, | Performed by: FAMILY MEDICINE

## 2021-08-05 ENCOUNTER — PATIENT MESSAGE (OUTPATIENT)
Dept: PULMONOLOGY | Facility: CLINIC | Age: 71
End: 2021-08-05

## 2021-08-09 ENCOUNTER — TELEPHONE (OUTPATIENT)
Dept: INTERNAL MEDICINE | Facility: CLINIC | Age: 71
End: 2021-08-09

## 2021-08-09 ENCOUNTER — PATIENT MESSAGE (OUTPATIENT)
Dept: PULMONOLOGY | Facility: CLINIC | Age: 71
End: 2021-08-09

## 2021-08-09 RX ORDER — PREDNISONE 20 MG/1
40 TABLET ORAL DAILY
Qty: 10 TABLET | Refills: 0 | OUTPATIENT
Start: 2021-08-09 | End: 2021-10-09

## 2021-08-12 ENCOUNTER — OFFICE VISIT (OUTPATIENT)
Dept: INTERNAL MEDICINE | Facility: CLINIC | Age: 71
End: 2021-08-12
Payer: MEDICARE

## 2021-08-12 DIAGNOSIS — R35.0 URINARY FREQUENCY: Primary | ICD-10-CM

## 2021-08-12 DIAGNOSIS — R30.0 DYSURIA: ICD-10-CM

## 2021-08-12 DIAGNOSIS — R82.90 CLOUDY URINE: ICD-10-CM

## 2021-08-12 PROCEDURE — 99441 PR PHYSICIAN TELEPHONE EVALUATION 5-10 MIN: CPT | Mod: 95,,, | Performed by: FAMILY MEDICINE

## 2021-08-12 PROCEDURE — 99441 PR PHYSICIAN TELEPHONE EVALUATION 5-10 MIN: ICD-10-PCS | Mod: 95,,, | Performed by: FAMILY MEDICINE

## 2021-08-12 RX ORDER — AMOXICILLIN AND CLAVULANATE POTASSIUM 875; 125 MG/1; MG/1
1 TABLET, FILM COATED ORAL EVERY 12 HOURS
Qty: 14 TABLET | Refills: 0 | Status: SHIPPED | OUTPATIENT
Start: 2021-08-12 | End: 2021-08-19

## 2021-08-13 ENCOUNTER — LAB VISIT (OUTPATIENT)
Dept: LAB | Facility: HOSPITAL | Age: 71
End: 2021-08-13
Payer: MEDICARE

## 2021-08-13 DIAGNOSIS — R30.0 DYSURIA: ICD-10-CM

## 2021-08-13 DIAGNOSIS — R35.0 URINARY FREQUENCY: ICD-10-CM

## 2021-08-13 DIAGNOSIS — R82.90 CLOUDY URINE: ICD-10-CM

## 2021-08-13 LAB
BACTERIA #/AREA URNS AUTO: ABNORMAL /HPF
BILIRUB UR QL STRIP: NEGATIVE
CLARITY UR REFRACT.AUTO: ABNORMAL
COLOR UR AUTO: ABNORMAL
GLUCOSE UR QL STRIP: NEGATIVE
HGB UR QL STRIP: NEGATIVE
KETONES UR QL STRIP: NEGATIVE
LEUKOCYTE ESTERASE UR QL STRIP: ABNORMAL
MICROSCOPIC COMMENT: ABNORMAL
NITRITE UR QL STRIP: NEGATIVE
PH UR STRIP: 6 [PH] (ref 5–8)
PROT UR QL STRIP: NEGATIVE
RBC #/AREA URNS AUTO: 1 /HPF (ref 0–4)
SP GR UR STRIP: 1.01 (ref 1–1.03)
SQUAMOUS #/AREA URNS AUTO: 1 /HPF
URN SPEC COLLECT METH UR: ABNORMAL
WBC #/AREA URNS AUTO: 30 /HPF (ref 0–5)

## 2021-08-13 PROCEDURE — 87186 SC STD MICRODIL/AGAR DIL: CPT | Performed by: FAMILY MEDICINE

## 2021-08-13 PROCEDURE — 81001 URINALYSIS AUTO W/SCOPE: CPT | Performed by: FAMILY MEDICINE

## 2021-08-13 PROCEDURE — 87088 URINE BACTERIA CULTURE: CPT | Performed by: FAMILY MEDICINE

## 2021-08-13 PROCEDURE — 87077 CULTURE AEROBIC IDENTIFY: CPT | Performed by: FAMILY MEDICINE

## 2021-08-13 PROCEDURE — 87086 URINE CULTURE/COLONY COUNT: CPT | Performed by: FAMILY MEDICINE

## 2021-08-18 LAB — BACTERIA UR CULT: ABNORMAL

## 2021-08-31 ENCOUNTER — EXTERNAL CHRONIC CARE MANAGEMENT (OUTPATIENT)
Dept: PRIMARY CARE CLINIC | Facility: CLINIC | Age: 71
End: 2021-08-31
Payer: MEDICARE

## 2021-08-31 PROCEDURE — 99490 PR CHRONIC CARE MGMT, 1ST 20 MIN: ICD-10-PCS | Mod: S$PBB,,, | Performed by: FAMILY MEDICINE

## 2021-08-31 PROCEDURE — 99490 CHRNC CARE MGMT STAFF 1ST 20: CPT | Mod: PBBFAC | Performed by: FAMILY MEDICINE

## 2021-08-31 PROCEDURE — 99490 CHRNC CARE MGMT STAFF 1ST 20: CPT | Mod: S$PBB,,, | Performed by: FAMILY MEDICINE

## 2021-09-30 ENCOUNTER — EXTERNAL CHRONIC CARE MANAGEMENT (OUTPATIENT)
Dept: PRIMARY CARE CLINIC | Facility: CLINIC | Age: 71
End: 2021-09-30
Payer: MEDICARE

## 2021-09-30 PROCEDURE — 99490 CHRNC CARE MGMT STAFF 1ST 20: CPT | Mod: S$PBB,,, | Performed by: FAMILY MEDICINE

## 2021-09-30 PROCEDURE — 99490 CHRNC CARE MGMT STAFF 1ST 20: CPT | Mod: PBBFAC | Performed by: FAMILY MEDICINE

## 2021-09-30 PROCEDURE — 99490 PR CHRONIC CARE MGMT, 1ST 20 MIN: ICD-10-PCS | Mod: S$PBB,,, | Performed by: FAMILY MEDICINE

## 2021-10-06 ENCOUNTER — PATIENT MESSAGE (OUTPATIENT)
Dept: PULMONOLOGY | Facility: CLINIC | Age: 71
End: 2021-10-06

## 2021-10-09 ENCOUNTER — HOSPITAL ENCOUNTER (EMERGENCY)
Facility: HOSPITAL | Age: 71
Discharge: HOME | End: 2021-10-09
Attending: EMERGENCY MEDICINE
Payer: MEDICARE

## 2021-10-09 VITALS
HEIGHT: 68 IN | HEART RATE: 96 BPM | TEMPERATURE: 99 F | SYSTOLIC BLOOD PRESSURE: 168 MMHG | BODY MASS INDEX: 19.25 KG/M2 | OXYGEN SATURATION: 95 % | DIASTOLIC BLOOD PRESSURE: 72 MMHG | WEIGHT: 127 LBS | RESPIRATION RATE: 20 BRPM

## 2021-10-09 DIAGNOSIS — R35.0 URINARY FREQUENCY: ICD-10-CM

## 2021-10-09 DIAGNOSIS — J44.1 COPD EXACERBATION: Primary | ICD-10-CM

## 2021-10-09 DIAGNOSIS — R06.02 SHORTNESS OF BREATH: ICD-10-CM

## 2021-10-09 LAB
ALBUMIN SERPL BCP-MCNC: 3.9 G/DL (ref 3.5–5.2)
ALP SERPL-CCNC: 93 U/L (ref 55–135)
ALT SERPL W/O P-5'-P-CCNC: 27 U/L (ref 10–44)
ANION GAP SERPL CALC-SCNC: 11 MMOL/L (ref 8–16)
AST SERPL-CCNC: 30 U/L (ref 10–40)
BASOPHILS # BLD AUTO: 0.08 K/UL (ref 0–0.2)
BASOPHILS NFR BLD: 0.9 % (ref 0–1.9)
BILIRUB SERPL-MCNC: 0.4 MG/DL (ref 0.1–1)
BILIRUB UR QL STRIP: NEGATIVE
BNP SERPL-MCNC: 18 PG/ML (ref 0–99)
BUN SERPL-MCNC: 10 MG/DL (ref 8–23)
CALCIUM SERPL-MCNC: 9.6 MG/DL (ref 8.7–10.5)
CHLORIDE SERPL-SCNC: 98 MMOL/L (ref 95–110)
CLARITY UR REFRACT.AUTO: CLEAR
CO2 SERPL-SCNC: 26 MMOL/L (ref 23–29)
COLOR UR AUTO: YELLOW
CREAT SERPL-MCNC: 0.6 MG/DL (ref 0.5–1.4)
CTP QC/QA: YES
DIFFERENTIAL METHOD: ABNORMAL
EOSINOPHIL # BLD AUTO: 0.4 K/UL (ref 0–0.5)
EOSINOPHIL NFR BLD: 5 % (ref 0–8)
ERYTHROCYTE [DISTWIDTH] IN BLOOD BY AUTOMATED COUNT: 13.1 % (ref 11.5–14.5)
EST. GFR  (AFRICAN AMERICAN): >60 ML/MIN/1.73 M^2
EST. GFR  (NON AFRICAN AMERICAN): >60 ML/MIN/1.73 M^2
GLUCOSE SERPL-MCNC: 99 MG/DL (ref 70–110)
GLUCOSE UR QL STRIP: NEGATIVE
HCT VFR BLD AUTO: 38.4 % (ref 37–48.5)
HGB BLD-MCNC: 12.1 G/DL (ref 12–16)
HGB UR QL STRIP: NEGATIVE
IMM GRANULOCYTES # BLD AUTO: 0.02 K/UL (ref 0–0.04)
IMM GRANULOCYTES NFR BLD AUTO: 0.2 % (ref 0–0.5)
KETONES UR QL STRIP: ABNORMAL
LEUKOCYTE ESTERASE UR QL STRIP: NEGATIVE
LYMPHOCYTES # BLD AUTO: 1.2 K/UL (ref 1–4.8)
LYMPHOCYTES NFR BLD: 14.5 % (ref 18–48)
MCH RBC QN AUTO: 30.5 PG (ref 27–31)
MCHC RBC AUTO-ENTMCNC: 31.5 G/DL (ref 32–36)
MCV RBC AUTO: 97 FL (ref 82–98)
MONOCYTES # BLD AUTO: 0.8 K/UL (ref 0.3–1)
MONOCYTES NFR BLD: 9.5 % (ref 4–15)
NEUTROPHILS # BLD AUTO: 6 K/UL (ref 1.8–7.7)
NEUTROPHILS NFR BLD: 69.9 % (ref 38–73)
NITRITE UR QL STRIP: NEGATIVE
NRBC BLD-RTO: 0 /100 WBC
PH UR STRIP: 7 [PH] (ref 5–8)
PLATELET # BLD AUTO: 315 K/UL (ref 150–450)
PMV BLD AUTO: 9.2 FL (ref 9.2–12.9)
POTASSIUM SERPL-SCNC: 4.5 MMOL/L (ref 3.5–5.1)
PROT SERPL-MCNC: 7 G/DL (ref 6–8.4)
PROT UR QL STRIP: NEGATIVE
RBC # BLD AUTO: 3.97 M/UL (ref 4–5.4)
SARS-COV-2 RDRP RESP QL NAA+PROBE: NEGATIVE
SODIUM SERPL-SCNC: 135 MMOL/L (ref 136–145)
SP GR UR STRIP: 1.01 (ref 1–1.03)
TROPONIN I SERPL DL<=0.01 NG/ML-MCNC: <0.006 NG/ML (ref 0–0.03)
URN SPEC COLLECT METH UR: ABNORMAL
WBC # BLD AUTO: 8.56 K/UL (ref 3.9–12.7)

## 2021-10-09 PROCEDURE — 83880 ASSAY OF NATRIURETIC PEPTIDE: CPT | Performed by: NURSE PRACTITIONER

## 2021-10-09 PROCEDURE — 25000242 PHARM REV CODE 250 ALT 637 W/ HCPCS: Performed by: NURSE PRACTITIONER

## 2021-10-09 PROCEDURE — 99284 PR EMERGENCY DEPT VISIT,LEVEL IV: ICD-10-PCS | Mod: CS,,, | Performed by: NURSE PRACTITIONER

## 2021-10-09 PROCEDURE — 80053 COMPREHEN METABOLIC PANEL: CPT | Performed by: NURSE PRACTITIONER

## 2021-10-09 PROCEDURE — 96374 THER/PROPH/DIAG INJ IV PUSH: CPT

## 2021-10-09 PROCEDURE — 93010 EKG 12-LEAD: ICD-10-PCS | Mod: ,,, | Performed by: INTERNAL MEDICINE

## 2021-10-09 PROCEDURE — U0002 COVID-19 LAB TEST NON-CDC: HCPCS | Performed by: NURSE PRACTITIONER

## 2021-10-09 PROCEDURE — 63600175 PHARM REV CODE 636 W HCPCS: Performed by: NURSE PRACTITIONER

## 2021-10-09 PROCEDURE — 93010 ELECTROCARDIOGRAM REPORT: CPT | Mod: ,,, | Performed by: INTERNAL MEDICINE

## 2021-10-09 PROCEDURE — 93005 ELECTROCARDIOGRAM TRACING: CPT

## 2021-10-09 PROCEDURE — 85025 COMPLETE CBC W/AUTO DIFF WBC: CPT | Performed by: NURSE PRACTITIONER

## 2021-10-09 PROCEDURE — 84484 ASSAY OF TROPONIN QUANT: CPT | Performed by: NURSE PRACTITIONER

## 2021-10-09 PROCEDURE — 99284 EMERGENCY DEPT VISIT MOD MDM: CPT | Mod: CS,,, | Performed by: NURSE PRACTITIONER

## 2021-10-09 PROCEDURE — 99285 EMERGENCY DEPT VISIT HI MDM: CPT | Mod: 25

## 2021-10-09 PROCEDURE — 81003 URINALYSIS AUTO W/O SCOPE: CPT | Performed by: NURSE PRACTITIONER

## 2021-10-09 RX ORDER — PREDNISONE 20 MG/1
TABLET ORAL
Qty: 25 TABLET | Refills: 0 | Status: ON HOLD | OUTPATIENT
Start: 2021-10-09 | End: 2021-11-15 | Stop reason: SDUPTHER

## 2021-10-09 RX ORDER — LEVALBUTEROL INHALATION SOLUTION 0.63 MG/3ML
0.63 SOLUTION RESPIRATORY (INHALATION)
Status: DISPENSED | OUTPATIENT
Start: 2021-10-09 | End: 2021-10-09

## 2021-10-09 RX ORDER — METHYLPREDNISOLONE SOD SUCC 125 MG
125 VIAL (EA) INJECTION
Status: COMPLETED | OUTPATIENT
Start: 2021-10-09 | End: 2021-10-09

## 2021-10-09 RX ADMIN — METHYLPREDNISOLONE SODIUM SUCCINATE 125 MG: 125 INJECTION, POWDER, LYOPHILIZED, FOR SOLUTION INTRAMUSCULAR; INTRAVENOUS at 03:10

## 2021-10-09 RX ADMIN — LEVALBUTEROL HYDROCHLORIDE 0.63 MG: 0.63 SOLUTION RESPIRATORY (INHALATION) at 03:10

## 2021-10-13 DIAGNOSIS — M81.0 OSTEOPOROSIS, UNSPECIFIED OSTEOPOROSIS TYPE, UNSPECIFIED PATHOLOGICAL FRACTURE PRESENCE: Primary | ICD-10-CM

## 2021-10-20 ENCOUNTER — PATIENT MESSAGE (OUTPATIENT)
Dept: PULMONOLOGY | Facility: CLINIC | Age: 71
End: 2021-10-20
Payer: MEDICARE

## 2021-10-21 ENCOUNTER — INFUSION (OUTPATIENT)
Dept: INFECTIOUS DISEASES | Facility: HOSPITAL | Age: 71
End: 2021-10-21
Attending: INTERNAL MEDICINE
Payer: MEDICARE

## 2021-10-21 VITALS
RESPIRATION RATE: 20 BRPM | OXYGEN SATURATION: 95 % | WEIGHT: 127 LBS | HEART RATE: 90 BPM | DIASTOLIC BLOOD PRESSURE: 83 MMHG | BODY MASS INDEX: 19.25 KG/M2 | HEIGHT: 68 IN | SYSTOLIC BLOOD PRESSURE: 157 MMHG | TEMPERATURE: 98 F

## 2021-10-21 DIAGNOSIS — M81.0 OSTEOPOROSIS, UNSPECIFIED OSTEOPOROSIS TYPE, UNSPECIFIED PATHOLOGICAL FRACTURE PRESENCE: ICD-10-CM

## 2021-10-21 DIAGNOSIS — M80.80XA PATHOLOGICAL FRACTURE DUE TO OSTEOPOROSIS, UNSPECIFIED FRACTURE SITE, UNSPECIFIED OSTEOPOROSIS TYPE, INITIAL ENCOUNTER: Primary | ICD-10-CM

## 2021-10-21 DIAGNOSIS — M81.6 LOCALIZED OSTEOPOROSIS OF LEQUESNE: ICD-10-CM

## 2021-10-21 PROCEDURE — 63600175 PHARM REV CODE 636 W HCPCS: Mod: JG | Performed by: PHYSICIAN ASSISTANT

## 2021-10-21 PROCEDURE — 96372 THER/PROPH/DIAG INJ SC/IM: CPT

## 2021-10-21 RX ADMIN — DENOSUMAB 60 MG: 60 INJECTION SUBCUTANEOUS at 02:10

## 2021-10-31 ENCOUNTER — EXTERNAL CHRONIC CARE MANAGEMENT (OUTPATIENT)
Dept: PRIMARY CARE CLINIC | Facility: CLINIC | Age: 71
End: 2021-10-31
Payer: MEDICARE

## 2021-10-31 PROCEDURE — 99490 CHRNC CARE MGMT STAFF 1ST 20: CPT | Mod: PBBFAC | Performed by: FAMILY MEDICINE

## 2021-10-31 PROCEDURE — 99490 PR CHRONIC CARE MGMT, 1ST 20 MIN: ICD-10-PCS | Mod: S$PBB,,, | Performed by: FAMILY MEDICINE

## 2021-10-31 PROCEDURE — 99490 CHRNC CARE MGMT STAFF 1ST 20: CPT | Mod: S$PBB,,, | Performed by: FAMILY MEDICINE

## 2021-11-13 ENCOUNTER — HOSPITAL ENCOUNTER (INPATIENT)
Facility: HOSPITAL | Age: 71
LOS: 3 days | Discharge: HOME | DRG: 189 | End: 2021-11-18
Attending: EMERGENCY MEDICINE | Admitting: STUDENT IN AN ORGANIZED HEALTH CARE EDUCATION/TRAINING PROGRAM
Payer: MEDICARE

## 2021-11-13 DIAGNOSIS — R07.9 CHEST PAIN: ICD-10-CM

## 2021-11-13 DIAGNOSIS — J44.1 COPD WITH ACUTE EXACERBATION: Primary | ICD-10-CM

## 2021-11-13 DIAGNOSIS — R06.02 SHORTNESS OF BREATH: ICD-10-CM

## 2021-11-13 DIAGNOSIS — J43.2 CENTRILOBULAR EMPHYSEMA: ICD-10-CM

## 2021-11-13 DIAGNOSIS — J18.9 COMMUNITY ACQUIRED PNEUMONIA OF RIGHT MIDDLE LOBE OF LUNG: ICD-10-CM

## 2021-11-13 DIAGNOSIS — J18.9 COMMUNITY ACQUIRED PNEUMONIA, UNSPECIFIED LATERALITY: ICD-10-CM

## 2021-11-13 DIAGNOSIS — J96.11 CHRONIC RESPIRATORY FAILURE WITH HYPOXIA: ICD-10-CM

## 2021-11-13 DIAGNOSIS — J44.1 COPD EXACERBATION: ICD-10-CM

## 2021-11-13 DIAGNOSIS — R91.8 OPACITY OF LUNG ON IMAGING STUDY: ICD-10-CM

## 2021-11-13 PROBLEM — N39.0 UTI (URINARY TRACT INFECTION): Status: RESOLVED | Noted: 2018-11-05 | Resolved: 2021-11-13

## 2021-11-13 LAB
ALBUMIN SERPL BCP-MCNC: 3.1 G/DL (ref 3.5–5.2)
ALLENS TEST: ABNORMAL
ALP SERPL-CCNC: 191 U/L (ref 55–135)
ALT SERPL W/O P-5'-P-CCNC: 50 U/L (ref 10–44)
ANION GAP SERPL CALC-SCNC: 13 MMOL/L (ref 8–16)
AST SERPL-CCNC: 39 U/L (ref 10–40)
BASOPHILS # BLD AUTO: 0.04 K/UL (ref 0–0.2)
BASOPHILS NFR BLD: 0.2 % (ref 0–1.9)
BILIRUB SERPL-MCNC: 0.7 MG/DL (ref 0.1–1)
BNP SERPL-MCNC: 44 PG/ML (ref 0–99)
BUN SERPL-MCNC: 10 MG/DL (ref 6–30)
BUN SERPL-MCNC: 10 MG/DL (ref 8–23)
CALCIUM SERPL-MCNC: 9.6 MG/DL (ref 8.7–10.5)
CHLORIDE SERPL-SCNC: 98 MMOL/L (ref 95–110)
CHLORIDE SERPL-SCNC: 99 MMOL/L (ref 95–110)
CO2 SERPL-SCNC: 25 MMOL/L (ref 23–29)
CREAT SERPL-MCNC: 0.4 MG/DL (ref 0.5–1.4)
CREAT SERPL-MCNC: 0.6 MG/DL (ref 0.5–1.4)
CTP QC/QA: YES
CTP QC/QA: YES
DELSYS: ABNORMAL
DIFFERENTIAL METHOD: ABNORMAL
EOSINOPHIL # BLD AUTO: 0.1 K/UL (ref 0–0.5)
EOSINOPHIL NFR BLD: 0.5 % (ref 0–8)
ERYTHROCYTE [DISTWIDTH] IN BLOOD BY AUTOMATED COUNT: 13.1 % (ref 11.5–14.5)
EST. GFR  (AFRICAN AMERICAN): >60 ML/MIN/1.73 M^2
EST. GFR  (NON AFRICAN AMERICAN): >60 ML/MIN/1.73 M^2
GLUCOSE SERPL-MCNC: 142 MG/DL (ref 70–110)
GLUCOSE SERPL-MCNC: 145 MG/DL (ref 70–110)
HCO3 UR-SCNC: 29.6 MMOL/L (ref 24–28)
HCT VFR BLD AUTO: 36.4 % (ref 37–48.5)
HCT VFR BLD CALC: 37 %PCV (ref 36–54)
HGB BLD-MCNC: 11.8 G/DL (ref 12–16)
IMM GRANULOCYTES # BLD AUTO: 0.08 K/UL (ref 0–0.04)
IMM GRANULOCYTES NFR BLD AUTO: 0.5 % (ref 0–0.5)
LYMPHOCYTES # BLD AUTO: 1.1 K/UL (ref 1–4.8)
LYMPHOCYTES NFR BLD: 7.1 % (ref 18–48)
MCH RBC QN AUTO: 30.9 PG (ref 27–31)
MCHC RBC AUTO-ENTMCNC: 32.4 G/DL (ref 32–36)
MCV RBC AUTO: 95 FL (ref 82–98)
MODE: ABNORMAL
MONOCYTES # BLD AUTO: 1.7 K/UL (ref 0.3–1)
MONOCYTES NFR BLD: 10.5 % (ref 4–15)
NEUTROPHILS # BLD AUTO: 13 K/UL (ref 1.8–7.7)
NEUTROPHILS NFR BLD: 81.2 % (ref 38–73)
NRBC BLD-RTO: 0 /100 WBC
PCO2 BLDA: 47.5 MMHG (ref 35–45)
PH SMN: 7.4 [PH] (ref 7.35–7.45)
PLATELET # BLD AUTO: 347 K/UL (ref 150–450)
PMV BLD AUTO: 9 FL (ref 9.2–12.9)
PO2 BLDA: 30 MMHG (ref 40–60)
POC BE: 5 MMOL/L
POC IONIZED CALCIUM: 1.11 MMOL/L (ref 1.06–1.42)
POC MOLECULAR INFLUENZA A AGN: NEGATIVE
POC MOLECULAR INFLUENZA B AGN: NEGATIVE
POC SATURATED O2: 56 % (ref 95–100)
POC TCO2 (MEASURED): 26 MMOL/L (ref 23–29)
POC TCO2: 31 MMOL/L (ref 24–29)
POTASSIUM BLD-SCNC: 3.6 MMOL/L (ref 3.5–5.1)
POTASSIUM SERPL-SCNC: 3.6 MMOL/L (ref 3.5–5.1)
PROT SERPL-MCNC: 7 G/DL (ref 6–8.4)
RBC # BLD AUTO: 3.82 M/UL (ref 4–5.4)
SAMPLE: ABNORMAL
SAMPLE: ABNORMAL
SARS-COV-2 RDRP RESP QL NAA+PROBE: NEGATIVE
SITE: ABNORMAL
SODIUM BLD-SCNC: 135 MMOL/L (ref 136–145)
SODIUM SERPL-SCNC: 136 MMOL/L (ref 136–145)
TROPONIN I SERPL DL<=0.01 NG/ML-MCNC: 0.01 NG/ML (ref 0–0.03)
WBC # BLD AUTO: 16.03 K/UL (ref 3.9–12.7)

## 2021-11-13 PROCEDURE — 27000221 HC OXYGEN, UP TO 24 HOURS

## 2021-11-13 PROCEDURE — 63600175 PHARM REV CODE 636 W HCPCS: Performed by: FAMILY MEDICINE

## 2021-11-13 PROCEDURE — 84484 ASSAY OF TROPONIN QUANT: CPT | Performed by: PHYSICIAN ASSISTANT

## 2021-11-13 PROCEDURE — U0002 COVID-19 LAB TEST NON-CDC: HCPCS | Performed by: PHYSICIAN ASSISTANT

## 2021-11-13 PROCEDURE — 93005 ELECTROCARDIOGRAM TRACING: CPT

## 2021-11-13 PROCEDURE — 99285 EMERGENCY DEPT VISIT HI MDM: CPT | Mod: 25

## 2021-11-13 PROCEDURE — G0378 HOSPITAL OBSERVATION PER HR: HCPCS

## 2021-11-13 PROCEDURE — 99900035 HC TECH TIME PER 15 MIN (STAT)

## 2021-11-13 PROCEDURE — 25000242 PHARM REV CODE 250 ALT 637 W/ HCPCS: Performed by: PHYSICIAN ASSISTANT

## 2021-11-13 PROCEDURE — 83880 ASSAY OF NATRIURETIC PEPTIDE: CPT | Performed by: PHYSICIAN ASSISTANT

## 2021-11-13 PROCEDURE — 93010 ELECTROCARDIOGRAM REPORT: CPT | Mod: ,,, | Performed by: INTERNAL MEDICINE

## 2021-11-13 PROCEDURE — 94761 N-INVAS EAR/PLS OXIMETRY MLT: CPT

## 2021-11-13 PROCEDURE — 99285 EMERGENCY DEPT VISIT HI MDM: CPT | Mod: CR,CS,, | Performed by: PHYSICIAN ASSISTANT

## 2021-11-13 PROCEDURE — 80053 COMPREHEN METABOLIC PANEL: CPT | Performed by: PHYSICIAN ASSISTANT

## 2021-11-13 PROCEDURE — 93010 EKG 12-LEAD: ICD-10-PCS | Mod: ,,, | Performed by: INTERNAL MEDICINE

## 2021-11-13 PROCEDURE — 86803 HEPATITIS C AB TEST: CPT | Performed by: EMERGENCY MEDICINE

## 2021-11-13 PROCEDURE — 82803 BLOOD GASES ANY COMBINATION: CPT

## 2021-11-13 PROCEDURE — 96374 THER/PROPH/DIAG INJ IV PUSH: CPT

## 2021-11-13 PROCEDURE — 63700000 PHARM REV CODE 250 ALT 637 W/O HCPCS: Performed by: PHYSICIAN ASSISTANT

## 2021-11-13 PROCEDURE — 63600175 PHARM REV CODE 636 W HCPCS: Performed by: PHYSICIAN ASSISTANT

## 2021-11-13 PROCEDURE — 99285 PR EMERGENCY DEPT VISIT,LEVEL V: ICD-10-PCS | Mod: CR,CS,, | Performed by: PHYSICIAN ASSISTANT

## 2021-11-13 PROCEDURE — 94640 AIRWAY INHALATION TREATMENT: CPT

## 2021-11-13 PROCEDURE — 85025 COMPLETE CBC W/AUTO DIFF WBC: CPT | Performed by: PHYSICIAN ASSISTANT

## 2021-11-13 RX ORDER — TALC
6 POWDER (GRAM) TOPICAL NIGHTLY PRN
Status: DISCONTINUED | OUTPATIENT
Start: 2021-11-13 | End: 2021-11-13

## 2021-11-13 RX ORDER — ONDANSETRON 2 MG/ML
4 INJECTION INTRAMUSCULAR; INTRAVENOUS EVERY 8 HOURS PRN
Status: DISCONTINUED | OUTPATIENT
Start: 2021-11-13 | End: 2021-11-18 | Stop reason: HOSPADM

## 2021-11-13 RX ORDER — MAG HYDROX/ALUMINUM HYD/SIMETH 200-200-20
30 SUSPENSION, ORAL (FINAL DOSE FORM) ORAL 4 TIMES DAILY PRN
Status: DISCONTINUED | OUTPATIENT
Start: 2021-11-13 | End: 2021-11-18 | Stop reason: HOSPADM

## 2021-11-13 RX ORDER — CEFTRIAXONE 1 G/1
1 INJECTION, POWDER, FOR SOLUTION INTRAMUSCULAR; INTRAVENOUS
Status: COMPLETED | OUTPATIENT
Start: 2021-11-13 | End: 2021-11-13

## 2021-11-13 RX ORDER — SODIUM CHLORIDE 0.9 % (FLUSH) 0.9 %
10 SYRINGE (ML) INJECTION
Status: DISCONTINUED | OUTPATIENT
Start: 2021-11-13 | End: 2021-11-18 | Stop reason: HOSPADM

## 2021-11-13 RX ORDER — TALC
6 POWDER (GRAM) TOPICAL NIGHTLY PRN
Status: DISCONTINUED | OUTPATIENT
Start: 2021-11-13 | End: 2021-11-18 | Stop reason: HOSPADM

## 2021-11-13 RX ORDER — SODIUM CHLORIDE 0.9 % (FLUSH) 0.9 %
10 SYRINGE (ML) INJECTION EVERY 12 HOURS PRN
Status: DISCONTINUED | OUTPATIENT
Start: 2021-11-13 | End: 2021-11-18 | Stop reason: HOSPADM

## 2021-11-13 RX ORDER — HEPARIN SODIUM 5000 [USP'U]/ML
5000 INJECTION, SOLUTION INTRAVENOUS; SUBCUTANEOUS EVERY 8 HOURS
Status: DISCONTINUED | OUTPATIENT
Start: 2021-11-14 | End: 2021-11-15

## 2021-11-13 RX ORDER — PREDNISONE 50 MG/1
50 TABLET ORAL DAILY
Status: DISCONTINUED | OUTPATIENT
Start: 2021-11-14 | End: 2021-11-15

## 2021-11-13 RX ORDER — ROFLUMILAST 500 UG/1
500 TABLET ORAL DAILY
Status: DISCONTINUED | OUTPATIENT
Start: 2021-11-14 | End: 2021-11-18 | Stop reason: HOSPADM

## 2021-11-13 RX ORDER — IBUPROFEN 200 MG
24 TABLET ORAL
Status: DISCONTINUED | OUTPATIENT
Start: 2021-11-13 | End: 2021-11-18 | Stop reason: HOSPADM

## 2021-11-13 RX ORDER — LEVALBUTEROL 1.25 MG/.5ML
1.25 SOLUTION, CONCENTRATE RESPIRATORY (INHALATION)
Status: COMPLETED | OUTPATIENT
Start: 2021-11-13 | End: 2021-11-13

## 2021-11-13 RX ORDER — LEVALBUTEROL 1.25 MG/.5ML
1.25 SOLUTION, CONCENTRATE RESPIRATORY (INHALATION) EVERY 8 HOURS
Status: DISCONTINUED | OUTPATIENT
Start: 2021-11-14 | End: 2021-11-18 | Stop reason: HOSPADM

## 2021-11-13 RX ORDER — IBUPROFEN 200 MG
16 TABLET ORAL
Status: DISCONTINUED | OUTPATIENT
Start: 2021-11-13 | End: 2021-11-18 | Stop reason: HOSPADM

## 2021-11-13 RX ORDER — OXYCODONE HYDROCHLORIDE 5 MG/1
5 TABLET ORAL EVERY 6 HOURS PRN
Status: DISCONTINUED | OUTPATIENT
Start: 2021-11-13 | End: 2021-11-18 | Stop reason: HOSPADM

## 2021-11-13 RX ORDER — ACETAMINOPHEN 500 MG
1000 TABLET ORAL EVERY 8 HOURS PRN
Status: DISCONTINUED | OUTPATIENT
Start: 2021-11-13 | End: 2021-11-18 | Stop reason: HOSPADM

## 2021-11-13 RX ORDER — AZITHROMYCIN 250 MG/1
500 TABLET, FILM COATED ORAL
Status: COMPLETED | OUTPATIENT
Start: 2021-11-13 | End: 2021-11-13

## 2021-11-13 RX ORDER — GLUCAGON 1 MG
1 KIT INJECTION
Status: DISCONTINUED | OUTPATIENT
Start: 2021-11-13 | End: 2021-11-18 | Stop reason: HOSPADM

## 2021-11-13 RX ORDER — FLUTICASONE FUROATE AND VILANTEROL 200; 25 UG/1; UG/1
1 POWDER RESPIRATORY (INHALATION) DAILY
Status: DISCONTINUED | OUTPATIENT
Start: 2021-11-14 | End: 2021-11-18 | Stop reason: HOSPADM

## 2021-11-13 RX ORDER — CEFTRIAXONE 1 G/1
1 INJECTION, POWDER, FOR SOLUTION INTRAMUSCULAR; INTRAVENOUS
Status: DISCONTINUED | OUTPATIENT
Start: 2021-11-13 | End: 2021-11-14

## 2021-11-13 RX ORDER — METHYLPREDNISOLONE SOD SUCC 125 MG
125 VIAL (EA) INJECTION
Status: DISCONTINUED | OUTPATIENT
Start: 2021-11-13 | End: 2021-11-13

## 2021-11-13 RX ORDER — NALOXONE HCL 0.4 MG/ML
0.02 VIAL (ML) INJECTION
Status: DISCONTINUED | OUTPATIENT
Start: 2021-11-13 | End: 2021-11-18 | Stop reason: HOSPADM

## 2021-11-13 RX ORDER — AZITHROMYCIN 250 MG/1
250 TABLET, FILM COATED ORAL DAILY
Status: DISCONTINUED | OUTPATIENT
Start: 2021-11-14 | End: 2021-11-14

## 2021-11-13 RX ADMIN — LEVALBUTEROL 1.25 MG: 1.25 SOLUTION, CONCENTRATE RESPIRATORY (INHALATION) at 09:11

## 2021-11-13 RX ADMIN — AZITHROMYCIN MONOHYDRATE 500 MG: 250 TABLET ORAL at 09:11

## 2021-11-13 RX ADMIN — CEFTRIAXONE 1 G: 1 INJECTION, POWDER, FOR SOLUTION INTRAMUSCULAR; INTRAVENOUS at 11:11

## 2021-11-13 RX ADMIN — CEFTRIAXONE 1 G: 1 INJECTION, POWDER, FOR SOLUTION INTRAMUSCULAR; INTRAVENOUS at 09:11

## 2021-11-14 PROBLEM — J18.9 CAP (COMMUNITY ACQUIRED PNEUMONIA): Status: ACTIVE | Noted: 2021-11-14

## 2021-11-14 LAB
ALBUMIN SERPL BCP-MCNC: 2.9 G/DL (ref 3.5–5.2)
ALP SERPL-CCNC: 186 U/L (ref 55–135)
ALT SERPL W/O P-5'-P-CCNC: 46 U/L (ref 10–44)
ANION GAP SERPL CALC-SCNC: 9 MMOL/L (ref 8–16)
AST SERPL-CCNC: 30 U/L (ref 10–40)
BASOPHILS # BLD AUTO: 0.02 K/UL (ref 0–0.2)
BASOPHILS NFR BLD: 0.2 % (ref 0–1.9)
BILIRUB SERPL-MCNC: 0.3 MG/DL (ref 0.1–1)
BUN SERPL-MCNC: 8 MG/DL (ref 8–23)
CALCIUM SERPL-MCNC: 9.3 MG/DL (ref 8.7–10.5)
CHLORIDE SERPL-SCNC: 99 MMOL/L (ref 95–110)
CO2 SERPL-SCNC: 29 MMOL/L (ref 23–29)
CREAT SERPL-MCNC: 0.6 MG/DL (ref 0.5–1.4)
DIFFERENTIAL METHOD: ABNORMAL
EOSINOPHIL # BLD AUTO: 0 K/UL (ref 0–0.5)
EOSINOPHIL NFR BLD: 0 % (ref 0–8)
ERYTHROCYTE [DISTWIDTH] IN BLOOD BY AUTOMATED COUNT: 13.2 % (ref 11.5–14.5)
EST. GFR  (AFRICAN AMERICAN): >60 ML/MIN/1.73 M^2
EST. GFR  (NON AFRICAN AMERICAN): >60 ML/MIN/1.73 M^2
GLUCOSE SERPL-MCNC: 193 MG/DL (ref 70–110)
HCT VFR BLD AUTO: 35.9 % (ref 37–48.5)
HGB BLD-MCNC: 11.4 G/DL (ref 12–16)
IMM GRANULOCYTES # BLD AUTO: 0.06 K/UL (ref 0–0.04)
IMM GRANULOCYTES NFR BLD AUTO: 0.5 % (ref 0–0.5)
LYMPHOCYTES # BLD AUTO: 0.3 K/UL (ref 1–4.8)
LYMPHOCYTES NFR BLD: 2.3 % (ref 18–48)
MAGNESIUM SERPL-MCNC: 2.2 MG/DL (ref 1.6–2.6)
MCH RBC QN AUTO: 30.1 PG (ref 27–31)
MCHC RBC AUTO-ENTMCNC: 31.8 G/DL (ref 32–36)
MCV RBC AUTO: 95 FL (ref 82–98)
MONOCYTES # BLD AUTO: 0.3 K/UL (ref 0.3–1)
MONOCYTES NFR BLD: 2.6 % (ref 4–15)
NEUTROPHILS # BLD AUTO: 12.5 K/UL (ref 1.8–7.7)
NEUTROPHILS NFR BLD: 94.4 % (ref 38–73)
NRBC BLD-RTO: 0 /100 WBC
PLATELET # BLD AUTO: 369 K/UL (ref 150–450)
PMV BLD AUTO: 9.4 FL (ref 9.2–12.9)
POTASSIUM SERPL-SCNC: 3.6 MMOL/L (ref 3.5–5.1)
PROCALCITONIN SERPL IA-MCNC: 0.04 NG/ML
PROT SERPL-MCNC: 7 G/DL (ref 6–8.4)
RBC # BLD AUTO: 3.79 M/UL (ref 4–5.4)
SODIUM SERPL-SCNC: 137 MMOL/L (ref 136–145)
WBC # BLD AUTO: 13.23 K/UL (ref 3.9–12.7)

## 2021-11-14 PROCEDURE — 36415 COLL VENOUS BLD VENIPUNCTURE: CPT | Performed by: FAMILY MEDICINE

## 2021-11-14 PROCEDURE — 87205 SMEAR GRAM STAIN: CPT | Performed by: STUDENT IN AN ORGANIZED HEALTH CARE EDUCATION/TRAINING PROGRAM

## 2021-11-14 PROCEDURE — 25000242 PHARM REV CODE 250 ALT 637 W/ HCPCS: Performed by: FAMILY MEDICINE

## 2021-11-14 PROCEDURE — 63600175 PHARM REV CODE 636 W HCPCS: Performed by: FAMILY MEDICINE

## 2021-11-14 PROCEDURE — 94640 AIRWAY INHALATION TREATMENT: CPT

## 2021-11-14 PROCEDURE — 99220 PR INITIAL OBSERVATION CARE,LEVL III: ICD-10-PCS | Mod: ,,, | Performed by: FAMILY MEDICINE

## 2021-11-14 PROCEDURE — 94761 N-INVAS EAR/PLS OXIMETRY MLT: CPT

## 2021-11-14 PROCEDURE — 83735 ASSAY OF MAGNESIUM: CPT | Performed by: FAMILY MEDICINE

## 2021-11-14 PROCEDURE — 25000003 PHARM REV CODE 250: Performed by: STUDENT IN AN ORGANIZED HEALTH CARE EDUCATION/TRAINING PROGRAM

## 2021-11-14 PROCEDURE — G0378 HOSPITAL OBSERVATION PER HR: HCPCS

## 2021-11-14 PROCEDURE — 85025 COMPLETE CBC W/AUTO DIFF WBC: CPT | Performed by: FAMILY MEDICINE

## 2021-11-14 PROCEDURE — 84145 PROCALCITONIN (PCT): CPT | Performed by: FAMILY MEDICINE

## 2021-11-14 PROCEDURE — 27000221 HC OXYGEN, UP TO 24 HOURS

## 2021-11-14 PROCEDURE — 99220 PR INITIAL OBSERVATION CARE,LEVL III: CPT | Mod: ,,, | Performed by: FAMILY MEDICINE

## 2021-11-14 PROCEDURE — 63700000 PHARM REV CODE 250 ALT 637 W/O HCPCS: Performed by: FAMILY MEDICINE

## 2021-11-14 PROCEDURE — 80053 COMPREHEN METABOLIC PANEL: CPT | Performed by: FAMILY MEDICINE

## 2021-11-14 PROCEDURE — 87070 CULTURE OTHR SPECIMN AEROBIC: CPT | Performed by: STUDENT IN AN ORGANIZED HEALTH CARE EDUCATION/TRAINING PROGRAM

## 2021-11-14 PROCEDURE — 25000003 PHARM REV CODE 250: Performed by: FAMILY MEDICINE

## 2021-11-14 RX ADMIN — PREDNISONE 50 MG: 50 TABLET ORAL at 08:11

## 2021-11-14 RX ADMIN — HEPARIN SODIUM 5000 UNITS: 5000 INJECTION INTRAVENOUS; SUBCUTANEOUS at 09:11

## 2021-11-14 RX ADMIN — ROFLUMILAST 500 MCG: 500 TABLET ORAL at 08:11

## 2021-11-14 RX ADMIN — HEPARIN SODIUM 5000 UNITS: 5000 INJECTION INTRAVENOUS; SUBCUTANEOUS at 05:11

## 2021-11-14 RX ADMIN — LEVALBUTEROL HYDROCHLORIDE 1.25 MG: 1.25 SOLUTION, CONCENTRATE RESPIRATORY (INHALATION) at 08:11

## 2021-11-14 RX ADMIN — HEPARIN SODIUM 5000 UNITS: 5000 INJECTION INTRAVENOUS; SUBCUTANEOUS at 01:11

## 2021-11-14 RX ADMIN — LEVALBUTEROL HYDROCHLORIDE 1.25 MG: 1.25 SOLUTION, CONCENTRATE RESPIRATORY (INHALATION) at 04:11

## 2021-11-14 RX ADMIN — AZITHROMYCIN MONOHYDRATE 250 MG: 250 TABLET ORAL at 08:11

## 2021-11-14 RX ADMIN — LEVOFLOXACIN 750 MG: 500 TABLET, FILM COATED ORAL at 01:11

## 2021-11-14 RX ADMIN — LEVALBUTEROL HYDROCHLORIDE 1.25 MG: 1.25 SOLUTION, CONCENTRATE RESPIRATORY (INHALATION) at 02:11

## 2021-11-15 LAB
ALBUMIN SERPL BCP-MCNC: 2.9 G/DL (ref 3.5–5.2)
ALP SERPL-CCNC: 166 U/L (ref 55–135)
ALT SERPL W/O P-5'-P-CCNC: 35 U/L (ref 10–44)
ANION GAP SERPL CALC-SCNC: 10 MMOL/L (ref 8–16)
AST SERPL-CCNC: 21 U/L (ref 10–40)
BASOPHILS # BLD AUTO: 0.03 K/UL (ref 0–0.2)
BASOPHILS NFR BLD: 0.2 % (ref 0–1.9)
BILIRUB SERPL-MCNC: 0.3 MG/DL (ref 0.1–1)
BUN SERPL-MCNC: 16 MG/DL (ref 8–23)
CALCIUM SERPL-MCNC: 9.1 MG/DL (ref 8.7–10.5)
CHLORIDE SERPL-SCNC: 102 MMOL/L (ref 95–110)
CO2 SERPL-SCNC: 28 MMOL/L (ref 23–29)
CREAT SERPL-MCNC: 0.6 MG/DL (ref 0.5–1.4)
DIFFERENTIAL METHOD: ABNORMAL
EOSINOPHIL # BLD AUTO: 0.1 K/UL (ref 0–0.5)
EOSINOPHIL NFR BLD: 0.3 % (ref 0–8)
ERYTHROCYTE [DISTWIDTH] IN BLOOD BY AUTOMATED COUNT: 13.1 % (ref 11.5–14.5)
EST. GFR  (AFRICAN AMERICAN): >60 ML/MIN/1.73 M^2
EST. GFR  (NON AFRICAN AMERICAN): >60 ML/MIN/1.73 M^2
GLUCOSE SERPL-MCNC: 96 MG/DL (ref 70–110)
HCT VFR BLD AUTO: 35.8 % (ref 37–48.5)
HCV AB SERPL QL IA: NEGATIVE
HGB BLD-MCNC: 11 G/DL (ref 12–16)
IMM GRANULOCYTES # BLD AUTO: 0.09 K/UL (ref 0–0.04)
IMM GRANULOCYTES NFR BLD AUTO: 0.5 % (ref 0–0.5)
LYMPHOCYTES # BLD AUTO: 2 K/UL (ref 1–4.8)
LYMPHOCYTES NFR BLD: 11.5 % (ref 18–48)
MAGNESIUM SERPL-MCNC: 2.2 MG/DL (ref 1.6–2.6)
MCH RBC QN AUTO: 30.1 PG (ref 27–31)
MCHC RBC AUTO-ENTMCNC: 30.7 G/DL (ref 32–36)
MCV RBC AUTO: 98 FL (ref 82–98)
MONOCYTES # BLD AUTO: 2 K/UL (ref 0.3–1)
MONOCYTES NFR BLD: 11.6 % (ref 4–15)
NEUTROPHILS # BLD AUTO: 12.9 K/UL (ref 1.8–7.7)
NEUTROPHILS NFR BLD: 75.9 % (ref 38–73)
NRBC BLD-RTO: 0 /100 WBC
PLATELET # BLD AUTO: 486 K/UL (ref 150–450)
PMV BLD AUTO: 9.5 FL (ref 9.2–12.9)
POTASSIUM SERPL-SCNC: 3.8 MMOL/L (ref 3.5–5.1)
PROCALCITONIN SERPL IA-MCNC: 0.03 NG/ML
PROT SERPL-MCNC: 6.2 G/DL (ref 6–8.4)
RBC # BLD AUTO: 3.66 M/UL (ref 4–5.4)
SODIUM SERPL-SCNC: 140 MMOL/L (ref 136–145)
WBC # BLD AUTO: 16.97 K/UL (ref 3.9–12.7)

## 2021-11-15 PROCEDURE — 36415 COLL VENOUS BLD VENIPUNCTURE: CPT | Performed by: FAMILY MEDICINE

## 2021-11-15 PROCEDURE — 84145 PROCALCITONIN (PCT): CPT | Performed by: STUDENT IN AN ORGANIZED HEALTH CARE EDUCATION/TRAINING PROGRAM

## 2021-11-15 PROCEDURE — 94640 AIRWAY INHALATION TREATMENT: CPT

## 2021-11-15 PROCEDURE — 99900035 HC TECH TIME PER 15 MIN (STAT)

## 2021-11-15 PROCEDURE — 25000003 PHARM REV CODE 250: Performed by: STUDENT IN AN ORGANIZED HEALTH CARE EDUCATION/TRAINING PROGRAM

## 2021-11-15 PROCEDURE — 85025 COMPLETE CBC W/AUTO DIFF WBC: CPT | Performed by: FAMILY MEDICINE

## 2021-11-15 PROCEDURE — 25000242 PHARM REV CODE 250 ALT 637 W/ HCPCS: Performed by: FAMILY MEDICINE

## 2021-11-15 PROCEDURE — 63600175 PHARM REV CODE 636 W HCPCS: Performed by: STUDENT IN AN ORGANIZED HEALTH CARE EDUCATION/TRAINING PROGRAM

## 2021-11-15 PROCEDURE — 94761 N-INVAS EAR/PLS OXIMETRY MLT: CPT

## 2021-11-15 PROCEDURE — 25000003 PHARM REV CODE 250: Performed by: FAMILY MEDICINE

## 2021-11-15 PROCEDURE — 63600175 PHARM REV CODE 636 W HCPCS: Performed by: FAMILY MEDICINE

## 2021-11-15 PROCEDURE — 11000001 HC ACUTE MED/SURG PRIVATE ROOM

## 2021-11-15 PROCEDURE — 83735 ASSAY OF MAGNESIUM: CPT | Performed by: FAMILY MEDICINE

## 2021-11-15 PROCEDURE — 25000242 PHARM REV CODE 250 ALT 637 W/ HCPCS: Performed by: STUDENT IN AN ORGANIZED HEALTH CARE EDUCATION/TRAINING PROGRAM

## 2021-11-15 PROCEDURE — 25000003 PHARM REV CODE 250: Performed by: PHYSICIAN ASSISTANT

## 2021-11-15 PROCEDURE — 80053 COMPREHEN METABOLIC PANEL: CPT | Performed by: FAMILY MEDICINE

## 2021-11-15 PROCEDURE — 27000221 HC OXYGEN, UP TO 24 HOURS

## 2021-11-15 PROCEDURE — 36415 COLL VENOUS BLD VENIPUNCTURE: CPT | Performed by: STUDENT IN AN ORGANIZED HEALTH CARE EDUCATION/TRAINING PROGRAM

## 2021-11-15 RX ORDER — PANTOPRAZOLE SODIUM 40 MG/1
40 TABLET, DELAYED RELEASE ORAL DAILY
Status: DISCONTINUED | OUTPATIENT
Start: 2021-11-15 | End: 2021-11-18 | Stop reason: HOSPADM

## 2021-11-15 RX ORDER — LEVOFLOXACIN 750 MG/1
750 TABLET ORAL DAILY
Qty: 5 TABLET | Refills: 0 | Status: SHIPPED | OUTPATIENT
Start: 2021-11-16 | End: 2021-11-23

## 2021-11-15 RX ORDER — AMOXICILLIN 250 MG
1 CAPSULE ORAL 2 TIMES DAILY PRN
Status: DISCONTINUED | OUTPATIENT
Start: 2021-11-15 | End: 2021-11-18 | Stop reason: HOSPADM

## 2021-11-15 RX ORDER — POLYETHYLENE GLYCOL 3350 17 G/17G
17 POWDER, FOR SOLUTION ORAL DAILY
Status: DISCONTINUED | OUTPATIENT
Start: 2021-11-15 | End: 2021-11-18 | Stop reason: HOSPADM

## 2021-11-15 RX ORDER — BISACODYL 10 MG
10 SUPPOSITORY, RECTAL RECTAL DAILY PRN
Status: DISCONTINUED | OUTPATIENT
Start: 2021-11-15 | End: 2021-11-18 | Stop reason: HOSPADM

## 2021-11-15 RX ORDER — PREDNISONE 20 MG/1
TABLET ORAL
Qty: 25 TABLET | Refills: 0 | Status: SHIPPED | OUTPATIENT
Start: 2021-11-15 | End: 2022-02-14

## 2021-11-15 RX ORDER — ENOXAPARIN SODIUM 100 MG/ML
40 INJECTION SUBCUTANEOUS EVERY 24 HOURS
Status: DISCONTINUED | OUTPATIENT
Start: 2021-11-15 | End: 2021-11-18 | Stop reason: HOSPADM

## 2021-11-15 RX ORDER — PROCHLORPERAZINE EDISYLATE 5 MG/ML
5 INJECTION INTRAMUSCULAR; INTRAVENOUS EVERY 6 HOURS PRN
Status: DISCONTINUED | OUTPATIENT
Start: 2021-11-15 | End: 2021-11-18 | Stop reason: HOSPADM

## 2021-11-15 RX ORDER — METHYLPREDNISOLONE SOD SUCC 125 MG
80 VIAL (EA) INJECTION EVERY 8 HOURS
Status: DISCONTINUED | OUTPATIENT
Start: 2021-11-15 | End: 2021-11-16

## 2021-11-15 RX ORDER — FLUTICASONE PROPIONATE 50 MCG
1 SPRAY, SUSPENSION (ML) NASAL DAILY
Status: DISCONTINUED | OUTPATIENT
Start: 2021-11-15 | End: 2021-11-18 | Stop reason: HOSPADM

## 2021-11-15 RX ADMIN — LEVALBUTEROL HYDROCHLORIDE 1.25 MG: 1.25 SOLUTION, CONCENTRATE RESPIRATORY (INHALATION) at 02:11

## 2021-11-15 RX ADMIN — HEPARIN SODIUM 5000 UNITS: 5000 INJECTION INTRAVENOUS; SUBCUTANEOUS at 05:11

## 2021-11-15 RX ADMIN — ROFLUMILAST 500 MCG: 500 TABLET ORAL at 09:11

## 2021-11-15 RX ADMIN — METHYLPREDNISOLONE SODIUM SUCCINATE 80 MG: 125 INJECTION, POWDER, FOR SOLUTION INTRAMUSCULAR; INTRAVENOUS at 11:11

## 2021-11-15 RX ADMIN — LEVALBUTEROL HYDROCHLORIDE 1.25 MG: 1.25 SOLUTION, CONCENTRATE RESPIRATORY (INHALATION) at 10:11

## 2021-11-15 RX ADMIN — METHYLPREDNISOLONE SODIUM SUCCINATE 80 MG: 125 INJECTION, POWDER, FOR SOLUTION INTRAMUSCULAR; INTRAVENOUS at 09:11

## 2021-11-15 RX ADMIN — LEVALBUTEROL HYDROCHLORIDE 1.25 MG: 1.25 SOLUTION, CONCENTRATE RESPIRATORY (INHALATION) at 12:11

## 2021-11-15 RX ADMIN — POLYETHYLENE GLYCOL 3350 17 G: 17 POWDER, FOR SOLUTION ORAL at 09:11

## 2021-11-15 RX ADMIN — SENNOSIDES AND DOCUSATE SODIUM 1 TABLET: 50; 8.6 TABLET ORAL at 01:11

## 2021-11-15 RX ADMIN — FLUTICASONE PROPIONATE 50 MCG: 50 SPRAY, METERED NASAL at 11:11

## 2021-11-15 RX ADMIN — LEVALBUTEROL HYDROCHLORIDE 1.25 MG: 1.25 SOLUTION, CONCENTRATE RESPIRATORY (INHALATION) at 07:11

## 2021-11-15 RX ADMIN — ENOXAPARIN SODIUM 40 MG: 40 INJECTION, SOLUTION INTRAVENOUS; SUBCUTANEOUS at 04:11

## 2021-11-15 RX ADMIN — ACETAMINOPHEN 1000 MG: 500 TABLET ORAL at 07:11

## 2021-11-15 RX ADMIN — PREDNISONE 50 MG: 50 TABLET ORAL at 09:11

## 2021-11-15 RX ADMIN — PANTOPRAZOLE SODIUM 40 MG: 40 TABLET, DELAYED RELEASE ORAL at 10:11

## 2021-11-15 RX ADMIN — LEVOFLOXACIN 750 MG: 500 TABLET, FILM COATED ORAL at 09:11

## 2021-11-15 RX ADMIN — FLUTICASONE FUROATE AND VILANTEROL TRIFENATATE 1 PUFF: 200; 25 POWDER RESPIRATORY (INHALATION) at 10:11

## 2021-11-16 LAB
ALBUMIN SERPL BCP-MCNC: 2.7 G/DL (ref 3.5–5.2)
ALP SERPL-CCNC: 150 U/L (ref 55–135)
ALT SERPL W/O P-5'-P-CCNC: 30 U/L (ref 10–44)
ANION GAP SERPL CALC-SCNC: 5 MMOL/L (ref 8–16)
AST SERPL-CCNC: 18 U/L (ref 10–40)
BASOPHILS # BLD AUTO: 0.01 K/UL (ref 0–0.2)
BASOPHILS NFR BLD: 0.1 % (ref 0–1.9)
BILIRUB SERPL-MCNC: 0.2 MG/DL (ref 0.1–1)
BUN SERPL-MCNC: 11 MG/DL (ref 8–23)
CALCIUM SERPL-MCNC: 8.8 MG/DL (ref 8.7–10.5)
CHLORIDE SERPL-SCNC: 101 MMOL/L (ref 95–110)
CO2 SERPL-SCNC: 31 MMOL/L (ref 23–29)
CREAT SERPL-MCNC: 0.5 MG/DL (ref 0.5–1.4)
DIFFERENTIAL METHOD: ABNORMAL
EOSINOPHIL # BLD AUTO: 0 K/UL (ref 0–0.5)
EOSINOPHIL NFR BLD: 0 % (ref 0–8)
ERYTHROCYTE [DISTWIDTH] IN BLOOD BY AUTOMATED COUNT: 13.1 % (ref 11.5–14.5)
EST. GFR  (AFRICAN AMERICAN): >60 ML/MIN/1.73 M^2
EST. GFR  (NON AFRICAN AMERICAN): >60 ML/MIN/1.73 M^2
GLUCOSE SERPL-MCNC: 142 MG/DL (ref 70–110)
HCT VFR BLD AUTO: 34.2 % (ref 37–48.5)
HGB BLD-MCNC: 11 G/DL (ref 12–16)
IMM GRANULOCYTES # BLD AUTO: 0.12 K/UL (ref 0–0.04)
IMM GRANULOCYTES NFR BLD AUTO: 1.1 % (ref 0–0.5)
LYMPHOCYTES # BLD AUTO: 0.6 K/UL (ref 1–4.8)
LYMPHOCYTES NFR BLD: 5.5 % (ref 18–48)
MAGNESIUM SERPL-MCNC: 2.4 MG/DL (ref 1.6–2.6)
MCH RBC QN AUTO: 30.6 PG (ref 27–31)
MCHC RBC AUTO-ENTMCNC: 32.2 G/DL (ref 32–36)
MCV RBC AUTO: 95 FL (ref 82–98)
MONOCYTES # BLD AUTO: 0.9 K/UL (ref 0.3–1)
MONOCYTES NFR BLD: 7.9 % (ref 4–15)
NEUTROPHILS # BLD AUTO: 9.3 K/UL (ref 1.8–7.7)
NEUTROPHILS NFR BLD: 85.4 % (ref 38–73)
NRBC BLD-RTO: 0 /100 WBC
PLATELET # BLD AUTO: 429 K/UL (ref 150–450)
PMV BLD AUTO: 9.2 FL (ref 9.2–12.9)
POTASSIUM SERPL-SCNC: 3.9 MMOL/L (ref 3.5–5.1)
PROT SERPL-MCNC: 6.3 G/DL (ref 6–8.4)
RBC # BLD AUTO: 3.59 M/UL (ref 4–5.4)
SODIUM SERPL-SCNC: 137 MMOL/L (ref 136–145)
WBC # BLD AUTO: 10.92 K/UL (ref 3.9–12.7)

## 2021-11-16 PROCEDURE — 36415 COLL VENOUS BLD VENIPUNCTURE: CPT | Performed by: FAMILY MEDICINE

## 2021-11-16 PROCEDURE — 27000221 HC OXYGEN, UP TO 24 HOURS

## 2021-11-16 PROCEDURE — 85025 COMPLETE CBC W/AUTO DIFF WBC: CPT | Performed by: FAMILY MEDICINE

## 2021-11-16 PROCEDURE — 25000242 PHARM REV CODE 250 ALT 637 W/ HCPCS: Performed by: STUDENT IN AN ORGANIZED HEALTH CARE EDUCATION/TRAINING PROGRAM

## 2021-11-16 PROCEDURE — 99900035 HC TECH TIME PER 15 MIN (STAT)

## 2021-11-16 PROCEDURE — 63600175 PHARM REV CODE 636 W HCPCS: Performed by: STUDENT IN AN ORGANIZED HEALTH CARE EDUCATION/TRAINING PROGRAM

## 2021-11-16 PROCEDURE — 80053 COMPREHEN METABOLIC PANEL: CPT | Performed by: FAMILY MEDICINE

## 2021-11-16 PROCEDURE — 99232 SBSQ HOSP IP/OBS MODERATE 35: CPT | Mod: ,,, | Performed by: STUDENT IN AN ORGANIZED HEALTH CARE EDUCATION/TRAINING PROGRAM

## 2021-11-16 PROCEDURE — 25000242 PHARM REV CODE 250 ALT 637 W/ HCPCS: Performed by: FAMILY MEDICINE

## 2021-11-16 PROCEDURE — 25000003 PHARM REV CODE 250: Performed by: FAMILY MEDICINE

## 2021-11-16 PROCEDURE — 25000003 PHARM REV CODE 250: Performed by: STUDENT IN AN ORGANIZED HEALTH CARE EDUCATION/TRAINING PROGRAM

## 2021-11-16 PROCEDURE — 25000003 PHARM REV CODE 250: Performed by: PHYSICIAN ASSISTANT

## 2021-11-16 PROCEDURE — 94640 AIRWAY INHALATION TREATMENT: CPT

## 2021-11-16 PROCEDURE — 11000001 HC ACUTE MED/SURG PRIVATE ROOM

## 2021-11-16 PROCEDURE — 83735 ASSAY OF MAGNESIUM: CPT | Performed by: FAMILY MEDICINE

## 2021-11-16 PROCEDURE — 94761 N-INVAS EAR/PLS OXIMETRY MLT: CPT

## 2021-11-16 PROCEDURE — 99232 PR SUBSEQUENT HOSPITAL CARE,LEVL II: ICD-10-PCS | Mod: ,,, | Performed by: STUDENT IN AN ORGANIZED HEALTH CARE EDUCATION/TRAINING PROGRAM

## 2021-11-16 RX ORDER — METHYLPREDNISOLONE SOD SUCC 125 MG
80 VIAL (EA) INJECTION 2 TIMES DAILY
Status: DISCONTINUED | OUTPATIENT
Start: 2021-11-16 | End: 2021-11-17

## 2021-11-16 RX ADMIN — LEVALBUTEROL HYDROCHLORIDE 1.25 MG: 1.25 SOLUTION, CONCENTRATE RESPIRATORY (INHALATION) at 07:11

## 2021-11-16 RX ADMIN — FLUTICASONE FUROATE AND VILANTEROL TRIFENATATE 1 PUFF: 200; 25 POWDER RESPIRATORY (INHALATION) at 07:11

## 2021-11-16 RX ADMIN — FLUTICASONE PROPIONATE 50 MCG: 50 SPRAY, METERED NASAL at 08:11

## 2021-11-16 RX ADMIN — METHYLPREDNISOLONE SODIUM SUCCINATE 80 MG: 125 INJECTION, POWDER, FOR SOLUTION INTRAMUSCULAR; INTRAVENOUS at 08:11

## 2021-11-16 RX ADMIN — METHYLPREDNISOLONE SODIUM SUCCINATE 80 MG: 125 INJECTION, POWDER, FOR SOLUTION INTRAMUSCULAR; INTRAVENOUS at 05:11

## 2021-11-16 RX ADMIN — POLYETHYLENE GLYCOL 3350 17 G: 17 POWDER, FOR SOLUTION ORAL at 08:11

## 2021-11-16 RX ADMIN — LEVALBUTEROL HYDROCHLORIDE 1.25 MG: 1.25 SOLUTION, CONCENTRATE RESPIRATORY (INHALATION) at 03:11

## 2021-11-16 RX ADMIN — ENOXAPARIN SODIUM 40 MG: 40 INJECTION, SOLUTION INTRAVENOUS; SUBCUTANEOUS at 05:11

## 2021-11-16 RX ADMIN — ROFLUMILAST 500 MCG: 500 TABLET ORAL at 08:11

## 2021-11-16 RX ADMIN — UMECLIDINIUM 62.5 MCG: 62.5 AEROSOL, POWDER ORAL at 08:11

## 2021-11-16 RX ADMIN — PANTOPRAZOLE SODIUM 40 MG: 40 TABLET, DELAYED RELEASE ORAL at 08:11

## 2021-11-16 RX ADMIN — LEVOFLOXACIN 750 MG: 500 TABLET, FILM COATED ORAL at 08:11

## 2021-11-17 LAB
ALBUMIN SERPL BCP-MCNC: 2.9 G/DL (ref 3.5–5.2)
ALP SERPL-CCNC: 141 U/L (ref 55–135)
ALT SERPL W/O P-5'-P-CCNC: 26 U/L (ref 10–44)
ANION GAP SERPL CALC-SCNC: 9 MMOL/L (ref 8–16)
AST SERPL-CCNC: 15 U/L (ref 10–40)
BACTERIA SPEC AEROBE CULT: NORMAL
BACTERIA SPEC AEROBE CULT: NORMAL
BASOPHILS # BLD AUTO: 0.02 K/UL (ref 0–0.2)
BASOPHILS NFR BLD: 0.2 % (ref 0–1.9)
BILIRUB SERPL-MCNC: 0.3 MG/DL (ref 0.1–1)
BUN SERPL-MCNC: 16 MG/DL (ref 8–23)
CALCIUM SERPL-MCNC: 8.7 MG/DL (ref 8.7–10.5)
CHLORIDE SERPL-SCNC: 100 MMOL/L (ref 95–110)
CO2 SERPL-SCNC: 27 MMOL/L (ref 23–29)
CREAT SERPL-MCNC: 0.6 MG/DL (ref 0.5–1.4)
DIFFERENTIAL METHOD: ABNORMAL
EOSINOPHIL # BLD AUTO: 0 K/UL (ref 0–0.5)
EOSINOPHIL NFR BLD: 0 % (ref 0–8)
ERYTHROCYTE [DISTWIDTH] IN BLOOD BY AUTOMATED COUNT: 13.2 % (ref 11.5–14.5)
EST. GFR  (AFRICAN AMERICAN): >60 ML/MIN/1.73 M^2
EST. GFR  (NON AFRICAN AMERICAN): >60 ML/MIN/1.73 M^2
GLUCOSE SERPL-MCNC: 126 MG/DL (ref 70–110)
GRAM STN SPEC: NORMAL
HCT VFR BLD AUTO: 34.8 % (ref 37–48.5)
HGB BLD-MCNC: 11.3 G/DL (ref 12–16)
IMM GRANULOCYTES # BLD AUTO: 0.18 K/UL (ref 0–0.04)
IMM GRANULOCYTES NFR BLD AUTO: 1.5 % (ref 0–0.5)
LYMPHOCYTES # BLD AUTO: 0.8 K/UL (ref 1–4.8)
LYMPHOCYTES NFR BLD: 7.2 % (ref 18–48)
MAGNESIUM SERPL-MCNC: 2.4 MG/DL (ref 1.6–2.6)
MCH RBC QN AUTO: 30.9 PG (ref 27–31)
MCHC RBC AUTO-ENTMCNC: 32.5 G/DL (ref 32–36)
MCV RBC AUTO: 95 FL (ref 82–98)
MONOCYTES # BLD AUTO: 0.9 K/UL (ref 0.3–1)
MONOCYTES NFR BLD: 8 % (ref 4–15)
NEUTROPHILS # BLD AUTO: 9.7 K/UL (ref 1.8–7.7)
NEUTROPHILS NFR BLD: 83.1 % (ref 38–73)
NRBC BLD-RTO: 0 /100 WBC
PLATELET # BLD AUTO: 494 K/UL (ref 150–450)
PMV BLD AUTO: 9.2 FL (ref 9.2–12.9)
POTASSIUM SERPL-SCNC: 4.3 MMOL/L (ref 3.5–5.1)
PROT SERPL-MCNC: 6 G/DL (ref 6–8.4)
RBC # BLD AUTO: 3.66 M/UL (ref 4–5.4)
SODIUM SERPL-SCNC: 136 MMOL/L (ref 136–145)
WBC # BLD AUTO: 11.62 K/UL (ref 3.9–12.7)

## 2021-11-17 PROCEDURE — 94640 AIRWAY INHALATION TREATMENT: CPT

## 2021-11-17 PROCEDURE — 27000221 HC OXYGEN, UP TO 24 HOURS

## 2021-11-17 PROCEDURE — 94760 N-INVAS EAR/PLS OXIMETRY 1: CPT

## 2021-11-17 PROCEDURE — 25000242 PHARM REV CODE 250 ALT 637 W/ HCPCS: Performed by: FAMILY MEDICINE

## 2021-11-17 PROCEDURE — 99900035 HC TECH TIME PER 15 MIN (STAT)

## 2021-11-17 PROCEDURE — 11000001 HC ACUTE MED/SURG PRIVATE ROOM

## 2021-11-17 PROCEDURE — 85025 COMPLETE CBC W/AUTO DIFF WBC: CPT | Performed by: FAMILY MEDICINE

## 2021-11-17 PROCEDURE — 94761 N-INVAS EAR/PLS OXIMETRY MLT: CPT

## 2021-11-17 PROCEDURE — 25000003 PHARM REV CODE 250: Performed by: FAMILY MEDICINE

## 2021-11-17 PROCEDURE — 36415 COLL VENOUS BLD VENIPUNCTURE: CPT | Performed by: FAMILY MEDICINE

## 2021-11-17 PROCEDURE — 25000242 PHARM REV CODE 250 ALT 637 W/ HCPCS: Performed by: STUDENT IN AN ORGANIZED HEALTH CARE EDUCATION/TRAINING PROGRAM

## 2021-11-17 PROCEDURE — 25000003 PHARM REV CODE 250: Performed by: PHYSICIAN ASSISTANT

## 2021-11-17 PROCEDURE — 83735 ASSAY OF MAGNESIUM: CPT | Performed by: FAMILY MEDICINE

## 2021-11-17 PROCEDURE — 25000003 PHARM REV CODE 250: Performed by: STUDENT IN AN ORGANIZED HEALTH CARE EDUCATION/TRAINING PROGRAM

## 2021-11-17 PROCEDURE — 63600175 PHARM REV CODE 636 W HCPCS: Performed by: STUDENT IN AN ORGANIZED HEALTH CARE EDUCATION/TRAINING PROGRAM

## 2021-11-17 PROCEDURE — 99232 PR SUBSEQUENT HOSPITAL CARE,LEVL II: ICD-10-PCS | Mod: ,,, | Performed by: STUDENT IN AN ORGANIZED HEALTH CARE EDUCATION/TRAINING PROGRAM

## 2021-11-17 PROCEDURE — 99232 SBSQ HOSP IP/OBS MODERATE 35: CPT | Mod: ,,, | Performed by: STUDENT IN AN ORGANIZED HEALTH CARE EDUCATION/TRAINING PROGRAM

## 2021-11-17 PROCEDURE — 80053 COMPREHEN METABOLIC PANEL: CPT | Performed by: FAMILY MEDICINE

## 2021-11-17 RX ORDER — METHYLPREDNISOLONE SOD SUCC 125 MG
80 VIAL (EA) INJECTION DAILY
Status: DISCONTINUED | OUTPATIENT
Start: 2021-11-18 | End: 2021-11-18 | Stop reason: HOSPADM

## 2021-11-17 RX ADMIN — PANTOPRAZOLE SODIUM 40 MG: 40 TABLET, DELAYED RELEASE ORAL at 09:11

## 2021-11-17 RX ADMIN — FLUTICASONE PROPIONATE 50 MCG: 50 SPRAY, METERED NASAL at 09:11

## 2021-11-17 RX ADMIN — LEVOFLOXACIN 750 MG: 500 TABLET, FILM COATED ORAL at 09:11

## 2021-11-17 RX ADMIN — ROFLUMILAST 500 MCG: 500 TABLET ORAL at 09:11

## 2021-11-17 RX ADMIN — ENOXAPARIN SODIUM 40 MG: 40 INJECTION, SOLUTION INTRAVENOUS; SUBCUTANEOUS at 05:11

## 2021-11-17 RX ADMIN — METHYLPREDNISOLONE SODIUM SUCCINATE 80 MG: 125 INJECTION, POWDER, FOR SOLUTION INTRAMUSCULAR; INTRAVENOUS at 09:11

## 2021-11-17 RX ADMIN — LEVALBUTEROL HYDROCHLORIDE 1.25 MG: 1.25 SOLUTION, CONCENTRATE RESPIRATORY (INHALATION) at 10:11

## 2021-11-17 RX ADMIN — POLYETHYLENE GLYCOL 3350 17 G: 17 POWDER, FOR SOLUTION ORAL at 09:11

## 2021-11-17 RX ADMIN — LEVALBUTEROL HYDROCHLORIDE 1.25 MG: 1.25 SOLUTION, CONCENTRATE RESPIRATORY (INHALATION) at 03:11

## 2021-11-17 RX ADMIN — UMECLIDINIUM 62.5 MCG: 62.5 AEROSOL, POWDER ORAL at 10:11

## 2021-11-17 RX ADMIN — FLUTICASONE FUROATE AND VILANTEROL TRIFENATATE 1 PUFF: 200; 25 POWDER RESPIRATORY (INHALATION) at 10:11

## 2021-11-18 VITALS
DIASTOLIC BLOOD PRESSURE: 77 MMHG | SYSTOLIC BLOOD PRESSURE: 129 MMHG | BODY MASS INDEX: 24.43 KG/M2 | RESPIRATION RATE: 18 BRPM | HEART RATE: 106 BPM | OXYGEN SATURATION: 94 % | WEIGHT: 161.19 LBS | TEMPERATURE: 97 F | HEIGHT: 68 IN

## 2021-11-18 PROBLEM — J96.22 ACUTE ON CHRONIC RESPIRATORY FAILURE WITH HYPOXIA AND HYPERCAPNIA: Status: RESOLVED | Noted: 2019-01-16 | Resolved: 2021-11-18

## 2021-11-18 PROBLEM — R05.9 COUGH IN ADULT: Status: RESOLVED | Noted: 2018-07-02 | Resolved: 2021-11-18

## 2021-11-18 PROBLEM — R03.0 ELEVATED BLOOD PRESSURE READING: Status: RESOLVED | Noted: 2018-07-02 | Resolved: 2021-11-18

## 2021-11-18 PROBLEM — R09.82 POST-NASAL DRIP: Status: RESOLVED | Noted: 2019-03-22 | Resolved: 2021-11-18

## 2021-11-18 PROBLEM — Z86.16 HISTORY OF COVID-19: Status: RESOLVED | Noted: 2021-01-17 | Resolved: 2021-11-18

## 2021-11-18 PROBLEM — J96.21 ACUTE ON CHRONIC RESPIRATORY FAILURE WITH HYPOXIA AND HYPERCAPNIA: Status: RESOLVED | Noted: 2019-01-16 | Resolved: 2021-11-18

## 2021-11-18 LAB
ALBUMIN SERPL BCP-MCNC: 3.1 G/DL (ref 3.5–5.2)
ALP SERPL-CCNC: 135 U/L (ref 55–135)
ALT SERPL W/O P-5'-P-CCNC: 31 U/L (ref 10–44)
ANION GAP SERPL CALC-SCNC: 8 MMOL/L (ref 8–16)
AST SERPL-CCNC: 18 U/L (ref 10–40)
BASOPHILS # BLD AUTO: 0.09 K/UL (ref 0–0.2)
BASOPHILS NFR BLD: 0.7 % (ref 0–1.9)
BILIRUB SERPL-MCNC: 0.2 MG/DL (ref 0.1–1)
BUN SERPL-MCNC: 17 MG/DL (ref 8–23)
CALCIUM SERPL-MCNC: 8.9 MG/DL (ref 8.7–10.5)
CHLORIDE SERPL-SCNC: 99 MMOL/L (ref 95–110)
CO2 SERPL-SCNC: 30 MMOL/L (ref 23–29)
CREAT SERPL-MCNC: 0.6 MG/DL (ref 0.5–1.4)
DIFFERENTIAL METHOD: ABNORMAL
EOSINOPHIL # BLD AUTO: 0.1 K/UL (ref 0–0.5)
EOSINOPHIL NFR BLD: 0.7 % (ref 0–8)
ERYTHROCYTE [DISTWIDTH] IN BLOOD BY AUTOMATED COUNT: 13.2 % (ref 11.5–14.5)
EST. GFR  (AFRICAN AMERICAN): >60 ML/MIN/1.73 M^2
EST. GFR  (NON AFRICAN AMERICAN): >60 ML/MIN/1.73 M^2
GLUCOSE SERPL-MCNC: 77 MG/DL (ref 70–110)
HCT VFR BLD AUTO: 37 % (ref 37–48.5)
HGB BLD-MCNC: 11.9 G/DL (ref 12–16)
IMM GRANULOCYTES # BLD AUTO: 0.51 K/UL (ref 0–0.04)
IMM GRANULOCYTES NFR BLD AUTO: 4 % (ref 0–0.5)
LYMPHOCYTES # BLD AUTO: 2.7 K/UL (ref 1–4.8)
LYMPHOCYTES NFR BLD: 20.9 % (ref 18–48)
MAGNESIUM SERPL-MCNC: 2.3 MG/DL (ref 1.6–2.6)
MCH RBC QN AUTO: 30.4 PG (ref 27–31)
MCHC RBC AUTO-ENTMCNC: 32.2 G/DL (ref 32–36)
MCV RBC AUTO: 94 FL (ref 82–98)
MONOCYTES # BLD AUTO: 1.8 K/UL (ref 0.3–1)
MONOCYTES NFR BLD: 13.8 % (ref 4–15)
NEUTROPHILS # BLD AUTO: 7.7 K/UL (ref 1.8–7.7)
NEUTROPHILS NFR BLD: 59.9 % (ref 38–73)
NRBC BLD-RTO: 0 /100 WBC
PLATELET # BLD AUTO: 522 K/UL (ref 150–450)
PMV BLD AUTO: 8.8 FL (ref 9.2–12.9)
POTASSIUM SERPL-SCNC: 3.6 MMOL/L (ref 3.5–5.1)
PROT SERPL-MCNC: 6.1 G/DL (ref 6–8.4)
RBC # BLD AUTO: 3.92 M/UL (ref 4–5.4)
SODIUM SERPL-SCNC: 137 MMOL/L (ref 136–145)
WBC # BLD AUTO: 12.82 K/UL (ref 3.9–12.7)

## 2021-11-18 PROCEDURE — 25000003 PHARM REV CODE 250: Performed by: FAMILY MEDICINE

## 2021-11-18 PROCEDURE — 99900035 HC TECH TIME PER 15 MIN (STAT)

## 2021-11-18 PROCEDURE — 85025 COMPLETE CBC W/AUTO DIFF WBC: CPT | Performed by: FAMILY MEDICINE

## 2021-11-18 PROCEDURE — 99239 HOSP IP/OBS DSCHRG MGMT >30: CPT | Mod: ,,, | Performed by: INTERNAL MEDICINE

## 2021-11-18 PROCEDURE — 94640 AIRWAY INHALATION TREATMENT: CPT

## 2021-11-18 PROCEDURE — 80053 COMPREHEN METABOLIC PANEL: CPT | Performed by: FAMILY MEDICINE

## 2021-11-18 PROCEDURE — 83735 ASSAY OF MAGNESIUM: CPT | Performed by: FAMILY MEDICINE

## 2021-11-18 PROCEDURE — 27000221 HC OXYGEN, UP TO 24 HOURS

## 2021-11-18 PROCEDURE — 94761 N-INVAS EAR/PLS OXIMETRY MLT: CPT

## 2021-11-18 PROCEDURE — 36415 COLL VENOUS BLD VENIPUNCTURE: CPT | Performed by: FAMILY MEDICINE

## 2021-11-18 PROCEDURE — 25000003 PHARM REV CODE 250: Performed by: STUDENT IN AN ORGANIZED HEALTH CARE EDUCATION/TRAINING PROGRAM

## 2021-11-18 PROCEDURE — 99239 PR HOSPITAL DISCHARGE DAY,>30 MIN: ICD-10-PCS | Mod: ,,, | Performed by: INTERNAL MEDICINE

## 2021-11-18 PROCEDURE — 25000242 PHARM REV CODE 250 ALT 637 W/ HCPCS: Performed by: FAMILY MEDICINE

## 2021-11-18 PROCEDURE — 63600175 PHARM REV CODE 636 W HCPCS: Performed by: STUDENT IN AN ORGANIZED HEALTH CARE EDUCATION/TRAINING PROGRAM

## 2021-11-18 RX ORDER — PREDNISONE 20 MG/1
TABLET ORAL
Qty: 25 TABLET | Refills: 0 | Status: SHIPPED | OUTPATIENT
Start: 2021-11-18 | End: 2022-02-14

## 2021-11-18 RX ORDER — ROFLUMILAST 250 UG/1
1 TABLET ORAL DAILY
Qty: 30 TABLET | Refills: 11 | Status: SHIPPED | OUTPATIENT
Start: 2021-11-18 | End: 2022-03-07

## 2021-11-18 RX ORDER — LEVALBUTEROL 1.25 MG/.5ML
SOLUTION, CONCENTRATE RESPIRATORY (INHALATION)
Status: COMPLETED
Start: 2021-11-18 | End: 2021-11-18

## 2021-11-18 RX ORDER — LEVOFLOXACIN 750 MG/1
750 TABLET ORAL DAILY
Qty: 5 TABLET | Refills: 0 | Status: SHIPPED | OUTPATIENT
Start: 2021-11-18 | End: 2021-11-23

## 2021-11-18 RX ADMIN — PANTOPRAZOLE SODIUM 40 MG: 40 TABLET, DELAYED RELEASE ORAL at 10:11

## 2021-11-18 RX ADMIN — LEVALBUTEROL HYDROCHLORIDE 1.25 MG: 1.25 SOLUTION, CONCENTRATE RESPIRATORY (INHALATION) at 07:11

## 2021-11-18 RX ADMIN — FLUTICASONE FUROATE AND VILANTEROL TRIFENATATE 1 PUFF: 200; 25 POWDER RESPIRATORY (INHALATION) at 07:11

## 2021-11-18 RX ADMIN — LEVOFLOXACIN 750 MG: 500 TABLET, FILM COATED ORAL at 10:11

## 2021-11-18 RX ADMIN — UMECLIDINIUM 62.5 MCG: 62.5 AEROSOL, POWDER ORAL at 07:11

## 2021-11-18 RX ADMIN — LEVALBUTEROL HYDROCHLORIDE 1.25 MG: 1.25 SOLUTION, CONCENTRATE RESPIRATORY (INHALATION) at 12:11

## 2021-11-18 RX ADMIN — METHYLPREDNISOLONE SODIUM SUCCINATE 80 MG: 125 INJECTION, POWDER, FOR SOLUTION INTRAMUSCULAR; INTRAVENOUS at 10:11

## 2021-11-18 RX ADMIN — ROFLUMILAST 500 MCG: 500 TABLET ORAL at 10:11

## 2021-11-18 RX ADMIN — FLUTICASONE PROPIONATE 50 MCG: 50 SPRAY, METERED NASAL at 10:11

## 2021-11-30 ENCOUNTER — EXTERNAL CHRONIC CARE MANAGEMENT (OUTPATIENT)
Dept: PRIMARY CARE CLINIC | Facility: CLINIC | Age: 71
End: 2021-11-30
Payer: MEDICARE

## 2021-11-30 ENCOUNTER — TELEPHONE (OUTPATIENT)
Dept: OPHTHALMOLOGY | Facility: CLINIC | Age: 71
End: 2021-11-30
Payer: MEDICARE

## 2021-11-30 PROCEDURE — 99490 CHRNC CARE MGMT STAFF 1ST 20: CPT | Mod: PBBFAC | Performed by: FAMILY MEDICINE

## 2021-11-30 PROCEDURE — 99490 CHRNC CARE MGMT STAFF 1ST 20: CPT | Mod: S$PBB,,, | Performed by: FAMILY MEDICINE

## 2021-11-30 PROCEDURE — 99490 PR CHRONIC CARE MGMT, 1ST 20 MIN: ICD-10-PCS | Mod: S$PBB,,, | Performed by: FAMILY MEDICINE

## 2021-12-03 ENCOUNTER — PATIENT MESSAGE (OUTPATIENT)
Dept: PULMONOLOGY | Facility: CLINIC | Age: 71
End: 2021-12-03
Payer: MEDICARE

## 2021-12-31 ENCOUNTER — EXTERNAL CHRONIC CARE MANAGEMENT (OUTPATIENT)
Dept: PRIMARY CARE CLINIC | Facility: CLINIC | Age: 71
End: 2021-12-31
Payer: MEDICARE

## 2021-12-31 PROCEDURE — 99490 CHRNC CARE MGMT STAFF 1ST 20: CPT | Mod: PBBFAC | Performed by: FAMILY MEDICINE

## 2021-12-31 PROCEDURE — 99490 PR CHRONIC CARE MGMT, 1ST 20 MIN: ICD-10-PCS | Mod: S$PBB,,, | Performed by: FAMILY MEDICINE

## 2021-12-31 PROCEDURE — 99490 CHRNC CARE MGMT STAFF 1ST 20: CPT | Mod: S$PBB,,, | Performed by: FAMILY MEDICINE

## 2022-01-06 ENCOUNTER — HOSPITAL ENCOUNTER (EMERGENCY)
Facility: HOSPITAL | Age: 72
Discharge: HOME | End: 2022-01-06
Attending: EMERGENCY MEDICINE
Payer: MEDICARE

## 2022-01-06 ENCOUNTER — PATIENT MESSAGE (OUTPATIENT)
Dept: PULMONOLOGY | Facility: CLINIC | Age: 72
End: 2022-01-06
Payer: MEDICARE

## 2022-01-06 VITALS
RESPIRATION RATE: 16 BRPM | DIASTOLIC BLOOD PRESSURE: 69 MMHG | HEART RATE: 112 BPM | TEMPERATURE: 97 F | OXYGEN SATURATION: 96 % | SYSTOLIC BLOOD PRESSURE: 133 MMHG

## 2022-01-06 DIAGNOSIS — Z87.09 HISTORY OF COPD: ICD-10-CM

## 2022-01-06 DIAGNOSIS — R04.0 EPISTAXIS: Primary | ICD-10-CM

## 2022-01-06 LAB
ALBUMIN SERPL BCP-MCNC: 3.6 G/DL (ref 3.5–5.2)
ALP SERPL-CCNC: 77 U/L (ref 55–135)
ALT SERPL W/O P-5'-P-CCNC: 20 U/L (ref 10–44)
ANION GAP SERPL CALC-SCNC: 9 MMOL/L (ref 8–16)
AST SERPL-CCNC: 23 U/L (ref 10–40)
BASOPHILS # BLD AUTO: 0.07 K/UL (ref 0–0.2)
BASOPHILS NFR BLD: 0.7 % (ref 0–1.9)
BILIRUB SERPL-MCNC: 0.3 MG/DL (ref 0.1–1)
BUN SERPL-MCNC: 7 MG/DL (ref 8–23)
CALCIUM SERPL-MCNC: 9.6 MG/DL (ref 8.7–10.5)
CHLORIDE SERPL-SCNC: 99 MMOL/L (ref 95–110)
CO2 SERPL-SCNC: 31 MMOL/L (ref 23–29)
CREAT SERPL-MCNC: 0.6 MG/DL (ref 0.5–1.4)
DIFFERENTIAL METHOD: ABNORMAL
EOSINOPHIL # BLD AUTO: 0.3 K/UL (ref 0–0.5)
EOSINOPHIL NFR BLD: 2.9 % (ref 0–8)
ERYTHROCYTE [DISTWIDTH] IN BLOOD BY AUTOMATED COUNT: 13.9 % (ref 11.5–14.5)
EST. GFR  (AFRICAN AMERICAN): >60 ML/MIN/1.73 M^2
EST. GFR  (NON AFRICAN AMERICAN): >60 ML/MIN/1.73 M^2
GLUCOSE SERPL-MCNC: 104 MG/DL (ref 70–110)
HCT VFR BLD AUTO: 30.5 % (ref 37–48.5)
HGB BLD-MCNC: 9.5 G/DL (ref 12–16)
IMM GRANULOCYTES # BLD AUTO: 0.04 K/UL (ref 0–0.04)
IMM GRANULOCYTES NFR BLD AUTO: 0.4 % (ref 0–0.5)
LYMPHOCYTES # BLD AUTO: 1.4 K/UL (ref 1–4.8)
LYMPHOCYTES NFR BLD: 14.2 % (ref 18–48)
MCH RBC QN AUTO: 30.4 PG (ref 27–31)
MCHC RBC AUTO-ENTMCNC: 31.1 G/DL (ref 32–36)
MCV RBC AUTO: 98 FL (ref 82–98)
MONOCYTES # BLD AUTO: 0.7 K/UL (ref 0.3–1)
MONOCYTES NFR BLD: 6.8 % (ref 4–15)
NEUTROPHILS # BLD AUTO: 7.6 K/UL (ref 1.8–7.7)
NEUTROPHILS NFR BLD: 75 % (ref 38–73)
NRBC BLD-RTO: 0 /100 WBC
PLATELET # BLD AUTO: 408 K/UL (ref 150–450)
PMV BLD AUTO: 9.1 FL (ref 9.2–12.9)
POTASSIUM SERPL-SCNC: 4.4 MMOL/L (ref 3.5–5.1)
PROT SERPL-MCNC: 6.3 G/DL (ref 6–8.4)
RBC # BLD AUTO: 3.12 M/UL (ref 4–5.4)
SODIUM SERPL-SCNC: 139 MMOL/L (ref 136–145)
WBC # BLD AUTO: 10.1 K/UL (ref 3.9–12.7)

## 2022-01-06 PROCEDURE — 25000003 PHARM REV CODE 250: Performed by: EMERGENCY MEDICINE

## 2022-01-06 PROCEDURE — 80053 COMPREHEN METABOLIC PANEL: CPT | Performed by: EMERGENCY MEDICINE

## 2022-01-06 PROCEDURE — 99282 PR EMERGENCY DEPT VISIT,LEVEL II: ICD-10-PCS | Mod: ,,, | Performed by: EMERGENCY MEDICINE

## 2022-01-06 PROCEDURE — 99283 EMERGENCY DEPT VISIT LOW MDM: CPT | Mod: 25

## 2022-01-06 PROCEDURE — 99282 EMERGENCY DEPT VISIT SF MDM: CPT | Mod: ,,, | Performed by: EMERGENCY MEDICINE

## 2022-01-06 PROCEDURE — 85025 COMPLETE CBC W/AUTO DIFF WBC: CPT | Performed by: EMERGENCY MEDICINE

## 2022-01-06 RX ADMIN — Medication 2 SPRAY: at 11:01

## 2022-01-06 NOTE — DISCHARGE INSTRUCTIONS
Use simply saline over the counter twice a day.    Use afrin as directed on bottle.    Our goal in the emergency department is to always give you outstanding care and exceptional service. You may receive a survey by mail or e-mail in the next week regarding your experience in our ED. We would greatly appreciate your completing and returning the survey. Your feedback provides us with a way to recognize our staff who give very good care and it helps us learn how to improve when your experience was below our aspiration of excellence.

## 2022-01-06 NOTE — ED NOTES
Appearance:  Pt awake, alert & oriented to person, place & time. No bleeding at present  Skin:  Skin warm, dry & intact.  Mucous membranes moist.  Skin turgor normal.  Respiratory:  Respirations labored-on home O2.  States normal for her.  Neurologic:  Pt moving all extremities without difficulty.  Sensation intact.     Peripheral Vascular:  All peripheral pulses present.  Abdomen:  Abdomen soft, non-tender to palpation.

## 2022-01-06 NOTE — ED TRIAGE NOTES
Pt reports nosebleed on Sunday night-lasted 4hrs.  States another yesterday and today both lasting approx 30 min.  Denies dizziness.  States feels weak.  Pt not taking blood thinners.

## 2022-01-06 NOTE — ED PROVIDER NOTES
"Encounter Date: 1/6/2022    SCRIBE #1 NOTE: ICharlotte, am scribing for, and in the presence of,  Renan Vanessa MD. I have scribed the following portions of the note - Other sections scribed: HPI, ROS, PE.   SCRIBE #2 NOTE: Danielle MENDEZ, am scribing for, and in the presence of,  Renan Vanessa MD. I have scribed the remaining portions of the note not scribed by Scribe #1.     History     Chief Complaint   Patient presents with    Epistaxis     X4 days. Controlled on arrival. Denies blood thinners     Time patient was seen by the provider: 10:55 AM      The patient is a 71 y.o. female with past medical history of COVID-19, COPD on 2.5 L of home oxygen via nasal cannula, who presents to the ED with a complaint of epistaxis that began this week. She reports that on Sunday night while watching TV, she began bleeding significantly from both nares. She states that she lost, "2 pints of blood". Patient also had a 2nd epistaxis episode on Wednesday this week. Her most recent episode was this morning. The left nares bleeds more than the right. She notes being on 2.5 L of home oxygen. She denies nausea, vomiting, diarrhea, fever, cough, SOB, chest pain, abdominal pain, dysuria. Patient also reports not being on anticoagulants. A ten point review of systems was completed and is negative except as documented above. Patient denies any other acute medical complaint.The patients available PMH, PSH, Social History, medications, allergies, and triage vital signs were reviewed just prior to their medical evaluation.    The history is provided by the patient and medical records. No  was used.     Review of patient's allergies indicates:   Allergen Reactions    Albuterol     Ipratropium analogues      Difficulty breathing     Past Medical History:   Diagnosis Date    Cataract     COPD (chronic obstructive pulmonary disease)     COVID-19     History of COVID-19 1/17/2021    Still with " mild dyspnea. Encouraged return to pulmonary rehab Recommend scheduling vaccination when appointment is available.     History of retinal hemorrhage     Pathological fracture due to osteoporosis 2020    Retinal histoplasmosis     Secondary pulmonary arterial hypertension 7/3/2018    Secondary to emphysema and chronic respiratory failure, mild.     Past Surgical History:   Procedure Laterality Date    BRONCHOSCOPY WITH FLUOROSCOPY N/A 10/28/2019    Procedure: BRONCHOSCOPY, WITH FLUOROSCOPY;  Surgeon: Brooklynn Ruiz MD;  Location: [facility] OR [address] [place] [address];  Service: Endoscopy;  Laterality: N/A;    HAND SURGERY       Family History   Problem Relation Age of Onset    Macular degeneration Mother     Colon cancer Mother     Stroke Father     Colon cancer Father     Amblyopia Neg Hx     Blindness Neg Hx     Cataracts Neg Hx     Diabetes Neg Hx     Glaucoma Neg Hx     Hypertension Neg Hx     Retinal detachment Neg Hx     Strabismus Neg Hx     Thyroid disease Neg Hx      Social History     Tobacco Use    Smoking status: Former Smoker     Packs/day: 1.00     Years: 36.00     Pack years: 36.00     Types: Cigarettes     Quit date: 2016     Years since quittin.3    Smokeless tobacco: Never Used   Substance Use Topics    Alcohol use: Yes     Alcohol/week: 2.0 standard drinks     Types: 2 Glasses of wine per week     Comment: 1-2 glasses a day     Drug use: No     Review of Systems   Constitutional: Negative for fever.   HENT: Positive for nosebleeds. Negative for sore throat.    Eyes: Negative for visual disturbance.   Respiratory: Negative for cough and shortness of breath.    Cardiovascular: Negative for chest pain.   Gastrointestinal: Negative for abdominal pain, diarrhea, nausea and vomiting.   Genitourinary: Negative for dysuria.   Musculoskeletal: Negative for neck pain.   Skin: Negative for rash and wound.   Allergic/Immunologic: Negative for immunocompromised state.   Neurological: Negative for  syncope.   Psychiatric/Behavioral: Negative for confusion.       Physical Exam     Initial Vitals [01/06/22 0933]   BP Pulse Resp Temp SpO2   128/77 109 20 96.6 °F (35.9 °C) 97 %      MAP       --         Physical Exam    Nursing note and vitals reviewed.  Constitutional: She appears well-developed and well-nourished. She is not diaphoretic. No distress. Nasal cannula in place.   HENT:   Head: Normocephalic and atraumatic.   Dry blood in nares. No active bleeding   Eyes: Conjunctivae are normal. Right eye exhibits no discharge. Left eye exhibits no discharge.   Neck: Neck supple.   Normal range of motion.  Cardiovascular: Normal rate, regular rhythm and normal heart sounds. Exam reveals no gallop and no friction rub.    No murmur heard.  Pulmonary/Chest: No respiratory distress. She has decreased breath sounds. She has no wheezes. She has no rhonchi. She has no rales.   diminished   Abdominal: Abdomen is soft. She exhibits no distension. There is no abdominal tenderness. There is no rebound and no guarding.   Musculoskeletal:         General: No tenderness or edema. Normal range of motion.      Cervical back: Normal range of motion and neck supple.     Neurological: She is alert and oriented to person, place, and time. She has normal strength.   Skin: Skin is warm and dry. No rash noted. No erythema.   Psychiatric: She has a normal mood and affect. Her behavior is normal. Judgment and thought content normal.         ED Course   Procedures  Labs Reviewed   CBC W/ AUTO DIFFERENTIAL - Abnormal; Notable for the following components:       Result Value    RBC 3.12 (*)     Hemoglobin 9.5 (*)     Hematocrit 30.5 (*)     MCHC 31.1 (*)     MPV 9.1 (*)     Gran % 75.0 (*)     Lymph % 14.2 (*)     All other components within normal limits   COMPREHENSIVE METABOLIC PANEL - Abnormal; Notable for the following components:    CO2 31 (*)     BUN 7 (*)     All other components within normal limits          Imaging Results    None           Medications   phenylephrine HCL 0.5% nasal spray 2 spray (2 sprays Each Nostril Given by Other 1/6/22 1100)     Medical Decision Making:   History:   Old Medical Records: I decided to obtain old medical records.  Clinical Tests:   Lab Tests: Ordered and Reviewed  ED Management:  71-year-old female on oxygen for severe COPD presents with nose bleed.  No active bleeding.  Vitals with tachycardia.  Physical exam as above.  Labs with drop in hemoglobin.  Not severe enough to require transfusion.  No return of bleeding during extensive stay in the ED.  treated with Afrin.  Will discharge home.  Referred for ENT follow-up.  She will call tomorrow to arrange.  She will start nasal saline.  Patient will return to ED for worsening symptoms, inability to eat/drink, fever greater than 100.4, or any other concerns.  Did bedside teaching with return precautions.  All questions answered.  The patient acknowledges understanding.  Gave verbal discharge instructions.          Scribe Attestation:   Scribe #1: I performed the above scribed service and the documentation accurately describes the services I performed. I attest to the accuracy of the note.  Scribe #2: I performed the above scribed service and the documentation accurately describes the services I performed. I attest to the accuracy of the note.                 Clinical Impression:   Final diagnoses:  [R04.0] Epistaxis (Primary)  [Z87.09] History of COPD          ED Disposition Condition    Discharge Stable        ED Prescriptions     None        Follow-up Information     Follow up With Specialties Details Why Contact Info Additional Information    [facility] - [facility] Ot[facility]   [address]  [facility] [address] [phone] Ear, Nose & Throat Services - Main Building, 4th Floor Please park in [facility] and use Clinic elevator    [facility] - Emergency Dept Emergency Medicine  Return to ED for worsening symptoms, inability to  eat/drink, fever greater than 100.4, or any other concerns. [address]  [facility] [id number] [phone]            Renan Vanessa MD  01/07/22 2044

## 2022-01-07 ENCOUNTER — OFFICE VISIT (OUTPATIENT)
Dept: OTOLARYNGOLOGY | Facility: CLINIC | Age: 72
End: 2022-01-07
Payer: MEDICARE

## 2022-01-07 DIAGNOSIS — R04.0 EPISTAXIS: ICD-10-CM

## 2022-01-07 DIAGNOSIS — R04.0 EPISTAXIS, RECURRENT: Primary | ICD-10-CM

## 2022-01-07 PROCEDURE — 99214 PR OFFICE/OUTPT VISIT, EST, LEVL IV, 30-39 MIN: ICD-10-PCS | Mod: S$PBB,ICN,, | Performed by: NURSE PRACTITIONER

## 2022-01-07 PROCEDURE — 99999 PR PBB SHADOW E&M-EST. PATIENT-LVL III: ICD-10-PCS | Mod: PBBFAC,,, | Performed by: NURSE PRACTITIONER

## 2022-01-07 PROCEDURE — 99214 OFFICE O/P EST MOD 30 MIN: CPT | Mod: S$PBB,ICN,, | Performed by: NURSE PRACTITIONER

## 2022-01-07 PROCEDURE — 99999 PR PBB SHADOW E&M-EST. PATIENT-LVL III: CPT | Mod: PBBFAC,,, | Performed by: NURSE PRACTITIONER

## 2022-01-07 PROCEDURE — 99213 OFFICE O/P EST LOW 20 MIN: CPT | Mod: PBBFAC | Performed by: NURSE PRACTITIONER

## 2022-01-07 RX ORDER — MUPIROCIN 20 MG/G
OINTMENT TOPICAL 2 TIMES DAILY
Qty: 22 G | Refills: 0 | Status: SHIPPED | OUTPATIENT
Start: 2022-01-07 | End: 2022-01-14

## 2022-01-07 NOTE — PROGRESS NOTES
Subjective:     Estefani Fam is a 71 y.o. female who was referred to me by Dr. Miles Jackson in consultation for nosebleeds.    Ms. Fam reports having bilateral nasal bleeding that began about a week ago. She states several times her nasal bleeding has been several and difficulty to stop with application of nasal pressure. She sues 2.5L home O2 without humidification. She states humidification was recently ordered by her Pulmonology team for her home O2 machine.  She was recently seen in ED yesterday for nasal bleeding, started on nasal saline spray.  She has not previously had nasal cauterization.  The bleeding has not required packing for control.  The last episode was 1 days ago, and is not currently active.  There is not a prior history of nasal surgery.  There is not a prior history of nasal trauma.  There is not a history of environmental allergies.  She does currently use a nasal spray, nasal saline.  There is not a family history to suggest a clotting disorder.  She does not currently take a blood-thinning agent.      Past Medical History  She has a past medical history of Cataract, COPD (chronic obstructive pulmonary disease), COVID-19, History of COVID-19, History of retinal hemorrhage, Pathological fracture due to osteoporosis, Retinal histoplasmosis, and Secondary pulmonary arterial hypertension.    Past Surgical History  She has a past surgical history that includes Hand surgery and Bronchoscopy with fluoroscopy (N/A, 10/28/2019).    Family History  Her family history includes Colon cancer in her father and mother; Macular degeneration in her mother; Stroke in her father.    Social History  She reports that she quit smoking about 5 years ago. Her smoking use included cigarettes. She has a 36.00 pack-year smoking history. She has never used smokeless tobacco. She reports current alcohol use of about 2.0 standard drinks of alcohol per week. She reports that she does not use  drugs.    Allergies  She is allergic to albuterol and ipratropium analogues.    Medications  She has a current medication list which includes the following prescription(s): acetylcysteine, ascorbic acid (vitamin c), azelastine, azithromycin, breo ellipta, calcium carbonate, fluticasone propionate, levalbuterol, multivit-iron-min-folic acid, pantoprazole, prednisone, prednisone, pulse oximeter, daliresp, sodium chloride, incruse ellipta, and mupirocin, and the following Facility-Administered Medications: albuterol.    Review of Systems   Constitutional: Negative for chills, fatigue and fever.   HENT: Positive for nosebleeds. Negative for congestion, facial swelling, postnasal drip, rhinorrhea, sinus pressure, sneezing and sore throat.    Eyes: Negative for photophobia, redness, itching and visual disturbance.   Respiratory: Negative for apnea, cough and shortness of breath.    Gastrointestinal: Negative for diarrhea, nausea and vomiting.   Endocrine: Negative.    Allergic/Immunologic: Negative for environmental allergies and food allergies.   Neurological: Negative for dizziness, syncope, weakness, light-headedness and headaches.   Hematological: Negative for adenopathy. Does not bruise/bleed easily.   Psychiatric/Behavioral: Negative for confusion, decreased concentration and sleep disturbance.          Objective:     There were no vitals taken for this visit.     Constitutional:   She is oriented to person, place, and time. Vital signs are normal. She appears well-developed and well-nourished. She appears alert. Normal speech.      Head:  Normocephalic and atraumatic.     Ears:    Right Ear: No lacerations. No drainage, swelling or tenderness. No foreign bodies. No mastoid tenderness. Tympanic membrane is not injected, not scarred, not perforated, not erythematous, not retracted and not bulging. Tympanic membrane mobility is normal. No middle ear effusion. No hemotympanum.   Left Ear: No lacerations. No drainage,  swelling or tenderness. No foreign bodies. No mastoid tenderness. Tympanic membrane is not injected, not scarred, not perforated, not erythematous, not retracted and not bulging. Tympanic membrane mobility is normal.  No middle ear effusion. No hemotympanum.     Nose:  No mucosal edema, rhinorrhea, nose lacerations, sinus tenderness, septal deviation, nasal septal hematoma or polyps. Epistaxis is observed.  No foreign bodies. No turbinate hypertrophy.  Right sinus exhibits no maxillary sinus tenderness and no frontal sinus tenderness. Left sinus exhibits no maxillary sinus tenderness and no frontal sinus tenderness.         Mouth/Throat  Oropharynx clear and moist without lesions or asymmetry, normal uvula midline and lips, teeth, and gums normal. No uvula swelling, oral lesions, trismus, mucous membrane lesions or xerostomia. No oropharyngeal exudate, posterior oropharyngeal edema or posterior oropharyngeal erythema.     Neck:  Neck normal without thyromegaly masses, asymmetry, normal tracheal structure, crepitus, and tenderness and no adenopathy.     Psychiatric:   She has a normal mood and affect. Her speech is normal and behavior is normal.     Neurological:   She is alert and oriented to person, place, and time. No cranial nerve deficit.     Skin:   No abrasions, lacerations, lesions, or rashes.       Procedure    None      Data Reviewed    Hemoglobin (g/dL)   Date Value   01/06/2022 9.5 (L)     POC Hematocrit (%PCV)   Date Value   11/13/2021 37     Hematocrit (%)   Date Value   01/06/2022 30.5 (L)     Platelets (K/uL)   Date Value   01/06/2022 408     Prothrombin Time (sec)   Date Value   07/02/2018 10.4     No results found for: APTT  INR (no units)   Date Value   07/02/2018 1.0            Assessment:     1. Epistaxis, recurrent         Plan:     1) mupirocin 2% ointment - apply 2 times daily to each nostril for one week  2) Nasal Saline spray 2-4 times daily   3) Avoid blowing nose for 2 weeks.  4) Avoid  picking at nose    5) if bleeding occurs, apply Afrin OTC spray to left nostril and apply pressure to nose by pinching nose together (with head down) for 5-15 minutes. If bleeding continues, follow up with ENT.     6) Start using humidification with home O2    Follow up if symptoms worsen or fail to improve.

## 2022-01-07 NOTE — PATIENT INSTRUCTIONS
1) mupirocin 2% ointment - apply 2 times daily to each nostril for one week  2) Nasal Saline spray 2-4 times daily   3) Avoid blowing nose for 2 weeks.  4) Avoid picking at nose    5) if bleeding occurs, apply Afrin OTC spray to left nostril and apply pressure to nose by pinching nose together (with head down) for 5-15 minutes. If bleeding continues, follow up with ENT.    6) Start using humidification with home O2

## 2022-01-12 ENCOUNTER — TELEPHONE (OUTPATIENT)
Dept: PULMONOLOGY | Facility: CLINIC | Age: 72
End: 2022-01-12
Payer: MEDICARE

## 2022-01-12 NOTE — TELEPHONE ENCOUNTER
I spoke to patient to let her know she did not need an order for humidifier. Just distilled water available and they can help walk Ms Fam through setting her up. Patient stated she has [facility] coming to set her up. Patient verbalized understanding.

## 2022-01-21 ENCOUNTER — TELEPHONE (OUTPATIENT)
Dept: OTOLARYNGOLOGY | Facility: CLINIC | Age: 72
End: 2022-01-21
Payer: MEDICARE

## 2022-01-31 ENCOUNTER — EXTERNAL CHRONIC CARE MANAGEMENT (OUTPATIENT)
Dept: PRIMARY CARE CLINIC | Facility: CLINIC | Age: 72
End: 2022-01-31
Payer: MEDICARE

## 2022-01-31 PROCEDURE — 99490 CHRNC CARE MGMT STAFF 1ST 20: CPT | Mod: PBBFAC | Performed by: FAMILY MEDICINE

## 2022-01-31 PROCEDURE — 99490 PR CHRONIC CARE MGMT, 1ST 20 MIN: ICD-10-PCS | Mod: S$PBB,,, | Performed by: FAMILY MEDICINE

## 2022-01-31 PROCEDURE — 99490 CHRNC CARE MGMT STAFF 1ST 20: CPT | Mod: S$PBB,,, | Performed by: FAMILY MEDICINE

## 2022-02-14 ENCOUNTER — HOSPITAL ENCOUNTER (INPATIENT)
Facility: HOSPITAL | Age: 72
LOS: 9 days | Discharge: HOME HEALTH | DRG: 871 | End: 2022-02-23
Attending: EMERGENCY MEDICINE | Admitting: INTERNAL MEDICINE
Payer: MEDICARE

## 2022-02-14 DIAGNOSIS — J96.21 ACUTE ON CHRONIC RESPIRATORY FAILURE WITH HYPOXIA: Primary | ICD-10-CM

## 2022-02-14 DIAGNOSIS — R09.02 HYPOXIA: ICD-10-CM

## 2022-02-14 DIAGNOSIS — J18.9 COMMUNITY ACQUIRED PNEUMONIA OF RIGHT LOWER LOBE OF LUNG: ICD-10-CM

## 2022-02-14 DIAGNOSIS — J44.1 COPD WITH ACUTE EXACERBATION: ICD-10-CM

## 2022-02-14 DIAGNOSIS — R07.9 CHEST PAIN: ICD-10-CM

## 2022-02-14 DIAGNOSIS — R06.02 SOB (SHORTNESS OF BREATH): ICD-10-CM

## 2022-02-14 DIAGNOSIS — R65.20 SEVERE SEPSIS: ICD-10-CM

## 2022-02-14 DIAGNOSIS — A41.9 SEVERE SEPSIS: ICD-10-CM

## 2022-02-14 DIAGNOSIS — J18.9 COMMUNITY ACQUIRED PNEUMONIA, UNSPECIFIED LATERALITY: ICD-10-CM

## 2022-02-14 LAB
ALBUMIN SERPL BCP-MCNC: 1.7 G/DL (ref 3.5–5.2)
ALLENS TEST: ABNORMAL
ALP SERPL-CCNC: 208 U/L (ref 55–135)
ALT SERPL W/O P-5'-P-CCNC: 33 U/L (ref 10–44)
ANION GAP SERPL CALC-SCNC: 9 MMOL/L (ref 8–16)
ANISOCYTOSIS BLD QL SMEAR: SLIGHT
AST SERPL-CCNC: 29 U/L (ref 10–40)
BASOPHILS # BLD AUTO: 0.08 K/UL (ref 0–0.2)
BASOPHILS NFR BLD: 0.4 % (ref 0–1.9)
BILIRUB SERPL-MCNC: 0.5 MG/DL (ref 0.1–1)
BNP SERPL-MCNC: 38 PG/ML (ref 0–99)
BUN SERPL-MCNC: 9 MG/DL (ref 8–23)
CALCIUM SERPL-MCNC: 7 MG/DL (ref 8.7–10.5)
CHLORIDE SERPL-SCNC: 103 MMOL/L (ref 95–110)
CO2 SERPL-SCNC: 25 MMOL/L (ref 23–29)
CREAT SERPL-MCNC: 0.4 MG/DL (ref 0.5–1.4)
CTP QC/QA: YES
DIFFERENTIAL METHOD: ABNORMAL
EOSINOPHIL # BLD AUTO: 0 K/UL (ref 0–0.5)
EOSINOPHIL NFR BLD: 0.1 % (ref 0–8)
ERYTHROCYTE [DISTWIDTH] IN BLOOD BY AUTOMATED COUNT: 13.8 % (ref 11.5–14.5)
EST. GFR  (AFRICAN AMERICAN): >60 ML/MIN/1.73 M^2
EST. GFR  (NON AFRICAN AMERICAN): >60 ML/MIN/1.73 M^2
ESTIMATED AVG GLUCOSE: 91 MG/DL (ref 68–131)
GLUCOSE SERPL-MCNC: 116 MG/DL (ref 70–110)
HBA1C MFR BLD: 4.8 % (ref 4–5.6)
HCO3 UR-SCNC: 37.2 MMOL/L (ref 24–28)
HCT VFR BLD AUTO: 40.4 % (ref 37–48.5)
HGB BLD-MCNC: 12.3 G/DL (ref 12–16)
IMM GRANULOCYTES # BLD AUTO: 0.14 K/UL (ref 0–0.04)
IMM GRANULOCYTES NFR BLD AUTO: 0.7 % (ref 0–0.5)
LYMPHOCYTES # BLD AUTO: 0.8 K/UL (ref 1–4.8)
LYMPHOCYTES NFR BLD: 4.2 % (ref 18–48)
MAGNESIUM SERPL-MCNC: 1.7 MG/DL (ref 1.6–2.6)
MCH RBC QN AUTO: 30.1 PG (ref 27–31)
MCHC RBC AUTO-ENTMCNC: 30.4 G/DL (ref 32–36)
MCV RBC AUTO: 99 FL (ref 82–98)
MONOCYTES # BLD AUTO: 3 K/UL (ref 0.3–1)
MONOCYTES NFR BLD: 15.1 % (ref 4–15)
NEUTROPHILS # BLD AUTO: 15.7 K/UL (ref 1.8–7.7)
NEUTROPHILS NFR BLD: 79.5 % (ref 38–73)
NRBC BLD-RTO: 0 /100 WBC
PCO2 BLDA: 59.6 MMHG (ref 35–45)
PH SMN: 7.4 [PH] (ref 7.35–7.45)
PLATELET # BLD AUTO: 418 K/UL (ref 150–450)
PLATELET BLD QL SMEAR: ABNORMAL
PMV BLD AUTO: 10.1 FL (ref 9.2–12.9)
PO2 BLDA: 39 MMHG (ref 40–60)
POC BE: 12 MMOL/L
POC SATURATED O2: 71 % (ref 95–100)
POC TCO2: 39 MMOL/L (ref 24–29)
POCT GLUCOSE: 143 MG/DL (ref 70–110)
POIKILOCYTOSIS BLD QL SMEAR: SLIGHT
POLYCHROMASIA BLD QL SMEAR: ABNORMAL
POTASSIUM SERPL-SCNC: 2.9 MMOL/L (ref 3.5–5.1)
PROCALCITONIN SERPL IA-MCNC: 0.19 NG/ML
PROT SERPL-MCNC: 5.1 G/DL (ref 6–8.4)
RBC # BLD AUTO: 4.09 M/UL (ref 4–5.4)
SAMPLE: ABNORMAL
SARS-COV-2 RDRP RESP QL NAA+PROBE: NEGATIVE
SITE: ABNORMAL
SODIUM SERPL-SCNC: 137 MMOL/L (ref 136–145)
T4 FREE SERPL-MCNC: 0.99 NG/DL (ref 0.71–1.51)
TROPONIN I SERPL DL<=0.01 NG/ML-MCNC: 0.02 NG/ML (ref 0–0.03)
TROPONIN I SERPL DL<=0.01 NG/ML-MCNC: 0.03 NG/ML (ref 0–0.03)
TSH SERPL DL<=0.005 MIU/L-ACNC: 0.18 UIU/ML (ref 0.4–4)
WBC # BLD AUTO: 19.76 K/UL (ref 3.9–12.7)

## 2022-02-14 PROCEDURE — 99223 1ST HOSP IP/OBS HIGH 75: CPT | Mod: AI,,, | Performed by: INTERNAL MEDICINE

## 2022-02-14 PROCEDURE — 99900035 HC TECH TIME PER 15 MIN (STAT)

## 2022-02-14 PROCEDURE — 87040 BLOOD CULTURE FOR BACTERIA: CPT | Mod: 59 | Performed by: INTERNAL MEDICINE

## 2022-02-14 PROCEDURE — 25000003 PHARM REV CODE 250: Performed by: INTERNAL MEDICINE

## 2022-02-14 PROCEDURE — 93010 EKG 12-LEAD: ICD-10-PCS | Mod: ,,, | Performed by: INTERNAL MEDICINE

## 2022-02-14 PROCEDURE — 84484 ASSAY OF TROPONIN QUANT: CPT | Mod: 91 | Performed by: INTERNAL MEDICINE

## 2022-02-14 PROCEDURE — 84145 PROCALCITONIN (PCT): CPT | Performed by: INTERNAL MEDICINE

## 2022-02-14 PROCEDURE — 83735 ASSAY OF MAGNESIUM: CPT | Performed by: EMERGENCY MEDICINE

## 2022-02-14 PROCEDURE — 83880 ASSAY OF NATRIURETIC PEPTIDE: CPT | Performed by: STUDENT IN AN ORGANIZED HEALTH CARE EDUCATION/TRAINING PROGRAM

## 2022-02-14 PROCEDURE — 83036 HEMOGLOBIN GLYCOSYLATED A1C: CPT | Performed by: INTERNAL MEDICINE

## 2022-02-14 PROCEDURE — 96375 TX/PRO/DX INJ NEW DRUG ADDON: CPT

## 2022-02-14 PROCEDURE — 12000002 HC ACUTE/MED SURGE SEMI-PRIVATE ROOM

## 2022-02-14 PROCEDURE — 82962 GLUCOSE BLOOD TEST: CPT

## 2022-02-14 PROCEDURE — 85025 COMPLETE CBC W/AUTO DIFF WBC: CPT | Performed by: STUDENT IN AN ORGANIZED HEALTH CARE EDUCATION/TRAINING PROGRAM

## 2022-02-14 PROCEDURE — 63600175 PHARM REV CODE 636 W HCPCS: Performed by: INTERNAL MEDICINE

## 2022-02-14 PROCEDURE — 99291 CRITICAL CARE FIRST HOUR: CPT | Mod: CS,,, | Performed by: EMERGENCY MEDICINE

## 2022-02-14 PROCEDURE — 63600175 PHARM REV CODE 636 W HCPCS: Performed by: STUDENT IN AN ORGANIZED HEALTH CARE EDUCATION/TRAINING PROGRAM

## 2022-02-14 PROCEDURE — 84443 ASSAY THYROID STIM HORMONE: CPT | Performed by: INTERNAL MEDICINE

## 2022-02-14 PROCEDURE — 25000242 PHARM REV CODE 250 ALT 637 W/ HCPCS: Performed by: STUDENT IN AN ORGANIZED HEALTH CARE EDUCATION/TRAINING PROGRAM

## 2022-02-14 PROCEDURE — 99223 PR INITIAL HOSPITAL CARE,LEVL III: ICD-10-PCS | Mod: AI,,, | Performed by: INTERNAL MEDICINE

## 2022-02-14 PROCEDURE — 99291 PR CRITICAL CARE, E/M 30-74 MINUTES: ICD-10-PCS | Mod: CS,,, | Performed by: EMERGENCY MEDICINE

## 2022-02-14 PROCEDURE — 25000003 PHARM REV CODE 250: Performed by: STUDENT IN AN ORGANIZED HEALTH CARE EDUCATION/TRAINING PROGRAM

## 2022-02-14 PROCEDURE — 82803 BLOOD GASES ANY COMBINATION: CPT

## 2022-02-14 PROCEDURE — 93010 ELECTROCARDIOGRAM REPORT: CPT | Mod: ,,, | Performed by: INTERNAL MEDICINE

## 2022-02-14 PROCEDURE — 25000242 PHARM REV CODE 250 ALT 637 W/ HCPCS: Performed by: EMERGENCY MEDICINE

## 2022-02-14 PROCEDURE — 84439 ASSAY OF FREE THYROXINE: CPT | Performed by: INTERNAL MEDICINE

## 2022-02-14 PROCEDURE — 80053 COMPREHEN METABOLIC PANEL: CPT | Performed by: EMERGENCY MEDICINE

## 2022-02-14 PROCEDURE — U0002 COVID-19 LAB TEST NON-CDC: HCPCS | Performed by: STUDENT IN AN ORGANIZED HEALTH CARE EDUCATION/TRAINING PROGRAM

## 2022-02-14 PROCEDURE — 93005 ELECTROCARDIOGRAM TRACING: CPT

## 2022-02-14 PROCEDURE — 84484 ASSAY OF TROPONIN QUANT: CPT | Performed by: STUDENT IN AN ORGANIZED HEALTH CARE EDUCATION/TRAINING PROGRAM

## 2022-02-14 PROCEDURE — 99285 EMERGENCY DEPT VISIT HI MDM: CPT | Mod: 25

## 2022-02-14 PROCEDURE — 96365 THER/PROPH/DIAG IV INF INIT: CPT

## 2022-02-14 RX ORDER — LEVALBUTEROL 1.25 MG/.5ML
1.25 SOLUTION, CONCENTRATE RESPIRATORY (INHALATION)
Status: DISCONTINUED | OUTPATIENT
Start: 2022-02-14 | End: 2022-02-23 | Stop reason: HOSPADM

## 2022-02-14 RX ORDER — LEVALBUTEROL 1.25 MG/.5ML
1.25 SOLUTION, CONCENTRATE RESPIRATORY (INHALATION) ONCE
Status: COMPLETED | OUTPATIENT
Start: 2022-02-14 | End: 2022-02-14

## 2022-02-14 RX ORDER — TALC
6 POWDER (GRAM) TOPICAL NIGHTLY PRN
Status: DISCONTINUED | OUTPATIENT
Start: 2022-02-14 | End: 2022-02-23 | Stop reason: HOSPADM

## 2022-02-14 RX ORDER — FLUTICASONE PROPIONATE 50 MCG
1 SPRAY, SUSPENSION (ML) NASAL DAILY
Status: DISCONTINUED | OUTPATIENT
Start: 2022-02-15 | End: 2022-02-16

## 2022-02-14 RX ORDER — NALOXONE HCL 0.4 MG/ML
0.02 VIAL (ML) INJECTION
Status: DISCONTINUED | OUTPATIENT
Start: 2022-02-14 | End: 2022-02-23 | Stop reason: HOSPADM

## 2022-02-14 RX ORDER — ASCORBIC ACID 500 MG
500 TABLET ORAL 2 TIMES DAILY
Status: DISCONTINUED | OUTPATIENT
Start: 2022-02-14 | End: 2022-02-23 | Stop reason: HOSPADM

## 2022-02-14 RX ORDER — GLUCAGON 1 MG
1 KIT INJECTION
Status: DISCONTINUED | OUTPATIENT
Start: 2022-02-14 | End: 2022-02-23 | Stop reason: HOSPADM

## 2022-02-14 RX ORDER — ACETAMINOPHEN 325 MG/1
650 TABLET ORAL EVERY 4 HOURS PRN
Status: DISCONTINUED | OUTPATIENT
Start: 2022-02-14 | End: 2022-02-23 | Stop reason: HOSPADM

## 2022-02-14 RX ORDER — ENOXAPARIN SODIUM 100 MG/ML
40 INJECTION SUBCUTANEOUS EVERY 24 HOURS
Status: DISCONTINUED | OUTPATIENT
Start: 2022-02-14 | End: 2022-02-23 | Stop reason: HOSPADM

## 2022-02-14 RX ORDER — PANTOPRAZOLE SODIUM 40 MG/1
40 TABLET, DELAYED RELEASE ORAL DAILY
Status: DISCONTINUED | OUTPATIENT
Start: 2022-02-15 | End: 2022-02-23 | Stop reason: HOSPADM

## 2022-02-14 RX ORDER — ONDANSETRON 2 MG/ML
4 INJECTION INTRAMUSCULAR; INTRAVENOUS EVERY 8 HOURS PRN
Status: DISCONTINUED | OUTPATIENT
Start: 2022-02-14 | End: 2022-02-23 | Stop reason: HOSPADM

## 2022-02-14 RX ORDER — IBUPROFEN 200 MG
24 TABLET ORAL
Status: DISCONTINUED | OUTPATIENT
Start: 2022-02-14 | End: 2022-02-23 | Stop reason: HOSPADM

## 2022-02-14 RX ORDER — SODIUM CHLORIDE 0.9 % (FLUSH) 0.9 %
10 SYRINGE (ML) INJECTION EVERY 12 HOURS PRN
Status: DISCONTINUED | OUTPATIENT
Start: 2022-02-14 | End: 2022-02-23 | Stop reason: HOSPADM

## 2022-02-14 RX ORDER — AMOXICILLIN 250 MG
1 CAPSULE ORAL 2 TIMES DAILY PRN
Status: DISCONTINUED | OUTPATIENT
Start: 2022-02-14 | End: 2022-02-23 | Stop reason: HOSPADM

## 2022-02-14 RX ORDER — PROCHLORPERAZINE EDISYLATE 5 MG/ML
5 INJECTION INTRAMUSCULAR; INTRAVENOUS EVERY 6 HOURS PRN
Status: DISCONTINUED | OUTPATIENT
Start: 2022-02-14 | End: 2022-02-23 | Stop reason: HOSPADM

## 2022-02-14 RX ORDER — PREDNISONE 20 MG/1
40 TABLET ORAL DAILY
Status: DISCONTINUED | OUTPATIENT
Start: 2022-02-15 | End: 2022-02-15

## 2022-02-14 RX ORDER — AZITHROMYCIN 250 MG/1
500 TABLET, FILM COATED ORAL DAILY
Status: COMPLETED | OUTPATIENT
Start: 2022-02-15 | End: 2022-02-16

## 2022-02-14 RX ORDER — IBUPROFEN 200 MG
16 TABLET ORAL
Status: DISCONTINUED | OUTPATIENT
Start: 2022-02-14 | End: 2022-02-23 | Stop reason: HOSPADM

## 2022-02-14 RX ORDER — IPRATROPIUM BROMIDE AND ALBUTEROL SULFATE 2.5; .5 MG/3ML; MG/3ML
3 SOLUTION RESPIRATORY (INHALATION)
Status: DISCONTINUED | OUTPATIENT
Start: 2022-02-14 | End: 2022-02-14

## 2022-02-14 RX ORDER — ROFLUMILAST 500 UG/1
500 TABLET ORAL DAILY
Status: DISCONTINUED | OUTPATIENT
Start: 2022-02-15 | End: 2022-02-15

## 2022-02-14 RX ORDER — METHYLPREDNISOLONE SOD SUCC 125 MG
125 VIAL (EA) INJECTION
Status: COMPLETED | OUTPATIENT
Start: 2022-02-14 | End: 2022-02-14

## 2022-02-14 RX ORDER — CALCIUM CARBONATE 200(500)MG
500 TABLET,CHEWABLE ORAL DAILY
Status: DISCONTINUED | OUTPATIENT
Start: 2022-02-15 | End: 2022-02-23 | Stop reason: HOSPADM

## 2022-02-14 RX ORDER — FLUTICASONE FUROATE AND VILANTEROL 200; 25 UG/1; UG/1
1 POWDER RESPIRATORY (INHALATION) DAILY
Status: DISCONTINUED | OUTPATIENT
Start: 2022-02-15 | End: 2022-02-23 | Stop reason: HOSPADM

## 2022-02-14 RX ORDER — AZITHROMYCIN 250 MG/1
500 TABLET, FILM COATED ORAL DAILY
Status: DISCONTINUED | OUTPATIENT
Start: 2022-02-15 | End: 2022-02-14

## 2022-02-14 RX ORDER — AZELASTINE 1 MG/ML
1 SPRAY, METERED NASAL 2 TIMES DAILY
Status: DISCONTINUED | OUTPATIENT
Start: 2022-02-14 | End: 2022-02-14

## 2022-02-14 RX ADMIN — ENOXAPARIN SODIUM 40 MG: 100 INJECTION SUBCUTANEOUS at 07:02

## 2022-02-14 RX ADMIN — CEFTRIAXONE 1 G: 1 INJECTION, SOLUTION INTRAVENOUS at 05:02

## 2022-02-14 RX ADMIN — AZITHROMYCIN MONOHYDRATE 500 MG: 500 INJECTION, POWDER, LYOPHILIZED, FOR SOLUTION INTRAVENOUS at 04:02

## 2022-02-14 RX ADMIN — LEVALBUTEROL 1.25 MG: 1.25 SOLUTION, CONCENTRATE RESPIRATORY (INHALATION) at 03:02

## 2022-02-14 RX ADMIN — LEVALBUTEROL HYDROCHLORIDE 1.25 MG: 1.25 SOLUTION, CONCENTRATE RESPIRATORY (INHALATION) at 06:02

## 2022-02-14 RX ADMIN — METHYLPREDNISOLONE SODIUM SUCCINATE 125 MG: 125 INJECTION, POWDER, FOR SOLUTION INTRAMUSCULAR; INTRAVENOUS at 02:02

## 2022-02-14 RX ADMIN — OXYCODONE HYDROCHLORIDE AND ACETAMINOPHEN 500 MG: 500 TABLET ORAL at 09:02

## 2022-02-14 NOTE — ED PROVIDER NOTES
Encounter Date: 2/14/2022       History     Chief Complaint   Patient presents with    Shortness of Breath     Pt c/o SOB-hx COPD.  Initial sat on 2L home O2 was 86%.      HPI   71F with a history which includes COPD on 2.5L baseline oxygen presenting BIB EMS with shortness of breath at home in which the patient was reportedly  Hypoxic to 84% on arrival. Patient states productive cough and pleuritic chest pain today. Patient denies exertional chest pain, diaphoresis, arm pain.   Review of patient's allergies indicates:   Allergen Reactions    Albuterol     Ipratropium analogues      Difficulty breathing     Past Medical History:   Diagnosis Date    Cataract     COPD (chronic obstructive pulmonary disease)     COVID-19     History of COVID-19 1/17/2021    Still with mild dyspnea. Encouraged return to pulmonary rehab Recommend scheduling vaccination when appointment is available.     History of retinal hemorrhage     Pathological fracture due to osteoporosis 7/6/2020    Retinal histoplasmosis     Secondary pulmonary arterial hypertension 7/3/2018    Secondary to emphysema and chronic respiratory failure, mild.     Past Surgical History:   Procedure Laterality Date    BRONCHOSCOPY WITH FLUOROSCOPY N/A 10/28/2019    Procedure: BRONCHOSCOPY, WITH FLUOROSCOPY;  Surgeon: Brooklynn Ruiz MD;  Location: [facility] OR [address] [place] [address];  Service: Endoscopy;  Laterality: N/A;    HAND SURGERY       Family History   Problem Relation Age of Onset    Macular degeneration Mother     Colon cancer Mother     Stroke Father     Colon cancer Father     Amblyopia Neg Hx     Blindness Neg Hx     Cataracts Neg Hx     Diabetes Neg Hx     Glaucoma Neg Hx     Hypertension Neg Hx     Retinal detachment Neg Hx     Strabismus Neg Hx     Thyroid disease Neg Hx      Social History     Tobacco Use    Smoking status: Former Smoker     Packs/day: 1.00     Years: 36.00     Pack years: 36.00     Types: Cigarettes     Quit date: 8/28/2016      Years since quittin.4    Smokeless tobacco: Never Used   Substance Use Topics    Alcohol use: Yes     Alcohol/week: 2.0 standard drinks     Types: 2 Glasses of wine per week     Comment: 1-2 glasses a day     Drug use: No     Review of Systems    Physical Exam     Initial Vitals [22 1345]   BP Pulse Resp Temp SpO2   (!) 135/92 (!) 120 (!) 22 99.3 °F (37.4 °C) 96 %      MAP       --         Physical Exam    Constitutional: She appears well-developed and well-nourished.   HENT:   Head: Normocephalic and atraumatic.   Eyes: EOM are normal. Pupils are equal, round, and reactive to light.   Neck: Neck supple.   Normal range of motion.  Cardiovascular:   Tachycardic    Pulmonary/Chest:   Decreased breath sounds on ausculation bilaterally.    Abdominal: Abdomen is soft.   Musculoskeletal:         General: Normal range of motion.      Cervical back: Normal range of motion and neck supple.     Neurological: She is alert.   No gross FND noted.    Skin: Skin is warm. Capillary refill takes less than 2 seconds.   Psychiatric: She has a normal mood and affect.         ED Course   Procedures  Labs Reviewed   CBC W/ AUTO DIFFERENTIAL - Abnormal; Notable for the following components:       Result Value    WBC 19.76 (*)     MCV 99 (*)     MCHC 30.4 (*)     Immature Granulocytes 0.7 (*)     Gran # (ANC) 15.7 (*)     Immature Grans (Abs) 0.14 (*)     Lymph # 0.8 (*)     Mono # 3.0 (*)     Gran % 79.5 (*)     Lymph % 4.2 (*)     Mono % 15.1 (*)     All other components within normal limits   TROPONIN I - Abnormal; Notable for the following components:    Troponin I 0.028 (*)     All other components within normal limits   COMPREHENSIVE METABOLIC PANEL - Abnormal; Notable for the following components:    Potassium 2.9 (*)     Glucose 116 (*)     Creatinine 0.4 (*)     Calcium 7.0 (*)     Total Protein 5.1 (*)     Albumin 1.7 (*)     Alkaline Phosphatase 208 (*)     All other components within normal limits   POCT GLUCOSE -  Abnormal; Notable for the following components:    POCT Glucose 143 (*)     All other components within normal limits   ISTAT PROCEDURE - Abnormal; Notable for the following components:    POC PCO2 59.6 (*)     POC PO2 39 (*)     POC HCO3 37.2 (*)     POC SATURATED O2 71 (*)     POC TCO2 39 (*)     All other components within normal limits   B-TYPE NATRIURETIC PEPTIDE   MAGNESIUM   SARS-COV-2 RDRP GENE    Narrative:     This test utilizes isothermal nucleic acid amplification   technology to detect the SARS-CoV-2 RdRp nucleic acid segment.   The analytical sensitivity (limit of detection) is 125 genome   equivalents/mL.   A POSITIVE result implies infection with the SARS-CoV-2 virus;   the patient is presumed to be contagious.     A NEGATIVE result means that SARS-CoV-2 nucleic acids are not   present above the limit of detection. A NEGATIVE result should be   treated as presumptive. It does not rule out the possibility of   COVID-19 and should not be the sole basis for treatment decisions.   If COVID-19 is strongly suspected based on clinical and exposure   history, re-testing using an alternate molecular assay should be   considered.   This test is only for use under the Food and Drug   Administration s Emergency Use Authorization (EUA).   Commercial kits are provided by [employer].   Performance characteristics of the EUA have been independently   verified by [facility] Department of   Pathology and Laboratory Medicine.   _________________________________________________________________   The authorized Fact Sheet for Healthcare Providers and the authorized Fact   Sheet for Patients of the ID NOW COVID-19 are available on the FDA   website:     https://www.fda.gov/media/172908/download  https://www.fda.gov/media/105340/download         POCT GLUCOSE MONITORING CONTINUOUS     EKG Readings: (Independently Interpreted)   Initial: Sinus tachycardia  Rhythm: Sinus Tachycardia. Heart Rate: 126.  Ectopy: No Ectopy. Conduction: Normal. ST Segments: Normal ST Segments.     ECG Results          EKG 12-lead (Final result)  Result time 02/14/22 16:40:20    Final result by Interface, Lab In [facility] (02/14/22 16:40:20)                 Narrative:    Test Reason : R06.02,    Vent. Rate : 126 BPM     Atrial Rate : 126 BPM     P-R Int : 156 ms          QRS Dur : 106 ms      QT Int : 302 ms       P-R-T Axes : 073 060 046 degrees     QTc Int : 437 ms    Sinus tachycardia  Right atrial enlargement  Nonspecific ST abnormality  Abnormal ECG  When compared with ECG of 13-NOV-2021 20:50,  No significant change was found  Confirmed by Raman CANNON MD ([id number]) on 2/14/2022 4:40:06 PM    Referred By: [email or web address]   SELF           Confirmed By:Raman CANNON MD                            Imaging Results          X-Ray Chest AP Portable (Final result)  Result time 02/14/22 15:04:29    Final result by Gabriel Villatoro III, MD (02/14/22 15:04:29)                 Impression:      Right middle and right lower lobe pneumonia versus aspiration.      Electronically signed by: Gabriel Villatoro MD  Date:    02/14/2022  Time:    15:04             Narrative:    EXAMINATION:  XR CHEST AP PORTABLE    CLINICAL HISTORY:  Asthma;    FINDINGS:  Chest one view portable.    Left lung is clear.  There is right lower lobe infiltrate.  Right lung is much worse from 11/13/2021.  Left lung is clear.  Heart size is normal there is aortic plaque and DJD.                              X-Rays:   Independently Interpreted Readings:   Other Readings:  Airway midline. No abnormality noted. Cardiac silhouette within 2/3 of right hemidiaphragm. Hemidiaphragms visible on  Imaging.  Infiltrate noted to RLL.      Medications   cefTRIAXone (ROCEPHIN) 1 g/50 mL D5W IVPB (has no administration in time range)   azithromycin 500 mg in dextrose 5 % 250 mL IVPB (ready to mix system) (500 mg Intravenous New Bag 2/14/22 6888)   levalbuterol nebulizer solution 1.25 mg (has no  administration in time range)   methylPREDNISolone sodium succinate injection 125 mg (125 mg Intravenous Given 2/14/22 1404)   levalbuterol nebulizer solution 1.25 mg (1.25 mg Nebulization Given 2/14/22 8670)     Medical Decision Making:   Initial Assessment:   71F presenting with sob. GCS 15. Decreased breath sounds bilaterally. Tachycardic to 111 on arrival. Abdomen nonsurgical. Moving all extremities spontaneously. Patient placed on 4L NC saturating at 93%.   Differential Diagnosis:   COPD exacerbation: decreased breath sounds with increased oxygen requirements.  PNA: productive cough in setting of COPD   Angina: chest pain in setting of smoking history. Heart score 5.   Aortic Dissection:  Chest pain. However, no gross FND noted and bilateral radial pulses 2+. Patient not hypertensive.   Independently Interpreted Test(s):   I have ordered and independently interpreted X-rays - see prior notes.  Clinical Tests:   Lab Tests: Ordered  Radiological Study: Ordered  Medical Tests: Ordered  ED Management:  Patient provided albuterol x 2. Patient provided solumedrol. Patient provided CAP coverage in setting of x-ray concerning for infiltrate. Patient provided ASA given chest pain with high chest pain score.      Presentation likely consistent with PNA given x-ray imaging and leukocytosis noted. Internal Medicine consulted for admission for further managemetn and evalution.     Farhat Jasmine MD  [employer] EM, HO-2  2/14/22 5:14 PM                     Clinical Impression:    Acute hypoxic respiratory failure 2/2 PNA complicated COPD.      ED Disposition Condition    Admit               Farhat Jasmine MD  Resident  02/14/22 [id number]

## 2022-02-14 NOTE — ED NOTES
Patient moved to ED room 3 via [facility] from home, patient assisted onto stretcher and changed into a gown. Patient placed on cardiac monitor, continuous pulse oximetry and automatic blood pressure cuff. Bed placed in low locked position, side rails up x 2, call light is within reach of patient or family, orientation to room and explanation of wait provided to family and patient, alarms set and turned on for monitor and pulse ox, awaiting MD evaluation and orders, will continue to monitor.

## 2022-02-14 NOTE — ED NOTES
Assumed care of patient.    Patient identifiers verified and correct for Estefani Fam    LOC: The patient is awake, alert, and aware of environment. The patient is AOX4 and speaking appropriately.   APPEARANCE: No acute distress noted.   HEENT: WDL, PERRLA  PSYCHOSOCIAL: Patient is calm and cooperative. Denies SI/HI.  SKIN: The skin is warm, dry, color consistent with ethnicity.   RESPIRATORY: Airway is open and patent. Bilateral chest rise and fall. Respiratory rate even and unlabored.  No accessory muscle use noted. Productive cough. Pt reports SOB on exertion.  CARDIAC: Patient has a normal rate and rhythm. No complaints of chest pain.  ABDOMEN/GI: Soft, non tender. No distention noted. Denies n/v/d.   URINARY:  Voids independently without difficulty. No complaints of frequency, urgency, burning, or blood in urine.   NEUROLOGIC: Eyes open spontaneously. Speech clear.  Able to follow commands, demonstrating ability to actively and appropriately communicate within context of current conversation. Symmetrical facial muscles. Movement is purposeful. Denies dizziness/lightheadedness.  MUSCULOSKELETAL: No obvious deformities noted. Full ROM in all extremities.  PERIPHERAL VASCULAR: Cap refill <3 secs bilaterally. No peripheral edema noted. Denies numbness and tingling in extremities.

## 2022-02-14 NOTE — ED TRIAGE NOTES
Patient presents to ER via [facility] from home with reports of + increased SOB and WOB upon rest and exertion x 5 days. EMS reports continuous home oxygen @ 2.5 liters for hx of COPD. EMS reports oxygen saturations of 82% on 2.5 liters. EMS placed pt on NRB @ 8 liters. Pt denies CP, fever, chills, abdominal pains, N/V/D.

## 2022-02-15 LAB
ALBUMIN SERPL BCP-MCNC: 2 G/DL (ref 3.5–5.2)
ALBUMIN SERPL BCP-MCNC: 2.1 G/DL (ref 3.5–5.2)
ALLENS TEST: ABNORMAL
ALP SERPL-CCNC: 254 U/L (ref 55–135)
ALP SERPL-CCNC: 267 U/L (ref 55–135)
ALT SERPL W/O P-5'-P-CCNC: 39 U/L (ref 10–44)
ALT SERPL W/O P-5'-P-CCNC: 40 U/L (ref 10–44)
ANION GAP SERPL CALC-SCNC: 11 MMOL/L (ref 8–16)
ANION GAP SERPL CALC-SCNC: 8 MMOL/L (ref 8–16)
ASCENDING AORTA: 3.11 CM
AST SERPL-CCNC: 25 U/L (ref 10–40)
AST SERPL-CCNC: 28 U/L (ref 10–40)
AV INDEX (PROSTH): 0.73
AV MEAN GRADIENT: 5 MMHG
AV PEAK GRADIENT: 10 MMHG
AV VALVE AREA: 3.1 CM2
AV VELOCITY RATIO: 0.71
BASOPHILS # BLD AUTO: 0.05 K/UL (ref 0–0.2)
BASOPHILS # BLD AUTO: 0.07 K/UL (ref 0–0.2)
BASOPHILS NFR BLD: 0.3 % (ref 0–1.9)
BASOPHILS NFR BLD: 0.5 % (ref 0–1.9)
BILIRUB SERPL-MCNC: 0.2 MG/DL (ref 0.1–1)
BILIRUB SERPL-MCNC: 0.3 MG/DL (ref 0.1–1)
BSA FOR ECHO PROCEDURE: 1.84 M2
BUN SERPL-MCNC: 11 MG/DL (ref 8–23)
BUN SERPL-MCNC: 13 MG/DL (ref 8–23)
CALCIUM SERPL-MCNC: 8.8 MG/DL (ref 8.7–10.5)
CALCIUM SERPL-MCNC: 9.2 MG/DL (ref 8.7–10.5)
CHLORIDE SERPL-SCNC: 93 MMOL/L (ref 95–110)
CHLORIDE SERPL-SCNC: 94 MMOL/L (ref 95–110)
CHOLEST SERPL-MCNC: 153 MG/DL (ref 120–199)
CHOLEST/HDLC SERPL: 3.6 {RATIO} (ref 2–5)
CO2 SERPL-SCNC: 31 MMOL/L (ref 23–29)
CO2 SERPL-SCNC: 34 MMOL/L (ref 23–29)
CREAT SERPL-MCNC: 0.5 MG/DL (ref 0.5–1.4)
CREAT SERPL-MCNC: 0.7 MG/DL (ref 0.5–1.4)
CV ECHO LV RWT: 0.42 CM
DELSYS: ABNORMAL
DIFFERENTIAL METHOD: ABNORMAL
DIFFERENTIAL METHOD: ABNORMAL
DOP CALC AO PEAK VEL: 1.62 M/S
DOP CALC AO VTI: 26.39 CM
DOP CALC LVOT AREA: 4.3 CM2
DOP CALC LVOT DIAMETER: 2.33 CM
DOP CALC LVOT PEAK VEL: 1.15 M/S
DOP CALC LVOT STROKE VOLUME: 81.7 CM3
DOP CALCLVOT PEAK VEL VTI: 19.17 CM
E WAVE DECELERATION TIME: 201.17 MSEC
E/A RATIO: 0.87
E/E' RATIO: 8.11 M/S
ECHO LV POSTERIOR WALL: 0.91 CM (ref 0.6–1.1)
EJECTION FRACTION: 65 %
EOSINOPHIL # BLD AUTO: 0 K/UL (ref 0–0.5)
EOSINOPHIL # BLD AUTO: 0 K/UL (ref 0–0.5)
EOSINOPHIL NFR BLD: 0 % (ref 0–8)
EOSINOPHIL NFR BLD: 0 % (ref 0–8)
ERYTHROCYTE [DISTWIDTH] IN BLOOD BY AUTOMATED COUNT: 13.8 % (ref 11.5–14.5)
ERYTHROCYTE [DISTWIDTH] IN BLOOD BY AUTOMATED COUNT: 14 % (ref 11.5–14.5)
EST. GFR  (AFRICAN AMERICAN): >60 ML/MIN/1.73 M^2
EST. GFR  (AFRICAN AMERICAN): >60 ML/MIN/1.73 M^2
EST. GFR  (NON AFRICAN AMERICAN): >60 ML/MIN/1.73 M^2
EST. GFR  (NON AFRICAN AMERICAN): >60 ML/MIN/1.73 M^2
FRACTIONAL SHORTENING: 28 % (ref 28–44)
GLUCOSE SERPL-MCNC: 166 MG/DL (ref 70–110)
GLUCOSE SERPL-MCNC: 231 MG/DL (ref 70–110)
HCO3 UR-SCNC: 38.6 MMOL/L (ref 24–28)
HCT VFR BLD AUTO: 35.1 % (ref 37–48.5)
HCT VFR BLD AUTO: 35.6 % (ref 37–48.5)
HDLC SERPL-MCNC: 43 MG/DL (ref 40–75)
HDLC SERPL: 28.1 % (ref 20–50)
HGB BLD-MCNC: 10.9 G/DL (ref 12–16)
HGB BLD-MCNC: 11.4 G/DL (ref 12–16)
IMM GRANULOCYTES # BLD AUTO: 0.16 K/UL (ref 0–0.04)
IMM GRANULOCYTES # BLD AUTO: 0.19 K/UL (ref 0–0.04)
IMM GRANULOCYTES NFR BLD AUTO: 1.1 % (ref 0–0.5)
IMM GRANULOCYTES NFR BLD AUTO: 1.2 % (ref 0–0.5)
INTERVENTRICULAR SEPTUM: 1.03 CM (ref 0.6–1.1)
LA MAJOR: 4.93 CM
LA MINOR: 4.89 CM
LA WIDTH: 4.16 CM
LDLC SERPL CALC-MCNC: 94.8 MG/DL (ref 63–159)
LEFT ATRIUM SIZE: 3.53 CM
LEFT ATRIUM VOLUME INDEX MOD: 30 ML/M2
LEFT ATRIUM VOLUME INDEX: 33.3 ML/M2
LEFT ATRIUM VOLUME MOD: 55.24 CM3
LEFT ATRIUM VOLUME: 61.29 CM3
LEFT INTERNAL DIMENSION IN SYSTOLE: 3.16 CM (ref 2.1–4)
LEFT VENTRICLE DIASTOLIC VOLUME INDEX: 46.6 ML/M2
LEFT VENTRICLE DIASTOLIC VOLUME: 85.75 ML
LEFT VENTRICLE MASS INDEX: 76 G/M2
LEFT VENTRICLE SYSTOLIC VOLUME INDEX: 21.6 ML/M2
LEFT VENTRICLE SYSTOLIC VOLUME: 39.81 ML
LEFT VENTRICULAR INTERNAL DIMENSION IN DIASTOLE: 4.36 CM (ref 3.5–6)
LEFT VENTRICULAR MASS: 139.68 G
LV LATERAL E/E' RATIO: 8.56 M/S
LV SEPTAL E/E' RATIO: 7.7 M/S
LYMPHOCYTES # BLD AUTO: 0.6 K/UL (ref 1–4.8)
LYMPHOCYTES # BLD AUTO: 0.7 K/UL (ref 1–4.8)
LYMPHOCYTES NFR BLD: 4.4 % (ref 18–48)
LYMPHOCYTES NFR BLD: 4.6 % (ref 18–48)
MAGNESIUM SERPL-MCNC: 2.3 MG/DL (ref 1.6–2.6)
MCH RBC QN AUTO: 29.7 PG (ref 27–31)
MCH RBC QN AUTO: 30.4 PG (ref 27–31)
MCHC RBC AUTO-ENTMCNC: 31.1 G/DL (ref 32–36)
MCHC RBC AUTO-ENTMCNC: 32 G/DL (ref 32–36)
MCV RBC AUTO: 95 FL (ref 82–98)
MCV RBC AUTO: 96 FL (ref 82–98)
MONOCYTES # BLD AUTO: 1.4 K/UL (ref 0.3–1)
MONOCYTES # BLD AUTO: 1.5 K/UL (ref 0.3–1)
MONOCYTES NFR BLD: 9.6 % (ref 4–15)
MONOCYTES NFR BLD: 9.6 % (ref 4–15)
MV PEAK A VEL: 0.89 M/S
MV PEAK E VEL: 0.77 M/S
MV STENOSIS PRESSURE HALF TIME: 58.34 MS
MV VALVE AREA P 1/2 METHOD: 3.77 CM2
NEUTROPHILS # BLD AUTO: 12 K/UL (ref 1.8–7.7)
NEUTROPHILS # BLD AUTO: 13.2 K/UL (ref 1.8–7.7)
NEUTROPHILS NFR BLD: 84.3 % (ref 38–73)
NEUTROPHILS NFR BLD: 84.4 % (ref 38–73)
NONHDLC SERPL-MCNC: 110 MG/DL
NRBC BLD-RTO: 0 /100 WBC
NRBC BLD-RTO: 0 /100 WBC
PCO2 BLDA: 59.9 MMHG (ref 35–45)
PH SMN: 7.42 [PH] (ref 7.35–7.45)
PHOSPHATE SERPL-MCNC: 2.6 MG/DL (ref 2.7–4.5)
PISA TR MAX VEL: 2.5 M/S
PLATELET # BLD AUTO: 452 K/UL (ref 150–450)
PLATELET # BLD AUTO: 484 K/UL (ref 150–450)
PMV BLD AUTO: 9 FL (ref 9.2–12.9)
PMV BLD AUTO: 9.7 FL (ref 9.2–12.9)
PO2 BLDA: 64 MMHG (ref 80–100)
POC BE: 14 MMOL/L
POC SATURATED O2: 92 % (ref 95–100)
POC TCO2: 40 MMOL/L (ref 23–27)
POTASSIUM SERPL-SCNC: 3.4 MMOL/L (ref 3.5–5.1)
POTASSIUM SERPL-SCNC: 3.4 MMOL/L (ref 3.5–5.1)
PROT SERPL-MCNC: 6.2 G/DL (ref 6–8.4)
PROT SERPL-MCNC: 6.4 G/DL (ref 6–8.4)
RA MAJOR: 4.93 CM
RA PRESSURE: 3 MMHG
RA WIDTH: 3.45 CM
RBC # BLD AUTO: 3.67 M/UL (ref 4–5.4)
RBC # BLD AUTO: 3.75 M/UL (ref 4–5.4)
SAMPLE: ABNORMAL
SINUS: 3.43 CM
SITE: ABNORMAL
SODIUM SERPL-SCNC: 135 MMOL/L (ref 136–145)
SODIUM SERPL-SCNC: 136 MMOL/L (ref 136–145)
STJ: 3.3 CM
TDI LATERAL: 0.09 M/S
TDI SEPTAL: 0.1 M/S
TDI: 0.1 M/S
TR MAX PG: 25 MMHG
TRICUSPID ANNULAR PLANE SYSTOLIC EXCURSION: 1.8 CM
TRIGL SERPL-MCNC: 76 MG/DL (ref 30–150)
TROPONIN I SERPL DL<=0.01 NG/ML-MCNC: 0.02 NG/ML (ref 0–0.03)
TV REST PULMONARY ARTERY PRESSURE: 28 MMHG
WBC # BLD AUTO: 14.24 K/UL (ref 3.9–12.7)
WBC # BLD AUTO: 15.67 K/UL (ref 3.9–12.7)

## 2022-02-15 PROCEDURE — 25500020 PHARM REV CODE 255: Performed by: INTERNAL MEDICINE

## 2022-02-15 PROCEDURE — 27000190 HC CPAP FULL FACE MASK W/VALVE

## 2022-02-15 PROCEDURE — 94761 N-INVAS EAR/PLS OXIMETRY MLT: CPT

## 2022-02-15 PROCEDURE — 27000221 HC OXYGEN, UP TO 24 HOURS

## 2022-02-15 PROCEDURE — 63700000 PHARM REV CODE 250 ALT 637 W/O HCPCS: Performed by: INTERNAL MEDICINE

## 2022-02-15 PROCEDURE — 97535 SELF CARE MNGMENT TRAINING: CPT

## 2022-02-15 PROCEDURE — 97161 PT EVAL LOW COMPLEX 20 MIN: CPT

## 2022-02-15 PROCEDURE — 94640 AIRWAY INHALATION TREATMENT: CPT

## 2022-02-15 PROCEDURE — 25000003 PHARM REV CODE 250: Performed by: INTERNAL MEDICINE

## 2022-02-15 PROCEDURE — 11000001 HC ACUTE MED/SURG PRIVATE ROOM

## 2022-02-15 PROCEDURE — 83735 ASSAY OF MAGNESIUM: CPT | Performed by: INTERNAL MEDICINE

## 2022-02-15 PROCEDURE — 97530 THERAPEUTIC ACTIVITIES: CPT

## 2022-02-15 PROCEDURE — 63600175 PHARM REV CODE 636 W HCPCS: Performed by: INTERNAL MEDICINE

## 2022-02-15 PROCEDURE — 99233 PR SUBSEQUENT HOSPITAL CARE,LEVL III: ICD-10-PCS | Mod: ,,, | Performed by: INTERNAL MEDICINE

## 2022-02-15 PROCEDURE — 93010 ELECTROCARDIOGRAM REPORT: CPT | Mod: ,,, | Performed by: INTERNAL MEDICINE

## 2022-02-15 PROCEDURE — 80053 COMPREHEN METABOLIC PANEL: CPT | Performed by: INTERNAL MEDICINE

## 2022-02-15 PROCEDURE — 94760 N-INVAS EAR/PLS OXIMETRY 1: CPT

## 2022-02-15 PROCEDURE — 27000646 HC AEROBIKA DEVICE

## 2022-02-15 PROCEDURE — 36415 COLL VENOUS BLD VENIPUNCTURE: CPT | Performed by: INTERNAL MEDICINE

## 2022-02-15 PROCEDURE — 97165 OT EVAL LOW COMPLEX 30 MIN: CPT

## 2022-02-15 PROCEDURE — 94660 CPAP INITIATION&MGMT: CPT

## 2022-02-15 PROCEDURE — 84484 ASSAY OF TROPONIN QUANT: CPT | Performed by: INTERNAL MEDICINE

## 2022-02-15 PROCEDURE — 99233 SBSQ HOSP IP/OBS HIGH 50: CPT | Mod: ,,, | Performed by: INTERNAL MEDICINE

## 2022-02-15 PROCEDURE — 84100 ASSAY OF PHOSPHORUS: CPT | Performed by: INTERNAL MEDICINE

## 2022-02-15 PROCEDURE — 85025 COMPLETE CBC W/AUTO DIFF WBC: CPT | Mod: 91 | Performed by: INTERNAL MEDICINE

## 2022-02-15 PROCEDURE — 94664 DEMO&/EVAL PT USE INHALER: CPT

## 2022-02-15 PROCEDURE — 93005 ELECTROCARDIOGRAM TRACING: CPT

## 2022-02-15 PROCEDURE — 93010 EKG 12-LEAD: ICD-10-PCS | Mod: ,,, | Performed by: INTERNAL MEDICINE

## 2022-02-15 PROCEDURE — 25000242 PHARM REV CODE 250 ALT 637 W/ HCPCS: Performed by: INTERNAL MEDICINE

## 2022-02-15 PROCEDURE — 80061 LIPID PANEL: CPT | Performed by: INTERNAL MEDICINE

## 2022-02-15 PROCEDURE — 27100171 HC OXYGEN HIGH FLOW UP TO 24 HOURS

## 2022-02-15 PROCEDURE — 99900035 HC TECH TIME PER 15 MIN (STAT)

## 2022-02-15 RX ORDER — POTASSIUM CHLORIDE 7.45 MG/ML
10 INJECTION INTRAVENOUS
Status: DISCONTINUED | OUTPATIENT
Start: 2022-02-15 | End: 2022-02-15

## 2022-02-15 RX ORDER — BENZONATATE 100 MG/1
200 CAPSULE ORAL 3 TIMES DAILY
Status: DISCONTINUED | OUTPATIENT
Start: 2022-02-15 | End: 2022-02-15

## 2022-02-15 RX ORDER — FUROSEMIDE 10 MG/ML
40 INJECTION INTRAMUSCULAR; INTRAVENOUS ONCE
Status: COMPLETED | OUTPATIENT
Start: 2022-02-15 | End: 2022-02-15

## 2022-02-15 RX ORDER — BENZONATATE 100 MG/1
200 CAPSULE ORAL 3 TIMES DAILY PRN
Status: DISCONTINUED | OUTPATIENT
Start: 2022-02-15 | End: 2022-02-23 | Stop reason: HOSPADM

## 2022-02-15 RX ORDER — METHYLPREDNISOLONE ACETATE 40 MG/ML
120 INJECTION, SUSPENSION INTRA-ARTICULAR; INTRALESIONAL; INTRAMUSCULAR; SOFT TISSUE DAILY
Status: DISCONTINUED | OUTPATIENT
Start: 2022-02-15 | End: 2022-02-15

## 2022-02-15 RX ORDER — ACETYLCYSTEINE 200 MG/ML
2 SOLUTION ORAL; RESPIRATORY (INHALATION) 2 TIMES DAILY
Status: DISCONTINUED | OUTPATIENT
Start: 2022-02-15 | End: 2022-02-15

## 2022-02-15 RX ORDER — KETOROLAC TROMETHAMINE 15 MG/ML
15 INJECTION, SOLUTION INTRAMUSCULAR; INTRAVENOUS EVERY 6 HOURS
Status: DISCONTINUED | OUTPATIENT
Start: 2022-02-15 | End: 2022-02-16

## 2022-02-15 RX ORDER — FUROSEMIDE 10 MG/ML
80 INJECTION INTRAMUSCULAR; INTRAVENOUS ONCE
Status: DISCONTINUED | OUTPATIENT
Start: 2022-02-15 | End: 2022-02-15

## 2022-02-15 RX ORDER — PROMETHAZINE HYDROCHLORIDE AND CODEINE PHOSPHATE 6.25; 1 MG/5ML; MG/5ML
5 SOLUTION ORAL NIGHTLY PRN
Status: DISCONTINUED | OUTPATIENT
Start: 2022-02-15 | End: 2022-02-23 | Stop reason: HOSPADM

## 2022-02-15 RX ORDER — GUAIFENESIN 600 MG/1
1200 TABLET, EXTENDED RELEASE ORAL 2 TIMES DAILY
Status: DISCONTINUED | OUTPATIENT
Start: 2022-02-15 | End: 2022-02-16

## 2022-02-15 RX ORDER — METHYLPREDNISOLONE SOD SUCC 125 MG
120 VIAL (EA) INJECTION DAILY
Status: COMPLETED | OUTPATIENT
Start: 2022-02-15 | End: 2022-02-16

## 2022-02-15 RX ADMIN — FLUTICASONE FUROATE AND VILANTEROL TRIFENATATE 1 PUFF: 200; 25 POWDER RESPIRATORY (INHALATION) at 09:02

## 2022-02-15 RX ADMIN — ACETYLCYSTEINE 2 ML: 200 INHALANT RESPIRATORY (INHALATION) at 08:02

## 2022-02-15 RX ADMIN — CEFTRIAXONE 1 G: 1 INJECTION, SOLUTION INTRAVENOUS at 09:02

## 2022-02-15 RX ADMIN — POTASSIUM CHLORIDE 10 MEQ: 7.46 INJECTION, SOLUTION INTRAVENOUS at 10:02

## 2022-02-15 RX ADMIN — AZITHROMYCIN MONOHYDRATE 500 MG: 250 TABLET ORAL at 08:02

## 2022-02-15 RX ADMIN — PANTOPRAZOLE SODIUM 40 MG: 40 TABLET, DELAYED RELEASE ORAL at 08:02

## 2022-02-15 RX ADMIN — KETOROLAC TROMETHAMINE 15 MG: 15 INJECTION, SOLUTION INTRAMUSCULAR; INTRAVENOUS at 09:02

## 2022-02-15 RX ADMIN — LEVALBUTEROL 1.25 MG: 1.25 SOLUTION, CONCENTRATE RESPIRATORY (INHALATION) at 08:02

## 2022-02-15 RX ADMIN — METHYLPREDNISOLONE SODIUM SUCCINATE 120 MG: 125 INJECTION, POWDER, FOR SOLUTION INTRAMUSCULAR; INTRAVENOUS at 10:02

## 2022-02-15 RX ADMIN — LEVALBUTEROL 1.25 MG: 1.25 SOLUTION, CONCENTRATE RESPIRATORY (INHALATION) at 07:02

## 2022-02-15 RX ADMIN — IOHEXOL 75 ML: 350 INJECTION, SOLUTION INTRAVENOUS at 12:02

## 2022-02-15 RX ADMIN — CALCIUM CARBONATE (ANTACID) CHEW TAB 500 MG 500 MG: 500 CHEW TAB at 08:02

## 2022-02-15 RX ADMIN — PREDNISONE 40 MG: 20 TABLET ORAL at 09:02

## 2022-02-15 RX ADMIN — POTASSIUM CHLORIDE 10 MEQ: 7.46 INJECTION, SOLUTION INTRAVENOUS at 12:02

## 2022-02-15 RX ADMIN — THERA TABS 1 TABLET: TAB at 08:02

## 2022-02-15 RX ADMIN — LEVALBUTEROL 1.25 MG: 1.25 SOLUTION, CONCENTRATE RESPIRATORY (INHALATION) at 01:02

## 2022-02-15 RX ADMIN — FUROSEMIDE 40 MG: 10 INJECTION, SOLUTION INTRAMUSCULAR; INTRAVENOUS at 12:02

## 2022-02-15 RX ADMIN — OXYCODONE HYDROCHLORIDE AND ACETAMINOPHEN 500 MG: 500 TABLET ORAL at 09:02

## 2022-02-15 RX ADMIN — TIOTROPIUM BROMIDE INHALATION SPRAY 2 PUFF: 3.12 SPRAY, METERED RESPIRATORY (INHALATION) at 09:02

## 2022-02-15 RX ADMIN — OXYCODONE HYDROCHLORIDE AND ACETAMINOPHEN 500 MG: 500 TABLET ORAL at 08:02

## 2022-02-15 NOTE — PROGRESS NOTES
[facility] Medicine  Progress Note    Patient Name: Estefani Fam  MRN: [id number]  Patient Class: IP- Inpatient   Admission Date: 2/14/2022  Length of Stay: 1 days  Attending Physician: Gianluca Zarate MD  Primary Care Provider: Miles Jackson MD        Subjective:     Principal Problem:<principal problem not specified>        HPI:  Ms. Fam is a 71 year old woman with PMH of COPD, chronic respiratory failure on 2-3L home O2, and prior COVID infection who presented to [facility] [place] for one day history  of shortness of breath at home. In the ED, patient was hypoxic to 84% upon arrival. Patient reports productive cough and pleuritic chest pain. She denies exertional chest pain, diaphoresis, arm pain. At home, she takes home inhalers of Breo Ellipta and Incruse Ellipta and Azithromycin 3 times weekly to help prevent exacerbations in the future.    She was previously hospitalized 11/2021 for COPD exacerbation 2/2 CAP. She was started on oral antibiotics and IV steroids. Patient clinically improved and discharged on 7 day course of levofloxacin and steroid taper. She also was discharged on Roflumilast and outpatient followup with her Pulmonologist Dr. Brooklynn Ruiz.     Past Medical History  She has a past medical history of Cataract, COPD (chronic obstructive pulmonary disease), History of COVID-19, History of retinal hemorrhage, Pathological fracture due to osteoporosis, Retinal histoplasmosis, and Secondary pulmonary arterial hypertension.     Past Surgical History  She has a past surgical history that includes Hand surgery and Bronchoscopy with fluoroscopy (10/28/2019).     Family History  Her family history includes Colon cancer in her father and mother; Macular degeneration in her mother; Stroke in her father.     Social History  She reports that she quit smoking about 5 years ago. Her smoking use included cigarettes. She has a 36.00 pack-year smoking history. She has never used smokeless  tobacco. She reports current alcohol use of about 2.0 standard drinks of alcohol per week. She reports that she does not use drugs.     Allergies  She is allergic to albuterol and ipratropium analogues.      Overview/Hospital Course:  No notes on file    Past Medical History:   Diagnosis Date    Cataract     COPD (chronic obstructive pulmonary disease)     COVID-19     History of COVID-19 1/17/2021    Still with mild dyspnea. Encouraged return to pulmonary rehab Recommend scheduling vaccination when appointment is available.     History of retinal hemorrhage     Pathological fracture due to osteoporosis 7/6/2020    Retinal histoplasmosis     Secondary pulmonary arterial hypertension 7/3/2018    Secondary to emphysema and chronic respiratory failure, mild.       Past Surgical History:   Procedure Laterality Date    BRONCHOSCOPY WITH FLUOROSCOPY N/A 10/28/2019    Procedure: BRONCHOSCOPY, WITH FLUOROSCOPY;  Surgeon: Brooklynn Ruiz MD;  Location: [facility] OR [address] [place] [address];  Service: Endoscopy;  Laterality: N/A;    HAND SURGERY         Review of patient's allergies indicates:   Allergen Reactions    Albuterol     Ipratropium analogues      Difficulty breathing       Current Facility-Administered Medications on File Prior to Encounter   Medication    albuterol inhaler 2 puff     Current Outpatient Medications on File Prior to Encounter   Medication Sig    acetylcysteine 600 mg Cap Take 1 capsule (600 mg total) by mouth 2 (two) times daily.    ascorbic acid, vitamin C, (VITAMIN C) 500 MG tablet Take 500 mg by mouth 2 (two) times daily.     azelastine (ASTELIN) 137 mcg (0.1 %) nasal spray INHALE 1 SPRAY BY NASAL ROUTE TWICE DAILY OR AS DIRECTED    azithromycin (Z-JERSEY) 250 MG tablet TAKE 1 TABLET BY MOUTH EVERY MONDAY WEDNESDAY AND FRIDAY    BREO ELLIPTA 200-25 mcg/dose DsDv diskus inhaler INHALE 1 PUFF INTO THE LUNGS DAILY    calcium carbonate (OS-STEPHANIE) 600 mg calcium (1,500 mg) Tab Take 1 tablet (600 mg total) by  mouth once daily. Take Levofloxacin antibiotic at least 2 hours before calcium.    fluticasone propionate (FLONASE) 50 mcg/actuation nasal spray INSTILL 1 SPRAY IN EACH NOSTRIL EVERY DAY    levalbuterol (XOPENEX) 0.63 mg/3 mL nebulizer solution USE 1 VIAL IN NEBULIZER EVERY 8 HOURS AS NEEDED FOR WHEEZE    MULTIVIT-IRON-MIN-FOLIC ACID 3,500-18-0.4 UNIT-MG-MG ORAL CHEW Take 1 tablet by mouth once daily. Take Levofloxacin antibiotic at least 2 hours before multivitamin.    pantoprazole (PROTONIX) 40 MG tablet Take 1 tablet (40 mg total) by mouth once daily.    pulse oximeter (PULSE OXIMETER) device by Apply Externally route 2 (two) times a day. Use twice daily at 8 AM and 3 PM and record the value in MyChart as directed.    sodium chloride (OCEAN) 0.65 % nasal spray 1 spray by Nasal route 2 (two) times daily.    umeclidinium (INCRUSE ELLIPTA) 62.5 mcg/actuation inhalation capsule INHALE 1 PUFF BY MOUTH INTO LUNGS ONCE DAILY    roflumilast (DALIRESP) 250 mcg Tab Take 1 tablet (250 mcg total) by mouth once daily.    [DISCONTINUED] predniSONE (DELTASONE) 20 MG tablet Take 2 tablets (40mg) by mouth daily for 7 days, then take 1 tablet (20mg) by mouth daily x 7 days, then take half a tablet (10mg) daily x 7 days    [DISCONTINUED] predniSONE (DELTASONE) 20 MG tablet Take 2 tablets (40mg) by mouth daily for 7 days, then take 1 tablet (20mg) by mouth daily x 7 days, then take half a tablet (10mg) daily x 7 days     Family History     Problem Relation (Age of Onset)    Colon cancer Mother, Father    Macular degeneration Mother    Stroke Father        Tobacco Use    Smoking status: Former Smoker     Packs/day: 1.00     Years: 36.00     Pack years: 36.00     Types: Cigarettes     Quit date: 2016     Years since quittin.4    Smokeless tobacco: Never Used   Substance and Sexual Activity    Alcohol use: Yes     Alcohol/week: 2.0 standard drinks     Types: 2 Glasses of wine per week     Comment: 1-2 glasses a  day     Drug use: No    Sexual activity: Yes     Review of Systems   Constitutional: Positive for activity change and fatigue. Negative for chills and fever.   HENT: Negative for congestion and trouble swallowing.    Eyes: Negative for visual disturbance.   Respiratory: Positive for cough and shortness of breath.    Cardiovascular: Negative for chest pain and leg swelling.   Gastrointestinal: Negative for abdominal distention, abdominal pain, nausea and vomiting.   Endocrine: Negative for cold intolerance, heat intolerance, polydipsia and polyuria.   Genitourinary: Negative for difficulty urinating and dysuria.   Musculoskeletal: Negative for back pain and myalgias.   Skin: Negative for rash and wound.   Neurological: Positive for weakness. Negative for dizziness and light-headedness.   Hematological: Negative for adenopathy. Does not bruise/bleed easily.   Psychiatric/Behavioral: Negative for confusion and sleep disturbance.     Objective:     Vital Signs (Most Recent):  Temp: 98.3 °F (36.8 °C) (02/14/22 1900)  Pulse: 110 (02/14/22 1915)  Resp: 20 (02/14/22 1915)  BP: (!) 124/57 (02/14/22 1900)  SpO2: 95 % (02/14/22 1915) Vital Signs (24h Range):  Temp:  [98.3 °F (36.8 °C)-99.3 °F (37.4 °C)] 98.3 °F (36.8 °C)  Pulse:  [102-121] 110  Resp:  [20-25] 20  SpO2:  [94 %-100 %] 95 %  BP: (118-135)/(56-92) 124/57     Weight: 72.6 kg (160 lb)  Body mass index is 24.33 kg/m².    Physical Exam  Constitutional:       Appearance: She is well-developed and well-nourished. She is ill-appearing.   HENT:      Head: Normocephalic.   Eyes:      General: No scleral icterus.     Extraocular Movements: EOM normal.      Pupils: Pupils are equal, round, and reactive to light.   Neck:      Vascular: No JVD.   Cardiovascular:      Rate and Rhythm: Regular rhythm. Tachycardia present.      Pulses: Normal pulses and intact distal pulses.      Heart sounds: Normal heart sounds. No murmur heard.      Pulmonary:      Effort: Pulmonary effort  is normal.      Breath sounds: Decreased air movement present. Examination of the right-lower field reveals decreased breath sounds. Examination of the left-lower field reveals decreased breath sounds. Decreased breath sounds, wheezing and rales present.   Abdominal:      General: Bowel sounds are normal. There is no distension.      Palpations: Abdomen is soft.   Musculoskeletal:         General: No deformity or edema. Normal range of motion.      Cervical back: Neck supple.   Lymphadenopathy:      Cervical: No cervical adenopathy.   Skin:     General: Skin is warm and dry.      Capillary Refill: Capillary refill takes less than 2 seconds.      Findings: No erythema or rash.   Neurological:      Mental Status: She is alert and oriented to person, place, and time.      Cranial Nerves: No cranial nerve deficit.      Sensory: No sensory deficit.   Psychiatric:         Mood and Affect: Mood and affect normal.         Judgment: Judgment normal.           CRANIAL NERVES     CN III, IV, VI   Pupils are equal, round, and reactive to light.  Extraocular motions are normal.        Significant Labs: All pertinent labs within the past 24 hours have been reviewed.    Significant Imaging: I have reviewed all pertinent imaging results/findings within the past 24 hours.      Assessment/Plan:      Severe sepsis  This patient does have evidence of infective focus  My overall impression is sepsis. Vital signs were reviewed and noted in progress note.  Antibiotics given-   Antibiotics (From admission, onward)            Start     Stop Route Frequency Ordered    02/15/22 0900  azithromycin tablet 500 mg         02/17 0859 Oral Daily 02/14/22 1819    02/15/22 0800  cefTRIAXone (ROCEPHIN) 1 g/50 mL D5W IVPB         02/19 0759 IV Daily 02/14/22 1819        Cultures were taken-   Microbiology Results (last 7 days)     Procedure Component Value Units Date/Time    Blood culture (site 1) [[id number]] Collected: 02/14/22 1840    Order Status:  "Sent Specimen: Blood from Peripheral, Forearm, Right Updated: 02/14/22 1849    Blood culture (site 2) [[id number]] Collected: 02/14/22 1840    Order Status: Sent Specimen: Blood from Peripheral, Hand, Right Updated: 02/14/22 1849    Culture, Respiratory with Gram Stain [[id number]]     Order Status: No result Specimen: Sputum, Expectorated         Latest lactate reviewed, they are-  No results for input(s): LACTATE in the last 72 hours.    Organ dysfunction indicated by Acute respiratory failure  Source- pneumonia    Source control Achieved by IV antibiotics     PLAN:  - 3/4 SIRS (tachycardia, tachypnea, leukocytosis)  - started on IVF  - continue CAP management   - see "PNA"  - ordered blood cultures and sputum cultures    CAP (community acquired pneumonia)  - CXR showed opacities in right lung  - continue IV ceftriaxone and azithromycin   - followup blood cultures and sputum culture  - procal 0.19  - WBC on admit, 19.7  - CBC daily       Acute on chronic respiratory failure with hypoxia  Patient with Hypercapnic and Hypoxic Respiratory failure which is Acute on chronic.  she is on home oxygen at 2-3 LPM. Supplemental oxygen was provided and noted-  .   Signs/symptoms of respiratory failure include- tachypnea, increased work of breathing, respiratory distress and wheezing. Contributing diagnoses includes - Aspiration, CHF, COPD, Pleural effusion and Pneumonia Labs and images were reviewed. Patient Has recent ABG, which has been reviewed. Will treat underlying causes and adjust management of respiratory failure as follows    PLAN:  - on 2-3 L O2 at home  - currently on 2.5 L NC  - likely 2/2 to COPD exacerbation due to CAP  - xopenex q4h while awake  - CXR showed right sided PNA  - continue home inhalers  - see "CAP"  - outpatient pulmonary followup    COPD with acute exacerbation  - continue CAP treatment   - see "CAP"  - continue prednisone        VTE Risk Mitigation (From admission, onward)         Ordered     " enoxaparin injection 40 mg  Daily         02/14/22 1817     IP VTE HIGH RISK PATIENT  Once         02/14/22 1817     Place sequential compression device  Until discontinued         02/14/22 1817                Discharge Planning   RENARD: 2/18/2022     Code Status: Full Code   Is the patient medically ready for discharge?: No    Reason for patient still in hospital (select all that apply): Patient unstable, Patient trending condition, Laboratory test, Treatment and PT / OT recommendations             Time spent in care of patient: > 45 minutes           Gianluca Zarate MD  Department of Hospital Medicine   [facility]

## 2022-02-15 NOTE — HPI
Ms. Fam is a 71 year old woman with PMH of COPD, chronic respiratory failure on 2-3L home O2, and prior COVID infection who presented to [facility] via EMS for one day history  of shortness of breath at home. In the ED, patient was hypoxic to 84% upon arrival. Patient reports productive cough and pleuritic chest pain. She denies exertional chest pain, diaphoresis, arm pain. At home, she takes home inhalers of Breo Ellipta and Incruse Ellipta and Azithromycin 3 times weekly to help prevent exacerbations in the future.    She was previously hospitalized 11/2021 for COPD exacerbation 2/2 CAP. She was started on oral antibiotics and IV steroids. Patient clinically improved and discharged on 7 day course of levofloxacin and steroid taper. She also was discharged on Roflumilast and outpatient followup with her Pulmonologist Dr. Brooklynn Ruiz.

## 2022-02-15 NOTE — PT/OT/SLP EVAL
Physical Therapy Co Evaluation and Tx    Patient Name:  Estefani Fam   MRN:  [id number]  Admit Date: 2/14/2022  Admitting Diagnosis:  <principal problem not specified>  Length of Stay: 1 days  Recent Surgery: * No surgery found *    *Co treatment of 2 skilled therapists indicated in order to maximize function and safety of Pt.  Recommendations:   Discharge Recommendations:  home health PT   Discharge Equipment Recommendations: shower chair   Barriers to discharge: Decreased caregiver support      Assessment:     Estefani Fam is a 71 y.o. female admitted with a medical diagnosis of <principal problem not specified>.  Pt demonstrates functional mobility deficits and is functioning below baseline. Pt participated well with PT/OT and performed bed mobility with SBA and tansfers with CGA. Limited activity tolerance at baseline.   SpO2 during session: On 4 L NC, 93% with head of bed elevated, 89-90% upon transfer to edge of bed, ~5 minutes to return to 91-92%, 87-90% to transfer to bedside commode, 84% return to sit edge of bed from bedside commode and ~3 minutes to return to 90% seated edge of bed, 90-91% with head of bed elevated at end of session with staff completing US present.    Problem List: weakness,impaired endurance,impaired self care skills,impaired functional mobilty,gait instability,impaired cardiopulmonary response to activity,impaired balance,decreased lower extremity function  Rehab Prognosis: Good; patient would benefit from acute skilled PT services to address these deficits and reach maximum level of function.        Plan:   During this hospitalization, patient to be seen 4 x/week to address the above listed problems via gait training,therapeutic activities,therapeutic exercises,neuromuscular re-education  · Plan of Care Expires:  03/15/22    Subjective     Chief Complaint: Shortness of Breath (Pt c/o SOB-hx COPD.  Initial sat on 2L home O2 was 86%. )    Patient/Family Comments/goals: to be  more independent  Pain/Comfort:  · Pain Rating 1: 0/10  · Pain Rating Post-Intervention 1: 0/10    Social History:  Residence: Pt lives with , 2SH bedroom upstairs so patient sleeping on couch in living room,walk-in shower  Previous level of function: Mod I, limited distance at baseline/does not go to second level of home; patient's son completes grocery shopping    Support available: spouse  Equipment Used: oxygen,bedside commode,rollator  Prior level of function: independent    Objective:     Communicated with RN prior to session.  Patient found HOB elevated upon PT entry to room.   Additional staff present: OT    General Precautions: Standard, fall   Orthopedic Precautions:N/A   Braces: N/A   Oxygen Device: Nasal Cannula 4L    Exams:  · Cognition:   · AxOx4  · Command following: Follows multistep verbal commands    · Postural Exam:  Patient presented with the following abnormalities:    · -       Rounded shoulders  · -       Forward head  · Sensation:    · -       Intact    · RLE ROM: WFL  · RLE Strength: WFL  · LLE ROM: WFL  · LLE Strength: WFL    Functional Mobility:  Bed Mobility:     Rolling Right: stand by assistance  Scooting: stand by assistance  Supine to Sit: stand by assistance  Transfers:     Sit to Stand:  contact guard assistance with hand-held assist  Toilet Transfer: contact guard assistance with  hand-held assist  using  Step Transfer  Gait:   · Distance: ~10 ft  · Level of Assistance:  contact guard assistance  · AD used: hand-held assist  · Gait Deviations: decreased speed, step length, swing through, heel strike, forward flexed posture, and downward gaze.   · Comments: PT providing verbal and tactile cues for improved upright posture, gaze direction, and gait fluidity.   Balance:   · Static Sitting: SPV  · Dynamic Sitting: SBA   · Static Standing: CGA  · Dynamic Standing: Dustin    Therapeutic Activities & Exercises:      Education on importance of upright positioning and aerobic exercise  to promote cardiopulmonary health   Patient educated on the importance of early mobility to prevent functional decline during hospital stay   Patient was instructed to utilize staff assistance for mobility/transfers.   Patient was educated on PT POC and all questions answered within PT scope of practice.   Patient able to verbalize understanding; will follow-up with pt during current admit for additional questions/concerns within scope of practice.     Outcome Measures:  AM-PAC 6 CLICK MOBILITY  Turning over in bed (including adjusting bedclothes, sheets and blankets)?: 3  Sitting down on and standing up from a chair with arms (e.g., wheelchair, bedside commode, etc.): 3  Moving from lying on back to sitting on the side of the bed?: 3  Moving to and from a bed to a chair (including a wheelchair)?: 3  Need to walk in hospital room?: 3  Climbing 3-5 steps with a railing?: 3  Basic Mobility Total Score: 18     Patient left HOB elevated with all lines intact and call button in reach.    GOALS:   Multidisciplinary Problems     Physical Therapy Goals        Problem: Physical Therapy Goal    Goal Priority Disciplines Outcome Goal Variances Interventions   Physical Therapy Goal     PT, PT/OT Ongoing, Progressing     Description: Goals to be met by: 3/1/22     Patient will increase functional independence with mobility by performin. Supine to sit with [place]  2. Sit to supine with [place]  3. Sit to stand transfer with Supervision  4. Gait  x 30 feet with Stand-by Assistance using Rolling Walker.   5. Lower extremity exercise program x 20 reps per handout, with independence                       History:     Past Medical History:   Diagnosis Date    Cataract     COPD (chronic obstructive pulmonary disease)     COVID-19     History of COVID-19 2021    Still with mild dyspnea. Encouraged return to pulmonary rehab Recommend scheduling vaccination when appointment is available.     History of  retinal hemorrhage     Pathological fracture due to osteoporosis 2020    Retinal histoplasmosis     Secondary pulmonary arterial hypertension 7/3/2018    Secondary to emphysema and chronic respiratory failure, mild.       Past Surgical History:   Procedure Laterality Date    BRONCHOSCOPY WITH FLUOROSCOPY N/A 10/28/2019    Procedure: BRONCHOSCOPY, WITH FLUOROSCOPY;  Surgeon: Brooklynn Ruiz MD;  Location: [facility] OR [address] [place] [address];  Service: Endoscopy;  Laterality: N/A;    HAND SURGERY         Family History   Problem Relation Age of Onset    Macular degeneration Mother     Colon cancer Mother     Stroke Father     Colon cancer Father     Amblyopia Neg Hx     Blindness Neg Hx     Cataracts Neg Hx     Diabetes Neg Hx     Glaucoma Neg Hx     Hypertension Neg Hx     Retinal detachment Neg Hx     Strabismus Neg Hx     Thyroid disease Neg Hx        Social History     Socioeconomic History    Marital status:    Tobacco Use    Smoking status: Former Smoker     Packs/day: 1.00     Years: 36.00     Pack years: 36.00     Types: Cigarettes     Quit date: 2016     Years since quittin.4    Smokeless tobacco: Never Used   Substance and Sexual Activity    Alcohol use: Yes     Alcohol/week: 2.0 standard drinks     Types: 2 Glasses of wine per week     Comment: 1-2 glasses a day     Drug use: No    Sexual activity: Yes     Time Tracking:     PT Received On: 02/15/22  PT Start Time: 1012     PT Stop Time: 1030  PT Total Time (min): 18 min     Billable Minutes: Evaluation 8 and Therapeutic Activity 10    2/15/2022

## 2022-02-15 NOTE — CODE/ RAPID DOCUMENTATION
RAPID RESPONSE NURSE NOTE        Admit Date: 2022  LOS: 1  Code Status: Full Code   Date of Consult: 02/15/2022  : 1950  Age: 71 y.o.  Weight:   Wt Readings from Last 1 Encounters:   02/15/22 71 kg (156 lb 8.4 oz)     Sex: female  Race: White   Bed: [facility]:   MRN: [id number]  Time Rapid Response Team page Received: 914  Time Rapid Response Team at Bedside: 917  Time Rapid Response Team left Bedside: 952  Was the patient discharged from an ICU this admission? No   Was the patient discharged from a PACU within last 24 hours? No   Did the patient receive conscious sedation/general anesthesia in last 24 hours? No  Was the patient in the ED within the past 24 hours? Yes  Was the patient on NIPPV within the past 24 hours? No   Did this progress into an ARC or CPA: no  Attending Physician: Gianluca Zarate MD  [place] [facility]       SITUATION    Notified by charge RN via phone call.  Reason for alert: Respiratory distress  Called to evaluate the patient for Respiratory    BACKGROUND     Why is the patient in the hospital?: <principal problem not specified>    Patient has a past medical history of Cataract, COPD (chronic obstructive pulmonary disease), COVID-19, History of COVID-19, History of retinal hemorrhage, Pathological fracture due to osteoporosis, Retinal histoplasmosis, and Secondary pulmonary arterial hypertension.    Last Vitals:  Temp: 96.9 °F (36.1 °C) (02/15 0558)  Pulse: 121 (02/15 0928)  Resp: 24 (02/15 0928)  BP: 107/55 (02/15 0928)  SpO2: 90 % (02/15 0928)    24 Hours Vitals Range:  Temp:  [96.9 °F (36.1 °C)-99.3 °F (37.4 °C)]   Pulse:  []   Resp:  [17-28]   BP: (103-135)/(54-92)   SpO2:  [65 %-100 %]     Labs:  Recent Labs     22  1403 02/15/22  0306   WBC 19.76* 14.24*   HGB 12.3 11.4*   HCT 40.4 35.6*    452*       Recent Labs     22  1531 02/15/22  0305    135*   K 2.9* 3.4*    93*   CO2 25 31*   CREATININE 0.4* 0.5   * 166*    PHOS  --  2.6*   MG 1.7 2.3        Recent Labs     02/14/22  1412   PH 7.404   PCO2 59.6*   PO2 39*   HCO3 37.2*   POCSATURATED 71*   BE 12        ASSESSMENT    Physical Exam  Constitutional:       Appearance: Normal appearance.   HENT:      Mouth/Throat:      Mouth: Mucous membranes are moist.   Eyes:      Pupils: Pupils are equal, round, and reactive to light.   Cardiovascular:      Rate and Rhythm: Regular rhythm. Tachycardia present.   Pulmonary:      Effort: Tachypnea present.      Breath sounds: Examination of the right-lower field reveals decreased breath sounds. Decreased breath sounds present.   Abdominal:      General: Abdomen is flat.      Palpations: Abdomen is soft.   Musculoskeletal:         General: Normal range of motion.   Skin:     General: Skin is warm and dry.      Capillary Refill: Capillary refill takes less than 2 seconds.   Neurological:      General: No focal deficit present.      Mental Status: She is alert and oriented to person, place, and time.   Psychiatric:         Mood and Affect: Mood normal.       Called to bedside for pt in acute respiratory distress - tachypnea, tachycardia 150s, SpO2 65%. Upon arrival, pt tachypneic, on NRB 15L SpO2 99%. Pt placed on 4L NC, SpO2 remaining 88% and above. HR decreased to 120s, appears to be in ST on monitor. Tachypnea resolving. Labs and imaging ordered by Dr. Zarate. Abg obtained, results per chart.      INTERVENTIONS    The patient was seen for a Respiratory problem. Staff concerns included tachypnea, new onset of difficulty breathing and oxygen saturation < 90% despite supplemental oxygen. The following interventions were performed: continued pulse ox monitoring, supplemental oxygen, POCT arterial blood gas  and portable chest x-ray.    RECOMMENDATIONS    We recommend: Agree with CT chest for PE r/o due to acute onset of respiratory symptoms. Continuous pulse ox.     PROVIDER ESCALATION    Orders received and case discussed with   Elliot.    Disposition: Remain in room 648.    FOLLOW UP    charge RN, Lennie updated on plan of care. Instructed to call the Rapid Response Nurse, Rey Gama RN at [phone] for additional questions or concerns.

## 2022-02-15 NOTE — SUBJECTIVE & OBJECTIVE
Past Medical History:   Diagnosis Date    Cataract     COPD (chronic obstructive pulmonary disease)     COVID-19     History of COVID-19 1/17/2021    Still with mild dyspnea. Encouraged return to pulmonary rehab Recommend scheduling vaccination when appointment is available.     History of retinal hemorrhage     Pathological fracture due to osteoporosis 7/6/2020    Retinal histoplasmosis     Secondary pulmonary arterial hypertension 7/3/2018    Secondary to emphysema and chronic respiratory failure, mild.       Past Surgical History:   Procedure Laterality Date    BRONCHOSCOPY WITH FLUOROSCOPY N/A 10/28/2019    Procedure: BRONCHOSCOPY, WITH FLUOROSCOPY;  Surgeon: Brooklynn Ruiz MD;  Location: [facility] OR [address] [place] [address];  Service: Endoscopy;  Laterality: N/A;    HAND SURGERY         Review of patient's allergies indicates:   Allergen Reactions    Albuterol     Ipratropium analogues      Difficulty breathing       No current facility-administered medications on file prior to encounter.     Current Outpatient Medications on File Prior to Encounter   Medication Sig    acetylcysteine 600 mg Cap Take 1 capsule (600 mg total) by mouth 2 (two) times daily.    ascorbic acid, vitamin C, (VITAMIN C) 500 MG tablet Take 500 mg by mouth 2 (two) times daily.     azelastine (ASTELIN) 137 mcg (0.1 %) nasal spray INHALE 1 SPRAY BY NASAL ROUTE TWICE DAILY OR AS DIRECTED    azithromycin (Z-JERSEY) 250 MG tablet TAKE 1 TABLET BY MOUTH EVERY MONDAY WEDNESDAY AND FRIDAY    BREO ELLIPTA 200-25 mcg/dose DsDv diskus inhaler INHALE 1 PUFF INTO THE LUNGS DAILY    calcium carbonate (OS-STEPHANIE) 600 mg calcium (1,500 mg) Tab Take 1 tablet (600 mg total) by mouth once daily. Take Levofloxacin antibiotic at least 2 hours before calcium.    fluticasone propionate (FLONASE) 50 mcg/actuation nasal spray INSTILL 1 SPRAY IN EACH NOSTRIL EVERY DAY    levalbuterol (XOPENEX) 0.63 mg/3 mL nebulizer solution USE 1 VIAL IN NEBULIZER EVERY 8 HOURS AS NEEDED  FOR WHEEZE    MULTIVIT-IRON-MIN-FOLIC ACID 3,500-18-0.4 UNIT-MG-MG ORAL CHEW Take 1 tablet by mouth once daily. Take Levofloxacin antibiotic at least 2 hours before multivitamin.    pantoprazole (PROTONIX) 40 MG tablet Take 1 tablet (40 mg total) by mouth once daily.    pulse oximeter (PULSE OXIMETER) device by Apply Externally route 2 (two) times a day. Use twice daily at 8 AM and 3 PM and record the value in [facility] as directed.    sodium chloride (OCEAN) 0.65 % nasal spray 1 spray by Nasal route 2 (two) times daily.    umeclidinium (INCRUSE ELLIPTA) 62.5 mcg/actuation inhalation capsule INHALE 1 PUFF BY MOUTH INTO LUNGS ONCE DAILY    roflumilast (DALIRESP) 250 mcg Tab Take 1 tablet (250 mcg total) by mouth once daily.     Family History     Problem Relation (Age of Onset)    Colon cancer Mother, Father    Macular degeneration Mother    Stroke Father        Tobacco Use    Smoking status: Former Smoker     Packs/day: 1.00     Years: 36.00     Pack years: 36.00     Types: Cigarettes     Quit date: 2016     Years since quittin.4    Smokeless tobacco: Never Used   Substance and Sexual Activity    Alcohol use: Yes     Alcohol/week: 2.0 standard drinks     Types: 2 Glasses of wine per week     Comment: 1-2 glasses a day     Drug use: No    Sexual activity: Yes     Review of Systems   Constitutional: Positive for activity change, appetite change and fatigue. Negative for chills and fever.   HENT: Negative for congestion and trouble swallowing.    Eyes: Negative for visual disturbance.   Respiratory: Positive for cough and shortness of breath.    Cardiovascular: Negative for chest pain and leg swelling.   Gastrointestinal: Negative for abdominal distention, abdominal pain, nausea and vomiting.   Genitourinary: Negative for difficulty urinating and dysuria.   Musculoskeletal: Negative for back pain and myalgias.   Skin: Negative for rash and wound.   Neurological: Positive for weakness. Negative for  dizziness and light-headedness.   Hematological: Negative for adenopathy. Does not bruise/bleed easily.   Psychiatric/Behavioral: Negative for confusion and sleep disturbance.     Objective:     Vital Signs (Most Recent):  Temp: 97 °F (36.1 °C) (02/15/22 0238)  Pulse: 95 (02/15/22 0238)  Resp: 20 (02/15/22 0238)  BP: 118/70 (02/15/22 0238)  SpO2: (!) 94 % (02/14/22 2230) Vital Signs (24h Range):  Temp:  [97 °F (36.1 °C)-99.3 °F (37.4 °C)] 97 °F (36.1 °C)  Pulse:  [] 95  Resp:  [20-25] 20  SpO2:  [94 %-100 %] 94 %  BP: (103-135)/(54-92) 118/70     Weight: 71 kg (156 lb 8.4 oz)  Body mass index is 23.8 kg/m².    Physical Exam        Significant Labs: All pertinent labs within the past 24 hours have been reviewed.    Significant Imaging: I have reviewed all pertinent imaging results/findings within the past 24 hours.

## 2022-02-15 NOTE — SUBJECTIVE & OBJECTIVE
Past Medical History:   Diagnosis Date    Cataract     COPD (chronic obstructive pulmonary disease)     COVID-19     History of COVID-19 1/17/2021    Still with mild dyspnea. Encouraged return to pulmonary rehab Recommend scheduling vaccination when appointment is available.     History of retinal hemorrhage     Pathological fracture due to osteoporosis 7/6/2020    Retinal histoplasmosis     Secondary pulmonary arterial hypertension 7/3/2018    Secondary to emphysema and chronic respiratory failure, mild.       Past Surgical History:   Procedure Laterality Date    BRONCHOSCOPY WITH FLUOROSCOPY N/A 10/28/2019    Procedure: BRONCHOSCOPY, WITH FLUOROSCOPY;  Surgeon: Brooklynn Ruiz MD;  Location: [facility] OR [address] [place] [address];  Service: Endoscopy;  Laterality: N/A;    HAND SURGERY         Review of patient's allergies indicates:   Allergen Reactions    Albuterol     Ipratropium analogues      Difficulty breathing       Current Facility-Administered Medications on File Prior to Encounter   Medication    albuterol inhaler 2 puff     Current Outpatient Medications on File Prior to Encounter   Medication Sig    acetylcysteine 600 mg Cap Take 1 capsule (600 mg total) by mouth 2 (two) times daily.    ascorbic acid, vitamin C, (VITAMIN C) 500 MG tablet Take 500 mg by mouth 2 (two) times daily.     azelastine (ASTELIN) 137 mcg (0.1 %) nasal spray INHALE 1 SPRAY BY NASAL ROUTE TWICE DAILY OR AS DIRECTED    azithromycin (Z-JERSEY) 250 MG tablet TAKE 1 TABLET BY MOUTH EVERY MONDAY WEDNESDAY AND FRIDAY    BREO ELLIPTA 200-25 mcg/dose DsDv diskus inhaler INHALE 1 PUFF INTO THE LUNGS DAILY    calcium carbonate (OS-STEPHANIE) 600 mg calcium (1,500 mg) Tab Take 1 tablet (600 mg total) by mouth once daily. Take Levofloxacin antibiotic at least 2 hours before calcium.    fluticasone propionate (FLONASE) 50 mcg/actuation nasal spray INSTILL 1 SPRAY IN EACH NOSTRIL EVERY DAY    levalbuterol (XOPENEX) 0.63 mg/3 mL nebulizer solution USE 1  VIAL IN NEBULIZER EVERY 8 HOURS AS NEEDED FOR WHEEZE    MULTIVIT-IRON-MIN-FOLIC ACID 3,500-18-0.4 UNIT-MG-MG ORAL CHEW Take 1 tablet by mouth once daily. Take Levofloxacin antibiotic at least 2 hours before multivitamin.    pantoprazole (PROTONIX) 40 MG tablet Take 1 tablet (40 mg total) by mouth once daily.    pulse oximeter (PULSE OXIMETER) device by Apply Externally route 2 (two) times a day. Use twice daily at 8 AM and 3 PM and record the value in MyChart as directed.    sodium chloride (OCEAN) 0.65 % nasal spray 1 spray by Nasal route 2 (two) times daily.    umeclidinium (INCRUSE ELLIPTA) 62.5 mcg/actuation inhalation capsule INHALE 1 PUFF BY MOUTH INTO LUNGS ONCE DAILY    roflumilast (DALIRESP) 250 mcg Tab Take 1 tablet (250 mcg total) by mouth once daily.    [DISCONTINUED] predniSONE (DELTASONE) 20 MG tablet Take 2 tablets (40mg) by mouth daily for 7 days, then take 1 tablet (20mg) by mouth daily x 7 days, then take half a tablet (10mg) daily x 7 days    [DISCONTINUED] predniSONE (DELTASONE) 20 MG tablet Take 2 tablets (40mg) by mouth daily for 7 days, then take 1 tablet (20mg) by mouth daily x 7 days, then take half a tablet (10mg) daily x 7 days     Family History     Problem Relation (Age of Onset)    Colon cancer Mother, Father    Macular degeneration Mother    Stroke Father        Tobacco Use    Smoking status: Former Smoker     Packs/day: 1.00     Years: 36.00     Pack years: 36.00     Types: Cigarettes     Quit date: 2016     Years since quittin.4    Smokeless tobacco: Never Used   Substance and Sexual Activity    Alcohol use: Yes     Alcohol/week: 2.0 standard drinks     Types: 2 Glasses of wine per week     Comment: 1-2 glasses a day     Drug use: No    Sexual activity: Yes     Review of Systems   Constitutional: Positive for activity change and fatigue. Negative for chills and fever.   HENT: Negative for congestion and trouble swallowing.    Eyes: Negative for visual  disturbance.   Respiratory: Positive for cough and shortness of breath.    Cardiovascular: Negative for chest pain and leg swelling.   Gastrointestinal: Negative for abdominal distention, abdominal pain, nausea and vomiting.   Endocrine: Negative for cold intolerance, heat intolerance, polydipsia and polyuria.   Genitourinary: Negative for difficulty urinating and dysuria.   Musculoskeletal: Negative for back pain and myalgias.   Skin: Negative for rash and wound.   Neurological: Positive for weakness. Negative for dizziness and light-headedness.   Hematological: Negative for adenopathy. Does not bruise/bleed easily.   Psychiatric/Behavioral: Negative for confusion and sleep disturbance.     Objective:     Vital Signs (Most Recent):  Temp: 98.3 °F (36.8 °C) (02/14/22 1900)  Pulse: 110 (02/14/22 1915)  Resp: 20 (02/14/22 1915)  BP: (!) 124/57 (02/14/22 1900)  SpO2: 95 % (02/14/22 1915) Vital Signs (24h Range):  Temp:  [98.3 °F (36.8 °C)-99.3 °F (37.4 °C)] 98.3 °F (36.8 °C)  Pulse:  [102-121] 110  Resp:  [20-25] 20  SpO2:  [94 %-100 %] 95 %  BP: (118-135)/(56-92) 124/57     Weight: 72.6 kg (160 lb)  Body mass index is 24.33 kg/m².    Physical Exam  Constitutional:       Appearance: She is well-developed and well-nourished. She is ill-appearing.   HENT:      Head: Normocephalic.   Eyes:      General: No scleral icterus.     Extraocular Movements: EOM normal.      Pupils: Pupils are equal, round, and reactive to light.   Neck:      Vascular: No JVD.   Cardiovascular:      Rate and Rhythm: Regular rhythm. Tachycardia present.      Pulses: Normal pulses and intact distal pulses.      Heart sounds: Normal heart sounds. No murmur heard.      Pulmonary:      Effort: Pulmonary effort is normal.      Breath sounds: Decreased air movement present. Examination of the right-lower field reveals decreased breath sounds. Examination of the left-lower field reveals decreased breath sounds. Decreased breath sounds, wheezing and rales  present.   Abdominal:      General: Bowel sounds are normal. There is no distension.      Palpations: Abdomen is soft.   Musculoskeletal:         General: No deformity or edema. Normal range of motion.      Cervical back: Neck supple.   Lymphadenopathy:      Cervical: No cervical adenopathy.   Skin:     General: Skin is warm and dry.      Capillary Refill: Capillary refill takes less than 2 seconds.      Findings: No erythema or rash.   Neurological:      Mental Status: She is alert and oriented to person, place, and time.      Cranial Nerves: No cranial nerve deficit.      Sensory: No sensory deficit.   Psychiatric:         Mood and Affect: Mood and affect normal.         Judgment: Judgment normal.           CRANIAL NERVES     CN III, IV, VI   Pupils are equal, round, and reactive to light.  Extraocular motions are normal.        Significant Labs: All pertinent labs within the past 24 hours have been reviewed.    Significant Imaging: I have reviewed all pertinent imaging results/findings within the past 24 hours.

## 2022-02-15 NOTE — PLAN OF CARE
Evaluation completed, plan of care established.   Discharge Recommendation: Home Health    Problem: Occupational Therapy Goal  Goal: Occupational Therapy Goal  Description: Goals to be met by: 3/15/22     Patient will increase functional independence with ADLs by performing:    UE Dressing with Set-up Assistance.  LE Dressing with Stand-by Assistance.  Grooming while standing at sink with Stand-by Assistance.  Toileting from toilet with Supervision for hygiene and clothing management.   Supine to sit with Modified [place].  Step transfer with Supervision  Toilet transfer to toilet with Supervision.    Outcome: Ongoing, Progressing

## 2022-02-15 NOTE — PROGRESS NOTES
[facility] Medicine  Progress Note    Patient Name: Estefani Fam  MRN: [id number]  Patient Class: IP- Inpatient   Admission Date: 2/14/2022  Length of Stay: 1 days  Attending Physician: Gianluca Zarate MD  Primary Care Provider: Miles Jackson MD        Subjective:     Principal Problem:Community acquired pneumonia of right lung        HPI:  Ms. Fam is a 71 year old woman with PMH of COPD, chronic respiratory failure on 2-3L home O2, and prior COVID infection who [place] [facility] for one day history  of shortness of breath at home. In the ED, patient was hypoxic to 84% upon arrival. Patient reports productive cough and pleuritic chest pain. She denies exertional chest pain, diaphoresis, arm pain. At home, she takes home inhalers of Breo Ellipta and Incruse Ellipta and Azithromycin 3 times weekly to help prevent exacerbations in the future.    She was previously hospitalized 11/2021 for COPD exacerbation 2/2 CAP. She was started on oral antibiotics and IV steroids. Patient clinically improved and discharged on 7 day course of levofloxacin and steroid taper. She also was discharged on Roflumilast and outpatient followup with her Pulmonologist Dr. Brooklynn Ruiz.     Past Medical History  She has a past medical history of Cataract, COPD (chronic obstructive pulmonary disease), History of COVID-19, History of retinal hemorrhage, Pathological fracture due to osteoporosis, Retinal histoplasmosis, and Secondary pulmonary arterial hypertension.     Past Surgical History  She has a past surgical history that includes Hand surgery and Bronchoscopy with fluoroscopy (10/28/2019).     Family History  Her family history includes Colon cancer in her father and mother; Macular degeneration in her mother; Stroke in her father.     Social History  She reports that she quit smoking about 5 years ago. Her smoking use included cigarettes. She has a 36.00 pack-year smoking history. She has never used  smokeless tobacco. She reports current alcohol use of about 2.0 standard drinks of alcohol per week. She reports that she does not use drugs.     Allergies  She is allergic to albuterol and ipratropium analogues.      Overview/Hospital Course:  No notes on file    Interval History:   Patient lying in bed, mild respiratory distress.This morning, patient had increased work of breathing and hypoxia requiring increase in oxygen requirement from 2.5 L to nonrebraether. Rapid response was called. She was given a nebulizer treatment, a dose of IV lasix and IV solumedrol and weaned to 4 L NC. CXR showed no changes, CTA chest negative for PE and troponin negative. Patient reports continued shortness of breath, cough and pursed lip breathing. Patient also notes good UOP, regular bowel movements and good po intake. Denies fever, chills, N/V, abdominal pain, changes in bowel or bladder function, signs of bleeding, rashes, wounds or swelling. Patient does note some weakness and is working well with therapy.    Review of Systems   Constitutional: Positive for activity change, fatigue and fever. Negative for chills.   HENT: Negative for congestion and trouble swallowing.    Eyes: Negative for visual disturbance.        Vision loss (chronic)   Respiratory: Positive for cough, chest tightness and shortness of breath.    Cardiovascular: Negative for chest pain and leg swelling.   Gastrointestinal: Negative for abdominal distention, abdominal pain, nausea and vomiting.   Endocrine: Negative for cold intolerance, heat intolerance, polydipsia and polyuria.   Genitourinary: Negative for difficulty urinating and dysuria.   Musculoskeletal: Negative for back pain and myalgias.   Skin: Negative for rash and wound.   Neurological: Positive for weakness. Negative for dizziness and light-headedness.   Hematological: Negative for adenopathy. Does not bruise/bleed easily.   Psychiatric/Behavioral: Negative for confusion and sleep disturbance.      Objective:     Vital Signs (Most Recent):  Temp: 97.5 °F (36.4 °C) (02/15/22 1645)  Pulse: 81 (02/15/22 1356)  Resp: 17 (02/15/22 1356)  BP: (!) 107/55 (02/15/22 0928)  SpO2: (!) 92 % (02/15/22 0956) Vital Signs (24h Range):  Temp:  [96.9 °F (36.1 °C)-98.3 °F (36.8 °C)] 97.5 °F (36.4 °C)  Pulse:  [] 81  Resp:  [17-28] 17  SpO2:  [65 %-98 %] 92 %  BP: (103-129)/(54-70) 107/55     Weight: 70.8 kg (156 lb)  Body mass index is 23.72 kg/m².    Intake/Output Summary (Last 24 hours) at 2/15/2022 1751  Last data filed at 2/15/2022 1400  Gross per 24 hour   Intake 530 ml   Output --   Net 530 ml      Physical Exam  Constitutional:       General: She is in acute distress.      Appearance: She is well-developed and well-nourished. She is ill-appearing.   HENT:      Head: Normocephalic and atraumatic.      Mouth/Throat:      Mouth: Mucous membranes are moist.   Eyes:      Extraocular Movements: Extraocular movements intact and EOM normal.      Pupils: Pupils are equal, round, and reactive to light.   Neck:      Vascular: No JVD.   Cardiovascular:      Rate and Rhythm: Regular rhythm. Tachycardia present.      Pulses: Normal pulses and intact distal pulses.      Heart sounds: Normal heart sounds.   Pulmonary:      Effort: Respiratory distress present.      Breath sounds: Examination of the right-lower field reveals decreased breath sounds. Examination of the left-lower field reveals decreased breath sounds. Decreased breath sounds and rales present.   Abdominal:      General: Bowel sounds are normal. There is no distension.      Palpations: Abdomen is soft.      Tenderness: There is no abdominal tenderness.   Musculoskeletal:         General: No deformity or edema. Normal range of motion.      Cervical back: Neck supple.   Lymphadenopathy:      Cervical: No cervical adenopathy.   Skin:     General: Skin is warm and dry.      Capillary Refill: Capillary refill takes less than 2 seconds.      Findings: No erythema or  rash.   Neurological:      General: No focal deficit present.      Mental Status: She is alert and oriented to person, place, and time.      Cranial Nerves: No cranial nerve deficit.      Sensory: No sensory deficit.      Motor: Weakness present.   Psychiatric:         Mood and Affect: Mood and affect and mood normal.         Significant Labs: All pertinent labs within the past 24 hours have been reviewed.    Significant Imaging: I have reviewed all pertinent imaging results/findings within the past 24 hours.      Assessment/Plan:      * Community acquired pneumonia of right lung  - CXR showed opacities in right lung  - continue IV ceftriaxone and azithromycin   - followup blood cultures and sputum culture  - procal 0.19  - WBC on admit, 19.7 --> 15  - CBC daily       Severe sepsis  This patient does have evidence of infective focus  My overall impression is sepsis. Vital signs were reviewed and noted in progress note.  Antibiotics given-   Antibiotics (From admission, onward)            Start     Stop Route Frequency Ordered    02/15/22 0900  azithromycin tablet 500 mg         02/17 0859 Oral Daily 02/14/22 1819    02/15/22 0800  cefTRIAXone (ROCEPHIN) 1 g/50 mL D5W IVPB         02/19 0759 IV Daily 02/14/22 1819        Cultures were taken-   Microbiology Results (last 7 days)     Procedure Component Value Units Date/Time    Blood culture (site 2) [[id number]] Collected: 02/14/22 1840    Order Status: Completed Specimen: Blood from Peripheral, Hand, Right Updated: 02/15/22 0315     Blood Culture, Routine No Growth to date    Narrative:      Site # 2, aerobic only    Blood culture (site 1) [[id number]] Collected: 02/14/22 1840    Order Status: Completed Specimen: Blood from Peripheral, Forearm, Right Updated: 02/15/22 0315     Blood Culture, Routine No Growth to date    Narrative:      Site # 1, aerobic and anaerobic    Culture, Respiratory with Gram Stain [[id number]]     Order Status: No result Specimen: Sputum,  "Expectorated         Latest lactate reviewed, they are-  No results for input(s): LACTATE in the last 72 hours.    Organ dysfunction indicated by Acute respiratory failure  Source- pneumonia    Source control Achieved by IV antibiotics     PLAN:  - 3/4 SIRS (tachycardia, tachypnea, leukocytosis)  - continue IVF  - continue CAP management   - see "PNA"  - followup blood cultures and sputum cultures    Acute on chronic respiratory failure with hypoxia  Patient with Hypercapnic and Hypoxic Respiratory failure which is Acute on chronic.  she is on home oxygen at 2-3 LPM. Supplemental oxygen was provided and noted-  .   Signs/symptoms of respiratory failure include- tachypnea, increased work of breathing, respiratory distress and wheezing. Contributing diagnoses includes - Aspiration, CHF, COPD, Pleural effusion and Pneumonia Labs and images were reviewed. Patient Has recent ABG, which has been reviewed. Will treat underlying causes and adjust management of respiratory failure as follows    PLAN:  - on 2-3 L O2 at home  - 2.5 L NC --> 4L NC.   - on 2/15, worsening hypoxia and given IV lasix, IV solumedrol, CXR unchanged, CTA chest negative for PE, troponin negative and TTE normal   - likely 2/2 to COPD exacerbation due to CAP  - xopenex q4h while awake  - CXR showed right sided PNA  - continue home inhalers  - see "CAP"  - VBG showed pCO2 59  - BiPAP HS  - outpatient pulmonary followup    COPD with acute exacerbation  - continue CAP treatment   - see "CAP"  - switched prednisone to Iv solumedrol due to worsening hypoxia   - acapella  - incentive spirometry  - O2 goal 88-92%  - continue home inhalers  - continue prn xopenex        VTE Risk Mitigation (From admission, onward)         Ordered     enoxaparin injection 40 mg  Daily         02/14/22 1817     IP VTE HIGH RISK PATIENT  Once         02/14/22 1817     Place sequential compression device  Until discontinued         02/14/22 1817                Discharge Planning "   RENARD: 2/18/2022     Code Status: Full Code   Is the patient medically ready for discharge?: No    Reason for patient still in hospital (select all that apply): Patient unstable, Patient trending condition, Laboratory test, Treatment, PT / OT recommendations and Pending disposition  Discharge Plan A: Home with family            Time spent in care of patient: > 35 minutes         Gianluca Zarate MD  Department of Hospital Medicine   [facility]

## 2022-02-15 NOTE — ASSESSMENT & PLAN NOTE
"This patient does have evidence of infective focus  My overall impression is sepsis. Vital signs were reviewed and noted in progress note.  Antibiotics given-   Antibiotics (From admission, onward)            Start     Stop Route Frequency Ordered    02/15/22 0900  azithromycin tablet 500 mg         02/17 0859 Oral Daily 02/14/22 1819    02/15/22 0800  cefTRIAXone (ROCEPHIN) 1 g/50 mL D5W IVPB         02/19 0759 IV Daily 02/14/22 1819        Cultures were taken-   Microbiology Results (last 7 days)     Procedure Component Value Units Date/Time    Blood culture (site 1) [[id number]] Collected: 02/14/22 1840    Order Status: Sent Specimen: Blood from Peripheral, Forearm, Right Updated: 02/14/22 1849    Blood culture (site 2) [[id number]] Collected: 02/14/22 1840    Order Status: Sent Specimen: Blood from Peripheral, Hand, Right Updated: 02/14/22 1849    Culture, Respiratory with Gram Stain [[id number]]     Order Status: No result Specimen: Sputum, Expectorated         Latest lactate reviewed, they are-  No results for input(s): LACTATE in the last 72 hours.    Organ dysfunction indicated by Acute respiratory failure  Source- pneumonia    Source control Achieved by IV antibiotics     PLAN:  - 3/4 SIRS (tachycardia, tachypnea, leukocytosis)  - started on IVF  - continue CAP management   - see "PNA"  - ordered blood cultures and sputum cultures  "

## 2022-02-15 NOTE — PT/OT/SLP EVAL
"Occupational Therapy   Evaluation    Name: Estefani Fam  MRN: [id number]  Admitting Diagnosis:  <principal problem not specified>  Recent Surgery: * No surgery found *      Recommendations:     Discharge Recommendations: home with home health  Discharge Equipment Recommendations:  shower chair  Barriers to discharge:   Decreased caregiver support    Assessment:     Estefani Fam is a 71 y.o. female with a medical diagnosis of <principal problem not specified>.  She presents with the following performance deficits affecting function: weakness,impaired endurance,impaired self care skills,impaired functional mobilty,gait instability,impaired cardiopulmonary response to activity.  Patient participated well with therapy, completed bed mobility with stand by assistance, transfers with contact guard and hand-held assist to bedside commode; limited activity tolerance at baseline however patient with good safety awareness of her "limits" and self-pacing. Patient will continue to benefit from therapy during this admission, OT POC initiated.    SpO2 during session: On 4 L NC, 93% with head of bed elevated, 89-90% upon transfer to edge of bed, ~5 minutes to return to 91-92%, 87-90% to transfer to bedside commode, 84% return to sit edge of bed from bedside commode and ~3 minutes to return to 90% seated edge of bed, 90-91% with head of bed elevated at end of session with staff completing US present.    Rehab Prognosis: Good; patient would benefit from acute skilled OT services to address these deficits and reach maximum level of function.       Plan:     Patient to be seen 3 x/week to address the above listed problems via self-care/home management,therapeutic activities,therapeutic exercises,neuromuscular re-education  · Plan of Care Expires: 03/01/22  · Plan of Care Reviewed with: patient    Subjective     Chief Complaint: Shortness of breath   Patient/Family Comments/goals: Complete ADL's and transfers " safely    Occupational Profile:  Living Environment: Pt lives with , 2SH bedroom upstairs so patient sleeping on couch in living room,walk-in shower  Previous level of function: Mod I, limited distance at baseline/does not go to second level of home; patient's son completes grocery shopping  Roles and Routines: Family  Equipment Used at Home:  oxygen,bedside commode,rollator  Assistance upon Discharge: Patient's  and son    Pain/Comfort:  · Pain Rating 1: 0/10  · Pain Rating Post-Intervention 1: 0/10    Patients cultural, spiritual, Rastafarian conflicts given the current situation:      Objective:   Communicated with: Nursing prior to session.  Patient found supine with peripheral IV,pulse ox (continuous),telemetry upon OT entry to room.    General Precautions: Standard, fall   Orthopedic Precautions:N/A   Braces: N/A  Respiratory Status: Room air    Occupational Performance:    Bed Mobility:    · Patient completed Rolling/Turning to Right with stand by assistance  · Patient completed Scooting/Bridging with stand by assistance  · Patient completed Supine to Sit with stand by assistance    Functional Mobility/Transfers:  · Patient completed Sit <> Stand Transfer with contact guard assistance  with  hand-held assist   · Patient completed Toilet Transfer Step Transfer technique with contact guard assistance with  hand-held assist  · Functional Mobility: Contact guard with hand-held assist for steps from edge of bed <> bedside chair    Activities of Daily Living:  · Grooming: stand by assistance seated edge of bed  · Upper Body Dressing: stand by assistance seated edge of bed  · Lower Body Dressing: minimum assistance seated edge of bed  · Toileting: minimum assistance to complete from bedside commode    Cognitive/Visual Perceptual:  Cognitive/Psychosocial Skills:     -       Oriented to: Person, Place, Time and Situation   -       Follows Commands/attention:Follows multistep  commands  -       Safety  awareness/insight to disability: intact   -       Mood/Affect/Coping skills/emotional control: Appropriate and cooperative    Physical Exam:  Postural examination/scapula alignment:    -       Rounded shoulders  -       Forward head  Upper Extremity Range of Motion:     -       Right Upper Extremity: WFL  -       Left Upper Extremity: WFL  Upper Extremity Strength:    -       Right Upper Extremity: WFL  -       Left Upper Extremity: WFL   Strength:    -       Right Upper Extremity: WFL  -       Left Upper Extremity: WFL    AMPAC 6 Click ADL:  AMPAC Total Score: 19    Treatment & Education:  -Evaluation completed, plan of care established.  -Patient and family educated on roles/goals of OT and POC.  -White board updated.  -Therapist provided time for questions and answered within scope of practice.  -Patient educated on importance of EOB/OOB activity to maximize recovery.  Education:    Patient left HOB elevated with all lines intact and call button in reach    GOALS:   Multidisciplinary Problems     Occupational Therapy Goals        Problem: Occupational Therapy Goal    Goal Priority Disciplines Outcome Interventions   Occupational Therapy Goal     OT, PT/OT Ongoing, Progressing    Description: Goals to be met by: 3/15/22     Patient will increase functional independence with ADLs by performing:    UE Dressing with Set-up Assistance.  LE Dressing with Stand-by Assistance.  Grooming while standing at sink with Stand-by Assistance.  Toileting from toilet with Supervision for hygiene and clothing management.   Supine to sit with Modified Bee.  Step transfer with Supervision  Toilet transfer to toilet with Supervision.                     History:     Past Medical History:   Diagnosis Date    Cataract     COPD (chronic obstructive pulmonary disease)     COVID-19     History of COVID-19 1/17/2021    Still with mild dyspnea. Encouraged return to pulmonary rehab Recommend scheduling vaccination when  appointment is available.     History of retinal hemorrhage     Pathological fracture due to osteoporosis 7/6/2020    Retinal histoplasmosis     Secondary pulmonary arterial hypertension 7/3/2018    Secondary to emphysema and chronic respiratory failure, mild.       Past Surgical History:   Procedure Laterality Date    BRONCHOSCOPY WITH FLUOROSCOPY N/A 10/28/2019    Procedure: BRONCHOSCOPY, WITH FLUOROSCOPY;  Surgeon: Brooklynn Ruiz MD;  Location: [facility] OR [address] [place] [address];  Service: Endoscopy;  Laterality: N/A;    HAND SURGERY         Time Tracking:     OT Date of Treatment: 02/15/22  OT Start Time: 1011  OT Stop Time: 1038  OT Total Time (min): 27 min    Billable Minutes:Evaluation 10  Self Care/Home Management 17    2/15/2022

## 2022-02-15 NOTE — ASSESSMENT & PLAN NOTE
"- continue CAP treatment   - see "CAP"  - switched prednisone to Iv solumedrol due to worsening hypoxia   - acapella  - incentive spirometry  - O2 goal 88-92%  - continue home inhalers  - continue prn xopenex    "

## 2022-02-15 NOTE — PLAN OF CARE
[facility]  Initial Discharge Assessment       Primary Care Provider: Miles Jackson MD    Admission Diagnosis: SOB (shortness of breath) [R06.02]  Chest pain [R07.9]    Admission Date: 2/14/2022  Expected Discharge Date: 2/18/2022    Discharge Barriers Identified: None    Payor: MEDICARE / Plan: MEDICARE PART A & B / Product Type: Government /     Extended Emergency Contact Information  Primary Emergency Contact: Miguelangel Quinn  Address: [address] [place] [address] [place]  Mobile Phone: [phone]  Relation: Spouse  Secondary Emergency Contact: MIGUELANGEL QUINN IV  Mobile Phone: [phone]  Relation: Son    Discharge Plan A: Home with family  Discharge Plan B: Home Health      [facility] [place] [facility] [address] [address]  Phone: [phone] Fax: [phone]    [id number] [place] - [facility] [facility] [place] [address] [address]  Phone: [phone] Fax: [phone]      Initial Assessment (most recent)     Adult Discharge Assessment - 02/15/22 1337        Discharge Assessment    Assessment Type Discharge Planning Assessment     Confirmed/corrected address, phone number and insurance Yes     Confirmed Demographics Correct on Facesheet     Source of Information patient     Communicated RENARD with patient/caregiver Yes     Reason For Admission SOB     Lives With spouse     Do you expect to return to your current living situation? Yes     Do you have help at home or someone to help you manage your care at home? Yes     Who are your caregiver(s) and their phone number(s)? Miguelangel Qunin (spouse) [phone]     Prior to hospitilization cognitive status: Alert/Oriented     Current cognitive status: Alert/Oriented     Walking or Climbing Stairs Difficulty none     Dressing/Bathing Difficulty none     Equipment  Currently Used at Home oxygen;nebulizer   [facility]    Readmission within 30 days? No     Patient currently being followed by outpatient case management? No     Do you currently have service(s) that help you manage your care at home? No     Do you take prescription medications? Yes     Do you have prescription coverage? Yes     Do you have any problems affording any of your prescribed medications? No     Is the patient taking medications as prescribed? yes     Who is going to help you get home at discharge? Patient's spouse will provide transportation home.     How do you get to doctors appointments? family or friend will provide     Are you on dialysis? No     Do you take coumadin? No     Discharge Plan A Home with family     Discharge Plan B Home Health     DME Needed Upon Discharge  none     Discharge Plan discussed with: Patient     Discharge Barriers Identified None        Relationship/Environment    Name(s) of Who Lives With Patient Osvaldo Fam (spouse) [phone]                  Ronel Parker RN  Ext [phone]

## 2022-02-15 NOTE — SIGNIFICANT EVENT
RAPID RESPONSE RESPIRATORY THERAPY NOTE             Code Status: Full Code   : 1950  Bed: 648/648 A:   MRN: [id number]  Time page Received: 914  Time Rapid Response RT at Bedside: 917  Time Rapid Response RT left Bedside: 952    SITUATION    Evaluated patient for: respiratory     BACKGROUND    Why is the patient in the hospital?: <principal problem not specified>    Patient has a past medical history of Cataract, COPD (chronic obstructive pulmonary disease), COVID-19, History of COVID-19, History of retinal hemorrhage, Pathological fracture due to osteoporosis, Retinal histoplasmosis, and Secondary pulmonary arterial hypertension.    24 Hours Vitals Range:  Temp:  [96.9 °F (36.1 °C)-98.3 °F (36.8 °C)]   Pulse:  []   Resp:  [17-28]   BP: (103-129)/(54-70)   SpO2:  [65 %-100 %]     Labs:    Recent Labs     22  1531 02/15/22  0305 02/15/22  1008    135* 136   K 2.9* 3.4* 3.4*    93* 94*   CO2 25 31* 34*   CREATININE 0.4* 0.5 0.7   * 166* 231*   PHOS  --  2.6*  --    MG 1.7 2.3  --         Recent Labs     22  1412 02/15/22  0941   PH 7.404 7.417   PCO2 59.6* 59.9*   PO2 39* 64*   HCO3 37.2* 38.6*   POCSATURATED 71* 92*   BE 12 14       ASSESSMENT/INTERVENTIONS    Upon arrival in room pt was on a NRB at 15L with SPO2 now 99%. RRT removed NRB and placed pt back on 4L NC. SPO2 maintained 88% and greater. Cont. Pulse ox monitoring, ABG and CXR ordered. ABG results are documented above.     Last Vitals: Temp: 96.9 °F (36.1 °C) (02/15 114)  Pulse: 81 (02/15 135)  Resp: 17 (02/15 1356)  BP: 107/55 (02/15 0928)  SpO2: 92 % (02/15 0956)  Level of Consciousness: Level of Consciousness (AVPU): alert  Respiratory Effort: Respiratory Effort: Normal  Expansion/Accessory Muscle Usage: Expansion/Accessory Muscles/Retractions: no use of accessory muscles  All Lung Field Breath Sounds: All Lung Fields Breath Sounds: diminished  O2 Device/Concentration: Flow (L/min): 4,  , O2 Device (Oxygen  Therapy): nasal cannula  NIPPV: No Surgical airway: No Vent: No  ETCO2 monitored:    Ambu at bedside: Ambu bag with the patient?: Yes, Adult Ambu    Active Orders   Respiratory Care    ACAPELLA TREATMENT Q8H     Frequency: Q8H     Number of Occurrences: Until Specified    CPAP QHS     Frequency: QHS     Number of Occurrences: Until Specified     Order Questions:      Expiratory pressure: 5    Inhalation Treatment Q6H WAKE     Frequency: Q6H WAKE     Number of Occurrences: Until Specified    Peak flow Q8H     Frequency: Q8H     Number of Occurrences: Until Specified    Pulse Oximetry Continuous     Frequency: Continuous     Number of Occurrences: Until Specified       RECOMMENDATIONS  ?  We recommend: RRT Recs: ABG, CXR, Cont. pulse ox     ESCALATION    follow current POC    FOLLOW-UP    Disposition: Remain in room 648.    Please call back the Rapid Response RT, Altagracia Forbes, RRT at x [phone] for any questions or concerns.

## 2022-02-15 NOTE — ASSESSMENT & PLAN NOTE
"Patient with Hypercapnic and Hypoxic Respiratory failure which is Acute on chronic.  she is on home oxygen at 2-3 LPM. Supplemental oxygen was provided and noted-  .   Signs/symptoms of respiratory failure include- tachypnea, increased work of breathing, respiratory distress and wheezing. Contributing diagnoses includes - Aspiration, CHF, COPD, Pleural effusion and Pneumonia Labs and images were reviewed. Patient Has recent ABG, which has been reviewed. Will treat underlying causes and adjust management of respiratory failure as follows    PLAN:  - on 2-3 L O2 at home  - 2.5 L NC --> 4L NC.   - on 2/15, worsening hypoxia and given IV lasix, IV solumedrol, CXR unchanged, CTA chest negative for PE, troponin negative and TTE normal   - likely 2/2 to COPD exacerbation due to CAP  - xopenex q4h while awake  - CXR showed right sided PNA  - continue home inhalers  - see "CAP"  - VBG showed pCO2 59  - BiPAP HS  - outpatient pulmonary followup  "

## 2022-02-15 NOTE — PROGRESS NOTES
Upon entering room noted patient pursed lip breathing, c/o chest heaviness. O2 sats reading 65% on 2L NC. Bumped patient up to 4L NC, O2 sats @ 72%. Patient placed on non rebreather mask, rapid team called for assessment. Patient O2 sats @ 98% on non rebreather mask. Patient with improvement. Placed back on 4L NC. MD Zarate notified. Rapid team at bedside, ABG drawn.

## 2022-02-15 NOTE — ASSESSMENT & PLAN NOTE
"This patient does have evidence of infective focus  My overall impression is sepsis. Vital signs were reviewed and noted in progress note.  Antibiotics given-   Antibiotics (From admission, onward)            Start     Stop Route Frequency Ordered    02/15/22 0900  azithromycin tablet 500 mg         02/17 0859 Oral Daily 02/14/22 1819    02/15/22 0800  cefTRIAXone (ROCEPHIN) 1 g/50 mL D5W IVPB         02/19 0759 IV Daily 02/14/22 1819        Cultures were taken-   Microbiology Results (last 7 days)     Procedure Component Value Units Date/Time    Blood culture (site 2) [[id number]] Collected: 02/14/22 1840    Order Status: Completed Specimen: Blood from Peripheral, Hand, Right Updated: 02/15/22 0315     Blood Culture, Routine No Growth to date    Narrative:      Site # 2, aerobic only    Blood culture (site 1) [[id number]] Collected: 02/14/22 1840    Order Status: Completed Specimen: Blood from Peripheral, Forearm, Right Updated: 02/15/22 0315     Blood Culture, Routine No Growth to date    Narrative:      Site # 1, aerobic and anaerobic    Culture, Respiratory with Gram Stain [[id number]]     Order Status: No result Specimen: Sputum, Expectorated         Latest lactate reviewed, they are-  No results for input(s): LACTATE in the last 72 hours.    Organ dysfunction indicated by Acute respiratory failure  Source- pneumonia    Source control Achieved by IV antibiotics     PLAN:  - 3/4 SIRS (tachycardia, tachypnea, leukocytosis)  - continue IVF  - continue CAP management   - see "PNA"  - followup blood cultures and sputum cultures  "

## 2022-02-15 NOTE — ASSESSMENT & PLAN NOTE
- CXR showed opacities in right lung  - continue IV ceftriaxone and azithromycin   - followup blood cultures and sputum culture  - procal 0.19  - WBC on admit, 19.7  - CBC daily

## 2022-02-15 NOTE — H&P
[facility] Medicine  History & Physical    Patient Name: Estefani Fam  MRN: [id number]  Patient Class: IP- Inpatient  Admission Date: 2/14/2022  Attending Physician: Gianluca Zarate MD   Primary Care Provider: Miles Jackson MD       Patient information was obtained from patient and ER records.     Subjective:     Principal Problem:<principal problem not specified>    Chief Complaint:   Chief Complaint   Patient presents with    Shortness of Breath     Pt c/o SOB-hx COPD.  Initial sat on 2L home O2 was 86%.         HPI: Ms. Fam is a 71 year old woman with PMH of COPD, chronic respiratory failure on 2-3L home O2, and prior COVID infection who [place] [facility] [place] for one day history  of shortness of breath at home. In the ED, patient was hypoxic to 84% upon arrival. Patient reports productive cough and pleuritic chest pain. She denies exertional chest pain, diaphoresis, arm pain. At home, she takes home inhalers of Breo Ellipta and Incruse Ellipta and Azithromycin 3 times weekly to help prevent exacerbations in the future.    She was previously hospitalized 11/2021 for COPD exacerbation 2/2 CAP. She was started on oral antibiotics and IV steroids. Patient clinically improved and discharged on 7 day course of levofloxacin and steroid taper. She also was discharged on Roflumilast and outpatient followup with her Pulmonologist Dr. Brooklynn Ruiz.     Past Medical History  She has a past medical history of Cataract, COPD (chronic obstructive pulmonary disease), History of COVID-19, History of retinal hemorrhage, Pathological fracture due to osteoporosis, Retinal histoplasmosis, and Secondary pulmonary arterial hypertension.     Past Surgical History  She has a past surgical history that includes Hand surgery and Bronchoscopy with fluoroscopy (10/28/2019).     Family History  Her family history includes Colon cancer in her father and mother; Macular degeneration in her mother; Stroke in  her father.     Social History  She reports that she quit smoking about 5 years ago. Her smoking use included cigarettes. She has a 36.00 pack-year smoking history. She has never used smokeless tobacco. She reports current alcohol use of about 2.0 standard drinks of alcohol per week. She reports that she does not use drugs.     Allergies  She is allergic to albuterol and ipratropium analogues.      Past Medical History:   Diagnosis Date    Cataract     COPD (chronic obstructive pulmonary disease)     COVID-19     History of COVID-19 1/17/2021    Still with mild dyspnea. Encouraged return to pulmonary rehab Recommend scheduling vaccination when appointment is available.     History of retinal hemorrhage     Pathological fracture due to osteoporosis 7/6/2020    Retinal histoplasmosis     Secondary pulmonary arterial hypertension 7/3/2018    Secondary to emphysema and chronic respiratory failure, mild.       Past Surgical History:   Procedure Laterality Date    BRONCHOSCOPY WITH FLUOROSCOPY N/A 10/28/2019    Procedure: BRONCHOSCOPY, WITH FLUOROSCOPY;  Surgeon: Brooklynn Ruiz MD;  Location: [facility] OR [address] [place] [address];  Service: Endoscopy;  Laterality: N/A;    HAND SURGERY         Review of patient's allergies indicates:   Allergen Reactions    Albuterol     Ipratropium analogues      Difficulty breathing       No current facility-administered medications on file prior to encounter.     Current Outpatient Medications on File Prior to Encounter   Medication Sig    acetylcysteine 600 mg Cap Take 1 capsule (600 mg total) by mouth 2 (two) times daily.    ascorbic acid, vitamin C, (VITAMIN C) 500 MG tablet Take 500 mg by mouth 2 (two) times daily.     azelastine (ASTELIN) 137 mcg (0.1 %) nasal spray INHALE 1 SPRAY BY NASAL ROUTE TWICE DAILY OR AS DIRECTED    azithromycin (Z-JERSEY) 250 MG tablet TAKE 1 TABLET BY MOUTH EVERY MONDAY WEDNESDAY AND FRIDAY    BREO ELLIPTA 200-25 mcg/dose DsDv diskus inhaler INHALE 1 PUFF INTO  THE LUNGS DAILY    calcium carbonate (OS-STEPHANIE) 600 mg calcium (1,500 mg) Tab Take 1 tablet (600 mg total) by mouth once daily. Take Levofloxacin antibiotic at least 2 hours before calcium.    fluticasone propionate (FLONASE) 50 mcg/actuation nasal spray INSTILL 1 SPRAY IN EACH NOSTRIL EVERY DAY    levalbuterol (XOPENEX) 0.63 mg/3 mL nebulizer solution USE 1 VIAL IN NEBULIZER EVERY 8 HOURS AS NEEDED FOR WHEEZE    MULTIVIT-IRON-MIN-FOLIC ACID 3,500-18-0.4 UNIT-MG-MG ORAL CHEW Take 1 tablet by mouth once daily. Take Levofloxacin antibiotic at least 2 hours before multivitamin.    pantoprazole (PROTONIX) 40 MG tablet Take 1 tablet (40 mg total) by mouth once daily.    pulse oximeter (PULSE OXIMETER) device by Apply Externally route 2 (two) times a day. Use twice daily at 8 AM and 3 PM and record the value in MyChart as directed.    sodium chloride (OCEAN) 0.65 % nasal spray 1 spray by Nasal route 2 (two) times daily.    umeclidinium (INCRUSE ELLIPTA) 62.5 mcg/actuation inhalation capsule INHALE 1 PUFF BY MOUTH INTO LUNGS ONCE DAILY    roflumilast (DALIRESP) 250 mcg Tab Take 1 tablet (250 mcg total) by mouth once daily.     Family History     Problem Relation (Age of Onset)    Colon cancer Mother, Father    Macular degeneration Mother    Stroke Father        Tobacco Use    Smoking status: Former Smoker     Packs/day: 1.00     Years: 36.00     Pack years: 36.00     Types: Cigarettes     Quit date: 2016     Years since quittin.4    Smokeless tobacco: Never Used   Substance and Sexual Activity    Alcohol use: Yes     Alcohol/week: 2.0 standard drinks     Types: 2 Glasses of wine per week     Comment: 1-2 glasses a day     Drug use: No    Sexual activity: Yes     Review of Systems   Constitutional: Positive for activity change, appetite change and fatigue. Negative for chills and fever.   HENT: Negative for congestion and trouble swallowing.    Eyes: Negative for visual disturbance.   Respiratory:  Positive for cough and shortness of breath.    Cardiovascular: Negative for chest pain and leg swelling.   Gastrointestinal: Negative for abdominal distention, abdominal pain, nausea and vomiting.   Genitourinary: Negative for difficulty urinating and dysuria.   Musculoskeletal: Negative for back pain and myalgias.   Skin: Negative for rash and wound.   Neurological: Positive for weakness. Negative for dizziness and light-headedness.   Hematological: Negative for adenopathy. Does not bruise/bleed easily.   Psychiatric/Behavioral: Negative for confusion and sleep disturbance.     Objective:     Vital Signs (Most Recent):  Temp: 97 °F (36.1 °C) (02/15/22 0238)  Pulse: 95 (02/15/22 0238)  Resp: 20 (02/15/22 0238)  BP: 118/70 (02/15/22 0238)  SpO2: (!) 94 % (02/14/22 2230) Vital Signs (24h Range):  Temp:  [97 °F (36.1 °C)-99.3 °F (37.4 °C)] 97 °F (36.1 °C)  Pulse:  [] 95  Resp:  [20-25] 20  SpO2:  [94 %-100 %] 94 %  BP: (103-135)/(54-92) 118/70     Weight: 71 kg (156 lb 8.4 oz)  Body mass index is 23.8 kg/m².    Physical Exam        Significant Labs: All pertinent labs within the past 24 hours have been reviewed.    Significant Imaging: I have reviewed all pertinent imaging results/findings within the past 24 hours.    Assessment/Plan:     Severe sepsis  This patient does have evidence of infective focus  My overall impression is sepsis. Vital signs were reviewed and noted in progress note.  Antibiotics given-   Antibiotics (From admission, onward)            Start     Stop Route Frequency Ordered    02/15/22 0900  azithromycin tablet 500 mg         02/17 0859 Oral Daily 02/14/22 1819    02/15/22 0800  cefTRIAXone (ROCEPHIN) 1 g/50 mL D5W IVPB         02/19 0759 IV Daily 02/14/22 1819        Cultures were taken-   Microbiology Results (last 7 days)     Procedure Component Value Units Date/Time    Blood culture (site 1) [[id number]] Collected: 02/14/22 1840    Order Status: Sent Specimen: Blood from Peripheral,  "Forearm, Right Updated: 02/14/22 1849    Blood culture (site 2) [[id number]] Collected: 02/14/22 1840    Order Status: Sent Specimen: Blood from Peripheral, Hand, Right Updated: 02/14/22 1849    Culture, Respiratory with Gram Stain [[id number]]     Order Status: No result Specimen: Sputum, Expectorated         Latest lactate reviewed, they are-  No results for input(s): LACTATE in the last 72 hours.    Organ dysfunction indicated by Acute respiratory failure  Source- pneumonia    Source control Achieved by IV antibiotics     PLAN:  - 3/4 SIRS (tachycardia, tachypnea, leukocytosis)  - started on IVF  - continue CAP management   - see "PNA"  - ordered blood cultures and sputum cultures    CAP (community acquired pneumonia)  - CXR showed opacities in right lung  - continue IV ceftriaxone and azithromycin   - followup blood cultures and sputum culture  - procal 0.19  - WBC on admit, 19.7  - CBC daily       Acute on chronic respiratory failure with hypoxia  Patient with Hypercapnic and Hypoxic Respiratory failure which is Acute on chronic.  she is on home oxygen at 2-3 LPM. Supplemental oxygen was provided and noted-  .   Signs/symptoms of respiratory failure include- tachypnea, increased work of breathing, respiratory distress and wheezing. Contributing diagnoses includes - Aspiration, CHF, COPD, Pleural effusion and Pneumonia Labs and images were reviewed. Patient Has recent ABG, which has been reviewed. Will treat underlying causes and adjust management of respiratory failure as follows    PLAN:  - on 2-3 L O2 at home  - currently on 2.5 L NC  - likely 2/2 to COPD exacerbation due to CAP  - xopenex q4h while awake  - CXR showed right sided PNA  - continue home inhalers  - see "CAP"  - outpatient pulmonary followup    COPD with acute exacerbation  - continue CAP treatment   - see "CAP"  - continue prednisone        VTE Risk Mitigation (From admission, onward)         Ordered     enoxaparin injection 40 mg  Daily         " 02/14/22 1817     IP VTE HIGH RISK PATIENT  Once         02/14/22 1817     Place sequential compression device  Until discontinued         02/14/22 1817                Time spent in care of patient: > 35 minutes       Gianluca Zarate MD  Department of Hospital Medicine   [facility]

## 2022-02-15 NOTE — RESPIRATORY THERAPY
RAPID RESPONSE RESPIRATORY THERAPY FOLLOW-UP NOTE       Followed up with patient for proactive rounding. Cont bipap order changed to QHS.   No acute issues at this time. Plan of care reviewed with primary MD, TRINITY Zarate.  Please call Rapid Response RT, Altagracia Forbes, RRT at [phone] with any questions or concerns.

## 2022-02-15 NOTE — SUBJECTIVE & OBJECTIVE
Interval History:   Patient lying in bed, mild respiratory distress.This morning, patient had increased work of breathing and hypoxia requiring increase in oxygen requirement from 2.5 L to nonrebraether. Rapid response was called. She was given a nebulizer treatment, a dose of IV lasix and IV solumedrol and weaned to 4 L NC. CXR showed no changes, CTA chest negative for PE and troponin negative. Patient reports continued shortness of breath, cough and pursed lip breathing. Patient also notes good UOP, regular bowel movements and good po intake. Denies fever, chills, N/V, abdominal pain, changes in bowel or bladder function, signs of bleeding, rashes, wounds or swelling. Patient does note some weakness and is working well with therapy.    Review of Systems   Constitutional: Positive for activity change, fatigue and fever. Negative for chills.   HENT: Negative for congestion and trouble swallowing.    Eyes: Negative for visual disturbance.        Vision loss (chronic)   Respiratory: Positive for cough, chest tightness and shortness of breath.    Cardiovascular: Negative for chest pain and leg swelling.   Gastrointestinal: Negative for abdominal distention, abdominal pain, nausea and vomiting.   Endocrine: Negative for cold intolerance, heat intolerance, polydipsia and polyuria.   Genitourinary: Negative for difficulty urinating and dysuria.   Musculoskeletal: Negative for back pain and myalgias.   Skin: Negative for rash and wound.   Neurological: Positive for weakness. Negative for dizziness and light-headedness.   Hematological: Negative for adenopathy. Does not bruise/bleed easily.   Psychiatric/Behavioral: Negative for confusion and sleep disturbance.     Objective:     Vital Signs (Most Recent):  Temp: 97.5 °F (36.4 °C) (02/15/22 1645)  Pulse: 81 (02/15/22 1356)  Resp: 17 (02/15/22 1356)  BP: (!) 107/55 (02/15/22 0928)  SpO2: (!) 92 % (02/15/22 0956) Vital Signs (24h Range):  Temp:  [96.9 °F (36.1 °C)-98.3 °F  (36.8 °C)] 97.5 °F (36.4 °C)  Pulse:  [] 81  Resp:  [17-28] 17  SpO2:  [65 %-98 %] 92 %  BP: (103-129)/(54-70) 107/55     Weight: 70.8 kg (156 lb)  Body mass index is 23.72 kg/m².    Intake/Output Summary (Last 24 hours) at 2/15/2022 1751  Last data filed at 2/15/2022 1400  Gross per 24 hour   Intake 530 ml   Output --   Net 530 ml      Physical Exam  Constitutional:       General: She is in acute distress.      Appearance: She is well-developed and well-nourished. She is ill-appearing.   HENT:      Head: Normocephalic and atraumatic.      Mouth/Throat:      Mouth: Mucous membranes are moist.   Eyes:      Extraocular Movements: Extraocular movements intact and EOM normal.      Pupils: Pupils are equal, round, and reactive to light.   Neck:      Vascular: No JVD.   Cardiovascular:      Rate and Rhythm: Regular rhythm. Tachycardia present.      Pulses: Normal pulses and intact distal pulses.      Heart sounds: Normal heart sounds.   Pulmonary:      Effort: Respiratory distress present.      Breath sounds: Examination of the right-lower field reveals decreased breath sounds. Examination of the left-lower field reveals decreased breath sounds. Decreased breath sounds and rales present.   Abdominal:      General: Bowel sounds are normal. There is no distension.      Palpations: Abdomen is soft.      Tenderness: There is no abdominal tenderness.   Musculoskeletal:         General: No deformity or edema. Normal range of motion.      Cervical back: Neck supple.   Lymphadenopathy:      Cervical: No cervical adenopathy.   Skin:     General: Skin is warm and dry.      Capillary Refill: Capillary refill takes less than 2 seconds.      Findings: No erythema or rash.   Neurological:      General: No focal deficit present.      Mental Status: She is alert and oriented to person, place, and time.      Cranial Nerves: No cranial nerve deficit.      Sensory: No sensory deficit.      Motor: Weakness present.   Psychiatric:          Mood and Affect: Mood and affect and mood normal.         Significant Labs: All pertinent labs within the past 24 hours have been reviewed.    Significant Imaging: I have reviewed all pertinent imaging results/findings within the past 24 hours.

## 2022-02-15 NOTE — PLAN OF CARE
Problem: Physical Therapy Goal  Goal: Physical Therapy Goal  Description: Goals to be met by: 3/1/22     Patient will increase functional independence with mobility by performin. Supine to sit with [place]  2. Sit to supine with [place]  3. Sit to stand transfer with Supervision  4. Gait  x 30 feet with Stand-by Assistance using Rolling Walker.   5. Lower extremity exercise program x 20 reps per handout, with independence      Outcome: Ongoing, Progressing   Pt progressing, appropriate goals established

## 2022-02-15 NOTE — ASSESSMENT & PLAN NOTE
- CXR showed opacities in right lung  - continue IV ceftriaxone and azithromycin   - followup blood cultures and sputum culture  - procal 0.19  - WBC on admit, 19.7 --> 15  - CBC daily

## 2022-02-15 NOTE — ASSESSMENT & PLAN NOTE
"Patient with Hypercapnic and Hypoxic Respiratory failure which is Acute on chronic.  she is on home oxygen at 2-3 LPM. Supplemental oxygen was provided and noted-  .   Signs/symptoms of respiratory failure include- tachypnea, increased work of breathing, respiratory distress and wheezing. Contributing diagnoses includes - Aspiration, CHF, COPD, Pleural effusion and Pneumonia Labs and images were reviewed. Patient Has recent ABG, which has been reviewed. Will treat underlying causes and adjust management of respiratory failure as follows    PLAN:  - on 2-3 L O2 at home  - currently on 2.5 L NC  - likely 2/2 to COPD exacerbation due to CAP  - xopenex q4h while awake  - CXR showed right sided PNA  - continue home inhalers  - see "CAP"  - outpatient pulmonary followup  "

## 2022-02-16 LAB
ALBUMIN SERPL BCP-MCNC: 2 G/DL (ref 3.5–5.2)
ALP SERPL-CCNC: 245 U/L (ref 55–135)
ALT SERPL W/O P-5'-P-CCNC: 37 U/L (ref 10–44)
ANION GAP SERPL CALC-SCNC: 9 MMOL/L (ref 8–16)
ANISOCYTOSIS BLD QL SMEAR: SLIGHT
AST SERPL-CCNC: 21 U/L (ref 10–40)
BASOPHILS # BLD AUTO: 0.1 K/UL (ref 0–0.2)
BASOPHILS NFR BLD: 0.6 % (ref 0–1.9)
BILIRUB SERPL-MCNC: 0.2 MG/DL (ref 0.1–1)
BUN SERPL-MCNC: 15 MG/DL (ref 8–23)
CALCIUM SERPL-MCNC: 8.5 MG/DL (ref 8.7–10.5)
CHLORIDE SERPL-SCNC: 94 MMOL/L (ref 95–110)
CO2 SERPL-SCNC: 34 MMOL/L (ref 23–29)
CREAT SERPL-MCNC: 0.6 MG/DL (ref 0.5–1.4)
DIFFERENTIAL METHOD: ABNORMAL
EOSINOPHIL # BLD AUTO: 0 K/UL (ref 0–0.5)
EOSINOPHIL NFR BLD: 0.1 % (ref 0–8)
ERYTHROCYTE [DISTWIDTH] IN BLOOD BY AUTOMATED COUNT: 14.1 % (ref 11.5–14.5)
EST. GFR  (AFRICAN AMERICAN): >60 ML/MIN/1.73 M^2
EST. GFR  (NON AFRICAN AMERICAN): >60 ML/MIN/1.73 M^2
GLUCOSE SERPL-MCNC: 185 MG/DL (ref 70–110)
HCT VFR BLD AUTO: 35.8 % (ref 37–48.5)
HGB BLD-MCNC: 10.9 G/DL (ref 12–16)
IMM GRANULOCYTES # BLD AUTO: 0.46 K/UL (ref 0–0.04)
IMM GRANULOCYTES NFR BLD AUTO: 3 % (ref 0–0.5)
LYMPHOCYTES # BLD AUTO: 1.1 K/UL (ref 1–4.8)
LYMPHOCYTES NFR BLD: 6.9 % (ref 18–48)
MAGNESIUM SERPL-MCNC: 2.5 MG/DL (ref 1.6–2.6)
MCH RBC QN AUTO: 29.7 PG (ref 27–31)
MCHC RBC AUTO-ENTMCNC: 30.4 G/DL (ref 32–36)
MCV RBC AUTO: 98 FL (ref 82–98)
MONOCYTES # BLD AUTO: 2.3 K/UL (ref 0.3–1)
MONOCYTES NFR BLD: 14.8 % (ref 4–15)
NEUTROPHILS # BLD AUTO: 11.6 K/UL (ref 1.8–7.7)
NEUTROPHILS NFR BLD: 74.6 % (ref 38–73)
NRBC BLD-RTO: 0 /100 WBC
PHOSPHATE SERPL-MCNC: 2 MG/DL (ref 2.7–4.5)
PLATELET # BLD AUTO: 516 K/UL (ref 150–450)
PLATELET BLD QL SMEAR: ABNORMAL
PMV BLD AUTO: 9.5 FL (ref 9.2–12.9)
POCT GLUCOSE: 123 MG/DL (ref 70–110)
POCT GLUCOSE: 173 MG/DL (ref 70–110)
POCT GLUCOSE: 247 MG/DL (ref 70–110)
POCT GLUCOSE: 291 MG/DL (ref 70–110)
POTASSIUM SERPL-SCNC: 3.5 MMOL/L (ref 3.5–5.1)
PROT SERPL-MCNC: 5.8 G/DL (ref 6–8.4)
RBC # BLD AUTO: 3.67 M/UL (ref 4–5.4)
SODIUM SERPL-SCNC: 137 MMOL/L (ref 136–145)
WBC # BLD AUTO: 15.58 K/UL (ref 3.9–12.7)

## 2022-02-16 PROCEDURE — 99900035 HC TECH TIME PER 15 MIN (STAT)

## 2022-02-16 PROCEDURE — 36415 COLL VENOUS BLD VENIPUNCTURE: CPT | Performed by: INTERNAL MEDICINE

## 2022-02-16 PROCEDURE — 85025 COMPLETE CBC W/AUTO DIFF WBC: CPT | Performed by: INTERNAL MEDICINE

## 2022-02-16 PROCEDURE — 83735 ASSAY OF MAGNESIUM: CPT | Performed by: INTERNAL MEDICINE

## 2022-02-16 PROCEDURE — 11000001 HC ACUTE MED/SURG PRIVATE ROOM

## 2022-02-16 PROCEDURE — 94761 N-INVAS EAR/PLS OXIMETRY MLT: CPT

## 2022-02-16 PROCEDURE — 27100171 HC OXYGEN HIGH FLOW UP TO 24 HOURS

## 2022-02-16 PROCEDURE — 94640 AIRWAY INHALATION TREATMENT: CPT

## 2022-02-16 PROCEDURE — 99233 SBSQ HOSP IP/OBS HIGH 50: CPT | Mod: ,,, | Performed by: INTERNAL MEDICINE

## 2022-02-16 PROCEDURE — 80053 COMPREHEN METABOLIC PANEL: CPT | Performed by: INTERNAL MEDICINE

## 2022-02-16 PROCEDURE — 27000646 HC AEROBIKA DEVICE

## 2022-02-16 PROCEDURE — 94664 DEMO&/EVAL PT USE INHALER: CPT

## 2022-02-16 PROCEDURE — 27100107 HC POCKET PEAK FLOW METER

## 2022-02-16 PROCEDURE — 84100 ASSAY OF PHOSPHORUS: CPT | Performed by: INTERNAL MEDICINE

## 2022-02-16 PROCEDURE — 99233 PR SUBSEQUENT HOSPITAL CARE,LEVL III: ICD-10-PCS | Mod: ,,, | Performed by: INTERNAL MEDICINE

## 2022-02-16 PROCEDURE — 63600175 PHARM REV CODE 636 W HCPCS: Performed by: INTERNAL MEDICINE

## 2022-02-16 PROCEDURE — 63700000 PHARM REV CODE 250 ALT 637 W/O HCPCS: Performed by: INTERNAL MEDICINE

## 2022-02-16 PROCEDURE — 25000242 PHARM REV CODE 250 ALT 637 W/ HCPCS: Performed by: INTERNAL MEDICINE

## 2022-02-16 PROCEDURE — 25000003 PHARM REV CODE 250: Performed by: INTERNAL MEDICINE

## 2022-02-16 PROCEDURE — 97535 SELF CARE MNGMENT TRAINING: CPT

## 2022-02-16 RX ORDER — GUAIFENESIN 100 MG/5ML
400 SOLUTION ORAL EVERY 6 HOURS
Status: DISCONTINUED | OUTPATIENT
Start: 2022-02-16 | End: 2022-02-23 | Stop reason: HOSPADM

## 2022-02-16 RX ORDER — SODIUM CHLORIDE 9 MG/ML
INJECTION, SOLUTION INTRAVENOUS CONTINUOUS
Status: ACTIVE | OUTPATIENT
Start: 2022-02-16 | End: 2022-02-17

## 2022-02-16 RX ADMIN — CALCIUM CARBONATE (ANTACID) CHEW TAB 500 MG 500 MG: 500 CHEW TAB at 08:02

## 2022-02-16 RX ADMIN — OXYCODONE HYDROCHLORIDE AND ACETAMINOPHEN 500 MG: 500 TABLET ORAL at 08:02

## 2022-02-16 RX ADMIN — THERA TABS 1 TABLET: TAB at 08:02

## 2022-02-16 RX ADMIN — CEFTRIAXONE 1 G: 1 INJECTION, SOLUTION INTRAVENOUS at 09:02

## 2022-02-16 RX ADMIN — NASAL 2 SPRAY: 6.5 SPRAY NASAL at 09:02

## 2022-02-16 RX ADMIN — POTASSIUM BICARBONATE 50 MEQ: 978 TABLET, EFFERVESCENT ORAL at 02:02

## 2022-02-16 RX ADMIN — LEVALBUTEROL 1.25 MG: 1.25 SOLUTION, CONCENTRATE RESPIRATORY (INHALATION) at 01:02

## 2022-02-16 RX ADMIN — LEVALBUTEROL 1.25 MG: 1.25 SOLUTION, CONCENTRATE RESPIRATORY (INHALATION) at 08:02

## 2022-02-16 RX ADMIN — POTASSIUM BICARBONATE 50 MEQ: 978 TABLET, EFFERVESCENT ORAL at 10:02

## 2022-02-16 RX ADMIN — TIOTROPIUM BROMIDE INHALATION SPRAY 2 PUFF: 3.12 SPRAY, METERED RESPIRATORY (INHALATION) at 08:02

## 2022-02-16 RX ADMIN — GUAIFENESIN 400 MG: 100 SOLUTION ORAL at 04:02

## 2022-02-16 RX ADMIN — PANTOPRAZOLE SODIUM 40 MG: 40 TABLET, DELAYED RELEASE ORAL at 08:02

## 2022-02-16 RX ADMIN — FLUTICASONE FUROATE AND VILANTEROL TRIFENATATE 1 PUFF: 200; 25 POWDER RESPIRATORY (INHALATION) at 08:02

## 2022-02-16 RX ADMIN — AZITHROMYCIN MONOHYDRATE 500 MG: 250 TABLET ORAL at 08:02

## 2022-02-16 RX ADMIN — KETOROLAC TROMETHAMINE 15 MG: 15 INJECTION, SOLUTION INTRAMUSCULAR; INTRAVENOUS at 12:02

## 2022-02-16 RX ADMIN — ENOXAPARIN SODIUM 40 MG: 100 INJECTION SUBCUTANEOUS at 04:02

## 2022-02-16 RX ADMIN — SODIUM CHLORIDE: 0.9 INJECTION, SOLUTION INTRAVENOUS at 04:02

## 2022-02-16 RX ADMIN — GUAIFENESIN 400 MG: 100 SOLUTION ORAL at 11:02

## 2022-02-16 RX ADMIN — NASAL 2 SPRAY: 6.5 SPRAY NASAL at 08:02

## 2022-02-16 RX ADMIN — METHYLPREDNISOLONE SODIUM SUCCINATE 120 MG: 125 INJECTION, POWDER, FOR SOLUTION INTRAMUSCULAR; INTRAVENOUS at 08:02

## 2022-02-16 NOTE — RESPIRATORY THERAPY
Patient was given levalbuterol & mucomyst around 2030 tonight. she is allergic to albuterol. I came to place patient on bipap for the night & she says she thinks she may be allergic to the mucomyst because she became much more sob (having a normal reaction to as if she was taking albuterol but doesn't feel as bad). Informed patient that albuterol wasn't given. Patient asked for mucomyst to be D/C due to how it made her feel.  She stated that she doesn't feel too bad & wanted to try the bipap. I placed on pt 10/5 but she asked to come off within 1min of wearing stating that she cannot tolerate it. I placed pt on 6L green hi flow sat 93%. After placing pt on NC, she then informed me that before I came into the room she did get up to use the bathroom which prob has a lot to do with why she is very SOB at the moment. She states that's she's feeling better right now..she has COPD & wears O2 at home.Pt is noncompliant with bipap machine. Informed pt to let her nurse know if SOB gets any worse. Patient agreed. Jony contacted & aware of pt status. Will monitor throughout the night.

## 2022-02-16 NOTE — PT/OT/SLP PROGRESS
"Occupational Therapy   Treatment    Name: Estefani Fam  MRN: [id number]  Admitting Diagnosis:  Community acquired pneumonia of right lung       Recommendations:     Discharge Recommendations: home health OT  Discharge Equipment Recommendations:  shower chair  Barriers to discharge:  None    Assessment:     Estefani Fam is a 71 y.o. female with a medical diagnosis of Community acquired pneumonia of right lung.  She presents with limitations listed below. She was agreeable to bed level activities today but stated she felt too out of breathe/ anxious for OOB functional mobility. She stated she would be willing tomorrow. During today's session, pt performed bed mobility, sit <>stand trial with independence and completed oral care, LB dressing and grooming tasks while seated EOB with set-up assistance. Pt and spouse said this was the most she has done for herself since being admitted and would like to continue progressing towards independence in ADLS and  functional mobility.  Performance deficits affecting function are weakness,impaired endurance,impaired functional mobilty,impaired balance,impaired cardiopulmonary response to activity,impaired self care skills.     Rehab Prognosis:  Good; patient would benefit from acute skilled OT services to address these deficits and reach maximum level of function.       Plan:     Patient to be seen 3 x/week to address the above listed problems via self-care/home management,therapeutic activities,therapeutic exercises,neuromuscular re-education  · Plan of Care Expires: 03/01/22  · Plan of Care Reviewed with: patient,spouse    Subjective   "It feels good to be doing things for myself again."  Pt and spouse voiced concerns with increased anxiety about breathing and loss of endurance. They were asking about potential medications but pt ended up stating she does not want any. They were told to speak with nurse if recoocurent concern or if she feels she would benefit from " it.  Pain/Comfort:  · Pain Rating 1: 0/10  · Pain Rating Post-Intervention 1: 0/10    Objective:     Communicated with: RN prior to session.  Patient found HOB elevated with telemetry,peripheral IV,pulse ox (continuous),oxygen upon OT entry to room.    General Precautions: Standard, fall   Orthopedic Precautions:N/A   Braces: N/A  Respiratory Status: Nasal cannula, flow 4 L/min     Occupational Performance:     Bed Mobility:    · Patient completed Scooting/Bridging with independence  · Patient completed Supine to Sit with independence  · Patient completed Sit to Supine with independence   · Pt able to sit EOB to completed ADLs with independence.     Functional Mobility/Transfers:  · Patient completed Sit <> Stand Transfer with independence  with  no assistive device      Activities of Daily Living:  · Feeding:  independence    · Grooming: independence and set up assistance to brush hair while seated EOB  · Bathing: independence and set up assiatance to wash face with wipe while seated EOB    · Lower Body Dressing: independence to don/doff socks while seated EOB.  · Toileting: not seen today but pt reported she is able to use the bedside commode independently.        [facility] 6 Click ADL: 20    Treatment & Education:  Pt and spouse educated on OT POC/goals for session. Pt educated on energy conservation/breathing techniques during tasks to increase task tolerance, such as pursed breathing. Pt encouraged to maintain independence in ADLs and importance of OOB activities/sitting up while recovering from pneumonia.     Patient left HOB elevated with call button in reach and spouse presentEducation:      GOALS:   Multidisciplinary Problems     Occupational Therapy Goals        Problem: Occupational Therapy Goal    Goal Priority Disciplines Outcome Interventions   Occupational Therapy Goal     OT, PT/OT Ongoing, Progressing    Description: Goals to be met by: 3/15/22     Patient will increase functional independence with ADLs  by performing:    UE Dressing with Set-up Assistance.  LE Dressing with Stand-by Assistance.  Grooming while standing at sink with Stand-by Assistance.   Toileting from toilet with Supervision for hygiene and clothing management.   Supine to sit with Modified [place].  Step transfer with Supervision  Toilet transfer to toilet with Supervision.                     Time Tracking:     OT Date of Treatment: 02/16/22  OT Start Time: 1344  OT Stop Time: 1408  OT Total Time (min): 24 min    Billable Minutes:Self Care/Home Management 24 minutes    OT/GREGORIA: OT          2/16/2022

## 2022-02-16 NOTE — PLAN OF CARE
Pt started IV fluids, continues on HF , BBS diminished , up to BSC, no falls  Problem: Adult Inpatient Plan of Care  Goal: Plan of Care Review  Outcome: Ongoing, Progressing  Goal: Patient-Specific Goal (Individualized)  Outcome: Ongoing, Progressing  Goal: Absence of Hospital-Acquired Illness or Injury  Outcome: Ongoing, Progressing  Goal: Optimal Comfort and Wellbeing  Outcome: Ongoing, Progressing  Goal: Readiness for Transition of Care  Outcome: Ongoing, Progressing     Problem: Adjustment to Illness (Sepsis/Septic Shock)  Goal: Optimal Coping  Outcome: Ongoing, Progressing     Problem: Bleeding (Sepsis/Septic Shock)  Goal: Absence of Bleeding  Outcome: Ongoing, Progressing     Problem: Glycemic Control Impaired (Sepsis/Septic Shock)  Goal: Blood Glucose Level Within Desired Range  Outcome: Ongoing, Progressing     Problem: Infection Progression (Sepsis/Septic Shock)  Goal: Absence of Infection Signs and Symptoms  Outcome: Ongoing, Progressing     Problem: Nutrition Impaired (Sepsis/Septic Shock)  Goal: Optimal Nutrition Intake  Outcome: Ongoing, Progressing     Problem: Fluid Imbalance (Pneumonia)  Goal: Fluid Balance  Outcome: Ongoing, Progressing     Problem: Infection (Pneumonia)  Goal: Resolution of Infection Signs and Symptoms  Outcome: Ongoing, Progressing     Problem: Respiratory Compromise (Pneumonia)  Goal: Effective Oxygenation and Ventilation  Outcome: Ongoing, Progressing

## 2022-02-16 NOTE — RESEARCH
Sponsor: Dr. Joss Garcia M.D.    Study Title/IRB Number: A computer vision approach to prevention of injury from falling  IRB #: 2020.256    Principle Investigator: Joss Garcia M.D.    Present for Discussion: Yes/Patient only     Is LAR Consenting for Subject: NO    Prior to the Informed Consent (IC) being signed, or any study protocol required data collection, testing, procedure, or intervention being performed, the following was done and/or discussed:   Patient was given a copy of the IC for review    Purpose of the study and qualifications to participate    Study design, follow up schedule, and tests or procedures done at each visit   Confidentiality and HIPAA Authorization for Release of Medical Records for the research trial/ subject's rights/research related injury   Risk, Benefits, Alternative Treatments, Compensation and Costs   Participation in the research trial is voluntary and patient may withdraw at anytime   Contact information for study related questions    Patient verbalizes understanding of the above: Yes  Contact information for CRC and PI given to patient: Yes  Patient able to adequately summarize: the purpose of the study, the risks associated with the study, and all procedures, testing, and follow-ups associated with the study: Yes    Patient signed the informed consent form for the A Computer Vision Approach to Prevent Injury From Falling research study with an IRB approval date of 07/09/2020.  Each page of the consent form was reviewed with patient and all questions answered satisfactorily. Patient signed the consent form and received a copy of same. The original consent was scanned into electronic medical records (EPIC) and filed into the subject's research study chart.    Ms. Fam was able to complete the following upon entry of the study:    - Subject was outfitted with white and black checkered gown: Yes  - Subject understands that they must wear the white and black  checkered gown for the entirety of the 24 hour observation period: Yes   - Subject understands that visitors and staff will also be recorded while in the view of visual recording equipment: Yes  - Subject was able to read consent and understands that they'll only be visually recorded for 24 hours and not audio recorded: Yes  -Subject states that they understand that this is an investigational study and that the WOW (Workstation on Wheels) doesn't prevent or lower risks of falls or injuries, and that they should always follow their provider's orders and use their call bell to alert hospital staff before ambulating or becoming mobile: Yes    Dr. Joss Garcia M.D. has reviewed the following inclusion/ exclusion criteria and confirms that subject meets all Inclusion and no Exclusion criteria at this time:      Inclusion-   - Subject is currently admitted as an inpatient with acute care needs to [facility] and is at risk for falling.  - Subject is awake, alert, oriented and can speak and understand English without        difficulty.  - Subject can stand and ambulate with or without assistance.  - Subject must be literate.  - Subject must have a BMI that allows for proper fit of white and black      checkered gown.  - Subject must be able to provide informed consent.  - Subject will be admitted for > 24 hours.    Exclusion-  - Subject cannot read.  - Subject is immobile e.g. (paralyzed)  - Subject has BMI that prevents them from properly fitting in white and black checkered      gown.  - Subject has COVID-19 or other airborne infectious diseases.  - Subject is on reverse-isolation for immunosuppressive diseases.  - Subject has altered mental status or diagnosis of dementia.  - Subject is expected to be discharged in < 24 hours.    Pt AAO x 4, lying quietly in bed with HOB elevated X 75 degrees. Respirations even and unlabored without distress noted or complaints voiced. Pt is calm  with a pleasant affect. Patient safely placed in gown without incident. Pt reminded that WOW and video recording is not monitored and provides no emergency benefits or reduction or elimination from the risk of injury from falls; pt voiced understanding. Pt reminded to follow plan of care put forth by provider and to call RN for all assistance to reduce risk of problematic situations and potential injuries; pt voiced understanding. Recording started without incident. Advised pt that their participation is voluntary and if for any reasons they decide to cease the study that they only needed to inform their nurse and study session would be stopped; pt voiced understanding. Call arreola within reach. JOEL Nieto notified of continuous video monitoring and of other study implications.

## 2022-02-16 NOTE — NURSING
0736 Pt in bed A&O x4 resp even and unlabored, noted on 5 Lpm per HF, resp even and unlabored, has occasional dry cough, BBS very diminished, no edema, denies SOB at this time    0855 Scheduled meds given, pt refused Flonase and Mucinex states her other doctor stopped it when she was outside hospital, and put her on something else didn't want her taking Mucinex, also stated had problems with a severe nose bleeds, son at bedside    1054 Scheduled potassium given son remains at bedside

## 2022-02-16 NOTE — PLAN OF CARE
Problem: Occupational Therapy Goal  Goal: Occupational Therapy Goal  Description: Goals to be met by: 3/15/22     Patient will increase functional independence with ADLs by performing:    UE Dressing with Set-up Assistance.  LE Dressing with Stand-by Assistance.  Grooming while standing at sink with Stand-by Assistance.   Toileting from toilet with Supervision for hygiene and clothing management.   Supine to sit with Modified [place].  Step transfer with Supervision  Toilet transfer to toilet with Supervision.    Outcome: Ongoing, Progressing   LUCIAN Duncan

## 2022-02-16 NOTE — RESPIRATORY THERAPY
RAPID RESPONSE RESPIRATORY THERAPY PROACTIVE ROUNDING NOTE             Time of visit: 08    Code Status: Full Code   : 1950  Bed: 648/648 A:   MRN: [id number]  Time spent at the bedside: < 15 min    SITUATION    Evaluated patient for: HFNC Compliance     BACKGROUND    Patient has a past medical history of Cataract, COPD (chronic obstructive pulmonary disease), COVID-19, History of COVID-19, History of retinal hemorrhage, Pathological fracture due to osteoporosis, Retinal histoplasmosis, and Secondary pulmonary arterial hypertension.    24 Hours Vitals Range:  Temp:  [96.9 °F (36.1 °C)-98.2 °F (36.8 °C)]   Pulse:  []   Resp:  [14-28]   BP: (107-125)/(55-75)   SpO2:  [65 %-98 %]     Labs:    Recent Labs     22  1531 22  1531 02/15/22  0305 02/15/22  1008 22  0255      < > 135* 136 137   K 2.9*   < > 3.4* 3.4* 3.5      < > 93* 94* 94*   CO2 25   < > 31* 34* 34*   CREATININE 0.4*   < > 0.5 0.7 0.6   *   < > 166* 231* 185*   PHOS  --   --  2.6*  --  2.0*   MG 1.7  --  2.3  --  2.5    < > = values in this interval not displayed.        Recent Labs     22  1412 02/15/22  0941   PH 7.404 7.417   PCO2 59.6* 59.9*   PO2 39* 64*   HCO3 37.2* 38.6*   POCSATURATED 71* 92*   BE 12 14       ASSESSMENT/INTERVENTIONS    Upon arrival in room pt weaned to 4L low flow and doing well  WCTM    Last VS   Temp: 97.8 °F (36.6 °C) (736)  Pulse: 99 (820)  Resp: 18 (820)  BP: 119/60 (736)  SpO2: 91 % (820)    Level of Consciousness: Level of Consciousness (AVPU): alert  Respiratory Effort: Respiratory Effort: Pursed lips Expansion/Accessory Muscle Usage: Expansion/Accessory Muscles/Retractions: expansion symmetric  All Lung Field Breath Sounds: All Lung Fields Breath Sounds: clear,diminished  O2 Device/Concentration: Flow (L/min): 4, Oxygen Concentration (%): 40, O2 Device (Oxygen Therapy): nasal cannula  Was the O2 device able to be weaned? Yes  Ambu at  bedside: Ambu bag with the patient?: Yes, Adult Ambu    Active Orders   Respiratory Care    ACAPELLA TREATMENT Q8H     Frequency: Q8H     Number of Occurrences: Until Specified    Incentive spirometry     Frequency: Q4H     Number of Occurrences: Until Specified    Inhalation Treatment BID     Frequency: BID     Number of Occurrences: Until Specified    Inhalation Treatment Q6H WAKE     Frequency: Q6H WAKE     Number of Occurrences: Until Specified    Oxygen Continuous     Frequency: Continuous     Number of Occurrences: Until Specified     Order Questions:      Device type: Low flow      Device: Nasal Cannula (1- 5 Liters)      LPM: 1-5      Titrate O2 per Oxygen Titration Protocol: Yes      To maintain SpO2 goal of: Other      SpO2 goal: 88-92      Notify MD of: Inability to achieve desired SpO2; Sudden change in patient status and requires 20% increase in FiO2; Patient requires >60% FiO2    Peak flow Q8H     Frequency: Q8H     Number of Occurrences: Until Specified    Pulse Oximetry Continuous     Frequency: Continuous     Number of Occurrences: Until Specified       RECOMMENDATIONS    We recommend: RRT Recs: Continue POC per primary team.    ESCALATION        FOLLOW-UP    Please call back the Rapid Response RTBibi RRT at x [phone] for any questions or concerns.

## 2022-02-17 LAB
ALBUMIN SERPL BCP-MCNC: 2 G/DL (ref 3.5–5.2)
ALP SERPL-CCNC: 227 U/L (ref 55–135)
ALT SERPL W/O P-5'-P-CCNC: 33 U/L (ref 10–44)
ANION GAP SERPL CALC-SCNC: 6 MMOL/L (ref 8–16)
ANISOCYTOSIS BLD QL SMEAR: SLIGHT
AST SERPL-CCNC: 21 U/L (ref 10–40)
BASO STIPL BLD QL SMEAR: ABNORMAL
BASOPHILS NFR BLD: 0 % (ref 0–1.9)
BILIRUB SERPL-MCNC: 0.2 MG/DL (ref 0.1–1)
BUN SERPL-MCNC: 20 MG/DL (ref 8–23)
CALCIUM SERPL-MCNC: 8.4 MG/DL (ref 8.7–10.5)
CHLORIDE SERPL-SCNC: 99 MMOL/L (ref 95–110)
CO2 SERPL-SCNC: 36 MMOL/L (ref 23–29)
CREAT SERPL-MCNC: 0.5 MG/DL (ref 0.5–1.4)
DIFFERENTIAL METHOD: ABNORMAL
EOSINOPHIL NFR BLD: 1.3 % (ref 0–8)
ERYTHROCYTE [DISTWIDTH] IN BLOOD BY AUTOMATED COUNT: 14.4 % (ref 11.5–14.5)
EST. GFR  (AFRICAN AMERICAN): >60 ML/MIN/1.73 M^2
EST. GFR  (NON AFRICAN AMERICAN): >60 ML/MIN/1.73 M^2
GLUCOSE SERPL-MCNC: 123 MG/DL (ref 70–110)
HCT VFR BLD AUTO: 37.7 % (ref 37–48.5)
HGB BLD-MCNC: 11.4 G/DL (ref 12–16)
IMM GRANULOCYTES # BLD AUTO: ABNORMAL K/UL (ref 0–0.04)
IMM GRANULOCYTES NFR BLD AUTO: ABNORMAL % (ref 0–0.5)
LYMPHOCYTES NFR BLD: 13.3 % (ref 18–48)
MAGNESIUM SERPL-MCNC: 2.1 MG/DL (ref 1.6–2.6)
MCH RBC QN AUTO: 29.6 PG (ref 27–31)
MCHC RBC AUTO-ENTMCNC: 30.2 G/DL (ref 32–36)
MCV RBC AUTO: 98 FL (ref 82–98)
METAMYELOCYTES NFR BLD MANUAL: 2 %
MONOCYTES NFR BLD: 10.7 % (ref 4–15)
MYELOCYTES NFR BLD MANUAL: 4 %
NEUTROPHILS NFR BLD: 67.4 % (ref 38–73)
NEUTS BAND NFR BLD MANUAL: 1.3 %
NRBC BLD-RTO: 0 /100 WBC
PHOSPHATE SERPL-MCNC: 2.3 MG/DL (ref 2.7–4.5)
PLATELET # BLD AUTO: 530 K/UL (ref 150–450)
PLATELET BLD QL SMEAR: ABNORMAL
PMV BLD AUTO: 8.8 FL (ref 9.2–12.9)
POTASSIUM SERPL-SCNC: 3.8 MMOL/L (ref 3.5–5.1)
PROT SERPL-MCNC: 5.7 G/DL (ref 6–8.4)
RBC # BLD AUTO: 3.85 M/UL (ref 4–5.4)
SODIUM SERPL-SCNC: 141 MMOL/L (ref 136–145)
WBC # BLD AUTO: 15.43 K/UL (ref 3.9–12.7)

## 2022-02-17 PROCEDURE — 27000221 HC OXYGEN, UP TO 24 HOURS

## 2022-02-17 PROCEDURE — 11000001 HC ACUTE MED/SURG PRIVATE ROOM

## 2022-02-17 PROCEDURE — 99232 PR SUBSEQUENT HOSPITAL CARE,LEVL II: ICD-10-PCS | Mod: ,,, | Performed by: STUDENT IN AN ORGANIZED HEALTH CARE EDUCATION/TRAINING PROGRAM

## 2022-02-17 PROCEDURE — 85027 COMPLETE CBC AUTOMATED: CPT | Performed by: INTERNAL MEDICINE

## 2022-02-17 PROCEDURE — 63600175 PHARM REV CODE 636 W HCPCS: Performed by: INTERNAL MEDICINE

## 2022-02-17 PROCEDURE — 94760 N-INVAS EAR/PLS OXIMETRY 1: CPT

## 2022-02-17 PROCEDURE — 99232 SBSQ HOSP IP/OBS MODERATE 35: CPT | Mod: ,,, | Performed by: STUDENT IN AN ORGANIZED HEALTH CARE EDUCATION/TRAINING PROGRAM

## 2022-02-17 PROCEDURE — 27000646 HC AEROBIKA DEVICE

## 2022-02-17 PROCEDURE — 85007 BL SMEAR W/DIFF WBC COUNT: CPT | Performed by: INTERNAL MEDICINE

## 2022-02-17 PROCEDURE — 80053 COMPREHEN METABOLIC PANEL: CPT | Performed by: INTERNAL MEDICINE

## 2022-02-17 PROCEDURE — 83735 ASSAY OF MAGNESIUM: CPT | Performed by: INTERNAL MEDICINE

## 2022-02-17 PROCEDURE — 99900035 HC TECH TIME PER 15 MIN (STAT)

## 2022-02-17 PROCEDURE — 94640 AIRWAY INHALATION TREATMENT: CPT

## 2022-02-17 PROCEDURE — 84100 ASSAY OF PHOSPHORUS: CPT | Performed by: INTERNAL MEDICINE

## 2022-02-17 PROCEDURE — 94761 N-INVAS EAR/PLS OXIMETRY MLT: CPT

## 2022-02-17 PROCEDURE — 25000003 PHARM REV CODE 250: Performed by: INTERNAL MEDICINE

## 2022-02-17 PROCEDURE — 94664 DEMO&/EVAL PT USE INHALER: CPT

## 2022-02-17 PROCEDURE — 25000242 PHARM REV CODE 250 ALT 637 W/ HCPCS: Performed by: INTERNAL MEDICINE

## 2022-02-17 PROCEDURE — 63600175 PHARM REV CODE 636 W HCPCS: Performed by: STUDENT IN AN ORGANIZED HEALTH CARE EDUCATION/TRAINING PROGRAM

## 2022-02-17 PROCEDURE — 36415 COLL VENOUS BLD VENIPUNCTURE: CPT | Performed by: INTERNAL MEDICINE

## 2022-02-17 RX ORDER — PREDNISONE 20 MG/1
40 TABLET ORAL DAILY
Status: COMPLETED | OUTPATIENT
Start: 2022-02-17 | End: 2022-02-18

## 2022-02-17 RX ADMIN — GUAIFENESIN 400 MG: 100 SOLUTION ORAL at 05:02

## 2022-02-17 RX ADMIN — NASAL 2 SPRAY: 6.5 SPRAY NASAL at 08:02

## 2022-02-17 RX ADMIN — GUAIFENESIN 400 MG: 100 SOLUTION ORAL at 11:02

## 2022-02-17 RX ADMIN — OXYCODONE HYDROCHLORIDE AND ACETAMINOPHEN 500 MG: 500 TABLET ORAL at 08:02

## 2022-02-17 RX ADMIN — PANTOPRAZOLE SODIUM 40 MG: 40 TABLET, DELAYED RELEASE ORAL at 08:02

## 2022-02-17 RX ADMIN — ENOXAPARIN SODIUM 40 MG: 100 INJECTION SUBCUTANEOUS at 05:02

## 2022-02-17 RX ADMIN — SODIUM CHLORIDE: 0.9 INJECTION, SOLUTION INTRAVENOUS at 08:02

## 2022-02-17 RX ADMIN — LEVALBUTEROL 1.25 MG: 1.25 SOLUTION, CONCENTRATE RESPIRATORY (INHALATION) at 08:02

## 2022-02-17 RX ADMIN — CEFTRIAXONE 1 G: 1 INJECTION, SOLUTION INTRAVENOUS at 08:02

## 2022-02-17 RX ADMIN — PREDNISONE 40 MG: 20 TABLET ORAL at 08:02

## 2022-02-17 RX ADMIN — THERA TABS 1 TABLET: TAB at 08:02

## 2022-02-17 RX ADMIN — CALCIUM CARBONATE (ANTACID) CHEW TAB 500 MG 500 MG: 500 CHEW TAB at 08:02

## 2022-02-17 RX ADMIN — TIOTROPIUM BROMIDE INHALATION SPRAY 2 PUFF: 3.12 SPRAY, METERED RESPIRATORY (INHALATION) at 08:02

## 2022-02-17 RX ADMIN — LEVALBUTEROL 1.25 MG: 1.25 SOLUTION, CONCENTRATE RESPIRATORY (INHALATION) at 01:02

## 2022-02-17 RX ADMIN — FLUTICASONE FUROATE AND VILANTEROL TRIFENATATE 1 PUFF: 200; 25 POWDER RESPIRATORY (INHALATION) at 08:02

## 2022-02-17 RX ADMIN — ACETAMINOPHEN 650 MG: 325 TABLET ORAL at 09:02

## 2022-02-17 NOTE — PT/OT/SLP PROGRESS
Physical Therapy      Patient Name:  Estefani Fam   MRN:  [id number]    Patient not seen today. Patient refused - due to respiratory distress -- RN aware.  PT will continue to follow.       Jacobo Hassan DPT, PhD

## 2022-02-17 NOTE — ASSESSMENT & PLAN NOTE
"- continue CAP treatment   - see "CAP"  - switched prednisone to Iv solumedrol due to worsening hypoxia to complete 5 days before transitioning back to oral prednisone   - acapella  - incentive spirometry  - O2 goal 88-92%  - continue home inhalers  - continue prn xopenex    "

## 2022-02-17 NOTE — SUBJECTIVE & OBJECTIVE
Interval History:   Patient lying in bed, mild respiratory distress.  at bedside. Alert and oriented x4. Remains on 4 L NC. Patient reports continued shortness of breath, cough and pursed lip breathing. She expectorated minimal amount of sputum but continues to note chest congestion. Patient also notes good UOP, regular bowel movements and good po intake. Denies fever, chills, N/V, abdominal pain, changes in bowel or bladder function, signs of bleeding, rashes, wounds or swelling. Patient does note some weakness and is working well with therapy.    Review of Systems   Constitutional: Positive for activity change, fatigue and fever. Negative for chills.   HENT: Negative for congestion and trouble swallowing.    Eyes: Negative for visual disturbance.        Vision loss (chronic)   Respiratory: Positive for cough, chest tightness and shortness of breath.    Cardiovascular: Negative for chest pain and leg swelling.   Gastrointestinal: Negative for abdominal distention, abdominal pain, nausea and vomiting.   Endocrine: Negative for cold intolerance, heat intolerance, polydipsia and polyuria.   Genitourinary: Negative for difficulty urinating and dysuria.   Musculoskeletal: Negative for back pain and myalgias.   Skin: Negative for rash and wound.   Neurological: Positive for weakness. Negative for dizziness and light-headedness.   Hematological: Negative for adenopathy. Does not bruise/bleed easily.   Psychiatric/Behavioral: Negative for confusion and sleep disturbance.     Objective:     Vital Signs (Most Recent):  Temp: 98.8 °F (37.1 °C) (02/16/22 1940)  Pulse: 93 (02/16/22 1940)  Resp: 18 (02/16/22 1940)  BP: (!) 142/69 (02/16/22 1940)  SpO2: (!) 92 % (02/16/22 1940) Vital Signs (24h Range):  Temp:  [97.8 °F (36.6 °C)-98.8 °F (37.1 °C)] 98.8 °F (37.1 °C)  Pulse:  [] 93  Resp:  [14-20] 18  SpO2:  [86 %-97 %] 92 %  BP: (115-142)/(58-75) 142/69     Weight: 70.8 kg (156 lb)  Body mass index is 23.72 kg/m².  No  intake or output data in the 24 hours ending 02/16/22 2003   Physical Exam  Constitutional:       General: She is in acute distress.      Appearance: She is well-developed. She is ill-appearing.   HENT:      Head: Normocephalic and atraumatic.      Mouth/Throat:      Mouth: Mucous membranes are moist.   Eyes:      Extraocular Movements: Extraocular movements intact.      Pupils: Pupils are equal, round, and reactive to light.   Neck:      Vascular: No JVD.   Cardiovascular:      Rate and Rhythm: Regular rhythm. Tachycardia present.      Pulses: Normal pulses.      Heart sounds: Normal heart sounds.   Pulmonary:      Effort: Respiratory distress present.      Breath sounds: Examination of the right-lower field reveals decreased breath sounds. Examination of the left-lower field reveals decreased breath sounds. Decreased breath sounds and rales present.   Abdominal:      General: Bowel sounds are normal. There is no distension.      Palpations: Abdomen is soft.      Tenderness: There is no abdominal tenderness.   Musculoskeletal:         General: No deformity. Normal range of motion.      Cervical back: Neck supple.   Lymphadenopathy:      Cervical: No cervical adenopathy.   Skin:     General: Skin is warm and dry.      Capillary Refill: Capillary refill takes less than 2 seconds.      Findings: No erythema or rash.   Neurological:      General: No focal deficit present.      Mental Status: She is alert and oriented to person, place, and time.      Cranial Nerves: No cranial nerve deficit.      Sensory: No sensory deficit.      Motor: Weakness present.   Psychiatric:         Mood and Affect: Mood normal.         Significant Labs: All pertinent labs within the past 24 hours have been reviewed.    Significant Imaging: I have reviewed all pertinent imaging results/findings within the past 24 hours.

## 2022-02-17 NOTE — NURSING
0739 Pt in bed A&O x4 resp even and slightly labored breathing with pursed lips, O2 sats 96% on 8 Lpm, decreased to 7 will monitor, vitals done, updated communication board    0856 Scheduled meds given respiratory here giving breathing treatment sats 88-90% on 7 Lpm, continues slightly labored    0903 continues breathing treatment O2 sats up to 96-97% on 7 Lpm, decreased to 6 Lpm will continue to monitor, resp even and unlabored c/o headache Tylenol given    0930 Pt had difficulty getting up to BSC voided on floor, was very SOB with exertion improved with rest, sats down to 86% on 6 Lpm but went back up to 90-92% after rest    1149 Pt O2 sats 96% on 6 Lpm, no SOB resting quietly, spoke with patient decreased to 5 Lpm

## 2022-02-17 NOTE — PT/OT/SLP PROGRESS
Occupational Therapy      Patient Name:  Estefani Fam   MRN:  [id number]    Patient not seen today secondary to Other (Comment) (patient declined therapy secondary to respiratory issues with increased need for supplemental oxygen.). Will follow-up 02/18/22.    2/17/2022

## 2022-02-17 NOTE — PLAN OF CARE
Pt continues with O2 per HF, decreased from 8 Lpm to 4.5 Lpm, O2 sats 90-92%, SOB with exertion decreases with rest, continues on steroid and IV ABT, no falls  Problem: Adult Inpatient Plan of Care  Goal: Plan of Care Review  Outcome: Ongoing, Progressing  Goal: Patient-Specific Goal (Individualized)  Outcome: Ongoing, Progressing  Goal: Absence of Hospital-Acquired Illness or Injury  Outcome: Ongoing, Progressing  Goal: Optimal Comfort and Wellbeing  Outcome: Ongoing, Progressing  Goal: Readiness for Transition of Care  Outcome: Ongoing, Progressing     Problem: Adjustment to Illness (Sepsis/Septic Shock)  Goal: Optimal Coping  Outcome: Ongoing, Progressing     Problem: Bleeding (Sepsis/Septic Shock)  Goal: Absence of Bleeding  Outcome: Ongoing, Progressing     Problem: Glycemic Control Impaired (Sepsis/Septic Shock)  Goal: Blood Glucose Level Within Desired Range  Outcome: Ongoing, Progressing     Problem: Infection Progression (Sepsis/Septic Shock)  Goal: Absence of Infection Signs and Symptoms  Outcome: Ongoing, Progressing     Problem: Nutrition Impaired (Sepsis/Septic Shock)  Goal: Optimal Nutrition Intake  Outcome: Ongoing, Progressing     Problem: Fluid Imbalance (Pneumonia)  Goal: Fluid Balance  Outcome: Ongoing, Progressing     Problem: Infection (Pneumonia)  Goal: Resolution of Infection Signs and Symptoms  Outcome: Ongoing, Progressing     Problem: Respiratory Compromise (Pneumonia)  Goal: Effective Oxygenation and Ventilation  Outcome: Ongoing, Progressing

## 2022-02-17 NOTE — RESPIRATORY THERAPY
RAPID RESPONSE RESPIRATORY THERAPY PROACTIVE ROUNDING NOTE             Time of visit: 1015    Code Status: Full Code   : 1950  Bed: 648/648 A:   MRN: [id number]  Time spent at the bedside: < 15 min    SITUATION    Evaluated patient for: HFNC Compliance     BACKGROUND    Patient has a past medical history of Cataract, COPD (chronic obstructive pulmonary disease), COVID-19, History of COVID-19, History of retinal hemorrhage, Pathological fracture due to osteoporosis, Retinal histoplasmosis, and Secondary pulmonary arterial hypertension.    24 Hours Vitals Range:  Temp:  [97.1 °F (36.2 °C)-98.8 °F (37.1 °C)]   Pulse:  []   Resp:  [18-22]   BP: (115-161)/(58-79)   SpO2:  [83 %-97 %]     Labs:    Recent Labs     02/15/22  0305 02/15/22  0305 02/15/22  1008 22  0255 22  0221   *   < > 136 137 141   K 3.4*   < > 3.4* 3.5 3.8   CL 93*   < > 94* 94* 99   CO2 31*   < > 34* 34* 36*   CREATININE 0.5   < > 0.7 0.6 0.5   *   < > 231* 185* 123*   PHOS 2.6*  --   --  2.0* 2.3*   MG 2.3  --   --  2.5 2.1    < > = values in this interval not displayed.        Recent Labs     22  1412 02/15/22  0941   PH 7.404 7.417   PCO2 59.6* 59.9*   PO2 39* 64*   HCO3 37.2* 38.6*   POCSATURATED 71* 92*   BE 12 14       ASSESSMENT/INTERVENTIONS        Last VS   Temp: 97.9 °F (36.6 °C) (739)  Pulse: 111 (818)  Resp: 20 (818)  BP: 145/79 (739)  SpO2: 89 % (818)    Level of Consciousness: Level of Consciousness (AVPU): alert  Respiratory Effort: Respiratory Effort: Short of breath Expansion/Accessory Muscle Usage: Expansion/Accessory Muscles/Retractions: no use of accessory muscles,no retractions,expansion symmetric  All Lung Field Breath Sounds: All Lung Fields Breath Sounds: Anterior:,diminished  O2 Device/Concentration: Flow (L/min): 6, Oxygen Concentration (%): 40, O2 Device (Oxygen Therapy): High Flow nasal Cannula  Was the O2 device able to be weaned? No  Ambu at  bedside: Ambu bag with the patient?: Yes, Adult Ambu    Active Orders   Respiratory Care    ACAPELLA TREATMENT Q8H     Frequency: Q8H     Number of Occurrences: Until Specified    Chest physiotherapy BID     Frequency: BID     Number of Occurrences: Until Specified     Order Questions:      Indications: ATELECTASIS NONRESPONSIVE TO TX    Incentive spirometry     Frequency: Q4H     Number of Occurrences: Until Specified    Inhalation Treatment BID     Frequency: BID     Number of Occurrences: Until Specified    Inhalation Treatment Q6H WAKE     Frequency: Q6H WAKE     Number of Occurrences: Until Specified    Oxygen Continuous     Frequency: Continuous     Number of Occurrences: Until Specified     Order Questions:      Device type: High flow      Device: High Flow Nasal Cannula (6 -15 Liters)      LPM: 6-15      Titrate O2 per Oxygen Titration Protocol: Yes      To maintain SpO2 goal of: Other      SpO2 goal: 88-92      Notify MD of: Inability to achieve desired SpO2; Sudden change in patient status and requires 20% increase in FiO2; Patient requires >60% FiO2    Peak flow Q8H     Frequency: Q8H     Number of Occurrences: Until Specified    Pulse Oximetry Continuous     Frequency: Continuous     Number of Occurrences: Until Specified       RECOMMENDATIONS    We recommend: RRT Recs: wean O2 as tolerated    ESCALATION        FOLLOW-UP    Please call back the Rapid Response RTBibi RRT at x [phone] for any questions or concerns.

## 2022-02-17 NOTE — PROGRESS NOTES
[facility] Medicine  Progress Note    Patient Name: Estefani Fam  MRN: [id number]  Patient Class: IP- Inpatient   Admission Date: 2/14/2022  Length of Stay: 2 days  Attending Physician: Gianluca Zarate MD  Primary Care Provider: Miles Jackson MD        Subjective:     Principal Problem:Community acquired pneumonia of right lung        HPI:  Ms. Fam is a 71 year old woman with PMH of COPD, chronic respiratory failure on 2-3L home O2, and prior COVID infection who presented to [facility] via EMS for one day history  of shortness of breath at home. In the ED, patient was hypoxic to 84% upon arrival. Patient reports productive cough and pleuritic chest pain. She denies exertional chest pain, diaphoresis, arm pain. At home, she takes home inhalers of Breo Ellipta and Incruse Ellipta and Azithromycin 3 times weekly to help prevent exacerbations in the future.    She was previously hospitalized 11/2021 for COPD exacerbation 2/2 CAP. She was started on oral antibiotics and IV steroids. Patient clinically improved and discharged on 7 day course of levofloxacin and steroid taper. She also was discharged on Roflumilast and outpatient followup with her Pulmonologist Dr. Brooklynn Ruiz.     Past Medical History  She has a past medical history of Cataract, COPD (chronic obstructive pulmonary disease), History of COVID-19, History of retinal hemorrhage, Pathological fracture due to osteoporosis, Retinal histoplasmosis, and Secondary pulmonary arterial hypertension.     Past Surgical History  She has a past surgical history that includes Hand surgery and Bronchoscopy with fluoroscopy (10/28/2019).     Family History  Her family history includes Colon cancer in her father and mother; Macular degeneration in her mother; Stroke in her father.     Social History  She reports that she quit smoking about 5 years ago. Her smoking use included cigarettes. She has a 36.00 pack-year smoking history. She has never used  smokeless tobacco. She reports current alcohol use of about 2.0 standard drinks of alcohol per week. She reports that she does not use drugs.     Allergies  She is allergic to albuterol and ipratropium analogues.      Overview/Hospital Course:  No notes on file    Interval History:   Patient lying in bed, mild respiratory distress.  at bedside. Alert and oriented x4. Remains on 4 L NC. Patient reports continued shortness of breath, cough and pursed lip breathing. She expectorated minimal amount of sputum but continues to note chest congestion. Patient also notes good UOP, regular bowel movements and good po intake. Denies fever, chills, N/V, abdominal pain, changes in bowel or bladder function, signs of bleeding, rashes, wounds or swelling. Patient does note some weakness and is working well with therapy.    Review of Systems   Constitutional: Positive for activity change, fatigue and fever. Negative for chills.   HENT: Negative for congestion and trouble swallowing.    Eyes: Negative for visual disturbance.        Vision loss (chronic)   Respiratory: Positive for cough, chest tightness and shortness of breath.    Cardiovascular: Negative for chest pain and leg swelling.   Gastrointestinal: Negative for abdominal distention, abdominal pain, nausea and vomiting.   Endocrine: Negative for cold intolerance, heat intolerance, polydipsia and polyuria.   Genitourinary: Negative for difficulty urinating and dysuria.   Musculoskeletal: Negative for back pain and myalgias.   Skin: Negative for rash and wound.   Neurological: Positive for weakness. Negative for dizziness and light-headedness.   Hematological: Negative for adenopathy. Does not bruise/bleed easily.   Psychiatric/Behavioral: Negative for confusion and sleep disturbance.     Objective:     Vital Signs (Most Recent):  Temp: 98.8 °F (37.1 °C) (02/16/22 1940)  Pulse: 93 (02/16/22 1940)  Resp: 18 (02/16/22 1940)  BP: (!) 142/69 (02/16/22 1940)  SpO2: (!) 92 %  (02/16/22 1940) Vital Signs (24h Range):  Temp:  [97.8 °F (36.6 °C)-98.8 °F (37.1 °C)] 98.8 °F (37.1 °C)  Pulse:  [] 93  Resp:  [14-20] 18  SpO2:  [86 %-97 %] 92 %  BP: (115-142)/(58-75) 142/69     Weight: 70.8 kg (156 lb)  Body mass index is 23.72 kg/m².  No intake or output data in the 24 hours ending 02/16/22 2003   Physical Exam  Constitutional:       General: She is in acute distress.      Appearance: She is well-developed. She is ill-appearing.   HENT:      Head: Normocephalic and atraumatic.      Mouth/Throat:      Mouth: Mucous membranes are moist.   Eyes:      Extraocular Movements: Extraocular movements intact.      Pupils: Pupils are equal, round, and reactive to light.   Neck:      Vascular: No JVD.   Cardiovascular:      Rate and Rhythm: Regular rhythm. Tachycardia present.      Pulses: Normal pulses.      Heart sounds: Normal heart sounds.   Pulmonary:      Effort: Respiratory distress present.      Breath sounds: Examination of the right-lower field reveals decreased breath sounds. Examination of the left-lower field reveals decreased breath sounds. Decreased breath sounds and rales present.   Abdominal:      General: Bowel sounds are normal. There is no distension.      Palpations: Abdomen is soft.      Tenderness: There is no abdominal tenderness.   Musculoskeletal:         General: No deformity. Normal range of motion.      Cervical back: Neck supple.   Lymphadenopathy:      Cervical: No cervical adenopathy.   Skin:     General: Skin is warm and dry.      Capillary Refill: Capillary refill takes less than 2 seconds.      Findings: No erythema or rash.   Neurological:      General: No focal deficit present.      Mental Status: She is alert and oriented to person, place, and time.      Cranial Nerves: No cranial nerve deficit.      Sensory: No sensory deficit.      Motor: Weakness present.   Psychiatric:         Mood and Affect: Mood normal.         Significant Labs: All pertinent labs within  the past 24 hours have been reviewed.    Significant Imaging: I have reviewed all pertinent imaging results/findings within the past 24 hours.      Assessment/Plan:      * Community acquired pneumonia of right lung  - CXR showed opacities in right lung  - continue IV ceftriaxone and azithromycin   - followup blood cultures and sputum culture  - procal 0.19  - WBC on admit, 19.7 --> 15  - patient declined tessalon perles   - continue guaifenesin and nightly prn promethazine-codeine   - CBC daily       Severe sepsis  This patient does have evidence of infective focus  My overall impression is sepsis. Vital signs were reviewed and noted in progress note.  Antibiotics given-   Antibiotics (From admission, onward)            Start     Stop Route Frequency Ordered    02/15/22 0900  azithromycin tablet 500 mg         02/17 0859 Oral Daily 02/14/22 1819    02/15/22 0800  cefTRIAXone (ROCEPHIN) 1 g/50 mL D5W IVPB         02/19 0759 IV Daily 02/14/22 1819        Cultures were taken-   Microbiology Results (last 7 days)     Procedure Component Value Units Date/Time    Blood culture (site 2) [[id number]] Collected: 02/14/22 1840    Order Status: Completed Specimen: Blood from Peripheral, Hand, Right Updated: 02/15/22 0315     Blood Culture, Routine No Growth to date    Narrative:      Site # 2, aerobic only    Blood culture (site 1) [[id number]] Collected: 02/14/22 1840    Order Status: Completed Specimen: Blood from Peripheral, Forearm, Right Updated: 02/15/22 0315     Blood Culture, Routine No Growth to date    Narrative:      Site # 1, aerobic and anaerobic    Culture, Respiratory with Gram Stain [[id number]]     Order Status: No result Specimen: Sputum, Expectorated         Latest lactate reviewed, they are-  No results for input(s): LACTATE in the last 72 hours.    Organ dysfunction indicated by Acute respiratory failure  Source- pneumonia    Source control Achieved by IV antibiotics     PLAN:  - 3/4 SIRS (tachycardia,  "tachypnea, leukocytosis)  - continue IVF  - continue CAP management   - see "PNA"  - followup blood cultures and sputum cultures    Acute on chronic respiratory failure with hypoxia  Patient with Hypercapnic and Hypoxic Respiratory failure which is Acute on chronic.  she is on home oxygen at 2-3 LPM. Supplemental oxygen was provided and noted- Oxygen Concentration (%):  [36-40] 40.   Signs/symptoms of respiratory failure include- tachypnea, increased work of breathing, respiratory distress and wheezing. Contributing diagnoses includes - Aspiration, CHF, COPD, Pleural effusion and Pneumonia Labs and images were reviewed. Patient Has recent ABG, which has been reviewed. Will treat underlying causes and adjust management of respiratory failure as follows    PLAN:  - on 2-3 L O2 at home  - 2.5 L NC --> 4L NC.   - on 2/15, worsening hypoxia and given IV lasix, IV solumedrol, CXR unchanged, CTA chest negative for PE, troponin negative and TTE normal   - likely 2/2 to COPD exacerbation due to CAP  - xopenex q4h while awake  - CXR showed right sided PNA  - continue home inhalers  - continue guaifenesin   - Chest physiotherapy and bronchial hygiene  - see "CAP"  - VBG showed pCO2 59  - BiPAP HS  - outpatient pulmonary followup    COPD with acute exacerbation  - continue CAP treatment   - see "CAP"  - switched prednisone to Iv solumedrol due to worsening hypoxia to complete 5 days before transitioning back to oral prednisone   - acapella  - incentive spirometry  - O2 goal 88-92%  - continue home inhalers  - continue prn xopenex        VTE Risk Mitigation (From admission, onward)         Ordered     enoxaparin injection 40 mg  Daily         02/14/22 1817     IP VTE HIGH RISK PATIENT  Once         02/14/22 1817     Place sequential compression device  Until discontinued         02/14/22 1817                Discharge Planning   RENARD: 2/21/2022     Code Status: Full Code   Is the patient medically ready for discharge?: No    " Reason for patient still in hospital (select all that apply): Patient unstable, Patient trending condition, Laboratory test and Treatment  Discharge Plan A: Home with family          Time spent in care of patient: > 35 minutes           Gianluca Zarate MD  Department of Hospital Medicine   [facility]

## 2022-02-17 NOTE — ASSESSMENT & PLAN NOTE
- CXR showed opacities in right lung  - continue IV ceftriaxone and azithromycin   - followup blood cultures and sputum culture  - procal 0.19  - WBC on admit, 19.7 --> 15  - patient declined tessalon perles   - continue guaifenesin and nightly prn promethazine-codeine   - CBC daily

## 2022-02-17 NOTE — RESPIRATORY THERAPY
Pt O2 titrated from 6L HFNC to 5L NC to maintain O2 sat > 88%. Pt family member complains that pt should never be 8L of O2 because of COPD. I explain to family member we are aware of her disease but if pt needs more O2 to maintain sat levels we will do what is best for the pt at that time and titrate as tolerated.  Pt states she never wants to be placed on HFNC  Ever again. Pt current O2 sat on 5L NC 89%. Family and Nurse @ bedside

## 2022-02-17 NOTE — ASSESSMENT & PLAN NOTE
"Patient with Hypercapnic and Hypoxic Respiratory failure which is Acute on chronic.  she is on home oxygen at 2-3 LPM. Supplemental oxygen was provided and noted- Oxygen Concentration (%):  [36-40] 40.   Signs/symptoms of respiratory failure include- tachypnea, increased work of breathing, respiratory distress and wheezing. Contributing diagnoses includes - Aspiration, CHF, COPD, Pleural effusion and Pneumonia Labs and images were reviewed. Patient Has recent ABG, which has been reviewed. Will treat underlying causes and adjust management of respiratory failure as follows    PLAN:  - on 2-3 L O2 at home  - 2.5 L NC --> 4L NC.   - on 2/15, worsening hypoxia and given IV lasix, IV solumedrol, CXR unchanged, CTA chest negative for PE, troponin negative and TTE normal   - likely 2/2 to COPD exacerbation due to CAP  - xopenex q4h while awake  - CXR showed right sided PNA  - continue home inhalers  - continue guaifenesin   - Chest physiotherapy and bronchial hygiene  - see "CAP"  - VBG showed pCO2 59  - BiPAP HS  - outpatient pulmonary followup  "

## 2022-02-17 NOTE — PT/OT/SLP PROGRESS
Occupational Therapy      Patient Name:  Estefani Fam   MRN:  [id number]    Patient not seen today secondary to Other (Comment) (patient experiencing respiratory issue with increased need of supplemental oxygen, nursing recommends hold for today.). Will follow-up 02/18/22.    2/17/2022

## 2022-02-17 NOTE — PLAN OF CARE
Pt stable overnight without notable events. Respiratory condition unchanged with nonproductive cough. No c/o SHOB. Pt pleasant and involved with care.     Problem: Adult Inpatient Plan of Care  Goal: Plan of Care Review  Outcome: Ongoing, Progressing  Goal: Patient-Specific Goal (Individualized)  Outcome: Ongoing, Progressing  Goal: Absence of Hospital-Acquired Illness or Injury  Outcome: Ongoing, Progressing  Goal: Optimal Comfort and Wellbeing  Outcome: Ongoing, Progressing  Goal: Readiness for Transition of Care  Outcome: Ongoing, Progressing     Problem: Adjustment to Illness (Sepsis/Septic Shock)  Goal: Optimal Coping  Outcome: Ongoing, Progressing     Problem: Bleeding (Sepsis/Septic Shock)  Goal: Absence of Bleeding  Outcome: Ongoing, Progressing     Problem: Glycemic Control Impaired (Sepsis/Septic Shock)  Goal: Blood Glucose Level Within Desired Range  Outcome: Ongoing, Progressing     Problem: Infection Progression (Sepsis/Septic Shock)  Goal: Absence of Infection Signs and Symptoms  Outcome: Ongoing, Progressing     Problem: Nutrition Impaired (Sepsis/Septic Shock)  Goal: Optimal Nutrition Intake  Outcome: Ongoing, Progressing     Problem: Fluid Imbalance (Pneumonia)  Goal: Fluid Balance  Outcome: Ongoing, Progressing     Problem: Infection (Pneumonia)  Goal: Resolution of Infection Signs and Symptoms  Outcome: Ongoing, Progressing     Problem: Respiratory Compromise (Pneumonia)  Goal: Effective Oxygenation and Ventilation  Outcome: Ongoing, Progressing

## 2022-02-18 LAB
ANION GAP SERPL CALC-SCNC: 10 MMOL/L (ref 8–16)
BASOPHILS NFR BLD: 0 % (ref 0–1.9)
BUN SERPL-MCNC: 8 MG/DL (ref 8–23)
CALCIUM SERPL-MCNC: 8.8 MG/DL (ref 8.7–10.5)
CHLORIDE SERPL-SCNC: 97 MMOL/L (ref 95–110)
CO2 SERPL-SCNC: 32 MMOL/L (ref 23–29)
CREAT SERPL-MCNC: 0.5 MG/DL (ref 0.5–1.4)
DIFFERENTIAL METHOD: ABNORMAL
EOSINOPHIL NFR BLD: 1 % (ref 0–8)
ERYTHROCYTE [DISTWIDTH] IN BLOOD BY AUTOMATED COUNT: 14.6 % (ref 11.5–14.5)
EST. GFR  (AFRICAN AMERICAN): >60 ML/MIN/1.73 M^2
EST. GFR  (NON AFRICAN AMERICAN): >60 ML/MIN/1.73 M^2
GLUCOSE SERPL-MCNC: 98 MG/DL (ref 70–110)
HCT VFR BLD AUTO: 39.1 % (ref 37–48.5)
HGB BLD-MCNC: 12.1 G/DL (ref 12–16)
HYPOCHROMIA BLD QL SMEAR: ABNORMAL
IMM GRANULOCYTES # BLD AUTO: ABNORMAL K/UL (ref 0–0.04)
IMM GRANULOCYTES NFR BLD AUTO: ABNORMAL % (ref 0–0.5)
LYMPHOCYTES NFR BLD: 12 % (ref 18–48)
MAGNESIUM SERPL-MCNC: 2.1 MG/DL (ref 1.6–2.6)
MCH RBC QN AUTO: 29.7 PG (ref 27–31)
MCHC RBC AUTO-ENTMCNC: 30.9 G/DL (ref 32–36)
MCV RBC AUTO: 96 FL (ref 82–98)
METAMYELOCYTES NFR BLD MANUAL: 1 %
MONOCYTES NFR BLD: 5 % (ref 4–15)
MYELOCYTES NFR BLD MANUAL: 2 %
NEUTROPHILS NFR BLD: 79 % (ref 38–73)
NRBC BLD-RTO: 0 /100 WBC
PLATELET # BLD AUTO: 583 K/UL (ref 150–450)
PLATELET BLD QL SMEAR: ABNORMAL
PMV BLD AUTO: 8.9 FL (ref 9.2–12.9)
POIKILOCYTOSIS BLD QL SMEAR: SLIGHT
POLYCHROMASIA BLD QL SMEAR: ABNORMAL
POTASSIUM SERPL-SCNC: 3.6 MMOL/L (ref 3.5–5.1)
RBC # BLD AUTO: 4.07 M/UL (ref 4–5.4)
SODIUM SERPL-SCNC: 139 MMOL/L (ref 136–145)
WBC # BLD AUTO: 20.56 K/UL (ref 3.9–12.7)

## 2022-02-18 PROCEDURE — 25000242 PHARM REV CODE 250 ALT 637 W/ HCPCS: Performed by: INTERNAL MEDICINE

## 2022-02-18 PROCEDURE — 94640 AIRWAY INHALATION TREATMENT: CPT

## 2022-02-18 PROCEDURE — 80048 BASIC METABOLIC PNL TOTAL CA: CPT | Performed by: STUDENT IN AN ORGANIZED HEALTH CARE EDUCATION/TRAINING PROGRAM

## 2022-02-18 PROCEDURE — 83735 ASSAY OF MAGNESIUM: CPT | Performed by: STUDENT IN AN ORGANIZED HEALTH CARE EDUCATION/TRAINING PROGRAM

## 2022-02-18 PROCEDURE — 94761 N-INVAS EAR/PLS OXIMETRY MLT: CPT

## 2022-02-18 PROCEDURE — 11000001 HC ACUTE MED/SURG PRIVATE ROOM

## 2022-02-18 PROCEDURE — 27000646 HC AEROBIKA DEVICE

## 2022-02-18 PROCEDURE — 99232 SBSQ HOSP IP/OBS MODERATE 35: CPT | Mod: ,,, | Performed by: STUDENT IN AN ORGANIZED HEALTH CARE EDUCATION/TRAINING PROGRAM

## 2022-02-18 PROCEDURE — 85027 COMPLETE CBC AUTOMATED: CPT | Performed by: STUDENT IN AN ORGANIZED HEALTH CARE EDUCATION/TRAINING PROGRAM

## 2022-02-18 PROCEDURE — 85007 BL SMEAR W/DIFF WBC COUNT: CPT | Performed by: STUDENT IN AN ORGANIZED HEALTH CARE EDUCATION/TRAINING PROGRAM

## 2022-02-18 PROCEDURE — 63600175 PHARM REV CODE 636 W HCPCS: Performed by: INTERNAL MEDICINE

## 2022-02-18 PROCEDURE — 25000003 PHARM REV CODE 250: Performed by: INTERNAL MEDICINE

## 2022-02-18 PROCEDURE — 94799 UNLISTED PULMONARY SVC/PX: CPT

## 2022-02-18 PROCEDURE — 97110 THERAPEUTIC EXERCISES: CPT | Mod: CQ

## 2022-02-18 PROCEDURE — 94664 DEMO&/EVAL PT USE INHALER: CPT

## 2022-02-18 PROCEDURE — 97530 THERAPEUTIC ACTIVITIES: CPT | Mod: CQ

## 2022-02-18 PROCEDURE — 36415 COLL VENOUS BLD VENIPUNCTURE: CPT | Performed by: STUDENT IN AN ORGANIZED HEALTH CARE EDUCATION/TRAINING PROGRAM

## 2022-02-18 PROCEDURE — 27000221 HC OXYGEN, UP TO 24 HOURS

## 2022-02-18 PROCEDURE — 99232 PR SUBSEQUENT HOSPITAL CARE,LEVL II: ICD-10-PCS | Mod: ,,, | Performed by: STUDENT IN AN ORGANIZED HEALTH CARE EDUCATION/TRAINING PROGRAM

## 2022-02-18 PROCEDURE — 63600175 PHARM REV CODE 636 W HCPCS: Performed by: STUDENT IN AN ORGANIZED HEALTH CARE EDUCATION/TRAINING PROGRAM

## 2022-02-18 PROCEDURE — 97535 SELF CARE MNGMENT TRAINING: CPT

## 2022-02-18 PROCEDURE — 99900035 HC TECH TIME PER 15 MIN (STAT)

## 2022-02-18 PROCEDURE — 94760 N-INVAS EAR/PLS OXIMETRY 1: CPT

## 2022-02-18 RX ADMIN — GUAIFENESIN 400 MG: 100 SOLUTION ORAL at 11:02

## 2022-02-18 RX ADMIN — GUAIFENESIN 400 MG: 100 SOLUTION ORAL at 05:02

## 2022-02-18 RX ADMIN — PREDNISONE 40 MG: 20 TABLET ORAL at 09:02

## 2022-02-18 RX ADMIN — TIOTROPIUM BROMIDE INHALATION SPRAY 2 PUFF: 3.12 SPRAY, METERED RESPIRATORY (INHALATION) at 08:02

## 2022-02-18 RX ADMIN — OXYCODONE HYDROCHLORIDE AND ACETAMINOPHEN 500 MG: 500 TABLET ORAL at 08:02

## 2022-02-18 RX ADMIN — LEVALBUTEROL 1.25 MG: 1.25 SOLUTION, CONCENTRATE RESPIRATORY (INHALATION) at 02:02

## 2022-02-18 RX ADMIN — CEFTRIAXONE 1 G: 1 INJECTION, SOLUTION INTRAVENOUS at 10:02

## 2022-02-18 RX ADMIN — LEVALBUTEROL 1.25 MG: 1.25 SOLUTION, CONCENTRATE RESPIRATORY (INHALATION) at 08:02

## 2022-02-18 RX ADMIN — ENOXAPARIN SODIUM 40 MG: 100 INJECTION SUBCUTANEOUS at 05:02

## 2022-02-18 RX ADMIN — OXYCODONE HYDROCHLORIDE AND ACETAMINOPHEN 500 MG: 500 TABLET ORAL at 09:02

## 2022-02-18 RX ADMIN — FLUTICASONE FUROATE AND VILANTEROL TRIFENATATE 1 PUFF: 200; 25 POWDER RESPIRATORY (INHALATION) at 08:02

## 2022-02-18 RX ADMIN — PANTOPRAZOLE SODIUM 40 MG: 40 TABLET, DELAYED RELEASE ORAL at 09:02

## 2022-02-18 RX ADMIN — NASAL 2 SPRAY: 6.5 SPRAY NASAL at 08:02

## 2022-02-18 RX ADMIN — GUAIFENESIN 400 MG: 100 SOLUTION ORAL at 12:02

## 2022-02-18 RX ADMIN — CALCIUM CARBONATE (ANTACID) CHEW TAB 500 MG 500 MG: 500 CHEW TAB at 09:02

## 2022-02-18 RX ADMIN — THERA TABS 1 TABLET: TAB at 09:02

## 2022-02-18 RX ADMIN — NASAL 2 SPRAY: 6.5 SPRAY NASAL at 09:02

## 2022-02-18 NOTE — NURSING
0530 Arm swelling. IV fluids stopped.Informed pt IV needs to be taken out, pt's  ask to reinsert later, asked pt. what she would like, she states she'll have it done later. Will continue to monitor.

## 2022-02-18 NOTE — CONSULTS
Pulmonology/Critical Care Consult Note:    Chief complaint: CAP, hypoxia    HPI: Ms. Fam is a 72 yo F with severe COPD (GOLD D) on triple therapy and home 02 at 3L; well-known to our Pulmonary clinic. She was admitted on 2/14 for CAP and AEOCOPD, and is on day 5 of CAP coverage and steroid therapy. She is afebrile and 02 has been weaned down to 3.5L. CT Chest was done showing a large RLL consolidation, concerning for pneumonia. Of note, a CT chest was done in 5/2022 and did not have any masses or nodules in that area.       On my visit at bedside, Ms. Fam reports that she had pneumonia in 11/2021 and does not feel that she truly recovered since then, but she worsened the past week before admit, with increased coughing, SOB, and fevers. She denies any issues swallowing, and reports no particular coughing after meals. She reports that she is now feeling much better and is almost back to baseline, still somewhat SOB. Exam is significant for decreased breath sounds, crackles to right middle and lower lung zones, and some pursed-lip breathing.    Objective:  Vitals: reviewed  General: Sitting up in bed, well-appearing and conversant  HEENT: nonicteric, MMM. Some pursed-lip breathing  CV: RRR, no murmurs, trace pedal edema  Resp: diffusely soft air movement, scattered wheezing, no accessory muscle use, focal crackles to right middle zone and right lower lung, no clubbing, 02 at 3.5L  Abd: soft, NTTP  Extr: scattered bruising on all extremities    Labs: Impression: increase in leukocytosis on steroids, no anemia, some thrombocytosis, notable elevation in bicarb suggesting chronic retention of CO2, hypoalbuminemia    Imaging:  CTA Chest 2/15/22:Impression:  1.  No CT evidence of new pulmonary thromboembolic disease with good opacification of the pulmonary arteries.    2.  Right lower lobe consolidation, concerning for lobar pneumonia.    3.  Prominent to mildly enlarged subcarinal and paraesophageal lymph nodes.     4.  Redemonstrated background of severe bullous emphysema.    Echo: 2/15/22:  · The left ventricle is normal in size with normal systolic function.  · The estimated ejection fraction is 65%.  · Normal left ventricular diastolic function.  · Tachycardia was present during the study  · Normal right ventricular size with normal right ventricular systolic function.  · Mild tricuspid regurgitation.  · There are segmental left ventricular wall motion abnormalities.  · Mild left atrial enlargement.  · Normal central venous pressure (3 mmHg).  · The estimated PA systolic pressure is 28 mmHg.    Assessment:  1. Acute on chronic hypoxic respiratory failure; resolving  2. COPD exacerbation;resolving  3. CAP    Plan/Recommendations:  -continue scheduled inhalers with LABA/LAMA/ICS; agree with scheduled VENKATA while in exacerbation  -agree with abx course as well as 5 days steroids for COPD  -much improved, appears to be on close to home oxygen levels  -would consider suppressive/preventive therapy with either azithromycin or roflumilast; will discuss with attending prior to final recs.  -will need follow-up in Pulm clinic after DC    Thank you for the consult. We will continue to follow along with you. Will discuss with attending Dr. Castaneda in AM    Milena Selby MD  PCCM fellow

## 2022-02-18 NOTE — ASSESSMENT & PLAN NOTE
"Patient with Hypercapnic and Hypoxic Respiratory failure which is Acute on chronic.  She is on home oxygen at 2-3 LPM. Supplemental oxygen was provided and noted-  .   Signs/symptoms of respiratory failure include- tachypnea, increased work of breathing, respiratory distress and wheezing. Contributing diagnoses includes - Aspiration, CHF, COPD, Pleural effusion and Pneumonia Labs and images were reviewed. Patient Has recent ABG, which has been reviewed. Will treat underlying causes and adjust management of respiratory failure as follows    PLAN:  - On 2-3 L O2 at home  - 2.5 L NC --> 9L NC.   - On 2/15, worsening hypoxia and given IV lasix, IV solumedrol, CXR unchanged, CTA chest negative for PE, troponin negative and TTE normal   - Likely 2/2 to COPD exacerbation due to CAP  - Xopenex q4h while awake  - CXR showed right sided PNA  - Continue home inhalers  - Continue guaifenesin   - Chest physiotherapy and bronchial hygiene  - see "CAP"  - VBG showed pCO2 59  - BiPAP HS  - Outpatient pulmonary followup  "

## 2022-02-18 NOTE — ASSESSMENT & PLAN NOTE
"- 3/4 SIRS (tachycardia, tachypnea, leukocytosis)  - Discontinue IVF  - Continue CAP management   - see "PNA"  - Followup blood cultures and sputum cultures  "

## 2022-02-18 NOTE — ASSESSMENT & PLAN NOTE
- CXR showed opacities in right lung  - Continue IV ceftriaxone   - S/p azithromycin   - Followup blood cultures and sputum culture  - Procal 0.19  - WBC on admit, 19.7 --> 15  - Patient declined tessalon perles   - Continue guaifenesin and nightly prn promethazine-codeine   - CBC daily

## 2022-02-18 NOTE — ASSESSMENT & PLAN NOTE
"- Continue CAP treatment   - See "CAP"  - Switched prednisone to Iv solumedrol due to worsening hypoxia to complete 5 days before transitioning back to oral prednisone   - Continue prednisone 40mg daily for total 5 days steroids  - Acapella  - Incentive spirometry  - O2 goal 88-92%  - Continue home inhalers  - Continue prn xopenex    "

## 2022-02-18 NOTE — SUBJECTIVE & OBJECTIVE
Interval History:   Patient with increased oxygen requirement overnight. Patient reports discomfort with higher flow of oxygen of 8L. Reports she feels breathing is on correct tract this morning. Denies dyspnea, fever, and chest pain.    Review of Systems   Constitutional: Positive for activity change, fatigue and fever. Negative for chills.   HENT: Negative for congestion and trouble swallowing.    Eyes: Negative for visual disturbance.        Vision loss (chronic)   Respiratory: Positive for cough, chest tightness and shortness of breath.    Cardiovascular: Negative for chest pain and leg swelling.   Gastrointestinal: Negative for abdominal distention, abdominal pain, nausea and vomiting.   Endocrine: Negative for cold intolerance, heat intolerance, polydipsia and polyuria.   Genitourinary: Negative for difficulty urinating and dysuria.   Musculoskeletal: Negative for back pain and myalgias.   Skin: Negative for rash and wound.   Neurological: Positive for weakness. Negative for dizziness and light-headedness.   Hematological: Negative for adenopathy. Does not bruise/bleed easily.   Psychiatric/Behavioral: Negative for confusion and sleep disturbance.     Objective:     Vital Signs (Most Recent):  Temp: 97.3 °F (36.3 °C) (02/17/22 1948)  Pulse: 91 (02/17/22 1948)  Resp: 20 (02/17/22 1948)  BP: (!) 140/80 (02/17/22 1948)  SpO2: (!) 92 % (02/17/22 1948) Vital Signs (24h Range):  Temp:  [97.1 °F (36.2 °C)-98.5 °F (36.9 °C)] 97.3 °F (36.3 °C)  Pulse:  [] 91  Resp:  [16-22] 20  SpO2:  [83 %-97 %] 92 %  BP: (135-161)/(67-80) 140/80     Weight: 70.8 kg (156 lb)  Body mass index is 23.72 kg/m².  No intake or output data in the 24 hours ending 02/17/22 1955   Physical Exam  Constitutional:       General: She is not in acute distress.     Appearance: She is well-developed. She is ill-appearing.   HENT:      Head: Normocephalic and atraumatic.      Mouth/Throat:      Mouth: Mucous membranes are moist.   Eyes:       Extraocular Movements: Extraocular movements intact.      Pupils: Pupils are equal, round, and reactive to light.   Neck:      Vascular: No JVD.   Cardiovascular:      Rate and Rhythm: Regular rhythm. Tachycardia present.      Pulses: Normal pulses.      Heart sounds: Normal heart sounds.   Pulmonary:      Effort: No respiratory distress.      Breath sounds: Examination of the right-lower field reveals decreased breath sounds. Examination of the left-lower field reveals decreased breath sounds. Decreased breath sounds and rales present. No wheezing.   Abdominal:      General: Bowel sounds are normal. There is no distension.      Palpations: Abdomen is soft.      Tenderness: There is no abdominal tenderness.   Musculoskeletal:         General: No deformity. Normal range of motion.      Cervical back: Neck supple.   Lymphadenopathy:      Cervical: No cervical adenopathy.   Skin:     General: Skin is warm and dry.      Capillary Refill: Capillary refill takes less than 2 seconds.      Findings: No erythema or rash.   Neurological:      General: No focal deficit present.      Mental Status: She is alert and oriented to person, place, and time.      Cranial Nerves: No cranial nerve deficit.      Sensory: No sensory deficit.      Motor: Weakness present.   Psychiatric:         Mood and Affect: Mood normal.         Significant Labs:   BMP:   Recent Labs   Lab 02/17/22 0221   *      K 3.8   CL 99   CO2 36*   BUN 20   CREATININE 0.5   CALCIUM 8.4*   MG 2.1     CBC:   Recent Labs   Lab 02/16/22 0255 02/17/22 0221   WBC 15.58* 15.43*   HGB 10.9* 11.4*   HCT 35.8* 37.7   * 530*       Significant Imaging: I have reviewed all pertinent imaging results/findings within the past 24 hours.

## 2022-02-18 NOTE — PROGRESS NOTES
[facility] Medicine  Progress Note    Patient Name: Estefani Fam  MRN: [id number]  Patient Class: IP- Inpatient   Admission Date: 2/14/2022  Length of Stay: 3 days  Attending Physician: Tremaine Barajas MD  Primary Care Provider: Miles Jackson MD        Subjective:     Principal Problem:Community acquired pneumonia of right lung        HPI:  Ms. Fam is a 71 year old woman with PMH of COPD, chronic respiratory failure on 2-3L home O2, and prior COVID infection who presented to [facility] via [place] for one day history  of shortness of breath at home. In the ED, patient was hypoxic to 84% upon arrival. Patient reports productive cough and pleuritic chest pain. She denies exertional chest pain, diaphoresis, arm pain. At home, she takes home inhalers of Breo Ellipta and Incruse Ellipta and Azithromycin 3 times weekly to help prevent exacerbations in the future.    She was previously hospitalized 11/2021 for COPD exacerbation 2/2 CAP. She was started on oral antibiotics and IV steroids. Patient clinically improved and discharged on 7 day course of levofloxacin and steroid taper. She also was discharged on Roflumilast and outpatient followup with her Pulmonologist Dr. Brooklynn Ruiz.     Past Medical History  She has a past medical history of Cataract, COPD (chronic obstructive pulmonary disease), History of COVID-19, History of retinal hemorrhage, Pathological fracture due to osteoporosis, Retinal histoplasmosis, and Secondary pulmonary arterial hypertension.     Past Surgical History  She has a past surgical history that includes Hand surgery and Bronchoscopy with fluoroscopy (10/28/2019).     Family History  Her family history includes Colon cancer in her father and mother; Macular degeneration in her mother; Stroke in her father.     Social History  She reports that she quit smoking about 5 years ago. Her smoking use included cigarettes. She has a 36.00 pack-year smoking history. She has never used  smokeless tobacco. She reports current alcohol use of about 2.0 standard drinks of alcohol per week. She reports that she does not use drugs.     Allergies  She is allergic to albuterol and ipratropium analogues.      Overview/Hospital Course:  No notes on file    Interval History:   Patient with increased oxygen requirement overnight. Patient reports discomfort with higher flow of oxygen of 8L. Reports she feels breathing is on correct tract this morning. Denies dyspnea, fever, and chest pain.    Review of Systems   Constitutional: Positive for activity change, fatigue and fever. Negative for chills.   HENT: Negative for congestion and trouble swallowing.    Eyes: Negative for visual disturbance.        Vision loss (chronic)   Respiratory: Positive for cough, chest tightness and shortness of breath.    Cardiovascular: Negative for chest pain and leg swelling.   Gastrointestinal: Negative for abdominal distention, abdominal pain, nausea and vomiting.   Endocrine: Negative for cold intolerance, heat intolerance, polydipsia and polyuria.   Genitourinary: Negative for difficulty urinating and dysuria.   Musculoskeletal: Negative for back pain and myalgias.   Skin: Negative for rash and wound.   Neurological: Positive for weakness. Negative for dizziness and light-headedness.   Hematological: Negative for adenopathy. Does not bruise/bleed easily.   Psychiatric/Behavioral: Negative for confusion and sleep disturbance.     Objective:     Vital Signs (Most Recent):  Temp: 97.3 °F (36.3 °C) (02/17/22 1948)  Pulse: 91 (02/17/22 1948)  Resp: 20 (02/17/22 1948)  BP: (!) 140/80 (02/17/22 1948)  SpO2: (!) 92 % (02/17/22 1948) Vital Signs (24h Range):  Temp:  [97.1 °F (36.2 °C)-98.5 °F (36.9 °C)] 97.3 °F (36.3 °C)  Pulse:  [] 91  Resp:  [16-22] 20  SpO2:  [83 %-97 %] 92 %  BP: (135-161)/(67-80) 140/80     Weight: 70.8 kg (156 lb)  Body mass index is 23.72 kg/m².  No intake or output data in the 24 hours ending 02/17/22 1955    Physical Exam  Constitutional:       General: She is not in acute distress.     Appearance: She is well-developed. She is ill-appearing.   HENT:      Head: Normocephalic and atraumatic.      Mouth/Throat:      Mouth: Mucous membranes are moist.   Eyes:      Extraocular Movements: Extraocular movements intact.      Pupils: Pupils are equal, round, and reactive to light.   Neck:      Vascular: No JVD.   Cardiovascular:      Rate and Rhythm: Regular rhythm. Tachycardia present.      Pulses: Normal pulses.      Heart sounds: Normal heart sounds.   Pulmonary:      Effort: No respiratory distress.      Breath sounds: Examination of the right-lower field reveals decreased breath sounds. Examination of the left-lower field reveals decreased breath sounds. Decreased breath sounds and rales present. No wheezing.   Abdominal:      General: Bowel sounds are normal. There is no distension.      Palpations: Abdomen is soft.      Tenderness: There is no abdominal tenderness.   Musculoskeletal:         General: No deformity. Normal range of motion.      Cervical back: Neck supple.   Lymphadenopathy:      Cervical: No cervical adenopathy.   Skin:     General: Skin is warm and dry.      Capillary Refill: Capillary refill takes less than 2 seconds.      Findings: No erythema or rash.   Neurological:      General: No focal deficit present.      Mental Status: She is alert and oriented to person, place, and time.      Cranial Nerves: No cranial nerve deficit.      Sensory: No sensory deficit.      Motor: Weakness present.   Psychiatric:         Mood and Affect: Mood normal.         Significant Labs:   BMP:   Recent Labs   Lab 02/17/22 0221   *      K 3.8   CL 99   CO2 36*   BUN 20   CREATININE 0.5   CALCIUM 8.4*   MG 2.1     CBC:   Recent Labs   Lab 02/16/22  0255 02/17/22 0221   WBC 15.58* 15.43*   HGB 10.9* 11.4*   HCT 35.8* 37.7   * 530*       Significant Imaging: I have reviewed all pertinent imaging  "results/findings within the past 24 hours.      Assessment/Plan:      * Community acquired pneumonia of right lung  - CXR showed opacities in right lung  - Continue IV ceftriaxone   - S/p azithromycin   - Followup blood cultures and sputum culture  - Procal 0.19  - WBC on admit, 19.7 --> 15  - Patient declined tessalon perles   - Continue guaifenesin and nightly prn promethazine-codeine   - CBC daily       Severe sepsis  - 3/4 SIRS (tachycardia, tachypnea, leukocytosis)  - Discontinue IVF  - Continue CAP management   - see "PNA"  - Followup blood cultures and sputum cultures    Acute on chronic respiratory failure with hypoxia  Patient with Hypercapnic and Hypoxic Respiratory failure which is Acute on chronic.  She is on home oxygen at 2-3 LPM. Supplemental oxygen was provided and noted-  .   Signs/symptoms of respiratory failure include- tachypnea, increased work of breathing, respiratory distress and wheezing. Contributing diagnoses includes - Aspiration, CHF, COPD, Pleural effusion and Pneumonia Labs and images were reviewed. Patient Has recent ABG, which has been reviewed. Will treat underlying causes and adjust management of respiratory failure as follows    PLAN:  - On 2-3 L O2 at home  - 2.5 L NC --> 9L NC.   - On 2/15, worsening hypoxia and given IV lasix, IV solumedrol, CXR unchanged, CTA chest negative for PE, troponin negative and TTE normal   - Likely 2/2 to COPD exacerbation due to CAP  - Xopenex q4h while awake  - CXR showed right sided PNA  - Continue home inhalers  - Continue guaifenesin   - Chest physiotherapy and bronchial hygiene  - see "CAP"  - VBG showed pCO2 59  - BiPAP HS  - Outpatient pulmonary followup    COPD with acute exacerbation  - Continue CAP treatment   - See "CAP"  - Switched prednisone to Iv solumedrol due to worsening hypoxia to complete 5 days before transitioning back to oral prednisone   - Continue prednisone 40mg daily for total 5 days steroids  - Acapella  - Incentive " spirometry  - O2 goal 88-92%  - Continue home inhalers  - Continue prn xopenex        VTE Risk Mitigation (From admission, onward)         Ordered     enoxaparin injection 40 mg  Daily         02/14/22 1817     IP VTE HIGH RISK PATIENT  Once         02/14/22 1817     Place sequential compression device  Until discontinued         02/14/22 1817                Discharge Planning   RENARD: 2/21/2022     Code Status: Full Code   Is the patient medically ready for discharge?: No    Reason for patient still in hospital (select all that apply): Patient trending condition, Laboratory test, Treatment, PT / OT recommendations and Pending disposition  Discharge Plan A: Home with family                  Tremaine Barajas MD  Department of Hospital Medicine   [facility] Surg

## 2022-02-19 LAB
ANION GAP SERPL CALC-SCNC: 13 MMOL/L (ref 8–16)
BACTERIA BLD CULT: NORMAL
BACTERIA BLD CULT: NORMAL
BUN SERPL-MCNC: 8 MG/DL (ref 8–23)
CALCIUM SERPL-MCNC: 8.8 MG/DL (ref 8.7–10.5)
CHLORIDE SERPL-SCNC: 100 MMOL/L (ref 95–110)
CO2 SERPL-SCNC: 26 MMOL/L (ref 23–29)
CREAT SERPL-MCNC: 0.5 MG/DL (ref 0.5–1.4)
EST. GFR  (AFRICAN AMERICAN): >60 ML/MIN/1.73 M^2
EST. GFR  (NON AFRICAN AMERICAN): >60 ML/MIN/1.73 M^2
GLUCOSE SERPL-MCNC: 75 MG/DL (ref 70–110)
MAGNESIUM SERPL-MCNC: 2.1 MG/DL (ref 1.6–2.6)
POTASSIUM SERPL-SCNC: 4.4 MMOL/L (ref 3.5–5.1)
SODIUM SERPL-SCNC: 139 MMOL/L (ref 136–145)

## 2022-02-19 PROCEDURE — 94640 AIRWAY INHALATION TREATMENT: CPT

## 2022-02-19 PROCEDURE — 27000221 HC OXYGEN, UP TO 24 HOURS

## 2022-02-19 PROCEDURE — 83735 ASSAY OF MAGNESIUM: CPT | Performed by: STUDENT IN AN ORGANIZED HEALTH CARE EDUCATION/TRAINING PROGRAM

## 2022-02-19 PROCEDURE — 99232 PR SUBSEQUENT HOSPITAL CARE,LEVL II: ICD-10-PCS | Mod: ,,, | Performed by: STUDENT IN AN ORGANIZED HEALTH CARE EDUCATION/TRAINING PROGRAM

## 2022-02-19 PROCEDURE — 80048 BASIC METABOLIC PNL TOTAL CA: CPT | Performed by: STUDENT IN AN ORGANIZED HEALTH CARE EDUCATION/TRAINING PROGRAM

## 2022-02-19 PROCEDURE — 99232 SBSQ HOSP IP/OBS MODERATE 35: CPT | Mod: ,,, | Performed by: STUDENT IN AN ORGANIZED HEALTH CARE EDUCATION/TRAINING PROGRAM

## 2022-02-19 PROCEDURE — 27000646 HC AEROBIKA DEVICE

## 2022-02-19 PROCEDURE — 94761 N-INVAS EAR/PLS OXIMETRY MLT: CPT

## 2022-02-19 PROCEDURE — 63600175 PHARM REV CODE 636 W HCPCS: Performed by: INTERNAL MEDICINE

## 2022-02-19 PROCEDURE — 94664 DEMO&/EVAL PT USE INHALER: CPT

## 2022-02-19 PROCEDURE — 36415 COLL VENOUS BLD VENIPUNCTURE: CPT | Performed by: STUDENT IN AN ORGANIZED HEALTH CARE EDUCATION/TRAINING PROGRAM

## 2022-02-19 PROCEDURE — 94760 N-INVAS EAR/PLS OXIMETRY 1: CPT

## 2022-02-19 PROCEDURE — 99900035 HC TECH TIME PER 15 MIN (STAT)

## 2022-02-19 PROCEDURE — 11000001 HC ACUTE MED/SURG PRIVATE ROOM

## 2022-02-19 PROCEDURE — 25000242 PHARM REV CODE 250 ALT 637 W/ HCPCS: Performed by: INTERNAL MEDICINE

## 2022-02-19 PROCEDURE — 25000003 PHARM REV CODE 250: Performed by: INTERNAL MEDICINE

## 2022-02-19 RX ADMIN — GUAIFENESIN 400 MG: 100 SOLUTION ORAL at 06:02

## 2022-02-19 RX ADMIN — LEVALBUTEROL 1.25 MG: 1.25 SOLUTION, CONCENTRATE RESPIRATORY (INHALATION) at 08:02

## 2022-02-19 RX ADMIN — OXYCODONE HYDROCHLORIDE AND ACETAMINOPHEN 500 MG: 500 TABLET ORAL at 09:02

## 2022-02-19 RX ADMIN — OXYCODONE HYDROCHLORIDE AND ACETAMINOPHEN 500 MG: 500 TABLET ORAL at 08:02

## 2022-02-19 RX ADMIN — GUAIFENESIN 400 MG: 100 SOLUTION ORAL at 12:02

## 2022-02-19 RX ADMIN — FLUTICASONE FUROATE AND VILANTEROL TRIFENATATE 1 PUFF: 200; 25 POWDER RESPIRATORY (INHALATION) at 08:02

## 2022-02-19 RX ADMIN — NASAL 2 SPRAY: 6.5 SPRAY NASAL at 09:02

## 2022-02-19 RX ADMIN — LEVALBUTEROL 1.25 MG: 1.25 SOLUTION, CONCENTRATE RESPIRATORY (INHALATION) at 02:02

## 2022-02-19 RX ADMIN — TIOTROPIUM BROMIDE INHALATION SPRAY 2 PUFF: 3.12 SPRAY, METERED RESPIRATORY (INHALATION) at 08:02

## 2022-02-19 RX ADMIN — DOCUSATE SODIUM 50 MG AND SENNOSIDES 8.6 MG 1 TABLET: 8.6; 5 TABLET, FILM COATED ORAL at 04:02

## 2022-02-19 RX ADMIN — PANTOPRAZOLE SODIUM 40 MG: 40 TABLET, DELAYED RELEASE ORAL at 09:02

## 2022-02-19 RX ADMIN — ENOXAPARIN SODIUM 40 MG: 100 INJECTION SUBCUTANEOUS at 04:02

## 2022-02-19 RX ADMIN — LEVALBUTEROL 1.25 MG: 1.25 SOLUTION, CONCENTRATE RESPIRATORY (INHALATION) at 07:02

## 2022-02-19 RX ADMIN — CALCIUM CARBONATE (ANTACID) CHEW TAB 500 MG 500 MG: 500 CHEW TAB at 09:02

## 2022-02-19 RX ADMIN — GUAIFENESIN 400 MG: 100 SOLUTION ORAL at 11:02

## 2022-02-19 RX ADMIN — THERA TABS 1 TABLET: TAB at 09:02

## 2022-02-19 RX ADMIN — NASAL 2 SPRAY: 6.5 SPRAY NASAL at 08:02

## 2022-02-19 RX ADMIN — GUAIFENESIN 400 MG: 100 SOLUTION ORAL at 05:02

## 2022-02-19 NOTE — PROGRESS NOTES
[facility] Medicine  Progress Note    Patient Name: Estefani Fam  MRN: [id number]  Patient Class: IP- Inpatient   Admission Date: 2/14/2022  Length of Stay: 5 days  Attending Physician: Tremaine Barajas MD  Primary Care Provider: Miles Jackson MD        Subjective:     Principal Problem:Community acquired pneumonia of right lung        HPI:  Ms. Fam is a 71 year old woman with PMH of COPD, chronic respiratory failure on 2-3L home O2, and prior COVID infection who [place] [facility] [place] for one day history  of shortness of breath at home. In the ED, patient was hypoxic to 84% upon arrival. Patient reports productive cough and pleuritic chest pain. She denies exertional chest pain, diaphoresis, arm pain. At home, she takes home inhalers of Breo Ellipta and Incruse Ellipta and Azithromycin 3 times weekly to help prevent exacerbations in the future.    She was previously hospitalized 11/2021 for COPD exacerbation 2/2 CAP. She was started on oral antibiotics and IV steroids. Patient clinically improved and discharged on 7 day course of levofloxacin and steroid taper. She also was discharged on Roflumilast and outpatient followup with her Pulmonologist Dr. Brooklynn Ruiz.      Overview/Hospital Course:  No notes on file    Interval History:   No events overnight. Patient continues to note breathing slightly improved. Continues to report cough and some dyspnea. Continue pulm toilet. Finished 5 days of CTX on yesterday for PNA and prednisone for COPD. On 4L NC.      Review of Systems   Constitutional:  Positive for activity change and fatigue. Negative for chills and fever.   HENT:  Negative for congestion and trouble swallowing.    Eyes:  Negative for visual disturbance.        Vision loss (chronic)   Respiratory:  Positive for cough, chest tightness and shortness of breath.    Cardiovascular:  Negative for chest pain and leg swelling.   Gastrointestinal:  Negative for abdominal distention,  abdominal pain, nausea and vomiting.   Endocrine: Negative for cold intolerance, heat intolerance, polydipsia and polyuria.   Genitourinary:  Negative for difficulty urinating and dysuria.   Musculoskeletal:  Negative for back pain and myalgias.   Skin:  Negative for rash and wound.   Neurological:  Positive for weakness. Negative for dizziness and light-headedness.   Hematological:  Negative for adenopathy. Does not bruise/bleed easily.   Psychiatric/Behavioral:  Negative for confusion and sleep disturbance.    Objective:     Vital Signs (Most Recent):  Temp: 96.2 °F (35.7 °C) (02/19/22 1057)  Pulse: 100 (02/19/22 1057)  Resp: 19 (02/19/22 0829)  BP: 131/62 (02/19/22 1057)  SpO2: (!) 92 % (02/19/22 1057)   Vital Signs (24h Range):  Temp:  [96.2 °F (35.7 °C)-97.9 °F (36.6 °C)] 96.2 °F (35.7 °C)  Pulse:  [] 100  Resp:  [16-20] 19  SpO2:  [90 %-96 %] 92 %  BP: (126-158)/(62-86) 131/62     Weight: 72 kg (158 lb 11.7 oz)  Body mass index is 24.14 kg/m².  No intake or output data in the 24 hours ending 02/19/22 1355   Physical Exam  Constitutional:       General: She is not in acute distress.     Appearance: She is well-developed. She is ill-appearing.   HENT:      Head: Normocephalic and atraumatic.      Mouth/Throat:      Mouth: Mucous membranes are moist.   Eyes:      Extraocular Movements: Extraocular movements intact.   Neck:      Vascular: No JVD.   Cardiovascular:      Rate and Rhythm: Regular rhythm. Tachycardia present.      Pulses: Normal pulses.      Heart sounds: Normal heart sounds.   Pulmonary:      Effort: No respiratory distress.      Breath sounds: Decreased breath sounds and rales present. No wheezing.   Abdominal:      General: Bowel sounds are normal. There is no distension.      Palpations: Abdomen is soft.      Tenderness: There is no abdominal tenderness.   Musculoskeletal:         General: No deformity. Normal range of motion.      Cervical back: Neck supple.   Lymphadenopathy:       "Cervical: No cervical adenopathy.   Skin:     General: Skin is warm and dry.      Findings: No erythema or rash.   Neurological:      General: No focal deficit present.      Mental Status: She is alert and oriented to person, place, and time.      Cranial Nerves: No cranial nerve deficit.      Sensory: No sensory deficit.      Motor: Weakness present.   Psychiatric:         Mood and Affect: Mood normal.       Significant Labs: All pertinent labs within the past 24 hours have been reviewed.  BMP:   Recent Labs   Lab 02/19/22  0505   GLU 75      K 4.4      CO2 26   BUN 8   CREATININE 0.5   CALCIUM 8.8   MG 2.1     CBC:   Recent Labs   Lab 02/18/22  0837   WBC 20.56*   HGB 12.1   HCT 39.1   *       Significant Imaging: I have reviewed all pertinent imaging results/findings within the past 24 hours.      Assessment/Plan:      * Community acquired pneumonia of right lung  - CXR showed opacities in right lung  - s/p 5 days of Ceftriaxone on 2/18  - s/p 3 days azithromycin on 2/16  - Blood cultures NGTD  - Pulmonary consulted  -- Following up recs  - Sputum culture ordered  - Procal 0.19  - Continue guaifenesin and nightly prn promethazine-codeine   - CBC daily   - 4L NC (home 2-3L)    COPD with acute exacerbation  - Continue CAP treatment   - See "CAP"  - Switched prednisone to Iv solumedrol due to worsening hypoxia to complete 5 days before transitioning back to oral prednisone   - S/p 5 days of steroids on 2/18  - Acapella  - Incentive spirometry  - O2 goal 88-92%  - Continue home inhalers  - Continue prn xopenex    Severe sepsis  - 3/4 SIRS (tachycardia, tachypnea, leukocytosis)  - s/p IVF  - See CAP management   - Improved      Acute on chronic respiratory failure with hypoxia  Patient with Hypercapnic and Hypoxic Respiratory failure which is Acute on chronic.  She is on home oxygen at 2-3 LPM. Supplemental oxygen was provided and noted- Oxygen Concentration (%):  [40] 40.   Signs/symptoms of " "respiratory failure include- tachypnea, increased work of breathing, respiratory distress and wheezing. Contributing diagnoses includes - Aspiration, CHF, COPD, Pleural effusion and Pneumonia Labs and images were reviewed. - VBG showed pCO2 59. Patient Has recent ABG, which has been reviewed. Will treat underlying causes and adjust management of respiratory failure as follows    PLAN:  - On 2-3 L O2 at home  - 2.5 L NC --> 4L NC.   - On 2/15, worsening hypoxia and given IV lasix, IV solumedrol, CXR unchanged, CTA chest negative for PE, troponin negative and TTE normal   - Likely 2/2 to COPD exacerbation due to CAP  - Xopenex q4h while awake  - CXR showed right sided PNA  - Continue home inhalers  - Continue guaifenesin   - Chest physiotherapy and bronchial hygiene  - See "CAP"  - BiPAP HS  - Outpatient pulmonary followup      VTE Risk Mitigation (From admission, onward)         Ordered     enoxaparin injection 40 mg  Daily         02/14/22 1817     IP VTE HIGH RISK PATIENT  Once         02/14/22 1817     Place sequential compression device  Until discontinued         02/14/22 1817                Discharge Planning   RENARD: 2/21/2022     Code Status: Full Code   Is the patient medically ready for discharge?: No    Reason for patient still in hospital (select all that apply): Patient trending condition, Laboratory test, Consult recommendations and Pending disposition  Discharge Plan A: Home with family                  Tremaine Barajas MD  Department of Hospital Medicine   [facility] - Med Surg    "

## 2022-02-19 NOTE — ASSESSMENT & PLAN NOTE
"- Continue CAP treatment   - See "CAP"  - Switched prednisone to Iv solumedrol due to worsening hypoxia to complete 5 days before transitioning back to oral prednisone   - S/p 5 days of steroids on 2/18  - Acapella  - Incentive spirometry  - O2 goal 88-92%  - Continue home inhalers  - Continue prn xopenex  "

## 2022-02-19 NOTE — RESPIRATORY THERAPY
RAPID RESPONSE RESPIRATORY THERAPY PROACTIVE ROUNDING NOTE             Time of visit: 07     Code Status: Full Code   : 1950  Bed: 648/648 A:   MRN: [id number]  Time spent at the bedside: < 15 min    SITUATION    Evaluated patient for: HFNC Compliance     BACKGROUND    Patient has a past medical history of Cataract, COPD (chronic obstructive pulmonary disease), COVID-19, History of COVID-19, History of retinal hemorrhage, Pathological fracture due to osteoporosis, Retinal histoplasmosis, and Secondary pulmonary arterial hypertension.    24 Hours Vitals Range:  Temp:  [96.4 °F (35.8 °C)-97.9 °F (36.6 °C)]   Pulse:  []   Resp:  [16-20]   BP: (130-158)/(63-78)   SpO2:  [90 %-96 %]     Labs:    Recent Labs     22  0221 22  0837 22  0505    139 139   K 3.8 3.6 4.4   CL 99 97 100   CO2 36* 32* 26   CREATININE 0.5 0.5 0.5   * 98 75   PHOS 2.3*  --   --    MG 2.1 2.1 2.1        No results for input(s): PH, PCO2, PO2, HCO3, POCSATURATED, BE in the last 72 hours.    ASSESSMENT/INTERVENTIONS    Pt on 4L NC w/ humidification. Keep SPO2 88-92.  Modified oxygen order to match pt    Last VS   Temp: 96.4 °F (35.8 °C) (411)  Pulse: 92 (411)  Resp: 18 (411)  BP: 158/78 (411)  SpO2: 91 % (411)    Level of Consciousness: Level of Consciousness (AVPU): alert  Respiratory Effort: Respiratory Effort: Normal, Unlabored Expansion/Accessory Muscle Usage: Expansion/Accessory Muscles/Retractions: expansion symmetric, no retractions, no use of accessory muscles  All Lung Field Breath Sounds: All Lung Fields Breath Sounds: diminished  O2 Device/Concentration: Flow (L/min): 4, Oxygen Concentration (%): 40, O2 Device (Oxygen Therapy): nasal cannula w/ humidification  Was the O2 device able to be weaned? No  Ambu at bedside: Ambu bag with the patient?: Yes, Adult Ambu    Active Orders   Respiratory Care    ACAPELLA TREATMENT Q8H     Frequency: Q8H     Number of  Occurrences: Until Specified    Chest physiotherapy BID     Frequency: BID     Number of Occurrences: Until Specified     Order Questions:      Indications: ATELECTASIS NONRESPONSIVE TO TX    Inhalation Treatment Q6H WAKE     Frequency: Q6H WAKE     Number of Occurrences: Until Specified    Oxygen Continuous     Frequency: Continuous     Number of Occurrences: Until Specified     Order Questions:      Device type: Low flow      Device: Nasal Cannula-Humidified      Titrate O2 per Oxygen Titration Protocol: Yes      To maintain SpO2 goal of: Other      SpO2 goal: 88-92      Notify MD of: Inability to achieve desired SpO2; Sudden change in patient status and requires 20% increase in FiO2; Patient requires >60% FiO2    Peak flow Q8H     Frequency: Q8H     Number of Occurrences: Until Specified    Pulse Oximetry Continuous     Frequency: Continuous     Number of Occurrences: Until Specified       RECOMMENDATIONS    We recommend: RRT Recs: Continue POC per primary team.    ESCALATION    .    FOLLOW-UP    Please call back the Rapid Response RT, Altagracia Forbes, RRT at x [phone] for any questions or concerns.

## 2022-02-19 NOTE — PLAN OF CARE
Problem: Adult Inpatient Plan of Care  Goal: Plan of Care Review  Outcome: Ongoing, Progressing  Goal: Patient-Specific Goal (Individualized)  Outcome: Ongoing, Progressing  Goal: Absence of Hospital-Acquired Illness or Injury  Outcome: Ongoing, Progressing  Goal: Optimal Comfort and Wellbeing  Outcome: Ongoing, Progressing  Goal: Readiness for Transition of Care  Outcome: Ongoing, Progressing   Unevenful night. Pt stable,no signs of respiratory distress. Will continue to monitor.

## 2022-02-19 NOTE — PROGRESS NOTES
[facility] Medicine  Progress Note    Patient Name: Estefani Fam  MRN: [id number]  Patient Class: IP- Inpatient   Admission Date: 2/14/2022  Length of Stay: 4 days  Attending Physician: Tremaine Barajas MD  Primary Care Provider: Miles Jackson MD        Subjective:     Principal Problem:Community acquired pneumonia of right lung        HPI:  Ms. Fam is a 71 year old woman with PMH of COPD, chronic respiratory failure on 2-3L home O2, and prior COVID infection who [place] [facility] [place] for one day history  of shortness of breath at home. In the ED, patient was hypoxic to 84% upon arrival. Patient reports productive cough and pleuritic chest pain. She denies exertional chest pain, diaphoresis, arm pain. At home, she takes home inhalers of Breo Ellipta and Incruse Ellipta and Azithromycin 3 times weekly to help prevent exacerbations in the future.    She was previously hospitalized 11/2021 for COPD exacerbation 2/2 CAP. She was started on oral antibiotics and IV steroids. Patient clinically improved and discharged on 7 day course of levofloxacin and steroid taper. She also was discharged on Roflumilast and outpatient followup with her Pulmonologist Dr. Brooklynn Ruiz.     Past Medical History  She has a past medical history of Cataract, COPD (chronic obstructive pulmonary disease), History of COVID-19, History of retinal hemorrhage, Pathological fracture due to osteoporosis, Retinal histoplasmosis, and Secondary pulmonary arterial hypertension.     Past Surgical History  She has a past surgical history that includes Hand surgery and Bronchoscopy with fluoroscopy (10/28/2019).     Family History  Her family history includes Colon cancer in her father and mother; Macular degeneration in her mother; Stroke in her father.     Social History  She reports that she quit smoking about 5 years ago. Her smoking use included cigarettes. She has a 36.00 pack-year smoking history. She has never used  smokeless tobacco. She reports current alcohol use of about 2.0 standard drinks of alcohol per week. She reports that she does not use drugs.     Allergies  She is allergic to albuterol and ipratropium analogues.      Overview/Hospital Course:  No notes on file    Interval History:   No events overnight. Patient stats breathing is slightly better compared to yesterday, but still has cough. NC at 4L.      Review of Systems   Constitutional: Positive for activity change, fatigue and fever. Negative for chills.   HENT: Negative for congestion and trouble swallowing.    Eyes: Negative for visual disturbance.        Vision loss (chronic)   Respiratory: Positive for cough, chest tightness and shortness of breath.    Cardiovascular: Negative for chest pain and leg swelling.   Gastrointestinal: Negative for abdominal distention, abdominal pain, nausea and vomiting.   Endocrine: Negative for cold intolerance, heat intolerance, polydipsia and polyuria.   Genitourinary: Negative for difficulty urinating and dysuria.   Musculoskeletal: Negative for back pain and myalgias.   Skin: Negative for rash and wound.   Neurological: Positive for weakness. Negative for dizziness and light-headedness.   Hematological: Negative for adenopathy. Does not bruise/bleed easily.   Psychiatric/Behavioral: Negative for confusion and sleep disturbance.     Objective:     Vital Signs (Most Recent):  Temp: 97.9 °F (36.6 °C) (02/18/22 1938)  Pulse: 94 (02/18/22 1938)  Resp: 19 (02/18/22 1938)  BP: 131/63 (02/18/22 1938)  SpO2: (!) 90 % (02/18/22 1938) Vital Signs (24h Range):  Temp:  [97.8 °F (36.6 °C)-98.3 °F (36.8 °C)] 97.9 °F (36.6 °C)  Pulse:  [] 94  Resp:  [18-24] 19  SpO2:  [90 %-96 %] 90 %  BP: (131-155)/(63-80) 131/63     Weight: 72 kg (158 lb 11.7 oz)  Body mass index is 24.14 kg/m².  No intake or output data in the 24 hours ending 02/18/22 2007   Physical Exam  Constitutional:       General: She is not in acute distress.      Appearance: She is well-developed. She is ill-appearing.   HENT:      Head: Normocephalic and atraumatic.      Mouth/Throat:      Mouth: Mucous membranes are moist.   Eyes:      Extraocular Movements: Extraocular movements intact.      Pupils: Pupils are equal, round, and reactive to light.   Neck:      Vascular: No JVD.   Cardiovascular:      Rate and Rhythm: Regular rhythm. Tachycardia present.      Pulses: Normal pulses.      Heart sounds: Normal heart sounds.   Pulmonary:      Effort: No respiratory distress.      Breath sounds: Decreased breath sounds (bases) and rales present. No wheezing.   Abdominal:      General: Bowel sounds are normal. There is no distension.      Palpations: Abdomen is soft.      Tenderness: There is no abdominal tenderness.   Musculoskeletal:         General: No deformity. Normal range of motion.      Cervical back: Neck supple.   Lymphadenopathy:      Cervical: No cervical adenopathy.   Skin:     General: Skin is warm and dry.      Findings: No erythema or rash.   Neurological:      General: No focal deficit present.      Mental Status: She is alert and oriented to person, place, and time.      Cranial Nerves: No cranial nerve deficit.      Sensory: No sensory deficit.      Motor: Weakness present.   Psychiatric:         Mood and Affect: Mood normal.         Significant Labs:   BMP:   Recent Labs   Lab 02/18/22  0837   GLU 98      K 3.6   CL 97   CO2 32*   BUN 8   CREATININE 0.5   CALCIUM 8.8   MG 2.1     CBC:   Recent Labs   Lab 02/17/22  0221 02/18/22  0837   WBC 15.43* 20.56*   HGB 11.4* 12.1   HCT 37.7 39.1   * 583*       Significant Imaging: I have reviewed all pertinent imaging results/findings within the past 24 hours.      Assessment/Plan:      * Community acquired pneumonia of right lung  - CXR showed opacities in right lung  - s/p 5 days of Ceftriaxone on 2/18  - s/p 3 days azithromycin on 2/16  - Blood cultures NGTD  - Pulmonary consulted  - Sputum culture  "ordered  - Procal 0.19  - Patient declined tessalon perles   - Continue guaifenesin and nightly prn promethazine-codeine   - CBC daily   - 4L NC (home 2-3L)    COPD with acute exacerbation  - Continue CAP treatment   - See "CAP"  - Switched prednisone to Iv solumedrol due to worsening hypoxia to complete 5 days before transitioning back to oral prednisone   - S/p 5 days of steroids on 2/18  - Acapella  - Incentive spirometry  - O2 goal 88-92%  - Continue home inhalers  - Continue prn xopenex    Severe sepsis  - 3/4 SIRS (tachycardia, tachypnea, leukocytosis)  - s/p IVF  - See CAP management   - Improved      Acute on chronic respiratory failure with hypoxia  Patient with Hypercapnic and Hypoxic Respiratory failure which is Acute on chronic.  She is on home oxygen at 2-3 LPM. Supplemental oxygen was provided and noted-  .   Signs/symptoms of respiratory failure include- tachypnea, increased work of breathing, respiratory distress and wheezing. Contributing diagnoses includes - Aspiration, CHF, COPD, Pleural effusion and Pneumonia Labs and images were reviewed. - VBG showed pCO2 59. Patient Has recent ABG, which has been reviewed. Will treat underlying causes and adjust management of respiratory failure as follows    PLAN:  - On 2-3 L O2 at home  - 2.5 L NC --> 4L NC.   - On 2/15, worsening hypoxia and given IV lasix, IV solumedrol, CXR unchanged, CTA chest negative for PE, troponin negative and TTE normal   - Likely 2/2 to COPD exacerbation due to CAP  - Xopenex q4h while awake  - CXR showed right sided PNA  - Continue home inhalers  - Continue guaifenesin   - Chest physiotherapy and bronchial hygiene  - See "CAP"  - BiPAP HS  - Outpatient pulmonary followup      VTE Risk Mitigation (From admission, onward)         Ordered     enoxaparin injection 40 mg  Daily         02/14/22 1817     IP VTE HIGH RISK PATIENT  Once         02/14/22 1817     Place sequential compression device  Until discontinued         02/14/22 " 1817                Discharge Planning   RENARD: 2/21/2022     Code Status: Full Code   Is the patient medically ready for discharge?: No    Reason for patient still in hospital (select all that apply): Laboratory test, Treatment, Consult recommendations and Pending disposition  Discharge Plan A: Home with family                  Tremaine Barajas MD  Department of Hospital Medicine   [facility]

## 2022-02-19 NOTE — ASSESSMENT & PLAN NOTE
- CXR showed opacities in right lung  - s/p 5 days of Ceftriaxone on 2/18  - s/p 3 days azithromycin on 2/16  - Blood cultures NGTD  - Pulmonary consulted  - Sputum culture ordered  - Procal 0.19  - Patient declined tessalon perles   - Continue guaifenesin and nightly prn promethazine-codeine   - CBC daily   - 4L NC (home 2-3L)

## 2022-02-19 NOTE — ASSESSMENT & PLAN NOTE
"Patient with Hypercapnic and Hypoxic Respiratory failure which is Acute on chronic.  She is on home oxygen at 2-3 LPM. Supplemental oxygen was provided and noted-  .   Signs/symptoms of respiratory failure include- tachypnea, increased work of breathing, respiratory distress and wheezing. Contributing diagnoses includes - Aspiration, CHF, COPD, Pleural effusion and Pneumonia Labs and images were reviewed. - VBG showed pCO2 59. Patient Has recent ABG, which has been reviewed. Will treat underlying causes and adjust management of respiratory failure as follows    PLAN:  - On 2-3 L O2 at home  - 2.5 L NC --> 4L NC.   - On 2/15, worsening hypoxia and given IV lasix, IV solumedrol, CXR unchanged, CTA chest negative for PE, troponin negative and TTE normal   - Likely 2/2 to COPD exacerbation due to CAP  - Xopenex q4h while awake  - CXR showed right sided PNA  - Continue home inhalers  - Continue guaifenesin   - Chest physiotherapy and bronchial hygiene  - See "CAP"  - BiPAP HS  - Outpatient pulmonary followup  "

## 2022-02-19 NOTE — SUBJECTIVE & OBJECTIVE
Interval History:   No events overnight. Patient continues to note breathing slightly improved. Continues to report cough and some dyspnea. Continue pulm toilet. Finished 5 days of CTX on yesterday for PNA and prednisone for COPD. On 4L NC.      Review of Systems   Constitutional:  Positive for activity change and fatigue. Negative for chills and fever.   HENT:  Negative for congestion and trouble swallowing.    Eyes:  Negative for visual disturbance.        Vision loss (chronic)   Respiratory:  Positive for cough, chest tightness and shortness of breath.    Cardiovascular:  Negative for chest pain and leg swelling.   Gastrointestinal:  Negative for abdominal distention, abdominal pain, nausea and vomiting.   Endocrine: Negative for cold intolerance, heat intolerance, polydipsia and polyuria.   Genitourinary:  Negative for difficulty urinating and dysuria.   Musculoskeletal:  Negative for back pain and myalgias.   Skin:  Negative for rash and wound.   Neurological:  Positive for weakness. Negative for dizziness and light-headedness.   Hematological:  Negative for adenopathy. Does not bruise/bleed easily.   Psychiatric/Behavioral:  Negative for confusion and sleep disturbance.    Objective:     Vital Signs (Most Recent):  Temp: 96.2 °F (35.7 °C) (02/19/22 1057)  Pulse: 100 (02/19/22 1057)  Resp: 19 (02/19/22 0829)  BP: 131/62 (02/19/22 1057)  SpO2: (!) 92 % (02/19/22 1057)   Vital Signs (24h Range):  Temp:  [96.2 °F (35.7 °C)-97.9 °F (36.6 °C)] 96.2 °F (35.7 °C)  Pulse:  [] 100  Resp:  [16-20] 19  SpO2:  [90 %-96 %] 92 %  BP: (126-158)/(62-86) 131/62     Weight: 72 kg (158 lb 11.7 oz)  Body mass index is 24.14 kg/m².  No intake or output data in the 24 hours ending 02/19/22 1355   Physical Exam  Constitutional:       General: She is not in acute distress.     Appearance: She is well-developed. She is ill-appearing.   HENT:      Head: Normocephalic and atraumatic.      Mouth/Throat:      Mouth: Mucous membranes  are moist.   Eyes:      Extraocular Movements: Extraocular movements intact.   Neck:      Vascular: No JVD.   Cardiovascular:      Rate and Rhythm: Regular rhythm. Tachycardia present.      Pulses: Normal pulses.      Heart sounds: Normal heart sounds.   Pulmonary:      Effort: No respiratory distress.      Breath sounds: Decreased breath sounds and rales present. No wheezing.   Abdominal:      General: Bowel sounds are normal. There is no distension.      Palpations: Abdomen is soft.      Tenderness: There is no abdominal tenderness.   Musculoskeletal:         General: No deformity. Normal range of motion.      Cervical back: Neck supple.   Lymphadenopathy:      Cervical: No cervical adenopathy.   Skin:     General: Skin is warm and dry.      Findings: No erythema or rash.   Neurological:      General: No focal deficit present.      Mental Status: She is alert and oriented to person, place, and time.      Cranial Nerves: No cranial nerve deficit.      Sensory: No sensory deficit.      Motor: Weakness present.   Psychiatric:         Mood and Affect: Mood normal.       Significant Labs: All pertinent labs within the past 24 hours have been reviewed.  BMP:   Recent Labs   Lab 02/19/22  0505   GLU 75      K 4.4      CO2 26   BUN 8   CREATININE 0.5   CALCIUM 8.8   MG 2.1     CBC:   Recent Labs   Lab 02/18/22  0837   WBC 20.56*   HGB 12.1   HCT 39.1   *       Significant Imaging: I have reviewed all pertinent imaging results/findings within the past 24 hours.

## 2022-02-19 NOTE — SUBJECTIVE & OBJECTIVE
Interval History:   No events overnight. Patient stats breathing is slightly better compared to yesterday, but still has cough. NC at 4L.      Review of Systems   Constitutional: Positive for activity change, fatigue and fever. Negative for chills.   HENT: Negative for congestion and trouble swallowing.    Eyes: Negative for visual disturbance.        Vision loss (chronic)   Respiratory: Positive for cough, chest tightness and shortness of breath.    Cardiovascular: Negative for chest pain and leg swelling.   Gastrointestinal: Negative for abdominal distention, abdominal pain, nausea and vomiting.   Endocrine: Negative for cold intolerance, heat intolerance, polydipsia and polyuria.   Genitourinary: Negative for difficulty urinating and dysuria.   Musculoskeletal: Negative for back pain and myalgias.   Skin: Negative for rash and wound.   Neurological: Positive for weakness. Negative for dizziness and light-headedness.   Hematological: Negative for adenopathy. Does not bruise/bleed easily.   Psychiatric/Behavioral: Negative for confusion and sleep disturbance.     Objective:     Vital Signs (Most Recent):  Temp: 97.9 °F (36.6 °C) (02/18/22 1938)  Pulse: 94 (02/18/22 1938)  Resp: 19 (02/18/22 1938)  BP: 131/63 (02/18/22 1938)  SpO2: (!) 90 % (02/18/22 1938) Vital Signs (24h Range):  Temp:  [97.8 °F (36.6 °C)-98.3 °F (36.8 °C)] 97.9 °F (36.6 °C)  Pulse:  [] 94  Resp:  [18-24] 19  SpO2:  [90 %-96 %] 90 %  BP: (131-155)/(63-80) 131/63     Weight: 72 kg (158 lb 11.7 oz)  Body mass index is 24.14 kg/m².  No intake or output data in the 24 hours ending 02/18/22 2007   Physical Exam  Constitutional:       General: She is not in acute distress.     Appearance: She is well-developed. She is ill-appearing.   HENT:      Head: Normocephalic and atraumatic.      Mouth/Throat:      Mouth: Mucous membranes are moist.   Eyes:      Extraocular Movements: Extraocular movements intact.      Pupils: Pupils are equal, round, and  reactive to light.   Neck:      Vascular: No JVD.   Cardiovascular:      Rate and Rhythm: Regular rhythm. Tachycardia present.      Pulses: Normal pulses.      Heart sounds: Normal heart sounds.   Pulmonary:      Effort: No respiratory distress.      Breath sounds: Decreased breath sounds (bases) and rales present. No wheezing.   Abdominal:      General: Bowel sounds are normal. There is no distension.      Palpations: Abdomen is soft.      Tenderness: There is no abdominal tenderness.   Musculoskeletal:         General: No deformity. Normal range of motion.      Cervical back: Neck supple.   Lymphadenopathy:      Cervical: No cervical adenopathy.   Skin:     General: Skin is warm and dry.      Findings: No erythema or rash.   Neurological:      General: No focal deficit present.      Mental Status: She is alert and oriented to person, place, and time.      Cranial Nerves: No cranial nerve deficit.      Sensory: No sensory deficit.      Motor: Weakness present.   Psychiatric:         Mood and Affect: Mood normal.         Significant Labs:   BMP:   Recent Labs   Lab 02/18/22  0837   GLU 98      K 3.6   CL 97   CO2 32*   BUN 8   CREATININE 0.5   CALCIUM 8.8   MG 2.1     CBC:   Recent Labs   Lab 02/17/22  0221 02/18/22  0837   WBC 15.43* 20.56*   HGB 11.4* 12.1   HCT 37.7 39.1   * 583*       Significant Imaging: I have reviewed all pertinent imaging results/findings within the past 24 hours.

## 2022-02-19 NOTE — ASSESSMENT & PLAN NOTE
"Patient with Hypercapnic and Hypoxic Respiratory failure which is Acute on chronic.  She is on home oxygen at 2-3 LPM. Supplemental oxygen was provided and noted- Oxygen Concentration (%):  [40] 40.   Signs/symptoms of respiratory failure include- tachypnea, increased work of breathing, respiratory distress and wheezing. Contributing diagnoses includes - Aspiration, CHF, COPD, Pleural effusion and Pneumonia Labs and images were reviewed. - VBG showed pCO2 59. Patient Has recent ABG, which has been reviewed. Will treat underlying causes and adjust management of respiratory failure as follows    PLAN:  - On 2-3 L O2 at home  - 2.5 L NC --> 4L NC.   - On 2/15, worsening hypoxia and given IV lasix, IV solumedrol, CXR unchanged, CTA chest negative for PE, troponin negative and TTE normal   - Likely 2/2 to COPD exacerbation due to CAP  - Xopenex q4h while awake  - CXR showed right sided PNA  - Continue home inhalers  - Continue guaifenesin   - Chest physiotherapy and bronchial hygiene  - See "CAP"  - BiPAP HS  - Outpatient pulmonary followup  "

## 2022-02-19 NOTE — ASSESSMENT & PLAN NOTE
- CXR showed opacities in right lung  - s/p 5 days of Ceftriaxone on 2/18  - s/p 3 days azithromycin on 2/16  - Blood cultures NGTD  - Pulmonary consulted  -- Following up recs  - Sputum culture ordered  - Procal 0.19  - Continue guaifenesin and nightly prn promethazine-codeine   - CBC daily   - 4L NC (home 2-3L)

## 2022-02-20 ENCOUNTER — PATIENT MESSAGE (OUTPATIENT)
Dept: PULMONOLOGY | Facility: CLINIC | Age: 72
End: 2022-02-20
Payer: MEDICARE

## 2022-02-20 LAB
ANION GAP SERPL CALC-SCNC: 7 MMOL/L (ref 8–16)
BASOPHILS # BLD AUTO: 0.11 K/UL (ref 0–0.2)
BASOPHILS NFR BLD: 0.6 % (ref 0–1.9)
BUN SERPL-MCNC: 5 MG/DL (ref 8–23)
CALCIUM SERPL-MCNC: 8.4 MG/DL (ref 8.7–10.5)
CHLORIDE SERPL-SCNC: 95 MMOL/L (ref 95–110)
CO2 SERPL-SCNC: 34 MMOL/L (ref 23–29)
CREAT SERPL-MCNC: 0.5 MG/DL (ref 0.5–1.4)
DIFFERENTIAL METHOD: ABNORMAL
EOSINOPHIL # BLD AUTO: 0.5 K/UL (ref 0–0.5)
EOSINOPHIL NFR BLD: 2.6 % (ref 0–8)
ERYTHROCYTE [DISTWIDTH] IN BLOOD BY AUTOMATED COUNT: 14.5 % (ref 11.5–14.5)
EST. GFR  (AFRICAN AMERICAN): >60 ML/MIN/1.73 M^2
EST. GFR  (NON AFRICAN AMERICAN): >60 ML/MIN/1.73 M^2
GLUCOSE SERPL-MCNC: 104 MG/DL (ref 70–110)
HCT VFR BLD AUTO: 38.2 % (ref 37–48.5)
HGB BLD-MCNC: 12 G/DL (ref 12–16)
IMM GRANULOCYTES # BLD AUTO: 0.77 K/UL (ref 0–0.04)
IMM GRANULOCYTES NFR BLD AUTO: 4.1 % (ref 0–0.5)
LYMPHOCYTES # BLD AUTO: 2 K/UL (ref 1–4.8)
LYMPHOCYTES NFR BLD: 10.5 % (ref 18–48)
MAGNESIUM SERPL-MCNC: 2 MG/DL (ref 1.6–2.6)
MCH RBC QN AUTO: 30 PG (ref 27–31)
MCHC RBC AUTO-ENTMCNC: 31.4 G/DL (ref 32–36)
MCV RBC AUTO: 96 FL (ref 82–98)
MONOCYTES # BLD AUTO: 1.2 K/UL (ref 0.3–1)
MONOCYTES NFR BLD: 6.5 % (ref 4–15)
NEUTROPHILS # BLD AUTO: 14.3 K/UL (ref 1.8–7.7)
NEUTROPHILS NFR BLD: 75.7 % (ref 38–73)
NRBC BLD-RTO: 0 /100 WBC
PLATELET # BLD AUTO: 530 K/UL (ref 150–450)
PMV BLD AUTO: 8.6 FL (ref 9.2–12.9)
POTASSIUM SERPL-SCNC: 3.4 MMOL/L (ref 3.5–5.1)
RBC # BLD AUTO: 4 M/UL (ref 4–5.4)
SODIUM SERPL-SCNC: 136 MMOL/L (ref 136–145)
WBC # BLD AUTO: 18.88 K/UL (ref 3.9–12.7)

## 2022-02-20 PROCEDURE — 25000003 PHARM REV CODE 250: Performed by: STUDENT IN AN ORGANIZED HEALTH CARE EDUCATION/TRAINING PROGRAM

## 2022-02-20 PROCEDURE — 36415 COLL VENOUS BLD VENIPUNCTURE: CPT | Performed by: STUDENT IN AN ORGANIZED HEALTH CARE EDUCATION/TRAINING PROGRAM

## 2022-02-20 PROCEDURE — 80048 BASIC METABOLIC PNL TOTAL CA: CPT | Performed by: STUDENT IN AN ORGANIZED HEALTH CARE EDUCATION/TRAINING PROGRAM

## 2022-02-20 PROCEDURE — 83735 ASSAY OF MAGNESIUM: CPT | Performed by: STUDENT IN AN ORGANIZED HEALTH CARE EDUCATION/TRAINING PROGRAM

## 2022-02-20 PROCEDURE — 94640 AIRWAY INHALATION TREATMENT: CPT

## 2022-02-20 PROCEDURE — 99232 SBSQ HOSP IP/OBS MODERATE 35: CPT | Mod: ,,, | Performed by: STUDENT IN AN ORGANIZED HEALTH CARE EDUCATION/TRAINING PROGRAM

## 2022-02-20 PROCEDURE — 99233 PR SUBSEQUENT HOSPITAL CARE,LEVL III: ICD-10-PCS | Mod: GC,,, | Performed by: INTERNAL MEDICINE

## 2022-02-20 PROCEDURE — 25000242 PHARM REV CODE 250 ALT 637 W/ HCPCS: Performed by: INTERNAL MEDICINE

## 2022-02-20 PROCEDURE — 94760 N-INVAS EAR/PLS OXIMETRY 1: CPT

## 2022-02-20 PROCEDURE — 99232 PR SUBSEQUENT HOSPITAL CARE,LEVL II: ICD-10-PCS | Mod: ,,, | Performed by: STUDENT IN AN ORGANIZED HEALTH CARE EDUCATION/TRAINING PROGRAM

## 2022-02-20 PROCEDURE — 94667 MNPJ CHEST WALL 1ST: CPT

## 2022-02-20 PROCEDURE — 63600175 PHARM REV CODE 636 W HCPCS: Performed by: INTERNAL MEDICINE

## 2022-02-20 PROCEDURE — 99233 SBSQ HOSP IP/OBS HIGH 50: CPT | Mod: GC,,, | Performed by: INTERNAL MEDICINE

## 2022-02-20 PROCEDURE — 25000003 PHARM REV CODE 250: Performed by: INTERNAL MEDICINE

## 2022-02-20 PROCEDURE — 94664 DEMO&/EVAL PT USE INHALER: CPT

## 2022-02-20 PROCEDURE — 27000221 HC OXYGEN, UP TO 24 HOURS

## 2022-02-20 PROCEDURE — 99900035 HC TECH TIME PER 15 MIN (STAT)

## 2022-02-20 PROCEDURE — 94761 N-INVAS EAR/PLS OXIMETRY MLT: CPT

## 2022-02-20 PROCEDURE — A4216 STERILE WATER/SALINE, 10 ML: HCPCS | Performed by: INTERNAL MEDICINE

## 2022-02-20 PROCEDURE — 11000001 HC ACUTE MED/SURG PRIVATE ROOM

## 2022-02-20 PROCEDURE — 27000646 HC AEROBIKA DEVICE

## 2022-02-20 PROCEDURE — 85025 COMPLETE CBC W/AUTO DIFF WBC: CPT | Performed by: STUDENT IN AN ORGANIZED HEALTH CARE EDUCATION/TRAINING PROGRAM

## 2022-02-20 RX ORDER — FUROSEMIDE 20 MG/1
20 TABLET ORAL DAILY
Status: DISCONTINUED | OUTPATIENT
Start: 2022-02-20 | End: 2022-02-23 | Stop reason: HOSPADM

## 2022-02-20 RX ORDER — POTASSIUM CHLORIDE 20 MEQ/1
40 TABLET, EXTENDED RELEASE ORAL ONCE
Status: COMPLETED | OUTPATIENT
Start: 2022-02-20 | End: 2022-02-20

## 2022-02-20 RX ADMIN — NASAL 2 SPRAY: 6.5 SPRAY NASAL at 08:02

## 2022-02-20 RX ADMIN — OXYCODONE HYDROCHLORIDE AND ACETAMINOPHEN 500 MG: 500 TABLET ORAL at 08:02

## 2022-02-20 RX ADMIN — ENOXAPARIN SODIUM 40 MG: 100 INJECTION SUBCUTANEOUS at 05:02

## 2022-02-20 RX ADMIN — LEVALBUTEROL 1.25 MG: 1.25 SOLUTION, CONCENTRATE RESPIRATORY (INHALATION) at 08:02

## 2022-02-20 RX ADMIN — LEVALBUTEROL 1.25 MG: 1.25 SOLUTION, CONCENTRATE RESPIRATORY (INHALATION) at 07:02

## 2022-02-20 RX ADMIN — TIOTROPIUM BROMIDE INHALATION SPRAY 2 PUFF: 3.12 SPRAY, METERED RESPIRATORY (INHALATION) at 07:02

## 2022-02-20 RX ADMIN — SODIUM CHLORIDE, PRESERVATIVE FREE 10 ML: 5 INJECTION INTRAVENOUS at 09:02

## 2022-02-20 RX ADMIN — PANTOPRAZOLE SODIUM 40 MG: 40 TABLET, DELAYED RELEASE ORAL at 08:02

## 2022-02-20 RX ADMIN — FLUTICASONE FUROATE AND VILANTEROL TRIFENATATE 1 PUFF: 200; 25 POWDER RESPIRATORY (INHALATION) at 07:02

## 2022-02-20 RX ADMIN — THERA TABS 1 TABLET: TAB at 08:02

## 2022-02-20 RX ADMIN — GUAIFENESIN 400 MG: 100 SOLUTION ORAL at 06:02

## 2022-02-20 RX ADMIN — GUAIFENESIN 400 MG: 100 SOLUTION ORAL at 11:02

## 2022-02-20 RX ADMIN — LEVALBUTEROL 1.25 MG: 1.25 SOLUTION, CONCENTRATE RESPIRATORY (INHALATION) at 01:02

## 2022-02-20 RX ADMIN — GUAIFENESIN 400 MG: 100 SOLUTION ORAL at 05:02

## 2022-02-20 RX ADMIN — POTASSIUM CHLORIDE 40 MEQ: 1500 TABLET, EXTENDED RELEASE ORAL at 08:02

## 2022-02-20 RX ADMIN — NASAL 2 SPRAY: 6.5 SPRAY NASAL at 09:02

## 2022-02-20 RX ADMIN — OXYCODONE HYDROCHLORIDE AND ACETAMINOPHEN 500 MG: 500 TABLET ORAL at 09:02

## 2022-02-20 RX ADMIN — FLUTICASONE FUROATE AND VILANTEROL TRIFENATATE 1 PUFF: 200; 25 POWDER RESPIRATORY (INHALATION) at 09:02

## 2022-02-20 RX ADMIN — FUROSEMIDE 20 MG: 20 TABLET ORAL at 04:02

## 2022-02-20 RX ADMIN — CALCIUM CARBONATE (ANTACID) CHEW TAB 500 MG 500 MG: 500 CHEW TAB at 08:02

## 2022-02-20 NOTE — PLAN OF CARE
Problem: Adult Inpatient Plan of Care  Goal: Plan of Care Review  Outcome: Ongoing, Progressing  Goal: Patient-Specific Goal (Individualized)  Outcome: Ongoing, Progressing  Goal: Absence of Hospital-Acquired Illness or Injury  Outcome: Ongoing, Progressing  Goal: Optimal Comfort and Wellbeing  Outcome: Ongoing, Progressing  Goal: Readiness for Transition of Care  Outcome: Ongoing, Progressing   Uneventful night. No signs of respiratory distress. Will continue to monitor.

## 2022-02-20 NOTE — SUBJECTIVE & OBJECTIVE
Interval History:   No events overnight. Patient says she slept well. Breathing has not improved much. Continues to have cough. Denies fevers, nausea, and chest pain.      Review of Systems   Constitutional:  Positive for activity change. Negative for chills, fatigue and fever.   HENT:  Negative for congestion and trouble swallowing.    Eyes:  Negative for visual disturbance.        Vision loss (chronic)   Respiratory:  Positive for cough, chest tightness and shortness of breath.    Cardiovascular:  Negative for chest pain and leg swelling.   Gastrointestinal:  Negative for abdominal distention, abdominal pain, nausea and vomiting.   Endocrine: Negative for cold intolerance, heat intolerance, polydipsia and polyuria.   Genitourinary:  Negative for difficulty urinating and dysuria.   Musculoskeletal:  Negative for back pain and myalgias.   Skin:  Negative for rash and wound.   Neurological:  Positive for weakness. Negative for dizziness and light-headedness.   Hematological:  Negative for adenopathy. Does not bruise/bleed easily.   Psychiatric/Behavioral:  Negative for confusion and sleep disturbance.    Objective:     Vital Signs (Most Recent):  Temp: 99.7 °F (37.6 °C) (02/20/22 1545)  Pulse: 100 (02/20/22 1545)  Resp: 14 (02/20/22 1355)  BP: 127/60 (02/20/22 1545)  SpO2: (!) 93 % (02/20/22 1545)   Vital Signs (24h Range):  Temp:  [96.3 °F (35.7 °C)-99.7 °F (37.6 °C)] 99.7 °F (37.6 °C)  Pulse:  [] 100  Resp:  [14-24] 14  SpO2:  [87 %-93 %] 93 %  BP: (122-157)/(59-74) 127/60     Weight: 72 kg (158 lb 11.7 oz)  Body mass index is 24.14 kg/m².  No intake or output data in the 24 hours ending 02/20/22 1656   Physical Exam  Constitutional:       General: She is not in acute distress.     Appearance: She is well-developed. She is not ill-appearing.   HENT:      Head: Normocephalic and atraumatic.      Mouth/Throat:      Mouth: Mucous membranes are moist.   Eyes:      Extraocular Movements: Extraocular movements  intact.   Neck:      Vascular: No JVD.   Cardiovascular:      Rate and Rhythm: Regular rhythm. Tachycardia present.      Pulses: Normal pulses.      Heart sounds: Normal heart sounds.   Pulmonary:      Effort: No respiratory distress.      Breath sounds: Decreased breath sounds and rales present. No wheezing.   Abdominal:      General: Bowel sounds are normal. There is no distension.      Palpations: Abdomen is soft.      Tenderness: There is no abdominal tenderness.   Musculoskeletal:         General: No deformity. Normal range of motion.      Cervical back: Neck supple.   Lymphadenopathy:      Cervical: No cervical adenopathy.   Skin:     General: Skin is warm and dry.      Findings: No erythema or rash.   Neurological:      General: No focal deficit present.      Mental Status: She is alert and oriented to person, place, and time.      Cranial Nerves: No cranial nerve deficit.      Sensory: No sensory deficit.      Motor: Weakness present.   Psychiatric:         Mood and Affect: Mood normal.       Significant Labs: BMP:   Recent Labs   Lab 02/20/22  0533         K 3.4*   CL 95   CO2 34*   BUN 5*   CREATININE 0.5   CALCIUM 8.4*   MG 2.0     CBC:   Recent Labs   Lab 02/20/22  0533   WBC 18.88*   HGB 12.0   HCT 38.2   *       Significant Imaging: I have reviewed all pertinent imaging results/findings within the past 24 hours.

## 2022-02-20 NOTE — PROGRESS NOTES
[facility] Pulmonary/Critical Care Fellow Progress Note:    Subjective:  Patient reports feeling the same today as yesterday. She is coughing but not bringing up any sputum. Her main complaint is that in the hospital, she feels very sleepy during the day. She is currently on 3.5L    Objective:  Last 24 Hour Vital Signs:  BP  Min: 122/59  Max: 157/64  Temp  Av.4 °F (36.3 °C)  Min: 96.3 °F (35.7 °C)  Max: 98.3 °F (36.8 °C)  Pulse  Av.4  Min: 90  Max: 115  Resp  Av.9  Min: 14  Max: 24  SpO2  Av %  Min: 87 %  Max: 93 %  Body mass index is 24.14 kg/m².  No intake/output data recorded.        Physical Exam:  General: Alert and awake in NAD  HENT:  NCAT; anicteric sclera; OP clear with MMM  Cardio:  Regular rate and rhythm with normal S1 and S2; no murmurs or rubs  Resp:  CTAB; respirations unlabored; mild wheezes, crackles or rhonchi  Abdom: Soft, NTND   Extrem: WWP with no clubbing, cyanosis, (+) chronic skin changes in LE, (+)pitting edema bilaterally  Pulses: 2+ and symmetric distally  Neuro:  AAOx3; cooperative and pleasant with no focal deficits      Assessment & Plan:     72 yo with PMHx of GOLD D COPD admitted for COPD exacerbation and PNA  - s/p CAP treatment with ceftriaxone/azithromycin  - s/p 5d prednisone  - CT chest with consolidations concerning for PNA  - continue nebs, LAMA/LABA/ICS and encourage OOB to prevent atelectasis  - recommend walking patient to see if she desats with ambulation  - wean O2 to goal SpO2 88%   - patient reports 2-3 exacerbations in a year, can decided outpatient if patient needs daliresp or chronic azithro therapy  - diurese to mild net negative daily given her LE edema    Luzma Hicks MD  Pulm/CC Fellow

## 2022-02-21 LAB
ANION GAP SERPL CALC-SCNC: 10 MMOL/L (ref 8–16)
BASOPHILS # BLD AUTO: 0.12 K/UL (ref 0–0.2)
BASOPHILS NFR BLD: 0.7 % (ref 0–1.9)
BUN SERPL-MCNC: 7 MG/DL (ref 8–23)
CALCIUM SERPL-MCNC: 8.6 MG/DL (ref 8.7–10.5)
CHLORIDE SERPL-SCNC: 95 MMOL/L (ref 95–110)
CO2 SERPL-SCNC: 34 MMOL/L (ref 23–29)
CREAT SERPL-MCNC: 0.5 MG/DL (ref 0.5–1.4)
DIFFERENTIAL METHOD: ABNORMAL
EOSINOPHIL # BLD AUTO: 0.5 K/UL (ref 0–0.5)
EOSINOPHIL NFR BLD: 2.8 % (ref 0–8)
ERYTHROCYTE [DISTWIDTH] IN BLOOD BY AUTOMATED COUNT: 14.6 % (ref 11.5–14.5)
EST. GFR  (AFRICAN AMERICAN): >60 ML/MIN/1.73 M^2
EST. GFR  (NON AFRICAN AMERICAN): >60 ML/MIN/1.73 M^2
GLUCOSE SERPL-MCNC: 100 MG/DL (ref 70–110)
HCT VFR BLD AUTO: 35.6 % (ref 37–48.5)
HGB BLD-MCNC: 11.2 G/DL (ref 12–16)
IMM GRANULOCYTES # BLD AUTO: 0.59 K/UL (ref 0–0.04)
IMM GRANULOCYTES NFR BLD AUTO: 3.5 % (ref 0–0.5)
LYMPHOCYTES # BLD AUTO: 1.8 K/UL (ref 1–4.8)
LYMPHOCYTES NFR BLD: 10.7 % (ref 18–48)
MAGNESIUM SERPL-MCNC: 2 MG/DL (ref 1.6–2.6)
MCH RBC QN AUTO: 29.6 PG (ref 27–31)
MCHC RBC AUTO-ENTMCNC: 31.5 G/DL (ref 32–36)
MCV RBC AUTO: 94 FL (ref 82–98)
MONOCYTES # BLD AUTO: 1.2 K/UL (ref 0.3–1)
MONOCYTES NFR BLD: 7.1 % (ref 4–15)
NEUTROPHILS # BLD AUTO: 12.6 K/UL (ref 1.8–7.7)
NEUTROPHILS NFR BLD: 75.2 % (ref 38–73)
NRBC BLD-RTO: 0 /100 WBC
PLATELET # BLD AUTO: 500 K/UL (ref 150–450)
PMV BLD AUTO: 9.2 FL (ref 9.2–12.9)
POTASSIUM SERPL-SCNC: 3.6 MMOL/L (ref 3.5–5.1)
RBC # BLD AUTO: 3.78 M/UL (ref 4–5.4)
SODIUM SERPL-SCNC: 139 MMOL/L (ref 136–145)
WBC # BLD AUTO: 16.79 K/UL (ref 3.9–12.7)

## 2022-02-21 PROCEDURE — 99233 SBSQ HOSP IP/OBS HIGH 50: CPT | Mod: GC,,, | Performed by: STUDENT IN AN ORGANIZED HEALTH CARE EDUCATION/TRAINING PROGRAM

## 2022-02-21 PROCEDURE — 25000003 PHARM REV CODE 250: Performed by: INTERNAL MEDICINE

## 2022-02-21 PROCEDURE — 27100107 HC POCKET PEAK FLOW METER

## 2022-02-21 PROCEDURE — 94640 AIRWAY INHALATION TREATMENT: CPT

## 2022-02-21 PROCEDURE — 94668 MNPJ CHEST WALL SBSQ: CPT

## 2022-02-21 PROCEDURE — 94667 MNPJ CHEST WALL 1ST: CPT

## 2022-02-21 PROCEDURE — 83735 ASSAY OF MAGNESIUM: CPT | Performed by: STUDENT IN AN ORGANIZED HEALTH CARE EDUCATION/TRAINING PROGRAM

## 2022-02-21 PROCEDURE — 99232 SBSQ HOSP IP/OBS MODERATE 35: CPT | Mod: ,,, | Performed by: STUDENT IN AN ORGANIZED HEALTH CARE EDUCATION/TRAINING PROGRAM

## 2022-02-21 PROCEDURE — 85025 COMPLETE CBC W/AUTO DIFF WBC: CPT | Performed by: STUDENT IN AN ORGANIZED HEALTH CARE EDUCATION/TRAINING PROGRAM

## 2022-02-21 PROCEDURE — 97530 THERAPEUTIC ACTIVITIES: CPT

## 2022-02-21 PROCEDURE — 99232 PR SUBSEQUENT HOSPITAL CARE,LEVL II: ICD-10-PCS | Mod: ,,, | Performed by: STUDENT IN AN ORGANIZED HEALTH CARE EDUCATION/TRAINING PROGRAM

## 2022-02-21 PROCEDURE — 11000001 HC ACUTE MED/SURG PRIVATE ROOM

## 2022-02-21 PROCEDURE — 25000242 PHARM REV CODE 250 ALT 637 W/ HCPCS: Performed by: INTERNAL MEDICINE

## 2022-02-21 PROCEDURE — 63600175 PHARM REV CODE 636 W HCPCS: Performed by: INTERNAL MEDICINE

## 2022-02-21 PROCEDURE — 99900035 HC TECH TIME PER 15 MIN (STAT)

## 2022-02-21 PROCEDURE — 27000221 HC OXYGEN, UP TO 24 HOURS

## 2022-02-21 PROCEDURE — 25000003 PHARM REV CODE 250: Performed by: STUDENT IN AN ORGANIZED HEALTH CARE EDUCATION/TRAINING PROGRAM

## 2022-02-21 PROCEDURE — 80048 BASIC METABOLIC PNL TOTAL CA: CPT | Performed by: STUDENT IN AN ORGANIZED HEALTH CARE EDUCATION/TRAINING PROGRAM

## 2022-02-21 PROCEDURE — 36415 COLL VENOUS BLD VENIPUNCTURE: CPT | Performed by: STUDENT IN AN ORGANIZED HEALTH CARE EDUCATION/TRAINING PROGRAM

## 2022-02-21 PROCEDURE — 99233 PR SUBSEQUENT HOSPITAL CARE,LEVL III: ICD-10-PCS | Mod: GC,,, | Performed by: STUDENT IN AN ORGANIZED HEALTH CARE EDUCATION/TRAINING PROGRAM

## 2022-02-21 PROCEDURE — 94664 DEMO&/EVAL PT USE INHALER: CPT

## 2022-02-21 PROCEDURE — 94761 N-INVAS EAR/PLS OXIMETRY MLT: CPT

## 2022-02-21 PROCEDURE — 27000646 HC AEROBIKA DEVICE

## 2022-02-21 PROCEDURE — 94760 N-INVAS EAR/PLS OXIMETRY 1: CPT

## 2022-02-21 RX ADMIN — FUROSEMIDE 20 MG: 20 TABLET ORAL at 08:02

## 2022-02-21 RX ADMIN — ACETAMINOPHEN 650 MG: 325 TABLET ORAL at 03:02

## 2022-02-21 RX ADMIN — PANTOPRAZOLE SODIUM 40 MG: 40 TABLET, DELAYED RELEASE ORAL at 08:02

## 2022-02-21 RX ADMIN — GUAIFENESIN 400 MG: 100 SOLUTION ORAL at 06:02

## 2022-02-21 RX ADMIN — NASAL 2 SPRAY: 6.5 SPRAY NASAL at 08:02

## 2022-02-21 RX ADMIN — ENOXAPARIN SODIUM 40 MG: 100 INJECTION SUBCUTANEOUS at 05:02

## 2022-02-21 RX ADMIN — OXYCODONE HYDROCHLORIDE AND ACETAMINOPHEN 500 MG: 500 TABLET ORAL at 08:02

## 2022-02-21 RX ADMIN — GUAIFENESIN 400 MG: 100 SOLUTION ORAL at 12:02

## 2022-02-21 RX ADMIN — TIOTROPIUM BROMIDE INHALATION SPRAY 2 PUFF: 3.12 SPRAY, METERED RESPIRATORY (INHALATION) at 07:02

## 2022-02-21 RX ADMIN — FLUTICASONE FUROATE AND VILANTEROL TRIFENATATE 1 PUFF: 200; 25 POWDER RESPIRATORY (INHALATION) at 07:02

## 2022-02-21 RX ADMIN — THERA TABS 1 TABLET: TAB at 08:02

## 2022-02-21 RX ADMIN — GUAIFENESIN 400 MG: 100 SOLUTION ORAL at 05:02

## 2022-02-21 RX ADMIN — CALCIUM CARBONATE (ANTACID) CHEW TAB 500 MG 500 MG: 500 CHEW TAB at 08:02

## 2022-02-21 RX ADMIN — LEVALBUTEROL 1.25 MG: 1.25 SOLUTION, CONCENTRATE RESPIRATORY (INHALATION) at 07:02

## 2022-02-21 RX ADMIN — NASAL 2 SPRAY: 6.5 SPRAY NASAL at 09:02

## 2022-02-21 RX ADMIN — LEVALBUTEROL 1.25 MG: 1.25 SOLUTION, CONCENTRATE RESPIRATORY (INHALATION) at 08:02

## 2022-02-21 RX ADMIN — GUAIFENESIN 400 MG: 100 SOLUTION ORAL at 01:02

## 2022-02-21 RX ADMIN — LEVALBUTEROL 1.25 MG: 1.25 SOLUTION, CONCENTRATE RESPIRATORY (INHALATION) at 01:02

## 2022-02-21 NOTE — PROGRESS NOTES
Pulmonology/Critical Care Consult Note:     Last 24 hours: 02 weaned to 3L; continuing to improve, still reports poor appetite. Able to cough up more phlegm with CPT and reports breathing easier afterwards.     Objective:  Vitals: reviewed  General: Sitting up in bed, well-appearing and conversant, eating breakfast  HEENT: nonicteric, MMM. intermittant pursed-lip breathing  CV: RRR, no murmurs, trace pedal edema  Resp: diffusely soft air movement, scattered wheezing, no accessory muscle use, focal crackles to right middle zone and right lower lung, no clubbing, 02 at 3L  Abd: soft, NTTP  Extr: scattered bruising on all extremities     Labs: leukocytosis improving, thrombocytosis imroving     Imaging: no new  CTA Chest 2/15/22:Impression:  1.  No CT evidence of new pulmonary thromboembolic disease with good opacification of the pulmonary arteries.    2.  Right lower lobe consolidation, concerning for lobar pneumonia.    3.  Prominent to mildly enlarged subcarinal and paraesophageal lymph nodes.    4.  Redemonstrated background of severe bullous emphysema.     Echo: 2/15/22:  · The left ventricle is normal in size with normal systolic function.  · The estimated ejection fraction is 65%.  · Normal left ventricular diastolic function.  · Tachycardia was present during the study  · Normal right ventricular size with normal right ventricular systolic function.  · Mild tricuspid regurgitation.  · There are segmental left ventricular wall motion abnormalities.  · Mild left atrial enlargement.  · Normal central venous pressure (3 mmHg).  · The estimated PA systolic pressure is 28 mmHg.     Assessment:  1. Acute on chronic hypoxic respiratory failure; resolving  2. COPD exacerbation;resolving  3. CAP; improving   Ms. Fam is a 70 yo F with severe COPD (GOLD D) on triple therapy and home 02 at 3L; well-known to our Pulmonary clinic. She was admitted on 2/14 for CAP and AEOCOPD, and completed 5 days of CAP coverage and  steroid therapy. She is afebrile and 02 has been weaned down to 3L.      Plan/Recommendations:  -continue scheduled inhalers with LABA/LAMA/ICS; agree with scheduled VENKATA while in exacerbation  -s/p abx course as well as 5 days steroids for COPD  -much improved, how on home oxygen support  -OK to continue CPT per patient preference  -would consider suppressive/preventive therapy with either azithromycin or roflumilast; defer to outpatient pulmonologist  -will need follow-up in Pulm clinic after DC     Thank you for the consult. Discussed with attending Dr. Qian Selby MD  PCCM fellow

## 2022-02-21 NOTE — PT/OT/SLP PROGRESS
Occupational Therapy Treatment    Patient Name:  Estefani Fam   MRN:  [id number]  Admit Date: 2/14/2022  Admitting Diagnosis:  Community acquired pneumonia of right lung   Length of Stay: 7 days  Recent Surgery: * No surgery found *      Recommendations:     Discharge Recommendations: home with home health  Discharge Equipment Recommendations:  shower chair  Barriers to discharge:  None    Plan:     Patient to be seen 3 x/week to address the above listed problems via self-care/home management, therapeutic activities, therapeutic exercises  · Plan of Care Expires: 03/01/22  · Plan of Care Reviewed with: patient, family    Assessment:   Estefani Fam is a 71 y.o. female with a medical diagnosis of Community acquired pneumonia of right lung.  She presents with the following performance deficits affecting function: weakness, impaired endurance, impaired self care skills, impaired functional mobilty, gait instability, decreased lower extremity function, impaired cardiopulmonary response to activity, impaired balance, decreased safety awareness.      Pt tolerated session fairly this date and was willing to participate. Demonstrating continued impaired cardiopulmonary response to activity, increased fatigue, increased weakness, impaired balance and decreased safety awareness, requiring increased time to complete functional tasks. Patient found side-lying upon OT entry, complaining of SOB following toileting tasks. Patient cued on pursed breathing technique. Patient completed sit>Stand transfer and functional mobility of household distance within soriano ~30ft with SBA using no AD. Patient with unsafe technique to return to sitting due to increased SOB and fatigue, education provided. Patient is progressing towards established goals, and continues to benefit from acute skilled OT services to increase functional performance and improve quality of life. OT to continue to recommend HHOT at discharge to improve pt  "functional independence and increase patient safety before returning home.      Rehab Prognosis: Good; patient would benefit from acute skilled OT services to address these deficits and reach maximum level of function.        Subjective   Communicated with: Nurse prior to session.  Patient found right sidelying with telemetry, oxygen, peripheral IV upon OT entry to room. Pt agreeable to participate at this time.    Patient: " I just get nervous when I feel myself getting out of breath "    Pain/Comfort:  · Pain Rating 1: 0/10  · Pain Rating Post-Intervention 1: 0/10    Objective:   Patient found with: telemetry, oxygen, peripheral IV   General Precautions: Standard, Cardiac fall   Orthopedic Precautions:N/A   Braces: N/A   Oxygen Device: Nasal Cannula 3L  Vitals: /73   Pulse 91   Temp 98.4 °F (36.9 °C)   Resp 18   Ht 5' 8" (1.727 m)   Wt 72 kg (158 lb 11.7 oz)   SpO2 (!) 89%   Breastfeeding No   BMI 24.14 kg/m²     Outcome Measures:  [facility] 6 Click ADL: 22    Cognition:   · Alert and Cooperative  · Command following: follows one-step commands  · Communication: clear/fluent    Occupational Performance:  Bed Mobility:    · Patient completed Supine to Sit with stand by assistance on R side of bed  · Scooting anteriorly to EOB to have both feet planted on floor: stand by assistance    Functional Mobility/Transfers:   Static Sitting EOB: Supervision   Patient completed Sit <> Stand Transfer from EOB with stand by assistance  with  no assistive device x 3 trials ([facility] level)   Static Standing Balance: SBA   Patient completed BS Transfer Stand Pivot technique with stand by assistance with  no AD   Patient completed functional mobility of household distance ~30ft with SBA using no AD; required cueing for safety; increased SOB.      Activities of Daily Living:  · Lower Body Dressing: independence to doff/don underwear  · Toileting: independence to perform pericare seated on BSC level    [facility] 6 Click " ADL:  AMPAC Total Score: 22    Treatment & Education:  -Pt education on OT role and POC.  -Importance of E/OOB activity with staff assistance, emphasis on daily participation  -Safety during functional transfer and mobility ensured  -Patient provided with education on importance of Bilateral UB/LB integration during functional tasks for improvement in functional performance.   -Education provided/reviewed, questions answered within OT scope of practice.   -Patient demonstrates understanding and learning this date.         Patient left sitting edge of bed with all lines intact, call button in reach and nurse and family present    GOALS:   Multidisciplinary Problems     Occupational Therapy Goals        Problem: Occupational Therapy Goal    Goal Priority Disciplines Outcome Interventions   Occupational Therapy Goal     OT, PT/OT Ongoing, Progressing    Description: Goals to be met by: 3/15/22     Patient will increase functional independence with ADLs by performing:    UE Dressing with Set-up Assistance.  LE Dressing with Stand-by Assistance.  Grooming while standing at sink with Stand-by Assistance.   Toileting from toilet with Supervision for hygiene and clothing management.   Supine to sit with Modified [place]ell.  Step transfer with Supervision  Toilet transfer to toilet with Supervision.                     Time Tracking:     OT Date of Treatment: 02/21/22  OT Start Time: 1442  OT Stop Time: 1455  OT Total Time (min): 13 min    Billable Minutes:Therapeutic Activity 13      2/21/2022

## 2022-02-21 NOTE — ASSESSMENT & PLAN NOTE
"Patient with Hypercapnic and Hypoxic Respiratory failure which is Acute on chronic.  She is on home oxygen at 2-3 LPM. Supplemental oxygen was provided and noted- Oxygen Concentration (%):  [36] 36.   Signs/symptoms of respiratory failure include- tachypnea, increased work of breathing, respiratory distress and wheezing. Contributing diagnoses includes - Aspiration, CHF, COPD, Pleural effusion and Pneumonia Labs and images were reviewed. - VBG showed pCO2 59. Patient Has recent ABG, which has been reviewed. Will treat underlying causes and adjust management of respiratory failure as follows    PLAN:  - On 2-3 L O2 at home  - 2.5 L NC --> 4L NC.   - On 2/15, worsening hypoxia and given IV lasix, IV solumedrol, CXR unchanged, CTA chest negative for PE, troponin negative and TTE normal   - Likely 2/2 to COPD exacerbation due to CAP  - Xopenex q4h while awake  - CXR showed right sided PNA  - Continue home inhalers  - Continue guaifenesin   - Chest physiotherapy and bronchial hygiene  - See "CAP"  - BiPAP HS  - Outpatient pulmonary followup  "

## 2022-02-21 NOTE — ASSESSMENT & PLAN NOTE
- CXR showed opacities in right lung  - s/p 5 days of Ceftriaxone on 2/18  - s/p 3 days azithromycin on 2/16  - Blood cultures NGTD  - Pulmonary consulted  -- Lasix 20mg daily  - Sputum culture ordered  - Procal 0.19  - Continue guaifenesin and nightly prn promethazine-codeine   - CBC daily   - 4L NC (home 2-3L)  - Goal sat >88%

## 2022-02-21 NOTE — PROGRESS NOTES
[facility] Medicine  Progress Note    Patient Name: Estefani Fam  MRN: [id number]  Patient Class: IP- Inpatient   Admission Date: 2/14/2022  Length of Stay: 6 days  Attending Physician: Tremaine Barajas MD  Primary Care Provider: Miles Jackson MD        Subjective:     Principal Problem:Community acquired pneumonia of right lung        HPI:  Ms. Fam is a 71 year old woman with PMH of COPD, chronic respiratory failure on 2-3L home O2, and prior COVID infection who presented to [facility] via EMS for one day history  of shortness of breath at home. In the ED, patient was hypoxic to 84% upon arrival. Patient reports productive cough and pleuritic chest pain. She denies exertional chest pain, diaphoresis, arm pain. At home, she takes home inhalers of Breo Ellipta and Incruse Ellipta and Azithromycin 3 times weekly to help prevent exacerbations in the future.    She was previously hospitalized 11/2021 for COPD exacerbation 2/2 CAP. She was started on oral antibiotics and IV steroids. Patient clinically improved and discharged on 7 day course of levofloxacin and steroid taper. She also was discharged on Roflumilast and outpatient followup with her Pulmonologist Dr. Brooklynn Ruiz.      Overview/Hospital Course:  No notes on file    Interval History:   No events overnight. Patient says she slept well. Breathing has not improved much. Continues to have cough. Denies fevers, nausea, and chest pain.      Review of Systems   Constitutional:  Positive for activity change. Negative for chills, fatigue and fever.   HENT:  Negative for congestion and trouble swallowing.    Eyes:  Negative for visual disturbance.        Vision loss (chronic)   Respiratory:  Positive for cough, chest tightness and shortness of breath.    Cardiovascular:  Negative for chest pain and leg swelling.   Gastrointestinal:  Negative for abdominal distention, abdominal pain, nausea and vomiting.   Endocrine: Negative for cold  intolerance, heat intolerance, polydipsia and polyuria.   Genitourinary:  Negative for difficulty urinating and dysuria.   Musculoskeletal:  Negative for back pain and myalgias.   Skin:  Negative for rash and wound.   Neurological:  Positive for weakness. Negative for dizziness and light-headedness.   Hematological:  Negative for adenopathy. Does not bruise/bleed easily.   Psychiatric/Behavioral:  Negative for confusion and sleep disturbance.    Objective:     Vital Signs (Most Recent):  Temp: 99.7 °F (37.6 °C) (02/20/22 1545)  Pulse: 100 (02/20/22 1545)  Resp: 14 (02/20/22 1355)  BP: 127/60 (02/20/22 1545)  SpO2: (!) 93 % (02/20/22 1545)   Vital Signs (24h Range):  Temp:  [96.3 °F (35.7 °C)-99.7 °F (37.6 °C)] 99.7 °F (37.6 °C)  Pulse:  [] 100  Resp:  [14-24] 14  SpO2:  [87 %-93 %] 93 %  BP: (122-157)/(59-74) 127/60     Weight: 72 kg (158 lb 11.7 oz)  Body mass index is 24.14 kg/m².  No intake or output data in the 24 hours ending 02/20/22 1656   Physical Exam  Constitutional:       General: She is not in acute distress.     Appearance: She is well-developed. She is not ill-appearing.   HENT:      Head: Normocephalic and atraumatic.      Mouth/Throat:      Mouth: Mucous membranes are moist.   Eyes:      Extraocular Movements: Extraocular movements intact.   Neck:      Vascular: No JVD.   Cardiovascular:      Rate and Rhythm: Regular rhythm. Tachycardia present.      Pulses: Normal pulses.      Heart sounds: Normal heart sounds.   Pulmonary:      Effort: No respiratory distress.      Breath sounds: Decreased breath sounds and rales present. No wheezing.   Abdominal:      General: Bowel sounds are normal. There is no distension.      Palpations: Abdomen is soft.      Tenderness: There is no abdominal tenderness.   Musculoskeletal:         General: No deformity. Normal range of motion.      Cervical back: Neck supple.   Lymphadenopathy:      Cervical: No cervical adenopathy.   Skin:     General: Skin is warm and  "dry.      Findings: No erythema or rash.   Neurological:      General: No focal deficit present.      Mental Status: She is alert and oriented to person, place, and time.      Cranial Nerves: No cranial nerve deficit.      Sensory: No sensory deficit.      Motor: Weakness present.   Psychiatric:         Mood and Affect: Mood normal.       Significant Labs: BMP:   Recent Labs   Lab 02/20/22  0533         K 3.4*   CL 95   CO2 34*   BUN 5*   CREATININE 0.5   CALCIUM 8.4*   MG 2.0     CBC:   Recent Labs   Lab 02/20/22  0533   WBC 18.88*   HGB 12.0   HCT 38.2   *       Significant Imaging: I have reviewed all pertinent imaging results/findings within the past 24 hours.      Assessment/Plan:      * Community acquired pneumonia of right lung  - CXR showed opacities in right lung  - s/p 5 days of Ceftriaxone on 2/18  - s/p 3 days azithromycin on 2/16  - Blood cultures NGTD  - Pulmonary consulted  -- Lasix 20mg daily  - Sputum culture ordered  - Procal 0.19  - Continue guaifenesin and nightly prn promethazine-codeine   - CBC daily   - 4L NC (home 2-3L)  - Goal sat >88%    COPD with acute exacerbation  - Continue CAP treatment   - See "CAP"  - Switched prednisone to Iv solumedrol due to worsening hypoxia to complete 5 days before transitioning back to oral prednisone   - S/p 5 days of steroids on 2/18  - Acapella  - Incentive spirometry  - O2 goal >88  - Continue home inhalers  - Continue prn xopenex    Severe sepsis  - 3/4 SIRS (tachycardia, tachypnea, leukocytosis)  - s/p IVF  - See CAP management   - Improved      Acute on chronic respiratory failure with hypoxia  Patient with Hypercapnic and Hypoxic Respiratory failure which is Acute on chronic.  She is on home oxygen at 2-3 LPM. Supplemental oxygen was provided and noted- Oxygen Concentration (%):  [36] 36.   Signs/symptoms of respiratory failure include- tachypnea, increased work of breathing, respiratory distress and wheezing. Contributing diagnoses " "includes - Aspiration, CHF, COPD, Pleural effusion and Pneumonia Labs and images were reviewed. - VBG showed pCO2 59. Patient Has recent ABG, which has been reviewed. Will treat underlying causes and adjust management of respiratory failure as follows    PLAN:  - On 2-3 L O2 at home  - 2.5 L NC --> 4L NC.   - On 2/15, worsening hypoxia and given IV lasix, IV solumedrol, CXR unchanged, CTA chest negative for PE, troponin negative and TTE normal   - Likely 2/2 to COPD exacerbation due to CAP  - Xopenex q4h while awake  - CXR showed right sided PNA  - Continue home inhalers  - Continue guaifenesin   - Chest physiotherapy and bronchial hygiene  - See "CAP"  - BiPAP HS  - Outpatient pulmonary followup      VTE Risk Mitigation (From admission, onward)         Ordered     enoxaparin injection 40 mg  Daily         02/14/22 1817     IP VTE HIGH RISK PATIENT  Once         02/14/22 1817     Place sequential compression device  Until discontinued         02/14/22 1817                Discharge Planning   RENARD: 2/21/2022     Code Status: Full Code   Is the patient medically ready for discharge?: No    Reason for patient still in hospital (select all that apply): Patient trending condition, Laboratory test, Consult recommendations and Pending disposition  Discharge Plan A: Home with family                  Tremaine Barajas MD  Department of Hospital Medicine   [facility] - Med Surg    "

## 2022-02-21 NOTE — ASSESSMENT & PLAN NOTE
"- Continue CAP treatment   - See "CAP"  - Switched prednisone to Iv solumedrol due to worsening hypoxia to complete 5 days before transitioning back to oral prednisone   - S/p 5 days of steroids on 2/18  - Acapella  - Incentive spirometry  - O2 goal >88  - Continue home inhalers  - Continue prn xopenex  "

## 2022-02-21 NOTE — PLAN OF CARE
Pt is AAOx4. Pt remain free from fall and injuries. VS remain stable. Questions and concerns voiced and answered. Medication compliance. Using BSC. O2 sat remain stable Call light in reach. Bed in low locked position. Side rails x2. Belongings at bedside.

## 2022-02-21 NOTE — PT/OT/SLP PROGRESS
Physical Therapy      Patient Name:  Estefani Fam   MRN:  [id number]    Patient not seen today secondary to worked with OT already and too fatigued/SOB to work with PT, requested visit tomorrow. Will follow-up 2/22/22.

## 2022-02-21 NOTE — PLAN OF CARE
[facility]      HOME HEALTH ORDERS  FACE TO FACE ENCOUNTER    Patient Name: Estefani Fam  YOB: 1950    PCP: Miles Jackson MD   [facility] [address] [facility] [address]  PCP Phone Number: [phone]  PCP Fax: [phone]    Encounter Date: 2/14/22    Admit to Home Health    Diagnoses:  Active Hospital Problems    Diagnosis  POA    *Community acquired pneumonia of right lung [J18.9]  Yes     Priority: 1 - High    COPD with acute exacerbation [J44.1]  Yes     Priority: 1 - High    Severe sepsis [A41.9, R65.20]  Yes     Priority: 3     Acute on chronic respiratory failure with hypoxia [J96.21]  Yes     Priority: 3       Resolved Hospital Problems   No resolved problems to display.       Follow Up Appointments:  Future Appointments   Date Time Provider Department Center   4/22/2022  2:15 PM INJECTION [facility] INF [facility]       Allergies:  Review of patient's allergies indicates:   Allergen Reactions    Albuterol     Ipratropium analogues      Difficulty breathing       Medications: Review discharge medications with patient and family and provide education.    Current Facility-Administered Medications   Medication Dose Route Frequency Provider Last Rate Last Admin    acetaminophen tablet 650 mg  650 mg Oral Q4H PRN Gianluca Zarate MD   650 mg at 02/17/22 0903    ascorbic acid (vitamin C) tablet 500 mg  500 mg Oral BID Gianluca Zarate MD   500 mg at 02/21/22 0851    benzonatate capsule 200 mg  200 mg Oral TID PRN Gianluca Zarate MD        calcium carbonate 200 mg calcium (500 mg) chewable tablet 500 mg  500 mg Oral Daily Gianluca Zarate MD   500 mg at 02/21/22 0851    dextrose 10% bolus 125 mL  12.5 g Intravenous PRN Gianluca Zarate MD        dextrose 10% bolus 250 mL  25 g Intravenous PRN Gianluca Zarate MD        enoxaparin injection 40 mg  40 mg Subcutaneous Daily Gianluca Zarate MD   40 mg at 02/20/22 1700    fluticasone  furoate-vilanteroL 200-25 mcg/dose diskus inhaler 1 puff  1 puff Inhalation Daily Gianluca Zarate MD   1 puff at 02/21/22 0734    furosemide tablet 20 mg  20 mg Oral Daily Tremaine Barajas MD   20 mg at 02/21/22 0851    glucagon (human recombinant) injection 1 mg  1 mg Intramuscular PRN Gianluca Zarate MD        glucose chewable tablet 16 g  16 g Oral PRN Gianluca Zarate MD        glucose chewable tablet 24 g  24 g Oral PRN Gianluca Zarate MD        guaiFENesin 100 mg/5 ml syrup 400 mg  400 mg Oral Q6H Gianluca Zarate MD   400 mg at 02/21/22 0651    levalbuterol nebulizer solution 1.25 mg  1.25 mg Nebulization Q6H WAKE Gianluca Zarate MD   1.25 mg at 02/21/22 1316    melatonin tablet 6 mg  6 mg Oral Nightly PRN Gianluca Zarate MD        multivitamin tablet  1 tablet Oral Daily Gianluca Zarate MD   1 tablet at 02/21/22 0851    naloxone 0.4 mg/mL injection 0.02 mg  0.02 mg Intravenous PRN Gianluca Zarate MD        ondansetron injection 4 mg  4 mg Intravenous Q8H PRN Gianluca Zarate MD        pantoprazole EC tablet 40 mg  40 mg Oral Daily Gianluca Zarate MD   40 mg at 02/21/22 0851    prochlorperazine injection Soln 5 mg  5 mg Intravenous Q6H PRN Gianluca Zarate MD        promethazine-codeine 6.25-10 mg/5 ml syrup 5 mL  5 mL Oral Nightly PRN Gianluca Zarate MD        senna-docusate 8.6-50 mg per tablet 1 tablet  1 tablet Oral BID PRN Gianluca Zarate MD   1 tablet at 02/19/22 1628    sodium chloride 0.65 % nasal spray 2 spray  2 spray Each Nostril BID Gianluca Zarate MD   2 spray at 02/21/22 0900    sodium chloride 0.9% flush 10 mL  10 mL Intravenous Q12H PRN Gianluca Zarate MD   10 mL at 02/20/22 2112    tiotropium bromide 2.5 mcg/actuation inhaler 2 puff  2 puff Inhalation Daily Gianluca Zarate MD   2 puff at 02/21/22 0734    tramadol split tablet 25 mg  25 mg Oral Q6H PRN Gianluca Zarate MD         Current Discharge Medication List      CONTINUE these  medications which have NOT CHANGED    Details   acetylcysteine 600 mg Cap Take 1 capsule (600 mg total) by mouth 2 (two) times daily.  Qty: 90 capsule, Refills: 3      ascorbic acid, vitamin C, (VITAMIN C) 500 MG tablet Take 500 mg by mouth 2 (two) times daily.       azelastine (ASTELIN) 137 mcg (0.1 %) nasal spray INHALE 1 SPRAY BY NASAL ROUTE TWICE DAILY OR AS DIRECTED  Qty: 30 mL, Refills: 3      azithromycin (Z-JERSEY) 250 MG tablet TAKE 1 TABLET BY MOUTH EVERY MONDAY WEDNESDAY AND FRIDAY  Qty: 36 tablet, Refills: 3      BREO ELLIPTA 200-25 mcg/dose DsDv diskus inhaler INHALE 1 PUFF INTO THE LUNGS DAILY  Qty: 60 each, Refills: 11      calcium carbonate (OS-STEPHANIE) 600 mg calcium (1,500 mg) Tab Take 1 tablet (600 mg total) by mouth once daily. Take Levofloxacin antibiotic at least 2 hours before calcium.  Refills: 0      fluticasone propionate (FLONASE) 50 mcg/actuation nasal spray INSTILL 1 SPRAY IN EACH NOSTRIL EVERY DAY  Qty: 16 g, Refills: 0      levalbuterol (XOPENEX) 0.63 mg/3 mL nebulizer solution USE 1 VIAL IN NEBULIZER EVERY 8 HOURS AS NEEDED FOR WHEEZE  Qty: 750 mL, Refills: 2    Associated Diagnoses: COPD (chronic obstructive pulmonary disease) with acute bronchitis      MULTIVIT-IRON-MIN-FOLIC ACID 3,500-18-0.4 UNIT-MG-MG ORAL CHEW Take 1 tablet by mouth once daily. Take Levofloxacin antibiotic at least 2 hours before multivitamin.  Refills: 0      pantoprazole (PROTONIX) 40 MG tablet Take 1 tablet (40 mg total) by mouth once daily.  Qty: 30 tablet, Refills: 11      pulse oximeter (PULSE OXIMETER) device by Apply Externally route 2 (two) times a day. Use twice daily at 8 AM and 3 PM and record the value in [employer] as directed.  Qty: 1 each, Refills: 0    Comments: This is a NO CHARGE item.  Please override price to zero.  DO NOT PRINT.  NORMAL MODE e-PRESCRIBE ONLY.      sodium chloride (OCEAN) 0.65 % nasal spray 1 spray by Nasal route 2 (two) times daily.  Qty: 88 mL, Refills: 12      umeclidinium (INCRUSE  ELLIPTA) 62.5 mcg/actuation inhalation capsule INHALE 1 PUFF BY MOUTH INTO LUNGS ONCE DAILY  Qty: 30 each, Refills: 20      roflumilast (DALIRESP) 250 mcg Tab Take 1 tablet (250 mcg total) by mouth once daily.  Qty: 30 tablet, Refills: 11    Associated Diagnoses: COPD exacerbation               I have seen and examined this patient within the last 30 days. My clinical findings that support the need for the home health skilled services and home bound status are the following:no   Weakness/numbness causing balance and gait disturbance due to COPD Exacerbation making it taxing to leave home.     Diet:   regular diet    Labs:  Report Lab results to PCP.    Referrals/ Consults  Physical Therapy to evaluate and treat. Evaluate for home safety and equipment needs; Establish/upgrade home exercise program. Perform / instruct on therapeutic exercises, gait training, transfer training, and Range of Motion.  Occupational Therapy to evaluate and treat. Evaluate home environment for safety and equipment needs. Perform/Instruct on transfers, ADL training, ROM, and therapeutic exercises.    Activities:   activity as tolerated    Nursing:   Agency to admit patient within 24 hours of hospital discharge unless specified on physician order or at patient request    SN to complete comprehensive assessment including routine vital signs. Instruct on disease process and s/s of complications to report to MD. Review/verify medication list sent home with the patient at time of discharge  and instruct patient/caregiver as needed. Frequency may be adjusted depending on start of care date.     Skilled nurse to perform up to 3 visits PRN for symptoms related to diagnosis    Notify MD if SBP > 160 or < 90; DBP > 90 or < 50; HR > 120 or < 50; Temp > 101; O2 < 88%; Other:   n/a    Ok to schedule additional visits based on staff availability and patient request on consecutive days within the home health episode.    When multiple disciplines  ordered:    Start of Care occurs on Sunday - Wednesday schedule remaining discipline evaluations as ordered on separate consecutive days following the start of care.    Thursday SOC -schedule subsequent evaluations Friday and Monday the following week.     Friday - Saturday SOC - schedule subsequent discipline evaluations on consecutive days starting Monday of the following week.    For all post-discharge communication and subsequent orders please contact patient's primary care physician. If unable to reach primary care physician or do not receive response within 30 minutes, please contact [facility] for clinical staff order clarification    Miscellaneous   Home Oxygen:  No change    Home Health Aide:  Physical Therapy Three times weekly and Occupational Therapy Three times weekly    Wound Care Orders  no    I certify that this patient is confined to her home and needs physical therapy and occupational therapy.

## 2022-02-22 LAB
ANION GAP SERPL CALC-SCNC: 8 MMOL/L (ref 8–16)
BASOPHILS # BLD AUTO: 0.1 K/UL (ref 0–0.2)
BASOPHILS NFR BLD: 0.7 % (ref 0–1.9)
BUN SERPL-MCNC: 8 MG/DL (ref 8–23)
CALCIUM SERPL-MCNC: 8.6 MG/DL (ref 8.7–10.5)
CHLORIDE SERPL-SCNC: 95 MMOL/L (ref 95–110)
CO2 SERPL-SCNC: 32 MMOL/L (ref 23–29)
CREAT SERPL-MCNC: 0.5 MG/DL (ref 0.5–1.4)
DIFFERENTIAL METHOD: ABNORMAL
EOSINOPHIL # BLD AUTO: 0.5 K/UL (ref 0–0.5)
EOSINOPHIL NFR BLD: 3.8 % (ref 0–8)
ERYTHROCYTE [DISTWIDTH] IN BLOOD BY AUTOMATED COUNT: 14.4 % (ref 11.5–14.5)
EST. GFR  (AFRICAN AMERICAN): >60 ML/MIN/1.73 M^2
EST. GFR  (NON AFRICAN AMERICAN): >60 ML/MIN/1.73 M^2
GLUCOSE SERPL-MCNC: 93 MG/DL (ref 70–110)
HCT VFR BLD AUTO: 34.1 % (ref 37–48.5)
HGB BLD-MCNC: 11 G/DL (ref 12–16)
IMM GRANULOCYTES # BLD AUTO: 0.45 K/UL (ref 0–0.04)
IMM GRANULOCYTES NFR BLD AUTO: 3.2 % (ref 0–0.5)
LYMPHOCYTES # BLD AUTO: 1.6 K/UL (ref 1–4.8)
LYMPHOCYTES NFR BLD: 11.5 % (ref 18–48)
MAGNESIUM SERPL-MCNC: 2.1 MG/DL (ref 1.6–2.6)
MCH RBC QN AUTO: 30.6 PG (ref 27–31)
MCHC RBC AUTO-ENTMCNC: 32.3 G/DL (ref 32–36)
MCV RBC AUTO: 95 FL (ref 82–98)
MONOCYTES # BLD AUTO: 1.3 K/UL (ref 0.3–1)
MONOCYTES NFR BLD: 8.9 % (ref 4–15)
NEUTROPHILS # BLD AUTO: 10.1 K/UL (ref 1.8–7.7)
NEUTROPHILS NFR BLD: 71.9 % (ref 38–73)
NRBC BLD-RTO: 0 /100 WBC
PLATELET # BLD AUTO: 515 K/UL (ref 150–450)
PMV BLD AUTO: 9.2 FL (ref 9.2–12.9)
POTASSIUM SERPL-SCNC: 3.7 MMOL/L (ref 3.5–5.1)
RBC # BLD AUTO: 3.6 M/UL (ref 4–5.4)
SODIUM SERPL-SCNC: 135 MMOL/L (ref 136–145)
WBC # BLD AUTO: 14.06 K/UL (ref 3.9–12.7)

## 2022-02-22 PROCEDURE — 25000003 PHARM REV CODE 250: Performed by: STUDENT IN AN ORGANIZED HEALTH CARE EDUCATION/TRAINING PROGRAM

## 2022-02-22 PROCEDURE — 36415 COLL VENOUS BLD VENIPUNCTURE: CPT | Performed by: STUDENT IN AN ORGANIZED HEALTH CARE EDUCATION/TRAINING PROGRAM

## 2022-02-22 PROCEDURE — 25000003 PHARM REV CODE 250: Performed by: INTERNAL MEDICINE

## 2022-02-22 PROCEDURE — 85025 COMPLETE CBC W/AUTO DIFF WBC: CPT | Performed by: STUDENT IN AN ORGANIZED HEALTH CARE EDUCATION/TRAINING PROGRAM

## 2022-02-22 PROCEDURE — 94668 MNPJ CHEST WALL SBSQ: CPT

## 2022-02-22 PROCEDURE — 11000001 HC ACUTE MED/SURG PRIVATE ROOM

## 2022-02-22 PROCEDURE — 27000221 HC OXYGEN, UP TO 24 HOURS

## 2022-02-22 PROCEDURE — 94799 UNLISTED PULMONARY SVC/PX: CPT

## 2022-02-22 PROCEDURE — 97535 SELF CARE MNGMENT TRAINING: CPT

## 2022-02-22 PROCEDURE — 80048 BASIC METABOLIC PNL TOTAL CA: CPT | Performed by: STUDENT IN AN ORGANIZED HEALTH CARE EDUCATION/TRAINING PROGRAM

## 2022-02-22 PROCEDURE — 99233 SBSQ HOSP IP/OBS HIGH 50: CPT | Mod: GC,,, | Performed by: STUDENT IN AN ORGANIZED HEALTH CARE EDUCATION/TRAINING PROGRAM

## 2022-02-22 PROCEDURE — 94640 AIRWAY INHALATION TREATMENT: CPT

## 2022-02-22 PROCEDURE — 99232 SBSQ HOSP IP/OBS MODERATE 35: CPT | Mod: ,,, | Performed by: STUDENT IN AN ORGANIZED HEALTH CARE EDUCATION/TRAINING PROGRAM

## 2022-02-22 PROCEDURE — 63600175 PHARM REV CODE 636 W HCPCS: Performed by: INTERNAL MEDICINE

## 2022-02-22 PROCEDURE — 99900035 HC TECH TIME PER 15 MIN (STAT)

## 2022-02-22 PROCEDURE — 94664 DEMO&/EVAL PT USE INHALER: CPT

## 2022-02-22 PROCEDURE — 25000242 PHARM REV CODE 250 ALT 637 W/ HCPCS: Performed by: INTERNAL MEDICINE

## 2022-02-22 PROCEDURE — 83735 ASSAY OF MAGNESIUM: CPT | Performed by: STUDENT IN AN ORGANIZED HEALTH CARE EDUCATION/TRAINING PROGRAM

## 2022-02-22 PROCEDURE — 94761 N-INVAS EAR/PLS OXIMETRY MLT: CPT

## 2022-02-22 PROCEDURE — 27000646 HC AEROBIKA DEVICE

## 2022-02-22 PROCEDURE — 99233 PR SUBSEQUENT HOSPITAL CARE,LEVL III: ICD-10-PCS | Mod: GC,,, | Performed by: STUDENT IN AN ORGANIZED HEALTH CARE EDUCATION/TRAINING PROGRAM

## 2022-02-22 PROCEDURE — 97530 THERAPEUTIC ACTIVITIES: CPT

## 2022-02-22 PROCEDURE — 99232 PR SUBSEQUENT HOSPITAL CARE,LEVL II: ICD-10-PCS | Mod: ,,, | Performed by: STUDENT IN AN ORGANIZED HEALTH CARE EDUCATION/TRAINING PROGRAM

## 2022-02-22 RX ADMIN — NASAL 2 SPRAY: 6.5 SPRAY NASAL at 09:02

## 2022-02-22 RX ADMIN — PANTOPRAZOLE SODIUM 40 MG: 40 TABLET, DELAYED RELEASE ORAL at 09:02

## 2022-02-22 RX ADMIN — GUAIFENESIN 400 MG: 100 SOLUTION ORAL at 06:02

## 2022-02-22 RX ADMIN — ENOXAPARIN SODIUM 40 MG: 100 INJECTION SUBCUTANEOUS at 04:02

## 2022-02-22 RX ADMIN — LEVALBUTEROL 1.25 MG: 1.25 SOLUTION, CONCENTRATE RESPIRATORY (INHALATION) at 07:02

## 2022-02-22 RX ADMIN — TIOTROPIUM BROMIDE INHALATION SPRAY 2 PUFF: 3.12 SPRAY, METERED RESPIRATORY (INHALATION) at 07:02

## 2022-02-22 RX ADMIN — FLUTICASONE FUROATE AND VILANTEROL TRIFENATATE 1 PUFF: 200; 25 POWDER RESPIRATORY (INHALATION) at 07:02

## 2022-02-22 RX ADMIN — GUAIFENESIN 400 MG: 100 SOLUTION ORAL at 12:02

## 2022-02-22 RX ADMIN — CALCIUM CARBONATE (ANTACID) CHEW TAB 500 MG 500 MG: 500 CHEW TAB at 09:02

## 2022-02-22 RX ADMIN — LEVALBUTEROL 1.25 MG: 1.25 SOLUTION, CONCENTRATE RESPIRATORY (INHALATION) at 01:02

## 2022-02-22 RX ADMIN — OXYCODONE HYDROCHLORIDE AND ACETAMINOPHEN 500 MG: 500 TABLET ORAL at 09:02

## 2022-02-22 RX ADMIN — GUAIFENESIN 400 MG: 100 SOLUTION ORAL at 05:02

## 2022-02-22 RX ADMIN — FUROSEMIDE 20 MG: 20 TABLET ORAL at 09:02

## 2022-02-22 RX ADMIN — THERA TABS 1 TABLET: TAB at 09:02

## 2022-02-22 NOTE — ASSESSMENT & PLAN NOTE
"- See "CAP"  - Switched prednisone to Iv solumedrol due to worsening hypoxia to complete 5 days before transitioning back to oral prednisone   - S/p 5 days of steroids on 2/18  - Discharge with TIW Azithromycin per pulm  - Acapella, Chest PT  - Incentive spirometry  - O2 goal >88  - Continue home inhalers  - Continue prn xopenex  "

## 2022-02-22 NOTE — ASSESSMENT & PLAN NOTE
- CXR showed opacities in right lung  - s/p 5 days of Ceftriaxone on 2/18  - s/p 3 days azithromycin on 2/16  - Blood cultures NGTD  - Pulmonary consulted  -- Lasix 20mg daily   - Procal 0.19  - Continue guaifenesin  - CBC daily   - Chest PT, Acapella device  - 3L NC (home 2-3L)  - Goal sat >88%

## 2022-02-22 NOTE — PLAN OF CARE
[facility]  Discharge Reassessment    Primary Care Provider: Miles Jackson MD    Expected Discharge Date: 2/23/2022    Reassessment (most recent)     Discharge Reassessment - 02/22/22 1527        Discharge Reassessment    Assessment Type Discharge Planning Reassessment     Did the patient's condition or plan change since previous assessment? No     Discharge Plan discussed with: Patient     Discharge Plan A Home Health     Discharge Plan B Home with family     DME Needed Upon Discharge  other (see comments)   TBD    Discharge Barriers Identified None     Why the patient remains in the hospital Requires continued medical care        Post-Acute Status    Post-Acute Authorization Home Health     Home Health Status Pending medical clearance/testing                 Ronel Parker RN  Ext [phone]

## 2022-02-22 NOTE — PLAN OF CARE
Pt is AAOx4. Pt remain free from fall and injuries. VS remain stable. Questions and concerns voiced and answered. Medication compliance. Using BSC. O2 sat remain stable@ 3 L per NC. .Call light in reach. Bed in low locked position. Side rails x2. Belongings at bedside.

## 2022-02-22 NOTE — ASSESSMENT & PLAN NOTE
"- See "CAP"  - Switched prednisone to Iv solumedrol due to worsening hypoxia to complete 5 days before transitioning back to oral prednisone   - S/p 5 days of steroids on 2/18  - Acapella  - Chest PT  - Incentive spirometry  - O2 goal >88  - Continue home inhalers  - Continue prn xopenex  "

## 2022-02-22 NOTE — ASSESSMENT & PLAN NOTE
"Patient with Hypercapnic and Hypoxic Respiratory failure which is Acute on chronic.  She is on home oxygen at 2-3 LPM. Supplemental oxygen was provided and noted-  .   Signs/symptoms of respiratory failure include- tachypnea, increased work of breathing, respiratory distress and wheezing. Contributing diagnoses includes - Aspiration, CHF, COPD, Pleural effusion and Pneumonia Labs and images were reviewed. - VBG showed pCO2 59. Patient Has recent ABG, which has been reviewed. Will treat underlying causes and adjust management of respiratory failure as follows    PLAN:  - On 2-3 L O2 at home  - On 2/15, worsening hypoxia and given IV lasix, IV solumedrol, CXR unchanged, CTA chest negative for PE, troponin negative and TTE normal   - Likely 2/2 to COPD exacerbation due to CAP  - Xopenex q4h while awake  - CXR showed right sided PNA  - Continue home inhalers  - Continue guaifenesin   - Chest physiotherapy and bronchial hygiene  - See "CAP"  - BiPAP HS  - Outpatient pulmonary followup  "

## 2022-02-22 NOTE — PT/OT/SLP PROGRESS
"Occupational Therapy   Treatment    Name: Estefani Fam  MRN: [id number]  Admitting Diagnosis:  Community acquired pneumonia of right lung       Recommendations:     Discharge Recommendations: home health OT  Discharge Equipment Recommendations:  shower chair  Barriers to discharge:  Decreased caregiver support (spouse is not able to provide physical assist)    Assessment:     Estefani Fam is a 71 y.o. female with a medical diagnosis of Community acquired pneumonia of right lung.  She presents with good interaction and SOB. Performance deficits affecting function are weakness, impaired functional mobilty, impaired cognition, decreased safety awareness, impaired cardiopulmonary response to activity, impaired endurance, gait instability, impaired balance, impaired self care skills. Patient struggles to understand use of IS with multiple lessions. She sat at EOB with saturation of 88% on 3L NC. She transferred from EOB to [facility] and completed toileting with no assistance. She walked from EOB to door and back 25' with desaturation to 79%, greater then 3 minutes of pursed lip breathing to return to 88%. Impulsive with ambulation, good awareness of respiratory status.    Rehab Prognosis:  Fair; patient would benefit from acute skilled OT services to address these deficits and reach maximum level of function.       Plan:     Patient to be seen 3 x/week to address the above listed problems via self-care/home management, therapeutic activities, therapeutic exercises  · Plan of Care Expires: 03/01/22  · Plan of Care Reviewed with: patient    Subjective   "I cant walk and catch my breath."  Pain/Comfort:  · Pain Rating 1: 0/10  · Pain Rating Post-Intervention 1: 0/10    Objective:     Communicated with: RN prior to session.  Patient found sitting edge of bed with telemetry, oxygen upon OT entry to room.    General Precautions: Standard, fall   Orthopedic Precautions:N/A   Braces: N/A  Respiratory Status: Nasal cannula, " flow 3 L/min     Occupational Performance:     Functional Mobility/Transfers:  · Patient completed Sit <> Stand Transfer with stand by assistance  with  no assistive device   · Patient completed Toilet Transfer Step Transfer technique with stand by assistance with  no AD  · Functional Mobility: in room with desaturation to 79% on 3L NC    Activities of Daily Living:  · Toileting: supervision [facility] 6 Click ADL: 22    Treatment & Education:  Education on IS, education on use of pulse-ox    Patient left sitting edge of bed with all lines intact and call button in reachEducation:      GOALS:   Multidisciplinary Problems     Occupational Therapy Goals        Problem: Occupational Therapy Goal    Goal Priority Disciplines Outcome Interventions   Occupational Therapy Goal     OT, PT/OT Ongoing, Progressing    Description: Goals to be met by: 3/15/22     Patient will increase functional independence with ADLs by performing:    UE Dressing with Set-up Assistance.  LE Dressing with Stand-by Assistance.  Grooming while standing at sink with Stand-by Assistance.   Toileting from toilet with Supervision for hygiene and clothing management.   Supine to sit with Modified [place].  Step transfer with Supervision  Toilet transfer to toilet with Supervision.                     Time Tracking:     OT Date of Treatment: 02/22/22  OT Start Time: 1545  OT Stop Time: 1610  OT Total Time (min): 25 min    Billable Minutes:Self Care/Home Management 13  Therapeutic Activity 12    OT/GREGORIA: OT          2/22/2022

## 2022-02-22 NOTE — SUBJECTIVE & OBJECTIVE
Interval History:   No events overnight. Patient continues to have persistent dyspnea and cough. Respiratory therapy at bedside, patient noted to satting 88% on 3L. Denies fevers, chest pain, nausea, and lightheadedness.      Review of Systems   Constitutional:  Positive for activity change. Negative for chills, fatigue and fever.   HENT:  Negative for congestion and trouble swallowing.    Eyes:  Negative for visual disturbance.        Vision loss (chronic)   Respiratory:  Positive for cough and shortness of breath. Negative for chest tightness.    Cardiovascular:  Negative for chest pain and leg swelling.   Gastrointestinal:  Negative for abdominal distention, abdominal pain, nausea and vomiting.   Endocrine: Negative for cold intolerance, heat intolerance, polydipsia and polyuria.   Genitourinary:  Negative for difficulty urinating and dysuria.   Musculoskeletal:  Negative for back pain and myalgias.   Skin:  Negative for rash and wound.   Neurological:  Positive for weakness. Negative for dizziness and light-headedness.   Hematological:  Negative for adenopathy. Does not bruise/bleed easily.   Psychiatric/Behavioral:  Negative for confusion and sleep disturbance.    Objective:     Vital Signs (Most Recent):  Temp: 98.4 °F (36.9 °C) (02/22/22 1532)  Pulse: 105 (02/22/22 1532)  Resp: 16 (02/22/22 1532)  BP: 115/62 (02/22/22 1532)  SpO2: (!) 88 % (02/22/22 1532)   Vital Signs (24h Range):  Temp:  [97.1 °F (36.2 °C)-98.8 °F (37.1 °C)] 98.4 °F (36.9 °C)  Pulse:  [] 105  Resp:  [16-22] 16  SpO2:  [87 %-93 %] 88 %  BP: (113-134)/(56-64) 115/62     Weight: 72 kg (158 lb 11.7 oz)  Body mass index is 24.14 kg/m².  No intake or output data in the 24 hours ending 02/22/22 1537   Physical Exam  Constitutional:       General: She is not in acute distress.     Appearance: She is well-developed. She is not ill-appearing.   HENT:      Head: Normocephalic and atraumatic.      Mouth/Throat:      Mouth: Mucous membranes are  moist.   Eyes:      Extraocular Movements: Extraocular movements intact.   Neck:      Vascular: No JVD.   Cardiovascular:      Rate and Rhythm: Regular rhythm. Tachycardia present.      Pulses: Normal pulses.      Heart sounds: Normal heart sounds.   Pulmonary:      Effort: Prolonged expiration present. No respiratory distress.      Breath sounds: Decreased breath sounds and rhonchi present. No wheezing or rales.   Abdominal:      General: Bowel sounds are normal. There is no distension.      Palpations: Abdomen is soft.      Tenderness: There is no abdominal tenderness.   Musculoskeletal:         General: No deformity. Normal range of motion.      Cervical back: Neck supple.   Lymphadenopathy:      Cervical: No cervical adenopathy.   Skin:     General: Skin is warm and dry.      Findings: No erythema or rash.   Neurological:      General: No focal deficit present.      Mental Status: She is alert and oriented to person, place, and time.      Cranial Nerves: No cranial nerve deficit.      Sensory: No sensory deficit.      Motor: Weakness present.   Psychiatric:         Mood and Affect: Mood normal.       Significant Labs: BMP:   Recent Labs   Lab 02/22/22  0320   GLU 93   *   K 3.7   CL 95   CO2 32*   BUN 8   CREATININE 0.5   CALCIUM 8.6*   MG 2.1     CBC:   Recent Labs   Lab 02/21/22  0313 02/22/22  0320   WBC 16.79* 14.06*   HGB 11.2* 11.0*   HCT 35.6* 34.1*   * 515*       Significant Imaging: I have reviewed all pertinent imaging results/findings within the past 24 hours.

## 2022-02-22 NOTE — ASSESSMENT & PLAN NOTE
- CXR showed opacities in right lung  - s/p 5 days of Ceftriaxone on 2/18  - s/p 3 days azithromycin on 2/16  - Blood cultures NGTD  - Pulmonary consulted  -- Lasix 20mg daily  - Procal 0.19  - Continue guaifenesin  - CBC daily   - 4L NC (home 2-3L)  - Goal sat >88%

## 2022-02-22 NOTE — PROGRESS NOTES
[facility] Medicine  Progress Note    Patient Name: Estefani Fam  MRN: [id number]  Patient Class: IP- Inpatient   Admission Date: 2/14/2022  Length of Stay: 7 days  Attending Physician: Tremaine Barajas MD  Primary Care Provider: Miles Jackson MD        Subjective:     Principal Problem:Community acquired pneumonia of right lung        HPI:  Ms. Fam is a 71 year old woman with PMH of COPD, chronic respiratory failure on 2-3L home O2, and prior COVID infection who presented to [facility]ED via EMS for one day history  of shortness of breath at home. In the ED, patient was hypoxic to 84% upon arrival. Patient reports productive cough and pleuritic chest pain. She denies exertional chest pain, diaphoresis, arm pain. At home, she takes home inhalers of Breo Ellipta and Incruse Ellipta and Azithromycin 3 times weekly to help prevent exacerbations in the future.    She was previously hospitalized 11/2021 for COPD exacerbation 2/2 CAP. She was started on oral antibiotics and IV steroids. Patient clinically improved and discharged on 7 day course of levofloxacin and steroid taper. She also was discharged on Roflumilast and outpatient followup with her Pulmonologist Dr. Brooklynn Ruiz.      Overview/Hospital Course:  No notes on file    Interval History:   No events overnight. Patient continue to have cough and some dyspnea. Reports improved dyspnea this morning with chest PT. Wanting to work with PT and OT.      Review of Systems   Constitutional:  Positive for activity change. Negative for chills, fatigue and fever.   HENT:  Negative for congestion and trouble swallowing.    Eyes:  Negative for visual disturbance.        Vision loss (chronic)   Respiratory:  Positive for cough and shortness of breath. Negative for chest tightness.    Cardiovascular:  Negative for chest pain and leg swelling.   Gastrointestinal:  Negative for abdominal distention, abdominal pain, nausea and vomiting.   Endocrine:  Negative for cold intolerance, heat intolerance, polydipsia and polyuria.   Genitourinary:  Negative for difficulty urinating and dysuria.   Musculoskeletal:  Negative for back pain and myalgias.   Skin:  Negative for rash and wound.   Neurological:  Positive for weakness. Negative for dizziness and light-headedness.   Hematological:  Negative for adenopathy. Does not bruise/bleed easily.   Psychiatric/Behavioral:  Negative for confusion and sleep disturbance.    Objective:     Vital Signs (Most Recent):  Temp: 97.6 °F (36.4 °C) (02/21/22 1635)  Pulse: 105 (02/21/22 1635)  Resp: 20 (02/21/22 1635)  BP: (!) 119/58 (02/21/22 1635)  SpO2: (!) 87 % (02/21/22 1635)   Vital Signs (24h Range):  Temp:  [96.6 °F (35.9 °C)-98.4 °F (36.9 °C)] 97.6 °F (36.4 °C)  Pulse:  [] 105  Resp:  [18-22] 20  SpO2:  [87 %-93 %] 87 %  BP: (119-155)/(56-73) 119/58     Weight: 72 kg (158 lb 11.7 oz)  Body mass index is 24.14 kg/m².    Intake/Output Summary (Last 24 hours) at 2/21/2022 1939  Last data filed at 2/21/2022 0630  Gross per 24 hour   Intake 120 ml   Output --   Net 120 ml      Physical Exam  Constitutional:       General: She is not in acute distress.     Appearance: She is well-developed. She is not ill-appearing.   HENT:      Head: Normocephalic and atraumatic.      Mouth/Throat:      Mouth: Mucous membranes are moist.   Eyes:      Extraocular Movements: Extraocular movements intact.   Neck:      Vascular: No JVD.   Cardiovascular:      Rate and Rhythm: Regular rhythm. Tachycardia present.      Pulses: Normal pulses.      Heart sounds: Normal heart sounds.   Pulmonary:      Effort: Prolonged expiration present. No respiratory distress.      Breath sounds: Decreased breath sounds and rhonchi present. No wheezing or rales.   Abdominal:      General: Bowel sounds are normal. There is no distension.      Palpations: Abdomen is soft.      Tenderness: There is no abdominal tenderness.   Musculoskeletal:         General: No  "deformity. Normal range of motion.      Cervical back: Neck supple.   Lymphadenopathy:      Cervical: No cervical adenopathy.   Skin:     General: Skin is warm and dry.      Findings: No erythema or rash.   Neurological:      General: No focal deficit present.      Mental Status: She is alert and oriented to person, place, and time.      Cranial Nerves: No cranial nerve deficit.      Sensory: No sensory deficit.      Motor: Weakness present.   Psychiatric:         Mood and Affect: Mood normal.       Significant Labs: BMP:   Recent Labs   Lab 02/21/22  0313         K 3.6   CL 95   CO2 34*   BUN 7*   CREATININE 0.5   CALCIUM 8.6*   MG 2.0     CBC:   Recent Labs   Lab 02/20/22  0533 02/21/22  0313   WBC 18.88* 16.79*   HGB 12.0 11.2*   HCT 38.2 35.6*   * 500*       Significant Imaging: I have reviewed all pertinent imaging results/findings within the past 24 hours.      Assessment/Plan:      * Community acquired pneumonia of right lung  - CXR showed opacities in right lung  - s/p 5 days of Ceftriaxone on 2/18  - s/p 3 days azithromycin on 2/16  - Blood cultures NGTD  - Pulmonary consulted  -- Lasix 20mg daily  - Procal 0.19  - Continue guaifenesin  - CBC daily   - 4L NC (home 2-3L)  - Goal sat >88%    COPD with acute exacerbation  - See "CAP"  - Switched prednisone to Iv solumedrol due to worsening hypoxia to complete 5 days before transitioning back to oral prednisone   - S/p 5 days of steroids on 2/18  - Acapella  - Chest PT  - Incentive spirometry  - O2 goal >88  - Continue home inhalers  - Continue prn xopenex    Severe sepsis  - 3/4 SIRS (tachycardia, tachypnea, leukocytosis)  - s/p IVF  - See CAP management   - Improved      Acute on chronic respiratory failure with hypoxia  Patient with Hypercapnic and Hypoxic Respiratory failure which is Acute on chronic.  She is on home oxygen at 2-3 LPM. Supplemental oxygen was provided and noted- Oxygen Concentration (%):  [36] 36.   Signs/symptoms of " "respiratory failure include- tachypnea, increased work of breathing, respiratory distress and wheezing. Contributing diagnoses includes - Aspiration, CHF, COPD, Pleural effusion and Pneumonia Labs and images were reviewed. - VBG showed pCO2 59. Patient Has recent ABG, which has been reviewed. Will treat underlying causes and adjust management of respiratory failure as follows    PLAN:  - On 2-3 L O2 at home  - 2.5 L NC --> 4L NC.   - On 2/15, worsening hypoxia and given IV lasix, IV solumedrol, CXR unchanged, CTA chest negative for PE, troponin negative and TTE normal   - Likely 2/2 to COPD exacerbation due to CAP  - Xopenex q4h while awake  - CXR showed right sided PNA  - Continue home inhalers  - Continue guaifenesin   - Chest physiotherapy and bronchial hygiene  - See "CAP"  - BiPAP HS  - Outpatient pulmonary followup      VTE Risk Mitigation (From admission, onward)         Ordered     enoxaparin injection 40 mg  Daily         02/14/22 1817     IP VTE HIGH RISK PATIENT  Once         02/14/22 1817     Place sequential compression device  Until discontinued         02/14/22 1817                Discharge Planning   RENARD: 2/22/2022     Code Status: Full Code   Is the patient medically ready for discharge?: No    Reason for patient still in hospital (select all that apply): Patient trending condition, Laboratory test and Pending disposition  Discharge Plan A: Home with family                  Tremaine Barajas MD  Department of Hospital Medicine   [facility] - Med Surg    "

## 2022-02-22 NOTE — SUBJECTIVE & OBJECTIVE
Interval History:   No events overnight. Patient continue to have cough and some dyspnea. Reports improved dyspnea this morning with chest PT. Wanting to work with PT and OT.      Review of Systems   Constitutional:  Positive for activity change. Negative for chills, fatigue and fever.   HENT:  Negative for congestion and trouble swallowing.    Eyes:  Negative for visual disturbance.        Vision loss (chronic)   Respiratory:  Positive for cough and shortness of breath. Negative for chest tightness.    Cardiovascular:  Negative for chest pain and leg swelling.   Gastrointestinal:  Negative for abdominal distention, abdominal pain, nausea and vomiting.   Endocrine: Negative for cold intolerance, heat intolerance, polydipsia and polyuria.   Genitourinary:  Negative for difficulty urinating and dysuria.   Musculoskeletal:  Negative for back pain and myalgias.   Skin:  Negative for rash and wound.   Neurological:  Positive for weakness. Negative for dizziness and light-headedness.   Hematological:  Negative for adenopathy. Does not bruise/bleed easily.   Psychiatric/Behavioral:  Negative for confusion and sleep disturbance.    Objective:     Vital Signs (Most Recent):  Temp: 97.6 °F (36.4 °C) (02/21/22 1635)  Pulse: 105 (02/21/22 1635)  Resp: 20 (02/21/22 1635)  BP: (!) 119/58 (02/21/22 1635)  SpO2: (!) 87 % (02/21/22 1635)   Vital Signs (24h Range):  Temp:  [96.6 °F (35.9 °C)-98.4 °F (36.9 °C)] 97.6 °F (36.4 °C)  Pulse:  [] 105  Resp:  [18-22] 20  SpO2:  [87 %-93 %] 87 %  BP: (119-155)/(56-73) 119/58     Weight: 72 kg (158 lb 11.7 oz)  Body mass index is 24.14 kg/m².    Intake/Output Summary (Last 24 hours) at 2/21/2022 1939  Last data filed at 2/21/2022 0630  Gross per 24 hour   Intake 120 ml   Output --   Net 120 ml      Physical Exam  Constitutional:       General: She is not in acute distress.     Appearance: She is well-developed. She is not ill-appearing.   HENT:      Head: Normocephalic and atraumatic.       Mouth/Throat:      Mouth: Mucous membranes are moist.   Eyes:      Extraocular Movements: Extraocular movements intact.   Neck:      Vascular: No JVD.   Cardiovascular:      Rate and Rhythm: Regular rhythm. Tachycardia present.      Pulses: Normal pulses.      Heart sounds: Normal heart sounds.   Pulmonary:      Effort: Prolonged expiration present. No respiratory distress.      Breath sounds: Decreased breath sounds and rhonchi present. No wheezing or rales.   Abdominal:      General: Bowel sounds are normal. There is no distension.      Palpations: Abdomen is soft.      Tenderness: There is no abdominal tenderness.   Musculoskeletal:         General: No deformity. Normal range of motion.      Cervical back: Neck supple.   Lymphadenopathy:      Cervical: No cervical adenopathy.   Skin:     General: Skin is warm and dry.      Findings: No erythema or rash.   Neurological:      General: No focal deficit present.      Mental Status: She is alert and oriented to person, place, and time.      Cranial Nerves: No cranial nerve deficit.      Sensory: No sensory deficit.      Motor: Weakness present.   Psychiatric:         Mood and Affect: Mood normal.       Significant Labs: BMP:   Recent Labs   Lab 02/21/22  0313         K 3.6   CL 95   CO2 34*   BUN 7*   CREATININE 0.5   CALCIUM 8.6*   MG 2.0     CBC:   Recent Labs   Lab 02/20/22  0533 02/21/22  0313   WBC 18.88* 16.79*   HGB 12.0 11.2*   HCT 38.2 35.6*   * 500*       Significant Imaging: I have reviewed all pertinent imaging results/findings within the past 24 hours.

## 2022-02-22 NOTE — PROGRESS NOTES
[facility] Medicine  Progress Note    Patient Name: Estefani Fam  MRN: [id number]  Patient Class: IP- Inpatient   Admission Date: 2/14/2022  Length of Stay: 8 days  Attending Physician: Tremaine Barajas MD  Primary Care Provider: Miles Jackson MD        Subjective:     Principal Problem:Community acquired pneumonia of right lung        HPI:  Ms. Fam is a 71 year old woman with PMH of COPD, chronic respiratory failure on 2-3L home O2, and prior COVID infection who presented to [facility]ED via EMS for one day history  of shortness of breath at home. In the ED, patient was hypoxic to 84% upon arrival. Patient reports productive cough and pleuritic chest pain. She denies exertional chest pain, diaphoresis, arm pain. At home, she takes home inhalers of Breo Ellipta and Incruse Ellipta and Azithromycin 3 times weekly to help prevent exacerbations in the future.    She was previously hospitalized 11/2021 for COPD exacerbation 2/2 CAP. She was started on oral antibiotics and IV steroids. Patient clinically improved and discharged on 7 day course of levofloxacin and steroid taper. She also was discharged on Roflumilast and outpatient followup with her Pulmonologist Dr. Brooklynn Ruiz.      Overview/Hospital Course:  No notes on file    Interval History:   No events overnight. Patient continues to have persistent dyspnea and cough. Respiratory therapy at bedside, patient noted to satting 88% on 3L. Denies fevers, chest pain, nausea, and lightheadedness.      Review of Systems   Constitutional:  Positive for activity change. Negative for chills, fatigue and fever.   HENT:  Negative for congestion and trouble swallowing.    Eyes:  Negative for visual disturbance.        Vision loss (chronic)   Respiratory:  Positive for cough and shortness of breath. Negative for chest tightness.    Cardiovascular:  Negative for chest pain and leg swelling.   Gastrointestinal:  Negative for abdominal distention, abdominal  pain, nausea and vomiting.   Endocrine: Negative for cold intolerance, heat intolerance, polydipsia and polyuria.   Genitourinary:  Negative for difficulty urinating and dysuria.   Musculoskeletal:  Negative for back pain and myalgias.   Skin:  Negative for rash and wound.   Neurological:  Positive for weakness. Negative for dizziness and light-headedness.   Hematological:  Negative for adenopathy. Does not bruise/bleed easily.   Psychiatric/Behavioral:  Negative for confusion and sleep disturbance.    Objective:     Vital Signs (Most Recent):  Temp: 98.4 °F (36.9 °C) (02/22/22 1532)  Pulse: 105 (02/22/22 1532)  Resp: 16 (02/22/22 1532)  BP: 115/62 (02/22/22 1532)  SpO2: (!) 88 % (02/22/22 1532)   Vital Signs (24h Range):  Temp:  [97.1 °F (36.2 °C)-98.8 °F (37.1 °C)] 98.4 °F (36.9 °C)  Pulse:  [] 105  Resp:  [16-22] 16  SpO2:  [87 %-93 %] 88 %  BP: (113-134)/(56-64) 115/62     Weight: 72 kg (158 lb 11.7 oz)  Body mass index is 24.14 kg/m².  No intake or output data in the 24 hours ending 02/22/22 1537   Physical Exam  Constitutional:       General: She is not in acute distress.     Appearance: She is well-developed. She is not ill-appearing.   HENT:      Head: Normocephalic and atraumatic.      Mouth/Throat:      Mouth: Mucous membranes are moist.   Eyes:      Extraocular Movements: Extraocular movements intact.   Neck:      Vascular: No JVD.   Cardiovascular:      Rate and Rhythm: Regular rhythm. Tachycardia present.      Pulses: Normal pulses.      Heart sounds: Normal heart sounds.   Pulmonary:      Effort: Prolonged expiration present. No respiratory distress.      Breath sounds: Decreased breath sounds and rhonchi present. No wheezing or rales.   Abdominal:      General: Bowel sounds are normal. There is no distension.      Palpations: Abdomen is soft.      Tenderness: There is no abdominal tenderness.   Musculoskeletal:         General: No deformity. Normal range of motion.      Cervical back: Neck  "supple.   Lymphadenopathy:      Cervical: No cervical adenopathy.   Skin:     General: Skin is warm and dry.      Findings: No erythema or rash.   Neurological:      General: No focal deficit present.      Mental Status: She is alert and oriented to person, place, and time.      Cranial Nerves: No cranial nerve deficit.      Sensory: No sensory deficit.      Motor: Weakness present.   Psychiatric:         Mood and Affect: Mood normal.       Significant Labs: BMP:   Recent Labs   Lab 02/22/22  0320   GLU 93   *   K 3.7   CL 95   CO2 32*   BUN 8   CREATININE 0.5   CALCIUM 8.6*   MG 2.1     CBC:   Recent Labs   Lab 02/21/22  0313 02/22/22  0320   WBC 16.79* 14.06*   HGB 11.2* 11.0*   HCT 35.6* 34.1*   * 515*       Significant Imaging: I have reviewed all pertinent imaging results/findings within the past 24 hours.      Assessment/Plan:      * Community acquired pneumonia of right lung  - CXR showed opacities in right lung  - s/p 5 days of Ceftriaxone on 2/18  - s/p 3 days azithromycin on 2/16  - Blood cultures NGTD  - Pulmonary consulted  -- Lasix 20mg daily   - Procal 0.19  - Continue guaifenesin  - CBC daily   - Chest PT, Acapella device  - 3L NC (home 2-3L)  - Goal sat >88%    COPD with acute exacerbation  - See "CAP"  - Switched prednisone to Iv solumedrol due to worsening hypoxia to complete 5 days before transitioning back to oral prednisone   - S/p 5 days of steroids on 2/18  - Discharge with TIW Azithromycin per pulm  - Acapella, Chest PT  - Incentive spirometry  - O2 goal >88  - Continue home inhalers  - Continue prn xopenex    Severe sepsis  - 3/4 SIRS (tachycardia, tachypnea, leukocytosis)  - s/p IVF  - See CAP management   - Improved      Acute on chronic respiratory failure with hypoxia  Patient with Hypercapnic and Hypoxic Respiratory failure which is Acute on chronic.  She is on home oxygen at 2-3 LPM. Supplemental oxygen was provided and noted-  .   Signs/symptoms of respiratory failure " "include- tachypnea, increased work of breathing, respiratory distress and wheezing. Contributing diagnoses includes - Aspiration, CHF, COPD, Pleural effusion and Pneumonia Labs and images were reviewed. - VBG showed pCO2 59. Patient Has recent ABG, which has been reviewed. Will treat underlying causes and adjust management of respiratory failure as follows    PLAN:  - On 2-3 L O2 at home  - On 2/15, worsening hypoxia and given IV lasix, IV solumedrol, CXR unchanged, CTA chest negative for PE, troponin negative and TTE normal   - Likely 2/2 to COPD exacerbation due to CAP  - Xopenex q4h while awake  - CXR showed right sided PNA  - Continue home inhalers  - Continue guaifenesin   - Chest physiotherapy and bronchial hygiene  - See "CAP"  - BiPAP HS  - Outpatient pulmonary followup      VTE Risk Mitigation (From admission, onward)         Ordered     enoxaparin injection 40 mg  Daily         02/14/22 1817     IP VTE HIGH RISK PATIENT  Once         02/14/22 1817     Place sequential compression device  Until discontinued         02/14/22 1817                Discharge Planning   RENARD: 2/23/2022     Code Status: Full Code   Is the patient medically ready for discharge?: No    Reason for patient still in hospital (select all that apply): Patient trending condition, Laboratory test, Consult recommendations and Pending disposition  Discharge Plan A: Home Health                  Tremaine Barajas MD  Department of Hospital Medicine   [facility] S[facility]    "

## 2022-02-22 NOTE — PROGRESS NOTES
Pulmonology/Critical Care Consult Note:     Last 24 hours: 02 weaned to 3L; continuing to improve, still reports poor appetite. Improvement in leukocytosis. Afebrile.      Objective:  Vitals: reviewed  General: Sitting up in bed, well-appearing and conversant  HEENT: nonicteric, MMM. intermittant pursed-lip breathing  CV: RRR, no murmurs, trace pedal edema  Resp: diffusely soft air movement, scattered wheezing, no accessory muscle use, focal crackles to right middle zone and right lower lung, no clubbing, 02 at 3L  Abd: soft, NTTP  Extr: scattered bruising on all extremities     Labs: leukocytosis improving, thrombocytosis stable. No ELA     Imaging: no new  CTA Chest 2/15/22:Impression:  1.  No CT evidence of new pulmonary thromboembolic disease with good opacification of the pulmonary arteries.    2.  Right lower lobe consolidation, concerning for lobar pneumonia.    3.  Prominent to mildly enlarged subcarinal and paraesophageal lymph nodes.    4.  Redemonstrated background of severe bullous emphysema.     Echo: 2/15/22:  · The left ventricle is normal in size with normal systolic function.  · The estimated ejection fraction is 65%.  · Normal left ventricular diastolic function.  · Tachycardia was present during the study  · Normal right ventricular size with normal right ventricular systolic function.  · Mild tricuspid regurgitation.  · There are segmental left ventricular wall motion abnormalities.  · Mild left atrial enlargement.  · Normal central venous pressure (3 mmHg).  · The estimated PA systolic pressure is 28 mmHg.     Assessment:  1. Acute on chronic hypoxic respiratory failure; resolving  2. COPD exacerbation;resolving  3. CAP; improving   Ms. Fam is a 70 yo F with severe COPD (GOLD D) on triple therapy and home 02 at 3L; well-known to our Pulmonary clinic. She was admitted on 2/14 for CAP and AEOCOPD, and completed 5 days of CAP coverage and steroid therapy. She is afebrile and 02 has been weaned  down to 3L.      Plan/Recommendations:  -continue scheduled inhalers with LABA/LAMA/ICS; agree with scheduled VENKATA while in exacerbation  -s/p abx course as well as 5 days steroids for COPD  -much improved, now on home oxygen support  -OK to continue CPT per patient preference  -continue suppressive/preventive therapy TIW azithromycin  -would refer again to Pulmonary Rehab on DC  -will need follow-up in Pulm clinic after DC     Thank you for the consult. Discussed with attending Dr. Qian Selby MD  PCCM fellow

## 2022-02-23 VITALS
DIASTOLIC BLOOD PRESSURE: 55 MMHG | TEMPERATURE: 98 F | OXYGEN SATURATION: 90 % | HEART RATE: 105 BPM | WEIGHT: 158.75 LBS | RESPIRATION RATE: 24 BRPM | HEIGHT: 68 IN | BODY MASS INDEX: 24.06 KG/M2 | SYSTOLIC BLOOD PRESSURE: 107 MMHG

## 2022-02-23 LAB
ANION GAP SERPL CALC-SCNC: 9 MMOL/L (ref 8–16)
BASOPHILS # BLD AUTO: 0.04 K/UL (ref 0–0.2)
BASOPHILS NFR BLD: 0.3 % (ref 0–1.9)
BUN SERPL-MCNC: 7 MG/DL (ref 8–23)
CALCIUM SERPL-MCNC: 8.4 MG/DL (ref 8.7–10.5)
CHLORIDE SERPL-SCNC: 96 MMOL/L (ref 95–110)
CO2 SERPL-SCNC: 33 MMOL/L (ref 23–29)
CREAT SERPL-MCNC: 0.6 MG/DL (ref 0.5–1.4)
DIFFERENTIAL METHOD: ABNORMAL
EOSINOPHIL # BLD AUTO: 0.4 K/UL (ref 0–0.5)
EOSINOPHIL NFR BLD: 3 % (ref 0–8)
ERYTHROCYTE [DISTWIDTH] IN BLOOD BY AUTOMATED COUNT: 14.5 % (ref 11.5–14.5)
EST. GFR  (AFRICAN AMERICAN): >60 ML/MIN/1.73 M^2
EST. GFR  (NON AFRICAN AMERICAN): >60 ML/MIN/1.73 M^2
GLUCOSE SERPL-MCNC: 114 MG/DL (ref 70–110)
HCT VFR BLD AUTO: 37.8 % (ref 37–48.5)
HGB BLD-MCNC: 11.5 G/DL (ref 12–16)
IMM GRANULOCYTES # BLD AUTO: 0.25 K/UL (ref 0–0.04)
IMM GRANULOCYTES NFR BLD AUTO: 2.1 % (ref 0–0.5)
LYMPHOCYTES # BLD AUTO: 1.4 K/UL (ref 1–4.8)
LYMPHOCYTES NFR BLD: 11.9 % (ref 18–48)
MAGNESIUM SERPL-MCNC: 2.3 MG/DL (ref 1.6–2.6)
MCH RBC QN AUTO: 29.3 PG (ref 27–31)
MCHC RBC AUTO-ENTMCNC: 30.4 G/DL (ref 32–36)
MCV RBC AUTO: 96 FL (ref 82–98)
MONOCYTES # BLD AUTO: 1.4 K/UL (ref 0.3–1)
MONOCYTES NFR BLD: 11.4 % (ref 4–15)
NEUTROPHILS # BLD AUTO: 8.4 K/UL (ref 1.8–7.7)
NEUTROPHILS NFR BLD: 71.3 % (ref 38–73)
NRBC BLD-RTO: 0 /100 WBC
PLATELET # BLD AUTO: 519 K/UL (ref 150–450)
PMV BLD AUTO: 8.8 FL (ref 9.2–12.9)
POCT GLUCOSE: 140 MG/DL (ref 70–110)
POCT GLUCOSE: 173 MG/DL (ref 70–110)
POTASSIUM SERPL-SCNC: 4.1 MMOL/L (ref 3.5–5.1)
RBC # BLD AUTO: 3.93 M/UL (ref 4–5.4)
SODIUM SERPL-SCNC: 138 MMOL/L (ref 136–145)
WBC # BLD AUTO: 11.83 K/UL (ref 3.9–12.7)

## 2022-02-23 PROCEDURE — 80048 BASIC METABOLIC PNL TOTAL CA: CPT | Performed by: STUDENT IN AN ORGANIZED HEALTH CARE EDUCATION/TRAINING PROGRAM

## 2022-02-23 PROCEDURE — 94761 N-INVAS EAR/PLS OXIMETRY MLT: CPT

## 2022-02-23 PROCEDURE — 63700000 PHARM REV CODE 250 ALT 637 W/O HCPCS: Performed by: STUDENT IN AN ORGANIZED HEALTH CARE EDUCATION/TRAINING PROGRAM

## 2022-02-23 PROCEDURE — 94799 UNLISTED PULMONARY SVC/PX: CPT

## 2022-02-23 PROCEDURE — 85025 COMPLETE CBC W/AUTO DIFF WBC: CPT | Performed by: STUDENT IN AN ORGANIZED HEALTH CARE EDUCATION/TRAINING PROGRAM

## 2022-02-23 PROCEDURE — 27000221 HC OXYGEN, UP TO 24 HOURS

## 2022-02-23 PROCEDURE — 25000003 PHARM REV CODE 250: Performed by: INTERNAL MEDICINE

## 2022-02-23 PROCEDURE — 25000003 PHARM REV CODE 250: Performed by: STUDENT IN AN ORGANIZED HEALTH CARE EDUCATION/TRAINING PROGRAM

## 2022-02-23 PROCEDURE — 99239 HOSP IP/OBS DSCHRG MGMT >30: CPT | Mod: ,,, | Performed by: STUDENT IN AN ORGANIZED HEALTH CARE EDUCATION/TRAINING PROGRAM

## 2022-02-23 PROCEDURE — 83735 ASSAY OF MAGNESIUM: CPT | Performed by: STUDENT IN AN ORGANIZED HEALTH CARE EDUCATION/TRAINING PROGRAM

## 2022-02-23 PROCEDURE — 97116 GAIT TRAINING THERAPY: CPT | Mod: CQ

## 2022-02-23 PROCEDURE — 27000646 HC AEROBIKA DEVICE

## 2022-02-23 PROCEDURE — 94668 MNPJ CHEST WALL SBSQ: CPT

## 2022-02-23 PROCEDURE — 94664 DEMO&/EVAL PT USE INHALER: CPT

## 2022-02-23 PROCEDURE — 99900035 HC TECH TIME PER 15 MIN (STAT)

## 2022-02-23 PROCEDURE — 94640 AIRWAY INHALATION TREATMENT: CPT

## 2022-02-23 PROCEDURE — 36415 COLL VENOUS BLD VENIPUNCTURE: CPT | Performed by: STUDENT IN AN ORGANIZED HEALTH CARE EDUCATION/TRAINING PROGRAM

## 2022-02-23 PROCEDURE — 25000242 PHARM REV CODE 250 ALT 637 W/ HCPCS: Performed by: INTERNAL MEDICINE

## 2022-02-23 PROCEDURE — 99239 PR HOSPITAL DISCHARGE DAY,>30 MIN: ICD-10-PCS | Mod: ,,, | Performed by: STUDENT IN AN ORGANIZED HEALTH CARE EDUCATION/TRAINING PROGRAM

## 2022-02-23 RX ORDER — AZITHROMYCIN 250 MG/1
250 TABLET, FILM COATED ORAL
Status: DISCONTINUED | OUTPATIENT
Start: 2022-02-23 | End: 2022-02-23 | Stop reason: HOSPADM

## 2022-02-23 RX ORDER — GUAIFENESIN 600 MG/1
1200 TABLET, EXTENDED RELEASE ORAL 2 TIMES DAILY
Qty: 24 TABLET | Refills: 0 | Status: SHIPPED | OUTPATIENT
Start: 2022-02-23 | End: 2022-03-01

## 2022-02-23 RX ADMIN — THERA TABS 1 TABLET: TAB at 09:02

## 2022-02-23 RX ADMIN — NASAL 2 SPRAY: 6.5 SPRAY NASAL at 09:02

## 2022-02-23 RX ADMIN — GUAIFENESIN 400 MG: 100 SOLUTION ORAL at 11:02

## 2022-02-23 RX ADMIN — LEVALBUTEROL 1.25 MG: 1.25 SOLUTION, CONCENTRATE RESPIRATORY (INHALATION) at 09:02

## 2022-02-23 RX ADMIN — AZITHROMYCIN MONOHYDRATE 250 MG: 250 TABLET ORAL at 11:02

## 2022-02-23 RX ADMIN — OXYCODONE HYDROCHLORIDE AND ACETAMINOPHEN 500 MG: 500 TABLET ORAL at 09:02

## 2022-02-23 RX ADMIN — PANTOPRAZOLE SODIUM 40 MG: 40 TABLET, DELAYED RELEASE ORAL at 09:02

## 2022-02-23 RX ADMIN — FLUTICASONE FUROATE AND VILANTEROL TRIFENATATE 1 PUFF: 200; 25 POWDER RESPIRATORY (INHALATION) at 09:02

## 2022-02-23 RX ADMIN — GUAIFENESIN 400 MG: 100 SOLUTION ORAL at 05:02

## 2022-02-23 RX ADMIN — FUROSEMIDE 20 MG: 20 TABLET ORAL at 09:02

## 2022-02-23 RX ADMIN — LEVALBUTEROL 1.25 MG: 1.25 SOLUTION, CONCENTRATE RESPIRATORY (INHALATION) at 01:02

## 2022-02-23 RX ADMIN — TIOTROPIUM BROMIDE INHALATION SPRAY 2 PUFF: 3.12 SPRAY, METERED RESPIRATORY (INHALATION) at 09:02

## 2022-02-23 RX ADMIN — GUAIFENESIN 400 MG: 100 SOLUTION ORAL at 12:02

## 2022-02-23 RX ADMIN — CALCIUM CARBONATE (ANTACID) CHEW TAB 500 MG 500 MG: 500 CHEW TAB at 09:02

## 2022-02-23 NOTE — PLAN OF CARE
Pt is AAOx4. Pt remain free from fall and injuries. VS remain stable. Questions and concerns voiced and answered. Medication compliance. Using BSC. O2 sat remain stable@ 3 L per NC. . Call light in reach. Bed in low locked position. Side rails x2. Belongings at bedside.

## 2022-02-23 NOTE — PLAN OF CARE
Problem: Adult Inpatient Plan of Care  Goal: Plan of Care Review  Outcome: Ongoing, Progressing  Goal: Patient-Specific Goal (Individualized)  Outcome: Ongoing, Progressing  Goal: Absence of Hospital-Acquired Illness or Injury  Outcome: Ongoing, Progressing  Goal: Optimal Comfort and Wellbeing  Outcome: Ongoing, Progressing  Goal: Readiness for Transition of Care  Outcome: Ongoing, Progressing     Problem: Adjustment to Illness (Sepsis/Septic Shock)  Goal: Optimal Coping  Outcome: Ongoing, Progressing     Problem: Bleeding (Sepsis/Septic Shock)  Goal: Absence of Bleeding  Outcome: Ongoing, Progressing     Problem: Glycemic Control Impaired (Sepsis/Septic Shock)  Goal: Blood Glucose Level Within Desired Range  Outcome: Ongoing, Progressing     Problem: Infection Progression (Sepsis/Septic Shock)  Goal: Absence of Infection Signs and Symptoms  Outcome: Ongoing, Progressing     Problem: Nutrition Impaired (Sepsis/Septic Shock)  Goal: Optimal Nutrition Intake  Outcome: Ongoing, Progressing     Problem: Fluid Imbalance (Pneumonia)  Goal: Fluid Balance  Outcome: Ongoing, Progressing     Problem: Infection (Pneumonia)  Goal: Resolution of Infection Signs and Symptoms  Outcome: Ongoing, Progressing     Problem: Respiratory Compromise (Pneumonia)  Goal: Effective Oxygenation and Ventilation  Outcome: Ongoing, Progressing     Problem: Fall Injury Risk  Goal: Absence of Fall and Fall-Related Injury  Outcome: Ongoing, Progressing   Pt is A+Ox4. SP02 88% on 3L. Frequent checks q 2hrs.

## 2022-02-23 NOTE — PLAN OF CARE
CM met with patient to discuss discharge planning. Ms. Fam is expected to discharge home today with HH. Order for hospital bed placed. Will continue to follow.    Ronel Parker RN  Ext [phone]

## 2022-02-23 NOTE — PLAN OF CARE
Amee sent home health referral to [facility] per family request, will follow to be sure Pt can be accepted. Pt accepted and [facility] and will be seen in the am.

## 2022-02-23 NOTE — PLAN OF CARE
[facility]  Discharge Final Note    Primary Care Provider: Miles Jackson MD    Expected Discharge Date: 2/23/2022       Future Appointments   Date Time Provider Department Center   2/28/2022  9:00 AM Miles Jackson MD [facility] IM [facility]   3/7/2022 10:30 AM Brooklynn Ruiz MD [facility]   4/22/2022  2:15 PM INJECTION [facility] AMB INF [facility]         Final Discharge Note (most recent)     Final Note - 02/23/22 1515        Final Note    Assessment Type Final Discharge Note     Anticipated Discharge Disposition Home-Health Care Svc     What phone number can be called within the next 1-3 days to see how you are doing after discharge? [phone]     Hospital Resources/Appts/Education Provided Appointments scheduled and added to AVS   Ambulatory referral sent to Pulmonary Rehab.       Post-Acute Status    Post-Acute Authorization Home Health     Home Health Status Set-up Complete/Auth obtained   [place]    Discharge Delays None known at this time                 Important Message from Medicare  Important Message from Medicare regarding Discharge Appeal Rights: Given to patient/caregiver, Explained to patient/caregiver, Signed/date by patient/caregiver     Date IMM was signed: 02/23/22  Time IMM was signed: 1328    Contact Info     [facility]   Specialty: DME Provider    [address]  [facility] [address]   Phone: [phone]       Next Steps: Follow up today    [facility]   Specialty: Home Health Services    [address] [place] [address] [place] [address]   Phone: [phone]       Next Steps: Follow up in 1 day(s)            Ronel Parker RN  Ext [phone]

## 2022-02-23 NOTE — PT/OT/SLP PROGRESS
Physical Therapy Treatment    Patient Name:  Estefani Fam   MRN:  [id number]    Recommendations:     Discharge Recommendations:  home health PT   Discharge Equipment Recommendations: shower chair   Barriers to discharge: Decreased caregiver support    Assessment:     Estefani Fam is a 71 y.o. female admitted with a medical diagnosis of Community acquired pneumonia of right lung.  She presents with the following impairments/functional limitations:  weakness, impaired endurance, impaired self care skills, impaired functional mobilty, gait instability, impaired cardiopulmonary response to activity, impaired balance, decreased lower extremity function. Pt with increased activity tolerance during today's session. Pt was able to ambulate 25 ft in room with Supervision. Pt with improved functional mobility and endurance.     Rehab Prognosis: Good; patient would benefit from acute skilled PT services to address these deficits and reach maximum level of function.    Recent Surgery: * No surgery found *      Plan:     During this hospitalization, patient to be seen 4 x/week to address the identified rehab impairments via gait training, therapeutic activities, therapeutic exercises, neuromuscular re-education and progress toward the following goals:    · Plan of Care Expires:  03/15/22    Subjective     Chief Complaint: none stated by pt  Patient/Family Comments/goals: agreeable to therapy  Pain/Comfort:  · Pain Rating 1: 0/10  · Pain Rating Post-Intervention 1: 0/10      Objective:     Communicated with RN and  prior to session.  Patient found sitting edge of bed with telemetry, oxygen upon PT entry to room.     General Precautions: Standard, fall   Orthopedic Precautions:N/A   Braces: N/A  Respiratory Status: Nasal cannula, flow 4 L/min     Functional Mobility:  · Transfers:     · Sit to Stand:  independence with no AD  · Gait: Pt ambulates 25 ft with Supervision, no AD. Pt demonstrates improve gait  stability and endurance.   · Balance: Sitting: Good, (I) pt instructed for proper posture d/t rounded shoulders.      AM-PAC 6 CLICK MOBILITY  Turning over in bed (including adjusting bedclothes, sheets and blankets)?: 4  Sitting down on and standing up from a chair with arms (e.g., wheelchair, bedside commode, etc.): 4  Moving from lying on back to sitting on the side of the bed?: 4  Moving to and from a bed to a chair (including a wheelchair)?: 4  Need to walk in hospital room?: 4  Climbing 3-5 steps with a railing?: 3  Basic Mobility Total Score: 23       Therapeutic Activities and Exercises:  SPO2 measured prior to activity at 86%, and 86% post ambulation.   Pt and spouse educated on mobility, safety, and use of AD upon discharge.    Patient left sitting edge of bed with call button in reach and spouse present.    GOALS:   Multidisciplinary Problems     Physical Therapy Goals        Problem: Physical Therapy Goal    Goal Priority Disciplines Outcome Goal Variances Interventions   Physical Therapy Goal     PT, PT/OT Ongoing, Progressing     Description: Goals to be met by: 3/1/22     Patient will increase functional independence with mobility by performin. Supine to sit with [place]  2. Sit to supine with [place]  3. Sit to stand transfer with Supervision  4. Gait  x 30 feet with Stand-by Assistance using Rolling Walker.   5. Lower extremity exercise program x 20 reps per handout, with independence                       Time Tracking:     PT Received On: 22  PT Start Time: 1100     PT Stop Time: 1116  PT Total Time (min): 16 min     Billable Minutes: Gait Training 16    Treatment Type: Treatment  PT/PTA: PTA     PTA Visit Number: 2     SHIRA Pollard  2022   Pager: [phone]      2022

## 2022-02-23 NOTE — PLAN OF CARE
Problem: Adult Inpatient Plan of Care  Goal: Plan of Care Review  Outcome: Adequate for Care Transition  Goal: Patient-Specific Goal (Individualized)  Outcome: Adequate for Care Transition  Goal: Absence of Hospital-Acquired Illness or Injury  Outcome: Adequate for Care Transition  Goal: Optimal Comfort and Wellbeing  Outcome: Adequate for Care Transition  Goal: Readiness for Transition of Care  Outcome: Adequate for Care Transition     Problem: Adjustment to Illness (Sepsis/Septic Shock)  Goal: Optimal Coping  Outcome: Adequate for Care Transition     Problem: Bleeding (Sepsis/Septic Shock)  Goal: Absence of Bleeding  Outcome: Adequate for Care Transition     Problem: Glycemic Control Impaired (Sepsis/Septic Shock)  Goal: Blood Glucose Level Within Desired Range  Outcome: Adequate for Care Transition     Problem: Infection Progression (Sepsis/Septic Shock)  Goal: Absence of Infection Signs and Symptoms  Outcome: Adequate for Care Transition     Problem: Nutrition Impaired (Sepsis/Septic Shock)  Goal: Optimal Nutrition Intake  Outcome: Adequate for Care Transition     Problem: Fluid Imbalance (Pneumonia)  Goal: Fluid Balance  Outcome: Adequate for Care Transition     Problem: Infection (Pneumonia)  Goal: Resolution of Infection Signs and Symptoms  Outcome: Adequate for Care Transition     Problem: Respiratory Compromise (Pneumonia)  Goal: Effective Oxygenation and Ventilation  Outcome: Adequate for Care Transition     Problem: Fall Injury Risk  Goal: Absence of Fall and Fall-Related Injury  Outcome: Adequate for Care Transition  Pt is A+Ox4. Continous O2 3LNC. Pt d'c home w/. All belongings with pt. IV access d'cd. AVS education received well by pt and . Frequent checks q 2hrs.

## 2022-02-25 ENCOUNTER — TELEPHONE (OUTPATIENT)
Dept: INTERNAL MEDICINE | Facility: CLINIC | Age: 72
End: 2022-02-25
Payer: MEDICARE

## 2022-02-25 NOTE — TELEPHONE ENCOUNTER
"----- Message from Sarah Manzano sent at 2/25/2022 12:33 PM CST -----  Contact: [facility]/ Jessica [phone]  She recv'd orders from the hospitalist, Dr. Tremaine Barajas and she wants you to add a "nurse aide to the orders.     Thank you    "

## 2022-02-28 ENCOUNTER — EXTERNAL CHRONIC CARE MANAGEMENT (OUTPATIENT)
Dept: PRIMARY CARE CLINIC | Facility: CLINIC | Age: 72
End: 2022-02-28
Payer: MEDICARE

## 2022-02-28 PROCEDURE — 99490 PR CHRONIC CARE MGMT, 1ST 20 MIN: ICD-10-PCS | Mod: S$PBB,,, | Performed by: FAMILY MEDICINE

## 2022-02-28 PROCEDURE — 99490 CHRNC CARE MGMT STAFF 1ST 20: CPT | Mod: S$PBB,,, | Performed by: FAMILY MEDICINE

## 2022-02-28 PROCEDURE — 99490 CHRNC CARE MGMT STAFF 1ST 20: CPT | Mod: PBBFAC | Performed by: FAMILY MEDICINE

## 2022-02-28 NOTE — TELEPHONE ENCOUNTER
No new care gaps identified.  Powered by [employer]. Reference number: [id number].   2/28/2022 11:36:11 AM CST

## 2022-03-02 NOTE — DISCHARGE SUMMARY
[facility] Surg  [facility] Medicine  Discharge Summary      Patient Name: Estefani Fam  MRN: [id number]  Patient Class: IP- Inpatient  Admission Date: 2/14/2022  Hospital Length of Stay: 9 days  Discharge Date and Time: 2/23/2022  4:21 PM  Attending Physician: No att. providers found   Discharging Provider: Tremaine Barajas MD  Primary Care Provider: Miles Jackson MD  Hospital Medicine Team: [facility] HOSP MED R Tremaine Barajas MD    HPI:   Ms. Fam is a 71 year old woman with PMH of COPD, chronic respiratory failure on 2-3L home O2, and prior COVID infection who presented to [facility] via EMS for one day history  of shortness of breath at home. In the ED, patient was hypoxic to 84% upon arrival. Patient reports productive cough and pleuritic chest pain. She denies exertional chest pain, diaphoresis, arm pain. At home, she takes home inhalers of Breo Ellipta and Incruse Ellipta and Azithromycin 3 times weekly to help prevent exacerbations in the future.    She was previously hospitalized 11/2021 for COPD exacerbation 2/2 CAP. She was started on oral antibiotics and IV steroids. Patient clinically improved and discharged on 7 day course of levofloxacin and steroid taper. She also was discharged on Roflumilast and outpatient followup with her Pulmonologist Dr. Brooklynn Ruiz.      * No surgery found *      Hospital Course:   Patient admitted for COPD exacerbation treated initially with prednisone but symptoms continued to worsen.  She was then treated with 2 days of IV Solu-Medrol with improvement in symptoms before being switched back to oral prednisone 40 mg. CTA chest negative for PE.  She is also treated with 5 days of ceftriaxone and 3 days of azithromycin 500 mg daily before being switched to whom azithromycin 250 3 times a week.  Given severity of symptoms pulmonology was also consulted.  They recommended schedule chest physical therapy, Acapella device, and continue pulmonary toilet.  She was also diuresed  "with 20 mg of oral Lasix per pulmonology to help improve respiratory status.  After several days slow progression of symptoms patient was discharged home with home oxygen requirement.  She was also sent home with DME per OT/PT and medication refills.  Outpatient Pulmonary and primary care follow-up requested.       Goals of Care Treatment Preferences:  Code Status: Full Code      Consults:   Consults (From admission, onward)        Status Ordering Provider     Inpatient consult to Pulmonology  Once        Provider:  (Not yet assigned)    Completed CORRINA BROWNE          No new Assessment & Plan notes have been filed under this hospital service since the last note was generated.  Service: Hospital Medicine    Final Active Diagnoses:    Diagnosis Date Noted POA    PRINCIPAL PROBLEM:  Community acquired pneumonia of right lung [J18.9] 11/14/2021 Yes    COPD with acute exacerbation [J44.1] 07/02/2018 Yes    Severe sepsis [A41.9, R65.20] 02/14/2022 Yes    Acute on chronic respiratory failure with hypoxia [J96.21] 07/02/2018 Yes      Problems Resolved During this Admission:       Discharged Condition: stable    Disposition: Home or Self Care    Follow Up:   Follow-up Information     [facility] Follow up today.    Specialty: DME Provider  Contact information:  [address]  [address]  [place] [address]  [phone]             [facility] Follow up in 1 day(s).    Specialty: Home Health Services  Contact information:  [address] [place] [address]  [phone]                       Patient Instructions:      BATH/SHOWER CHAIR FOR HOME USE     Order Specific Question Answer Comments   Height: 5' 8" (1.727 m)    Weight: 72 kg (158 lb 11.7 oz)    Does patient have medical equipment at home? bedside commode    Does patient have medical equipment at home? oxygen    Length of need (1-99 months): 99    Type: With back      HOSPITAL BED FOR HOME USE     Order Specific Question Answer " "Comments   Type: Semi-electric    Length of need (1-99 months): 12    Does patient have medical equipment at home? bedside commode    Does patient have medical equipment at home? oxygen    Height: 5' 8" (1.727 m)    Weight: 72 kg (158 lb 11.7 oz)    Please check all that apply: Patient requires positioning of the body in ways not feasible in an ordinary bed due to a medical condition which is expected to last at least one month.      COMMODE FOR HOME USE     Order Specific Question Answer Comments   Type: Standard    Height: 5' 8" (1.727 m)    Weight: 72 kg (158 lb 11.7 oz)    Does patient have medical equipment at home? bedside commode    Does patient have medical equipment at home? oxygen    Length of need (1-99 months): 99      Ambulatory referral/consult to Pulmonology   Standing Status: Future   Referral Priority: Routine Referral Type: Consultation   Referral Reason: Specialty Services Required   Requested Specialty: Pulmonary Disease     Ambulatory referral/consult to Internal Medicine   Standing Status: Future   Referral Priority: Routine Referral Type: Consultation   Referral Reason: Specialty Services Required   Requested Specialty: Internal Medicine   Number of Visits Requested: 1     Ambulatory referral/consult to Pulmonary Rehab   Standing Status: Future   Referral Priority: Routine Referral Type: Consultation   Referral Reason: Specialty Services Required   Requested Specialty: Pulmonary Disease   Number of Visits Requested: 1     Diet Adult Regular     Notify your health care provider if you experience any of the following:  increased confusion or weakness     Notify your health care provider if you experience any of the following:  persistent dizziness, light-headedness, or visual disturbances     Notify your health care provider if you experience any of the following:  worsening rash     Notify your health care provider if you experience any of the following:  severe persistent headache     Notify " your health care provider if you experience any of the following:  difficulty breathing or increased cough     Notify your health care provider if you experience any of the following:  redness, tenderness, or signs of infection (pain, swelling, redness, odor or green/yellow discharge around incision site)     Notify your health care provider if you experience any of the following:  severe uncontrolled pain     Notify your health care provider if you experience any of the following:  persistent nausea and vomiting or diarrhea     Notify your health care provider if you experience any of the following:  temperature >100.4     Activity as tolerated       Significant Diagnostic Studies: Labs: All labs within the past 24 hours have been reviewed    Pending Diagnostic Studies:     Procedure Component Value Units Date/Time    CBC auto differential [[id number]] Collected: 02/19/22 0505    Order Status: Sent Lab Status: In process Updated: 02/19/22 0505    Specimen: Blood          Medications:  Reconciled Home Medications:      Medication List      START taking these medications    MUCUS RELIEF  mg 12 hr tablet  Generic drug: guaiFENesin  Take 2 tablets (1,200 mg total) by mouth 2 (two) times daily. for 6 days        CONTINUE taking these medications    acetylcysteine 600 mg Cap  Take 1 capsule (600 mg total) by mouth 2 (two) times daily.     ascorbic acid (vitamin C) 500 MG tablet  Commonly known as: VITAMIN C  Take 500 mg by mouth 2 (two) times daily.     azelastine 137 mcg (0.1 %) nasal spray  Commonly known as: ASTELIN  INHALE 1 SPRAY BY NASAL ROUTE TWICE DAILY OR AS DIRECTED     azithromycin 250 MG tablet  Commonly known as: Z-JERSEY  TAKE 1 TABLET BY MOUTH EVERY MONDAY WEDNESDAY AND FRIDAY     BREO ELLIPTA 200-25 mcg/dose Dsdv diskus inhaler  Generic drug: fluticasone furoate-vilanteroL  INHALE 1 PUFF INTO THE LUNGS DAILY     calcium carbonate 600 mg calcium (1,500 mg) Tab  Commonly known as: OS-STEPHANIE  Take 1 tablet  (600 mg total) by mouth once daily. Take Levofloxacin antibiotic at least 2 hours before calcium.     DALIRESP 250 mcg Tab  Generic drug: roflumilast  Take 1 tablet (250 mcg total) by mouth once daily.     fluticasone propionate 50 mcg/actuation nasal spray  Commonly known as: FLONASE  INSTILL 1 SPRAY IN EACH NOSTRIL EVERY DAY     INCRUSE ELLIPTA 62.5 mcg/actuation inhalation capsule  Generic drug: umeclidinium  INHALE 1 PUFF BY MOUTH INTO LUNGS ONCE DAILY     levalbuterol 0.63 mg/3 mL nebulizer solution  Commonly known as: XOPENEX  USE 1 VIAL IN NEBULIZER EVERY 8 HOURS AS NEEDED FOR WHEEZE     multivit-iron-min-folic acid 3,500-18-0.4 unit-mg-mg Chew  Commonly known as: CENTRUM  Take 1 tablet by mouth once daily. Take Levofloxacin antibiotic at least 2 hours before multivitamin.     pantoprazole 40 MG tablet  Commonly known as: PROTONIX  Take 1 tablet (40 mg total) by mouth once daily.     pulse oximeter device  Commonly known as: pulse oximeter  by Apply Externally route 2 (two) times a day. Use twice daily at 8 AM and 3 PM and record the value in MyChart as directed.     sodium chloride 0.65 % nasal spray  Commonly known as: OCEAN  1 spray by Nasal route 2 (two) times daily.            Indwelling Lines/Drains at time of discharge:   Lines/Drains/Airways     None                 Time spent on the discharge of patient: 35 minutes         Tremaine Barajas MD  Department of Hospital Medicine  [facility]

## 2022-03-02 NOTE — HOSPITAL COURSE
Patient admitted for COPD exacerbation treated initially with prednisone but symptoms continued to worsen.  She was then treated with 2 days of IV Solu-Medrol with improvement in symptoms before being switched back to oral prednisone 40 mg. CTA chest negative for PE.  She is also treated with 5 days of ceftriaxone and 3 days of azithromycin 500 mg daily before being switched to whom azithromycin 250 3 times a week.  Given severity of symptoms pulmonology was also consulted.  They recommended schedule chest physical therapy, Acapella device, and continue pulmonary toilet.  She was also diuresed with 20 mg of oral Lasix per pulmonology to help improve respiratory status.  After several days slow progression of symptoms patient was discharged home with home oxygen requirement.  She was also sent home with DME per OT/PT and medication refills.  Outpatient Pulmonary and primary care follow-up requested.

## 2022-03-03 ENCOUNTER — OFFICE VISIT (OUTPATIENT)
Dept: INTERNAL MEDICINE | Facility: CLINIC | Age: 72
End: 2022-03-03
Payer: MEDICARE

## 2022-03-03 ENCOUNTER — PATIENT MESSAGE (OUTPATIENT)
Dept: INTERNAL MEDICINE | Facility: CLINIC | Age: 72
End: 2022-03-03

## 2022-03-03 ENCOUNTER — HOSPITAL ENCOUNTER (OUTPATIENT)
Dept: RADIOLOGY | Facility: HOSPITAL | Age: 72
Discharge: HOME | End: 2022-03-03
Attending: FAMILY MEDICINE
Payer: MEDICARE

## 2022-03-03 DIAGNOSIS — J44.1 CHRONIC OBSTRUCTIVE PULMONARY DISEASE WITH ACUTE EXACERBATION: ICD-10-CM

## 2022-03-03 DIAGNOSIS — J18.9 COMMUNITY ACQUIRED PNEUMONIA OF RIGHT LOWER LOBE OF LUNG: ICD-10-CM

## 2022-03-03 DIAGNOSIS — J96.21 ACUTE ON CHRONIC RESPIRATORY FAILURE WITH HYPOXIA: ICD-10-CM

## 2022-03-03 PROCEDURE — 71046 X-RAY EXAM CHEST 2 VIEWS: CPT | Mod: TC

## 2022-03-03 PROCEDURE — 71046 X-RAY EXAM CHEST 2 VIEWS: CPT | Mod: 26,,, | Performed by: RADIOLOGY

## 2022-03-03 PROCEDURE — 99499 UNLISTED E&M SERVICE: CPT | Mod: 95,,, | Performed by: FAMILY MEDICINE

## 2022-03-03 PROCEDURE — 99499 NO LOS: ICD-10-PCS | Mod: 95,,, | Performed by: FAMILY MEDICINE

## 2022-03-03 PROCEDURE — 71046 XR CHEST PA AND LATERAL: ICD-10-PCS | Mod: 26,,, | Performed by: RADIOLOGY

## 2022-03-03 NOTE — PROGRESS NOTES
"Established Patient - Audio Only Telehealth Visit     The patient location is: [place]  The chief complaint leading to consultation is: hospital follow up  Visit type: Virtual visit with audio only (telephone)  Total time spent with patient: 10 minutes       The reason for the audio only service rather than synchronous audio and video virtual visit was related to technical difficulties or patient preference/necessity.     Each patient to whom I provide medical services by telemedicine is:  (1) informed of the relationship between the physician and patient and the respective role of any other health care provider with respect to management of the patient; and (2) notified that they may decline to receive medical services by telemedicine and may withdraw from such care at any time. Patient verbally consented to receive this service via voice-only telephone call.       HPI: Estefani Fam is a 71 year old female with cataracts, COPD on 2L home o2, history of elevated blood pressure reading, history of retinal hemorrhage, history of tobacco use, and pulmonary hypertension who presents for an audio only visit for hospital follow up.    Hospitalized 2/14/22 - 2/23/22.  Per discharge summary, "presented to [facility] via EMS for one day history  of shortness of breath at home. In the ED, patient was hypoxic to 84% upon arrival. Patient reports productive cough and pleuritic chest pain. She denies exertional chest pain, diaphoresis, arm pain. At home, she takes home inhalers of Breo Ellipta and Incruse Ellipta and Azithromycin 3 times weekly to help prevent exacerbations in the future.     She was previously hospitalized 11/2021 for COPD exacerbation 2/2 CAP. She was started on oral antibiotics and IV steroids. Patient clinically improved and discharged on 7 day course of levofloxacin and steroid taper. She also was discharged on Roflumilast and outpatient followup with her Pulmonologist Dr. Brooklynn Ruiz.    Patient admitted " "for COPD exacerbation treated initially with prednisone but symptoms continued to worsen.  She was then treated with 2 days of IV Solu-Medrol with improvement in symptoms before being switched back to oral prednisone 40 mg. CTA chest negative for PE.  She is also treated with 5 days of ceftriaxone and 3 days of azithromycin 500 mg daily before being switched to whom azithromycin 250 3 times a week.  Given severity of symptoms pulmonology was also consulted.  They recommended schedule chest physical therapy, Acapella device, and continue pulmonary toilet.  She was also diuresed with 20 mg of oral Lasix per pulmonology to help improve respiratory status.  After several days slow progression of symptoms patient was discharged home with home oxygen requirement.  She was also sent home with DME per OT/PT and medication refills.  Outpatient Pulmonary and primary care follow-up requested."    BNP 2/14/22 normal.      CXR 2/15/22 showed Chronic lung findings including emphysema and redemonstration of consolidation in the right middle and lower lung zones consistent with a pneumonia or aspiration without significant change from yesterday.  Clinical correlation and follow-up recommended.    CTA 2/15/22 showed 1.  No CT evidence of new pulmonary thromboembolic disease with good opacification of the pulmonary arteries.  2.  Right lower lobe consolidation, concerning for lobar pneumonia.  3.  Prominent to mildly enlarged subcarinal and paraesophageal lymph nodes.  4.  Redemonstrated background of severe bullous emphysema.    Reports she feels very weak--working with OT through home health.  Has f/u with pulm 3/7/21.  Denies fevers/chills since hospital discharge.  Has supplemental O2 at 3L which is what she was on before hospitalization.  Denies SOB at rest but does endorse GRUBER.  Used to go to pulmonary therapy at [facility] but this closed during COVID-19.     Assessment and plan:      1.  Acute on chronic respiratory failure with " hypoxia; Chronic obstructive pulmonary disease with acute exacerbation; community acquired pneumonia of right lower lube of lung    -  Chest X-ray ordered to re-evaluate RLL ensure improvement/resolution.  Continue azithromycin 3x/week as standard home regimen for now.  Appears acute COPD exacerbation has resolved per patient report of symptoms.  Continue PT/OT via HH.  Recommend 3 regular meals daily with meal supplements like Boost/Ensure if unable to complete full meal.  Return ED precautions discussed.  Keep scheduled f/u with pulmonology as well.                        This service was not originating from a related E/M service provided within the previous 7 days nor will  to an E/M service or procedure within the next 24 hours or my soonest available appointment.  Prevailing standard of care was able to be met in this audio-only visit.

## 2022-03-04 ENCOUNTER — TELEPHONE (OUTPATIENT)
Dept: INTERNAL MEDICINE | Facility: CLINIC | Age: 72
End: 2022-03-04
Payer: MEDICARE

## 2022-03-04 ENCOUNTER — PATIENT MESSAGE (OUTPATIENT)
Dept: INTERNAL MEDICINE | Facility: CLINIC | Age: 72
End: 2022-03-04
Payer: MEDICARE

## 2022-03-04 DIAGNOSIS — Z87.01 HISTORY OF PNEUMONIA: ICD-10-CM

## 2022-03-04 DIAGNOSIS — R93.89 ABNORMAL CHEST X-RAY: Primary | ICD-10-CM

## 2022-03-04 NOTE — TELEPHONE ENCOUNTER
No new care gaps identified.  Powered by [employer]. Reference number: [id number].   3/04/2022 11:26:46 AM CST

## 2022-03-05 ENCOUNTER — PATIENT OUTREACH (OUTPATIENT)
Dept: ADMINISTRATIVE | Facility: OTHER | Age: 72
End: 2022-03-05
Payer: MEDICARE

## 2022-03-05 RX ORDER — UMECLIDINIUM 62.5 UG/1
62.5 AEROSOL, POWDER ORAL DAILY
Qty: 90 EACH | Refills: 3 | Status: SHIPPED | OUTPATIENT
Start: 2022-03-05 | End: 2023-01-12 | Stop reason: SDUPTHER

## 2022-03-05 NOTE — TELEPHONE ENCOUNTER
Refill Authorization Note   Estefani Fam  is requesting a refill authorization.  Brief Assessment and Rationale for Refill:  Approve     Medication Therapy Plan:       Medication Reconciliation Completed: No   Comments:   --->Care Gap information included below if applicable.   Orders Placed This Encounter    umeclidinium (INCRUSE ELLIPTA) 62.5 mcg/actuation inhalation capsule      Requested Prescriptions   Signed Prescriptions Disp Refills    umeclidinium (INCRUSE ELLIPTA) 62.5 mcg/actuation inhalation capsule 90 each 3     Sig: Inhale 62.5 mcg into the lungs once daily.       Pulmonology:  Anticholinergic Agents Passed - 3/4/2022 11:26 AM        Passed - Patient is at least 18 years old        Passed - Valid encounter within last 15 months     Recent Visits  Date Type Provider Dept   03/03/22 Office Visit Miles Jackson MD [facility] Internal Medicine   08/12/21 Office Visit Miles Jackson MD [facility] Internal Medicine   06/11/20 Office Visit Miles Jackson MD [facility] Internal Medicine   Showing recent visits within past 720 days and meeting all other requirements  Future Appointments  No visits were found meeting these conditions.  Showing future appointments within next 150 days and meeting all other requirements      Future Appointments              In 2 days MD Luc [facility] - Pulmonary Svcs 9th Fl, [facility]    In 1 week [facility] [id number] LB LIMIT [facility] Radiology Primarycarebldg, [facility]    In 1 month INJECTION [facility] - Infectious Diseases, [facility]    In 1 month MD [facility] Int Med Primary Care Bldg, [facility]                    Appointments  past 12m or future 3m with PCP    Date Provider   Last Visit   3/3/2022 Miles Jackson MD   Next Visit   Visit date not found Miles Jackson MD   ED visits in past 90 days: 1     Note composed:2:03 PM 03/05/2022

## 2022-03-06 NOTE — PROGRESS NOTES
Health Maintenance Due   Topic Date Due    Mammogram  Never done    Colorectal Cancer Screening  Never done    Shingles Vaccine (2 of 2) 11/26/2020    COVID-19 Vaccine (3 - Booster for Pfizer series) 11/16/2021     Updates were requested from care everywhere.  Chart was reviewed for overdue Proactive [facility] Encounters (KATIE) topics (CRS, Breast Cancer Screening, Eye exam)  Health Maintenance has been updated.  LINKS immunization registry triggered.  Immunizations were reconciled.

## 2022-03-07 ENCOUNTER — OFFICE VISIT (OUTPATIENT)
Dept: PULMONOLOGY | Facility: CLINIC | Age: 72
End: 2022-03-07
Payer: MEDICARE

## 2022-03-07 VITALS
SYSTOLIC BLOOD PRESSURE: 118 MMHG | HEIGHT: 68 IN | BODY MASS INDEX: 24.06 KG/M2 | HEART RATE: 98 BPM | DIASTOLIC BLOOD PRESSURE: 80 MMHG | OXYGEN SATURATION: 86 % | WEIGHT: 158.75 LBS

## 2022-03-07 DIAGNOSIS — J43.2 CENTRILOBULAR EMPHYSEMA: ICD-10-CM

## 2022-03-07 DIAGNOSIS — J96.11 CHRONIC RESPIRATORY FAILURE WITH HYPOXIA: ICD-10-CM

## 2022-03-07 DIAGNOSIS — J18.1 LOBAR PNEUMONIA: ICD-10-CM

## 2022-03-07 DIAGNOSIS — R60.9 EDEMA, UNSPECIFIED TYPE: ICD-10-CM

## 2022-03-07 DIAGNOSIS — R63.39 OTHER FEEDING DIFFICULTIES: ICD-10-CM

## 2022-03-07 DIAGNOSIS — J18.9 RECURRENT PNEUMONIA: Primary | ICD-10-CM

## 2022-03-07 PROCEDURE — 99214 OFFICE O/P EST MOD 30 MIN: CPT | Mod: PBBFAC,25 | Performed by: INTERNAL MEDICINE

## 2022-03-07 PROCEDURE — 99215 PR OFFICE/OUTPT VISIT, EST, LEVL V, 40-54 MIN: ICD-10-PCS | Mod: S$PBB,,, | Performed by: INTERNAL MEDICINE

## 2022-03-07 PROCEDURE — G0009 ADMIN PNEUMOCOCCAL VACCINE: HCPCS | Mod: PBBFAC

## 2022-03-07 PROCEDURE — 99999 PR PBB SHADOW E&M-EST. PATIENT-LVL IV: ICD-10-PCS | Mod: PBBFAC,,, | Performed by: INTERNAL MEDICINE

## 2022-03-07 PROCEDURE — 99215 OFFICE O/P EST HI 40 MIN: CPT | Mod: S$PBB,,, | Performed by: INTERNAL MEDICINE

## 2022-03-07 PROCEDURE — 99999 PR PBB SHADOW E&M-EST. PATIENT-LVL IV: CPT | Mod: PBBFAC,,, | Performed by: INTERNAL MEDICINE

## 2022-03-07 RX ORDER — FUROSEMIDE 20 MG/1
20 TABLET ORAL DAILY
Qty: 3 TABLET | Refills: 0 | Status: SHIPPED | OUTPATIENT
Start: 2022-03-07 | End: 2022-04-08

## 2022-03-07 RX ORDER — FLUTICASONE FUROATE AND VILANTEROL TRIFENATATE 200; 25 UG/1; UG/1
POWDER RESPIRATORY (INHALATION)
Qty: 60 EACH | Refills: 11 | Status: SHIPPED | OUTPATIENT
Start: 2022-03-07 | End: 2023-03-08 | Stop reason: SDUPTHER

## 2022-03-07 NOTE — PROGRESS NOTES
"Subjective:       Patient ID: Estefani Fam is a 71 y.o. female.    Chief Complaint: Recurrent Pneumonia (RLL pneumonia 11/2021 and 2/2022)    71 year old with severe emphysema, chronic hypoxic respiratory failure.  Complains of dysphagia and cough and gagging with meals.  She has to eat slowly because it "takes her breath away".  Patient is here for hospital follow up for pneumonia.  She is overall improved but still very debilitated.  Has at home PT/RT coming.  Patient without a significant cough.  Still with GRUBER.  No fevers, chills or sweats.      Review of Systems    Currently sleeping in a hospital bed downstairs as she cannot walk up a flight of stairs.   Objective:       Vitals:    03/07/22 1042   BP: 118/80   BP Location: Right arm   Patient Position: Sitting   Pulse: 98   SpO2: (!) 86%  Comment: 2.0   Weight: 72 kg (158 lb 11.7 oz)   Height: 5' 8" (1.727 m)     Physical Exam   Constitutional: She is oriented to person, place, and time. She appears well-developed and well-nourished.   HENT:   Head: Normocephalic.   Cardiovascular: Normal rate and regular rhythm.   Pulmonary/Chest: Normal expansion. She has no wheezes. She has no rales.   Musculoskeletal:         General: Edema (bilateral lower extremity edema) present.      Cervical back: Neck supple.   Neurological: She is alert and oriented to person, place, and time.   Skin: Skin is warm and dry.   Psychiatric: She has a normal mood and affect.        Personal Diagnostic Review  Chest x-ray: 3/3/2022 much improved.  CTA with patent airways, no endobronchial obstruction.    No flowsheet data found.      Assessment:       1. Recurrent pneumonia    2. Other feeding difficulties     3. Centrilobular emphysema    4. Edema, unspecified type    5. Chronic respiratory failure with hypoxia    6. Lobar pneumonia        Outpatient Encounter Medications as of 3/7/2022   Medication Sig Dispense Refill    acetylcysteine 600 mg Cap Take 1 capsule (600 mg total) by " mouth 2 (two) times daily. 90 capsule 3    ascorbic acid, vitamin C, (VITAMIN C) 500 MG tablet Take 500 mg by mouth 2 (two) times daily.       azelastine (ASTELIN) 137 mcg (0.1 %) nasal spray INHALE 1 SPRAY BY NASAL ROUTE TWICE DAILY OR AS DIRECTED 30 mL 3    azithromycin (Z-JERSEY) 250 MG tablet TAKE 1 TABLET BY MOUTH EVERY MONDAY WEDNESDAY AND FRIDAY 36 tablet 3    calcium carbonate (OS-STEPHANIE) 600 mg calcium (1,500 mg) Tab Take 1 tablet (600 mg total) by mouth once daily. Take Levofloxacin antibiotic at least 2 hours before calcium.  0    fluticasone propionate (FLONASE) 50 mcg/actuation nasal spray INSTILL 1 SPRAY IN EACH NOSTRIL EVERY DAY 16 g 0    levalbuterol (XOPENEX) 0.63 mg/3 mL nebulizer solution USE 1 VIAL IN NEBULIZER EVERY 8 HOURS AS NEEDED FOR WHEEZE 750 mL 2    MULTIVIT-IRON-MIN-FOLIC ACID 3,500-18-0.4 UNIT-MG-MG ORAL CHEW Take 1 tablet by mouth once daily. Take Levofloxacin antibiotic at least 2 hours before multivitamin.  0    pantoprazole (PROTONIX) 40 MG tablet Take 1 tablet (40 mg total) by mouth once daily. 30 tablet 11    pulse oximeter (PULSE OXIMETER) device by Apply Externally route 2 (two) times a day. Use twice daily at 8 AM and 3 PM and record the value in [facility] as directed. 1 each 0    sodium chloride (OCEAN) 0.65 % nasal spray 1 spray by Nasal route 2 (two) times daily. 88 mL 12    umeclidinium (INCRUSE ELLIPTA) 62.5 mcg/actuation inhalation capsule Inhale 62.5 mcg into the lungs once daily. 90 each 3    [DISCONTINUED] BREO ELLIPTA 200-25 mcg/dose DsDv diskus inhaler INHALE 1 PUFF INTO THE LUNGS DAILY 60 each 11    furosemide (LASIX) 20 MG tablet Take 1 tablet (20 mg total) by mouth once daily. for 3 days 3 tablet 0    [DISCONTINUED] roflumilast (DALIRESP) 250 mcg Tab Take 1 tablet (250 mcg total) by mouth once daily. (Patient not taking: Reported on 3/7/2022) 30 tablet 11     Facility-Administered Encounter Medications as of 3/7/2022   Medication Dose Route Frequency  Provider Last Rate Last Admin    albuterol inhaler 2 puff  2 puff Inhalation Q20 Min PRN Miles Jackson MD         Orders Placed This Encounter   Procedures    Fl Modified Barium Swallow Speech     Standing Status:   Future     Standing Expiration Date:   3/7/2023     Order Specific Question:   May the Radiologist modify the order per protocol to meet the clinical needs of the patient?     Answer:   Yes     Order Specific Question:   Is the patient allergic to iodine or contrast?     Answer:   No     Order Specific Question:   Release to patient     Answer:   Immediate    FL Esophagram Complete     Standing Status:   Future     Standing Expiration Date:   3/7/2023     Order Specific Question:   May the Radiologist modify the order per protocol to meet the clinical needs of the patient?     Answer:   Yes     Order Specific Question:   Is the patient allergic to iodine or contrast?     Answer:   No     Order Specific Question:   Release to patient     Answer:   Immediate    Pneumococcal polysaccharide vaccine 23-valent greater than or equal to 3yo subcutaneous/IM    SLP video swallow     Standing Status:   Future     Standing Expiration Date:   3/7/2023     Plan:     Problem List Items Addressed This Visit     Chronic respiratory failure with hypoxia    Overview     Continues to benefit from supplemental oxygen at 3L/M           Centrilobular emphysema    Overview     Hospitalized 5/2021  Continue incruse, BREO, NAC and zmax TIW for control.  Albuterol for rescue and prevention    Should restart pulmonary rehab.  Consult for [employer] will be placed once patient completes home health.  Instructed to contact us via [facility].    Did not tolerate Daliresp 250 mg daily.                  Relevant Orders    Pneumococcal polysaccharide vaccine 23-valent greater than or equal to 3yo subcutaneous/IM (Completed)    Lobar pneumonia    Overview     Recurrent in RLL, no endobronchial lesion  Needs speech evaluation and MBSS  for recurrent aspiration in setting of dysphagia    Will need pulmonary rehab at [facility].             Other Visit Diagnoses     Recurrent pneumonia    -  Primary    Relevant Orders    Fl Modified Barium Swallow Speech    SLP video swallow    FL Esophagram Complete    Pneumococcal polysaccharide vaccine 23-valent greater than or equal to 1yo subcutaneous/IM (Completed)    Other feeding difficulties         Relevant Orders    Fl Modified Barium Swallow Speech    Edema, unspecified type        Echo wnl.  Lasix x 3 days.    Relevant Medications    furosemide (LASIX) 20 MG tablet        I spent 40 minutes with the patient and more than half the time was spent discussing diagnosis and treatment.  I personally reviewed the patient's test results and medications past and present and the need to adhere to the plan put forth during this visit encounter as described above.

## 2022-03-08 ENCOUNTER — EXTERNAL HOME HEALTH (OUTPATIENT)
Dept: HOME HEALTH SERVICES | Facility: HOSPITAL | Age: 72
End: 2022-03-08
Payer: MEDICARE

## 2022-03-09 ENCOUNTER — TELEPHONE (OUTPATIENT)
Dept: SPEECH THERAPY | Facility: HOSPITAL | Age: 72
End: 2022-03-09
Payer: MEDICARE

## 2022-03-09 PROBLEM — J18.1 LOBAR PNEUMONIA: Status: ACTIVE | Noted: 2021-11-14

## 2022-03-09 NOTE — TELEPHONE ENCOUNTER
Spoke with patient to schedule mbss. 3/14@2:30. Pt informed [facility] radiology main campus. 2hr fast before appt. Pt confirmed she can stand for test she has copd so uses wheelchair to get around but is not bound.

## 2022-03-14 ENCOUNTER — HOSPITAL ENCOUNTER (OUTPATIENT)
Dept: RADIOLOGY | Facility: HOSPITAL | Age: 72
Discharge: HOME | End: 2022-03-14
Attending: INTERNAL MEDICINE
Payer: MEDICARE

## 2022-03-14 ENCOUNTER — CLINICAL SUPPORT (OUTPATIENT)
Dept: SPEECH THERAPY | Facility: HOSPITAL | Age: 72
End: 2022-03-14
Attending: INTERNAL MEDICINE
Payer: MEDICARE

## 2022-03-14 DIAGNOSIS — R63.39 OTHER FEEDING DIFFICULTIES: ICD-10-CM

## 2022-03-14 DIAGNOSIS — R13.12 DYSPHAGIA, OROPHARYNGEAL: Primary | ICD-10-CM

## 2022-03-14 DIAGNOSIS — J18.9 RECURRENT PNEUMONIA: ICD-10-CM

## 2022-03-14 PROCEDURE — 74230 X-RAY XM SWLNG FUNCJ C+: CPT | Mod: TC

## 2022-03-14 PROCEDURE — 74230 FL MODIFIED BARIUM SWALLOW SPEECH STUDY: ICD-10-PCS | Mod: 26,,, | Performed by: STUDENT IN AN ORGANIZED HEALTH CARE EDUCATION/TRAINING PROGRAM

## 2022-03-14 PROCEDURE — 25500020 PHARM REV CODE 255: Performed by: INTERNAL MEDICINE

## 2022-03-14 PROCEDURE — A9698 NON-RAD CONTRAST MATERIALNOC: HCPCS | Performed by: INTERNAL MEDICINE

## 2022-03-14 PROCEDURE — 74230 X-RAY XM SWLNG FUNCJ C+: CPT | Mod: 26,,, | Performed by: STUDENT IN AN ORGANIZED HEALTH CARE EDUCATION/TRAINING PROGRAM

## 2022-03-14 PROCEDURE — 92611 MOTION FLUOROSCOPY/SWALLOW: CPT | Mod: GN | Performed by: SPEECH-LANGUAGE PATHOLOGIST

## 2022-03-14 RX ADMIN — BARIUM SULFATE 100 ML: 0.81 POWDER, FOR SUSPENSION ORAL at 02:03

## 2022-03-14 NOTE — Clinical Note
Dr. Ruiz, in the average patient with no baseline pulmonary disease, I would not get very excited about the trace, flash penetration we saw -- but I'm reserving judgment a little in Mrs. Fam's case.  If her esophagram does not show any reflux or any explanation for her globus sensation, I plan to ask her to come in for a short course of therapy targeting maintaining liquids in her oral cavity until the swallow is initiated. Please let me know if you have any questions about the study.  Thanks. Margaret

## 2022-03-15 ENCOUNTER — TELEPHONE (OUTPATIENT)
Dept: INTERNAL MEDICINE | Facility: CLINIC | Age: 72
End: 2022-03-15
Payer: MEDICARE

## 2022-03-15 NOTE — PLAN OF CARE
"  IMPRESSIONS:   This 71 y.o. woman appears to present with  1.  Mild oropharyngeal dysphagia characterized by premature spillage of thin liquids, particularly, which, in combination with her posture and orientation of the open laryngeal vestibule, facilitated trace, flash laryngeal penetration, sometimes to the TVFs.  There was no aspiration in this study.     As Mrs. Fam's swallowing function per MBSS appears essentially stable as compared to her Jan 2019 study and she does not appear to have had any pna's prior to Nov 2021, it seems less likely that the laryngeal penetration documented in 2019 and today is the cause of her pna's. However, the studies are "snapshots" of instances of swallowing and certainly an isolated episode of a more substantial airway threat in the context of a patient with significant baseline pulmonary disease could lead to pna.  Additionally, while there is not currently any known reflux problem, the reported globus sensation suggests the need, as planned, to rule out something like laryngopharyngeal reflux.  2.  Globus sensation, feeling of something always present (indicated at level of base of neck) and sense that she needs to hack cough.  3.  Dependent on supplemental O2 via nasal cannula.  4.  History recurrent RLL pna (Nov 2021 and Feb 2022).  5.  History severe emphysema and chronic hypoxic respiratory failure.      RECOMMENDATIONS/PLAN OF CARE:   It is felt that Mrs. Fam would benefit from  1.  Continuation of her current regular consistency diet with thin liquids using the following strategies and common aspiration precautions, including, but not limited to   A.  Appropriate upright seating for all eating and drinking.   B.  Maintaining boluses, particularly liquids, in the oral cavity until the swallow is initiated.  Use of a slight chin tuck can help facilitate this.   C.  Continued use of slow rate of eating/drinking.   D.  Pills with liquids.   E.  Monitoring for any " signs/symptoms of aspiration (such as wet/gurgly voice that does not clear with coughing, inability to make any voice sounds, any persistent coughing with oral intake, otherwise unexplained fever, unexplained increased or new difficulty or discomfort breathing, unexplained increase in sleepiness/lethargy/significant fatigue, unexplained increase or new onset confusion or change in cognitive functioning, or any other unexplained change in health or well-being that could be related to swallowing).  2.  Proceed with plans for esophagram 3/24/22.  Once the results of that study are in hand, determine need for short course of dysphagia therapy.    3.  Follow up with Dr. Ruiz as directed.  4.  Repeat MBSS as needed.

## 2022-03-15 NOTE — PROGRESS NOTES
MODIFIED BARIUM SWALLOW STUDY    REASON FOR REFERRAL:  Estefani Fam, age 71, was referred by Dr. Brooklynn Ruiz,  Pulmonologist, for a Modified Barium Swallow Study to rule out aspiration, assess his overall swallowing function, and determine safest consistencies for oral intake.  She accompanied by her  who remained in the anteroom throughout the visit.    Mrs. Fam has a history of recurrent pna (RLL 11/2021 and 2/2022) concerning for possible aspiration pna.   She also has a history of severe emphysema and chronic hypoxic respiratory failure.  She is on supplemental O2 via a portable oxygen device and nasal cannula.     Today, Mrs. Fam reported that she felt she never really recovered from the Nov 2021 pna b/c she felt poorly through the holidays.  She stated that she eats slowly b/c if she eats at her regular rate she chokes on solids.  She denied that she has any difficulty with liquids.  She reported that she always feels like something is in her throat and that she needs to hack; she initially indicated the level of her larynx, but later, when feeling the sensation acutely, indicated her base of neck.  She takes a regular consistency diet with thin liquids and takes pills with liquids.    She denied need for Heimlich maneuver or unintended weight loss.      MEDICAL HISTORY:  Past Medical History:   Diagnosis Date    Cataract     COPD (chronic obstructive pulmonary disease)     COVID-19     History of COVID-19 1/17/2021    Still with mild dyspnea. Encouraged return to pulmonary rehab Recommend scheduling vaccination when appointment is available.     History of retinal hemorrhage     Pathological fracture due to osteoporosis 7/6/2020    Retinal histoplasmosis     Secondary pulmonary arterial hypertension 7/3/2018    Secondary to emphysema and chronic respiratory failure, mild.        SURGICAL HISTORY:  Past Surgical History:   Procedure Laterality Date    BRONCHOSCOPY WITH FLUOROSCOPY  N/A 10/28/2019    Procedure: BRONCHOSCOPY, WITH FLUOROSCOPY;  Surgeon: Brooklynn Ruiz MD;  Location: [facility] OR [address] [place] [address];  Service: Endoscopy;  Laterality: N/A;    HAND SURGERY         SWALLOWING HISTORY:  As above.    History MBSS as an inpatient Jan 2019.  Report not available, but ST note from next day stated there was no aspiration, but there was mildly decreased bolus control and flash penetration of 2 of 6 thin liquid PO trials.  She was provided dysphagia exercise information.  Recommended diet was regular consistency with thin liquids.    An esophagram has been scheduled 3/24/22.    FAMILY HISTORY:  Family History   Problem Relation Age of Onset    Macular degeneration Mother     Colon cancer Mother     Stroke Father     Colon cancer Father     Amblyopia Neg Hx     Blindness Neg Hx     Cataracts Neg Hx     Diabetes Neg Hx     Glaucoma Neg Hx     Hypertension Neg Hx     Retinal detachment Neg Hx     Strabismus Neg Hx     Thyroid disease Neg Hx         SOCIAL HISTORY:  Mrs. Fam lives in [place].      Tobacco:  Per EMR, former smoker; 1 ppd x36 years; quit 8/28/2016.  ETOH:  Per EMR, 1-2 glasses/day.    BEHAVIOR:  Mrs. Fam was a very pleasant woman who had normal affect and social interaction.  She arrived via wheelchair, but was able to rise and walk short distances for transfer independently.      She was using her supplemental oxygen via nasal cannula and was noted to consistently exhale via pursed lips.  She reported that she had been trained by pulmonary rehab to utilize diaphragmatic breathing with pursed-lip exhalation.    Mrs. Fam was able to fully cooperate during the study.  Results of today's assessment were considered indicative of her current levels of swallowing functioning.      HEARING:  Subjectively, within normal limits.     ORAL PERIPHERAL:   Informal examination of the oral mechanism revealed structures and functioning within normal limits for swallowing and speech  purposes.    Voice quality was within normal limits with no wet quality before, during, or after the study.      TEST FINDINGS:   Mrs. Fam was seen in Radiology with the Radiologist for a Modified Barium Swallow Study.  She was seated on a stool for a left lateral videofluoroscopic view.  Her posture was noted to be kyphotic.    Consistencies assessed using radiopaque barium contrast:  thin (single and continuous swallows via open cup),   thin puree (1-teaspoon boluses) via spoon,   thick puree (1-teaspoon boluses) via spoon,   solid (half cracker boluses) by Mrs. Fam's hand, and   13 mm barium tablet with water    Strategies:  None.    Phases:  Oral:  Mrs. Fam was able to obtain liquid and strip utensils well with no loss of material from the oral cavity.  She moved boluses through the oral cavity with appropriate transit time.  There was no pooling of liquids in the mouth.  Swallow reflex was triggered after liquids reached the valleculae and were spilling to the pyriforms and after solids reached the valleculae.  Due to her posture, the spillage of liquids resulted in tiny volumes having access to the open laryngeal vestibule prior to swallow initiation.    Pharyngeal:  Thin liquid boluses moved through the pharyngeal phase with trace, flash laryngeal penetration (some as deep as the TVFs) and no aspiration and no nasal regurgitation.  Solids were swallowed safely as was the barium tablet.     - Delayed initiation (see above)  - Adequate soft palate elevation  - Adequate laryngeal elevation and anterior hyoid excursion  - Adequate tongue base retraction  - Complete epiglottic inversion  - Complete laryngeal vestibular closure  - Adequate pharyngeal stripping wave  - Adequate PE segment opening.  -  No pharyngeal residue.    Cervical Esophageal:  Boluses entered the upper esophagus within normal limits.  No obstruction was noted.    Rosenbeck 8-point Penetration-Aspiration Scale:  Best:    1 - Material  "does not enter airway. -- 2 of 6 thin liquid boluses and all solid boluses and barium tablet.  Worst:    4 - Material enters the airway, contacts the vocal folds, and is ejected from the airway. -- 2 of 6 thin liquid boluses.    Observed somewhat frequent production of hack-type cough (single sharp cough) through visit at times when Mrs. Fam was not swallowing and several seconds after swallows at times.    IMPRESSIONS:   This 71 y.o. woman appears to present with  1.  Mild oropharyngeal dysphagia characterized by premature spillage of thin liquids, particularly, which, in combination with her posture and orientation of the open laryngeal vestibule, facilitated trace, flash laryngeal penetration, sometimes to the TVFs.  There was no aspiration in this study.     As Mrs. Fam's swallowing function per MBSS appears essentially stable as compared to her Jan 2019 study and she does not appear to have had any pna's prior to Nov 2021, it seems less likely that the laryngeal penetration documented in 2019 and today is the cause of her pna's. However, the studies are "snapshots" of instances of swallowing and certainly an isolated episode of a more substantial airway threat in the context of a patient with significant baseline pulmonary disease could lead to pna.  Additionally, while there is not currently any known reflux problem, the reported globus sensation suggests the need, as planned, to rule out something like laryngopharyngeal reflux.  2.  Globus sensation, feeling of something always present (indicated at level of base of neck) and sense that she needs to hack cough.  3.  Dependent on supplemental O2 via nasal cannula.  4.  History recurrent RLL pna (Nov 2021 and Feb 2022).  5.  History severe emphysema and chronic hypoxic respiratory failure.      RECOMMENDATIONS/PLAN OF CARE:   It is felt that Mrs. Fam would benefit from  1.  Continuation of her current regular consistency diet with thin liquids using " the following strategies and common aspiration precautions, including, but not limited to   A.  Appropriate upright seating for all eating and drinking.   B.  Maintaining boluses, particularly liquids, in the oral cavity until the swallow is initiated.  Use of a slight chin tuck can help facilitate this.   C.  Continued use of slow rate of eating/drinking.   D.  Pills with liquids.   E.  Monitoring for any signs/symptoms of aspiration (such as wet/gurgly voice that does not clear with coughing, inability to make any voice sounds, any persistent coughing with oral intake, otherwise unexplained fever, unexplained increased or new difficulty or discomfort breathing, unexplained increase in sleepiness/lethargy/significant fatigue, unexplained increase or new onset confusion or change in cognitive functioning, or any other unexplained change in health or well-being that could be related to swallowing).  2.  Proceed with plans for esophagram 3/24/22.  Once the results of that study are in hand, determine need for short course of dysphagia therapy.    3.  Follow up with Dr. Ruiz as directed.  4.  Repeat MBSS as needed.

## 2022-03-15 NOTE — TELEPHONE ENCOUNTER
----- Message from Trung Willard sent at 3/15/2022  3:51 PM CDT -----  Regarding: Message.  Contact: [facility] Mike, with [facility] called stating OT ends today. Pt. Is functioning well only needs PT.          [phone]

## 2022-03-17 ENCOUNTER — DOCUMENT SCAN (OUTPATIENT)
Dept: HOME HEALTH SERVICES | Facility: HOSPITAL | Age: 72
End: 2022-03-17
Payer: MEDICARE

## 2022-03-24 ENCOUNTER — HOSPITAL ENCOUNTER (OUTPATIENT)
Dept: RADIOLOGY | Facility: HOSPITAL | Age: 72
Discharge: HOME | End: 2022-03-24
Attending: INTERNAL MEDICINE
Payer: MEDICARE

## 2022-03-24 DIAGNOSIS — J18.9 RECURRENT PNEUMONIA: ICD-10-CM

## 2022-03-24 PROCEDURE — 74220 X-RAY XM ESOPHAGUS 1CNTRST: CPT | Mod: 26,,, | Performed by: RADIOLOGY

## 2022-03-24 PROCEDURE — 25500020 PHARM REV CODE 255: Performed by: INTERNAL MEDICINE

## 2022-03-24 PROCEDURE — A9698 NON-RAD CONTRAST MATERIALNOC: HCPCS | Performed by: INTERNAL MEDICINE

## 2022-03-24 PROCEDURE — 74220 FL ESOPHAGRAM COMPLETE: ICD-10-PCS | Mod: 26,,, | Performed by: RADIOLOGY

## 2022-03-24 PROCEDURE — 74220 X-RAY XM ESOPHAGUS 1CNTRST: CPT | Mod: TC

## 2022-03-24 RX ADMIN — BARIUM SULFATE 60 ML: 0.6 SUSPENSION ORAL at 03:03

## 2022-03-30 ENCOUNTER — PATIENT MESSAGE (OUTPATIENT)
Dept: SPEECH THERAPY | Facility: HOSPITAL | Age: 72
End: 2022-03-30
Payer: MEDICARE

## 2022-03-31 ENCOUNTER — EXTERNAL CHRONIC CARE MANAGEMENT (OUTPATIENT)
Dept: PRIMARY CARE CLINIC | Facility: CLINIC | Age: 72
End: 2022-03-31
Payer: MEDICARE

## 2022-03-31 PROCEDURE — 99439 PR CHRONIC CARE MGMT, EA ADDTL 20 MIN: ICD-10-PCS | Mod: S$PBB,,, | Performed by: FAMILY MEDICINE

## 2022-03-31 PROCEDURE — 99490 PR CHRONIC CARE MGMT, 1ST 20 MIN: ICD-10-PCS | Mod: S$PBB,,, | Performed by: FAMILY MEDICINE

## 2022-03-31 PROCEDURE — 99439 CHRNC CARE MGMT STAF EA ADDL: CPT | Mod: PBBFAC,27 | Performed by: FAMILY MEDICINE

## 2022-03-31 PROCEDURE — 99490 CHRNC CARE MGMT STAFF 1ST 20: CPT | Mod: PBBFAC | Performed by: FAMILY MEDICINE

## 2022-03-31 PROCEDURE — 99490 CHRNC CARE MGMT STAFF 1ST 20: CPT | Mod: S$PBB,,, | Performed by: FAMILY MEDICINE

## 2022-03-31 PROCEDURE — 99439 CHRNC CARE MGMT STAF EA ADDL: CPT | Mod: S$PBB,,, | Performed by: FAMILY MEDICINE

## 2022-04-03 ENCOUNTER — HOSPITAL ENCOUNTER (INPATIENT)
Facility: HOSPITAL | Age: 72
LOS: 4 days | Discharge: HOME HEALTH | DRG: 190 | End: 2022-04-08
Attending: EMERGENCY MEDICINE | Admitting: HOSPITALIST
Payer: MEDICARE

## 2022-04-03 DIAGNOSIS — M81.0 OSTEOPOROSIS, UNSPECIFIED OSTEOPOROSIS TYPE, UNSPECIFIED PATHOLOGICAL FRACTURE PRESENCE: ICD-10-CM

## 2022-04-03 DIAGNOSIS — Z87.01 HISTORY OF PNEUMONIA: ICD-10-CM

## 2022-04-03 DIAGNOSIS — R09.02 HYPOXIA: ICD-10-CM

## 2022-04-03 DIAGNOSIS — J44.1 COPD EXACERBATION: Primary | ICD-10-CM

## 2022-04-03 DIAGNOSIS — R93.89 ABNORMAL CHEST X-RAY: ICD-10-CM

## 2022-04-03 LAB
ALBUMIN SERPL BCP-MCNC: 3.7 G/DL (ref 3.5–5.2)
ALLENS TEST: ABNORMAL
ALP SERPL-CCNC: 90 U/L (ref 55–135)
ALT SERPL W/O P-5'-P-CCNC: 16 U/L (ref 10–44)
ANION GAP SERPL CALC-SCNC: 10 MMOL/L (ref 8–16)
AST SERPL-CCNC: 21 U/L (ref 10–40)
BASOPHILS # BLD AUTO: 0.05 K/UL (ref 0–0.2)
BASOPHILS NFR BLD: 0.7 % (ref 0–1.9)
BILIRUB SERPL-MCNC: 0.2 MG/DL (ref 0.1–1)
BNP SERPL-MCNC: 21 PG/ML (ref 0–99)
BUN SERPL-MCNC: 8 MG/DL (ref 8–23)
CALCIUM SERPL-MCNC: 9.2 MG/DL (ref 8.7–10.5)
CHLORIDE SERPL-SCNC: 100 MMOL/L (ref 95–110)
CO2 SERPL-SCNC: 30 MMOL/L (ref 23–29)
CREAT SERPL-MCNC: 0.6 MG/DL (ref 0.5–1.4)
CTP QC/QA: YES
DELSYS: ABNORMAL
DIFFERENTIAL METHOD: ABNORMAL
EOSINOPHIL # BLD AUTO: 0.6 K/UL (ref 0–0.5)
EOSINOPHIL NFR BLD: 7.7 % (ref 0–8)
EP: 5
ERYTHROCYTE [DISTWIDTH] IN BLOOD BY AUTOMATED COUNT: 14.1 % (ref 11.5–14.5)
EST. GFR  (AFRICAN AMERICAN): >60 ML/MIN/1.73 M^2
EST. GFR  (NON AFRICAN AMERICAN): >60 ML/MIN/1.73 M^2
FIO2: 35
FLOW: 3
FLOW: 3
GLUCOSE SERPL-MCNC: 153 MG/DL (ref 70–110)
HCO3 UR-SCNC: 33 MMOL/L (ref 24–28)
HCO3 UR-SCNC: 33.2 MMOL/L (ref 24–28)
HCO3 UR-SCNC: 37 MMOL/L (ref 24–28)
HCT VFR BLD AUTO: 42.9 % (ref 37–48.5)
HGB BLD-MCNC: 13.4 G/DL (ref 12–16)
IMM GRANULOCYTES # BLD AUTO: 0.02 K/UL (ref 0–0.04)
IMM GRANULOCYTES NFR BLD AUTO: 0.3 % (ref 0–0.5)
IP: 12
LYMPHOCYTES # BLD AUTO: 1.4 K/UL (ref 1–4.8)
LYMPHOCYTES NFR BLD: 18.2 % (ref 18–48)
MCH RBC QN AUTO: 28.5 PG (ref 27–31)
MCHC RBC AUTO-ENTMCNC: 31.2 G/DL (ref 32–36)
MCV RBC AUTO: 91 FL (ref 82–98)
MODE: ABNORMAL
MONOCYTES # BLD AUTO: 0.8 K/UL (ref 0.3–1)
MONOCYTES NFR BLD: 9.8 % (ref 4–15)
NEUTROPHILS # BLD AUTO: 4.8 K/UL (ref 1.8–7.7)
NEUTROPHILS NFR BLD: 63.3 % (ref 38–73)
NRBC BLD-RTO: 0 /100 WBC
PCO2 BLDA: 69.4 MMHG (ref 35–45)
PCO2 BLDA: 77.3 MMHG (ref 35–45)
PCO2 BLDA: 85.8 MMHG (ref 35–45)
PH SMN: 7.24 [PH] (ref 7.35–7.45)
PH SMN: 7.24 [PH] (ref 7.35–7.45)
PH SMN: 7.29 [PH] (ref 7.35–7.45)
PLATELET # BLD AUTO: 342 K/UL (ref 150–450)
PMV BLD AUTO: 9 FL (ref 9.2–12.9)
PO2 BLDA: 25 MMHG (ref 40–60)
PO2 BLDA: 31 MMHG (ref 40–60)
PO2 BLDA: 38 MMHG (ref 40–60)
POC BE: 10 MMOL/L
POC BE: 6 MMOL/L
POC BE: 6 MMOL/L
POC SATURATED O2: 32 % (ref 95–100)
POC SATURATED O2: 46 % (ref 95–100)
POC SATURATED O2: 63 % (ref 95–100)
POC TCO2: 35 MMOL/L (ref 24–29)
POC TCO2: 36 MMOL/L (ref 24–29)
POC TCO2: 40 MMOL/L (ref 24–29)
POTASSIUM SERPL-SCNC: 3.8 MMOL/L (ref 3.5–5.1)
PROT SERPL-MCNC: 7.4 G/DL (ref 6–8.4)
RBC # BLD AUTO: 4.71 M/UL (ref 4–5.4)
SAMPLE: ABNORMAL
SARS-COV-2 RDRP RESP QL NAA+PROBE: NEGATIVE
SITE: ABNORMAL
SODIUM SERPL-SCNC: 140 MMOL/L (ref 136–145)
TROPONIN I SERPL DL<=0.01 NG/ML-MCNC: 0.01 NG/ML (ref 0–0.03)
WBC # BLD AUTO: 7.64 K/UL (ref 3.9–12.7)

## 2022-04-03 PROCEDURE — 85025 COMPLETE CBC W/AUTO DIFF WBC: CPT | Performed by: EMERGENCY MEDICINE

## 2022-04-03 PROCEDURE — 82803 BLOOD GASES ANY COMBINATION: CPT

## 2022-04-03 PROCEDURE — 99285 EMERGENCY DEPT VISIT HI MDM: CPT | Mod: CS,,, | Performed by: EMERGENCY MEDICINE

## 2022-04-03 PROCEDURE — 94640 AIRWAY INHALATION TREATMENT: CPT

## 2022-04-03 PROCEDURE — 83880 ASSAY OF NATRIURETIC PEPTIDE: CPT | Performed by: EMERGENCY MEDICINE

## 2022-04-03 PROCEDURE — 99900035 HC TECH TIME PER 15 MIN (STAT)

## 2022-04-03 PROCEDURE — 94761 N-INVAS EAR/PLS OXIMETRY MLT: CPT

## 2022-04-03 PROCEDURE — U0002 COVID-19 LAB TEST NON-CDC: HCPCS | Performed by: EMERGENCY MEDICINE

## 2022-04-03 PROCEDURE — 27000221 HC OXYGEN, UP TO 24 HOURS

## 2022-04-03 PROCEDURE — 84484 ASSAY OF TROPONIN QUANT: CPT | Performed by: EMERGENCY MEDICINE

## 2022-04-03 PROCEDURE — 99285 PR EMERGENCY DEPT VISIT,LEVEL V: ICD-10-PCS | Mod: CS,,, | Performed by: EMERGENCY MEDICINE

## 2022-04-03 PROCEDURE — 80053 COMPREHEN METABOLIC PANEL: CPT | Performed by: EMERGENCY MEDICINE

## 2022-04-03 PROCEDURE — 96360 HYDRATION IV INFUSION INIT: CPT

## 2022-04-03 PROCEDURE — 93010 EKG 12-LEAD: ICD-10-PCS | Mod: ,,, | Performed by: INTERNAL MEDICINE

## 2022-04-03 PROCEDURE — 93005 ELECTROCARDIOGRAM TRACING: CPT

## 2022-04-03 PROCEDURE — 27000190 HC CPAP FULL FACE MASK W/VALVE

## 2022-04-03 PROCEDURE — 25000242 PHARM REV CODE 250 ALT 637 W/ HCPCS: Performed by: EMERGENCY MEDICINE

## 2022-04-03 PROCEDURE — 93010 ELECTROCARDIOGRAM REPORT: CPT | Mod: ,,, | Performed by: INTERNAL MEDICINE

## 2022-04-03 PROCEDURE — 99285 EMERGENCY DEPT VISIT HI MDM: CPT | Mod: 25

## 2022-04-03 PROCEDURE — 94660 CPAP INITIATION&MGMT: CPT

## 2022-04-03 PROCEDURE — 25000003 PHARM REV CODE 250: Performed by: EMERGENCY MEDICINE

## 2022-04-03 RX ORDER — LEVALBUTEROL 1.25 MG/.5ML
1.25 SOLUTION, CONCENTRATE RESPIRATORY (INHALATION) EVERY 20 MIN
Status: COMPLETED | OUTPATIENT
Start: 2022-04-03 | End: 2022-04-03

## 2022-04-03 RX ADMIN — LEVALBUTEROL 1.25 MG: 1.25 SOLUTION, CONCENTRATE RESPIRATORY (INHALATION) at 08:04

## 2022-04-03 RX ADMIN — SODIUM CHLORIDE 1000 ML: 0.9 INJECTION, SOLUTION INTRAVENOUS at 08:04

## 2022-04-03 RX ADMIN — LEVALBUTEROL 1.25 MG: 1.25 SOLUTION, CONCENTRATE RESPIRATORY (INHALATION) at 09:04

## 2022-04-04 ENCOUNTER — TELEPHONE (OUTPATIENT)
Dept: INTERNAL MEDICINE | Facility: CLINIC | Age: 72
End: 2022-04-04
Payer: MEDICARE

## 2022-04-04 ENCOUNTER — DOCUMENT SCAN (OUTPATIENT)
Dept: HOME HEALTH SERVICES | Facility: HOSPITAL | Age: 72
End: 2022-04-04
Payer: MEDICARE

## 2022-04-04 PROBLEM — J43.2 CENTRILOBULAR EMPHYSEMA: Chronic | Status: ACTIVE | Noted: 2019-06-20

## 2022-04-04 PROBLEM — J96.01 ACUTE RESPIRATORY FAILURE WITH HYPOXIA AND HYPERCARBIA: Status: ACTIVE | Noted: 2022-04-04

## 2022-04-04 PROBLEM — A41.9 SEVERE SEPSIS: Status: RESOLVED | Noted: 2022-02-14 | Resolved: 2022-04-04

## 2022-04-04 PROBLEM — J96.11 CHRONIC RESPIRATORY FAILURE WITH HYPOXIA: Chronic | Status: ACTIVE | Noted: 2019-03-23

## 2022-04-04 PROBLEM — I27.21 SECONDARY PULMONARY ARTERIAL HYPERTENSION: Chronic | Status: ACTIVE | Noted: 2018-07-03

## 2022-04-04 PROBLEM — J96.21 ACUTE ON CHRONIC RESPIRATORY FAILURE WITH HYPOXIA: Status: RESOLVED | Noted: 2018-07-02 | Resolved: 2022-04-04

## 2022-04-04 PROBLEM — J96.02 ACUTE RESPIRATORY FAILURE WITH HYPOXIA AND HYPERCARBIA: Status: ACTIVE | Noted: 2022-04-04

## 2022-04-04 PROBLEM — Z99.81 CHRONIC RESPIRATORY FAILURE WITH HYPOXIA, ON HOME OXYGEN THERAPY: Chronic | Status: ACTIVE | Noted: 2019-03-23

## 2022-04-04 PROBLEM — K21.9 GERD (GASTROESOPHAGEAL REFLUX DISEASE): Status: ACTIVE | Noted: 2022-04-04

## 2022-04-04 PROBLEM — K21.9 GERD (GASTROESOPHAGEAL REFLUX DISEASE): Chronic | Status: ACTIVE | Noted: 2022-04-04

## 2022-04-04 PROBLEM — R65.20 SEVERE SEPSIS: Status: RESOLVED | Noted: 2022-02-14 | Resolved: 2022-04-04

## 2022-04-04 LAB
ALLENS TEST: ABNORMAL
ANION GAP SERPL CALC-SCNC: 9 MMOL/L (ref 8–16)
BASOPHILS # BLD AUTO: 0.03 K/UL (ref 0–0.2)
BASOPHILS NFR BLD: 0.6 % (ref 0–1.9)
BUN SERPL-MCNC: 9 MG/DL (ref 8–23)
CALCIUM SERPL-MCNC: 8.7 MG/DL (ref 8.7–10.5)
CHLORIDE SERPL-SCNC: 96 MMOL/L (ref 95–110)
CO2 SERPL-SCNC: 33 MMOL/L (ref 23–29)
CREAT SERPL-MCNC: 0.6 MG/DL (ref 0.5–1.4)
DIFFERENTIAL METHOD: ABNORMAL
EOSINOPHIL # BLD AUTO: 0 K/UL (ref 0–0.5)
EOSINOPHIL NFR BLD: 0 % (ref 0–8)
ERYTHROCYTE [DISTWIDTH] IN BLOOD BY AUTOMATED COUNT: 14.3 % (ref 11.5–14.5)
EST. GFR  (AFRICAN AMERICAN): >60 ML/MIN/1.73 M^2
EST. GFR  (NON AFRICAN AMERICAN): >60 ML/MIN/1.73 M^2
GLUCOSE SERPL-MCNC: 157 MG/DL (ref 70–110)
HCO3 UR-SCNC: 37.8 MMOL/L (ref 24–28)
HCT VFR BLD AUTO: 43.3 % (ref 37–48.5)
HGB BLD-MCNC: 13.3 G/DL (ref 12–16)
IMM GRANULOCYTES # BLD AUTO: 0.02 K/UL (ref 0–0.04)
IMM GRANULOCYTES NFR BLD AUTO: 0.4 % (ref 0–0.5)
LYMPHOCYTES # BLD AUTO: 0.4 K/UL (ref 1–4.8)
LYMPHOCYTES NFR BLD: 8.7 % (ref 18–48)
MAGNESIUM SERPL-MCNC: 2.2 MG/DL (ref 1.6–2.6)
MCH RBC QN AUTO: 28.7 PG (ref 27–31)
MCHC RBC AUTO-ENTMCNC: 30.7 G/DL (ref 32–36)
MCV RBC AUTO: 94 FL (ref 82–98)
MONOCYTES # BLD AUTO: 0.2 K/UL (ref 0.3–1)
MONOCYTES NFR BLD: 3.6 % (ref 4–15)
NEUTROPHILS # BLD AUTO: 4.1 K/UL (ref 1.8–7.7)
NEUTROPHILS NFR BLD: 86.7 % (ref 38–73)
NRBC BLD-RTO: 0 /100 WBC
PCO2 BLDA: 63 MMHG (ref 35–45)
PH SMN: 7.39 [PH] (ref 7.35–7.45)
PHOSPHATE SERPL-MCNC: 2.5 MG/DL (ref 2.7–4.5)
PLATELET # BLD AUTO: 336 K/UL (ref 150–450)
PMV BLD AUTO: 9.7 FL (ref 9.2–12.9)
PO2 BLDA: 53 MMHG (ref 40–60)
POC BE: 13 MMOL/L
POC SATURATED O2: 85 % (ref 95–100)
POC TCO2: 40 MMOL/L (ref 24–29)
POTASSIUM SERPL-SCNC: 3.7 MMOL/L (ref 3.5–5.1)
RBC # BLD AUTO: 4.63 M/UL (ref 4–5.4)
SAMPLE: ABNORMAL
SITE: ABNORMAL
SODIUM SERPL-SCNC: 138 MMOL/L (ref 136–145)
WBC # BLD AUTO: 4.7 K/UL (ref 3.9–12.7)

## 2022-04-04 PROCEDURE — 94640 AIRWAY INHALATION TREATMENT: CPT

## 2022-04-04 PROCEDURE — 25000242 PHARM REV CODE 250 ALT 637 W/ HCPCS: Performed by: HOSPITALIST

## 2022-04-04 PROCEDURE — 94667 MNPJ CHEST WALL 1ST: CPT

## 2022-04-04 PROCEDURE — 20600001 HC STEP DOWN PRIVATE ROOM

## 2022-04-04 PROCEDURE — 99223 PR INITIAL HOSPITAL CARE,LEVL III: ICD-10-PCS | Mod: ,,, | Performed by: HOSPITALIST

## 2022-04-04 PROCEDURE — 25000003 PHARM REV CODE 250: Performed by: HOSPITALIST

## 2022-04-04 PROCEDURE — 83735 ASSAY OF MAGNESIUM: CPT | Performed by: HOSPITALIST

## 2022-04-04 PROCEDURE — 84100 ASSAY OF PHOSPHORUS: CPT | Performed by: HOSPITALIST

## 2022-04-04 PROCEDURE — 99900035 HC TECH TIME PER 15 MIN (STAT)

## 2022-04-04 PROCEDURE — 99223 1ST HOSP IP/OBS HIGH 75: CPT | Mod: ,,, | Performed by: HOSPITALIST

## 2022-04-04 PROCEDURE — 36415 COLL VENOUS BLD VENIPUNCTURE: CPT | Performed by: HOSPITALIST

## 2022-04-04 PROCEDURE — 63600175 PHARM REV CODE 636 W HCPCS: Performed by: HOSPITALIST

## 2022-04-04 PROCEDURE — 99222 PR INITIAL HOSPITAL CARE,LEVL II: ICD-10-PCS | Mod: GC,,, | Performed by: INTERNAL MEDICINE

## 2022-04-04 PROCEDURE — 85025 COMPLETE CBC W/AUTO DIFF WBC: CPT | Performed by: HOSPITALIST

## 2022-04-04 PROCEDURE — 82803 BLOOD GASES ANY COMBINATION: CPT

## 2022-04-04 PROCEDURE — 94761 N-INVAS EAR/PLS OXIMETRY MLT: CPT

## 2022-04-04 PROCEDURE — 99222 1ST HOSP IP/OBS MODERATE 55: CPT | Mod: GC,,, | Performed by: INTERNAL MEDICINE

## 2022-04-04 PROCEDURE — 80048 BASIC METABOLIC PNL TOTAL CA: CPT | Performed by: HOSPITALIST

## 2022-04-04 PROCEDURE — 27000221 HC OXYGEN, UP TO 24 HOURS

## 2022-04-04 RX ORDER — GUAIFENESIN/DEXTROMETHORPHAN 100-10MG/5
5 SYRUP ORAL EVERY 4 HOURS PRN
Status: DISCONTINUED | OUTPATIENT
Start: 2022-04-04 | End: 2022-04-08 | Stop reason: HOSPADM

## 2022-04-04 RX ORDER — CALCIUM CARBONATE 200(500)MG
500 TABLET,CHEWABLE ORAL 3 TIMES DAILY PRN
Status: DISCONTINUED | OUTPATIENT
Start: 2022-04-04 | End: 2022-04-08 | Stop reason: HOSPADM

## 2022-04-04 RX ORDER — AZITHROMYCIN 250 MG/1
500 TABLET, FILM COATED ORAL EVERY 24 HOURS
Status: DISCONTINUED | OUTPATIENT
Start: 2022-04-04 | End: 2022-04-04

## 2022-04-04 RX ORDER — LEVALBUTEROL 1.25 MG/.5ML
1.25 SOLUTION, CONCENTRATE RESPIRATORY (INHALATION) EVERY 4 HOURS PRN
Status: DISCONTINUED | OUTPATIENT
Start: 2022-04-04 | End: 2022-04-08 | Stop reason: HOSPADM

## 2022-04-04 RX ORDER — FLUTICASONE PROPIONATE 50 MCG
1 SPRAY, SUSPENSION (ML) NASAL DAILY
Status: DISCONTINUED | OUTPATIENT
Start: 2022-04-04 | End: 2022-04-08 | Stop reason: HOSPADM

## 2022-04-04 RX ORDER — PREDNISONE 20 MG/1
40 TABLET ORAL DAILY
Status: DISCONTINUED | OUTPATIENT
Start: 2022-04-04 | End: 2022-04-04

## 2022-04-04 RX ORDER — SODIUM CHLORIDE 0.9 % (FLUSH) 0.9 %
3 SYRINGE (ML) INJECTION
Status: DISCONTINUED | OUTPATIENT
Start: 2022-04-04 | End: 2022-04-08 | Stop reason: HOSPADM

## 2022-04-04 RX ORDER — BISACODYL 5 MG
5 TABLET, DELAYED RELEASE (ENTERIC COATED) ORAL DAILY PRN
Status: DISCONTINUED | OUTPATIENT
Start: 2022-04-04 | End: 2022-04-08 | Stop reason: HOSPADM

## 2022-04-04 RX ORDER — LEVALBUTEROL INHALATION SOLUTION 0.63 MG/3ML
0.63 SOLUTION RESPIRATORY (INHALATION) EVERY 8 HOURS
Status: DISCONTINUED | OUTPATIENT
Start: 2022-04-04 | End: 2022-04-08 | Stop reason: HOSPADM

## 2022-04-04 RX ORDER — FUROSEMIDE 10 MG/ML
20 INJECTION INTRAMUSCULAR; INTRAVENOUS ONCE
Status: COMPLETED | OUTPATIENT
Start: 2022-04-04 | End: 2022-04-04

## 2022-04-04 RX ORDER — ONDANSETRON 2 MG/ML
4 INJECTION INTRAMUSCULAR; INTRAVENOUS EVERY 6 HOURS PRN
Status: DISCONTINUED | OUTPATIENT
Start: 2022-04-04 | End: 2022-04-08 | Stop reason: HOSPADM

## 2022-04-04 RX ORDER — PANTOPRAZOLE SODIUM 40 MG/1
40 TABLET, DELAYED RELEASE ORAL DAILY
Status: DISCONTINUED | OUTPATIENT
Start: 2022-04-04 | End: 2022-04-08 | Stop reason: HOSPADM

## 2022-04-04 RX ORDER — ENOXAPARIN SODIUM 100 MG/ML
40 INJECTION SUBCUTANEOUS EVERY 24 HOURS
Status: DISCONTINUED | OUTPATIENT
Start: 2022-04-04 | End: 2022-04-08 | Stop reason: HOSPADM

## 2022-04-04 RX ORDER — ACETAMINOPHEN 325 MG/1
650 TABLET ORAL EVERY 4 HOURS PRN
Status: DISCONTINUED | OUTPATIENT
Start: 2022-04-04 | End: 2022-04-08 | Stop reason: HOSPADM

## 2022-04-04 RX ORDER — LEVOFLOXACIN 750 MG/1
750 TABLET ORAL DAILY
Status: COMPLETED | OUTPATIENT
Start: 2022-04-04 | End: 2022-04-08

## 2022-04-04 RX ORDER — POLYETHYLENE GLYCOL 3350 17 G/17G
17 POWDER, FOR SOLUTION ORAL DAILY
Status: DISCONTINUED | OUTPATIENT
Start: 2022-04-04 | End: 2022-04-08 | Stop reason: HOSPADM

## 2022-04-04 RX ORDER — FLUTICASONE FUROATE AND VILANTEROL 200; 25 UG/1; UG/1
1 POWDER RESPIRATORY (INHALATION) DAILY
Status: DISCONTINUED | OUTPATIENT
Start: 2022-04-04 | End: 2022-04-04

## 2022-04-04 RX ORDER — TALC
6 POWDER (GRAM) TOPICAL NIGHTLY PRN
Status: DISCONTINUED | OUTPATIENT
Start: 2022-04-04 | End: 2022-04-08 | Stop reason: HOSPADM

## 2022-04-04 RX ADMIN — PANTOPRAZOLE SODIUM 40 MG: 40 TABLET, DELAYED RELEASE ORAL at 09:04

## 2022-04-04 RX ADMIN — METHYLPREDNISOLONE SODIUM SUCCINATE 80 MG: 40 INJECTION, POWDER, FOR SOLUTION INTRAMUSCULAR; INTRAVENOUS at 01:04

## 2022-04-04 RX ADMIN — METHYLPREDNISOLONE SODIUM SUCCINATE 80 MG: 40 INJECTION, POWDER, FOR SOLUTION INTRAMUSCULAR; INTRAVENOUS at 05:04

## 2022-04-04 RX ADMIN — LEVALBUTEROL HYDROCHLORIDE 0.63 MG: 0.63 SOLUTION RESPIRATORY (INHALATION) at 08:04

## 2022-04-04 RX ADMIN — FUROSEMIDE 20 MG: 20 INJECTION, SOLUTION INTRAMUSCULAR; INTRAVENOUS at 02:04

## 2022-04-04 RX ADMIN — ACETAMINOPHEN 650 MG: 325 TABLET ORAL at 05:04

## 2022-04-04 RX ADMIN — ENOXAPARIN SODIUM 40 MG: 100 INJECTION SUBCUTANEOUS at 05:04

## 2022-04-04 RX ADMIN — LEVALBUTEROL HYDROCHLORIDE 0.63 MG: 0.63 SOLUTION RESPIRATORY (INHALATION) at 03:04

## 2022-04-04 RX ADMIN — METHYLPREDNISOLONE SODIUM SUCCINATE 80 MG: 40 INJECTION, POWDER, FOR SOLUTION INTRAMUSCULAR; INTRAVENOUS at 10:04

## 2022-04-04 RX ADMIN — LEVOFLOXACIN 750 MG: 750 TABLET, FILM COATED ORAL at 09:04

## 2022-04-04 NOTE — PROVIDER PROGRESS NOTES - EMERGENCY DEPT.
Encounter Date: 4/3/2022    ED Physician Progress Notes        Physician Note:   Received patient at sign-out  70 yo W with pmhx advanced COPD on 3L home O2 presents via EMS for 2 days of worsening dyspnea.  She received IV Solu-Medrol, magnesium, epinephrine with EMS and placed on BiPAP here.  She received breathing treatments.  Initial VBG with pH of 7.243, pCO2 85.8.  Patient is a chronic retainer but severity of which is alarming.  Repeat VBG resulted at time of sign-out reveals a pH of 7.241 and a pCO2 slightly improved to 77.3.  Patient was seen and examined by me.  She reports that she feels that her shortness of breath has improved but she needs a few minutes off of BiPAP.  She is very alert.  She has tight lungs but appropriate work of breathing on home 3 L.  Will place back on BiPAP and reassess.    Reassessment:  Repeat blood gas improved.  PH 7.286, pCO2 improved to 69.4.  Patient reports feeling improved and requests a break from BiPAP.  She is stable on her home 3 L via nasal cannula.  I feel she is appropriate for admission to Medicine at this time.    Critical Care:  Date: 04/04/2022  Performed by:  Balbir Chinchilla MD  Authorized by: Balbir Chinchilla MD  Total critical care time (exclusive of procedural time) : 30 minutes  Critical care was necessary to treat or prevent imminent or life-threatening deterioration of the following conditions:  Acute on chronic resp failure

## 2022-04-04 NOTE — SUBJECTIVE & OBJECTIVE
Past Medical History:   Diagnosis Date    Cataract     COPD (chronic obstructive pulmonary disease)     COVID-19     History of COVID-19 2021    Still with mild dyspnea. Encouraged return to pulmonary rehab Recommend scheduling vaccination when appointment is available.     History of retinal hemorrhage     Pathological fracture due to osteoporosis 2020    Retinal histoplasmosis     Secondary pulmonary arterial hypertension 7/3/2018    Secondary to emphysema and chronic respiratory failure, mild.       Past Surgical History:   Procedure Laterality Date    BRONCHOSCOPY WITH FLUOROSCOPY N/A 10/28/2019    Procedure: BRONCHOSCOPY, WITH FLUOROSCOPY;  Surgeon: Brooklynn Ruiz MD;  Location: [facility] OR [address] [place] [address];  Service: Endoscopy;  Laterality: N/A;    HAND SURGERY         Review of patient's allergies indicates:   Allergen Reactions    Albuterol     Ipratropium analogues      Difficulty breathing       Family History       Problem Relation (Age of Onset)    Colon cancer Mother, Father    Macular degeneration Mother    Stroke Father          Tobacco Use    Smoking status: Former Smoker     Packs/day: 1.00     Years: 36.00     Pack years: 36.00     Types: Cigarettes     Quit date: 2016     Years since quittin.6    Smokeless tobacco: Never Used   Substance and Sexual Activity    Alcohol use: Yes     Alcohol/week: 2.0 standard drinks     Types: 2 Glasses of wine per week     Comment: 1-2 glasses a day     Drug use: No    Sexual activity: Yes         Review of Systems   Constitutional:  Positive for activity change.   HENT: Negative.     Respiratory:  Positive for cough, chest tightness, shortness of breath and wheezing.    Cardiovascular: Negative.    Gastrointestinal: Negative.    Genitourinary: Negative.    Musculoskeletal: Negative.    Skin: Negative.    Neurological: Negative.    Psychiatric/Behavioral: Negative.     Objective:     Vital Signs (Most Recent):  Temp: 97.3 °F (36.3 °C) (22 0206)  Pulse:  100 (04/04/22 0730)  Resp: 19 (04/04/22 0445)  BP: (!) 140/69 (04/04/22 0206)  SpO2: (!) 94 % (04/04/22 0730)   Vital Signs (24h Range):  Temp:  [97.3 °F (36.3 °C)-99.1 °F (37.3 °C)] 97.3 °F (36.3 °C)  Pulse:  [] 100  Resp:  [16-28] 19  SpO2:  [93 %-100 %] 94 %  BP: (139-176)/(63-94) 140/69     Weight: 63.5 kg (140 lb)  Body mass index is 21.29 kg/m².      Intake/Output Summary (Last 24 hours) at 4/4/2022 0809  Last data filed at 4/4/2022 0146  Gross per 24 hour   Intake 200 ml   Output 200 ml   Net 0 ml       Physical Exam  Vitals reviewed.   Cardiovascular:      Rate and Rhythm: Regular rhythm. Tachycardia present.      Pulses: Normal pulses.      Heart sounds: Normal heart sounds.   Pulmonary:      Effort: Pulmonary effort is normal. No respiratory distress.      Breath sounds: No stridor. No wheezing, rhonchi or rales.      Comments: Weaned from 4L to 3LNC  Musculoskeletal:      Right lower leg: Edema (1+) present.      Left lower leg: Edema (trace) present.   Skin:     General: Skin is warm and dry.      Findings: Erythema (venous stasis skin changes of bilateral LE) present.   Neurological:      General: No focal deficit present.      Mental Status: She is alert.   Psychiatric:         Mood and Affect: Mood normal.       Vents:  Oxygen Concentration (%): 35 (04/03/22 2102)    Lines/Drains/Airways       Peripheral Intravenous Line  Duration                  Peripheral IV - Single Lumen 04/03/22 2005 18 G Left Wrist <1 day                    Significant Labs:    CBC/Anemia Profile:  Recent Labs   Lab 04/03/22 2041   WBC 7.64   HGB 13.4   HCT 42.9      MCV 91   RDW 14.1        Chemistries:  Recent Labs   Lab 04/03/22 2041 04/04/22  0556    138   K 3.8 3.7    96   CO2 30* 33*   BUN 8 9   CREATININE 0.6 0.6   CALCIUM 9.2 8.7   ALBUMIN 3.7  --    PROT 7.4  --    BILITOT 0.2  --    ALKPHOS 90  --    ALT 16  --    AST 21  --    MG  --  2.2   PHOS  --  2.5*       All pertinent labs within the  past 24 hours have been reviewed.    Significant Imaging:   I have reviewed all pertinent imaging results/findings within the past 24 hours.

## 2022-04-04 NOTE — PLAN OF CARE
[facility] - [facility] ([place] [address])  Initial Discharge Assessment       Primary Care Provider: Primary Doctor No    Admission Diagnosis: Hypoxia [R09.02]  COPD exacerbation [J44.1]    Admission Date: 4/3/2022  Expected Discharge Date: 4/6/2022    Discharge Barriers Identified: (P) None    Payor: MEDICARE / Plan: MEDICARE PART A & B / Product Type: Government /     Extended Emergency Contact Information  Primary Emergency Contact: Miguelangel Quinn  Address: [address] [place] [address] [place]  Mobile Phone: [phone]  Relation: Spouse  Secondary Emergency Contact: MIGUELANGEL QUINN IV  Mobile Phone: [phone]  Relation: Son    Discharge Plan A: (P) Home Health, Home with family (re-est [facility])  Discharge Plan B: (P) Home with family      [facility] [place] [address]. - [place], [place] - [address] [facility]  [address] [place] [address]  Phone: [phone] Fax: [phone]    [id number] - [place], [place] - [address] [facility] [address] [address]  Phone: [phone] Fax: [phone]      Initial Assessment (most recent)       Adult Discharge Assessment - 04/04/22 1246          Discharge Assessment    Assessment Type Discharge Planning Assessment (P)      Confirmed/corrected address, phone number and insurance Yes (P)      Confirmed Demographics Correct on Facesheet (P)      Source of Information patient (P)    Completed at     Does patient/caregiver understand observation status Yes (P)      Communicated RENARD with patient/caregiver Yes (P)      Reason For Admission hypoxia (P)      Lives With spouse (P)      Facility Arrived From: home (P)      Do you expect to return to your current living situation? Yes (P)      Do you have help at home or someone to help you manage your care at home? Yes (P)      Who are your caregiver(s) and their phone number(s)? Spouse Miguelangel Deal  [phone] (P)      Prior to hospitilization cognitive status: Alert/Oriented (P)      Current cognitive status: Alert/Oriented (P)      Walking or Climbing Stairs Difficulty none (P)      Dressing/Bathing Difficulty none (P)      Home Accessibility wheelchair accessible (P)      Home Layout Able to live on 1st floor (P)      Equipment Currently Used at Home nebulizer;oxygen;hospital bed (P)      Readmission within 30 days? No (P)      Patient currently being followed by outpatient case management? No (P)      Do you currently have service(s) that help you manage your care at home? No (P)      Do you take prescription medications? Yes (P)      Do you have prescription coverage? Yes (P)      Do you have any problems affording any of your prescribed medications? No (P)      Is the patient taking medications as prescribed? yes (P)      Who is going to help you get home at discharge? ; pt wikll resume w/ [facility] (P)      How do you get to doctors appointments? family or friend will provide (P)      Are you on dialysis? No (P)      Do you take coumadin? No (P)      Discharge Plan A Home Health;Home with family (P)    re-est [facility]    Discharge Plan B Home with family (P)      DME Needed Upon Discharge  other (see comments) (P)    TBD    Discharge Plan discussed with: Spouse/sig other (P)      Discharge Barriers Identified None (P)                           .SW

## 2022-04-04 NOTE — HPI
Estefani Fam is a 71 year old white woman with former smoking (quit in 2017), centrilobular emphysema with chronic obstructive pulmonary disease (COPD), chornic hypoxic respiratory failure on supplemental oxygen (3 liters/minute), secondary pulmonary hypertension, gastroesophageal reflux disease, osteoporosis, history of recurrent right lower lobe pneumonia in November 2021 and February 2022, history of COVID-19 on 1/17/2021, history of retinal histoplasmosis. She lives in [facility]. She is . Her primary care physician is Dr. Miles Jackson. Her pulmonologist is Dr. Brooklynn Ruiz.    She was hospitalized at [facility] from 2/14/2022 to 2/23/2022 for COPD exacerbation and recurrent pneumonia.   She was seen by Dr. Ruiz on 3/7/2022. She reported dysphagia. She was referred to Speech Language Pathology. She underwent modified barium swallow study on 3/14/2022, which showed mild oropharyngeal dysphagia and globus sensation but no aspiration.    She presented to [facility] Emergency Department on 4/3/2022 with worsening shortness of breath, wheezing, cough productive of clear sputum and chest tightness for two days. She has an albuterol allergy and had been using her home levalbuterol nebulizer three times a day without relief. She called emergency medical services because she had a hard time catching her breath around 15:00. She was in acute respiratory distress upon their arrival. She was given intravenous steroid, magnesium, and 0.3 mg of intramuscular epinephrine en route. She was placed on 15 liters/minute of supplemental oxygen by non-rebreather.

## 2022-04-04 NOTE — TELEPHONE ENCOUNTER
Pt has a order to do chest x ray on 3-8-22. Pt would like to know if you still want it done. Please, advise. Thanks.

## 2022-04-04 NOTE — ASSESSMENT & PLAN NOTE
-she was on acute respiratory distress upon EMS arrival   -received IV steroid, magnesium and epinephrine IM by EMS with improved dyspnea   -arrived in ED on 15 L supplemental oxygen via NRB   -received xopenex nebs x 3 in ED and then transitioned to 3 L N/C oxygen   -clinically improved, awake, alert, oriented and conversant   -VB. which improved to 7./ on transient BIPAP   -patient reluctant to use BIPAP and wanted to a break as it cause severe anxiety to her   -during my interview she appeared comfortable on 3 L N/C, awake and conversant   -will repeat VBG and start on BIPAP if needed (patient agreeable)  -patient afebrile and does not appear toxic  -CXR w/o acute process    -will start on IV solumedrol and levofloxacin per COPD pathway as patient required IV steroid during last admission  -continue xopenex nebs scheduled and prn (patient with albuterol allergy)  -will consult pulmonary given COPD exacerbation within 90 days

## 2022-04-04 NOTE — PLAN OF CARE
Estefani Fam is a 71 year old white woman with former smoking (quit in 2017), centrilobular emphysema with chronic obstructive pulmonary disease (COPD), chornic hypoxic respiratory failure on supplemental oxygen (3 liters/minute), secondary pulmonary hypertension, gastroesophageal reflux disease, osteoporosis, history of recurrent right lower lobe pneumonia in November 2021 and February 2022, history of COVID-19 on 1/17/2021, history of retinal histoplasmosis. She lives in [facility]. She is . Her primary care physician is Dr. Miles Jackson. Her pulmonologist is Dr. Brooklynn Ruiz.    She was hospitalized at [facility] from 2/14/2022 to 2/23/2022 for COPD exacerbation and recurrent pneumonia.   She was seen by Dr. Ruiz on 3/7/2022. She reported dysphagia. She was referred to Speech Language Pathology. She underwent modified barium swallow study on 3/14/2022, which showed mild oropharyngeal dysphagia and globus sensation but no aspiration.    She presented to [facility] Emergency Department on 4/3/2022 with worsening shortness of breath, wheezing, cough productive of clear sputum and chest tightness for two days. She has an albuterol allergy and had been using her home levalbuterol nebulizer three times a day without relief. She called emergency medical services because she had a hard time catching her breath around 15:00. She was in acute respiratory distress upon their arrival. She was given intravenous steroid, magnesium, and 0.3 mg of intramuscular epinephrine en route. She was placed on 15 liters/minute of supplemental oxygen by non-rebreather.   In the emergency department, Chest X-ray showed improved right airspace disease compared to 3/3/2022. Venous blood gas showed pH 7.24, pCO2 85 mmHg. She was given three levalbuterol nebulizer treatments. Repeat venous blood gas on BiPAP showed pH 7.28 and pCO2 69. She was transitioned  to 3 liters/minute by nasal cannula with oxygen saturation 93%. She reported improvement in shortness of breath. She thinks pollen might have triggered her current COPD exacerbation. She was admitted to Hospital Medicine Team A.    She was put on levalbuterol nebulizer treatments every 8 hours, intravenous methylprednisolone 80 mg every 8 hours, and levofloxacin for 5 days. Pulmonology was consulted and recommended steroid for 5 days, azithromycin 3 times per week, chest physiotherapy, and referral to pulmonary rehab on discharge.     X-Ray Chest AP Portable 4/03/22: FINDINGS:   Cardiac wires overlie the chest.  Cardiac silhouette is not enlarged.  Atherosclerotic calcifications overlie the aortic arch.  Extensive pulmonary emphysema and biapical scarring.  Persistent but decreased size of masslike airspace disease in the right perihilar region.  Improved bibasilar interstitial opacities.  No new focal consolidation.  No sizable pleural effusion.  No pneumothorax.   Impression:  Persistent but decreased size of consolidation or airspace disease in the right perihilar region when compared with 03/03/2022.  Suggest continued follow-up to ensure resolution and exclude underlying mass, potentially with noncontrast CT follow-up in 1-3 months given history of severe pulmonary emphysema.   Overall improved bibasilar aeration when compared with 03/03/2022.

## 2022-04-04 NOTE — TELEPHONE ENCOUNTER
----- Message from Francesca Flowers sent at 4/4/2022 11:16 AM CDT -----  Contact: [phone]  Pt is calling for Francesca she would like you to call her she is back in the hospital and wants to know if the dr wants her to get a chest xray please advise and give return call

## 2022-04-04 NOTE — ED PROVIDER NOTES
Encounter Date: 4/3/2022       History     Chief Complaint   Patient presents with    Shortness of Breath     Pt reports to ED via EMS from home w. Complaints of SOB, COPD, mag, solumedrol, epi 0.3 IM , given in route. Pt aaox4 pt arrives 15L non rebreather      Ms. Fam is a 71-year-old who presents by ambulance.  She complains of worsening shortness of breath for two days.  She has associated cough productive of clear sputum.  Her shortness of breath is exacerbated by walking and performing activities of daily life.  Her appetite has been diminished.  She denies chest pain, fever, chills, nausea, and vomiting.  She denies sick contacts but reports being admitted to the hospital one month ago for pneumonia.  She is on 3 L continuous supplemental oxygen.  She reports pulse oximetry measurements as low as 70. She is feeling better after receiving IV Solu-Medrol, IV magnesium, and IM epinephrine during transport.    She has a past medical history of Cataract, COPD (chronic obstructive pulmonary disease), COVID-19, History of COVID-19 (1/17/2021), History of retinal hemorrhage, Pathological fracture due to osteoporosis (7/6/2020), Retinal histoplasmosis, and Secondary pulmonary arterial hypertension (7/3/2018).    The history is provided by the patient. No  was used.     Review of patient's allergies indicates:   Allergen Reactions    Albuterol     Ipratropium analogues      Difficulty breathing     Past Medical History:   Diagnosis Date    Cataract     COPD (chronic obstructive pulmonary disease)     COVID-19     History of COVID-19 1/17/2021    Still with mild dyspnea. Encouraged return to pulmonary rehab Recommend scheduling vaccination when appointment is available.     History of retinal hemorrhage     Lobar pneumonia 11/14/2021    Recurrent in RLL, no endobronchial lesion Needs speech evaluation and MBSS for recurrent aspiration in setting of dysphagia  Will need pulmonary rehab at  [facility].    Pathological fracture due to osteoporosis 2020    Retinal histoplasmosis     Secondary pulmonary arterial hypertension 7/3/2018    Secondary to emphysema and chronic respiratory failure, mild.     Past Surgical History:   Procedure Laterality Date    BRONCHOSCOPY WITH FLUOROSCOPY N/A 10/28/2019    Procedure: BRONCHOSCOPY, WITH FLUOROSCOPY;  Surgeon: Brooklynn Ruiz MD;  Location: [facility] OR [address] [place] [address];  Service: Endoscopy;  Laterality: N/A;    HAND SURGERY       Family History   Problem Relation Age of Onset    Macular degeneration Mother     Colon cancer Mother     Stroke Father     Colon cancer Father     Amblyopia Neg Hx     Blindness Neg Hx     Cataracts Neg Hx     Diabetes Neg Hx     Glaucoma Neg Hx     Hypertension Neg Hx     Retinal detachment Neg Hx     Strabismus Neg Hx     Thyroid disease Neg Hx      Social History     Tobacco Use    Smoking status: Former Smoker     Packs/day: 1.00     Years: 36.00     Pack years: 36.00     Types: Cigarettes     Quit date: 2016     Years since quittin.6    Smokeless tobacco: Never Used   Substance Use Topics    Alcohol use: Yes     Alcohol/week: 2.0 standard drinks     Types: 2 Glasses of wine per week     Comment: 1-2 glasses a day     Drug use: No     Review of Systems   Constitutional: Positive for appetite change.   HENT: Negative for congestion and rhinorrhea.    Eyes: Negative for visual disturbance.   Respiratory: Positive for cough and shortness of breath.    Cardiovascular: Negative for chest pain, palpitations and leg swelling.   Gastrointestinal: Negative for abdominal pain, nausea and vomiting.   Musculoskeletal: Negative for arthralgias and myalgias.   Allergic/Immunologic: Negative for immunocompromised state.   Neurological: Negative for dizziness, light-headedness and headaches.       Physical Exam     Initial Vitals [22]   BP Pulse Resp Temp SpO2   (!) 176/94 110 (!) 22 99.1 °F (37.3 °C) 99 %      MAP        --         Physical Exam    Nursing note and vitals reviewed.  Constitutional: She is not diaphoretic. No distress.   Eyes: Conjunctivae are normal. No scleral icterus.   Cardiovascular: Regular rhythm. Tachycardia present.  Exam reveals no gallop and no friction rub.    Murmur heard.   Systolic murmur is present with a grade of 1/6.  Pulses:       Radial pulses are 2+ on the right side and 2+ on the left side.        Dorsalis pedis pulses are 2+ on the right side and 2+ on the left side.   Pulmonary/Chest: No accessory muscle usage or stridor. Tachypnea noted. She has decreased breath sounds in the right lower field and the left lower field. She has wheezes in the right lower field. She has no rhonchi. She has no rales.   Abdominal: Abdomen is soft. There is no abdominal tenderness.   Musculoskeletal:         General: No tenderness or edema.      Comments: No swelling or tenderness to the lower extremities.  Negative bilateral Homans signs.     Neurological: She is alert and oriented to person, place, and time. GCS score is 15. GCS eye subscore is 4. GCS verbal subscore is 5. GCS motor subscore is 6.   Skin: Skin is warm and dry. No pallor.         ED Course   Procedures  Labs Reviewed   CBC W/ AUTO DIFFERENTIAL - Abnormal; Notable for the following components:       Result Value    MCHC 31.2 (*)     MPV 9.0 (*)     Eos # 0.6 (*)     All other components within normal limits   COMPREHENSIVE METABOLIC PANEL - Abnormal; Notable for the following components:    CO2 30 (*)     Glucose 153 (*)     All other components within normal limits   ISTAT PROCEDURE - Abnormal; Notable for the following components:    POC PH 7.243 (*)     POC PCO2 85.8 (*)     POC PO2 25 (*)     POC HCO3 37.0 (*)     POC SATURATED O2 32 (*)     POC TCO2 40 (*)     All other components within normal limits   ISTAT PROCEDURE - Abnormal; Notable for the following components:    POC PH 7.241 (*)     POC PCO2 77.3 (*)     POC PO2 31 (*)     POC HCO3  33.2 (*)     POC SATURATED O2 46 (*)     POC TCO2 36 (*)     All other components within normal limits   ISTAT PROCEDURE - Abnormal; Notable for the following components:    POC PH 7.286 (*)     POC PCO2 69.4 (*)     POC PO2 38 (*)     POC HCO3 33.0 (*)     POC SATURATED O2 63 (*)     POC TCO2 35 (*)     All other components within normal limits   TROPONIN I   B-TYPE NATRIURETIC PEPTIDE   SARS-COV-2 RDRP GENE    Narrative:     This test utilizes isothermal nucleic acid amplification   technology to detect the SARS-CoV-2 RdRp nucleic acid segment.   The analytical sensitivity (limit of detection) is 125 genome   equivalents/mL.   A POSITIVE result implies infection with the SARS-CoV-2 virus;   the patient is presumed to be contagious.     A NEGATIVE result means that SARS-CoV-2 nucleic acids are not   present above the limit of detection. A NEGATIVE result should be   treated as presumptive. It does not rule out the possibility of   COVID-19 and should not be the sole basis for treatment decisions.   If COVID-19 is strongly suspected based on clinical and exposure   history, re-testing using an alternate molecular assay should be   considered.   This test is only for use under the Food and Drug   Administration s Emergency Use Authorization (EUA).   Commercial kits are provided by [employer].   Performance characteristics of the EUA have been independently   verified by [facility] Department of   Pathology and Laboratory Medicine.   _________________________________________________________________   The authorized Fact Sheet for Healthcare Providers and the authorized Fact   Sheet for Patients of the ID NOW COVID-19 are available on the FDA   website:     https://www.fda.gov/media/048707/download  https://www.fda.gov/media/039248/download           EKG Readings: (Independently Interpreted)   04/03/2022 20:25  Sinus tachycardia.  Ventricular rate 110 beats per minute.  Normal axis.  Incomplete  "right bundle-branch block (QRS interval 100 milliseconds).       Imaging Results          X-Ray Chest AP Portable (Final result)  Result time 04/03/22 20:44:45    Final result by Bong Owens MD (04/03/22 20:44:45)                 Impression:      Persistent but decreased size of consolidation or airspace disease in the right perihilar region when compared with 03/03/2022.  Suggest continued follow-up to ensure resolution and exclude underlying mass, potentially with noncontrast CT follow-up in 1-3 months given history of severe pulmonary emphysema.    Overall improved bibasilar aeration when compared with 03/03/2022.      Electronically signed by: Bong Owens MD  Date:    04/03/2022  Time:    20:44             Narrative:    EXAMINATION:  XR CHEST AP PORTABLE    CLINICAL HISTORY:  Provided history is "Shortness of breath;  ".    TECHNIQUE:  One view of the chest.    COMPARISON:  Chest radiograph, 03/03/2022.  CTA chest, 02/15/2022.    FINDINGS:  Cardiac wires overlie the chest.  Cardiac silhouette is not enlarged.  Atherosclerotic calcifications overlie the aortic arch.  Extensive pulmonary emphysema and biapical scarring.  Persistent but decreased size of masslike airspace disease in the right perihilar region.  Improved bibasilar interstitial opacities.  No new focal consolidation.  No sizable pleural effusion.  No pneumothorax.                                 Medications   sodium chloride 0.9% flush 3 mL (has no administration in time range)   enoxaparin injection 40 mg (40 mg Subcutaneous Given 4/6/22 5017)   melatonin tablet 6 mg (has no administration in time range)   ondansetron injection 4 mg (has no administration in time range)   acetaminophen tablet 650 mg (650 mg Oral Given 4/6/22 1301)   polyethylene glycol packet 17 g (17 g Oral Given 4/6/22 1845)   levoFLOXacin tablet 750 mg (750 mg Oral Given 4/6/22 0810)   fluticasone propionate 50 mcg/actuation nasal spray 50 mcg (50 mcg Each Nostril Not " Given 4/6/22 0900)   levalbuterol nebulizer solution 0.63 mg (0.63 mg Nebulization Given 4/6/22 1510)   pantoprazole EC tablet 40 mg (40 mg Oral Given 4/6/22 0810)   levalbuterol nebulizer solution 1.25 mg (has no administration in time range)   dextromethorphan-guaiFENesin  mg/5 ml liquid 5 mL (has no administration in time range)   bisacodyL EC tablet 5 mg (5 mg Oral Given 4/6/22 1301)   calcium carbonate 200 mg calcium (500 mg) chewable tablet 500 mg (has no administration in time range)   predniSONE tablet 40 mg (40 mg Oral Given 4/6/22 0810)   mupirocin 2 % ointment ( Nasal Given 4/6/22 2104)   fluticasone furoate-vilanteroL 200-25 mcg/dose diskus inhaler 1 puff (1 puff Inhalation Given 4/6/22 0855)   tiotropium bromide 2.5 mcg/actuation inhaler 2 puff (2 puffs Inhalation Given 4/6/22 0855)   sodium chloride 0.9% bolus 1,000 mL (0 mLs Intravenous Stopped 4/3/22 2157)   levalbuterol nebulizer solution 1.25 mg ( Nebulization Canceled Entry 4/3/22 2210)   furosemide injection 20 mg (20 mg Intravenous Given 4/4/22 0216)   magnesium sulfate 2g in water 50mL IVPB (premix) (0 g Intravenous Stopped 4/5/22 2345)     Medical Decision Making:   History:   Old Medical Records: I decided to obtain old medical records.  Old Records Summarized: other records.       <> Summary of Records: Past medical history reviewed as above.  Differential Diagnosis:   COPD exacerbation, spontaneous pneumothorax, cardiac arrhythmia, acute coronary syndromes  Independently Interpreted Test(s):   I have ordered and independently interpreted EKG Reading(s) - see prior notes  Clinical Tests:   Lab Tests: Ordered and Reviewed  Radiological Study: Ordered and Reviewed  Medical Tests: Ordered and Reviewed    MDM:  The patient underwent emergent evaluation for breathing difficulty.  She had signs of respiratory distress on arrival.  She was initially treated with supplemental oxygen and nebulized breathing treatments.  She was found to have  hypercarbia on blood gas and was placed on BiPAP.  Workup included cardiopulmonary monitoring, EKG, chest x-ray, and labs.  No acute processes on chest x-ray.  Workup was inconsistent with acute coronary syndromes.  She had some improvement with ED management but not to a degree where it was safe for her to be discharged.  She was admitted to Hospital Medicine for further management.                      Clinical Impression:   Final diagnoses:  [J44.1] COPD exacerbation (Primary)  [R09.02] Hypoxia          ED Disposition Condition    Admit               David Gusman III, MD  04/06/22 2248

## 2022-04-04 NOTE — HOSPITAL COURSE
In the emergency department, Chest X-ray showed improved right airspace disease compared to 3/3/2022. Venous blood gas showed pH 7.24, pCO2 85 mmHg. She was given three levalbuterol nebulizer treatments. Repeat venous blood gas on BiPAP showed pH 7.28 and pCO2 69. She was transitioned to 3 liters/minute by nasal cannula with oxygen saturation 93%. She reported improvement in shortness of breath. She thinks pollen might have triggered her current COPD exacerbation. She was admitted to Hospital Medicine Team A. She was put on levalbuterol nebulizer treatments every 8 hours, intravenous methylprednisolone 80 mg every 8 hours, and levofloxacin for 5 days. Pulmonology was consulted and recommended steroid for 5 days, azithromycin 3 times per week, chest physiotherapy, and referral to pulmonary rehab on discharge. She improved and was weaned to her home oxygen requirements. She felt better. Patient deemed ready for discharge. Plan discussed with pt, who was agreeable and amenable; medications were discussed and reviewed, outpatient follow-up arranged, ER precautions were given, all questions were answered to the pt's satisfaction, and Estefani Fam  was subsequently discharged.

## 2022-04-04 NOTE — H&P
[facility] - [facility] ([address] Medicine  History & Physical    Patient Name: Estefani Fam  MRN: [id number]  Patient Class: IP- Inpatient  Admission Date: 4/3/2022  Attending Physician: Emre Crawley MD   Primary Care Provider: Primary Doctor No         Patient information was obtained from patient and ER records.     Subjective:     Principal Problem:Chronic obstructive pulmonary disease with acute exacerbation    Chief Complaint:   Chief Complaint   Patient presents with    Shortness of Breath     Pt reports to ED via EMS from home w. Complaints of SOB, COPD, mag, solumedrol, epi 0.3 IM , given in route. Pt aaox4 pt arrives 15L non rebreather         HPI: Ms. Fam is a 71 year old female with PMH of GERD,  COPD (chronic obstructive pulmonary disease), chronic hypoxic respiratory failure  on 3 liter home oxygen, COVID-19 (1/17/2021), History of retinal hemorrhage, Pathological fracture due to osteoporosis (7/6/2020), Retinal histoplasmosis, and Secondary pulmonary arterial hypertension (7/3/2018). Patient presented to ED via EMS for evaluation of acute respiratory distress. Patient reports worsening shortness of breath, wheezing, productive cough of clear sputum and chest tightness for last 2 days. Patient has albuterol allergy and she has been using her home xopenex nebulizer 3 times a day without relief. Yesterday afternoon around 3 pm she had hard time catching her breath which prompted her to call EMS. Patient was in acute respiratory distress upon EMS arrival for which she received IV steroid, magnesium and epinephrine 0.3 mg IM in route. She was also placed on 15 L supplemental oxygen via NRB. She reports feeling better with above treatment and transitioned to 3 L N/C in ED with sat 93%. Patient had prolonged 10 days hospital admission at the beginning of March for COPD exacerbation and pneumonia. Patient thinks pollen might have triggered her current COPD exacerbation. She denies  fever, chills, chest pain, palpitation, N/V/D/abdominal pain, focal weakness or numbness. She is a long term former smoker and quit 5 year ago. She is independent with her ADL and lives at home with .     Patient received xopenex nebs x 3 in ED. CXR improved R perihilar consolidation and bibasilar areation compared to 3/3/22. EKG sinus tachy without ischemic changes. Echo in February showed EF 65%. Initial VBG: PH 7.24, PCO2: 85 with repeat VBG on BIPAP: 7.28/69. Patient then transitioned from BIPAP to N/C 3L per patient request as she wanted a break.     During my interview patient is awake, conversant and oxygenating well on 3 L N/C. She reports improved SOB.       Past Medical History:   Diagnosis Date    Cataract     COPD (chronic obstructive pulmonary disease)     COVID-19     History of COVID-19 1/17/2021    Still with mild dyspnea. Encouraged return to pulmonary rehab Recommend scheduling vaccination when appointment is available.     History of retinal hemorrhage     Pathological fracture due to osteoporosis 7/6/2020    Retinal histoplasmosis     Secondary pulmonary arterial hypertension 7/3/2018    Secondary to emphysema and chronic respiratory failure, mild.       Past Surgical History:   Procedure Laterality Date    BRONCHOSCOPY WITH FLUOROSCOPY N/A 10/28/2019    Procedure: BRONCHOSCOPY, WITH FLUOROSCOPY;  Surgeon: Brooklynn Ruiz MD;  Location: [facility] OR [address] [place] [address];  Service: Endoscopy;  Laterality: N/A;    HAND SURGERY         Review of patient's allergies indicates:   Allergen Reactions    Albuterol     Ipratropium analogues      Difficulty breathing       No current facility-administered medications on file prior to encounter.     Current Outpatient Medications on File Prior to Encounter   Medication Sig    acetylcysteine 600 mg Cap Take 1 capsule (600 mg total) by mouth 2 (two) times daily.    ascorbic acid, vitamin C, (VITAMIN C) 500 MG tablet Take 500 mg by mouth 2 (two) times daily.      azelastine (ASTELIN) 137 mcg (0.1 %) nasal spray INHALE 1 SPRAY BY NASAL ROUTE TWICE DAILY OR AS DIRECTED    azithromycin (Z-JERSEY) 250 MG tablet TAKE 1 TABLET BY MOUTH EVERY  AND FRIDAY    BREO ELLIPTA 200-25 mcg/dose DsDv diskus inhaler INHALE 1 PUFF INTO THE LUNGS DAILY    calcium carbonate (OS-STEPHANIE) 600 mg calcium (1,500 mg) Tab Take 1 tablet (600 mg total) by mouth once daily. Take Levofloxacin antibiotic at least 2 hours before calcium.    fluticasone propionate (FLONASE) 50 mcg/actuation nasal spray INSTILL 1 SPRAY IN EACH NOSTRIL EVERY DAY    levalbuterol (XOPENEX) 0.63 mg/3 mL nebulizer solution USE 1 VIAL IN NEBULIZER EVERY 8 HOURS AS NEEDED FOR WHEEZE    MULTIVIT-IRON-MIN-FOLIC ACID 3,500-18-0.4 UNIT-MG-MG ORAL CHEW Take 1 tablet by mouth once daily. Take Levofloxacin antibiotic at least 2 hours before multivitamin.    pantoprazole (PROTONIX) 40 MG tablet Take 1 tablet (40 mg total) by mouth once daily.    pulse oximeter (PULSE OXIMETER) device by Apply Externally route 2 (two) times a day. Use twice daily at 8 AM and 3 PM and record the value in [employer] as directed.    sodium chloride (OCEAN) 0.65 % nasal spray 1 spray by Nasal route 2 (two) times daily.    umeclidinium (INCRUSE ELLIPTA) 62.5 mcg/actuation inhalation capsule Inhale 62.5 mcg into the lungs once daily.    furosemide (LASIX) 20 MG tablet Take 1 tablet (20 mg total) by mouth once daily. for 3 days     Family History       Problem Relation (Age of Onset)    Colon cancer Mother, Father    Macular degeneration Mother    Stroke Father          Tobacco Use    Smoking status: Former Smoker     Packs/day: 1.00     Years: 36.00     Pack years: 36.00     Types: Cigarettes     Quit date: 2016     Years since quittin.6    Smokeless tobacco: Never Used   Substance and Sexual Activity    Alcohol use: Yes     Alcohol/week: 2.0 standard drinks     Types: 2 Glasses of wine per week     Comment: 1-2 glasses a day      Drug use: No    Sexual activity: Yes     Review of Systems   Constitutional:  Positive for fatigue. Negative for activity change, appetite change, chills, diaphoresis and fever.   HENT:  Negative for congestion, dental problem, drooling, ear discharge, ear pain, facial swelling, hearing loss, mouth sores, nosebleeds, postnasal drip, rhinorrhea, sinus pressure, sneezing, sore throat, tinnitus, trouble swallowing and voice change.    Eyes:  Negative for photophobia, pain, discharge, redness, itching and visual disturbance.   Respiratory:  Positive for cough, chest tightness, shortness of breath and wheezing. Negative for apnea, choking and stridor.    Cardiovascular:  Negative for chest pain, palpitations and leg swelling.   Gastrointestinal:  Negative for abdominal distention, abdominal pain, anal bleeding, blood in stool, constipation, diarrhea, nausea, rectal pain and vomiting.   Endocrine: Negative for cold intolerance, heat intolerance, polydipsia, polyphagia and polyuria.   Genitourinary:  Negative for decreased urine volume, difficulty urinating, dyspareunia, dysuria, enuresis, flank pain, frequency, genital sores, hematuria, menstrual problem, pelvic pain, urgency, vaginal bleeding, vaginal discharge and vaginal pain.   Musculoskeletal:  Negative for arthralgias, back pain, gait problem, joint swelling, myalgias, neck pain and neck stiffness.   Skin:  Negative for color change, pallor, rash and wound.   Allergic/Immunologic: Negative for environmental allergies, food allergies and immunocompromised state.   Neurological:  Negative for dizziness, tremors, seizures, syncope, facial asymmetry, speech difficulty, weakness, light-headedness, numbness and headaches.   Hematological:  Negative for adenopathy. Does not bruise/bleed easily.   Psychiatric/Behavioral:  Negative for agitation, behavioral problems, confusion, decreased concentration, dysphoric mood, hallucinations, self-injury, sleep disturbance and  suicidal ideas. The patient is not nervous/anxious and is not hyperactive.    Objective:     Vital Signs (Most Recent):  Temp: 97.3 °F (36.3 °C) (04/04/22 0206)  Pulse: 108 (04/04/22 0145)  Resp: 18 (04/04/22 0145)  BP: (!) 140/69 (04/04/22 0206)  SpO2: 95 % (04/04/22 0145)   Vital Signs (24h Range):  Temp:  [97.3 °F (36.3 °C)-99.1 °F (37.3 °C)] 97.3 °F (36.3 °C)  Pulse:  [] 108  Resp:  [16-28] 18  SpO2:  [93 %-100 %] 95 %  BP: (139-176)/(63-94) 140/69     Weight: 63.5 kg (140 lb)  Body mass index is 21.29 kg/m².    Physical Exam  Constitutional:       General: She is not in acute distress.     Appearance: She is well-developed. She is not diaphoretic.      Comments: Elderly thin female not in distress    HENT:      Head: Normocephalic and atraumatic.      Right Ear: External ear normal.      Left Ear: External ear normal.      Nose: Nose normal.      Mouth/Throat:      Pharynx: No oropharyngeal exudate.   Eyes:      General: No scleral icterus.     Conjunctiva/sclera: Conjunctivae normal.      Pupils: Pupils are equal, round, and reactive to light.   Neck:      Thyroid: No thyromegaly.      Vascular: No JVD.      Trachea: No tracheal deviation.   Cardiovascular:      Rate and Rhythm: Regular rhythm. Tachycardia present.      Heart sounds: Normal heart sounds. No murmur heard.    No gallop.   Pulmonary:      Effort: Pulmonary effort is normal. No respiratory distress.      Breath sounds: Wheezing and rhonchi present. No rales.   Chest:      Chest wall: No tenderness.   Abdominal:      General: Bowel sounds are normal. There is no distension.      Palpations: Abdomen is soft. There is no mass.      Tenderness: There is no abdominal tenderness. There is no guarding or rebound.   Genitourinary:     Vagina: No vaginal discharge.   Musculoskeletal:         General: Swelling (trace bilateral LE swelling) present. No tenderness.      Cervical back: Neck supple.   Lymphadenopathy:      Cervical: No cervical  adenopathy.   Skin:     General: Skin is warm and dry.      Coloration: Skin is not pale.      Findings: No erythema or rash.   Neurological:      Mental Status: She is alert and oriented to person, place, and time.      Cranial Nerves: No cranial nerve deficit.      Motor: No abnormal muscle tone.      Coordination: Coordination normal.      Deep Tendon Reflexes: Reflexes are normal and symmetric. Reflexes normal.      Comments: Mild tremors of upper extremities    Psychiatric:         Behavior: Behavior normal.         Thought Content: Thought content normal.         Judgment: Judgment normal.      Comments: Flat affect        CRANIAL NERVES     CN III, IV, VI   Pupils are equal, round, and reactive to light.     Significant Labs: All pertinent labs within the past 24 hours have been reviewed.  None    Recent Results (from the past 24 hour(s))   CBC auto differential    Collection Time: 04/03/22  8:41 PM   Result Value Ref Range    WBC 7.64 3.90 - 12.70 K/uL    RBC 4.71 4.00 - 5.40 M/uL    Hemoglobin 13.4 12.0 - 16.0 g/dL    Hematocrit 42.9 37.0 - 48.5 %    MCV 91 82 - 98 fL    MCH 28.5 27.0 - 31.0 pg    MCHC 31.2 (L) 32.0 - 36.0 g/dL    RDW 14.1 11.5 - 14.5 %    Platelets 342 150 - 450 K/uL    MPV 9.0 (L) 9.2 - 12.9 fL    Immature Granulocytes 0.3 0.0 - 0.5 %    Gran # (ANC) 4.8 1.8 - 7.7 K/uL    Immature Grans (Abs) 0.02 0.00 - 0.04 K/uL    Lymph # 1.4 1.0 - 4.8 K/uL    Mono # 0.8 0.3 - 1.0 K/uL    Eos # 0.6 (H) 0.0 - 0.5 K/uL    Baso # 0.05 0.00 - 0.20 K/uL    nRBC 0 0 /100 WBC    Gran % 63.3 38.0 - 73.0 %    Lymph % 18.2 18.0 - 48.0 %    Mono % 9.8 4.0 - 15.0 %    Eosinophil % 7.7 0.0 - 8.0 %    Basophil % 0.7 0.0 - 1.9 %    Differential Method Automated    Comprehensive metabolic panel    Collection Time: 04/03/22  8:41 PM   Result Value Ref Range    Sodium 140 136 - 145 mmol/L    Potassium 3.8 3.5 - 5.1 mmol/L    Chloride 100 95 - 110 mmol/L    CO2 30 (H) 23 - 29 mmol/L    Glucose 153 (H) 70 - 110 mg/dL     BUN 8 8 - 23 mg/dL    Creatinine 0.6 0.5 - 1.4 mg/dL    Calcium 9.2 8.7 - 10.5 mg/dL    Total Protein 7.4 6.0 - 8.4 g/dL    Albumin 3.7 3.5 - 5.2 g/dL    Total Bilirubin 0.2 0.1 - 1.0 mg/dL    Alkaline Phosphatase 90 55 - 135 U/L    AST 21 10 - 40 U/L    ALT 16 10 - 44 U/L    Anion Gap 10 8 - 16 mmol/L    eGFR if African American >60.0 >60 mL/min/1.73 m^2    eGFR if non African American >60.0 >60 mL/min/1.73 m^2   Troponin I    Collection Time: 04/03/22  8:41 PM   Result Value Ref Range    Troponin I 0.015 0.000 - 0.026 ng/mL   B-Type natriuretic peptide (BNP)    Collection Time: 04/03/22  8:41 PM   Result Value Ref Range    BNP 21 0 - 99 pg/mL   ISTAT PROCEDURE    Collection Time: 04/03/22  8:49 PM   Result Value Ref Range    POC PH 7.243 (L) 7.35 - 7.45    POC PCO2 85.8 (H) 35 - 45 mmHg    POC PO2 25 (L) 40 - 60 mmHg    POC HCO3 37.0 (H) 24 - 28 mmol/L    POC BE 10 -2 to 2 mmol/L    POC SATURATED O2 32 (L) 95 - 100 %    POC TCO2 40 (H) 24 - 29 mmol/L    Sample VENOUS     Site Other     Allens Test N/A     DelSys Nasal Can     Mode SPONT     Flow 3    ISTAT PROCEDURE    Collection Time: 04/03/22 10:00 PM   Result Value Ref Range    POC PH 7.241 (L) 7.35 - 7.45    POC PCO2 77.3 (H) 35 - 45 mmHg    POC PO2 31 (L) 40 - 60 mmHg    POC HCO3 33.2 (H) 24 - 28 mmol/L    POC BE 6 -2 to 2 mmol/L    POC SATURATED O2 46 (L) 95 - 100 %    POC TCO2 36 (H) 24 - 29 mmol/L    Sample VENOUS     Site Other     Allens Test N/A     DelSys Nasal Can     Mode SPONT     Flow 3    POCT COVID-19 Rapid Screening    Collection Time: 04/03/22 10:09 PM   Result Value Ref Range    POC Rapid COVID Negative Negative     Acceptable Yes    ISTAT PROCEDURE    Collection Time: 04/03/22 11:34 PM   Result Value Ref Range    POC PH 7.286 (L) 7.35 - 7.45    POC PCO2 69.4 (H) 35 - 45 mmHg    POC PO2 38 (L) 40 - 60 mmHg    POC HCO3 33.0 (H) 24 - 28 mmol/L    POC BE 6 -2 to 2 mmol/L    POC SATURATED O2 63 (L) 95 - 100 %    POC TCO2 35 (H) 24 -  29 mmol/L    Sample VENOUS     Site Other     Allens Test N/A     DelSys CPAP/BiPAP     Mode BiPAP     FiO2 35     IP 12     EP 5          Significant Imaging: I have reviewed all pertinent imaging results/findings within the past 24 hours.    Assessment/Plan:     * Chronic obstructive pulmonary disease with acute exacerbation  -she was on acute respiratory distress upon EMS arrival   -received IV steroid, magnesium and epinephrine IM by EMS with improved dyspnea   -arrived in ED on 15 L supplemental oxygen via NRB   -received xopenex nebs x 3 in ED and then transitioned to 3 L N/C oxygen   -clinically improved, awake, alert, oriented and conversant   -VB. which improved to 7.28/ on transient BIPAP   -patient reluctant to use BIPAP and wanted to a break as it cause severe anxiety to her   -during my interview she appeared comfortable on 3 L N/C, awake and conversant   -will repeat VBG and start on BIPAP if needed (patient agreeable)  -patient afebrile and does not appear toxic  -CXR w/o acute process    -will start on IV solumedrol and levofloxacin per COPD pathway as patient required IV steroid during last admission  -continue xopenex nebs scheduled and prn (patient with albuterol allergy)  -will consult pulmonary given COPD exacerbation within 90 days            Acute respiratory failure with hypoxia and hypercarbia  Patient with Hypercapnic and Hypoxic Respiratory failure which is Acute on chronic.  she is on home oxygen at 93 LPM. Supplemental oxygen was provided and noted- Oxygen Concentration (%):  [35] 35.   Signs/symptoms of respiratory failure include- tachypnea, increased work of breathing, use of accessory muscles and wheezing. Contributing diagnoses includes - COPD Labs and images were reviewed. Patient Has recent ABG, which has been reviewed. Will treat underlying causes and adjust management of respiratory failure as follows  -BIPAP as needed (baseline PCO2 around 60)    Chronic respiratory  failure with hypoxia  - due to COPD and on 3 L home oxygen     GERD (gastroesophageal reflux disease)  -continue home protonix       Code Status: Full code.     VTE Risk Mitigation (From admission, onward)         Ordered     enoxaparin injection 40 mg  Daily         04/04/22 0036     IP VTE HIGH RISK PATIENT  Once         04/04/22 0036     Place sequential compression device  Until discontinued         04/04/22 0036                   Taz Brown DO  Department of Hospital Medicine   [facility] - Intensive Care ([place]-)

## 2022-04-04 NOTE — ASSESSMENT & PLAN NOTE
71 year old woman with severe COPD, admitted for exacerbation initially requiring BiPAP though now comfortable on home O2 of 3LNC. Recently admitted [facility] 2/14 to 4/1 for the same, with concern for R lobar pneumonia.  Unclear trigger for this event, ?allergies, does not appear to be an infective exacerbation.     Plan  - Resume daily inhaled LABA/ICS/LAMA  - Scheduled VENKATA  - Continue empiric levofloxacin  - Azithromycin 3x week  - Steroids for 5 days  - Pulmonary hygiene: chest physiotherapy, Acapella device  - Referral to pulmonary rehab and clinic on discharge

## 2022-04-04 NOTE — SUBJECTIVE & OBJECTIVE
Past Medical History:   Diagnosis Date    Cataract     COPD (chronic obstructive pulmonary disease)     COVID-19     History of COVID-19 1/17/2021    Still with mild dyspnea. Encouraged return to pulmonary rehab Recommend scheduling vaccination when appointment is available.     History of retinal hemorrhage     Pathological fracture due to osteoporosis 7/6/2020    Retinal histoplasmosis     Secondary pulmonary arterial hypertension 7/3/2018    Secondary to emphysema and chronic respiratory failure, mild.       Past Surgical History:   Procedure Laterality Date    BRONCHOSCOPY WITH FLUOROSCOPY N/A 10/28/2019    Procedure: BRONCHOSCOPY, WITH FLUOROSCOPY;  Surgeon: Brooklynn Ruiz MD;  Location: [facility] OR [address] [place] [address];  Service: Endoscopy;  Laterality: N/A;    HAND SURGERY         Review of patient's allergies indicates:   Allergen Reactions    Albuterol     Ipratropium analogues      Difficulty breathing       No current facility-administered medications on file prior to encounter.     Current Outpatient Medications on File Prior to Encounter   Medication Sig    acetylcysteine 600 mg Cap Take 1 capsule (600 mg total) by mouth 2 (two) times daily.    ascorbic acid, vitamin C, (VITAMIN C) 500 MG tablet Take 500 mg by mouth 2 (two) times daily.     azelastine (ASTELIN) 137 mcg (0.1 %) nasal spray INHALE 1 SPRAY BY NASAL ROUTE TWICE DAILY OR AS DIRECTED    azithromycin (Z-JERSEY) 250 MG tablet TAKE 1 TABLET BY MOUTH EVERY MONDAY WEDNESDAY AND FRIDAY    BREO ELLIPTA 200-25 mcg/dose DsDv diskus inhaler INHALE 1 PUFF INTO THE LUNGS DAILY    calcium carbonate (OS-STEPHANIE) 600 mg calcium (1,500 mg) Tab Take 1 tablet (600 mg total) by mouth once daily. Take Levofloxacin antibiotic at least 2 hours before calcium.    fluticasone propionate (FLONASE) 50 mcg/actuation nasal spray INSTILL 1 SPRAY IN EACH NOSTRIL EVERY DAY    levalbuterol (XOPENEX) 0.63 mg/3 mL nebulizer solution USE 1 VIAL IN NEBULIZER EVERY 8 HOURS AS NEEDED FOR WHEEZE     MULTIVIT-IRON-MIN-FOLIC ACID 3,500-18-0.4 UNIT-MG-MG ORAL CHEW Take 1 tablet by mouth once daily. Take Levofloxacin antibiotic at least 2 hours before multivitamin.    pantoprazole (PROTONIX) 40 MG tablet Take 1 tablet (40 mg total) by mouth once daily.    pulse oximeter (PULSE OXIMETER) device by Apply Externally route 2 (two) times a day. Use twice daily at 8 AM and 3 PM and record the value in [facility] as directed.    sodium chloride (OCEAN) 0.65 % nasal spray 1 spray by Nasal route 2 (two) times daily.    umeclidinium (INCRUSE ELLIPTA) 62.5 mcg/actuation inhalation capsule Inhale 62.5 mcg into the lungs once daily.    furosemide (LASIX) 20 MG tablet Take 1 tablet (20 mg total) by mouth once daily. for 3 days     Family History       Problem Relation (Age of Onset)    Colon cancer Mother, Father    Macular degeneration Mother    Stroke Father          Tobacco Use    Smoking status: Former Smoker     Packs/day: 1.00     Years: 36.00     Pack years: 36.00     Types: Cigarettes     Quit date: 2016     Years since quittin.6    Smokeless tobacco: Never Used   Substance and Sexual Activity    Alcohol use: Yes     Alcohol/week: 2.0 standard drinks     Types: 2 Glasses of wine per week     Comment: 1-2 glasses a day     Drug use: No    Sexual activity: Yes     Review of Systems   Constitutional:  Positive for fatigue. Negative for activity change, appetite change, chills, diaphoresis and fever.   HENT:  Negative for congestion, dental problem, drooling, ear discharge, ear pain, facial swelling, hearing loss, mouth sores, nosebleeds, postnasal drip, rhinorrhea, sinus pressure, sneezing, sore throat, tinnitus, trouble swallowing and voice change.    Eyes:  Negative for photophobia, pain, discharge, redness, itching and visual disturbance.   Respiratory:  Positive for cough, chest tightness, shortness of breath and wheezing. Negative for apnea, choking and stridor.    Cardiovascular:  Negative for chest pain,  palpitations and leg swelling.   Gastrointestinal:  Negative for abdominal distention, abdominal pain, anal bleeding, blood in stool, constipation, diarrhea, nausea, rectal pain and vomiting.   Endocrine: Negative for cold intolerance, heat intolerance, polydipsia, polyphagia and polyuria.   Genitourinary:  Negative for decreased urine volume, difficulty urinating, dyspareunia, dysuria, enuresis, flank pain, frequency, genital sores, hematuria, menstrual problem, pelvic pain, urgency, vaginal bleeding, vaginal discharge and vaginal pain.   Musculoskeletal:  Negative for arthralgias, back pain, gait problem, joint swelling, myalgias, neck pain and neck stiffness.   Skin:  Negative for color change, pallor, rash and wound.   Allergic/Immunologic: Negative for environmental allergies, food allergies and immunocompromised state.   Neurological:  Negative for dizziness, tremors, seizures, syncope, facial asymmetry, speech difficulty, weakness, light-headedness, numbness and headaches.   Hematological:  Negative for adenopathy. Does not bruise/bleed easily.   Psychiatric/Behavioral:  Negative for agitation, behavioral problems, confusion, decreased concentration, dysphoric mood, hallucinations, self-injury, sleep disturbance and suicidal ideas. The patient is not nervous/anxious and is not hyperactive.    Objective:     Vital Signs (Most Recent):  Temp: 97.3 °F (36.3 °C) (04/04/22 0206)  Pulse: 108 (04/04/22 0145)  Resp: 18 (04/04/22 0145)  BP: (!) 140/69 (04/04/22 0206)  SpO2: 95 % (04/04/22 0145)   Vital Signs (24h Range):  Temp:  [97.3 °F (36.3 °C)-99.1 °F (37.3 °C)] 97.3 °F (36.3 °C)  Pulse:  [] 108  Resp:  [16-28] 18  SpO2:  [93 %-100 %] 95 %  BP: (139-176)/(63-94) 140/69     Weight: 63.5 kg (140 lb)  Body mass index is 21.29 kg/m².    Physical Exam  Constitutional:       General: She is not in acute distress.     Appearance: She is well-developed. She is not diaphoretic.      Comments: Elderly thin female not  in distress    HENT:      Head: Normocephalic and atraumatic.      Right Ear: External ear normal.      Left Ear: External ear normal.      Nose: Nose normal.      Mouth/Throat:      Pharynx: No oropharyngeal exudate.   Eyes:      General: No scleral icterus.     Conjunctiva/sclera: Conjunctivae normal.      Pupils: Pupils are equal, round, and reactive to light.   Neck:      Thyroid: No thyromegaly.      Vascular: No JVD.      Trachea: No tracheal deviation.   Cardiovascular:      Rate and Rhythm: Regular rhythm. Tachycardia present.      Heart sounds: Normal heart sounds. No murmur heard.    No gallop.   Pulmonary:      Effort: Pulmonary effort is normal. No respiratory distress.      Breath sounds: Wheezing and rhonchi present. No rales.   Chest:      Chest wall: No tenderness.   Abdominal:      General: Bowel sounds are normal. There is no distension.      Palpations: Abdomen is soft. There is no mass.      Tenderness: There is no abdominal tenderness. There is no guarding or rebound.   Genitourinary:     Vagina: No vaginal discharge.   Musculoskeletal:         General: Swelling (trace bilateral LE swelling) present. No tenderness.      Cervical back: Neck supple.   Lymphadenopathy:      Cervical: No cervical adenopathy.   Skin:     General: Skin is warm and dry.      Coloration: Skin is not pale.      Findings: No erythema or rash.   Neurological:      Mental Status: She is alert and oriented to person, place, and time.      Cranial Nerves: No cranial nerve deficit.      Motor: No abnormal muscle tone.      Coordination: Coordination normal.      Deep Tendon Reflexes: Reflexes are normal and symmetric. Reflexes normal.      Comments: Mild tremors of upper extremities    Psychiatric:         Behavior: Behavior normal.         Thought Content: Thought content normal.         Judgment: Judgment normal.      Comments: Flat affect        CRANIAL NERVES     CN III, IV, VI   Pupils are equal, round, and reactive to  light.     Significant Labs: All pertinent labs within the past 24 hours have been reviewed.  None    Recent Results (from the past 24 hour(s))   CBC auto differential    Collection Time: 04/03/22  8:41 PM   Result Value Ref Range    WBC 7.64 3.90 - 12.70 K/uL    RBC 4.71 4.00 - 5.40 M/uL    Hemoglobin 13.4 12.0 - 16.0 g/dL    Hematocrit 42.9 37.0 - 48.5 %    MCV 91 82 - 98 fL    MCH 28.5 27.0 - 31.0 pg    MCHC 31.2 (L) 32.0 - 36.0 g/dL    RDW 14.1 11.5 - 14.5 %    Platelets 342 150 - 450 K/uL    MPV 9.0 (L) 9.2 - 12.9 fL    Immature Granulocytes 0.3 0.0 - 0.5 %    Gran # (ANC) 4.8 1.8 - 7.7 K/uL    Immature Grans (Abs) 0.02 0.00 - 0.04 K/uL    Lymph # 1.4 1.0 - 4.8 K/uL    Mono # 0.8 0.3 - 1.0 K/uL    Eos # 0.6 (H) 0.0 - 0.5 K/uL    Baso # 0.05 0.00 - 0.20 K/uL    nRBC 0 0 /100 WBC    Gran % 63.3 38.0 - 73.0 %    Lymph % 18.2 18.0 - 48.0 %    Mono % 9.8 4.0 - 15.0 %    Eosinophil % 7.7 0.0 - 8.0 %    Basophil % 0.7 0.0 - 1.9 %    Differential Method Automated    Comprehensive metabolic panel    Collection Time: 04/03/22  8:41 PM   Result Value Ref Range    Sodium 140 136 - 145 mmol/L    Potassium 3.8 3.5 - 5.1 mmol/L    Chloride 100 95 - 110 mmol/L    CO2 30 (H) 23 - 29 mmol/L    Glucose 153 (H) 70 - 110 mg/dL    BUN 8 8 - 23 mg/dL    Creatinine 0.6 0.5 - 1.4 mg/dL    Calcium 9.2 8.7 - 10.5 mg/dL    Total Protein 7.4 6.0 - 8.4 g/dL    Albumin 3.7 3.5 - 5.2 g/dL    Total Bilirubin 0.2 0.1 - 1.0 mg/dL    Alkaline Phosphatase 90 55 - 135 U/L    AST 21 10 - 40 U/L    ALT 16 10 - 44 U/L    Anion Gap 10 8 - 16 mmol/L    eGFR if African American >60.0 >60 mL/min/1.73 m^2    eGFR if non African American >60.0 >60 mL/min/1.73 m^2   Troponin I    Collection Time: 04/03/22  8:41 PM   Result Value Ref Range    Troponin I 0.015 0.000 - 0.026 ng/mL   B-Type natriuretic peptide (BNP)    Collection Time: 04/03/22  8:41 PM   Result Value Ref Range    BNP 21 0 - 99 pg/mL   ISTAT PROCEDURE    Collection Time: 04/03/22  8:49 PM    Result Value Ref Range    POC PH 7.243 (L) 7.35 - 7.45    POC PCO2 85.8 (H) 35 - 45 mmHg    POC PO2 25 (L) 40 - 60 mmHg    POC HCO3 37.0 (H) 24 - 28 mmol/L    POC BE 10 -2 to 2 mmol/L    POC SATURATED O2 32 (L) 95 - 100 %    POC TCO2 40 (H) 24 - 29 mmol/L    Sample VENOUS     Site Other     Allens Test N/A     DelSys Nasal Can     Mode SPONT     Flow 3    ISTAT PROCEDURE    Collection Time: 04/03/22 10:00 PM   Result Value Ref Range    POC PH 7.241 (L) 7.35 - 7.45    POC PCO2 77.3 (H) 35 - 45 mmHg    POC PO2 31 (L) 40 - 60 mmHg    POC HCO3 33.2 (H) 24 - 28 mmol/L    POC BE 6 -2 to 2 mmol/L    POC SATURATED O2 46 (L) 95 - 100 %    POC TCO2 36 (H) 24 - 29 mmol/L    Sample VENOUS     Site Other     Allens Test N/A     DelSys Nasal Can     Mode SPONT     Flow 3    POCT COVID-19 Rapid Screening    Collection Time: 04/03/22 10:09 PM   Result Value Ref Range    POC Rapid COVID Negative Negative     Acceptable Yes    ISTAT PROCEDURE    Collection Time: 04/03/22 11:34 PM   Result Value Ref Range    POC PH 7.286 (L) 7.35 - 7.45    POC PCO2 69.4 (H) 35 - 45 mmHg    POC PO2 38 (L) 40 - 60 mmHg    POC HCO3 33.0 (H) 24 - 28 mmol/L    POC BE 6 -2 to 2 mmol/L    POC SATURATED O2 63 (L) 95 - 100 %    POC TCO2 35 (H) 24 - 29 mmol/L    Sample VENOUS     Site Other     Allens Test N/A     DelSys CPAP/BiPAP     Mode BiPAP     FiO2 35     IP 12     EP 5          Significant Imaging: I have reviewed all pertinent imaging results/findings within the past 24 hours.

## 2022-04-04 NOTE — HPI
Estefani Fam is a 71 year old woman with COPD, chronic hypoxic respiratory failure on home oxygen 3L NC, who presented via EMS for acute respiratory distress. Patient has had worsening SOB, wheezing, increased clear sputum production over the last 2 days at home. She was recently admitted from 2/14 to 3/1 for COPD exacerbation with concern for a R lobar pneumonia. Since discharge she has been undergoing home chest physiotherapy twice a week. Remains rather debilitated and unable to perform most ADLs. Unclear what triggered this decompensation; patient denies fevers chills or being around sick contacts. She is vaccinated against COVID influenza and pneumococcus. She used to smoke (~50 pack years) but quit 5 years ago. Patient thinks she might have reacted to increased pollen in the air, but denies having seasonal allergies. Her home regimen includes levalbuterol TID as needed, Breo Ellipta daily, Incruse Ellipta daily, azithromycin 3x per week. She sees Dr Ruiz in Pulmonary clinic.     In the ED, patient was initially on 15L NRB. Initial VBG showed pH 7.24 and CO2 85. Patint was placed on BiPAP, given solumedrol, levalbuterol nebs x3, 1L NS bolus then Lasix 20mg. Repeat VBG with pH 7.286, CO2 improved to 69.4. Patient was weaned to 3LNC this morning with VBG pH 7.28 and CO2 63. Patient admitted to hospital medicine. Pulmonology consulted due to her having COPD exacerbation within the last 90 days.

## 2022-04-04 NOTE — ASSESSMENT & PLAN NOTE
Patient with Hypercapnic and Hypoxic Respiratory failure which is Acute on chronic.  she is on home oxygen at 93 LPM. Supplemental oxygen was provided and noted- Oxygen Concentration (%):  [35] 35.   Signs/symptoms of respiratory failure include- tachypnea, increased work of breathing, use of accessory muscles and wheezing. Contributing diagnoses includes - COPD Labs and images were reviewed. Patient Has recent ABG, which has been reviewed. Will treat underlying causes and adjust management of respiratory failure as follows  -BIPAP as needed (baseline PCO2 around 60)

## 2022-04-05 PROBLEM — J96.01 ACUTE RESPIRATORY FAILURE WITH HYPOXIA AND HYPERCARBIA: Status: RESOLVED | Noted: 2022-04-04 | Resolved: 2022-04-05

## 2022-04-05 PROBLEM — J96.02 ACUTE RESPIRATORY FAILURE WITH HYPOXIA AND HYPERCARBIA: Status: RESOLVED | Noted: 2022-04-04 | Resolved: 2022-04-05

## 2022-04-05 PROBLEM — J18.1 LOBAR PNEUMONIA: Status: RESOLVED | Noted: 2021-11-14 | Resolved: 2022-04-05

## 2022-04-05 LAB
ANION GAP SERPL CALC-SCNC: 11 MMOL/L (ref 8–16)
BUN SERPL-MCNC: 15 MG/DL (ref 8–23)
CALCIUM SERPL-MCNC: 9.3 MG/DL (ref 8.7–10.5)
CHLORIDE SERPL-SCNC: 99 MMOL/L (ref 95–110)
CO2 SERPL-SCNC: 28 MMOL/L (ref 23–29)
CREAT SERPL-MCNC: 0.6 MG/DL (ref 0.5–1.4)
EST. GFR  (AFRICAN AMERICAN): >60 ML/MIN/1.73 M^2
EST. GFR  (NON AFRICAN AMERICAN): >60 ML/MIN/1.73 M^2
GLUCOSE SERPL-MCNC: 190 MG/DL (ref 70–110)
PHOSPHATE SERPL-MCNC: 2.4 MG/DL (ref 2.7–4.5)
POTASSIUM SERPL-SCNC: 4 MMOL/L (ref 3.5–5.1)
SODIUM SERPL-SCNC: 138 MMOL/L (ref 136–145)

## 2022-04-05 PROCEDURE — 63600175 PHARM REV CODE 636 W HCPCS: Performed by: HOSPITALIST

## 2022-04-05 PROCEDURE — 36415 COLL VENOUS BLD VENIPUNCTURE: CPT | Performed by: HOSPITALIST

## 2022-04-05 PROCEDURE — 94761 N-INVAS EAR/PLS OXIMETRY MLT: CPT

## 2022-04-05 PROCEDURE — 94668 MNPJ CHEST WALL SBSQ: CPT

## 2022-04-05 PROCEDURE — 25000003 PHARM REV CODE 250: Performed by: HOSPITALIST

## 2022-04-05 PROCEDURE — 25000242 PHARM REV CODE 250 ALT 637 W/ HCPCS: Performed by: HOSPITALIST

## 2022-04-05 PROCEDURE — 20600001 HC STEP DOWN PRIVATE ROOM

## 2022-04-05 PROCEDURE — 99232 PR SUBSEQUENT HOSPITAL CARE,LEVL II: ICD-10-PCS | Mod: ,,, | Performed by: HOSPITALIST

## 2022-04-05 PROCEDURE — 94640 AIRWAY INHALATION TREATMENT: CPT

## 2022-04-05 PROCEDURE — 80048 BASIC METABOLIC PNL TOTAL CA: CPT | Performed by: HOSPITALIST

## 2022-04-05 PROCEDURE — 84100 ASSAY OF PHOSPHORUS: CPT | Performed by: HOSPITALIST

## 2022-04-05 PROCEDURE — 27000221 HC OXYGEN, UP TO 24 HOURS

## 2022-04-05 PROCEDURE — 99232 SBSQ HOSP IP/OBS MODERATE 35: CPT | Mod: ,,, | Performed by: HOSPITALIST

## 2022-04-05 PROCEDURE — 99900035 HC TECH TIME PER 15 MIN (STAT)

## 2022-04-05 RX ORDER — FLUTICASONE FUROATE AND VILANTEROL 200; 25 UG/1; UG/1
1 POWDER RESPIRATORY (INHALATION) DAILY
Status: DISCONTINUED | OUTPATIENT
Start: 2022-04-05 | End: 2022-04-08 | Stop reason: HOSPADM

## 2022-04-05 RX ORDER — MUPIROCIN 20 MG/G
OINTMENT TOPICAL 2 TIMES DAILY
Status: DISCONTINUED | OUTPATIENT
Start: 2022-04-05 | End: 2022-04-08 | Stop reason: HOSPADM

## 2022-04-05 RX ORDER — PREDNISONE 20 MG/1
40 TABLET ORAL DAILY
Status: COMPLETED | OUTPATIENT
Start: 2022-04-05 | End: 2022-04-08

## 2022-04-05 RX ORDER — MAGNESIUM SULFATE HEPTAHYDRATE 40 MG/ML
2 INJECTION, SOLUTION INTRAVENOUS ONCE
Status: COMPLETED | OUTPATIENT
Start: 2022-04-05 | End: 2022-04-05

## 2022-04-05 RX ADMIN — LEVALBUTEROL HYDROCHLORIDE 0.63 MG: 0.63 SOLUTION RESPIRATORY (INHALATION) at 08:04

## 2022-04-05 RX ADMIN — ENOXAPARIN SODIUM 40 MG: 100 INJECTION SUBCUTANEOUS at 04:04

## 2022-04-05 RX ADMIN — MUPIROCIN: 20 OINTMENT TOPICAL at 04:04

## 2022-04-05 RX ADMIN — LEVOFLOXACIN 750 MG: 750 TABLET, FILM COATED ORAL at 08:04

## 2022-04-05 RX ADMIN — LEVALBUTEROL HYDROCHLORIDE 0.63 MG: 0.63 SOLUTION RESPIRATORY (INHALATION) at 11:04

## 2022-04-05 RX ADMIN — METHYLPREDNISOLONE SODIUM SUCCINATE 80 MG: 40 INJECTION, POWDER, FOR SOLUTION INTRAMUSCULAR; INTRAVENOUS at 06:04

## 2022-04-05 RX ADMIN — PANTOPRAZOLE SODIUM 40 MG: 40 TABLET, DELAYED RELEASE ORAL at 08:04

## 2022-04-05 RX ADMIN — MAGNESIUM SULFATE 2 G: 2 INJECTION INTRAVENOUS at 09:04

## 2022-04-05 RX ADMIN — LEVALBUTEROL HYDROCHLORIDE 0.63 MG: 0.63 SOLUTION RESPIRATORY (INHALATION) at 03:04

## 2022-04-05 RX ADMIN — LEVALBUTEROL HYDROCHLORIDE 0.63 MG: 0.63 SOLUTION RESPIRATORY (INHALATION) at 12:04

## 2022-04-05 RX ADMIN — ACETAMINOPHEN 650 MG: 325 TABLET ORAL at 08:04

## 2022-04-05 RX ADMIN — PREDNISONE 40 MG: 20 TABLET ORAL at 10:04

## 2022-04-05 RX ADMIN — FLUTICASONE FUROATE AND VILANTEROL TRIFENATATE 1 PUFF: 200; 25 POWDER RESPIRATORY (INHALATION) at 03:04

## 2022-04-05 RX ADMIN — MUPIROCIN: 20 OINTMENT TOPICAL at 08:04

## 2022-04-05 NOTE — SUBJECTIVE & OBJECTIVE
Interval History: Discussed treatment plan. Still short of breath. Pulmonology signed off and seeing if she continues to improve on current treatments. Home maintenance inhalers were not ordered on admission so ordered them (with tiotropium in place of umeclidinium). Patient's  present.     Review of Systems   Constitutional:  Negative for chills and fever.   Respiratory:  Positive for cough and shortness of breath.    Gastrointestinal:  Negative for nausea and vomiting.   Neurological:  Negative for seizures and syncope.   Objective:     Vital Signs (Most Recent):  Temp: 97.2 °F (36.2 °C) (04/05/22 0834)  Pulse: 97 (04/05/22 1132)  Resp: 20 (04/05/22 0834)  BP: 134/70 (04/05/22 0834)  SpO2: 95 % (04/05/22 1132) Vital Signs (24h Range):  Temp:  [96.3 °F (35.7 °C)-98.1 °F (36.7 °C)] 97.2 °F (36.2 °C)  Pulse:  [] 97  Resp:  [16-33] 20  SpO2:  [92 %-98 %] 95 %  BP: (134-174)/(70-89) 134/70     Weight: 63.5 kg (139 lb 15.9 oz)  Body mass index is 21.29 kg/m².    Intake/Output Summary (Last 24 hours) at 4/5/2022 1205  Last data filed at 4/4/2022 1800  Gross per 24 hour   Intake 680 ml   Output --   Net 680 ml      Physical Exam  Vitals and nursing note reviewed.   Constitutional:       General: She is not in acute distress.     Appearance: She is well-developed. She is not toxic-appearing or diaphoretic.   Cardiovascular:      Rate and Rhythm: Regular rhythm. Tachycardia present.      Heart sounds: Normal heart sounds. No murmur heard.  Pulmonary:      Effort: Tachypnea present.      Breath sounds: Decreased breath sounds present.      Comments: Purse lipped breathing  Neurological:      Mental Status: She is alert and oriented to person, place, and time.      Motor: No seizure activity.   Psychiatric:         Attention and Perception: Attention normal.         Mood and Affect: Mood and affect normal.         Behavior: Behavior is cooperative.       Computed MELD-Na score unavailable. Necessary lab results  were not found in the last year.  Computed MELD score unavailable. Necessary lab results were not found in the last year.    Significant Labs:  CBC:  Recent Labs   Lab 04/03/22 2041 04/04/22  0556   WBC 7.64 4.70   HGB 13.4 13.3   HCT 42.9 43.3    336     CMP:  Recent Labs   Lab 04/03/22 2041 04/04/22  0556 04/05/22  0306    138 138   K 3.8 3.7 4.0    96 99   CO2 30* 33* 28   * 157* 190*   BUN 8 9 15   CREATININE 0.6 0.6 0.6   CALCIUM 9.2 8.7 9.3   PROT 7.4  --   --    ALBUMIN 3.7  --   --    BILITOT 0.2  --   --    ALKPHOS 90  --   --    AST 21  --   --    ALT 16  --   --    ANIONGAP 10 9 11   EGFRNONAA >60.0 >60.0 >60.0

## 2022-04-05 NOTE — ASSESSMENT & PLAN NOTE
Patient with Hypercapnic and Hypoxic Respiratory failure which is Acute on chronic.  she is on home oxygen at 93 LPM. Supplemental oxygen was provided and noted-  .   Signs/symptoms of respiratory failure include- tachypnea, increased work of breathing, use of accessory muscles and wheezing. Contributing diagnoses includes - COPD Labs and images were reviewed. Patient Has recent ABG, which has been reviewed. Will treat underlying causes and adjust management of respiratory failure as follows  -BIPAP as needed (baseline PCO2 around 60)

## 2022-04-05 NOTE — ASSESSMENT & PLAN NOTE
Centrilobular emphysema  Secondary pulmonary arterial hypertension  Chronic respiratory failure with hypoxia, on home oxygen therapy  Initially required BiPAP and higher supplemental oxygen. Now on 3 liters/minute but still short of breath. Resume home fluticasone-vilanterol. Give tiotropium in place of home umeclidinium. Giving 5 days of levofloxacin. Resume azithromycin 3 times a week afterward. Change IV methylprednisolone to oral prednisone. Give more than 5 days due to severity. Giving levalbuterol nebulizer treatments.

## 2022-04-05 NOTE — PLAN OF CARE
Patient a&ox4, vss this shift. No prn meds required and no complaints of pain this shift. Patient up independently to bedside commode and using baseline O2. Plan of care reviewed with the patient and her .     Problem: Adult Inpatient Plan of Care  Goal: Plan of Care Review  4/5/2022 1713 by Shilpa Gonzalez RN  Outcome: Ongoing, Progressing  4/5/2022 0857 by Shilpa Gonzalez RN  Outcome: Ongoing, Progressing  Goal: Absence of Hospital-Acquired Illness or Injury  Outcome: Ongoing, Progressing  Goal: Optimal Comfort and Wellbeing  4/5/2022 1713 by Shilpa Gonzalez RN  Outcome: Ongoing, Progressing  4/5/2022 0857 by Shilpa Gonzalez RN  Outcome: Ongoing, Progressing  Goal: Readiness for Transition of Care  Outcome: Ongoing, Progressing     Problem: Adjustment to Illness COPD (Chronic Obstructive Pulmonary Disease)  Goal: Optimal Chronic Illness Coping  4/5/2022 1713 by Shilpa Gonzalez RN  Outcome: Ongoing, Progressing  4/5/2022 0857 by Shilpa Gonzalez RN  Outcome: Ongoing, Progressing     Problem: Oral Intake Inadequate COPD (Chronic Obstructive Pulmonary Disease)  Goal: Improved Nutrition Intake  Outcome: Ongoing, Progressing

## 2022-04-05 NOTE — PROGRESS NOTES
[facility] - Intensive Care ([address] Medicine  Progress Note    Patient Name: Estefani Fam  MRN: [id number]  Patient Class: IP- Inpatient   Admission Date: 4/3/2022  Length of Stay: 1 days  Attending Physician: Emre Crawley MD  Primary Care Provider: Primary Doctor No        Subjective:     Principal Problem:Chronic obstructive pulmonary disease with acute exacerbation        HPI:  Estefani Fam is a 71 year old white woman with former smoking (quit in 2017), centrilobular emphysema with chronic obstructive pulmonary disease (COPD), chornic hypoxic respiratory failure on supplemental oxygen (3 liters/minute), secondary pulmonary hypertension, gastroesophageal reflux disease, osteoporosis, history of recurrent right lower lobe pneumonia in November 2021 and February 2022, history of COVID-19 on 1/17/2021, history of retinal histoplasmosis. She lives in [facility]. She is . Her primary care physician is Dr. Miles Jackson. Her pulmonologist is Dr. Brooklynn Ruiz.    She was hospitalized at [facility] from 2/14/2022 to 2/23/2022 for COPD exacerbation and recurrent pneumonia.   She was seen by Dr. Ruiz on 3/7/2022. She reported dysphagia. She was referred to Speech Language Pathology. She underwent modified barium swallow study on 3/14/2022, which showed mild oropharyngeal dysphagia and globus sensation but no aspiration.    She presented to [facility] Emergency Department on 4/3/2022 with worsening shortness of breath, wheezing, cough productive of clear sputum and chest tightness for two days. She has an albuterol allergy and had been using her home levalbuterol nebulizer three times a day without relief. She called emergency medical services because she had a hard time catching her breath around 15:00. She was in acute respiratory distress upon their arrival. She was given intravenous steroid, magnesium, and 0.3 mg  of intramuscular epinephrine en route. She was placed on 15 liters/minute of supplemental oxygen by non-rebreather.   In the emergency department, Chest X-ray showed improved right airspace disease compared to 3/3/2022. Venous blood gas showed pH 7.24, pCO2 85 mmHg. She was given three levalbuterol nebulizer treatments. Repeat venous blood gas on BiPAP showed pH 7.28 and pCO2 69. She was transitioned to 3 liters/minute by nasal cannula with oxygen saturation 93%. She reported improvement in shortness of breath. She thinks pollen might have triggered her current COPD exacerbation. She was admitted to Hospital Medicine Team A.      Overview/Hospital Course:  She was put on levalbuterol nebulizer treatments every 8 hours, intravenous methylprednisolone 80 mg every 8 hours, and levofloxacin for 5 days. Pulmonology was consulted and recommended steroid for 5 days, azithromycin 3 times per week, chest physiotherapy, and referral to pulmonary rehab on discharge.       Interval History: Discussed treatment plan. Still short of breath. Pulmonology signed off and seeing if she continues to improve on current treatments. Home maintenance inhalers were not ordered on admission so ordered them (with tiotropium in place of umeclidinium). Patient's  present.     Review of Systems   Constitutional:  Negative for chills and fever.   Respiratory:  Positive for cough and shortness of breath.    Gastrointestinal:  Negative for nausea and vomiting.   Neurological:  Negative for seizures and syncope.   Objective:     Vital Signs (Most Recent):  Temp: 97.2 °F (36.2 °C) (04/05/22 0834)  Pulse: 97 (04/05/22 1132)  Resp: 20 (04/05/22 0834)  BP: 134/70 (04/05/22 0834)  SpO2: 95 % (04/05/22 1132) Vital Signs (24h Range):  Temp:  [96.3 °F (35.7 °C)-98.1 °F (36.7 °C)] 97.2 °F (36.2 °C)  Pulse:  [] 97  Resp:  [16-33] 20  SpO2:  [92 %-98 %] 95 %  BP: (134-174)/(70-89) 134/70     Weight: 63.5 kg (139 lb 15.9 oz)  Body mass index is  21.29 kg/m².    Intake/Output Summary (Last 24 hours) at 4/5/2022 1205  Last data filed at 4/4/2022 1800  Gross per 24 hour   Intake 680 ml   Output --   Net 680 ml      Physical Exam  Vitals and nursing note reviewed.   Constitutional:       General: She is not in acute distress.     Appearance: She is well-developed. She is not toxic-appearing or diaphoretic.   Cardiovascular:      Rate and Rhythm: Regular rhythm. Tachycardia present.      Heart sounds: Normal heart sounds. No murmur heard.  Pulmonary:      Effort: Tachypnea present.      Breath sounds: Decreased breath sounds present.      Comments: Purse lipped breathing  Neurological:      Mental Status: She is alert and oriented to person, place, and time.      Motor: No seizure activity.   Psychiatric:         Attention and Perception: Attention normal.         Mood and Affect: Mood and affect normal.         Behavior: Behavior is cooperative.       Computed MELD-Na score unavailable. Necessary lab results were not found in the last year.  Computed MELD score unavailable. Necessary lab results were not found in the last year.    Significant Labs:  CBC:  Recent Labs   Lab 04/03/22 2041 04/04/22  0556   WBC 7.64 4.70   HGB 13.4 13.3   HCT 42.9 43.3    336     CMP:  Recent Labs   Lab 04/03/22 2041 04/04/22  0556 04/05/22  0306    138 138   K 3.8 3.7 4.0    96 99   CO2 30* 33* 28   * 157* 190*   BUN 8 9 15   CREATININE 0.6 0.6 0.6   CALCIUM 9.2 8.7 9.3   PROT 7.4  --   --    ALBUMIN 3.7  --   --    BILITOT 0.2  --   --    ALKPHOS 90  --   --    AST 21  --   --    ALT 16  --   --    ANIONGAP 10 9 11   EGFRNONAA >60.0 >60.0 >60.0           Assessment/Plan:      * Chronic obstructive pulmonary disease with acute exacerbation  Centrilobular emphysema  Secondary pulmonary arterial hypertension  Chronic respiratory failure with hypoxia, on home oxygen therapy  Initially required BiPAP and higher supplemental oxygen. Now on 3 liters/minute  but still short of breath. Resume home fluticasone-vilanterol. Give tiotropium in place of home umeclidinium. Giving 5 days of levofloxacin. Resume azithromycin 3 times a week afterward. Change IV methylprednisolone to oral prednisone. Give more than 5 days due to severity. Giving levalbuterol nebulizer treatments.     GERD (gastroesophageal reflux disease)  Continue home pantoprazole.    Chronic respiratory failure with hypoxia, on home oxygen therapy  - due to COPD and on 3 L home oxygen       VTE Risk Mitigation (From admission, onward)         Ordered     enoxaparin injection 40 mg  Daily         04/04/22 0036     IP VTE HIGH RISK PATIENT  Once         04/04/22 0036     Place sequential compression device  Until discontinued         04/04/22 0036                Discharge Planning   RENARD: 4/6/2022     Code Status: Full Code   Is the patient medically ready for discharge?:     Reason for patient still in hospital (select all that apply): Patient unstable and Treatment  Discharge Plan A: Home Health, Home with family (re-est [facility])                  Emre Crawley MD  Department of Hospital Medicine   [facility] - Intensive Care ([facility]

## 2022-04-05 NOTE — PLAN OF CARE
Awake, alert, sitting up in bed. Resp non labored.  Denies c/o pain or discomfort.  Cough noted. Non productive.  Offered patient PRN cough medication, but patient refused. Patient stated that she did not need anything for cough and would be ok.  Offered assistance to bedside, but patient stated she did not she did not have to get up to bedside commode at this time.  No acute distress noted. Encouraged to call for assistance.  Care plan reviewed with patient.  Patient verbalized understanding.

## 2022-04-06 ENCOUNTER — DOCUMENT SCAN (OUTPATIENT)
Dept: HOME HEALTH SERVICES | Facility: HOSPITAL | Age: 72
End: 2022-04-06
Payer: MEDICARE

## 2022-04-06 LAB
ANION GAP SERPL CALC-SCNC: 8 MMOL/L (ref 8–16)
BUN SERPL-MCNC: 18 MG/DL (ref 8–23)
CALCIUM SERPL-MCNC: 9 MG/DL (ref 8.7–10.5)
CHLORIDE SERPL-SCNC: 99 MMOL/L (ref 95–110)
CO2 SERPL-SCNC: 32 MMOL/L (ref 23–29)
CREAT SERPL-MCNC: 0.5 MG/DL (ref 0.5–1.4)
EST. GFR  (AFRICAN AMERICAN): >60 ML/MIN/1.73 M^2
EST. GFR  (NON AFRICAN AMERICAN): >60 ML/MIN/1.73 M^2
GLUCOSE SERPL-MCNC: 93 MG/DL (ref 70–110)
PHOSPHATE SERPL-MCNC: 2.6 MG/DL (ref 2.7–4.5)
POTASSIUM SERPL-SCNC: 3.8 MMOL/L (ref 3.5–5.1)
SODIUM SERPL-SCNC: 139 MMOL/L (ref 136–145)

## 2022-04-06 PROCEDURE — 99900035 HC TECH TIME PER 15 MIN (STAT)

## 2022-04-06 PROCEDURE — 63600175 PHARM REV CODE 636 W HCPCS: Performed by: HOSPITALIST

## 2022-04-06 PROCEDURE — 25000242 PHARM REV CODE 250 ALT 637 W/ HCPCS: Performed by: HOSPITALIST

## 2022-04-06 PROCEDURE — 36415 COLL VENOUS BLD VENIPUNCTURE: CPT | Performed by: HOSPITALIST

## 2022-04-06 PROCEDURE — 99232 PR SUBSEQUENT HOSPITAL CARE,LEVL II: ICD-10-PCS | Mod: ,,, | Performed by: HOSPITALIST

## 2022-04-06 PROCEDURE — 27000221 HC OXYGEN, UP TO 24 HOURS

## 2022-04-06 PROCEDURE — 80048 BASIC METABOLIC PNL TOTAL CA: CPT | Performed by: HOSPITALIST

## 2022-04-06 PROCEDURE — 94640 AIRWAY INHALATION TREATMENT: CPT

## 2022-04-06 PROCEDURE — 94761 N-INVAS EAR/PLS OXIMETRY MLT: CPT

## 2022-04-06 PROCEDURE — 94664 DEMO&/EVAL PT USE INHALER: CPT

## 2022-04-06 PROCEDURE — 27000646 HC AEROBIKA DEVICE

## 2022-04-06 PROCEDURE — 25000003 PHARM REV CODE 250: Performed by: HOSPITALIST

## 2022-04-06 PROCEDURE — 99232 SBSQ HOSP IP/OBS MODERATE 35: CPT | Mod: ,,, | Performed by: HOSPITALIST

## 2022-04-06 PROCEDURE — 20600001 HC STEP DOWN PRIVATE ROOM

## 2022-04-06 PROCEDURE — 84100 ASSAY OF PHOSPHORUS: CPT | Performed by: HOSPITALIST

## 2022-04-06 RX ADMIN — ENOXAPARIN SODIUM 40 MG: 100 INJECTION SUBCUTANEOUS at 05:04

## 2022-04-06 RX ADMIN — MUPIROCIN: 20 OINTMENT TOPICAL at 08:04

## 2022-04-06 RX ADMIN — PREDNISONE 40 MG: 20 TABLET ORAL at 08:04

## 2022-04-06 RX ADMIN — TIOTROPIUM BROMIDE INHALATION SPRAY 2 PUFF: 3.12 SPRAY, METERED RESPIRATORY (INHALATION) at 08:04

## 2022-04-06 RX ADMIN — LEVALBUTEROL HYDROCHLORIDE 0.63 MG: 0.63 SOLUTION RESPIRATORY (INHALATION) at 08:04

## 2022-04-06 RX ADMIN — ACETAMINOPHEN 650 MG: 325 TABLET ORAL at 01:04

## 2022-04-06 RX ADMIN — POLYETHYLENE GLYCOL 3350 17 G: 17 POWDER, FOR SOLUTION ORAL at 06:04

## 2022-04-06 RX ADMIN — LEVALBUTEROL HYDROCHLORIDE 0.63 MG: 0.63 SOLUTION RESPIRATORY (INHALATION) at 03:04

## 2022-04-06 RX ADMIN — MUPIROCIN: 20 OINTMENT TOPICAL at 09:04

## 2022-04-06 RX ADMIN — BISACODYL 5 MG: 5 TABLET, COATED ORAL at 01:04

## 2022-04-06 RX ADMIN — PANTOPRAZOLE SODIUM 40 MG: 40 TABLET, DELAYED RELEASE ORAL at 08:04

## 2022-04-06 RX ADMIN — LEVOFLOXACIN 750 MG: 750 TABLET, FILM COATED ORAL at 08:04

## 2022-04-06 RX ADMIN — FLUTICASONE FUROATE AND VILANTEROL TRIFENATATE 1 PUFF: 200; 25 POWDER RESPIRATORY (INHALATION) at 08:04

## 2022-04-06 NOTE — PROGRESS NOTES
[facility] - Intensive Care ([address] Medicine  Progress Note    Patient Name: Estefani Fam  MRN: [id number]  Patient Class: IP- Inpatient   Admission Date: 4/3/2022  Length of Stay: 2 days  Attending Physician: Emre Crawley MD  Primary Care Provider: Primary Doctor No        Subjective:     Principal Problem:Chronic obstructive pulmonary disease with acute exacerbation        HPI:  Estefani Fam is a 71 year old white woman with former smoking (quit in 2017), centrilobular emphysema with chronic obstructive pulmonary disease (COPD), chornic hypoxic respiratory failure on supplemental oxygen (3 liters/minute), secondary pulmonary hypertension, gastroesophageal reflux disease, osteoporosis, history of recurrent right lower lobe pneumonia in November 2021 and February 2022, history of COVID-19 on 1/17/2021, history of retinal histoplasmosis. She lives in [facility]. She is . Her primary care physician is Dr. Miles Jackson. Her pulmonologist is Dr. Brooklynn Ruiz.    She was hospitalized at [facility] from 2/14/2022 to 2/23/2022 for COPD exacerbation and recurrent pneumonia.   She was seen by Dr. Ruiz on 3/7/2022. She reported dysphagia. She was referred to Speech Language Pathology. She underwent modified barium swallow study on 3/14/2022, which showed mild oropharyngeal dysphagia and globus sensation but no aspiration.    She presented to [facility] Emergency Department on 4/3/2022 with worsening shortness of breath, wheezing, cough productive of clear sputum and chest tightness for two days. She has an albuterol allergy and had been using her home levalbuterol nebulizer three times a day without relief. She called emergency medical services because she had a hard time catching her breath around 15:00. She was in acute respiratory distress upon their arrival. She was given intravenous steroid, magnesium, and 0.3 mg  of intramuscular epinephrine en route. She was placed on 15 liters/minute of supplemental oxygen by non-rebreather.   In the emergency department, Chest X-ray showed improved right airspace disease compared to 3/3/2022. Venous blood gas showed pH 7.24, pCO2 85 mmHg. She was given three levalbuterol nebulizer treatments. Repeat venous blood gas on BiPAP showed pH 7.28 and pCO2 69. She was transitioned to 3 liters/minute by nasal cannula with oxygen saturation 93%. She reported improvement in shortness of breath. She thinks pollen might have triggered her current COPD exacerbation. She was admitted to Hospital Medicine Team A.      Overview/Hospital Course:  She was put on levalbuterol nebulizer treatments every 8 hours, intravenous methylprednisolone 80 mg every 8 hours, and levofloxacin for 5 days. Pulmonology was consulted and recommended steroid for 5 days, azithromycin 3 times per week, chest physiotherapy, and referral to pulmonary rehab on discharge.       Interval History: She feels the same as yesterday, but her amount of pursed lip breathing is improved. Lungs sound the same. Patient's  present. She thinks she will be ready to go home on Friday (4/8/2022).    Review of Systems   Constitutional:  Negative for chills and fever.   Respiratory:  Positive for cough and shortness of breath.    Gastrointestinal:  Negative for nausea and vomiting.   Neurological:  Negative for seizures and syncope.   Objective:     Vital Signs (Most Recent):  Temp: 97.6 °F (36.4 °C) (04/06/22 1124)  Pulse: 103 (04/06/22 1139)  Resp: (!) 33 (04/06/22 1139)  BP: 134/85 (04/06/22 1124)  SpO2: (!) 93 % (04/06/22 1139) Vital Signs (24h Range):  Temp:  [97.6 °F (36.4 °C)-97.9 °F (36.6 °C)] 97.6 °F (36.4 °C)  Pulse:  [] 103  Resp:  [16-35] 33  SpO2:  [93 %-98 %] 93 %  BP: (134-180)/(73-85) 134/85     Weight: 63.5 kg (139 lb 15.9 oz)  Body mass index is 21.29 kg/m².  No intake or output data in the 24 hours ending 04/06/22  1325     Physical Exam  Vitals and nursing note reviewed.   Constitutional:       General: She is not in acute distress.     Appearance: She is well-developed. She is not toxic-appearing or diaphoretic.   Cardiovascular:      Rate and Rhythm: Regular rhythm. Tachycardia present.      Heart sounds: Normal heart sounds. No murmur heard.  Pulmonary:      Effort: Tachypnea present.      Breath sounds: Decreased breath sounds present.      Comments: Purse lipped breathing but less than yesterday  Neurological:      Mental Status: She is alert and oriented to person, place, and time.      Motor: No seizure activity.   Psychiatric:         Attention and Perception: Attention normal.         Mood and Affect: Mood and affect normal.         Behavior: Behavior is cooperative.         Significant Labs:  CBC:  No results for input(s): WBC, HGB, HCT, PLT in the last 48 hours.    CMP:  Recent Labs   Lab 04/05/22  0306 04/06/22  0313    139   K 4.0 3.8   CL 99 99   CO2 28 32*   * 93   BUN 15 18   CREATININE 0.6 0.5   CALCIUM 9.3 9.0   ANIONGAP 11 8   EGFRNONAA >60.0 >60.0             Assessment/Plan:      * Chronic obstructive pulmonary disease with acute exacerbation  Centrilobular emphysema  Secondary pulmonary arterial hypertension  Chronic respiratory failure with hypoxia, on home oxygen therapy  Initially required BiPAP and higher supplemental oxygen. Now on 3 liters/minute but still short of breath. Continue home fluticasone-vilanterol. Giving tiotropium in place of home umeclidinium. Giving 5 days of levofloxacin. Resume azithromycin 3 times a week afterward. Giving oral prednisone. Pulmonology recommended 5 days but should give more than 5 days due to severity. Giving levalbuterol nebulizer treatments.     GERD (gastroesophageal reflux disease)  Continue home pantoprazole.    Chronic respiratory failure with hypoxia, on home oxygen therapy  - due to COPD and on 3 L home oxygen       VTE Risk Mitigation (From  admission, onward)         Ordered     enoxaparin injection 40 mg  Daily         04/04/22 0036     IP VTE HIGH RISK PATIENT  Once         04/04/22 0036     Place sequential compression device  Until discontinued         04/04/22 0036                Discharge Planning   RENARD: 4/8/2022     Code Status: Full Code   Is the patient medically ready for discharge?:     Reason for patient still in hospital (select all that apply): Patient unstable, Patient trending condition and Treatment  Discharge Plan A: Home Health, Home with family (re-est [facility])                  Emre Crawley MD  Department of Hospital Medicine   [facility] - Intensive Care ([place])

## 2022-04-06 NOTE — SUBJECTIVE & OBJECTIVE
Interval History: She feels the same as yesterday, but her amount of pursed lip breathing is improved. Lungs sound the same. Patient's  present. She thinks she will be ready to go home on Friday (4/8/2022).    Review of Systems   Constitutional:  Negative for chills and fever.   Respiratory:  Positive for cough and shortness of breath.    Gastrointestinal:  Negative for nausea and vomiting.   Neurological:  Negative for seizures and syncope.   Objective:     Vital Signs (Most Recent):  Temp: 97.6 °F (36.4 °C) (04/06/22 1124)  Pulse: 103 (04/06/22 1139)  Resp: (!) 33 (04/06/22 1139)  BP: 134/85 (04/06/22 1124)  SpO2: (!) 93 % (04/06/22 1139) Vital Signs (24h Range):  Temp:  [97.6 °F (36.4 °C)-97.9 °F (36.6 °C)] 97.6 °F (36.4 °C)  Pulse:  [] 103  Resp:  [16-35] 33  SpO2:  [93 %-98 %] 93 %  BP: (134-180)/(73-85) 134/85     Weight: 63.5 kg (139 lb 15.9 oz)  Body mass index is 21.29 kg/m².  No intake or output data in the 24 hours ending 04/06/22 1325     Physical Exam  Vitals and nursing note reviewed.   Constitutional:       General: She is not in acute distress.     Appearance: She is well-developed. She is not toxic-appearing or diaphoretic.   Cardiovascular:      Rate and Rhythm: Regular rhythm. Tachycardia present.      Heart sounds: Normal heart sounds. No murmur heard.  Pulmonary:      Effort: Tachypnea present.      Breath sounds: Decreased breath sounds present.      Comments: Purse lipped breathing but less than yesterday  Neurological:      Mental Status: She is alert and oriented to person, place, and time.      Motor: No seizure activity.   Psychiatric:         Attention and Perception: Attention normal.         Mood and Affect: Mood and affect normal.         Behavior: Behavior is cooperative.         Significant Labs:  CBC:  No results for input(s): WBC, HGB, HCT, PLT in the last 48 hours.    CMP:  Recent Labs   Lab 04/05/22  0306 04/06/22  0313    139   K 4.0 3.8   CL 99 99   CO2 28  32*   * 93   BUN 15 18   CREATININE 0.6 0.5   CALCIUM 9.3 9.0   ANIONGAP 11 8   EGFRNONAA >60.0 >60.0

## 2022-04-06 NOTE — RESPIRATORY THERAPY
RAPID RESPONSE RESPIRATORY THERAPY PROACTIVE NOTE           Time of visit: 1136     Code Status: Full Code   : 1950  Bed: 96461/38060 A:   MRN: [id number]  Time spent at the bedside: < 15 min    SITUATION    Evaluated patient for: O2 requirements/assessment.    BACKGROUND    Why is the patient in the hospital?: Chronic obstructive pulmonary disease with acute exacerbation    Patient has a past medical history of Cataract, COPD (chronic obstructive pulmonary disease), COVID-19, History of COVID-19, History of retinal hemorrhage, Lobar pneumonia, Pathological fracture due to osteoporosis, Retinal histoplasmosis, and Secondary pulmonary arterial hypertension.    24 Hours Vitals Range:  Temp:  [97.6 °F (36.4 °C)-97.9 °F (36.6 °C)]   Pulse:  []   Resp:  [16-35]   BP: (134-180)/(73-85)   SpO2:  [93 %-98 %]     Labs:    Recent Labs     22  0556 22  0306 22  0313    138 139   K 3.7 4.0 3.8   CL 96 99 99   CO2 33* 28 32*   CREATININE 0.6 0.6 0.5   * 190* 93   PHOS 2.5* 2.4* 2.6*   MG 2.2  --   --         Recent Labs     22  2200 22  2334 22  0445   PH 7.241* 7.286* 7.386   PCO2 77.3* 69.4* 63.0*   PO2 31* 38* 53   HCO3 33.2* 33.0* 37.8*   POCSATURATED 46* 63* 85*   BE 6 6 13       ASSESSMENT/INTERVENTIONS    Upon arrival in room patient sitting on side of bed, on 3L NC. Pt wears 3L at home so she is at her baseline.Talked with pt and pt's  about home O2 requirements and plan of care.    Last VS   Temp: 97.6 °F (36.4 °C) (1124)  Pulse: 103 (1139)  Resp: 33 (1139)  BP: 134/85 (1124)  SpO2: 93 % (1139)    Level of Consciousness: Level of Consciousness (AVPU): alert  Respiratory Effort: Respiratory Effort: Slight, Short of breath Expansion/Accessory Muscle Usage: Expansion/Accessory Muscles/Retractions: expansion symmetric  All Lung Field Breath Sounds: All Lung Fields Breath Sounds: Anterior:, Lateral:, Posterior:, diminished,  crackles, fine  JAIME Breath Sounds: diminished  LLL Breath Sounds: diminished  RUL Breath Sounds: diminished  RML Breath Sounds: diminished  RLL Breath Sounds: diminished  O2 Device/Concentration: Flow (L/min): 3, Oxygen Concentration (%): 35, O2 Device (Oxygen Therapy): nasal cannula  NIPPV: No Surgical airway: No  ETCO2 monitored:    Ambu at bedside: Ambu bag with the patient?: Yes, Adult Ambu    Active Orders   Respiratory Care    Chest physiotherapy Q6H     Frequency: Q6H     Number of Occurrences: Until Specified     Order Questions:      Indications: ATELECTASIS PROPHYLAXIS    Chest physiotherapy TID     Frequency: TID     Number of Occurrences: Until Specified     Order Questions:      Indications: ATELECTASIS PROPHYLAXIS    Inhalation Treatment Continuous     Frequency: Continuous     Number of Occurrences: Until Specified    Inhalation Treatment Q6H WAKE     Frequency: Q6H WAKE     Number of Occurrences: Until Specified    Oxygen Continuous     Frequency: Continuous     Number of Occurrences: Until Specified     Order Questions:      Device type: Low flow      Device: Nasal Cannula (1- 5 Liters)      LPM: 3      Titrate O2 per Oxygen Titration Protocol: Yes      To maintain SpO2 goal of: >= 90%      Notify MD of: Inability to achieve desired SpO2; Sudden change in patient status and requires 20% increase in FiO2; Patient requires >60% FiO2    Pulse Oximetry Q4H     Frequency: Q4H     Number of Occurrences: Until Specified     Order Comments: Q4h with Vitals. Notify MD if < or = 88%         RECOMMENDATIONS    We recommend: RRT Recs: Continue POC per primary team.    ESCALATION        FOLLOW-UP    Please call back the Rapid Response RTKael RRT at x [phone] for any questions or concerns.

## 2022-04-06 NOTE — ASSESSMENT & PLAN NOTE
Centrilobular emphysema  Secondary pulmonary arterial hypertension  Chronic respiratory failure with hypoxia, on home oxygen therapy  Initially required BiPAP and higher supplemental oxygen. Now on 3 liters/minute but still short of breath. Continue home fluticasone-vilanterol. Giving tiotropium in place of home umeclidinium. Giving 5 days of levofloxacin. Resume azithromycin 3 times a week afterward. Giving oral prednisone. Pulmonology recommended 5 days but should give more than 5 days due to severity. Giving levalbuterol nebulizer treatments.

## 2022-04-06 NOTE — PLAN OF CARE
Patient A&O x4 this shift, vss. Plan of care reviewed with patient and her . Prn pain meds given this afternoon for a headache as well as a stool softener. Patient hoping to go home soon.     Problem: Adult Inpatient Plan of Care  Goal: Optimal Comfort and Wellbeing  Outcome: Ongoing, Progressing  Goal: Readiness for Transition of Care  Outcome: Ongoing, Progressing     Problem: Functional Ability Impaired COPD (Chronic Obstructive Pulmonary Disease)  Goal: Optimal Level of Functional [place]  Outcome: Ongoing, Progressing     Problem: Respiratory Compromise COPD (Chronic Obstructive Pulmonary Disease)  Goal: Effective Oxygenation and Ventilation  Outcome: Ongoing, Progressing

## 2022-04-06 NOTE — PLAN OF CARE
Pt is AOX3,stable, pt had 12 beats of VTach during shift, MD notified see mar for new order. Medicated for pain effective.Safety measures in place.Call light within reach

## 2022-04-07 LAB
ANION GAP SERPL CALC-SCNC: 9 MMOL/L (ref 8–16)
BUN SERPL-MCNC: 16 MG/DL (ref 8–23)
CALCIUM SERPL-MCNC: 8.8 MG/DL (ref 8.7–10.5)
CHLORIDE SERPL-SCNC: 99 MMOL/L (ref 95–110)
CO2 SERPL-SCNC: 32 MMOL/L (ref 23–29)
CREAT SERPL-MCNC: 0.6 MG/DL (ref 0.5–1.4)
EST. GFR  (AFRICAN AMERICAN): >60 ML/MIN/1.73 M^2
EST. GFR  (NON AFRICAN AMERICAN): >60 ML/MIN/1.73 M^2
GLUCOSE SERPL-MCNC: 109 MG/DL (ref 70–110)
PHOSPHATE SERPL-MCNC: 3.1 MG/DL (ref 2.7–4.5)
POTASSIUM SERPL-SCNC: 3.1 MMOL/L (ref 3.5–5.1)
SODIUM SERPL-SCNC: 140 MMOL/L (ref 136–145)

## 2022-04-07 PROCEDURE — 63600175 PHARM REV CODE 636 W HCPCS: Performed by: HOSPITALIST

## 2022-04-07 PROCEDURE — 94761 N-INVAS EAR/PLS OXIMETRY MLT: CPT

## 2022-04-07 PROCEDURE — 94668 MNPJ CHEST WALL SBSQ: CPT

## 2022-04-07 PROCEDURE — 94640 AIRWAY INHALATION TREATMENT: CPT

## 2022-04-07 PROCEDURE — 99900035 HC TECH TIME PER 15 MIN (STAT)

## 2022-04-07 PROCEDURE — 25000242 PHARM REV CODE 250 ALT 637 W/ HCPCS: Performed by: HOSPITALIST

## 2022-04-07 PROCEDURE — 99232 SBSQ HOSP IP/OBS MODERATE 35: CPT | Mod: ,,, | Performed by: STUDENT IN AN ORGANIZED HEALTH CARE EDUCATION/TRAINING PROGRAM

## 2022-04-07 PROCEDURE — 84100 ASSAY OF PHOSPHORUS: CPT | Performed by: HOSPITALIST

## 2022-04-07 PROCEDURE — 94664 DEMO&/EVAL PT USE INHALER: CPT

## 2022-04-07 PROCEDURE — 20600001 HC STEP DOWN PRIVATE ROOM

## 2022-04-07 PROCEDURE — 27000221 HC OXYGEN, UP TO 24 HOURS

## 2022-04-07 PROCEDURE — 25000003 PHARM REV CODE 250: Performed by: HOSPITALIST

## 2022-04-07 PROCEDURE — 36415 COLL VENOUS BLD VENIPUNCTURE: CPT | Performed by: HOSPITALIST

## 2022-04-07 PROCEDURE — 99232 PR SUBSEQUENT HOSPITAL CARE,LEVL II: ICD-10-PCS | Mod: ,,, | Performed by: STUDENT IN AN ORGANIZED HEALTH CARE EDUCATION/TRAINING PROGRAM

## 2022-04-07 PROCEDURE — 80048 BASIC METABOLIC PNL TOTAL CA: CPT | Performed by: HOSPITALIST

## 2022-04-07 PROCEDURE — 25000003 PHARM REV CODE 250: Performed by: STUDENT IN AN ORGANIZED HEALTH CARE EDUCATION/TRAINING PROGRAM

## 2022-04-07 RX ORDER — POTASSIUM CHLORIDE 20 MEQ/1
40 TABLET, EXTENDED RELEASE ORAL ONCE
Status: COMPLETED | OUTPATIENT
Start: 2022-04-07 | End: 2022-04-07

## 2022-04-07 RX ADMIN — LEVALBUTEROL HYDROCHLORIDE 0.63 MG: 0.63 SOLUTION RESPIRATORY (INHALATION) at 03:04

## 2022-04-07 RX ADMIN — MUPIROCIN: 20 OINTMENT TOPICAL at 08:04

## 2022-04-07 RX ADMIN — PREDNISONE 40 MG: 20 TABLET ORAL at 08:04

## 2022-04-07 RX ADMIN — GUAIFENESIN AND DEXTROMETHORPHAN 5 ML: 100; 10 SYRUP ORAL at 12:04

## 2022-04-07 RX ADMIN — GUAIFENESIN AND DEXTROMETHORPHAN 5 ML: 100; 10 SYRUP ORAL at 06:04

## 2022-04-07 RX ADMIN — LEVALBUTEROL HYDROCHLORIDE 0.63 MG: 0.63 SOLUTION RESPIRATORY (INHALATION) at 08:04

## 2022-04-07 RX ADMIN — LEVOFLOXACIN 750 MG: 750 TABLET, FILM COATED ORAL at 08:04

## 2022-04-07 RX ADMIN — POTASSIUM CHLORIDE 40 MEQ: 1500 TABLET, EXTENDED RELEASE ORAL at 10:04

## 2022-04-07 RX ADMIN — ENOXAPARIN SODIUM 40 MG: 100 INJECTION SUBCUTANEOUS at 06:04

## 2022-04-07 RX ADMIN — LEVALBUTEROL HYDROCHLORIDE 0.63 MG: 0.63 SOLUTION RESPIRATORY (INHALATION) at 12:04

## 2022-04-07 RX ADMIN — POLYETHYLENE GLYCOL 3350 17 G: 17 POWDER, FOR SOLUTION ORAL at 08:04

## 2022-04-07 RX ADMIN — PANTOPRAZOLE SODIUM 40 MG: 40 TABLET, DELAYED RELEASE ORAL at 08:04

## 2022-04-07 RX ADMIN — TIOTROPIUM BROMIDE INHALATION SPRAY 2 PUFF: 3.12 SPRAY, METERED RESPIRATORY (INHALATION) at 08:04

## 2022-04-07 RX ADMIN — FLUTICASONE FUROATE AND VILANTEROL TRIFENATATE 1 PUFF: 200; 25 POWDER RESPIRATORY (INHALATION) at 08:04

## 2022-04-07 NOTE — PLAN OF CARE
Pt Aox3, remains on O2 3L NC no s/s distress, pt is stable, denies pain or discomfort. Safety measures in place call light within reach.

## 2022-04-07 NOTE — ASSESSMENT & PLAN NOTE
Centrilobular emphysema  Secondary pulmonary arterial hypertension  Chronic respiratory failure with hypoxia, on home oxygen therapy  Initially required BiPAP and higher supplemental oxygen. Now on 3 liters/minute.  Continue home fluticasone-vilanterol. Giving tiotropium in place of home umeclidinium. Giving 5 days of levofloxacin. Resume azithromycin 3 times a week afterward. Giving oral prednisone. Pulmonology recommended 5 days. Giving levalbuterol nebulizer treatments.

## 2022-04-07 NOTE — PROGRESS NOTES
[facility] - Intensive Care ([address] Medicine  Progress Note    Patient Name: Estefani Fam  MRN: [id number]  Patient Class: IP- Inpatient   Admission Date: 4/3/2022  Length of Stay: 3 days  Attending Physician: Ludwig Santiago*  Primary Care Provider: Primary Doctor No        Subjective:     Principal Problem:Chronic obstructive pulmonary disease with acute exacerbation        HPI:  Estefani Fam is a 71 year old white woman with former smoking (quit in 2017), centrilobular emphysema with chronic obstructive pulmonary disease (COPD), chornic hypoxic respiratory failure on supplemental oxygen (3 liters/minute), secondary pulmonary hypertension, gastroesophageal reflux disease, osteoporosis, history of recurrent right lower lobe pneumonia in November 2021 and February 2022, history of COVID-19 on 1/17/2021, history of retinal histoplasmosis. She lives in [facility]. She is . Her primary care physician is Dr. Miles Jackson. Her pulmonologist is Dr. Brooklynn Ruiz.    She was hospitalized at [facility] from 2/14/2022 to 2/23/2022 for COPD exacerbation and recurrent pneumonia.   She was seen by Dr. Ruiz on 3/7/2022. She reported dysphagia. She was referred to Speech Language Pathology. She underwent modified barium swallow study on 3/14/2022, which showed mild oropharyngeal dysphagia and globus sensation but no aspiration.    She presented to [facility] Emergency Department on 4/3/2022 with worsening shortness of breath, wheezing, cough productive of clear sputum and chest tightness for two days. She has an albuterol allergy and had been using her home levalbuterol nebulizer three times a day without relief. She called emergency medical services because she had a hard time catching her breath around 15:00. She was in acute respiratory distress upon their arrival. She was given intravenous steroid, magnesium, and  0.3 mg of intramuscular epinephrine en route. She was placed on 15 liters/minute of supplemental oxygen by non-rebreather.   In the emergency department, Chest X-ray showed improved right airspace disease compared to 3/3/2022. Venous blood gas showed pH 7.24, pCO2 85 mmHg. She was given three levalbuterol nebulizer treatments. Repeat venous blood gas on BiPAP showed pH 7.28 and pCO2 69. She was transitioned to 3 liters/minute by nasal cannula with oxygen saturation 93%. She reported improvement in shortness of breath. She thinks pollen might have triggered her current COPD exacerbation. She was admitted to Hospital Medicine Team A.      Overview/Hospital Course:  She was put on levalbuterol nebulizer treatments every 8 hours, intravenous methylprednisolone 80 mg every 8 hours, and levofloxacin for 5 days. Pulmonology was consulted and recommended steroid for 5 days, azithromycin 3 times per week, chest physiotherapy, and referral to pulmonary rehab on discharge.       Interval History: Pt feels improved today.  Afebrile. Shortness of breath improved. On home oxygen 3 L    Review of Systems  Objective:     Vital Signs (Most Recent):  Temp: 98.4 °F (36.9 °C) (04/07/22 1200)  Pulse: 101 (04/07/22 1200)  Resp: 20 (04/07/22 1200)  BP: (!) 141/75 (04/07/22 1200)  SpO2: 96 % (04/07/22 1200)   Vital Signs (24h Range):  Temp:  [97.5 °F (36.4 °C)-98.4 °F (36.9 °C)] 98.4 °F (36.9 °C)  Pulse:  [] 101  Resp:  [19-24] 20  SpO2:  [20 %-96 %] 96 %  BP: (141-169)/(66-81) 141/75     Weight: 63.5 kg (139 lb 15.9 oz)  Body mass index is 21.29 kg/m².  No intake or output data in the 24 hours ending 04/07/22 1525   Physical Exam  Constitutional:       General: She is not in acute distress.     Appearance: Normal appearance. She is not toxic-appearing or diaphoretic.   Cardiovascular:      Rate and Rhythm: Normal rate and regular rhythm.      Heart sounds: No murmur heard.    No friction rub. No gallop.   Pulmonary:      Effort: No  respiratory distress.      Breath sounds: No wheezing or rales.      Comments: Reduced breath sounds  Abdominal:      General: Abdomen is flat. There is no distension.      Palpations: Abdomen is soft.      Tenderness: There is no abdominal tenderness. There is no guarding or rebound.   Musculoskeletal:      Right lower leg: No edema.      Left lower leg: No edema.   Neurological:      Mental Status: She is alert.       Computed MELD-Na score unavailable. Necessary lab results were not found in the last year.  Computed MELD score unavailable. Necessary lab results were not found in the last year.    Significant Labs:  CBC:  No results for input(s): WBC, HGB, HCT, PLT in the last 48 hours.  CMP:  Recent Labs   Lab 04/06/22  0313 04/07/22  0551    140   K 3.8 3.1*   CL 99 99   CO2 32* 32*   GLU 93 109   BUN 18 16   CREATININE 0.5 0.6   CALCIUM 9.0 8.8   ANIONGAP 8 9   EGFRNONAA >60.0 >60.0     PTINR:  No results for input(s): INR in the last 48 hours.    Significant Procedures:   Dobutamine Stress Test with Color Flow: No results found for this or any previous visit.      Assessment/Plan:      * Chronic obstructive pulmonary disease with acute exacerbation  Centrilobular emphysema  Secondary pulmonary arterial hypertension  Chronic respiratory failure with hypoxia, on home oxygen therapy  Initially required BiPAP and higher supplemental oxygen. Now on 3 liters/minute.  Continue home fluticasone-vilanterol. Giving tiotropium in place of home umeclidinium. Giving 5 days of levofloxacin. Resume azithromycin 3 times a week afterward. Giving oral prednisone. Pulmonology recommended 5 days. Giving levalbuterol nebulizer treatments.     GERD (gastroesophageal reflux disease)  Continue home pantoprazole.    Chronic respiratory failure with hypoxia, on home oxygen therapy  - due to COPD and on 3 L home oxygen     VTE Risk Mitigation (From admission, onward)         Ordered     enoxaparin injection 40 mg  Daily          04/04/22 0036     IP VTE HIGH RISK PATIENT  Once         04/04/22 0036     Place sequential compression device  Until discontinued         04/04/22 0036                Discharge Planning   RENARD: 4/8/2022     Code Status: Full Code   Is the patient medically ready for discharge?:     Reason for patient still in hospital (select all that apply): Patient trending condition and Treatment  Discharge Plan A: Home Health, Home with family (re-est [facility])          Ludwig Pretty MD  Department of Hospital Medicine   [facility] - Intensive Care ([place]-14)

## 2022-04-07 NOTE — PLAN OF CARE
Plan of care reviewed with patient and spouse. Expected to discharge tomorrow. Pt on 3LNC. Received prn cough medicine twice during this shift, bowel movement reported. No other needs at this time. Bed in lowest position, call light in reach, will continue to monitor.     Problem: Adult Inpatient Plan of Care  Goal: Plan of Care Review  Outcome: Ongoing, Progressing  Goal: Patient-Specific Goal (Individualized)  Outcome: Ongoing, Progressing  Goal: Absence of Hospital-Acquired Illness or Injury  Outcome: Ongoing, Progressing  Goal: Optimal Comfort and Wellbeing  Outcome: Ongoing, Progressing  Goal: Readiness for Transition of Care  Outcome: Ongoing, Progressing     Problem: Adjustment to Illness COPD (Chronic Obstructive Pulmonary Disease)  Goal: Optimal Chronic Illness Coping  Outcome: Ongoing, Progressing     Problem: Functional Ability Impaired COPD (Chronic Obstructive Pulmonary Disease)  Goal: Optimal Level of Functional Worth  Outcome: Ongoing, Progressing     Problem: Infection COPD (Chronic Obstructive Pulmonary Disease)  Goal: Absence of Infection Signs and Symptoms  Outcome: Ongoing, Progressing     Problem: Oral Intake Inadequate COPD (Chronic Obstructive Pulmonary Disease)  Goal: Improved Nutrition Intake  Outcome: Ongoing, Progressing     Problem: Respiratory Compromise COPD (Chronic Obstructive Pulmonary Disease)  Goal: Effective Oxygenation and Ventilation  Outcome: Ongoing, Progressing     Problem: Adjustment to Illness (Sepsis/Septic Shock)  Goal: Optimal Coping  Outcome: Ongoing, Progressing     Problem: Bleeding (Sepsis/Septic Shock)  Goal: Absence of Bleeding  Outcome: Ongoing, Progressing     Problem: Glycemic Control Impaired (Sepsis/Septic Shock)  Goal: Blood Glucose Level Within Desired Range  Outcome: Ongoing, Progressing     Problem: Infection Progression (Sepsis/Septic Shock)  Goal: Absence of Infection Signs and Symptoms  Outcome: Ongoing, Progressing     Problem: Nutrition  Impaired (Sepsis/Septic Shock)  Goal: Optimal Nutrition Intake  Outcome: Ongoing, Progressing

## 2022-04-07 NOTE — SUBJECTIVE & OBJECTIVE
Interval History: Pt feels improved today.  Afebrile. Shortness of breath improved. On home oxygen 3 L    Review of Systems  Objective:     Vital Signs (Most Recent):  Temp: 98.4 °F (36.9 °C) (04/07/22 1200)  Pulse: 101 (04/07/22 1200)  Resp: 20 (04/07/22 1200)  BP: (!) 141/75 (04/07/22 1200)  SpO2: 96 % (04/07/22 1200)   Vital Signs (24h Range):  Temp:  [97.5 °F (36.4 °C)-98.4 °F (36.9 °C)] 98.4 °F (36.9 °C)  Pulse:  [] 101  Resp:  [19-24] 20  SpO2:  [20 %-96 %] 96 %  BP: (141-169)/(66-81) 141/75     Weight: 63.5 kg (139 lb 15.9 oz)  Body mass index is 21.29 kg/m².  No intake or output data in the 24 hours ending 04/07/22 1525   Physical Exam  Constitutional:       General: She is not in acute distress.     Appearance: Normal appearance. She is not toxic-appearing or diaphoretic.   Cardiovascular:      Rate and Rhythm: Normal rate and regular rhythm.      Heart sounds: No murmur heard.    No friction rub. No gallop.   Pulmonary:      Effort: No respiratory distress.      Breath sounds: No wheezing or rales.      Comments: Reduced breath sounds  Abdominal:      General: Abdomen is flat. There is no distension.      Palpations: Abdomen is soft.      Tenderness: There is no abdominal tenderness. There is no guarding or rebound.   Musculoskeletal:      Right lower leg: No edema.      Left lower leg: No edema.   Neurological:      Mental Status: She is alert.       Computed MELD-Na score unavailable. Necessary lab results were not found in the last year.  Computed MELD score unavailable. Necessary lab results were not found in the last year.    Significant Labs:  CBC:  No results for input(s): WBC, HGB, HCT, PLT in the last 48 hours.  CMP:  Recent Labs   Lab 04/06/22  0313 04/07/22  0551    140   K 3.8 3.1*   CL 99 99   CO2 32* 32*   GLU 93 109   BUN 18 16   CREATININE 0.5 0.6   CALCIUM 9.0 8.8   ANIONGAP 8 9   EGFRNONAA >60.0 >60.0     PTINR:  No results for input(s): INR in the last 48  hours.    Significant Procedures:   Dobutamine Stress Test with Color Flow: No results found for this or any previous visit.

## 2022-04-08 VITALS
WEIGHT: 140 LBS | SYSTOLIC BLOOD PRESSURE: 132 MMHG | TEMPERATURE: 98 F | DIASTOLIC BLOOD PRESSURE: 62 MMHG | RESPIRATION RATE: 21 BRPM | HEIGHT: 68 IN | HEART RATE: 97 BPM | BODY MASS INDEX: 21.22 KG/M2 | OXYGEN SATURATION: 94 %

## 2022-04-08 PROCEDURE — 25000242 PHARM REV CODE 250 ALT 637 W/ HCPCS: Performed by: HOSPITALIST

## 2022-04-08 PROCEDURE — 94640 AIRWAY INHALATION TREATMENT: CPT

## 2022-04-08 PROCEDURE — 94668 MNPJ CHEST WALL SBSQ: CPT

## 2022-04-08 PROCEDURE — 63600175 PHARM REV CODE 636 W HCPCS: Performed by: HOSPITALIST

## 2022-04-08 PROCEDURE — 99239 HOSP IP/OBS DSCHRG MGMT >30: CPT | Mod: ,,, | Performed by: STUDENT IN AN ORGANIZED HEALTH CARE EDUCATION/TRAINING PROGRAM

## 2022-04-08 PROCEDURE — 27000221 HC OXYGEN, UP TO 24 HOURS

## 2022-04-08 PROCEDURE — 94761 N-INVAS EAR/PLS OXIMETRY MLT: CPT

## 2022-04-08 PROCEDURE — 63700000 PHARM REV CODE 250 ALT 637 W/O HCPCS: Performed by: STUDENT IN AN ORGANIZED HEALTH CARE EDUCATION/TRAINING PROGRAM

## 2022-04-08 PROCEDURE — 99239 PR HOSPITAL DISCHARGE DAY,>30 MIN: ICD-10-PCS | Mod: ,,, | Performed by: STUDENT IN AN ORGANIZED HEALTH CARE EDUCATION/TRAINING PROGRAM

## 2022-04-08 PROCEDURE — 99900035 HC TECH TIME PER 15 MIN (STAT)

## 2022-04-08 PROCEDURE — 25000003 PHARM REV CODE 250: Performed by: HOSPITALIST

## 2022-04-08 RX ORDER — AZITHROMYCIN 250 MG/1
250 TABLET, FILM COATED ORAL ONCE
Status: COMPLETED | OUTPATIENT
Start: 2022-04-08 | End: 2022-04-08

## 2022-04-08 RX ORDER — GUAIFENESIN/DEXTROMETHORPHAN 100-10MG/5
5 SYRUP ORAL EVERY 4 HOURS PRN
Qty: 118 ML | Refills: 0 | Status: SHIPPED | OUTPATIENT
Start: 2022-04-08 | End: 2022-04-18

## 2022-04-08 RX ORDER — AZITHROMYCIN 250 MG/1
250 TABLET, FILM COATED ORAL
Qty: 36 TABLET | Refills: 3 | Status: SHIPPED | OUTPATIENT
Start: 2022-04-08 | End: 2023-04-17 | Stop reason: SDUPTHER

## 2022-04-08 RX ORDER — PREDNISONE 20 MG/1
40 TABLET ORAL DAILY
Qty: 2 TABLET | Refills: 0 | Status: SHIPPED | OUTPATIENT
Start: 2022-04-08 | End: 2022-04-09

## 2022-04-08 RX ADMIN — AZITHROMYCIN MONOHYDRATE 250 MG: 250 TABLET ORAL at 10:04

## 2022-04-08 RX ADMIN — LEVALBUTEROL HYDROCHLORIDE 0.63 MG: 0.63 SOLUTION RESPIRATORY (INHALATION) at 08:04

## 2022-04-08 RX ADMIN — LEVALBUTEROL HYDROCHLORIDE 0.63 MG: 0.63 SOLUTION RESPIRATORY (INHALATION) at 12:04

## 2022-04-08 RX ADMIN — PANTOPRAZOLE SODIUM 40 MG: 40 TABLET, DELAYED RELEASE ORAL at 08:04

## 2022-04-08 RX ADMIN — FLUTICASONE FUROATE AND VILANTEROL TRIFENATATE 1 PUFF: 200; 25 POWDER RESPIRATORY (INHALATION) at 08:04

## 2022-04-08 RX ADMIN — LEVOFLOXACIN 750 MG: 750 TABLET, FILM COATED ORAL at 08:04

## 2022-04-08 RX ADMIN — TIOTROPIUM BROMIDE INHALATION SPRAY 2 PUFF: 3.12 SPRAY, METERED RESPIRATORY (INHALATION) at 08:04

## 2022-04-08 RX ADMIN — PREDNISONE 40 MG: 20 TABLET ORAL at 08:04

## 2022-04-08 RX ADMIN — MUPIROCIN: 20 OINTMENT TOPICAL at 08:04

## 2022-04-08 RX ADMIN — GUAIFENESIN AND DEXTROMETHORPHAN 5 ML: 100; 10 SYRUP ORAL at 08:04

## 2022-04-08 NOTE — PLAN OF CARE
[facility] - Intensive Care ([facility]-14)  Discharge Final Note    Primary Care Provider: Primary Doctor No    Expected Discharge Date: 4/8/2022    Final Discharge Note (most recent)       Final Note - 04/08/22 1545          Final Note    Assessment Type Final Discharge Note (P)      Anticipated Discharge Disposition Home-Health Care Svc (P)      Hospital Resources/Appts/Education Provided Provided patient/caregiver with written discharge plan information;Appointments scheduled and added to AVS (P)                      Important Message from Medicare  Important Message from Medicare regarding Discharge Appeal Rights: Given to patient/caregiver, Explained to patient/caregiver, Signed/date by patient/caregiver     Date IMM was signed: 04/08/22  Time IMM was signed: 0932    Patient discharging home today with Home health services through , [facility].    Future Appointments   Date Time Provider Department Center   4/13/2022  1:30 PM Celi Bailey PA-C [facility]   4/22/2022  2:15 PM [facility]   4/28/2022  2:30 PM Dustin Khan MD [facility] W     Melissa Rivera LMSW  [facility]   [phone]

## 2022-04-08 NOTE — NURSING
Patient discharged to home in stable condition with belongings. IV and tele removed. Prescriptions delivered by pharmacy. Accompanied to main entrance via wheelchair by this nurse. No distress noted.

## 2022-04-08 NOTE — PLAN OF CARE
Patient discharging home today with HH. SW sent orders to [facility]. SW will continue to follow up.        Melissa Rivera LMSW  [facility]   [phone]

## 2022-04-08 NOTE — PLAN OF CARE
Pt  AOX3, remains on O2 3L NC, stable no s/s distress.Denies pain or discomfort.Safety measures in place call light in reach.

## 2022-04-09 NOTE — DISCHARGE SUMMARY
[facility] - Intensive Care ([address] Medicine  Discharge Summary      Patient Name: Estefani Fam  MRN: [id number]  Patient Class: IP- Inpatient  Admission Date: 4/3/2022  Hospital Length of Stay: 4 days  Discharge Date and Time: 4/8/22  Attending Physician: Lenore att. providers found   Discharging Provider: Ludwig Pretty MD  Primary Care Provider: Primary Doctor [facility] Medicine Team: [facility] HOSP MED A Ludwig Pretty MD    HPI:   Estefani Fam is a 71 year old white woman with former smoking (quit in 2017), centrilobular emphysema with chronic obstructive pulmonary disease (COPD), chornic hypoxic respiratory failure on supplemental oxygen (3 liters/minute), secondary pulmonary hypertension, gastroesophageal reflux disease, osteoporosis, history of recurrent right lower lobe pneumonia in November 2021 and February 2022, history of COVID-19 on 1/17/2021, history of retinal histoplasmosis. She lives in [facility]. She is . Her primary care physician is Dr. Miles Jackson. Her pulmonologist is Dr. Brooklynn Ruiz.    She was hospitalized at [facility] from 2/14/2022 to 2/23/2022 for COPD exacerbation and recurrent pneumonia.   She was seen by Dr. Ruiz on 3/7/2022. She reported dysphagia. She was referred to Speech Language Pathology. She underwent modified barium swallow study on 3/14/2022, which showed mild oropharyngeal dysphagia and globus sensation but no aspiration.    She presented to [facility] Emergency Department on 4/3/2022 with worsening shortness of breath, wheezing, cough productive of clear sputum and chest tightness for two days. She has an albuterol allergy and had been using her home levalbuterol nebulizer three times a day without relief. She called emergency medical services because she had a hard time catching her breath around 15:00. She was in acute respiratory distress upon their  arrival. She was given intravenous steroid, magnesium, and 0.3 mg of intramuscular epinephrine en route. She was placed on 15 liters/minute of supplemental oxygen by non-rebreather.           Hospital Course:   In the emergency department, Chest X-ray showed improved right airspace disease compared to 3/3/2022. Venous blood gas showed pH 7.24, pCO2 85 mmHg. She was given three levalbuterol nebulizer treatments. Repeat venous blood gas on BiPAP showed pH 7.28 and pCO2 69. She was transitioned to 3 liters/minute by nasal cannula with oxygen saturation 93%. She reported improvement in shortness of breath. She thinks pollen might have triggered her current COPD exacerbation. She was admitted to Hospital Medicine Team A. She was put on levalbuterol nebulizer treatments every 8 hours, intravenous methylprednisolone 80 mg every 8 hours, and levofloxacin for 5 days. Pulmonology was consulted and recommended steroid for 5 days, azithromycin 3 times per week, chest physiotherapy, and referral to pulmonary rehab on discharge. She improved and was weaned to her home oxygen requirements. She felt better. Patient deemed ready for discharge. Plan discussed with pt, who was agreeable and amenable; medications were discussed and reviewed, outpatient follow-up arranged, ER precautions were given, all questions were answered to the pt's satisfaction, and Estefani Fam  was subsequently discharged.       Goals of Care Treatment Preferences:  Code Status: Full Code    Physical Exam  Constitutional:       General: She is not in acute distress.     Appearance: Normal appearance. She is not toxic-appearing or diaphoretic.   Cardiovascular:      Rate and Rhythm: Normal rate and regular rhythm.      Heart sounds: No murmur heard.    No friction rub. No gallop.   Pulmonary:      Effort: No respiratory distress.      Breath sounds: No wheezing or rales.   Abdominal:      General: Abdomen is flat. There is no distension.      Palpations:  Abdomen is soft.      Tenderness: There is no abdominal tenderness. There is no guarding or rebound.   Musculoskeletal:      Right lower leg: No edema.      Left lower leg: No edema.   Neurological:      Mental Status: She is alert.     Consults:   Consults (From admission, onward)        Status Ordering Provider     Inpatient consult to Pulmonology  Once        Provider:  (Not yet assigned)    Completed ARVIN RHOADES          No new Assessment & Plan notes have been filed under this hospital service since the last note was generated.  Service: Hospital Medicine    Final Active Diagnoses:    Diagnosis Date Noted POA    PRINCIPAL PROBLEM:  Chronic obstructive pulmonary disease with acute exacerbation [J44.1] 12/28/2018 Yes    GERD (gastroesophageal reflux disease) [K21.9] 04/04/2022 Yes     Chronic    Centrilobular emphysema [J43.2] 06/20/2019 Yes     Chronic    Chronic respiratory failure with hypoxia, on home oxygen therapy [J96.11, Z99.81] 03/23/2019 Not Applicable     Chronic    Secondary pulmonary arterial hypertension [I27.21] 07/03/2018 Yes     Chronic      Problems Resolved During this Admission:    Diagnosis Date Noted Date Resolved POA    Acute respiratory failure with hypoxia and hypercarbia [J96.01, J96.02] 04/04/2022 04/05/2022 Yes       Discharged Condition: good    Disposition: Home or Self Care    Follow Up:    Patient Instructions:      Ambulatory referral/consult to Outpatient Case Management   Referral Priority: Routine Referral Type: Consultation   Referral Reason: Specialty Services Required   Number of Visits Requested: 1     Notify your health care provider if you experience any of the following:  temperature >100.4     Notify your health care provider if you experience any of the following:  persistent nausea and vomiting or diarrhea     Notify your health care provider if you experience any of the following:  severe uncontrolled pain     Notify your health care provider if you  experience any of the following:  difficulty breathing or increased cough     Notify your health care provider if you experience any of the following:  severe persistent headache     Notify your health care provider if you experience any of the following:  worsening rash     Notify your health care provider if you experience any of the following:  increased confusion or weakness     Notify your health care provider if you experience any of the following:  persistent dizziness, light-headedness, or visual disturbances     Activity as tolerated       Significant Diagnostic Studies: Labs: All labs within the past 24 hours have been reviewed    Pending Diagnostic Studies:     None         Medications:  Reconciled Home Medications:      Medication List      START taking these medications    predniSONE 20 MG tablet  Commonly known as: DELTASONE  Take 2 tablets (40 mg total) by mouth once daily. for 1 day     SILTUSSIN-DM  mg/5 mL liquid  Generic drug: dextromethorphan-guaiFENesin  mg/5 ml  Take 5 mLs by mouth every 4 (four) hours as needed (Cough).        CONTINUE taking these medications    acetylcysteine 600 mg Cap  Take 1 capsule (600 mg total) by mouth 2 (two) times daily.     ascorbic acid (vitamin C) 500 MG tablet  Commonly known as: VITAMIN C  Take 500 mg by mouth 2 (two) times daily.     azelastine 137 mcg (0.1 %) nasal spray  Commonly known as: ASTELIN  INHALE 1 SPRAY BY NASAL ROUTE TWICE DAILY OR AS DIRECTED     azithromycin 250 MG tablet  Commonly known as: Z-JERSEY  Take 1 tablet (250 mg total) by mouth every Mon, Wed, Fri.     BREO ELLIPTA 200-25 mcg/dose Dsdv diskus inhaler  Generic drug: fluticasone furoate-vilanteroL  INHALE 1 PUFF INTO THE LUNGS DAILY     calcium carbonate 600 mg calcium (1,500 mg) Tab  Commonly known as: OS-STEPHANIE  Take 1 tablet (600 mg total) by mouth once daily. Take Levofloxacin antibiotic at least 2 hours before calcium.     fluticasone propionate 50 mcg/actuation nasal  spray  Commonly known as: FLONASE  INSTILL 1 SPRAY IN EACH NOSTRIL EVERY DAY     INCRUSE ELLIPTA 62.5 mcg/actuation inhalation capsule  Generic drug: umeclidinium  Inhale 62.5 mcg into the lungs once daily.     levalbuterol 0.63 mg/3 mL nebulizer solution  Commonly known as: XOPENEX  USE 1 VIAL IN NEBULIZER EVERY 8 HOURS AS NEEDED FOR WHEEZE     multivit-iron-min-folic acid 3,500-18-0.4 unit-mg-mg Chew  Commonly known as: CENTRUM  Take 1 tablet by mouth once daily. Take Levofloxacin antibiotic at least 2 hours before multivitamin.     pantoprazole 40 MG tablet  Commonly known as: PROTONIX  Take 1 tablet (40 mg total) by mouth once daily.     pulse oximeter device  Commonly known as: pulse oximeter  by Apply Externally route 2 (two) times a day. Use twice daily at 8 AM and 3 PM and record the value in [employer] as directed.     sodium chloride 0.65 % nasal spray  Commonly known as: OCEAN  1 spray by Nasal route 2 (two) times daily.        STOP taking these medications    furosemide 20 MG tablet  Commonly known as: LASIX            Indwelling Lines/Drains at time of discharge:   Lines/Drains/Airways     None                 Time spent on the discharge of patient: 35 minutes         Ludwig Pretty MD  Department of Hospital Medicine  [facility] - Intensive Care ([place]-14)

## 2022-04-11 ENCOUNTER — OUTPATIENT CASE MANAGEMENT (OUTPATIENT)
Dept: ADMINISTRATIVE | Facility: OTHER | Age: 72
End: 2022-04-11
Payer: MEDICARE

## 2022-04-11 NOTE — PROGRESS NOTES
Outpatient Care Management   - Low Risk Patient Assessment    Patient: Estefani Fam  MRN:  [id number]  Date of Service:  4/11/2022  Completed by:  Magalie Farley LCSW  Referral Date: 04/04/2022  Program: OPCM Low Risk    Reason for Visit   Patient presents with    Social Work Assessment - Low/Mod Risk    Case Closure     Brief Summary:  received a referral to contact patient from [facility] inpatient care management. Sw completed low risk assessment with Pt via telephone. Pt is 71 yrs old retired female. Pt reports living with spouse and reports being independent with ADLs. Pt denies difficulty affording food, housing, medications or medical care. Pt reports spouse provides transportation to medical appointments. Pt has appointment to establish care with PCP on 4/28. Pt denies needing additional resources at this time. Pt reports she has not been contacted with [facility] for intake. Sw offered to contact [facility] to obtain SOC.     Sw spoke to staff with [facility]  and they indicated that Pt would be contacted today for intake visit tomorrow. Amee called Pt and provided her with update. No other concerns identified by Pt. Low/mod risk case closed.    Patient Summary     OPCM Social Work Assessment (Low/Moderate Risk)    General  Level of Caregiver support: Member independent and does not need caregiver assistance  Have you had to make a decision between paying for any of the following in the last 2 months?: None  Transportation means: Family  Employment status: Retired and not working  Do you have any of the following?: Medical power of   Current symptoms: None  Assessments  Was the PHQ Depression Screening completed this visit?: No  Was the MARY-7 Screening completed this visit?: No         Complex Care Plan     Care plan was discussed and completed today with input from patient and/or caregiver.    Patient Instructions     No follow-ups on file.    Todays OPCM Self-Management  Care Plan was developed with the patients/caregivers input and was based on identified barriers from todays assessment.  Goals were written today with the patient/caregiver and the patient has agreed to work towards these goals to improve his/her overall well-being. Patient verbalized understanding of the care plan, goals, and all of today's instructions. Encouraged patient/caregiver to communicate with his/her physician and health care team about health conditions and the treatment plan.  Provided my contact information today and encouraged patient/caregiver to call me with any questions as needed.

## 2022-04-21 ENCOUNTER — TELEPHONE (OUTPATIENT)
Dept: PULMONOLOGY | Facility: CLINIC | Age: 72
End: 2022-04-21
Payer: MEDICARE

## 2022-04-21 DIAGNOSIS — J43.2 CENTRILOBULAR EMPHYSEMA: Primary | ICD-10-CM

## 2022-04-21 NOTE — TELEPHONE ENCOUNTER
71 year old with severe, end stage emphysema.  Some dysphagia.  Frequent hospitalizations for dyspnea and treated for COPD exacerbation.  For the past couple of days she complains of shortness of breath and home health stated that her CO2 was elevated, oxygen was normal.  This sensation is what has caused the last two hospital visits.  Talked to Mrs Fam and she said that when she is short of breath, it makes her extremely anxious.  I believe she would benefit from palliative care evaluation for severe, end stage COPD.  I explained the benefits of palliative care to alleviate the anxiety provoking feeling of dyspnea.  She verbalized an understanding and agrees to the consult.  I have left a message with home health explaining my recommendations.

## 2022-04-22 ENCOUNTER — INFUSION (OUTPATIENT)
Dept: INFECTIOUS DISEASES | Facility: HOSPITAL | Age: 72
End: 2022-04-22
Attending: INTERNAL MEDICINE
Payer: MEDICARE

## 2022-04-22 VITALS
RESPIRATION RATE: 22 BRPM | DIASTOLIC BLOOD PRESSURE: 66 MMHG | SYSTOLIC BLOOD PRESSURE: 144 MMHG | HEIGHT: 68 IN | BODY MASS INDEX: 21.68 KG/M2 | WEIGHT: 143.06 LBS

## 2022-04-22 DIAGNOSIS — M80.80XA PATHOLOGICAL FRACTURE DUE TO OSTEOPOROSIS, UNSPECIFIED FRACTURE SITE, UNSPECIFIED OSTEOPOROSIS TYPE, INITIAL ENCOUNTER: Primary | ICD-10-CM

## 2022-04-22 DIAGNOSIS — M81.6 LOCALIZED OSTEOPOROSIS OF LEQUESNE: ICD-10-CM

## 2022-04-22 DIAGNOSIS — M81.0 OSTEOPOROSIS, UNSPECIFIED OSTEOPOROSIS TYPE, UNSPECIFIED PATHOLOGICAL FRACTURE PRESENCE: ICD-10-CM

## 2022-04-22 PROCEDURE — 63600175 PHARM REV CODE 636 W HCPCS: Mod: JG | Performed by: PHYSICIAN ASSISTANT

## 2022-04-22 PROCEDURE — 96372 THER/PROPH/DIAG INJ SC/IM: CPT

## 2022-04-22 RX ADMIN — DENOSUMAB 60 MG: 60 INJECTION SUBCUTANEOUS at 02:04

## 2022-04-22 NOTE — PROGRESS NOTES
Patient received prolia 60mg injection sub-Q in left arm. Patient tolerated injection well, left in NAD.     Patient is taking vitamin D supplement.

## 2022-04-26 PROCEDURE — G0179 MD RECERTIFICATION HHA PT: HCPCS | Mod: ,,, | Performed by: INTERNAL MEDICINE

## 2022-04-26 PROCEDURE — G0179 PR HOME HEALTH MD RECERTIFICATION: ICD-10-PCS | Mod: ,,, | Performed by: INTERNAL MEDICINE

## 2022-04-28 ENCOUNTER — OFFICE VISIT (OUTPATIENT)
Dept: INTERNAL MEDICINE | Facility: CLINIC | Age: 72
End: 2022-04-28
Payer: MEDICARE

## 2022-04-28 VITALS
HEIGHT: 68 IN | OXYGEN SATURATION: 94 % | SYSTOLIC BLOOD PRESSURE: 138 MMHG | HEART RATE: 70 BPM | DIASTOLIC BLOOD PRESSURE: 88 MMHG | WEIGHT: 143 LBS | BODY MASS INDEX: 21.67 KG/M2

## 2022-04-28 DIAGNOSIS — J44.1 CHRONIC OBSTRUCTIVE PULMONARY DISEASE WITH ACUTE EXACERBATION: ICD-10-CM

## 2022-04-28 DIAGNOSIS — F41.9 ANXIETY: Primary | ICD-10-CM

## 2022-04-28 PROCEDURE — 99214 OFFICE O/P EST MOD 30 MIN: CPT | Mod: PBBFAC | Performed by: INTERNAL MEDICINE

## 2022-04-28 PROCEDURE — 99215 PR OFFICE/OUTPT VISIT, EST, LEVL V, 40-54 MIN: ICD-10-PCS | Mod: S$PBB,,, | Performed by: INTERNAL MEDICINE

## 2022-04-28 PROCEDURE — 99215 OFFICE O/P EST HI 40 MIN: CPT | Mod: S$PBB,,, | Performed by: INTERNAL MEDICINE

## 2022-04-28 PROCEDURE — 99999 PR PBB SHADOW E&M-EST. PATIENT-LVL IV: CPT | Mod: PBBFAC,,, | Performed by: INTERNAL MEDICINE

## 2022-04-28 PROCEDURE — 99999 PR PBB SHADOW E&M-EST. PATIENT-LVL IV: ICD-10-PCS | Mod: PBBFAC,,, | Performed by: INTERNAL MEDICINE

## 2022-04-28 RX ORDER — CITALOPRAM 10 MG/1
10 TABLET ORAL DAILY
Qty: 30 TABLET | Refills: 11 | Status: SHIPPED | OUTPATIENT
Start: 2022-04-28 | End: 2022-06-01

## 2022-04-28 NOTE — PROGRESS NOTES
"      CHIEF COMPLAINT     Chief Complaint   Patient presents with    [facility]     Estefani Fam is a 71 y.o. female end-stage COPD with chronic respiratory failure mixed, pulmonary artery hypertension anxiety here today for new patient visit    Recently hospitalized for COPD exacerbation earlier this month.  Followed by Pulmonary.  She is on Lama Laba and ICS inhalers.  She is on chronic oxygen    Dyspnea with anxiety  Per  she has been anxious her whole life is her COPD has worsened she has become more anxious.  Reports that she gets breathy with minimal activity.  Dyspneic walking to bathroom, purses lips with resting breath denies weight loss    Personally Reviewed Patient's Medical, surgical, family and social hx. Changes updated in [facility].  Care Team updated in Epic    Review of Systems:  Review of Systems   Respiratory: Positive for shortness of breath.    Psychiatric/Behavioral: The patient is nervous/anxious.        Health Maintenance:   Reviewed with patient  Due for the following:      PHYSICAL EXAM     /88   Pulse 70   Ht 5' 8" (1.727 m)   Wt 64.9 kg (143 lb)   SpO2 (!) 94%   BMI 21.74 kg/m²     Gen:  Chronically ill-appearing, NAD  HEENT: PERR, EOMI  Neck: FROM, no thyromegaly, no cervical adenopathy  CVD: RRR, no M/R/G  Pulm: Normal work of breathing, CTAB, no wheezing  Abd:  Soft, NT, ND non TTP, no mass  MSK: no LE edema  Neuro: A&Ox3, gait normal, speech normal  Mood; Mood normal, behavior normal, thought process linear       LABS     Labs reviewed;  Lab Results   Component Value Date    WBC 4.70 04/04/2022    HGB 13.3 04/04/2022    HCT 43.3 04/04/2022    MCV 94 04/04/2022     04/04/2022         Chemistry        Component Value Date/Time     04/07/2022 0551    K 3.1 (L) 04/07/2022 0551    CL 99 04/07/2022 0551    CO2 32 (H) 04/07/2022 0551    BUN 16 04/07/2022 0551    CREATININE 0.6 04/07/2022 0551     04/07/2022 0551        Component Value " Date/Time    CALCIUM 8.8 04/07/2022 0551    ALKPHOS 90 04/03/2022 2041    AST 21 04/03/2022 2041    ALT 16 04/03/2022 2041    BILITOT 0.2 04/03/2022 2041    ESTGFRAFRICA >60.0 04/07/2022 0551    EGFRNONAA >60.0 04/07/2022 0551        PFT 2019  FEV1-FVC 36    ASSESSMENT     1. Anxiety  citalopram (CELEXA) 10 MG tablet   2. Chronic obstructive pulmonary disease with acute exacerbation             Plan     Estefani Fam is a 71 y.o. female with end-stage COPD with chronic respiratory failure mixed, pulmonary artery hypertension anxiety  1. Anxiety  Sounds like underlying anxiety disorder exacerbated by worsening respiratory status.  Going to undergo therapeutic trial of SSRI.  Think she may benefit from additional treatment for air hunger.  - citalopram (CELEXA) 10 MG tablet; Take 1 tablet (10 mg total) by mouth once daily.  Dispense: 30 tablet; Refill: 11    2. Chronic obstructive pulmonary disease with acute exacerbation  Advanced disease  Getting ready to begin pulmonary rehab  Will be evaluated by palliative Care to help Treat comorbid air hunger    >40 minutes of professional services provided as part of today's visit.    F/u 6 weeks.  Dustin Khan MD

## 2022-04-30 ENCOUNTER — EXTERNAL CHRONIC CARE MANAGEMENT (OUTPATIENT)
Dept: PRIMARY CARE CLINIC | Facility: CLINIC | Age: 72
End: 2022-04-30
Payer: MEDICARE

## 2022-04-30 PROCEDURE — 99490 CHRNC CARE MGMT STAFF 1ST 20: CPT | Mod: PBBFAC | Performed by: FAMILY MEDICINE

## 2022-04-30 PROCEDURE — 99490 CHRNC CARE MGMT STAFF 1ST 20: CPT | Mod: S$PBB,,, | Performed by: FAMILY MEDICINE

## 2022-04-30 PROCEDURE — 99490 PR CHRONIC CARE MGMT, 1ST 20 MIN: ICD-10-PCS | Mod: S$PBB,,, | Performed by: FAMILY MEDICINE

## 2022-05-05 ENCOUNTER — DOCUMENT SCAN (OUTPATIENT)
Dept: HOME HEALTH SERVICES | Facility: HOSPITAL | Age: 72
End: 2022-05-05
Payer: MEDICARE

## 2022-05-06 ENCOUNTER — PATIENT OUTREACH (OUTPATIENT)
Dept: HOME HEALTH SERVICES | Facility: HOSPITAL | Age: 72
End: 2022-05-06
Payer: MEDICARE

## 2022-05-13 ENCOUNTER — DOCUMENT SCAN (OUTPATIENT)
Dept: HOME HEALTH SERVICES | Facility: HOSPITAL | Age: 72
End: 2022-05-13
Payer: MEDICARE

## 2022-05-17 ENCOUNTER — HOSPITAL ENCOUNTER (EMERGENCY)
Facility: HOSPITAL | Age: 72
Discharge: HOME | End: 2022-05-17
Attending: EMERGENCY MEDICINE
Payer: MEDICARE

## 2022-05-17 VITALS
DIASTOLIC BLOOD PRESSURE: 76 MMHG | RESPIRATION RATE: 16 BRPM | HEART RATE: 97 BPM | SYSTOLIC BLOOD PRESSURE: 144 MMHG | BODY MASS INDEX: 21.74 KG/M2 | OXYGEN SATURATION: 95 % | WEIGHT: 143 LBS | TEMPERATURE: 99 F

## 2022-05-17 DIAGNOSIS — S69.92XA WRIST INJURY, LEFT, INITIAL ENCOUNTER: ICD-10-CM
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
## 2022-05-17 PROCEDURE — 96374 THER/PROPH/DIAG INJ IV PUSH: CPT

## 2022-05-17 PROCEDURE — 25000003 PHARM REV CODE 250: Performed by: EMERGENCY MEDICINE

## 2022-05-17 PROCEDURE — 63600175 PHARM REV CODE 636 W HCPCS: Performed by: STUDENT IN AN ORGANIZED HEALTH CARE EDUCATION/TRAINING PROGRAM

## 2022-05-17 PROCEDURE — 99284 EMERGENCY DEPT VISIT MOD MDM: CPT | Mod: 25

## 2022-05-17 PROCEDURE — 86803 HEPATITIS C AB TEST: CPT | Performed by: EMERGENCY MEDICINE

## 2022-05-17 PROCEDURE — 99285 EMERGENCY DEPT VISIT HI MDM: CPT | Mod: ,,, | Performed by: EMERGENCY MEDICINE

## 2022-05-17 PROCEDURE — 99285 PR EMERGENCY DEPT VISIT,LEVEL V: ICD-10-PCS | Mod: ,,, | Performed by: EMERGENCY MEDICINE

## 2022-05-17 RX ORDER — LORAZEPAM 2 MG/ML
1 INJECTION INTRAMUSCULAR
Status: COMPLETED | OUTPATIENT
Start: 2022-05-17 | End: 2022-05-17

## 2022-05-17 RX ORDER — ACETAMINOPHEN 500 MG
1000 TABLET ORAL
Status: COMPLETED | OUTPATIENT
Start: 2022-05-17 | End: 2022-05-17

## 2022-05-17 RX ORDER — LIDOCAINE HYDROCHLORIDE 10 MG/ML
10 INJECTION, SOLUTION EPIDURAL; INFILTRATION; INTRACAUDAL; PERINEURAL
Status: COMPLETED | OUTPATIENT
Start: 2022-05-17 | End: 2022-05-17

## 2022-05-17 RX ADMIN — LIDOCAINE HYDROCHLORIDE 100 MG: 10 INJECTION, SOLUTION EPIDURAL; INFILTRATION; INTRACAUDAL at 05:05

## 2022-05-17 RX ADMIN — ACETAMINOPHEN 1000 MG: 500 TABLET ORAL at 04:05

## 2022-05-17 RX ADMIN — LORAZEPAM 1 MG: 2 INJECTION INTRAMUSCULAR; INTRAVENOUS at 05:05

## 2022-05-17 NOTE — FIRST PROVIDER EVALUATION
Medical screening exam completed.  I have conducted a focused provider triage encounter, findings are as follows:    Brief history of present illness:  Wrist injury, on home o2     There were no vitals filed for this visit.    Pertinent physical exam:  Tachypneic, wrist swelling    Brief workup plan:  Wrist, forearm xray    Preliminary workup initiated; this workup will be continued and followed by the physician or advanced practice provider that is assigned to the patient when roomed.

## 2022-05-17 NOTE — ED NOTES
Splint applied to left forearm per orthopedics  Left fingers warm to touch, moveable with good capillary refill noted upon blanching nailbeds

## 2022-05-17 NOTE — ED PROVIDER NOTES
Encounter Date: 5/17/2022       History     Chief Complaint   Patient presents with    Wrist Injury     Mechanical trip and fall last night, injured left wrist. Pt. Also hx. Of COPD and on 3L home O2. Pt. Presents with O2 80% on 3L.      HPI   71-year-old female with past medical history of severe COPD on 3 L of home O2 coming in with a mechanical trip and fall yesterday.  She says she lost her footing causing her to fall bracing with her left arm.  She has no other pain complaints she denies hitting her head or passing out, no neck or back pain, no chest pain abdominal pain no bilateral lower extremity pain or symptoms no right upper extremity pain or symptoms.  She has been ambulatory since the fall.  She came in for her wrist pain.  She took a NSAID earlier today.    She initially denies any new shortness of breath this MD states that she was accidentally on 2 L instead of 3 L earlier, when questioned about the way she is breathing she says she is feeling nervous and often uses pursed lip breathing when she is nervous and or in pain.      Review of patient's allergies indicates:   Allergen Reactions    Albuterol     Ipratropium analogues      Difficulty breathing     Past Medical History:   Diagnosis Date    Cataract     COPD (chronic obstructive pulmonary disease)     COVID-19     History of COVID-19 1/17/2021    Still with mild dyspnea. Encouraged return to pulmonary rehab Recommend scheduling vaccination when appointment is available.     History of retinal hemorrhage     Lobar pneumonia 11/14/2021    Recurrent in RLL, no endobronchial lesion Needs speech evaluation and MBSS for recurrent aspiration in setting of dysphagia  Will need pulmonary rehab at [facility].    Pathological fracture due to osteoporosis 7/6/2020    Retinal histoplasmosis     Secondary pulmonary arterial hypertension 7/3/2018    Secondary to emphysema and chronic respiratory failure, mild.     Past Surgical History:   Procedure  Laterality Date    BRONCHOSCOPY WITH FLUOROSCOPY N/A 10/28/2019    Procedure: BRONCHOSCOPY, WITH FLUOROSCOPY;  Surgeon: Brooklynn Ruiz MD;  Location: [facility] OR [address] [place] [address];  Service: Endoscopy;  Laterality: N/A;    HAND SURGERY       Family History   Problem Relation Age of Onset    Macular degeneration Mother     Colon cancer Mother     Stroke Father     Colon cancer Father     Amblyopia Neg Hx     Blindness Neg Hx     Cataracts Neg Hx     Diabetes Neg Hx     Glaucoma Neg Hx     Hypertension Neg Hx     Retinal detachment Neg Hx     Strabismus Neg Hx     Thyroid disease Neg Hx      Social History     Tobacco Use    Smoking status: Former Smoker     Packs/day: 1.00     Years: 36.00     Pack years: 36.00     Types: Cigarettes     Quit date: 2016     Years since quittin.7    Smokeless tobacco: Never Used    Tobacco comment: pt states she does not remember date   Substance Use Topics    Alcohol use: Yes     Alcohol/week: 2.0 standard drinks     Types: 2 Glasses of wine per week     Comment: 1-2 glasses a day     Drug use: No     Review of Systems    Constitutional:  No Fever, No Chills,   Eyes: No Vision Changes  ENT/Mouth: No sore throat, No rhinorrhea  Cardiovascular:  No Chest Pain, No Palpitations  Respiratory:  No Cough, No new SOB  Gastrointestinal:  No Nausea, No Vomiting, No Diarrhea, No abdo pain.  Genitourinary:  No  pain, No dysuria   Musculoskeletal:  Sided wrist pain and swelling, No Back Pain, No Neck Pain, No recent trauma.  Skin:  No skin Lesions  Neuro:  No Weakness, No Numbness, No Dizziness, No Headache        Physical Exam     Initial Vitals [22 1529]   BP Pulse Resp Temp SpO2   (!) 180/86 (!) 114 (!) 28 98.3 °F (36.8 °C) (!) 80 %      MAP       --         Physical Exam    Physical Exam:  CONSTITUTIONAL: Well developed, well nourished, in no acute distress.  HENT: Normocephalic, atraumatic    EYES: Sclerae anicteric   NECK: Supple, no thyroid  enlargement  CARDIOVASCULAR: Regular rate and rhythm without any murmurs, gallops, rubs.  RESPIRATORY: Speaking in full sentences.  Mildly tachypneic and breathing with pursed lips, Auscultation of the lungs revealed poor air entry bilaterally however, no wheezing, no rales, no rhonchi.  ABDOMEN: Soft and nontender, no masses, no rebound or guarding   NEUROLOGIC: Alert, interacting normally. No facial droop.  Distally the left upper extremity is neurovascular intact.  MSK:  There is no C, T or L-spine tenderness.  Right upper extremity and bilateral lower extremities are without deformity or tenderness.  Normal range of motion.  Left upper extremity with dinner fork deformity at the wrist with some erythema over the dorsum of the skin wrist/distal forearm.  There is no deformity or tenderness to palpation to the proximal forearm, elbow, humerus, shoulder.  Moving all four extremities.  Skin: Warm and dry. No visible rash on exposed areas of skin.    Psych: Mood and affect normal.       ED Course   Procedures  Labs Reviewed   HEPATITIS C ANTIBODY          Imaging Results          X-Ray Wrist Complete Left (Final result)  Result time 05/17/22 19:58:52    Final result by Chucky Crawford MD (05/17/22 19:58:52)                 Impression:      As above.      Electronically signed by: Chucky Crawford MD  Date:    05/17/2022  Time:    19:58             Narrative:    EXAMINATION:  XR WRIST COMPLETE 3 VIEWS LEFT    CLINICAL HISTORY:  post reduction;    TECHNIQUE:  PA, lateral, and oblique views of the left wrist were performed.    COMPARISON:  Left hand and wrist series earlier same day    FINDINGS:  Status post interval closed reduction of previously described acute fracture of the distal radius with suspected additional tiny chip fracture of the ulnar styloid tip, demonstrating overall anatomic positioning and alignment.  No new displaced fracture or dislocation.  Overlying cast material may obscure fine bony detail.   Otherwise no change.                               X-Ray Hand 3 View Left (Final result)  Result time 05/17/22 16:22:34    Final result by Chucky Crawford MD (05/17/22 16:22:34)                 Impression:      Distal radial acute fracture, as above.    Ulna styloid tip osseous spur versus fracture.      Electronically signed by: Chucky Crawford MD  Date:    05/17/2022  Time:    16:22             Narrative:    EXAMINATION:  XR FOREARM LEFT; XR HAND COMPLETE 3 VIEW LEFT; XR WRIST COMPLETE 3 VIEWS LEFT    CLINICAL HISTORY:  Fall, wirst pain;Unspecified injury of left wrist, hand and finger(s), initial encounter    TECHNIQUE:  AP and lateral views of the left forearm; three views left hand and also left wrist    COMPARISON:  None    FINDINGS:  Generalized osteopenia.  Metallic ring in place at the proximal 4th finger somewhat limiting evaluation.    Acute appearing transverse type fracture involving the distal radial meta epiphyseal junction with mild impaction and also mild ventral apex angulation.  No definite fracture planes extending to the articular surface.  Ulnar styloids tip osseous spur versus fracture.  No dislocation or destructive osseous process.  Moderate to advanced degenerative change at the 1st carpometacarpal joint.  Remainder of the carpus is intact.  Minimal degenerative change elsewhere.  There is soft tissue swelling about the distal forearm and wrist particularly along the dorsal aspect.  No subcutaneous emphysema or radiodense retained foreign body.                               X-Ray Wrist Complete Left (Final result)  Result time 05/17/22 16:22:34    Final result by Chucky Crawford MD (05/17/22 16:22:34)                 Impression:      Distal radial acute fracture, as above.    Ulna styloid tip osseous spur versus fracture.      Electronically signed by: Chucky Crawford MD  Date:    05/17/2022  Time:    16:22             Narrative:    EXAMINATION:  XR FOREARM LEFT; XR HAND COMPLETE 3 VIEW LEFT; XR  WRIST COMPLETE 3 VIEWS LEFT    CLINICAL HISTORY:  Fall, wirst pain;Unspecified injury of left wrist, hand and finger(s), initial encounter    TECHNIQUE:  AP and lateral views of the left forearm; three views left hand and also left wrist    COMPARISON:  None    FINDINGS:  Generalized osteopenia.  Metallic ring in place at the proximal 4th finger somewhat limiting evaluation.    Acute appearing transverse type fracture involving the distal radial meta epiphyseal junction with mild impaction and also mild ventral apex angulation.  No definite fracture planes extending to the articular surface.  Ulnar styloids tip osseous spur versus fracture.  No dislocation or destructive osseous process.  Moderate to advanced degenerative change at the 1st carpometacarpal joint.  Remainder of the carpus is intact.  Minimal degenerative change elsewhere.  There is soft tissue swelling about the distal forearm and wrist particularly along the dorsal aspect.  No subcutaneous emphysema or radiodense retained foreign body.                               X-Ray Forearm Left (Final result)  Result time 05/17/22 16:22:34    Final result by Chucky Crawford MD (05/17/22 16:22:34)                 Impression:      Distal radial acute fracture, as above.    Ulna styloid tip osseous spur versus fracture.      Electronically signed by: Chucky Crawford MD  Date:    05/17/2022  Time:    16:22             Narrative:    EXAMINATION:  XR FOREARM LEFT; XR HAND COMPLETE 3 VIEW LEFT; XR WRIST COMPLETE 3 VIEWS LEFT    CLINICAL HISTORY:  Fall, wirst pain;Unspecified injury of left wrist, hand and finger(s), initial encounter    TECHNIQUE:  AP and lateral views of the left forearm; three views left hand and also left wrist    COMPARISON:  None    FINDINGS:  Generalized osteopenia.  Metallic ring in place at the proximal 4th finger somewhat limiting evaluation.    Acute appearing transverse type fracture involving the distal radial meta epiphyseal junction with  mild impaction and also mild ventral apex angulation.  No definite fracture planes extending to the articular surface.  Ulnar styloids tip osseous spur versus fracture.  No dislocation or destructive osseous process.  Moderate to advanced degenerative change at the 1st carpometacarpal joint.  Remainder of the carpus is intact.  Minimal degenerative change elsewhere.  There is soft tissue swelling about the distal forearm and wrist particularly along the dorsal aspect.  No subcutaneous emphysema or radiodense retained foreign body.                                 Medications   acetaminophen tablet 1,000 mg (1,000 mg Oral Given 5/17/22 1633)   LIDOcaine (PF) 10 mg/ml (1%) injection 100 mg (100 mg Infiltration Given by Other 5/17/22 1752)   lorazepam injection 1 mg (1 mg Intravenous Given 5/17/22 1751)     Medical Decision Making:   History:   Old Medical Records: I decided to obtain old medical records.  Clinical Tests:   Radiological Study: Ordered and Reviewed  Other:   I have discussed this case with another health care provider.    71-year-old female with past history as noted coming with mechanical fall with left wrist deformity.  She was initially hypoxic but is also on a lower oxygen level than she usually is.  When she is in the room in the emergency department on 3 L her sat is in the mid 90s.  She was initially stating that her breathing was at baseline although she felt a little short of breath secondary to pain (L wrist) and anxiety of being in the emergency department.     Will give Tylenol for pain control, x-rays of the extremity to look for fractures I suspect there is a distal radius fracture.  No other signs of dangerous traumatic pathology.  Will continue to monitor emergency department on her baseline O2 levels to see if there any new respiratory symptoms.    Update:  There is a distal radius fracture slightly displaced.  Possible ulnar styloid fracture.  Will obtain orthopedic consult for  reduction and splinting.    Patient re-evaluated she denies being short of breath at this time.  States that she is breathing at her baseline.  She continues to sat in the mid 90s on her baseline O2, she is not tachypneic, she is speaking full sentences.  Heart rate around 100. Not consistent with worsening or acute aspiration of her COPD currently.             ED Course as of 05/18/22 0951   Tue May 17, 2022   1854 Wrist injury was splinted by Ortho.  She got 1 mg of Ativan.  She is alert but sleepy but still satting well.  Will monitor for 30 40 minutes after the procedure to make sure she is not significantly sedated.  If she is doing well and vitals remained benign she is not complaining of a new shortness of breath will discharge home with Ortho follow-up, outpatient follow-up and return precautions. [BA]      ED Course User Index  [BA] Leander Rodriges MD             Update:  No excessive sedation from the Ativan, she is well-appearing, breathing is at baseline.  Will discharge with  back home outpatient follow-up with Orthopedics, return precautions for any new, worsening worrisome symptoms.    Findings of ED work up explained to patient. Patient agrees with discharge plan and verbalizes understanding of return precautions.       Clinical Impression:   Final diagnoses:  [S69.92XA] Wrist injury, left, initial encounter  [S69.92XA] Wrist injury, left, initial encounter  [W19.XXXA] Fall  [W19.XXXA] Fall  [W19.XXXA] Fall - wrist deformity.          ED Disposition Condition    Discharge Stable        ED Prescriptions     None        Follow-up Information     Follow up With Specialties Details Why Contact Info Additional Information    Dustin Khan MD Internal Medicine In 1 week  [address]  [facility] [address]  [phone]       [facility] Orthopedics In 1 week  [address], [address]  [facility] [address] [phone] [facility]  Building, 5th Floor Please park in [facility] and take Atrium elevator           Leander Rodriges MD  05/18/22 5671

## 2022-05-17 NOTE — CONSULTS
Orthopedic Surgery  Consult Note    Estefani Fam  05/17/2022    CC: Left wrist injury    HPI: Estefani Fam is a 71 y.o. female with PMHx significant for COPD on 3L home O2 who presents with left wrist injury. Patients states she got up to use the restroom last night when he tripped and landed on an outstretched left hand. Denies head injury or LOC. Denies prior injuries or surgeries to the left upper extremity. Prior history of right distal radius fracture s/p ORIF 10 years ago at OSH. No daily blood thinners. Denies other MSK pains or paresthesias. RHD at baseline. Does not use an assistive device for ambulation.       Past Medical History:   Diagnosis Date    Cataract     COPD (chronic obstructive pulmonary disease)     COVID-19     History of COVID-19 1/17/2021    Still with mild dyspnea. Encouraged return to pulmonary rehab Recommend scheduling vaccination when appointment is available.     History of retinal hemorrhage     Lobar pneumonia 11/14/2021    Recurrent in RLL, no endobronchial lesion Needs speech evaluation and MBSS for recurrent aspiration in setting of dysphagia  Will need pulmonary [facility] at [facility].    Pathological fracture due to osteoporosis 7/6/2020    Retinal histoplasmosis     Secondary pulmonary arterial hypertension 7/3/2018    Secondary to emphysema and chronic respiratory failure, mild.     Past Surgical History:   Procedure Laterality Date    BRONCHOSCOPY WITH FLUOROSCOPY N/A 10/28/2019    Procedure: BRONCHOSCOPY, WITH FLUOROSCOPY;  Surgeon: Brooklynn Ruiz MD;  Location: [facility] OR [address] [place] [address];  Service: Endoscopy;  Laterality: N/A;    HAND SURGERY       Family History   Problem Relation Age of Onset    Macular degeneration Mother     Colon cancer Mother     Stroke Father     Colon cancer Father     Amblyopia Neg Hx     Blindness Neg Hx     Cataracts Neg Hx     Diabetes Neg Hx     Glaucoma Neg Hx     Hypertension Neg Hx     Retinal detachment Neg Hx      Strabismus Neg Hx     Thyroid disease Neg Hx      Social History     Socioeconomic History    Marital status:    Tobacco Use    Smoking status: Former Smoker     Packs/day: 1.00     Years: 36.00     Pack years: 36.00     Types: Cigarettes     Quit date: 2016     Years since quittin.7    Smokeless tobacco: Never Used    Tobacco comment: pt states she does not remember date   Substance and Sexual Activity    Alcohol use: Yes     Alcohol/week: 2.0 standard drinks     Types: 2 Glasses of wine per week     Comment: 1-2 glasses a day     Drug use: No    Sexual activity: Yes     Partners: Male     Current Facility-Administered Medications on File Prior to Encounter   Medication    albuterol inhaler 2 puff     Current Outpatient Medications on File Prior to Encounter   Medication Sig    acetylcysteine 600 mg Cap Take 1 capsule (600 mg total) by mouth 2 (two) times daily.    ascorbic acid, vitamin C, (VITAMIN C) 500 MG tablet Take 500 mg by mouth 2 (two) times daily.     azelastine (ASTELIN) 137 mcg (0.1 %) nasal spray INHALE 1 SPRAY BY NASAL ROUTE TWICE DAILY OR AS DIRECTED    azithromycin (Z-JERSEY) 250 MG tablet Take 1 tablet (250 mg total) by mouth every Mon, Wed, Fri.    BREO ELLIPTA 200-25 mcg/dose DsDv diskus inhaler INHALE 1 PUFF INTO THE LUNGS DAILY    calcium carbonate (OS-STEPHANIE) 600 mg calcium (1,500 mg) Tab Take 1 tablet (600 mg total) by mouth once daily. Take Levofloxacin antibiotic at least 2 hours before calcium.    citalopram (CELEXA) 10 MG tablet Take 1 tablet (10 mg total) by mouth once daily.    levalbuterol (XOPENEX) 0.63 mg/3 mL nebulizer solution USE 1 VIAL IN NEBULIZER EVERY 8 HOURS AS NEEDED FOR WHEEZE    MULTIVIT-IRON-MIN-FOLIC ACID 3,500-18-0.4 UNIT-MG-MG ORAL CHEW Take 1 tablet by mouth once daily. Take Levofloxacin antibiotic at least 2 hours before multivitamin.    pantoprazole (PROTONIX) 40 MG tablet Take 1 tablet (40 mg total) by mouth once daily.    pulse  oximeter (PULSE OXIMETER) device by Apply Externally route 2 (two) times a day. Use twice daily at 8 AM and 3 PM and record the value in MyChart as directed.    sodium chloride (OCEAN) 0.65 % nasal spray 1 spray by Nasal route 2 (two) times daily.    umeclidinium (INCRUSE ELLIPTA) 62.5 mcg/actuation inhalation capsule Inhale 62.5 mcg into the lungs once daily.    fluticasone propionate (FLONASE) 50 mcg/actuation nasal spray INSTILL 1 SPRAY IN EACH NOSTRIL EVERY DAY    [DISCONTINUED] furosemide (LASIX) 20 MG tablet Take 1 tablet (20 mg total) by mouth once daily. for 3 days         Review of Systems:  Constitutional: negative for fevers  Eyes: negative visual changes  ENT: negative for hearing loss  Respiratory: negative for dyspnea  Cardiovascular: negative for chest pain  Gastrointestinal: negative for abdominal pain  Genitourinary: negative for dysuria  Neurological: negative for headaches  Behavioral/Psych: negative for hallucinations  Endocrine: negative for temperature intolerance      Physical Exam:    Temp:  [98.3 °F (36.8 °C)-98.6 °F (37 °C)] 98.6 °F (37 °C)  Pulse:  [100-114] 100  Resp:  [16-28] 16  SpO2:  [80 %-94 %] 92 %  BP: (161-180)/(77-86) 161/77    Vitals: Afebrile.  Vital signs stable.  General: No acute distress.  HEENT: Normocephalic. Atraumatic. Sclera anicteric. No tracheal deviation.  Cardio: Regular rate.  Chest: No increased work of breathing.  Abdominal: Nondistended.  Extremities: No cyanosis.  No clubbing.    Skin: No generalized rash.  Neuro: Awake. Alert. Oriented to person, place, time, and situation.  Psych: Normal appearance. Cooperative.  Appropriate mood.  Appropriate affect.      MSK:  LUE:  Deformity present at distal aspect of wrist  Skin intact throughout  Mild swelling around wrist  No TTP of proximal, middle, or distal aspects of humerus  No TTP of proximal or middle aspects of radius or ulna  No TTP of hand  Compartments soft  Painless ROM shoulder and elbow  SILT  M/U/R  Motor intact AIN/PIN/M/U/R  2+ radial  Brisk capillary refill     RUE:  Skin intact throughout  No swelling around wrist  No tenderness to palpation of proximal, middle, or distal aspects of humerus  No tenderness to palpation of proximal or middle aspects of radius or ulna  No tenderness to palpation of hand  Compartments soft  Painless ROM shoulder and elbow  SILT M/U/R  Motor intact AIN/PIN/M/U/R  2+ radial  Brisk capillary refill       Diagnostic Results: XR of the left wrist, hand and forearm show a dorsally displaced and shortened distal radius fracture.      Procedure Note: Left distal radius reduction  Patient was explained risks, benefits, and alternatives to treatment and verbalized consent to proceed. Time out was performed and patient name, , site, and procedure were confirmed. 10 cc of 1% lidocaine in 25 gauge needle was used for hematoma block. Fracture was reduced under fluoroscopy. Sugar tong splint was applied in typical fashion. Post-reduction films were performed and confirmed adequate reduction. Patient tolerated the procedure well.       Assessment/Plan:  Estefani Fam is a 71 y.o. female with left distal radius fracture, closed NVI.  - Reduced and splinted in ED   - NWB to zach VILLAGOMEZ encouraged to keep extremity elevated at all times  - Patient educated on the signs and symptoms of compartment syndrome and instructed to return to the hospital immediately if these symptoms arise  - Discussed multimodal pain regimen + vitamin C for CRPS prophylaxis   - Follow-up with Ortho Trauma clinic in 1 week (patient will be contacted with appointment details)      Phan Bran MD  Orthopedic Surgery Resident  2022

## 2022-05-17 NOTE — ED NOTES
Pt identifiers Estefani Fam were checked and are correct  LOC: The patient is awake, alert, aware of environment with an appropriate affect. Oriented x4, speaking appropriately  APPEARANCE: Pt rates left wrist pain a 10/10 , in no acute distress, pt is clean and well groomed, clothing properly fastened  SKIN: Skin warm, dry and intact, normal skin turgor, moist mucus membranes  RESPIRATORY: Airway is open and patent, respirations are spontaneous, even and unlabored, normal effort and rate O2 per nasal cannula in place at 3L/min   CARDIAC: Normal rate and rhythm, no peripheral edema noted, capillary refill < 3 seconds, bilateral radial pulses 2+   ABDOMEN: Soft, nontender, nondistended.    NEUROLOGIC: PERRL, facial expression is symmetrical, patient moving all extremities spontaneously, normal sensation in all extremities when touched with a finger.  Follows all commands appropriately  MUSCULOSKELETAL: Swelling , redness and deformity noted to left wrist Left fingers warm to touch , moveable with good capillary refill noted to nailbeds

## 2022-05-17 NOTE — ED TRIAGE NOTES
Pt states at approx. 04:00 am this morning pt was rushing to the bathroom and fell with her left arm stretched out to break her fall  Pt presents to day with deformity, swelling and redness to left forearm

## 2022-05-18 ENCOUNTER — TELEPHONE (OUTPATIENT)
Dept: INTERNAL MEDICINE | Facility: CLINIC | Age: 72
End: 2022-05-18
Payer: MEDICARE

## 2022-05-18 NOTE — TELEPHONE ENCOUNTER
Message left on Shira Sherwood's recorder. FYI to Dr Khan that pt had a fall recently and is following up with Orthopedics.

## 2022-05-18 NOTE — DISCHARGE INSTRUCTIONS
Please follow-up with orthopedics within 1 week for continued management of your wrist fracture.    At home you can take Tylenol and/or ibuprofen in over-the-counter doses for symptom control.    If you develop any new trouble breathing, change in respiratory pattern, chest pain, any new falls, dizziness, headache, or any other new, worsening worrisome symptoms please return to the emergency department.    Please follow-up with your primary care doctor within 1 week for continued management of your  other medical conditions.

## 2022-05-18 NOTE — TELEPHONE ENCOUNTER
----- Message from Pacheco Riddle sent at 5/18/2022 12:12 PM CDT -----  Contact: [phone]  Home health calling to report that the patient had a fall and has a left wrist fracture she is calling ortho to follow up.Please advise

## 2022-05-22 LAB — HCV AB SERPL QL IA: NEGATIVE

## 2022-05-25 ENCOUNTER — OFFICE VISIT (OUTPATIENT)
Dept: ORTHOPEDICS | Facility: CLINIC | Age: 72
End: 2022-05-25
Payer: MEDICARE

## 2022-05-25 VITALS — BODY MASS INDEX: 21.68 KG/M2 | HEIGHT: 68 IN | WEIGHT: 143.06 LBS

## 2022-05-25 DIAGNOSIS — S52.502A CLOSED FRACTURE OF DISTAL END OF LEFT RADIUS, UNSPECIFIED FRACTURE MORPHOLOGY, INITIAL ENCOUNTER: Primary | ICD-10-CM

## 2022-05-25 PROCEDURE — 99213 OFFICE O/P EST LOW 20 MIN: CPT | Mod: PBBFAC | Performed by: NURSE PRACTITIONER

## 2022-05-25 PROCEDURE — 99999 PR PBB SHADOW E&M-EST. PATIENT-LVL III: CPT | Mod: PBBFAC,,, | Performed by: NURSE PRACTITIONER

## 2022-05-25 PROCEDURE — 99999 PR PBB SHADOW E&M-EST. PATIENT-LVL III: ICD-10-PCS | Mod: PBBFAC,,, | Performed by: NURSE PRACTITIONER

## 2022-05-25 PROCEDURE — 99213 PR OFFICE/OUTPT VISIT, EST, LEVL III, 20-29 MIN: ICD-10-PCS | Mod: S$PBB,,, | Performed by: NURSE PRACTITIONER

## 2022-05-25 PROCEDURE — 99213 OFFICE O/P EST LOW 20 MIN: CPT | Mod: S$PBB,,, | Performed by: NURSE PRACTITIONER

## 2022-05-25 NOTE — PROGRESS NOTES
SUBJECTIVE:     Chief Complaint & History of Present Illness:  Estefani Fam is a New 71 y.o. year old female patient presenting today for ED follow up for her left wrist fracture.  She reports she tripped and fell during the night of 5/16/22 and fell with her left arm outstretched and injured her left wrist.  She went to the ED, x-rays done.  Results indicated a radius fracture.  Orthopedics was consulted.  Her fracture was reduced and patient was placed in a sugar-tong splint with instructions to follow in Orthopedic Trauma Clinic in 1 week.    Patient reports she is right-hand dominant.  She previously fractured her right wrist years ago which was surgically repaired at [facility].  She currently denies any paresthesia to her left lower arm.  She is able to move all of her fingers without difficulty.  She is a chronic COPD and is on supplemental oxygen 3 L the is nasal cannula and followed by Dr. Norman in pulmonology.  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  Patient states she has no pain and when she does it is adequately controlled with Advil.    Past Medical History:   Diagnosis Date    Cataract     COPD (chronic obstructive pulmonary disease)     COVID-19     History of COVID-19 1/17/2021    Still with mild dyspnea. Encouraged return to pulmonary rehab Recommend scheduling vaccination when appointment is available.     History of retinal hemorrhage     Lobar pneumonia 11/14/2021    Recurrent in RLL, no endobronchial lesion Needs speech evaluation and MBSS for recurrent aspiration in setting of dysphagia  Will need pulmonary rehab at [facility].    Pathological fracture due to osteoporosis 7/6/2020    Retinal histoplasmosis     Secondary pulmonary arterial hypertension 7/3/2018    Secondary to emphysema and chronic respiratory failure, mild.       Past Surgical History:   Procedure Laterality Date    BRONCHOSCOPY WITH FLUOROSCOPY N/A  10/28/2019    Procedure: BRONCHOSCOPY, WITH FLUOROSCOPY;  Surgeon: Brooklynn Ruiz MD;  Location: [facility] OR [address] [place] [address];  Service: Endoscopy;  Laterality: N/A;    HAND SURGERY         Family History   Problem Relation Age of Onset    Macular degeneration Mother     Colon cancer Mother     Stroke Father     Colon cancer Father     Amblyopia Neg Hx     Blindness Neg Hx     Cataracts Neg Hx     Diabetes Neg Hx     Glaucoma Neg Hx     Hypertension Neg Hx     Retinal detachment Neg Hx     Strabismus Neg Hx     Thyroid disease Neg Hx        Review of patient's allergies indicates:   Allergen Reactions    Albuterol     Ipratropium analogues      Difficulty breathing         Current Outpatient Medications:     acetylcysteine 600 mg Cap, Take 1 capsule (600 mg total) by mouth 2 (two) times daily., Disp: 90 capsule, Rfl: 3    ascorbic acid, vitamin C, (VITAMIN C) 500 MG tablet, Take 500 mg by mouth 2 (two) times daily. , Disp: , Rfl:     azelastine (ASTELIN) 137 mcg (0.1 %) nasal spray, INHALE 1 SPRAY BY NASAL ROUTE TWICE DAILY OR AS DIRECTED, Disp: 30 mL, Rfl: 3    azithromycin (Z-JERSEY) 250 MG tablet, Take 1 tablet (250 mg total) by mouth every Mon, Wed, Fri., Disp: 36 tablet, Rfl: 3    BREO ELLIPTA 200-25 mcg/dose DsDv diskus inhaler, INHALE 1 PUFF INTO THE LUNGS DAILY, Disp: 60 each, Rfl: 11    calcium carbonate (OS-STEPHANIE) 600 mg calcium (1,500 mg) Tab, Take 1 tablet (600 mg total) by mouth once daily. Take Levofloxacin antibiotic at least 2 hours before calcium., Disp: , Rfl: 0    citalopram (CELEXA) 10 MG tablet, Take 1 tablet (10 mg total) by mouth once daily., Disp: 30 tablet, Rfl: 11    fluticasone propionate (FLONASE) 50 mcg/actuation nasal spray, INSTILL 1 SPRAY IN EACH NOSTRIL EVERY DAY, Disp: 16 g, Rfl: 0    levalbuterol (XOPENEX) 0.63 mg/3 mL nebulizer solution, USE 1 VIAL IN NEBULIZER EVERY 8 HOURS AS NEEDED FOR WHEEZE, Disp: 750 mL, Rfl: 2    MULTIVIT-IRON-MIN-FOLIC ACID 3,500-18-0.4 UNIT-MG-MG  "ORAL CHEW, Take 1 tablet by mouth once daily. Take Levofloxacin antibiotic at least 2 hours before multivitamin., Disp: , Rfl: 0    pulse oximeter (PULSE OXIMETER) device, by Apply Externally route 2 (two) times a day. Use twice daily at 8 AM and 3 PM and record the value in MyChart as directed., Disp: 1 each, Rfl: 0    sodium chloride (OCEAN) 0.65 % nasal spray, 1 spray by Nasal route 2 (two) times daily., Disp: 88 mL, Rfl: 12    umeclidinium (INCRUSE ELLIPTA) 62.5 mcg/actuation inhalation capsule, Inhale 62.5 mcg into the lungs once daily., Disp: 90 each, Rfl: 3    pantoprazole (PROTONIX) 40 MG tablet, Take 1 tablet (40 mg total) by mouth once daily., Disp: 30 tablet, Rfl: 11    Current Facility-Administered Medications:     albuterol inhaler 2 puff, 2 puff, Inhalation, Q20 Min PRN, Miles Jackson MD    Review of Systems:  ROS:  Constitutional: no fever or chills  Eyes: no visual changes  ENT: no nasal congestion or sore throat  Respiratory: no cough or shortness of breath  Cardiovascular: no chest pain or palpitations  Gastrointestinal: no nausea or vomiting, tolerating diet  Genitourinary: no hematuria or dysuria  Integument/Breast: no rash or pruritis  Hematologic/Lymphatic: no easy bruising or lymphadenopathy  Musculoskeletal: Left wrist fracture  Neurological: no seizures or tremors  Behavioral/Psych: no auditory or visual hallucinations  Endocrine: no heat or cold intolerance        OBJECTIVE:     PHYSICAL EXAM:  Ht 5' 8" (1.727 m)   Wt 64.9 kg (143 lb 1.3 oz)   BMI 21.76 kg/m²   Estimated body mass index is 21.76 kg/m² as calculated from the following:    Height as of this encounter: 5' 8" (1.727 m).    Weight as of this encounter: 64.9 kg (143 lb 1.3 oz).   General: Pleasant, cooperative, NAD   HEENT: NCAT, sclera nonicteric   Lungs: Respirations are equal and unlabored.   Abdomen: Soft and non-tender.  CV: 2+ bilateral upper and lower extremity pulses.   Skin: Intact throughout LE with no " rashes, erythema, or lesions  Extremities: No LE edema, NVI lower extremities    left wrist   History of injury: fall, date: 5/16/22  Pain: none  Neurological complaints: none  Vascular complaints: none  Previous treatments:  Reduction in the ED and placement of a sugar-tong splint by orthopedic resident.  Condition of skin:  Unable to assess due to splint  Hand Exam: Unable to assess due to splinting  ROM:  Full range of motion of the fingers.    Wrist Exam: not examined due to recent injury      RADIOGRAPHS:  X-ray of the left wrist dated May 17, 2022, findings show an acute distal radius fracture and possible chip fracture to the ulnar styloid.  All radiographs were personally reviewed by me.    ASSESSMENT/PLAN:       ICD-10-CM ICD-9-CM   1. Closed fracture of distal end of left radius, unspecified fracture morphology, initial encounter  S52.502A 813.42       Plan: We discussed with the patient at length all the different treatment options available for her wrist, including anti-inflammatories, acetaminophen, rest, ice, physical therapy to include strengthening, range of motion exercise ultrasound, splinting,  occasional cortisone injections for temporary relief,  or possible surgical interventions.    -Estefani Fam presents to clinic today with c/c left wrist fracture secondary to trip and fall on 5/16/22.  -X-ray as above.  I discussed x-ray findings and plan with Dr. Turner, he reviewed the x-rays and felt the patient could be treated non operative.  He is recommended to keep the patient in a sugar-tong splint for the next 4-6 weeks and then convert her to a Alvin splint with elbow protection to prevent pronation and supination for a couple of weeks after that and then he removed the elbow protection.  At that point will get her into occupational therapy.  -Recommend RICE therapy.  -Patient advised of the recommendations.  She is encouraged to remain nonweightbearing to the left upper extremity.  -She  may continue to use over-the-counter analgesics for pain control.  -She will follow-up in 3 weeks at which time we will repeat her left wrist x-rays and convert her to a [place] Velcro splint.  Future Appointments   Date Time Provider Department Center   6/1/2022 11:00 AM Tammy Vazquez MD [facility] PAL MED [facility]   6/13/2022  5:00 PM Dustin Khan MD [facility]   6/20/2022  3:30 PM Marco Macdonald NP [facility] ORTHO [facility]   10/24/2022  1:45 PM INJECTION [facility]

## 2022-05-27 ENCOUNTER — DOCUMENT SCAN (OUTPATIENT)
Dept: HOME HEALTH SERVICES | Facility: HOSPITAL | Age: 72
End: 2022-05-27
Payer: MEDICARE

## 2022-05-31 ENCOUNTER — TELEPHONE (OUTPATIENT)
Dept: ORTHOPEDICS | Facility: CLINIC | Age: 72
End: 2022-05-31
Payer: MEDICARE

## 2022-05-31 ENCOUNTER — EXTERNAL CHRONIC CARE MANAGEMENT (OUTPATIENT)
Dept: PRIMARY CARE CLINIC | Facility: CLINIC | Age: 72
End: 2022-05-31
Payer: MEDICARE

## 2022-05-31 ENCOUNTER — TELEPHONE (OUTPATIENT)
Dept: PALLIATIVE MEDICINE | Facility: CLINIC | Age: 72
End: 2022-05-31
Payer: MEDICARE

## 2022-05-31 PROCEDURE — 99490 CHRNC CARE MGMT STAFF 1ST 20: CPT | Mod: S$PBB,,, | Performed by: INTERNAL MEDICINE

## 2022-05-31 PROCEDURE — 99490 CHRNC CARE MGMT STAFF 1ST 20: CPT | Mod: PBBFAC | Performed by: INTERNAL MEDICINE

## 2022-05-31 PROCEDURE — 99490 PR CHRONIC CARE MGMT, 1ST 20 MIN: ICD-10-PCS | Mod: S$PBB,,, | Performed by: INTERNAL MEDICINE

## 2022-05-31 NOTE — TELEPHONE ENCOUNTER
----- Message from Leda Adam MA sent at 5/31/2022  1:37 PM CDT -----  Contact: Saundra([facility])    Call placed to Saundra with [email or web address] [facility], no answer, left a voicemail indicating they can continue working with her but she will need to be nonweightbearing to left upper arm and maintain her arm in the splint for the next 4-6 weeks.  She was advised should the splint become damaged or dirty she should follow up in the clinic.  She can call the clinic for any questions or concerns.

## 2022-06-01 ENCOUNTER — OFFICE VISIT (OUTPATIENT)
Dept: PALLIATIVE MEDICINE | Facility: CLINIC | Age: 72
End: 2022-06-01
Payer: MEDICARE

## 2022-06-01 VITALS — SYSTOLIC BLOOD PRESSURE: 131 MMHG | OXYGEN SATURATION: 91 % | DIASTOLIC BLOOD PRESSURE: 82 MMHG | HEART RATE: 120 BPM

## 2022-06-01 DIAGNOSIS — J43.2 CENTRILOBULAR EMPHYSEMA: ICD-10-CM

## 2022-06-01 DIAGNOSIS — F41.9 ANXIETY: ICD-10-CM

## 2022-06-01 DIAGNOSIS — Z51.5 QUALITY OF LIFE PALLIATIVE CARE ENCOUNTER: ICD-10-CM

## 2022-06-01 DIAGNOSIS — J96.11 CHRONIC RESPIRATORY FAILURE WITH HYPOXIA, ON HOME OXYGEN THERAPY: ICD-10-CM

## 2022-06-01 DIAGNOSIS — Z99.81 CHRONIC RESPIRATORY FAILURE WITH HYPOXIA, ON HOME OXYGEN THERAPY: ICD-10-CM

## 2022-06-01 DIAGNOSIS — I27.21 SECONDARY PULMONARY ARTERIAL HYPERTENSION: Primary | ICD-10-CM

## 2022-06-01 PROCEDURE — 99497 ADVNCD CARE PLAN 30 MIN: CPT | Mod: S$PBB,,, | Performed by: STUDENT IN AN ORGANIZED HEALTH CARE EDUCATION/TRAINING PROGRAM

## 2022-06-01 PROCEDURE — 99215 OFFICE O/P EST HI 40 MIN: CPT | Mod: S$PBB,,, | Performed by: STUDENT IN AN ORGANIZED HEALTH CARE EDUCATION/TRAINING PROGRAM

## 2022-06-01 PROCEDURE — 99497 PR ADVNCD CARE PLAN 30 MIN: ICD-10-PCS | Mod: S$PBB,,, | Performed by: STUDENT IN AN ORGANIZED HEALTH CARE EDUCATION/TRAINING PROGRAM

## 2022-06-01 PROCEDURE — 99215 PR OFFICE/OUTPT VISIT, EST, LEVL V, 40-54 MIN: ICD-10-PCS | Mod: S$PBB,,, | Performed by: STUDENT IN AN ORGANIZED HEALTH CARE EDUCATION/TRAINING PROGRAM

## 2022-06-01 PROCEDURE — 99999 PR PBB SHADOW E&M-EST. PATIENT-LVL III: CPT | Mod: PBBFAC,,, | Performed by: STUDENT IN AN ORGANIZED HEALTH CARE EDUCATION/TRAINING PROGRAM

## 2022-06-01 PROCEDURE — 99999 PR PBB SHADOW E&M-EST. PATIENT-LVL III: ICD-10-PCS | Mod: PBBFAC,,, | Performed by: STUDENT IN AN ORGANIZED HEALTH CARE EDUCATION/TRAINING PROGRAM

## 2022-06-01 PROCEDURE — 99497 ADVNCD CARE PLAN 30 MIN: CPT | Mod: PBBFAC | Performed by: STUDENT IN AN ORGANIZED HEALTH CARE EDUCATION/TRAINING PROGRAM

## 2022-06-01 PROCEDURE — 99213 OFFICE O/P EST LOW 20 MIN: CPT | Mod: PBBFAC | Performed by: STUDENT IN AN ORGANIZED HEALTH CARE EDUCATION/TRAINING PROGRAM

## 2022-06-01 RX ORDER — CITALOPRAM 20 MG/1
20 TABLET, FILM COATED ORAL DAILY
Qty: 30 TABLET | Refills: 11 | Status: SHIPPED | OUTPATIENT
Start: 2022-06-01 | End: 2022-06-20

## 2022-06-01 RX ORDER — PANTOPRAZOLE SODIUM 40 MG/1
TABLET, DELAYED RELEASE ORAL
Qty: 30 TABLET | Refills: 0 | Status: SHIPPED | OUTPATIENT
Start: 2022-06-01 | End: 2022-07-06

## 2022-06-01 RX ORDER — PANTOPRAZOLE SODIUM 40 MG/1
TABLET, DELAYED RELEASE ORAL
Qty: 90 TABLET | OUTPATIENT
Start: 2022-06-01

## 2022-06-01 NOTE — TELEPHONE ENCOUNTER
Yang DC. Request already responded to by other means (e.g. phone or fax)   Refill Authorization Note   Estefani Cristel  is requesting a refill authorization.  Brief Assessment and Rationale for Refill:  Quick Discontinue  Medication Therapy Plan:       Medication Reconciliation Completed:  No      Comments:     Note composed:6:49 PM 06/01/2022

## 2022-06-01 NOTE — PROGRESS NOTES
Consult Note  Palliative Medicine Clinic      Consult Requested By: Dr. Brooklynn Ruiz    Primary Care Physician: Dustin Khan MD    Reason for Consult: Symptom management in the setting of Advanced COPD      ASSESSMENT/PLAN:     Plan/Recommendations:  Diagnoses and all orders for this visit:    Centrilobular emphysema / Chronic respiratory failure with hypoxia, on home oxygen therapy / Secondary pulmonary arterial hypertension  -     Ambulatory referral/consult to CLINIC Palliative Care  -On Breo Elliptia  -On Acetylcysteine caps BID, on azythro prophylaxis, on Incruse ellipta and levalbuterol nebs  -Non-pharmacologic therapies discussed, activity modification, balance rest with activity  -Discussed that controlling her anxiety is important to avoid dyspnea crises  -discussed that in the future we might need to add opioids    Anxiety  -   Will increase  citalopram (CELEXA) to 20 MG tablet; Take 1 tablet (20 mg total) by mouth once daily.  -Benzos not recommended at this point because of h/o of falls       Quality of life palliative care encounter    1) Symptoms:  anxiety and dyspnea sa above     2) Medicolegal: Has decision making capacity.    is surrogate decision maker.   No HCPOA.     3) Psychosocial:  support system consists of  and children     4) Spiritual: Zoroastrian    5) Prognostication: Patient with end stage COPD, limited functional status - prognosis is guarded    6) Understanding of disease and Illness Trajectory: Patient  has  adequate understanding of her illness, they can benefit from continued education on what to expect in the future.      7) Goals of care:    Advance Care Planning     Date: 06/01/2022    During this visit, I engaged the patient  in the advance care planning process.  The patient and I reviewed the role for advance directives and their purpose in directing future healthcare if the patient's unable to speak for him/herself.  At this point in time, the patient does  "have full decision-making capacity.      Discussed the importance of assigning a HCPOA, however she would name her  and then her only son which by law are her surrogate decision makers. Provided them with the ACP packet for them to review.  shared that when his father  the medical team asked him to decide on "pulling the plug" despite the fact that his father had a living will, so he believes ACP documents don't really help.     We discussed different extreme health states that she could experience, and reviewed what kind of medical care she would want in those situations.  The patient communicated that  she would not want machines/life-sustaining treatments used.  Along those lines, the patient does not wish to have CPR or other invasive treatments performed when her heart and/or breathing stops. I communicated to the patient that a DNR order would be placed in her medical record to reflect this preference.       We did specifically address the patient's likely prognosis, which is guarded.  We explored the patient's values and preferences for future care.  The patient endorses that what is most important right now is to focus on quality of life, even if it means sacrificing a little time and improvement in condition but with limits to invasive therapies   She also hopes to meet her first granddaughter that will be born in July    Accordingly, we have decided that the best plan to meet the patient's goals includes continuing with treatment    I did explain the role for hospice care at this stage of the patient's illness, including its ability to help the patient live with the best quality of life possible.  We will not be making a hospice referral.            8) Code status: DNR/DNI    9) Advance directives:none on file- provided them w/ HCPOA and [place]POST form to review      Summary and recommendations:  -Increase Celexa to 20mg  -Graded excercises      21 min time was spent on advance care " planning, goals of care discussion, emotional support, formulating and communicating prognosis and goals of care, exploring burden/benefit of various approaches of treatment.              Follow up: 1m     Plan discussed with PCP and referring physician     SUBJECTIVE:     History obtained from: Patient and       complaint: No chief complaint on file.        History of Present Illness:  Mrs. Estefani Fam is 71 y.o. year old female presenting with end stage COPD.  Referred to Palliative Care for evaluation and management of physical symptoms, advance care planning, and additional support.. She attended the appointment with her       Interval History:    She has been experiencing worsening dyspnea and anxiety often having frequent dyspnea episodes during the day     Had a fall 5/17 that resulted on L wrist fracture.     She is mostly sitting during the day as she feels that being tethered to the O2 and now her cast are limiting her mobility.    Chewing hard foods cause dyspnea    Anxiety has somewhat improved since she started celexa (same med that her  is taking)     Intermittent constipation and she takes stool softener for it and it works    Disease History:  Former smoking (quit in 2017), centrilobular emphysema with chronic obstructive pulmonary disease (COPD), chornic hypoxic respiratory failure on supplemental oxygen (3 liters/minute), secondary pulmonary hypertension, gastroesophageal reflux disease, osteoporosis, COVID-19 on 1/17/2021    Previous experience or exposure to a serious illness: Her  had to make EoL decisions for his father    Past Medical History:   Diagnosis Date    Cataract     COPD (chronic obstructive pulmonary disease)     COVID-19     History of COVID-19 1/17/2021    Still with mild dyspnea. Encouraged return to pulmonary rehab Recommend scheduling vaccination when appointment is available.     History of retinal hemorrhage     Lobar pneumonia  11/14/2021    Recurrent in RLL, no endobronchial lesion Needs speech evaluation and MBSS for recurrent aspiration in setting of dysphagia  Will [facility] at [facility].    Pathological fracture due to osteoporosis 7/6/2020    Retinal histoplasmosis     Secondary pulmonary arterial hypertension 7/3/2018    Secondary to emphysema and chronic respiratory failure, mild.     Past Surgical History:   Procedure Laterality Date    BRONCHOSCOPY WITH FLUOROSCOPY N/A 10/28/2019    Procedure: BRONCHOSCOPY, WITH FLUOROSCOPY;  Surgeon: Brooklynn Ruiz MD;  Location: [facility] OR [address] [place] [address];  Service: Endoscopy;  Laterality: N/A;    HAND SURGERY       Family History   Problem Relation Age of Onset    Macular degeneration Mother     Colon cancer Mother     Stroke Father     Colon cancer Father     Amblyopia Neg Hx     Blindness Neg Hx     Cataracts Neg Hx     Diabetes Neg Hx     Glaucoma Neg Hx     Hypertension Neg Hx     Retinal detachment Neg Hx     Strabismus Neg Hx     Thyroid disease Neg Hx      Review of patient's allergies indicates:   Allergen Reactions    Albuterol     Ipratropium analogues      Difficulty breathing       Medications:    Current Outpatient Medications:     acetylcysteine 600 mg Cap, Take 1 capsule (600 mg total) by mouth 2 (two) times daily., Disp: 90 capsule, Rfl: 3    ascorbic acid, vitamin C, (VITAMIN C) 500 MG tablet, Take 500 mg by mouth 2 (two) times daily. , Disp: , Rfl:     azelastine (ASTELIN) 137 mcg (0.1 %) nasal spray, INHALE 1 SPRAY BY NASAL ROUTE TWICE DAILY OR AS DIRECTED, Disp: 30 mL, Rfl: 3    azithromycin (Z-JERSEY) 250 MG tablet, Take 1 tablet (250 mg total) by mouth every Mon, Wed, Fri., Disp: 36 tablet, Rfl: 3    BREO ELLIPTA 200-25 mcg/dose DsDv diskus inhaler, INHALE 1 PUFF INTO THE LUNGS DAILY, Disp: 60 each, Rfl: 11    calcium carbonate (OS-STEPHANIE) 600 mg calcium (1,500 mg) Tab, Take 1 tablet (600 mg total) by mouth once daily. Take Levofloxacin antibiotic at  least 2 hours before calcium., Disp: , Rfl: 0    fluticasone propionate (FLONASE) 50 mcg/actuation nasal spray, INSTILL 1 SPRAY IN EACH NOSTRIL EVERY DAY, Disp: 16 g, Rfl: 0    levalbuterol (XOPENEX) 0.63 mg/3 mL nebulizer solution, USE 1 VIAL IN NEBULIZER EVERY 8 HOURS AS NEEDED FOR WHEEZE, Disp: 750 mL, Rfl: 2    MULTIVIT-IRON-MIN-FOLIC ACID 3,500-18-0.4 UNIT-MG-MG ORAL CHEW, Take 1 tablet by mouth once daily. Take Levofloxacin antibiotic at least 2 hours before multivitamin., Disp: , Rfl: 0    pulse oximeter (PULSE OXIMETER) device, by Apply Externally route 2 (two) times a day. Use twice daily at 8 AM and 3 PM and record the value in MyChart as directed., Disp: 1 each, Rfl: 0    sodium chloride (OCEAN) 0.65 % nasal spray, 1 spray by Nasal route 2 (two) times daily., Disp: 88 mL, Rfl: 12    umeclidinium (INCRUSE ELLIPTA) 62.5 mcg/actuation inhalation capsule, Inhale 62.5 mcg into the lungs once daily., Disp: 90 each, Rfl: 3    citalopram (CELEXA) 20 MG tablet, Take 1 tablet (20 mg total) by mouth once daily., Disp: 30 tablet, Rfl: 11    pantoprazole (PROTONIX) 40 MG tablet, TAKE 1 TABLET(40 MG) BY MOUTH EVERY DAY, Disp: 30 tablet, Rfl: 0    Current Facility-Administered Medications:     albuterol inhaler 2 puff, 2 puff, Inhalation, Q20 Min PRN, Miles Jackson MD     database queried on 06/01/2022  by Tammy Vazquez . The results reviewed and considered with the clinical data in the decision whether or not to prescribe a controlled substance.    No controlled substance Rx prescribed    OBJECTIVE:       ROS:  Review of Systems   Constitutional: Positive for appetite change and fatigue. Negative for activity change and unexpected weight change.   Respiratory: Positive for shortness of breath. Negative for cough and wheezing.    Cardiovascular: Negative for chest pain and leg swelling.   Gastrointestinal: Negative for constipation, diarrhea, nausea and vomiting.   Genitourinary: Negative for  dysuria.   Musculoskeletal: Negative for arthralgias, back pain, gait problem and leg pain.   Neurological: Negative for dizziness, weakness and memory loss.   Psychiatric/Behavioral: The patient is nervous/anxious.          Review of Symptoms    Symptom Assessment (ESAS 0-10 Scale)  Pain:  0  Dyspnea:  7  Anxiety:  9  Nausea:  0  Depression:  0  Anorexia:  5  Fatigue:  4  Insomnia:  0  Restlessness:  0  Agitation:  0     CAM / Delirium:  Negative  Constipation:  Positive  Diarrhea:  Negative    Modified Jason Scale:  6    Performance Status:  50    Living Arrangements:  Lives with spouse    Psychosocial/Cultural: Lives with her  and they have 1 son who is a  and they are expecting their first grandchild in July/22    Spiritual:  F - Myranda and Belief:  Anabaptism  I - Importance:  Very important       Advance Care Planning   Advance Directives:   Living Will: No    LaPOST: No    Do Not Resuscitate Status: Yes    Medical Power of : No        Oral Declaration: Yes   Witnesses:  Tammy Giron and Vanesa Rdz   Agent's Name:   Osvaldo Fam ()    Decision Making:  Patient answered questions              Physical Exam:  Vitals: Pulse: (!) 120 (06/01/22 1114)  BP: 131/82 (06/01/22 1114)  SpO2: (!) 91 % (06/01/22 1114)  Physical Exam  Constitutional:       General: She is not in acute distress.     Comments: Sitting in a wheelchair   HENT:      Head: Normocephalic and atraumatic.      Nose:      Comments: Wearing O2 NC  Eyes:      General: No scleral icterus.  Pulmonary:      Effort: Pulmonary effort is normal. Tachypnea and prolonged expiration present. No respiratory distress.   Abdominal:      General: There is no distension.   Musculoskeletal:      Cervical back: Neck supple.      Comments: L arm cast    Skin:     Findings: No rash.   Neurological:      Mental Status: She is alert and oriented to person, place, and time.   Psychiatric:         Mood and Affect: Affect normal. Mood is anxious.            Labs:  CBC:   WBC   Date Value Ref Range Status   04/04/2022 4.70 3.90 - 12.70 K/uL Final     MCV   Date Value Ref Range Status   04/04/2022 94 82 - 98 fL Final        Final     Protein:   Total Protein   Date Value Ref Range Status   04/03/2022 7.4 6.0 - 8.4 g/dL Final       Radiology:I have reviewed all pertinent imaging results/findings within the past 24 hours.    Results for orders placed during the hospital encounter of 02/14/22  Echo  Interpretation Summary  · The left ventricle is normal in size with normal systolic function.  · The estimated ejection fraction is 65%.  · Normal left ventricular diastolic function.  · Tachycardia was present during the study  · Normal right ventricular size with normal right ventricular systolic function.  · Mild tricuspid regurgitation.  · There are segmental left ventricular wall motion abnormalities.  · Mild left atrial enlargement.  · Normal central venous pressure (3 mmHg).  · The estimated PA systolic pressure is 28 mmHg.            60 minutes of total time spent on the encounter, which includes face to face time and non-face to face time preparing to see the patient (eg, review of tests), Obtaining and/or reviewing separately obtained history, Documenting clinical information in the electronic or other health record, Independently interpreting results (not separately reported) and communicating results to the patient/family/caregiver, or Care coordination (not separately reported).    21 minutes spent in discussing ACP    This note was partially created using [employer] Voice Recognition software. Typographical and content errors may occur with this process. While efforts are made to detect and correct such errors, in some cases errors will persist. For this reason, wording in this document should be considered in the proper context and not strictly verbatim.      Encounter occurred during period of COVID-19 emergency. Encounter performed under the concurrent  guidelines, limitations and protocols.      Signature: Tammy Vazquez MD

## 2022-06-01 NOTE — TELEPHONE ENCOUNTER
No new care gaps identified.  [facility] Embedded Care Gaps. Reference number: [id number]. 6/01/2022   2:42:02 PM CDT

## 2022-06-02 NOTE — PROGRESS NOTES
[facility]  Palliative Care   Psychosocial Assessment    Patient Name: Estefani Fam  MRN: [id number]  Palliative Care Provider: Tammy Vazquez MD   Primary Care Physician: Dustin Khan MD  Principal Problem: Centrilobular emphysema     Reason for Referral: Advanced Care Planning and Psychosocial Support       Present during Interview: patient and spouse/SO.      Primary Language:English   Needed: no      Past Medical Situation:   PMH:   Past Medical History:   Diagnosis Date    Cataract     COPD (chronic obstructive pulmonary disease)     COVID-19     History of COVID-19 1/17/2021    Still with mild dyspnea. Encouraged return to pulmonary rehab Recommend scheduling vaccination when appointment is available.     History of retinal hemorrhage     Lobar pneumonia 11/14/2021    Recurrent in RLL, no endobronchial lesion Needs speech evaluation and MBSS for recurrent aspiration in setting of dysphagia  Will [facility] pulmonary [facility]    Pathological fracture due to osteoporosis 7/6/2020    Retinal histoplasmosis     Secondary pulmonary arterial hypertension 7/3/2018    Secondary to emphysema and chronic respiratory failure, mild.     Mental Health/Substance Use History: None identified   Risk of Abuse, neglect or exploitation: None identified   Current or Previous Trauma and/or evidence of PTSD: None identified   Non-traditional Health practices: None identified     Understanding of diagnosis and prognosis: Good   Experience/Comfort level with health care system: Good   Patients Mental Status: Alert to person, place, time and situation with stable mood.     Socio-Economic Factors/Resources:  Address: [address] [place] [address]  Phone Number: [phone] (home)     Marital Status:   Household composition: Patient and spouse   Children: One son     Patient/Family perceptions about Caregiving Needs; availability and capacity: Patient's  family available to provide assistance as needs arise.     Family Dynamics/Relationships: Healthy     Patient/Family Strengths/Resilience: Patient exhibits a good understanding of her illness and appears motivated to work towards her goals.   Patient/Family Coping: Appropriately     Activities of Daily Living: Assistance as needed when SOB  Support Systems-Family & Community (Home Health, HME etc):  N/A    Transportation:  yes    Work/Education History: unemployed  Self-Care Activities/Hobbies: Puzzles, watching TV      History: no    Financial Resources:Medicare      Advanced Care Planning & Legal Concerns:   Advanced Directives/Living Will: no  [place]POST/POLST: no   Planning:  no    Power of : no    Emergency Contacts: Spouse/Osvaldo Fam     Spirituality, Culture & Coping Mechanisms:  F- Myranda and Belief: Sabianism     I - Importance: High     C - Community/Culture Values: Patient receives communion at home regularly      A - Address in Care: Patient's needs met at this time.       Goals/Hopes/Expectations: Patient hopes to remain out of the hospital and live to see the birth of her grandson due in July.   Fears/Anxiety/Concerns: Patient reports high anxiety when becoming SOB.         Complicated Bereavement Risk Assessment Tool (CBRAT)  Reference:  [facility] Clinical Practice Group (May 2016). Bereavement Risk Screening and Management Guidelines.  Retrieved from: http://www.grpcc.com.au/wp-content/uploads//UOFEW-Vjrbvqinsxv-Ahahicizj-and-Management-Guideline-2016.pdf      Bereaved Client Characteristics   ? Under 18      no  ? Was a Twin   no  ? Young Spouse   no  ? Elderly Spouse    yes  ? Isolated    no  ? Lacks Meaningful Social Support   no  ? Dissatisfied with help available during illness   no  ? New to Financial [employer] no  ? New to Decision-Making   no    Illness  ? Inherited Disorder   no  ? Stigmatized Disease in the  family/community   no  ? Lengthy/Burdensome   no     Bereaved Client's History of Loss   ? Cumulative Multiple Losses   no  ? Previous Mental Health Illnesses   no  ? Current Mental Health Illness   no  ? Other Significant Health Issues   no   ? Migrant/Refugee   no Death  ? Sudden or Unexpected   no  ? Traumatic Circumstances Associated with Death   no  ? Significant Cultural/Social Burdens as a result of Death   yes   Relationship with   ? Profound Lifelong Partner   yes  ? Highly Dependent    no  ? Antagonistic   no  ? Ambivalent   no  ? Deeply Connected   yes  ? Culturally Defined   yes   Risk Factors Scores  0-2  Low  3-5  Moderate  5+  High  All persons scoring moderate to high presume to be at risk**    (** It is acknowledged that protective factors and resilience may outweigh apparent risk factors.      Total Risk Factors Score:   Low to moderate     SW accompanied MD during initial visit with patient and spouse/Osvaldo.  Patient exhibits a good awareness of her disease noting she understands her COPD is not curable.  Patient hopes to remain out of the hospital and live to meet her first grandson, due this July.  GoC discussion initiated by MD.  Patient reports she would assign her spouse as primary decision maker and only son (Osvaldo Henriquez) as alternate.  Patient reports she does not wish to be resuscitated if her heart were to stop.  Patient would like to avoid life support and would only wish to survive if she could interact with others.  Evonne introduced by MD. Patient to review document with spouse and son and discuss her wishes further with team at future visit.  Patient and spouse deny psychosocial concerns at this time. SW remains available to provide psychosocial assistance and emotional support. SW will continue to follow.    Vanesa Caceres, hospitalsJULI  Outpatient   Palliative Medicine

## 2022-06-02 NOTE — TELEPHONE ENCOUNTER
"Called and spoke to pt. Pt states she was seen by Dr Vazquez yesterday in palliative medicine and they prescribed her Celexa 20 mg. Pt states PCP only prescribed 10 mg of Celexa. Pt states she doesn't really feel any more anxious than she did but her doctors think she is very anxious and the increase in medication "will help her breathing". Pt wants to know what Dr Khan thinks about the increase in medication.  "

## 2022-06-02 NOTE — TELEPHONE ENCOUNTER
----- Message from Irma Becerra sent at 6/2/2022  1:09 PM CDT -----  Contact: [phone]  Patient is now taking citalopram (CELEXA) 20 MG tablet instead of 10 MG and would like to know what does DR. Khan thinks about that, please call and advise.

## 2022-06-03 ENCOUNTER — EXTERNAL HOME HEALTH (OUTPATIENT)
Dept: HOME HEALTH SERVICES | Facility: HOSPITAL | Age: 72
End: 2022-06-03
Payer: MEDICARE

## 2022-06-03 NOTE — TELEPHONE ENCOUNTER
Called and spoke to patient. Relayed the following message from PCP:    Sound reasonable to try   We can always go back to 10 mg daily if she develops any side effects.       Pt verbalized understanding.

## 2022-06-13 ENCOUNTER — DOCUMENT SCAN (OUTPATIENT)
Dept: HOME HEALTH SERVICES | Facility: HOSPITAL | Age: 72
End: 2022-06-13
Payer: MEDICARE

## 2022-06-15 ENCOUNTER — TELEPHONE (OUTPATIENT)
Dept: INTERNAL MEDICINE | Facility: CLINIC | Age: 72
End: 2022-06-15
Payer: MEDICARE

## 2022-06-15 NOTE — TELEPHONE ENCOUNTER
----- Message from Minerva Mcdonald sent at 6/15/2022  3:04 PM CDT -----  Contact: Estefani [phone]  Patient needs call back. She had a virtual today @ 2:00 and states she has been sitting all this time waiting for her visit

## 2022-06-20 ENCOUNTER — TELEPHONE (OUTPATIENT)
Dept: INTERNAL MEDICINE | Facility: CLINIC | Age: 72
End: 2022-06-20
Payer: MEDICARE

## 2022-06-20 ENCOUNTER — OFFICE VISIT (OUTPATIENT)
Dept: ORTHOPEDICS | Facility: CLINIC | Age: 72
End: 2022-06-20
Payer: MEDICARE

## 2022-06-20 ENCOUNTER — OFFICE VISIT (OUTPATIENT)
Dept: INTERNAL MEDICINE | Facility: CLINIC | Age: 72
End: 2022-06-20
Payer: MEDICARE

## 2022-06-20 ENCOUNTER — HOSPITAL ENCOUNTER (OUTPATIENT)
Dept: RADIOLOGY | Facility: HOSPITAL | Age: 72
Discharge: HOME | End: 2022-06-20
Attending: NURSE PRACTITIONER
Payer: MEDICARE

## 2022-06-20 VITALS — BODY MASS INDEX: 21.68 KG/M2 | HEIGHT: 68 IN | WEIGHT: 143.06 LBS

## 2022-06-20 DIAGNOSIS — F41.9 ANXIETY: Primary | ICD-10-CM

## 2022-06-20 DIAGNOSIS — M25.532 LEFT WRIST PAIN: Primary | ICD-10-CM

## 2022-06-20 DIAGNOSIS — M25.532 LEFT WRIST PAIN: ICD-10-CM

## 2022-06-20 DIAGNOSIS — S52.502D CLOSED FRACTURE OF DISTAL END OF LEFT RADIUS WITH ROUTINE HEALING, UNSPECIFIED FRACTURE MORPHOLOGY, SUBSEQUENT ENCOUNTER: Primary | ICD-10-CM

## 2022-06-20 PROCEDURE — 99213 PR OFFICE/OUTPT VISIT, EST, LEVL III, 20-29 MIN: ICD-10-PCS | Mod: S$PBB,,, | Performed by: NURSE PRACTITIONER

## 2022-06-20 PROCEDURE — 99213 OFFICE O/P EST LOW 20 MIN: CPT | Mod: PBBFAC | Performed by: NURSE PRACTITIONER

## 2022-06-20 PROCEDURE — 99999 PR PBB SHADOW E&M-EST. PATIENT-LVL III: CPT | Mod: PBBFAC,,, | Performed by: NURSE PRACTITIONER

## 2022-06-20 PROCEDURE — 99442 PR PHYSICIAN TELEPHONE EVALUATION 11-20 MIN: ICD-10-PCS | Mod: 95,,, | Performed by: INTERNAL MEDICINE

## 2022-06-20 PROCEDURE — 73110 X-RAY EXAM OF WRIST: CPT | Mod: TC,LT

## 2022-06-20 PROCEDURE — 99999 PR PBB SHADOW E&M-EST. PATIENT-LVL III: ICD-10-PCS | Mod: PBBFAC,,, | Performed by: NURSE PRACTITIONER

## 2022-06-20 PROCEDURE — 73110 X-RAY EXAM OF WRIST: CPT | Mod: 26,LT,, | Performed by: RADIOLOGY

## 2022-06-20 PROCEDURE — 99442 PR PHYSICIAN TELEPHONE EVALUATION 11-20 MIN: CPT | Mod: 95,,, | Performed by: INTERNAL MEDICINE

## 2022-06-20 PROCEDURE — 99213 OFFICE O/P EST LOW 20 MIN: CPT | Mod: S$PBB,,, | Performed by: NURSE PRACTITIONER

## 2022-06-20 PROCEDURE — 73110 XR WRIST COMPLETE 3 VIEWS LEFT: ICD-10-PCS | Mod: 26,LT,, | Performed by: RADIOLOGY

## 2022-06-20 RX ORDER — CITALOPRAM 10 MG/1
10 TABLET ORAL DAILY
Qty: 90 TABLET | Refills: 3 | Status: SHIPPED | OUTPATIENT
Start: 2022-06-20 | End: 2022-11-10

## 2022-06-20 NOTE — PROGRESS NOTES
SUBJECTIVE:     Chief Complaint & History of Present Illness:  Estefani Fam is a Established 71 y.o. year old female patient presenting today for follow up for her left distal radius and possible ulna styloid fracture.  She was last seen by me on 5/25/22.  At that visit, case was discussed with Dr. Turner who recommended non-operative management.  He recommended placing her in a sugar tong splint for 4-6 weeks, followed by a Linch with elbow protection for a couple weeks afterwards.    Currently she has no pain.  She is hoping to keep the splint off today.  Denies paraesthesia to her left lower arm.  She is RHD.  She had previously fractured her right wrist years ago which was surgically repaired at [facility].  She denies falls or injuries since her last clinic visit.  She is a chronic COPD and is on supplemental oxygen 3 L the is nasal cannula and followed by Dr. Norman in pulmonology.  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  Patient states she has no pain and when she does it is adequately controlled with Advil.    Past Medical History:   Diagnosis Date    Cataract     COPD (chronic obstructive pulmonary disease)     COVID-19     History of COVID-19 1/17/2021    Still with mild dyspnea. Encouraged return to pulmonary rehab Recommend scheduling vaccination when appointment is available.     History of retinal hemorrhage     Lobar pneumonia 11/14/2021    Recurrent in RLL, no endobronchial lesion Needs speech evaluation and MBSS for recurrent aspiration in setting of dysphagia  Will need pulmonary rehab at [facility].    Pathological fracture due to osteoporosis 7/6/2020    Retinal histoplasmosis     Secondary pulmonary arterial hypertension 7/3/2018    Secondary to emphysema and chronic respiratory failure, mild.       Past Surgical History:   Procedure Laterality Date    BRONCHOSCOPY WITH FLUOROSCOPY N/A 10/28/2019     Procedure: BRONCHOSCOPY, WITH FLUOROSCOPY;  Surgeon: Brooklynn Ruiz MD;  Location: [facility] OR [address] [place] [address];  Service: Endoscopy;  Laterality: N/A;    HAND SURGERY         Family History   Problem Relation Age of Onset    Macular degeneration Mother     Colon cancer Mother     Stroke Father     Colon cancer Father     Amblyopia Neg Hx     Blindness Neg Hx     Cataracts Neg Hx     Diabetes Neg Hx     Glaucoma Neg Hx     Hypertension Neg Hx     Retinal detachment Neg Hx     Strabismus Neg Hx     Thyroid disease Neg Hx        Review of patient's allergies indicates:   Allergen Reactions    Albuterol     Ipratropium analogues      Difficulty breathing         Current Outpatient Medications:     acetylcysteine 600 mg Cap, Take 1 capsule (600 mg total) by mouth 2 (two) times daily., Disp: 90 capsule, Rfl: 3    ascorbic acid, vitamin C, (VITAMIN C) 500 MG tablet, Take 500 mg by mouth 2 (two) times daily. , Disp: , Rfl:     azelastine (ASTELIN) 137 mcg (0.1 %) nasal spray, INHALE 1 SPRAY BY NASAL ROUTE TWICE DAILY OR AS DIRECTED, Disp: 30 mL, Rfl: 3    azithromycin (Z-JERSEY) 250 MG tablet, Take 1 tablet (250 mg total) by mouth every Mon, Wed, Fri., Disp: 36 tablet, Rfl: 3    BREO ELLIPTA 200-25 mcg/dose DsDv diskus inhaler, INHALE 1 PUFF INTO THE LUNGS DAILY, Disp: 60 each, Rfl: 11    calcium carbonate (OS-STEPHANIE) 600 mg calcium (1,500 mg) Tab, Take 1 tablet (600 mg total) by mouth once daily. Take Levofloxacin antibiotic at least 2 hours before calcium., Disp: , Rfl: 0    citalopram (CELEXA) 20 MG tablet, Take 1 tablet (20 mg total) by mouth once daily., Disp: 30 tablet, Rfl: 11    fluticasone propionate (FLONASE) 50 mcg/actuation nasal spray, INSTILL 1 SPRAY IN EACH NOSTRIL EVERY DAY, Disp: 16 g, Rfl: 0    levalbuterol (XOPENEX) 0.63 mg/3 mL nebulizer solution, USE 1 VIAL IN NEBULIZER EVERY 8 HOURS AS NEEDED FOR WHEEZE, Disp: 750 mL, Rfl: 2    MULTIVIT-IRON-MIN-FOLIC ACID 3,500-18-0.4 UNIT-MG-MG ORAL CHEW,  "Take 1 tablet by mouth once daily. Take Levofloxacin antibiotic at least 2 hours before multivitamin., Disp: , Rfl: 0    pantoprazole (PROTONIX) 40 MG tablet, TAKE 1 TABLET(40 MG) BY MOUTH EVERY DAY, Disp: 30 tablet, Rfl: 0    pulse oximeter (PULSE OXIMETER) device, by Apply Externally route 2 (two) times a day. Use twice daily at 8 AM and 3 PM and record the value in MyChart as directed., Disp: 1 each, Rfl: 0    sodium chloride (OCEAN) 0.65 % nasal spray, 1 spray by Nasal route 2 (two) times daily., Disp: 88 mL, Rfl: 12    umeclidinium (INCRUSE ELLIPTA) 62.5 mcg/actuation inhalation capsule, Inhale 62.5 mcg into the lungs once daily., Disp: 90 each, Rfl: 3    Current Facility-Administered Medications:     albuterol inhaler 2 puff, 2 puff, Inhalation, Q20 Min PRN, Miles [place] MD    Review of Systems:  ROS:  Constitutional: no fever or chills  Eyes: no visual changes  ENT: no nasal congestion or sore throat  Respiratory: no cough or shortness of breath  Cardiovascular: no chest pain or palpitations  Gastrointestinal: no nausea or vomiting, tolerating diet  Genitourinary: no hematuria or dysuria  Integument/Breast: no rash or pruritis  Hematologic/Lymphatic: no easy bruising or lymphadenopathy  Musculoskeletal: Left wrist fracture  Neurological: no seizures or tremors  Behavioral/Psych: no auditory or visual hallucinations  Endocrine: no heat or cold intolerance        OBJECTIVE:     PHYSICAL EXAM:  There were no vitals taken for this visit.  Estimated body mass index is 21.76 kg/m² as calculated from the following:    Height as of 5/25/22: 5' 8" (1.727 m).    Weight as of 5/25/22: 64.9 kg (143 lb 1.3 oz).   General: Pleasant, cooperative, NAD   HEENT: NCAT, sclera nonicteric   Lungs: Respirations are equal and unlabored.   Abdomen: Soft and non-tender.  CV: 2+ bilateral upper and lower extremity pulses.   Skin: Intact throughout LE with no rashes, erythema, or lesions  Extremities: No LE edema, NVI lower " extremities    left wrist   History of injury: fall, date: 5/16/22  Pain: none  Neurological complaints: none  Vascular complaints: none  Previous treatments:  Reduction in the ED and placement of a sugar-tong splint by orthopedic resident.  Condition of skin:  intact  Hand Exam: limited ROM of hand, able to make a fist.  ROM:  Full range of motion of the fingers.    Wrist Exam: ROM is 5 degrees in all planes, Mild TTP.      RADIOGRAPHS:  X-ray of the left wrist obtained and personally reviewed by me, findings show a distal radius fracture that appears impacted.  No new fractures seen.  Her chip fracture in the ulna styloid is not clearly seen today.  No new fractures seen.      ASSESSMENT/PLAN:       ICD-10-CM ICD-9-CM   1. Closed fracture of distal end of left radius with routine healing, unspecified fracture morphology, subsequent encounter  S52.502D V54.12       Plan: We discussed with the patient at length all the different treatment options available for her wrist, including anti-inflammatories, acetaminophen, rest, ice, physical therapy to include strengthening, range of motion exercise ultrasound, splinting,  occasional cortisone injections for temporary relief,  or possible surgical interventions.    -Estefani Fam presents to clinic today with c/c left wrist fracture secondary to trip and fall on 5/16/22.  -X-ray as above.  Per my prior note, I had spoke with Dr. Turner who recommended non-op management and to keep he patient in a sugar-tong splint for the next 4-6 weeks and then convert her to a Seward splint with elbow protection to prevent pronation and supination for a couple of weeks after that and then he removed the elbow protection.  At that point will get her into occupational therapy.  -Will transition to Seward splint today.  -Recommend RICE therapy.  -She may continue to use over-the-counter analgesics for pain control.  -She will follow-up in 4 weeks at which time we will repeat her  left wrist x-rays and consider OT referral pending fracture healing.  Future Appointments   Date Time Provider Department Center   6/20/2022  5:00 PM Dustin Khan MD [facility]   7/6/2022  3:00 PM Tammy Vazquez MD [facility] PAL [facility]   7/20/2022  3:30 PM Marco Macdonald NP [facility] ORTH[facility]   10/24/2022  1:45 PM INJECTION [facility] AMB INF [facility]

## 2022-06-20 NOTE — PROGRESS NOTES
Established Patient - Audio Only Telehealth Visit     The patient location is: [place]  The chief complaint leading to consultation is: Anxiety  Visit type: Virtual visit with audio only (telephone)  Total time spent with patient: 11 minute       The reason for the audio only service rather than synchronous audio and video virtual visit was related to technical difficulties or patient preference/necessity.     Each patient to whom I provide medical services by telemedicine is:  (1) informed of the relationship between the physician and patient and the respective role of any other health care provider with respect to management of the patient; and (2) notified that they may decline to receive medical services by telemedicine and may withdraw from such care at any time. Patient verbally consented to receive this service via voice-only telephone call.       HPI:  Anxiety    Patient with end-stage COPD on home oxygen with anxiety.  Last visit started patient on citalopram 10 mg daily.  She was subsequently seen by palliative care who recommended increasing to 20 mg. Patient reports that the 20 mg made her drowsy so she went back down to 10 mg.  Overall feels like her anxiety is in a much better place.  Reports significant improvement prior to starting medication.  Reports that she does have some anxiety associated with dyspnea foot overall less anxious     Assessment and plan:      1. Anxiety  Improved with medication  Continue citalopram 10 mg daily.  If patient has further issues with anxiety recommend trying Lexapro 10 since she did not tolerate the higher dose of citalopram.  - citalopram (CELEXA) 10 MG tablet; Take 1 tablet (10 mg total) by mouth once daily.  Dispense: 90 tablet; Refill: 3                          This service was not originating from a related E/M service provided within the previous 7 days nor will  to an E/M service or procedure within the next 24 hours or my soonest available appointment.   Prevailing standard of care was able to be met in this audio-only visit.        Answers for HPI/ROS submitted by the patient on 6/20/2022  activity change: No  unexpected weight change: No  neck pain: No  hearing loss: No  rhinorrhea: No  trouble swallowing: No  eye discharge: No  visual disturbance: No  chest tightness: No  wheezing: No  chest pain: No  palpitations: No  blood in stool: No  constipation: No  vomiting: No  diarrhea: No  polydipsia: No  polyuria: No  difficulty urinating: No  hematuria: No  menstrual problem: No  dysuria: No  joint swelling: No  arthralgias: No  headaches: No  weakness: No  confusion: No  dysphoric mood: No

## 2022-06-20 NOTE — TELEPHONE ENCOUNTER
----- Message from Leda Benson sent at 6/20/2022  1:07 PM CDT -----  Contact: Pt @[phone]  [email or web address]     Patient is calling for Medical Advice regarding:    Pt has an 1pm appt virtual appt today and can not login    Would like response via [employer]:  ##call back     Comments:   She states that she did all the pre-checkin and she click on begin visit but it will not open up for her. Please call back to advise.

## 2022-06-21 ENCOUNTER — TELEPHONE (OUTPATIENT)
Dept: INTERNAL MEDICINE | Facility: CLINIC | Age: 72
End: 2022-06-21
Payer: MEDICARE

## 2022-06-21 ENCOUNTER — PATIENT MESSAGE (OUTPATIENT)
Dept: ORTHOPEDICS | Facility: CLINIC | Age: 72
End: 2022-06-21
Payer: MEDICARE

## 2022-06-21 NOTE — TELEPHONE ENCOUNTER
----- Message from Zbigniew Jhaveri sent at 6/21/2022 10:13 AM CDT -----  Contact: Saundra(Home Health) @ [phone]  Saundra(Home Health Physical Therapy Nurse) requesting verbal order for patient to continue home health Physical Therapy patient has not met endurance goals. Please advise.

## 2022-06-22 ENCOUNTER — TELEPHONE (OUTPATIENT)
Dept: ORTHOPEDICS | Facility: CLINIC | Age: 72
End: 2022-06-22
Payer: MEDICARE

## 2022-06-22 NOTE — TELEPHONE ENCOUNTER
Spoke with Saundra with [facility] and updated on plan of care.  She said  is re certifying her for another 2 months to build up her endurance.

## 2022-06-25 PROCEDURE — G0179 MD RECERTIFICATION HHA PT: HCPCS | Mod: ,,, | Performed by: INTERNAL MEDICINE

## 2022-06-25 PROCEDURE — G0179 PR HOME HEALTH MD RECERTIFICATION: ICD-10-PCS | Mod: ,,, | Performed by: INTERNAL MEDICINE

## 2022-06-29 ENCOUNTER — DOCUMENT SCAN (OUTPATIENT)
Dept: HOME HEALTH SERVICES | Facility: HOSPITAL | Age: 72
End: 2022-06-29
Payer: MEDICARE

## 2022-06-30 ENCOUNTER — EXTERNAL CHRONIC CARE MANAGEMENT (OUTPATIENT)
Dept: PRIMARY CARE CLINIC | Facility: CLINIC | Age: 72
End: 2022-06-30
Payer: MEDICARE

## 2022-06-30 PROCEDURE — 99490 CHRNC CARE MGMT STAFF 1ST 20: CPT | Mod: PBBFAC | Performed by: INTERNAL MEDICINE

## 2022-06-30 PROCEDURE — 99490 PR CHRONIC CARE MGMT, 1ST 20 MIN: ICD-10-PCS | Mod: S$PBB,,, | Performed by: INTERNAL MEDICINE

## 2022-06-30 PROCEDURE — 99490 CHRNC CARE MGMT STAFF 1ST 20: CPT | Mod: S$PBB,,, | Performed by: INTERNAL MEDICINE

## 2022-07-01 NOTE — TELEPHONE ENCOUNTER
No new care gaps identified.  [facility] Embedded Care Gaps. Reference number: [id number]. 7/01/2022   5:31:14 AM CDT

## 2022-07-01 NOTE — TELEPHONE ENCOUNTER
Refill Routing Note   Medication(s) are not appropriate for processing by [facility] for the following reason(s):      - Drug-Drug Interaction (osteoporosis and pantoprazole)    [facility] action(s):  Defer Medication-related problems identified: Drug-drug interaction     Medication Therapy Plan: Drug drug interaction: Osteoporosis and pantoprazole  Medication reconciliation completed: No     Appointments  past 12m or future 3m with PCP    Date Provider   Last Visit   6/20/2022 Dustin Khan MD   Next Visit   9/20/2022 Dustin Khan MD   ED visits in past 90 days: 1        Note composed:3:47 PM 07/01/2022

## 2022-07-05 ENCOUNTER — TELEPHONE (OUTPATIENT)
Dept: PALLIATIVE MEDICINE | Facility: CLINIC | Age: 72
End: 2022-07-05
Payer: MEDICARE

## 2022-07-06 RX ORDER — PANTOPRAZOLE SODIUM 40 MG/1
TABLET, DELAYED RELEASE ORAL
Qty: 90 TABLET | Refills: 0 | Status: SHIPPED | OUTPATIENT
Start: 2022-07-06 | End: 2022-09-30

## 2022-07-07 ENCOUNTER — OFFICE VISIT (OUTPATIENT)
Dept: PALLIATIVE MEDICINE | Facility: CLINIC | Age: 72
End: 2022-07-07
Payer: MEDICARE

## 2022-07-07 DIAGNOSIS — J43.2 CENTRILOBULAR EMPHYSEMA: Primary | ICD-10-CM

## 2022-07-07 DIAGNOSIS — F41.9 ANXIETY: ICD-10-CM

## 2022-07-07 DIAGNOSIS — Z51.5 QUALITY OF LIFE PALLIATIVE CARE ENCOUNTER: ICD-10-CM

## 2022-07-07 PROCEDURE — 99214 OFFICE O/P EST MOD 30 MIN: CPT | Mod: 95,,, | Performed by: STUDENT IN AN ORGANIZED HEALTH CARE EDUCATION/TRAINING PROGRAM

## 2022-07-07 PROCEDURE — 99214 PR OFFICE/OUTPT VISIT, EST, LEVL IV, 30-39 MIN: ICD-10-PCS | Mod: 95,,, | Performed by: STUDENT IN AN ORGANIZED HEALTH CARE EDUCATION/TRAINING PROGRAM

## 2022-07-07 NOTE — PROGRESS NOTES
Consult Note  Palliative Medicine Clinic      Consult Requested By: Dr. Brooklynn Ruiz    Primary Care Physician: Dustin Khan MD    Reason for Consult: Symptom management in the setting of Advanced COPD    The [place] location is: [place]  The chief complaint leading to consultation is: SOB    Visit type: audiovisual    Face to Face time with patient: 10min  30 minutes of total time spent on the encounter, which includes face to face time and non-face to face time preparing to see the patient (eg, review of tests), Obtaining and/or reviewing separately obtained history, Documenting clinical information in the electronic or other health record, Independently interpreting results (not separately reported) and communicating results to the patient/family/caregiver, or Care coordination (not separately reported).       Each patient provided with medical services by telemedicine is:  (1) informed of the relationship between the physician and patient and the respective role of any other health care provider with respect to management of the patient; and (2) notified that he or she may decline to receive medical services by telemedicine and may withdraw from such care at any time.      ASSESSMENT/PLAN:     Plan/Recommendations:  Diagnoses and all orders for this visit:    Centrilobular emphysema / Chronic respiratory failure with hypoxia, on home oxygen therapy / Secondary pulmonary arterial hypertension  -On Breo Elliptia  -On Acetylcysteine caps BID, on azythro prophylaxis, on Incruse ellipta and levalbuterol nebs  -Non-pharmacologic therapies discussed, activity modification, balance rest with activity  -Feeling better, reporting less dyspnea and more physical activity    Anxiety  -  Increase in Celexa caused somnolence now she is back on 10mg qhs  -Feels that anxiety is well managed   -Benzos not recommended at this point because of h/o of falls       Quality of life palliative care encounter    1) Symptoms:  anxiety  "and dyspnea as above     2) Medicolegal: Has decision making capacity.    is surrogate decision maker.   No HCPOA.     3) Psychosocial:  support system consists of  and children     4) Spiritual: Gnosticist    5) Prognostication: Patient with end stage COPD, limited functional status - prognosis is guarded    6) Understanding of disease and Illness Trajectory: Patient  has  adequate understanding of her illness, they can benefit from continued education on what to expect in the future.      7) Goals of care:      ACP Date: 2022  During this visit, I engaged the patient  in the advance care planning process.  The patient and I reviewed the role for advance directives and their purpose in directing future healthcare if the patient's unable to speak for him/herself.  At this point in time, the patient does have full decision-making capacity.  Discussed the importance of assigning a HCPOA, however she would name her  and then her only son which by law are her surrogate decision makers. Provided them with the ACP packet for them to review.  shared that when his father  the medical team asked him to decide on "pulling the plug" despite the fact that his father had a living will, so he believes ACP documents don't really help. We discussed different extreme health states that she could experience, and reviewed what kind of medical care she would want in those situations.  The patient communicated that  she would not want machines/life-sustaining treatments used.  Along those lines, the patient does not wish to have CPR or other invasive treatments performed when her heart and/or breathing stops. I communicated to the patient that a DNR order would be placed in her medical record to reflect this preference. We did specifically address the patient's likely prognosis, which is guarded.  We explored the patient's values and preferences for future care.  The patient endorses that what is most " important right now is to focus on quality of life, even if it means sacrificing a little time and improvement in condition but with limits to invasive therapies   She also hopes to meet her first granddaughter that will be born in July. Accordingly, we have decided that the best plan to meet the patient's goals includes continuing with treatment. I did explain the role for hospice care at this stage of the patient's illness, including its ability to help the patient live with the best quality of life possible.  We will not be making a hospice referral.      8) Code status: DNR/DNI    9) Advance directives: none on file- provided them w/ HCPOA and [place] form to review      Summary and recommendations:  -Graded excercises      Follow up: 4m       SUBJECTIVE:     History obtained from: Patient and       complaint: No chief complaint on file.      History of Present Illness:  Mrs. Estefani Fam is 71 y.o. year old female presenting with end stage COPD.  Referred to Palliative Care for evaluation and management of physical symptoms, advance care planning, and additional support.. She attended the appointment with her       Interval History:  Feeling better, less dyspnea more active, O2 sat 95% on 3L    Was able to meet her grandchild born 7/4     She has an appt w/ ortho 7/20 to reassess need for cast    Constipation resolved    Good appetite    Sleeping well     Disease History:  Former smoking (quit in 2017), centrilobular emphysema with chronic obstructive pulmonary disease (COPD), chornic hypoxic respiratory failure on supplemental oxygen (3 liters/minute), secondary pulmonary hypertension, gastroesophageal reflux disease, osteoporosis, COVID-19 on 1/17/2021    Previous experience or exposure to a serious illness: Her  had to make EoL decisions for his father    Past Medical History:   Diagnosis Date    Cataract     COPD (chronic obstructive pulmonary disease)     COVID-19      History of COVID-19 1/17/2021    Still with mild dyspnea. Encouraged return to pulmonary rehab Recommend scheduling vaccination when appointment is available.     History of retinal hemorrhage     Lobar pneumonia 11/14/2021    Recurrent in RLL, no endobronchial lesion Needs speech evaluation and MBSS for recurrent aspiration in setting of dysphagia  Will need pulmonary rehab at [facility].    Pathological fracture due to osteoporosis 7/6/2020    Retinal histoplasmosis     Secondary pulmonary arterial hypertension 7/3/2018    Secondary to emphysema and chronic respiratory failure, mild.     Past Surgical History:   Procedure Laterality Date    BRONCHOSCOPY WITH FLUOROSCOPY N/A 10/28/2019    Procedure: BRONCHOSCOPY, WITH FLUOROSCOPY;  Surgeon: Brooklynn Ruiz MD;  Location: [facility] OR [address] [place] [address];  Service: Endoscopy;  Laterality: N/A;    HAND SURGERY       Family History   Problem Relation Age of Onset    Macular degeneration Mother     Colon cancer Mother     Stroke Father     Colon cancer Father     Amblyopia Neg Hx     Blindness Neg Hx     Cataracts Neg Hx     Diabetes Neg Hx     Glaucoma Neg Hx     Hypertension Neg Hx     Retinal detachment Neg Hx     Strabismus Neg Hx     Thyroid disease Neg Hx      Review of patient's allergies indicates:   Allergen Reactions    Albuterol     Ipratropium analogues      Difficulty breathing       Medications:    Current Outpatient Medications:     acetylcysteine 600 mg Cap, Take 1 capsule (600 mg total) by mouth 2 (two) times daily., Disp: 90 capsule, Rfl: 3    ascorbic acid, vitamin C, (VITAMIN C) 500 MG tablet, Take 500 mg by mouth 2 (two) times daily. , Disp: , Rfl:     azelastine (ASTELIN) 137 mcg (0.1 %) nasal spray, INHALE 1 SPRAY BY NASAL ROUTE TWICE DAILY OR AS DIRECTED, Disp: 30 mL, Rfl: 3    azithromycin (Z-JERSEY) 250 MG tablet, Take 1 tablet (250 mg total) by mouth every Mon, Wed, Fri., Disp: 36 tablet, Rfl: 3    BREO ELLIPTA 200-25 mcg/dose DsDv diskus  inhaler, INHALE 1 PUFF INTO THE LUNGS DAILY, Disp: 60 each, Rfl: 11    calcium carbonate (OS-STEPHANIE) 600 mg calcium (1,500 mg) Tab, Take 1 tablet (600 mg total) by mouth once daily. Take Levofloxacin antibiotic at least 2 hours before calcium., Disp: , Rfl: 0    fluticasone propionate (FLONASE) 50 mcg/actuation nasal spray, INSTILL 1 SPRAY IN EACH NOSTRIL EVERY DAY, Disp: 16 g, Rfl: 0    levalbuterol (XOPENEX) 0.63 mg/3 mL nebulizer solution, USE 1 VIAL IN NEBULIZER EVERY 8 HOURS AS NEEDED FOR WHEEZE, Disp: 750 mL, Rfl: 2    MULTIVIT-IRON-MIN-FOLIC ACID 3,500-18-0.4 UNIT-MG-MG ORAL CHEW, Take 1 tablet by mouth once daily. Take Levofloxacin antibiotic at least 2 hours before multivitamin., Disp: , Rfl: 0    pulse oximeter (PULSE OXIMETER) device, by Apply Externally route 2 (two) times a day. Use twice daily at 8 AM and 3 PM and record the value in [employer] as directed., Disp: 1 each, Rfl: 0    sodium chloride (OCEAN) 0.65 % nasal spray, 1 spray by Nasal route 2 (two) times daily., Disp: 88 mL, Rfl: 12    umeclidinium (INCRUSE ELLIPTA) 62.5 mcg/actuation inhalation capsule, Inhale 62.5 mcg into the lungs once daily., Disp: 90 each, Rfl: 3    citalopram (CELEXA) 20 MG tablet, Take 1 tablet (20 mg total) by mouth once daily., Disp: 30 tablet, Rfl: 11    pantoprazole (PROTONIX) 40 MG tablet, TAKE 1 TABLET(40 MG) BY MOUTH EVERY DAY, Disp: 30 tablet, Rfl: 0    Current Facility-Administered Medications:     albuterol inhaler 2 puff, 2 puff, Inhalation, Q20 Min PRN, Miles Jackson MD     database queried on 06/01/2022  by Tammy Vazquez . The results reviewed and considered with the clinical data in the decision whether or not to prescribe a controlled substance.    No controlled substance Rx prescribed    OBJECTIVE:       ROS:  Review of Systems   Constitutional: Negative for activity change, appetite change, fatigue and unexpected weight change.   Respiratory: Positive for shortness of breath. Negative  for cough and wheezing.    Cardiovascular: Negative for chest pain and leg swelling.   Gastrointestinal: Negative for constipation, diarrhea, nausea and vomiting.   Genitourinary: Negative for dysuria.   Musculoskeletal: Negative for arthralgias, back pain, gait problem and leg pain.   Neurological: Negative for dizziness, weakness and memory loss.   Psychiatric/Behavioral: The patient is not nervous/anxious.          Review of Symptoms    Symptom Assessment (ESAS 0-10 Scale)  Pain:  0  Dyspnea:  5  Anxiety:  1  Nausea:  0  Depression:  0  Anorexia:  0  Fatigue:  2  Insomnia:  0  Restlessness:  0  Agitation:  0     CAM / Delirium:  Negative  Constipation:  Positive  Diarrhea:  Negative    Modified Jason Scale:  6    Performance Status:  50    Living Arrangements:  Lives with spouse    Psychosocial/Cultural: Lives with her  and they have 1 son who is a  1 granddaughter born 7/4    Spiritual:  F - Myranda and Belief:  Advent  I - Importance:  Very important       Advance Care Planning   Advance Directives:   Living Will: No    [place]: No    Do Not Resuscitate Status: Yes    Medical Power of : No        Oral Declaration: Yes   Witnesses:  Tammy Giron and Vanesa Rdz   Agent's Name:   Osvaldo Fam ()    Decision Making:  Patient answered questions              Physical Exam:  Physical Exam  Constitutional:       General: She is not in acute distress.  HENT:      Head: Normocephalic and atraumatic.      Nose:      Comments: Wearing O2 NC  Eyes:      General: No scleral icterus.  Pulmonary:      Effort: Pulmonary effort is normal. No respiratory distress.   Abdominal:      General: There is no distension.   Musculoskeletal:      Cervical back: Neck supple.   Skin:     Findings: No rash.   Neurological:      Mental Status: She is alert and oriented to person, place, and time.   Psychiatric:         Mood and Affect: Affect normal.           Labs:  CBC:   WBC   Date Value Ref Range Status    04/04/2022 4.70 3.90 - 12.70 K/uL Final     MCV   Date Value Ref Range Status   04/04/2022 94 82 - 98 fL Final        Final     Protein:   Total Protein   Date Value Ref Range Status   04/03/2022 7.4 6.0 - 8.4 g/dL Final       Radiology:I have reviewed all pertinent imaging results/findings within the past 24 hours.    Results for orders placed during the hospital encounter of 02/14/22  Echo  Interpretation Summary  · The left ventricle is normal in size with normal systolic function.  · The estimated ejection fraction is 65%.  · Normal left ventricular diastolic function.  · Tachycardia was present during the study  · Normal right ventricular size with normal right ventricular systolic function.  · Mild tricuspid regurgitation.  · There are segmental left ventricular wall motion abnormalities.  · Mild left atrial enlargement.  · Normal central venous pressure (3 mmHg).  · The estimated PA systolic pressure is 28 mmHg.            30minutes of total time spent on the encounter, which includes face to face time and non-face to face time preparing to see the patient (eg, review of tests), Obtaining and/or reviewing separately obtained history, Documenting clinical information in the electronic or other health record, Independently interpreting results (not separately reported) and communicating results to the patient/family/caregiver, or Care coordination (not separately reported).        This note was partially created using [employer] Voice Recognition software. Typographical and content errors may occur with this process. While efforts are made to detect and correct such errors, in some cases errors will persist. For this reason, wording in this document should be considered in the proper context and not strictly verbatim.      Encounter occurred during period of COVID-19 emergency. Encounter performed under the concurrent guidelines, limitations and protocols.      Signature: Tammy Vazquez MD

## 2022-07-11 ENCOUNTER — PATIENT OUTREACH (OUTPATIENT)
Dept: HOME HEALTH SERVICES | Facility: HOSPITAL | Age: 72
End: 2022-07-11
Payer: MEDICARE

## 2022-07-20 ENCOUNTER — OFFICE VISIT (OUTPATIENT)
Dept: ORTHOPEDICS | Facility: CLINIC | Age: 72
End: 2022-07-20
Payer: MEDICARE

## 2022-07-20 ENCOUNTER — HOSPITAL ENCOUNTER (OUTPATIENT)
Dept: RADIOLOGY | Facility: HOSPITAL | Age: 72
Discharge: HOME | End: 2022-07-20
Attending: NURSE PRACTITIONER
Payer: MEDICARE

## 2022-07-20 VITALS — WEIGHT: 143.06 LBS | HEIGHT: 68 IN | BODY MASS INDEX: 21.68 KG/M2

## 2022-07-20 DIAGNOSIS — M25.532 LEFT WRIST PAIN: ICD-10-CM

## 2022-07-20 DIAGNOSIS — S52.502D CLOSED FRACTURE OF DISTAL END OF LEFT RADIUS WITH ROUTINE HEALING, UNSPECIFIED FRACTURE MORPHOLOGY, SUBSEQUENT ENCOUNTER: Primary | ICD-10-CM

## 2022-07-20 DIAGNOSIS — M25.532 LEFT WRIST PAIN: Primary | ICD-10-CM

## 2022-07-20 PROCEDURE — 99999 PR PBB SHADOW E&M-EST. PATIENT-LVL III: ICD-10-PCS | Mod: PBBFAC,,, | Performed by: NURSE PRACTITIONER

## 2022-07-20 PROCEDURE — 99213 OFFICE O/P EST LOW 20 MIN: CPT | Mod: PBBFAC | Performed by: NURSE PRACTITIONER

## 2022-07-20 PROCEDURE — 99999 PR PBB SHADOW E&M-EST. PATIENT-LVL III: CPT | Mod: PBBFAC,,, | Performed by: NURSE PRACTITIONER

## 2022-07-20 PROCEDURE — 99213 PR OFFICE/OUTPT VISIT, EST, LEVL III, 20-29 MIN: ICD-10-PCS | Mod: S$PBB,,, | Performed by: NURSE PRACTITIONER

## 2022-07-20 PROCEDURE — 99213 OFFICE O/P EST LOW 20 MIN: CPT | Mod: S$PBB,,, | Performed by: NURSE PRACTITIONER

## 2022-07-20 PROCEDURE — 73110 X-RAY EXAM OF WRIST: CPT | Mod: TC,LT

## 2022-07-20 PROCEDURE — 73110 X-RAY EXAM OF WRIST: CPT | Mod: 26,LT,, | Performed by: INTERNAL MEDICINE

## 2022-07-20 PROCEDURE — 73110 XR WRIST COMPLETE 3 VIEWS LEFT: ICD-10-PCS | Mod: 26,LT,, | Performed by: INTERNAL MEDICINE

## 2022-07-20 NOTE — PROGRESS NOTES
SUBJECTIVE:     Chief Complaint & History of Present Illness:  Estefani Fam is a Established 71 y.o. year old female patient presenting today for follow up for her left distal radius and possible ulna styloid fracture.  She was last seen by me on 6/20/22.  Her case was previously discussed with Dr. Turner who recommended non-operative management.  He recommended placing her in a sugar tong splint for 4-6 weeks, followed by a [place] with elbow protection for a couple weeks afterwards.    Currently she has no pain unless she hyperflexes at the wrist.  She denies falls or injuries since her last visit.  States she has remained complaint with her weight bearing restrictions.  She is hoping to get the elbow portion of her splint off.  She denies hand or finger numbness.  She is RHD.  She had previously fractured her right wrist years ago which was surgically repaired at [facility].  She is a chronic COPD and is on supplemental oxygen 3 L the is nasal cannula and followed by Dr. Norman in pulmonology.  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  Patient states she has no pain and when she does it is adequately controlled with Advil.    Past Medical History:   Diagnosis Date    Cataract     COPD (chronic obstructive pulmonary disease)     COVID-19     History of COVID-19 1/17/2021    Still with mild dyspnea. Encouraged return to pulmonary rehab Recommend scheduling vaccination when appointment is available.     History of retinal hemorrhage     Lobar pneumonia 11/14/2021    Recurrent in RLL, no endobronchial lesion Needs speech evaluation and MBSS for recurrent aspiration in setting of dysphagia  Will need pulmonary re[facility] at [facility].    Pathological fracture due to osteoporosis 7/6/2020    Retinal histoplasmosis     Secondary pulmonary arterial hypertension 7/3/2018    Secondary to emphysema and chronic respiratory failure, mild.        Past Surgical History:   Procedure Laterality Date    BRONCHOSCOPY WITH FLUOROSCOPY N/A 10/28/2019    Procedure: BRONCHOSCOPY, WITH FLUOROSCOPY;  Surgeon: Brooklynn Ruiz MD;  Location: [facility] OR [address] [place] [address];  Service: Endoscopy;  Laterality: N/A;    HAND SURGERY         Family History   Problem Relation Age of Onset    Macular degeneration Mother     Colon cancer Mother     Stroke Father     Colon cancer Father     Amblyopia Neg Hx     Blindness Neg Hx     Cataracts Neg Hx     Diabetes Neg Hx     Glaucoma Neg Hx     Hypertension Neg Hx     Retinal detachment Neg Hx     Strabismus Neg Hx     Thyroid disease Neg Hx        Review of patient's allergies indicates:   Allergen Reactions    Albuterol     Ipratropium analogues      Difficulty breathing         Current Outpatient Medications:     acetylcysteine 600 mg Cap, Take 1 capsule (600 mg total) by mouth 2 (two) times daily., Disp: 90 capsule, Rfl: 3    ascorbic acid, vitamin C, (VITAMIN C) 500 MG tablet, Take 500 mg by mouth 2 (two) times daily. , Disp: , Rfl:     azelastine (ASTELIN) 137 mcg (0.1 %) nasal spray, USE 1 SPRAY IN EACH NOSTRIL TWICE DAILY OR AS DIRECTED, Disp: 30 mL, Rfl: 3    azelastine (ASTELIN) 137 mcg (0.1 %) nasal spray, USE 1 SPRAY IN EACH NOSTRIL TWICE DAILY OR AS DIRECTED, Disp: 30 mL, Rfl: 3    azithromycin (Z-JERSEY) 250 MG tablet, Take 1 tablet (250 mg total) by mouth every Mon, Wed, Fri., Disp: 36 tablet, Rfl: 3    BREO ELLIPTA 200-25 mcg/dose DsDv diskus inhaler, INHALE 1 PUFF INTO THE LUNGS DAILY, Disp: 60 each, Rfl: 11    calcium carbonate (OS-STEPHANIE) 600 mg calcium (1,500 mg) Tab, Take 1 tablet (600 mg total) by mouth once daily. Take Levofloxacin antibiotic at least 2 hours before calcium., Disp: , Rfl: 0    citalopram (CELEXA) 10 MG tablet, Take 1 tablet (10 mg total) by mouth once daily., Disp: 90 tablet, Rfl: 3    fluticasone propionate (FLONASE) 50 mcg/actuation nasal spray, INSTILL 1 SPRAY IN EACH NOSTRIL EVERY  "DAY, Disp: 16 g, Rfl: 0    levalbuterol (XOPENEX) 0.63 mg/3 mL nebulizer solution, USE 1 VIAL IN NEBULIZER EVERY 8 HOURS AS NEEDED FOR WHEEZE, Disp: 750 mL, Rfl: 2    MULTIVIT-IRON-MIN-FOLIC ACID 3,500-18-0.4 UNIT-MG-MG ORAL CHEW, Take 1 tablet by mouth once daily. Take Levofloxacin antibiotic at least 2 hours before multivitamin., Disp: , Rfl: 0    pantoprazole (PROTONIX) 40 MG tablet, TAKE 1 TABLET(40 MG) BY MOUTH EVERY DAY, Disp: 90 tablet, Rfl: 0    pulse oximeter (PULSE OXIMETER) device, by Apply Externally route 2 (two) times a day. Use twice daily at 8 AM and 3 PM and record the value in MyChart as directed., Disp: 1 each, Rfl: 0    sodium chloride (OCEAN) 0.65 % nasal spray, 1 spray by Nasal route 2 (two) times daily., Disp: 88 mL, Rfl: 12    umeclidinium (INCRUSE ELLIPTA) 62.5 mcg/actuation inhalation capsule, Inhale 62.5 mcg into the lungs once daily., Disp: 90 each, Rfl: 3    Current Facility-Administered Medications:     albuterol inhaler 2 puff, 2 puff, Inhalation, Q20 Min PRN, Miles Jackson MD    Review of Systems:  ROS:  Constitutional: no fever or chills  Eyes: no visual changes  ENT: no nasal congestion or sore throat  Respiratory: no cough or shortness of breath  Cardiovascular: no chest pain or palpitations  Gastrointestinal: no nausea or vomiting, tolerating diet  Genitourinary: no hematuria or dysuria  Integument/Breast: no rash or pruritis  Hematologic/Lymphatic: no easy bruising or lymphadenopathy  Musculoskeletal: Left wrist fracture  Neurological: no seizures or tremors  Behavioral/Psych: no auditory or visual hallucinations  Endocrine: no heat or cold intolerance        OBJECTIVE:     PHYSICAL EXAM:  Ht 5' 8" (1.727 m)   Wt 64.9 kg (143 lb 1.3 oz)   BMI 21.76 kg/m²   Estimated body mass index is 21.76 kg/m² as calculated from the following:    Height as of this encounter: 5' 8" (1.727 m).    Weight as of this encounter: 64.9 kg (143 lb 1.3 oz).   General: Pleasant, " cooperative, NAD   HEENT: NCAT, sclera nonicteric   Lungs: Respirations are equal and unlabored.   Abdomen: Soft and non-tender.  CV: 2+ bilateral upper and lower extremity pulses.   Skin: Intact throughout LE with no rashes, erythema, or lesions  Extremities: No LE edema, NVI lower extremities    left wrist   History of injury: fall, date: 5/16/22  Pain: none  Neurological complaints: none  Vascular complaints: none  Previous treatments:  Reduction in the ED and placement of a sugar-tong splint by orthopedic resident.  Condition of skin:  intact  Hand Exam: Normal ROM of the fingers.  No skin breakdown, but skin is excessively dry.  ROM:  Full range of motion of the fingers.    Wrist Exam: ROM is 5 degrees in all planes, Mild TTP.      RADIOGRAPHS:  X-ray of the left wrist obtained and personally reviewed by me, findings show a distal radius fracture that appears impacted.  No new fractures seen.  Her chip fracture in the ulna styloid is not clearly seen today.  There appears to be callus formation at the distal radius when compared to prior x-ray on 6/20/22.  No new fractures seen.      ASSESSMENT/PLAN:       ICD-10-CM ICD-9-CM   1. Closed fracture of distal end of left radius with routine healing, unspecified fracture morphology, subsequent encounter  S52.502D V54.12       Plan: We discussed with the patient at length all the different treatment options available for her wrist, including anti-inflammatories, acetaminophen, rest, ice, physical therapy to include strengthening, range of motion exercise ultrasound, splinting,  occasional cortisone injections for temporary relief,  or possible surgical interventions.    -Estefanisharath Fam presents to clinic today with c/c left wrist fracture secondary to trip and fall on 5/16/22.  -X-ray as above.    -Will transition her to the wrist velcro portion of the splint today.  -Remain NWB to LUE.  -Refer to OT, patient requesting home health due to her medial ailments.   Advise not ideal but will order for a couple of weeks with anticipation to transition to outpatient therapy at a later date.  -Recommend RICE therapy.  -She may continue to use over-the-counter analgesics for pain control.  -She will follow-up in 6 weeks at which time we will repeat her left wrist x-rays and consider outpatient OT.    Future Appointments   Date Time Provider Department Center   9/1/2022  3:30 PM Marco Macdonald NP [facility] ORTHO [facility]   9/20/2022  1:15 PM Dustin Khan MD [facility] IM [facility]   10/24/2022  1:45 PM INJECTION [facility] AMB INF [facility]   11/8/2022  2:00 PM Tammy Vazquez MD [facility] PAL MED [facility]

## 2022-07-29 ENCOUNTER — DOCUMENT SCAN (OUTPATIENT)
Dept: HOME HEALTH SERVICES | Facility: HOSPITAL | Age: 72
End: 2022-07-29
Payer: MEDICARE

## 2022-07-29 ENCOUNTER — TELEPHONE (OUTPATIENT)
Dept: INTERNAL MEDICINE | Facility: CLINIC | Age: 72
End: 2022-07-29

## 2022-07-29 NOTE — TELEPHONE ENCOUNTER
----- Message from Jocelynn Bermeo sent at 7/29/2022  3:21 PM CDT -----  Contact: [facility]/Valorie/  Valorie from [facility] said that she is calling in regards to needing to get a verbal order for OT evaluation  moved until next week per families request. Please advise

## 2022-07-31 ENCOUNTER — EXTERNAL CHRONIC CARE MANAGEMENT (OUTPATIENT)
Dept: PRIMARY CARE CLINIC | Facility: CLINIC | Age: 72
End: 2022-07-31
Payer: MEDICARE

## 2022-07-31 PROCEDURE — 99490 CHRNC CARE MGMT STAFF 1ST 20: CPT | Mod: S$PBB,,, | Performed by: INTERNAL MEDICINE

## 2022-07-31 PROCEDURE — 99490 CHRNC CARE MGMT STAFF 1ST 20: CPT | Mod: PBBFAC | Performed by: INTERNAL MEDICINE

## 2022-07-31 PROCEDURE — 99490 PR CHRONIC CARE MGMT, 1ST 20 MIN: ICD-10-PCS | Mod: S$PBB,,, | Performed by: INTERNAL MEDICINE

## 2022-08-12 ENCOUNTER — PATIENT OUTREACH (OUTPATIENT)
Dept: HOME HEALTH SERVICES | Facility: HOSPITAL | Age: 72
End: 2022-08-12
Payer: MEDICARE

## 2022-08-15 ENCOUNTER — EXTERNAL HOME HEALTH (OUTPATIENT)
Dept: HOME HEALTH SERVICES | Facility: HOSPITAL | Age: 72
End: 2022-08-15
Payer: MEDICARE

## 2022-08-15 ENCOUNTER — DOCUMENT SCAN (OUTPATIENT)
Dept: HOME HEALTH SERVICES | Facility: HOSPITAL | Age: 72
End: 2022-08-15
Payer: MEDICARE

## 2022-08-24 PROCEDURE — G0179 MD RECERTIFICATION HHA PT: HCPCS | Mod: ,,, | Performed by: INTERNAL MEDICINE

## 2022-08-24 PROCEDURE — G0179 PR HOME HEALTH MD RECERTIFICATION: ICD-10-PCS | Mod: ,,, | Performed by: INTERNAL MEDICINE

## 2022-08-31 ENCOUNTER — EXTERNAL CHRONIC CARE MANAGEMENT (OUTPATIENT)
Dept: PRIMARY CARE CLINIC | Facility: CLINIC | Age: 72
End: 2022-08-31
Payer: MEDICARE

## 2022-08-31 DIAGNOSIS — Z78.0 MENOPAUSE: ICD-10-CM

## 2022-08-31 PROCEDURE — 99490 PR CHRONIC CARE MGMT, 1ST 20 MIN: ICD-10-PCS | Mod: S$PBB,,, | Performed by: INTERNAL MEDICINE

## 2022-08-31 PROCEDURE — 99490 CHRNC CARE MGMT STAFF 1ST 20: CPT | Mod: S$PBB,,, | Performed by: INTERNAL MEDICINE

## 2022-08-31 PROCEDURE — 99490 CHRNC CARE MGMT STAFF 1ST 20: CPT | Mod: PBBFAC | Performed by: INTERNAL MEDICINE

## 2022-09-01 ENCOUNTER — HOSPITAL ENCOUNTER (OUTPATIENT)
Dept: RADIOLOGY | Facility: HOSPITAL | Age: 72
Discharge: HOME | End: 2022-09-01
Attending: NURSE PRACTITIONER
Payer: MEDICARE

## 2022-09-01 ENCOUNTER — OFFICE VISIT (OUTPATIENT)
Dept: ORTHOPEDICS | Facility: CLINIC | Age: 72
End: 2022-09-01
Payer: MEDICARE

## 2022-09-01 VITALS — HEIGHT: 68 IN | BODY MASS INDEX: 21.68 KG/M2 | WEIGHT: 143.06 LBS

## 2022-09-01 DIAGNOSIS — S52.502D CLOSED FRACTURE OF DISTAL END OF LEFT RADIUS WITH ROUTINE HEALING, UNSPECIFIED FRACTURE MORPHOLOGY, SUBSEQUENT ENCOUNTER: Primary | ICD-10-CM

## 2022-09-01 DIAGNOSIS — M25.532 LEFT WRIST PAIN: Primary | ICD-10-CM

## 2022-09-01 DIAGNOSIS — M25.532 LEFT WRIST PAIN: ICD-10-CM

## 2022-09-01 PROCEDURE — 99999 PR PBB SHADOW E&M-EST. PATIENT-LVL III: ICD-10-PCS | Mod: PBBFAC,,, | Performed by: NURSE PRACTITIONER

## 2022-09-01 PROCEDURE — 73110 XR WRIST COMPLETE 3 VIEWS LEFT: ICD-10-PCS | Mod: 26,LT,, | Performed by: RADIOLOGY

## 2022-09-01 PROCEDURE — 99213 OFFICE O/P EST LOW 20 MIN: CPT | Mod: PBBFAC | Performed by: NURSE PRACTITIONER

## 2022-09-01 PROCEDURE — 99213 OFFICE O/P EST LOW 20 MIN: CPT | Mod: S$PBB,,, | Performed by: NURSE PRACTITIONER

## 2022-09-01 PROCEDURE — 99999 PR PBB SHADOW E&M-EST. PATIENT-LVL III: CPT | Mod: PBBFAC,,, | Performed by: NURSE PRACTITIONER

## 2022-09-01 PROCEDURE — 73110 X-RAY EXAM OF WRIST: CPT | Mod: 26,LT,, | Performed by: RADIOLOGY

## 2022-09-01 PROCEDURE — 99213 PR OFFICE/OUTPT VISIT, EST, LEVL III, 20-29 MIN: ICD-10-PCS | Mod: S$PBB,,, | Performed by: NURSE PRACTITIONER

## 2022-09-01 PROCEDURE — 73110 X-RAY EXAM OF WRIST: CPT | Mod: TC,LT

## 2022-09-01 NOTE — PROGRESS NOTES
SUBJECTIVE:     Chief Complaint & History of Present Illness:  Estefani Fam is a Established 72 y.o. year old female patient presenting today for follow up for her left distal radius and possible ulna styloid fracture.  She was last seen by me on 7/20/22.  She has been treated non-operative per Dr. Turner's recommendation.  At the last visit, she was converted to a velcro splint and referred to OT through [facility].      Currently she has no pain.  She denies falls or injuries since her last visit.  States she has remained complaint with her weight bearing restrictions.  She said her therapist wanted to see if she could start with some resistance and weight bearing activities.  She denies hand or finger numbness.  She is RHD.  She has a DEXA scan scheduled on 9/28/22.  She had previously fractured her right wrist years ago which was surgically repaired at [facility].  She is a chronic COPD and is on supplemental oxygen 3 L the is nasal cannula and followed by Dr. Norman in pulmonology.  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      Past Medical History:   Diagnosis Date    Cataract     COPD (chronic obstructive pulmonary disease)     COVID-19     History of COVID-19 1/17/2021    Still with mild dyspnea. Encouraged return to pulmonary rehab Recommend scheduling vaccination when appointment is available.     History of retinal hemorrhage     Lobar pneumonia 11/14/2021    Recurrent in RLL, no endobronchial lesion Needs speech evaluation and MBSS for recurrent aspiration in setting of dysphagia  Will need pulmonary rehab at [facility].    Pathological fracture due to osteoporosis 7/6/2020    Retinal histoplasmosis     Secondary pulmonary arterial hypertension 7/3/2018    Secondary to emphysema and chronic respiratory failure, mild.       Past Surgical History:   Procedure Laterality Date    BRONCHOSCOPY WITH FLUOROSCOPY N/A  10/28/2019    Procedure: BRONCHOSCOPY, WITH FLUOROSCOPY;  Surgeon: Brooklynn Ruiz MD;  Location: [facility] OR [address] [place] [address];  Service: Endoscopy;  Laterality: N/A;    HAND SURGERY         Family History   Problem Relation Age of Onset    Macular degeneration Mother     Colon cancer Mother     Stroke Father     Colon cancer Father     Amblyopia Neg Hx     Blindness Neg Hx     Cataracts Neg Hx     Diabetes Neg Hx     Glaucoma Neg Hx     Hypertension Neg Hx     Retinal detachment Neg Hx     Strabismus Neg Hx     Thyroid disease Neg Hx        Review of patient's allergies indicates:   Allergen Reactions    Albuterol     Ipratropium analogues      Difficulty breathing         Current Outpatient Medications:     acetylcysteine 600 mg Cap, Take 1 capsule (600 mg total) by mouth 2 (two) times daily., Disp: 90 capsule, Rfl: 3    ascorbic acid, vitamin C, (VITAMIN C) 500 MG tablet, Take 500 mg by mouth 2 (two) times daily. , Disp: , Rfl:     azelastine (ASTELIN) 137 mcg (0.1 %) nasal spray, USE 1 SPRAY IN EACH NOSTRIL TWICE DAILY OR AS DIRECTED, Disp: 30 mL, Rfl: 3    azelastine (ASTELIN) 137 mcg (0.1 %) nasal spray, USE 1 SPRAY IN EACH NOSTRIL TWICE DAILY OR AS DIRECTED, Disp: 30 mL, Rfl: 3    azithromycin (Z-JERSEY) 250 MG tablet, Take 1 tablet (250 mg total) by mouth every Mon, Wed, Fri., Disp: 36 tablet, Rfl: 3    BREO ELLIPTA 200-25 mcg/dose DsDv diskus inhaler, INHALE 1 PUFF INTO THE LUNGS DAILY, Disp: 60 each, Rfl: 11    calcium carbonate (OS-STEPHANIE) 600 mg calcium (1,500 mg) Tab, Take 1 tablet (600 mg total) by mouth once daily. Take Levofloxacin antibiotic at least 2 hours before calcium., Disp: , Rfl: 0    citalopram (CELEXA) 10 MG tablet, Take 1 tablet (10 mg total) by mouth once daily., Disp: 90 tablet, Rfl: 3    fluticasone propionate (FLONASE) 50 mcg/actuation nasal spray, INSTILL 1 SPRAY IN EACH NOSTRIL EVERY DAY, Disp: 16 g, Rfl: 0    levalbuterol (XOPENEX) 0.63 mg/3 mL nebulizer solution, USE 1 VIAL IN NEBULIZER EVERY 8 HOURS  "AS NEEDED FOR WHEEZE, Disp: 750 mL, Rfl: 2    MULTIVIT-IRON-MIN-FOLIC ACID 3,500-18-0.4 UNIT-MG-MG ORAL CHEW, Take 1 tablet by mouth once daily. Take Levofloxacin antibiotic at least 2 hours before multivitamin., Disp: , Rfl: 0    pantoprazole (PROTONIX) 40 MG tablet, TAKE 1 TABLET(40 MG) BY MOUTH EVERY DAY, Disp: 90 tablet, Rfl: 0    pulse oximeter (PULSE OXIMETER) device, by Apply Externally route 2 (two) times a day. Use twice daily at 8 AM and 3 PM and record the value in MyChart as directed., Disp: 1 each, Rfl: 0    sodium chloride (OCEAN) 0.65 % nasal spray, 1 spray by Nasal route 2 (two) times daily., Disp: 88 mL, Rfl: 12    umeclidinium (INCRUSE ELLIPTA) 62.5 mcg/actuation inhalation capsule, Inhale 62.5 mcg into the lungs once daily., Disp: 90 each, Rfl: 3    Current Facility-Administered Medications:     albuterol inhaler 2 puff, 2 puff, Inhalation, Q20 Min PRN, Miles Jackson MD    Review of Systems:  ROS:  Constitutional: no fever or chills  Eyes: no visual changes  ENT: no nasal congestion or sore throat  Respiratory: no cough or shortness of breath  Cardiovascular: no chest pain or palpitations  Gastrointestinal: no nausea or vomiting, tolerating diet  Genitourinary: no hematuria or dysuria  Integument/Breast: no rash or pruritis  Hematologic/Lymphatic: no easy bruising or lymphadenopathy  Musculoskeletal:  Left wrist fracture  Neurological: no seizures or tremors  Behavioral/Psych: no auditory or visual hallucinations  Endocrine: no heat or cold intolerance        OBJECTIVE:     PHYSICAL EXAM:  Ht 5' 8" (1.727 m)   Wt 64.9 kg (143 lb 1.3 oz)   BMI 21.76 kg/m²   Estimated body mass index is 21.76 kg/m² as calculated from the following:    Height as of this encounter: 5' 8" (1.727 m).    Weight as of this encounter: 64.9 kg (143 lb 1.3 oz).   General: Pleasant, cooperative, NAD   HEENT: NCAT, sclera nonicteric   Lungs: Respirations are equal and unlabored.   Abdomen: Soft and non-tender.  CV: 2+ " bilateral upper and lower extremity pulses.   Skin: Intact throughout LE with no rashes, erythema, or lesions  Extremities: No LE edema, NVI lower extremities    left wrist   History of injury: fall, date: 5/16/22  Pain: none  Neurological complaints: none  Vascular complaints: none  Previous treatments:  Reduction in the ED and placement of a sugar-tong splint by orthopedic resident.  Condition of skin:  intact  Hand Exam: normal exam, no swelling, tenderness, instability; ligaments intact, full ROM both hands, wrists, and finger joints  ROM:  Full range of motion of the fingers.    Wrist Exam: ROM is 10 degrees in all planes.    RADIOGRAPHS:  X-ray of the left wrist obtained and personally reviewed by me, findings show a distal radius fracture that appears impacted.  There is progressive callus formation seen when compared to prior x-ray.  Fracture line remains visible.  No new fractures seen.  Her chip fracture in the ulna styloid is not clearly seen today.        ASSESSMENT/PLAN:       ICD-10-CM ICD-9-CM   1. Closed fracture of distal end of left radius with routine healing, unspecified fracture morphology, subsequent encounter  S52.502D V54.12         Plan: We discussed with the patient at length all the different treatment options available for her wrist, including anti-inflammatories, acetaminophen, rest, ice, physical therapy to include strengthening, range of motion exercise ultrasound, splinting,  occasional cortisone injections for temporary relief,  or possible surgical interventions.    -Estefani Fam presents to clinic today with c/c left wrist fracture secondary to trip and fall on 5/16/22.  -X-ray as above.    -I will allow her to start gentle resistant activities with OT.  Advised her if pain worsens she will cease activity and call me.    -Continue to use the velcro splint.  -I will allow her to start weight bearing at 2 lbs x 4 weeks and increase by 2 lbs/month thereafter as she  tolerates.  -I will update home health OT orders.    -Recommend RICE therapy.  -She may continue to use over-the-counter analgesics for pain control.  -She will follow-up in 12 weeks at which time we will repeat her left wrist x-rays.    Future Appointments   Date Time Provider Department Center   9/20/2022  1:15 PM Dustin Khan MD [facility] IM [facility] PCW   9/28/2022  3:00 PM DES, DEXA1 [facility] BMD [facility]   10/24/2022  1:45 PM INJECTION [facility] AMB INF [facility]   11/8/2022  2:00 PM Tammy Vazquez MD [facility] PAL MED [facility]   12/1/2022  1:30 PM Marco Macdonald NP [facility] ORTHO [facility]

## 2022-09-08 ENCOUNTER — PATIENT OUTREACH (OUTPATIENT)
Dept: HOME HEALTH SERVICES | Facility: HOSPITAL | Age: 72
End: 2022-09-08
Payer: MEDICARE

## 2022-09-08 ENCOUNTER — EXTERNAL HOME HEALTH (OUTPATIENT)
Dept: HOME HEALTH SERVICES | Facility: HOSPITAL | Age: 72
End: 2022-09-08
Payer: MEDICARE

## 2022-09-13 ENCOUNTER — DOCUMENT SCAN (OUTPATIENT)
Dept: HOME HEALTH SERVICES | Facility: HOSPITAL | Age: 72
End: 2022-09-13
Payer: MEDICARE

## 2022-09-14 ENCOUNTER — TELEPHONE (OUTPATIENT)
Dept: PALLIATIVE MEDICINE | Facility: CLINIC | Age: 72
End: 2022-09-14
Payer: MEDICARE

## 2022-09-14 DIAGNOSIS — J43.2 CENTRILOBULAR EMPHYSEMA: Primary | ICD-10-CM

## 2022-09-14 RX ORDER — GUAIFENESIN 600 MG/1
600 TABLET, EXTENDED RELEASE ORAL 2 TIMES DAILY
Qty: 60 TABLET | Refills: 2 | Status: SHIPPED | OUTPATIENT
Start: 2022-09-14 | End: 2022-11-10

## 2022-09-14 NOTE — TELEPHONE ENCOUNTER
----- Message from Stephanie Beltran RN sent at 9/14/2022 10:38 AM CDT -----  She called asking if she can have something sent in for chest congestion. States she feels mucous in her throat but can't cough it up. Has not tried anything to help except water.   Stephanie

## 2022-09-18 ENCOUNTER — DOCUMENT SCAN (OUTPATIENT)
Dept: HOME HEALTH SERVICES | Facility: HOSPITAL | Age: 72
End: 2022-09-18
Payer: MEDICARE

## 2022-09-20 ENCOUNTER — HOSPITAL ENCOUNTER (EMERGENCY)
Facility: HOSPITAL | Age: 72
Discharge: HOME | End: 2022-09-20
Attending: EMERGENCY MEDICINE
Payer: MEDICARE

## 2022-09-20 VITALS
WEIGHT: 130 LBS | HEART RATE: 112 BPM | OXYGEN SATURATION: 97 % | RESPIRATION RATE: 18 BRPM | BODY MASS INDEX: 19.77 KG/M2 | SYSTOLIC BLOOD PRESSURE: 135 MMHG | TEMPERATURE: 98 F | DIASTOLIC BLOOD PRESSURE: 63 MMHG

## 2022-09-20 DIAGNOSIS — J44.1 COPD EXACERBATION: Primary | ICD-10-CM

## 2022-09-20 DIAGNOSIS — R06.02 SHORTNESS OF BREATH: ICD-10-CM

## 2022-09-20 LAB
BASOPHILS # BLD AUTO: 0.05 K/UL (ref 0–0.2)
BASOPHILS NFR BLD: 0.8 % (ref 0–1.9)
BILIRUB UR QL STRIP: NEGATIVE
BNP SERPL-MCNC: 17 PG/ML (ref 0–99)
BUN SERPL-MCNC: 8 MG/DL (ref 6–30)
CHLORIDE SERPL-SCNC: 97 MMOL/L (ref 95–110)
CLARITY UR REFRACT.AUTO: CLEAR
COLOR UR AUTO: ABNORMAL
CREAT SERPL-MCNC: 0.3 MG/DL (ref 0.5–1.4)
DIFFERENTIAL METHOD: ABNORMAL
EOSINOPHIL # BLD AUTO: 0.1 K/UL (ref 0–0.5)
EOSINOPHIL NFR BLD: 0.9 % (ref 0–8)
ERYTHROCYTE [DISTWIDTH] IN BLOOD BY AUTOMATED COUNT: 12.5 % (ref 11.5–14.5)
GLUCOSE SERPL-MCNC: 144 MG/DL (ref 70–110)
GLUCOSE UR QL STRIP: NEGATIVE
HCT VFR BLD AUTO: 41.1 % (ref 37–48.5)
HCT VFR BLD CALC: 40 %PCV (ref 36–54)
HGB BLD-MCNC: 13.6 G/DL (ref 12–16)
HGB UR QL STRIP: NEGATIVE
HIV 1+2 AB+HIV1 P24 AG SERPL QL IA: NORMAL
IMM GRANULOCYTES # BLD AUTO: 0.03 K/UL (ref 0–0.04)
IMM GRANULOCYTES NFR BLD AUTO: 0.5 % (ref 0–0.5)
KETONES UR QL STRIP: ABNORMAL
LACTATE SERPL-SCNC: 1 MMOL/L (ref 0.5–2.2)
LEUKOCYTE ESTERASE UR QL STRIP: NEGATIVE
LYMPHOCYTES # BLD AUTO: 0.4 K/UL (ref 1–4.8)
LYMPHOCYTES NFR BLD: 5.4 % (ref 18–48)
MCH RBC QN AUTO: 30.9 PG (ref 27–31)
MCHC RBC AUTO-ENTMCNC: 33.1 G/DL (ref 32–36)
MCV RBC AUTO: 93 FL (ref 82–98)
MONOCYTES # BLD AUTO: 0.2 K/UL (ref 0.3–1)
MONOCYTES NFR BLD: 2.8 % (ref 4–15)
NEUTROPHILS # BLD AUTO: 5.8 K/UL (ref 1.8–7.7)
NEUTROPHILS NFR BLD: 89.6 % (ref 38–73)
NITRITE UR QL STRIP: NEGATIVE
NRBC BLD-RTO: 0 /100 WBC
PH UR STRIP: 7 [PH] (ref 5–8)
PLATELET # BLD AUTO: 343 K/UL (ref 150–450)
PMV BLD AUTO: 9.7 FL (ref 9.2–12.9)
POC IONIZED CALCIUM: 1.06 MMOL/L (ref 1.06–1.42)
POC TCO2 (MEASURED): 29 MMOL/L (ref 23–29)
POTASSIUM BLD-SCNC: 3.6 MMOL/L (ref 3.5–5.1)
PROT UR QL STRIP: NEGATIVE
RBC # BLD AUTO: 4.4 M/UL (ref 4–5.4)
SAMPLE: ABNORMAL
SARS-COV-2 RDRP RESP QL NAA+PROBE: NEGATIVE
SODIUM BLD-SCNC: 138 MMOL/L (ref 136–145)
SP GR UR STRIP: 1 (ref 1–1.03)
TROPONIN I SERPL DL<=0.01 NG/ML-MCNC: <0.006 NG/ML (ref 0–0.03)
URN SPEC COLLECT METH UR: ABNORMAL
WBC # BLD AUTO: 6.5 K/UL (ref 3.9–12.7)

## 2022-09-20 PROCEDURE — 99284 EMERGENCY DEPT VISIT MOD MDM: CPT | Mod: CS,,, | Performed by: EMERGENCY MEDICINE

## 2022-09-20 PROCEDURE — 87389 HIV-1 AG W/HIV-1&-2 AB AG IA: CPT | Performed by: PHYSICIAN ASSISTANT

## 2022-09-20 PROCEDURE — 83880 ASSAY OF NATRIURETIC PEPTIDE: CPT | Performed by: EMERGENCY MEDICINE

## 2022-09-20 PROCEDURE — 63600175 PHARM REV CODE 636 W HCPCS: Performed by: EMERGENCY MEDICINE

## 2022-09-20 PROCEDURE — 83605 ASSAY OF LACTIC ACID: CPT | Performed by: EMERGENCY MEDICINE

## 2022-09-20 PROCEDURE — 93005 ELECTROCARDIOGRAM TRACING: CPT

## 2022-09-20 PROCEDURE — 85025 COMPLETE CBC W/AUTO DIFF WBC: CPT | Performed by: EMERGENCY MEDICINE

## 2022-09-20 PROCEDURE — 93010 EKG 12-LEAD: ICD-10-PCS | Mod: ,,, | Performed by: INTERNAL MEDICINE

## 2022-09-20 PROCEDURE — 99285 EMERGENCY DEPT VISIT HI MDM: CPT | Mod: 25

## 2022-09-20 PROCEDURE — 99284 PR EMERGENCY DEPT VISIT,LEVEL IV: ICD-10-PCS | Mod: CS,,, | Performed by: EMERGENCY MEDICINE

## 2022-09-20 PROCEDURE — 96374 THER/PROPH/DIAG INJ IV PUSH: CPT

## 2022-09-20 PROCEDURE — U0002 COVID-19 LAB TEST NON-CDC: HCPCS | Performed by: EMERGENCY MEDICINE

## 2022-09-20 PROCEDURE — 84484 ASSAY OF TROPONIN QUANT: CPT | Performed by: EMERGENCY MEDICINE

## 2022-09-20 PROCEDURE — 87040 BLOOD CULTURE FOR BACTERIA: CPT | Performed by: EMERGENCY MEDICINE

## 2022-09-20 PROCEDURE — 25000242 PHARM REV CODE 250 ALT 637 W/ HCPCS: Performed by: EMERGENCY MEDICINE

## 2022-09-20 PROCEDURE — 93010 ELECTROCARDIOGRAM REPORT: CPT | Mod: ,,, | Performed by: INTERNAL MEDICINE

## 2022-09-20 PROCEDURE — 81003 URINALYSIS AUTO W/O SCOPE: CPT | Performed by: EMERGENCY MEDICINE

## 2022-09-20 RX ORDER — METHYLPREDNISOLONE SOD SUCC 125 MG
125 VIAL (EA) INJECTION
Status: COMPLETED | OUTPATIENT
Start: 2022-09-20 | End: 2022-09-20

## 2022-09-20 RX ORDER — LEVALBUTEROL 1.25 MG/.5ML
1.25 SOLUTION, CONCENTRATE RESPIRATORY (INHALATION)
Status: COMPLETED | OUTPATIENT
Start: 2022-09-20 | End: 2022-09-20

## 2022-09-20 RX ORDER — PREDNISONE 20 MG/1
40 TABLET ORAL DAILY
Qty: 15 TABLET | Refills: 0 | Status: SHIPPED | OUTPATIENT
Start: 2022-09-20 | End: 2022-09-25

## 2022-09-20 RX ADMIN — LEVALBUTEROL 1.25 MG: 1.25 SOLUTION, CONCENTRATE RESPIRATORY (INHALATION) at 02:09

## 2022-09-20 RX ADMIN — METHYLPREDNISOLONE SODIUM SUCCINATE 125 MG: 125 INJECTION, POWDER, FOR SOLUTION INTRAMUSCULAR; INTRAVENOUS at 03:09

## 2022-09-20 NOTE — ED TRIAGE NOTES
"Estefani Fam, a 72 y.o. female presents to the ED w/ complaint of worsening SOB x 10 days. Pt reports that she thinks that she has pneumonia. Pt reports non-productive cough. Pt also reports "cold-sweats".     Triage note:  Chief Complaint   Patient presents with    Shortness of Breath     Arrives via EMS with c/o SOB, hx of COPD      Review of patient's allergies indicates:   Allergen Reactions    Albuterol     Ipratropium analogues      Difficulty breathing     Past Medical History:   Diagnosis Date    Cataract     COPD (chronic obstructive pulmonary disease)     COVID-19     History of COVID-19 1/17/2021    Still with mild dyspnea. Encouraged return to pulmonary rehab Recommend scheduling vaccination when appointment is available.     History of retinal hemorrhage     Lobar pneumonia 11/14/2021    Recurrent in RLL, no endobronchial lesion Needs speech evaluation and MBSS for recurrent aspiration in setting of dysphagia  Will need pulmonary rehab at [facility].    Pathological fracture due to osteoporosis 7/6/2020    Retinal histoplasmosis     Secondary pulmonary arterial hypertension 7/3/2018    Secondary to emphysema and chronic respiratory failure, mild.       "

## 2022-09-20 NOTE — ED PROVIDER NOTES
Encounter Date: 9/20/2022       History     Chief Complaint   Patient presents with    Shortness of Breath     Arrives via EMS with c/o SOB, hx of COPD      72-year-old female with history of severe COPD, COVID infection, pneumonia presents with 2 days of worsening shortness of breath with associated chills and feeling sweaty at home.   She is concerned that she might have pneumonia again.  She is concerned that she might be septic.  She is having chest discomfort since yesterday as well.  It is a heaviness to her chest.  She is feeling better after taking nebulizer treatments.  She has an appointment with her primary care doctor this afternoon but was concerned and came to the emergency room.    Review of patient's allergies indicates:   Allergen Reactions    Albuterol     Ipratropium analogues      Difficulty breathing     Past Medical History:   Diagnosis Date    Cataract     COPD (chronic obstructive pulmonary disease)     COVID-19     History of COVID-19 1/17/2021    Still with mild dyspnea. Encouraged return to pulmonary rehab Recommend scheduling vaccination when appointment is available.     History of retinal hemorrhage     Lobar pneumonia 11/14/2021    Recurrent in RLL, no endobronchial lesion Needs speech evaluation and MBSS for recurrent aspiration in setting of dysphagia  Will need pulmonary rehab at [facility].    Pathological fracture due to osteoporosis 7/6/2020    Retinal histoplasmosis     Secondary pulmonary arterial hypertension 7/3/2018    Secondary to emphysema and chronic respiratory failure, mild.     Past Surgical History:   Procedure Laterality Date    BRONCHOSCOPY WITH FLUOROSCOPY N/A 10/28/2019    Procedure: BRONCHOSCOPY, WITH FLUOROSCOPY;  Surgeon: Brooklynn Ruiz MD;  Location: [facility] OR [address] [place] [address];  Service: Endoscopy;  Laterality: N/A;    HAND SURGERY       Family History   Problem Relation Age of Onset    Macular degeneration Mother     Colon cancer Mother     Stroke Father     Colon cancer  Father     Amblyopia Neg Hx     Blindness Neg Hx     Cataracts Neg Hx     Diabetes Neg Hx     Glaucoma Neg Hx     Hypertension Neg Hx     Retinal detachment Neg Hx     Strabismus Neg Hx     Thyroid disease Neg Hx      Social History     Tobacco Use    Smoking status: Former     Packs/day: 1.00     Years: 36.00     Pack years: 36.00     Types: Cigarettes     Quit date: 2016     Years since quittin.0    Smokeless tobacco: Never    Tobacco comments:     pt states she does not remember date   Substance Use Topics    Alcohol use: Yes     Alcohol/week: 2.0 standard drinks     Types: 2 Glasses of wine per week     Comment: 1-2 glasses a day     Drug use: No     Review of Systems   Constitutional:  Negative for chills and fever.   HENT:  Negative for nosebleeds.    Eyes:  Negative for visual disturbance.   Respiratory:  Positive for shortness of breath.    Cardiovascular:  Positive for chest pain (it is a fullness to the chest).   Gastrointestinal:  Negative for vomiting.   Genitourinary:  Negative for frequency.   Musculoskeletal:  Negative for joint swelling.   Skin:  Negative for rash.   Neurological:  Negative for numbness.   Hematological:  Does not bruise/bleed easily.   Psychiatric/Behavioral:  Negative for confusion.      Physical Exam     Initial Vitals   BP Pulse Resp Temp SpO2   22 0956 22 0956 22 0956 22 0958 22 0956   (!) 143/72 109 20 97.9 °F (36.6 °C) 99 %      MAP       --                Physical Exam    Constitutional: She appears well-developed and well-nourished. She is not diaphoretic. No distress.   HENT:   Head: Normocephalic and atraumatic.   Eyes: Conjunctivae are normal.   Neck: Neck supple. No tracheal deviation present. No JVD present.   Normal range of motion.  Cardiovascular:  Normal rate, regular rhythm, normal heart sounds and intact distal pulses.           Pulmonary/Chest: No respiratory distress. She has wheezes. She has no rhonchi. She has no rales.    Abdominal: Abdomen is soft. Bowel sounds are normal. She exhibits no distension. There is no abdominal tenderness. There is no rebound.   Musculoskeletal:         General: No edema.      Cervical back: Normal range of motion and neck supple.     Neurological: She is alert. She has normal strength. No sensory deficit. GCS score is 15. GCS eye subscore is 4. GCS verbal subscore is 5. GCS motor subscore is 6.   Skin: Skin is warm. No rash noted.   Psychiatric: She has a normal mood and affect.       ED Course   Procedures  Labs Reviewed   CBC W/ AUTO DIFFERENTIAL - Abnormal; Notable for the following components:       Result Value    Lymph # 0.4 (*)     Mono # 0.2 (*)     Gran % 89.6 (*)     Lymph % 5.4 (*)     Mono % 2.8 (*)     All other components within normal limits   URINALYSIS, REFLEX TO URINE CULTURE - Abnormal; Notable for the following components:    Ketones, UA Trace (*)     All other components within normal limits    Narrative:     Specimen Source->Urine   ISTAT PROCEDURE - Abnormal; Notable for the following components:    POC Glucose 144 (*)     POC Creatinine 0.3 (*)     All other components within normal limits   CULTURE, BLOOD   CULTURE, BLOOD   LACTIC ACID, PLASMA   TROPONIN I   B-TYPE NATRIURETIC PEPTIDE   SARS-COV-2 RNA AMPLIFICATION, QUAL   HIV 1 / 2 ANTIBODY    Narrative:     Release to patient->Immediate   ISTAT CHEM8        ECG Results              EKG 12-lead (Final result)  Result time 09/20/22 13:15:49      Final result by Interface, Lab In [facility] (09/20/22 13:15:49)                   Narrative:    Test Reason : R06.02,    Vent. Rate : 096 BPM     Atrial Rate : 096 BPM     P-R Int : 184 ms          QRS Dur : 112 ms      QT Int : 376 ms       P-R-T Axes : 081 080 078 degrees     QTc Int : 475 ms    Normal sinus rhythm  Normal ECG  When compared with ECG of 03-APR-2022 20:25,  No significant change was found  Confirmed by Raman CANNON MD ([id number]) on 9/20/2022 1:15:37 PM    Referred By:  [email or web address]   SELF           Confirmed By:Raman CANNON MD                                  Imaging Results              X-Ray Chest AP Portable (Final result)  Result time 09/20/22 12:16:03      Final result by Tristen Motta MD (09/20/22 12:16:03)                   Impression:      Stable emphysematous changes and scarring at the lung apices.  No acute radiographic findings in the chest.      Electronically signed by: Tristen Motta MD  Date:    09/20/2022  Time:    12:16               Narrative:    EXAMINATION:  XR CHEST AP PORTABLE    CLINICAL HISTORY:  shortness of breath;    TECHNIQUE:  Single frontal view of the chest was performed.    COMPARISON:  Multiple prior studies most recent is 04/03/2022    FINDINGS:  Patient is rotated.  Cardiac silhouette is normal in size.  There are emphysematous changes and scarring throughout the lungs particularly at the lung apices.  Right middle lobe opacity has improved when compared to prior.  No new focal consolidation, large effusion or pneumothorax.  Bones show no acute abnormalities.                                       Medications   methylPREDNISolone sodium succinate injection 125 mg (has no administration in time range)   levalbuterol nebulizer solution 1.25 mg (1.25 mg Nebulization Given 9/20/22 1403)     Medical Decision Making:   Initial Assessment:   72-year-old female with end-stage COPD presenting with shortness of breath.  She is concerned about pneumonia.  She also has been having some chest heaviness for several days.  She looks relatively well considering her past medical history.  Will evaluate with labs looking for sign of infection as well as a cardiac workup  ED Management:  No sign of infection with CBC and lactate.  Troponin was negative.  No sign of CHF.  No sign of pneumonia on chest x-ray.  She was comforted by these findings.  We discussed sending her home with a short course of steroid.  She prefers to do a 10 day course.  In light of her end-stage  COPD and think this would be reasonable.  We gave her a dose of Xopenex.  I offered her admission but after discussion with patient's son she would much prefer to be at home.                        Clinical Impression:   Final diagnoses:  [R06.02] Shortness of breath  [J44.1] COPD exacerbation (Primary)      ED Disposition Condition    Discharge Stable          ED Prescriptions       Medication Sig Dispense Start Date End Date Auth. Provider    predniSONE (DELTASONE) 20 MG tablet Take 2 tablets (40 mg total) by mouth once daily. for 5 days 15 tablet 9/20/2022 9/25/2022 Marquis Price MD          Follow-up Information       Follow up With Specialties Details Why Contact Info    Dustin Khan MD Internal Medicine Call  As needed [address]  [place] [address]  [phone]      Brooklynn Ruiz MD Intensive Care, Pulmonary Disease   [address]  [place] [address]  [phone]               Marquis Price MD  09/20/22 1440       Marquis Price MD  09/27/22 0877

## 2022-09-20 NOTE — ED NOTES
I-STAT Chem-8+ Results:   Value Reference Range   Sodium 138 136-145 mmol/L   Potassium  3.6 3.5-5.1 mmol/L   Chloride 97  mmol/L   Ionized Calcium 1.06 1.06-1.42 mmol/L   CO2 (measured) 29 23-29 mmol/L   Glucose 144  mg/dL   BUN 8 6-30 mg/dL   Creatinine 0.3 0.5-1.4 mg/dL   Hematocrit 40 36-54%

## 2022-09-25 LAB
BACTERIA BLD CULT: NORMAL
BACTERIA BLD CULT: NORMAL

## 2022-09-28 ENCOUNTER — HOSPITAL ENCOUNTER (OUTPATIENT)
Dept: RADIOLOGY | Facility: CLINIC | Age: 72
Discharge: HOME | End: 2022-09-28
Attending: INTERNAL MEDICINE
Payer: MEDICARE

## 2022-09-28 DIAGNOSIS — Z78.0 MENOPAUSE: ICD-10-CM

## 2022-09-28 PROCEDURE — 77080 DEXA BONE DENSITY SPINE HIP: ICD-10-PCS | Mod: 26,,, | Performed by: INTERNAL MEDICINE

## 2022-09-28 PROCEDURE — 77080 DXA BONE DENSITY AXIAL: CPT | Mod: 26,,, | Performed by: INTERNAL MEDICINE

## 2022-09-28 PROCEDURE — 77080 DXA BONE DENSITY AXIAL: CPT | Mod: TC

## 2022-09-29 ENCOUNTER — TELEPHONE (OUTPATIENT)
Dept: INTERNAL MEDICINE | Facility: CLINIC | Age: 72
End: 2022-09-29
Payer: MEDICARE

## 2022-09-29 NOTE — TELEPHONE ENCOUNTER
----- Message from Neftali Pierce sent at 9/29/2022  1:45 PM CDT -----  Contact: [phone]  Pt missed a call and would like a call back. Pt also said she took a Covid test and it is neg.

## 2022-09-29 NOTE — TELEPHONE ENCOUNTER
----- Message from Milagros Riddle sent at 9/29/2022 10:26 AM CDT -----  Contact: self [phone]  Per pt had to cancel last week appt due to being in ER stated she have a cold and suffer with COPD and requesting a call for advice and antibiotic.    [id number] - [place]OGERS - [address] AT [facility] [address]  [address] [address]  Phone: [phone] Fax: [phone]    Please call and advise

## 2022-09-30 ENCOUNTER — EXTERNAL CHRONIC CARE MANAGEMENT (OUTPATIENT)
Dept: PRIMARY CARE CLINIC | Facility: CLINIC | Age: 72
End: 2022-09-30
Payer: MEDICARE

## 2022-09-30 PROCEDURE — 99490 PR CHRONIC CARE MGMT, 1ST 20 MIN: ICD-10-PCS | Mod: S$PBB,,, | Performed by: INTERNAL MEDICINE

## 2022-09-30 PROCEDURE — 99490 CHRNC CARE MGMT STAFF 1ST 20: CPT | Mod: S$PBB,,, | Performed by: INTERNAL MEDICINE

## 2022-09-30 PROCEDURE — 99490 CHRNC CARE MGMT STAFF 1ST 20: CPT | Mod: PBBFAC | Performed by: INTERNAL MEDICINE

## 2022-09-30 NOTE — TELEPHONE ENCOUNTER
Called and spoke to pt. Pt states she never uses the rescue inhaler and that she is no longer wheezing. Pt states she is feeling better. Encouraged pt to report to the UC if she declines over the weekend.

## 2022-10-14 DIAGNOSIS — M81.0 OSTEOPOROSIS, UNSPECIFIED OSTEOPOROSIS TYPE, UNSPECIFIED PATHOLOGICAL FRACTURE PRESENCE: Primary | ICD-10-CM

## 2022-10-24 ENCOUNTER — INFUSION (OUTPATIENT)
Dept: INFECTIOUS DISEASES | Facility: HOSPITAL | Age: 72
End: 2022-10-24
Attending: INTERNAL MEDICINE
Payer: MEDICARE

## 2022-10-24 VITALS
BODY MASS INDEX: 19.78 KG/M2 | DIASTOLIC BLOOD PRESSURE: 68 MMHG | TEMPERATURE: 98 F | WEIGHT: 130.06 LBS | OXYGEN SATURATION: 88 % | HEART RATE: 94 BPM | SYSTOLIC BLOOD PRESSURE: 138 MMHG | RESPIRATION RATE: 22 BRPM

## 2022-10-24 DIAGNOSIS — M81.6 LOCALIZED OSTEOPOROSIS OF LEQUESNE: ICD-10-CM

## 2022-10-24 DIAGNOSIS — M80.80XA PATHOLOGICAL FRACTURE DUE TO OSTEOPOROSIS, UNSPECIFIED FRACTURE SITE, UNSPECIFIED OSTEOPOROSIS TYPE, INITIAL ENCOUNTER: Primary | ICD-10-CM

## 2022-10-24 DIAGNOSIS — M81.0 OSTEOPOROSIS, UNSPECIFIED OSTEOPOROSIS TYPE, UNSPECIFIED PATHOLOGICAL FRACTURE PRESENCE: ICD-10-CM

## 2022-10-24 PROCEDURE — 63600175 PHARM REV CODE 636 W HCPCS: Mod: JG | Performed by: PHYSICIAN ASSISTANT

## 2022-10-24 PROCEDURE — 96372 THER/PROPH/DIAG INJ SC/IM: CPT

## 2022-10-24 RX ADMIN — DENOSUMAB 60 MG: 60 INJECTION SUBCUTANEOUS at 01:10

## 2022-10-31 ENCOUNTER — EXTERNAL CHRONIC CARE MANAGEMENT (OUTPATIENT)
Dept: PRIMARY CARE CLINIC | Facility: CLINIC | Age: 72
End: 2022-10-31
Payer: MEDICARE

## 2022-10-31 PROCEDURE — 99490 PR CHRONIC CARE MGMT, 1ST 20 MIN: ICD-10-PCS | Mod: S$PBB,,, | Performed by: INTERNAL MEDICINE

## 2022-10-31 PROCEDURE — 99490 CHRNC CARE MGMT STAFF 1ST 20: CPT | Mod: S$PBB,,, | Performed by: INTERNAL MEDICINE

## 2022-10-31 PROCEDURE — 99490 CHRNC CARE MGMT STAFF 1ST 20: CPT | Mod: PBBFAC | Performed by: INTERNAL MEDICINE

## 2022-11-07 ENCOUNTER — TELEPHONE (OUTPATIENT)
Dept: PALLIATIVE MEDICINE | Facility: CLINIC | Age: 72
End: 2022-11-07
Payer: MEDICARE

## 2022-11-08 ENCOUNTER — TELEPHONE (OUTPATIENT)
Dept: INTERNAL MEDICINE | Facility: CLINIC | Age: 72
End: 2022-11-08
Payer: MEDICARE

## 2022-11-08 NOTE — TELEPHONE ENCOUNTER
----- Message from Sarah Manzano sent at 11/8/2022  2:18 PM CST -----  Contact: Pt [phone]  The patient said she thought she signed in for the virtual face to face appmnt yesterday but, she is not sure if she did it correctly. Please call her to reschedule it as a virtual audio only.    Thank you

## 2022-11-10 ENCOUNTER — OFFICE VISIT (OUTPATIENT)
Dept: PALLIATIVE MEDICINE | Facility: CLINIC | Age: 72
End: 2022-11-10
Payer: MEDICARE

## 2022-11-10 DIAGNOSIS — Z51.5 QUALITY OF LIFE PALLIATIVE CARE ENCOUNTER: ICD-10-CM

## 2022-11-10 DIAGNOSIS — J43.2 CENTRILOBULAR EMPHYSEMA: Primary | ICD-10-CM

## 2022-11-10 DIAGNOSIS — F41.9 ANXIETY: ICD-10-CM

## 2022-11-10 PROCEDURE — 99442 PR PHYSICIAN TELEPHONE EVALUATION 11-20 MIN: ICD-10-PCS | Mod: 95,,, | Performed by: STUDENT IN AN ORGANIZED HEALTH CARE EDUCATION/TRAINING PROGRAM

## 2022-11-10 PROCEDURE — 99442 PR PHYSICIAN TELEPHONE EVALUATION 11-20 MIN: CPT | Mod: 95,,, | Performed by: STUDENT IN AN ORGANIZED HEALTH CARE EDUCATION/TRAINING PROGRAM

## 2022-11-10 NOTE — PROGRESS NOTES
Established Patient - Audio Only Telehealth Visit     The patient location is: [facility]  The chief complaint leading to consultation is: SOB  Visit type: Virtual visit with audio only (telephone)  Total time spent with patient: 11min       The reason for the audio only service rather than synchronous audio and video virtual visit was related to technical difficulties or patient preference/necessity.     Each patient to whom I provide medical services by telemedicine is:  (1) informed of the relationship between the physician and patient and the respective role of any other health care provider with respect to management of the patient; and (2) notified that they may decline to receive medical services by telemedicine and may withdraw from such care at any time. Patient verbally consented to receive this service via voice-only telephone call.       HPI:   She feels like her shortness of breath is better she is satting 95% on average.  She gets short of breath with walking.  She continues to have mucus and her current management plan is working.    Her anxiety is better controlled she is taking 15 mg of Celexa daily (1.5 tabs)  She denies pain, her appetite is great, her sleep is good  She continues to being dependent on ADLs, she has help with IADLs, she was able to cook dinner and is cooking occasionally           Assessment and plan:    -she is doing well will not change current plan   -will send Rx for Celexa 15 mg                       This service was not originating from a related E/M service provided within the previous 7 days nor will  to an E/M service or procedure within the next 24 hours or my soonest available appointment.  Prevailing standard of care was able to be met in this audio-only visit.

## 2022-11-11 RX ORDER — CITALOPRAM 10 MG/1
15 TABLET ORAL DAILY
Qty: 90 TABLET | Refills: 3 | Status: SHIPPED | OUTPATIENT
Start: 2022-11-11 | End: 2023-03-13

## 2022-11-24 ENCOUNTER — PATIENT MESSAGE (OUTPATIENT)
Dept: PULMONOLOGY | Facility: CLINIC | Age: 72
End: 2022-11-24
Payer: MEDICARE

## 2022-11-24 ENCOUNTER — PATIENT MESSAGE (OUTPATIENT)
Dept: INTERNAL MEDICINE | Facility: CLINIC | Age: 72
End: 2022-11-24
Payer: MEDICARE

## 2022-11-24 DIAGNOSIS — J44.1 COPD EXACERBATION: Primary | ICD-10-CM

## 2022-11-25 ENCOUNTER — PATIENT MESSAGE (OUTPATIENT)
Dept: PULMONOLOGY | Facility: CLINIC | Age: 72
End: 2022-11-25
Payer: MEDICARE

## 2022-11-25 RX ORDER — PREDNISONE 10 MG/1
TABLET ORAL
Qty: 35 TABLET | Refills: 0 | Status: SHIPPED | OUTPATIENT
Start: 2022-11-25 | End: 2023-01-12

## 2022-11-28 ENCOUNTER — HOSPITAL ENCOUNTER (EMERGENCY)
Facility: HOSPITAL | Age: 72
Discharge: HOME | End: 2022-11-28
Attending: EMERGENCY MEDICINE
Payer: MEDICARE

## 2022-11-28 VITALS
RESPIRATION RATE: 18 BRPM | HEART RATE: 96 BPM | OXYGEN SATURATION: 98 % | SYSTOLIC BLOOD PRESSURE: 150 MMHG | TEMPERATURE: 98 F | DIASTOLIC BLOOD PRESSURE: 83 MMHG

## 2022-11-28 DIAGNOSIS — R06.02 SOB (SHORTNESS OF BREATH): ICD-10-CM

## 2022-11-28 DIAGNOSIS — J44.1 COPD EXACERBATION: ICD-10-CM

## 2022-11-28 DIAGNOSIS — N39.0 URINARY TRACT INFECTION WITHOUT HEMATURIA, SITE UNSPECIFIED: Primary | ICD-10-CM

## 2022-11-28 LAB
ALBUMIN SERPL BCP-MCNC: 3.9 G/DL (ref 3.5–5.2)
ALLENS TEST: ABNORMAL
ALP SERPL-CCNC: 65 U/L (ref 55–135)
ALT SERPL W/O P-5'-P-CCNC: 23 U/L (ref 10–44)
ANION GAP SERPL CALC-SCNC: 10 MMOL/L (ref 8–16)
AST SERPL-CCNC: 25 U/L (ref 10–40)
BACTERIA #/AREA URNS AUTO: ABNORMAL /HPF
BASOPHILS # BLD AUTO: 0.06 K/UL (ref 0–0.2)
BASOPHILS NFR BLD: 0.7 % (ref 0–1.9)
BILIRUB SERPL-MCNC: 0.4 MG/DL (ref 0.1–1)
BILIRUB UR QL STRIP: NEGATIVE
BNP SERPL-MCNC: 10 PG/ML (ref 0–99)
BUN SERPL-MCNC: 11 MG/DL (ref 8–23)
CALCIUM SERPL-MCNC: 9.1 MG/DL (ref 8.7–10.5)
CHLORIDE SERPL-SCNC: 94 MMOL/L (ref 95–110)
CLARITY UR REFRACT.AUTO: ABNORMAL
CO2 SERPL-SCNC: 29 MMOL/L (ref 23–29)
COLOR UR AUTO: ABNORMAL
CREAT SERPL-MCNC: 0.6 MG/DL (ref 0.5–1.4)
DELSYS: ABNORMAL
DIFFERENTIAL METHOD: ABNORMAL
EOSINOPHIL # BLD AUTO: 0.3 K/UL (ref 0–0.5)
EOSINOPHIL NFR BLD: 3.2 % (ref 0–8)
ERYTHROCYTE [DISTWIDTH] IN BLOOD BY AUTOMATED COUNT: 13 % (ref 11.5–14.5)
EST. GFR  (NO RACE VARIABLE): >60 ML/MIN/1.73 M^2
GLUCOSE SERPL-MCNC: 97 MG/DL (ref 70–110)
GLUCOSE UR QL STRIP: NEGATIVE
HCO3 UR-SCNC: 35.4 MMOL/L (ref 24–28)
HCT VFR BLD AUTO: 38.5 % (ref 37–48.5)
HGB BLD-MCNC: 12.6 G/DL (ref 12–16)
HGB UR QL STRIP: ABNORMAL
IMM GRANULOCYTES # BLD AUTO: 0.02 K/UL (ref 0–0.04)
IMM GRANULOCYTES NFR BLD AUTO: 0.2 % (ref 0–0.5)
INFLUENZA A, MOLECULAR: NOT DETECTED
INFLUENZA B, MOLECULAR: NOT DETECTED
KETONES UR QL STRIP: NEGATIVE
LEUKOCYTE ESTERASE UR QL STRIP: ABNORMAL
LYMPHOCYTES # BLD AUTO: 1.8 K/UL (ref 1–4.8)
LYMPHOCYTES NFR BLD: 22.5 % (ref 18–48)
MCH RBC QN AUTO: 31.1 PG (ref 27–31)
MCHC RBC AUTO-ENTMCNC: 32.7 G/DL (ref 32–36)
MCV RBC AUTO: 95 FL (ref 82–98)
MICROSCOPIC COMMENT: ABNORMAL
MONOCYTES # BLD AUTO: 1 K/UL (ref 0.3–1)
MONOCYTES NFR BLD: 11.8 % (ref 4–15)
NEUTROPHILS # BLD AUTO: 5 K/UL (ref 1.8–7.7)
NEUTROPHILS NFR BLD: 61.6 % (ref 38–73)
NITRITE UR QL STRIP: NEGATIVE
NON-SQ EPI CELLS #/AREA URNS AUTO: 16 /HPF
NRBC BLD-RTO: 0 /100 WBC
PCO2 BLDA: 50 MMHG (ref 35–45)
PH SMN: 7.46 [PH] (ref 7.35–7.45)
PH UR STRIP: 7 [PH] (ref 5–8)
PLATELET # BLD AUTO: 393 K/UL (ref 150–450)
PMV BLD AUTO: 9 FL (ref 9.2–12.9)
PO2 BLDA: 53 MMHG (ref 40–60)
POC BE: 12 MMOL/L
POC SATURATED O2: 88 % (ref 95–100)
POC TCO2: 37 MMOL/L (ref 24–29)
POTASSIUM SERPL-SCNC: 4.3 MMOL/L (ref 3.5–5.1)
PROT SERPL-MCNC: 6.6 G/DL (ref 6–8.4)
PROT UR QL STRIP: ABNORMAL
RBC # BLD AUTO: 4.05 M/UL (ref 4–5.4)
RBC #/AREA URNS AUTO: 13 /HPF (ref 0–4)
RSV AG BY MOLECULAR METHOD: NOT DETECTED
SAMPLE: ABNORMAL
SARS-COV-2 RNA RESP QL NAA+PROBE: NOT DETECTED
SITE: ABNORMAL
SODIUM SERPL-SCNC: 133 MMOL/L (ref 136–145)
SP GR UR STRIP: 1.01 (ref 1–1.03)
SQUAMOUS #/AREA URNS AUTO: 91 /HPF
TROPONIN I SERPL DL<=0.01 NG/ML-MCNC: 0.01 NG/ML (ref 0–0.03)
URN SPEC COLLECT METH UR: ABNORMAL
WBC # BLD AUTO: 8.14 K/UL (ref 3.9–12.7)
WBC #/AREA URNS AUTO: >100 /HPF (ref 0–5)
WBC CLUMPS UR QL AUTO: ABNORMAL

## 2022-11-28 PROCEDURE — 80053 COMPREHEN METABOLIC PANEL: CPT

## 2022-11-28 PROCEDURE — 81001 URINALYSIS AUTO W/SCOPE: CPT

## 2022-11-28 PROCEDURE — 85025 COMPLETE CBC W/AUTO DIFF WBC: CPT

## 2022-11-28 PROCEDURE — 0241U SARS-COV2 (COVID) WITH FLU/RSV BY PCR: CPT | Performed by: EMERGENCY MEDICINE

## 2022-11-28 PROCEDURE — 93010 ELECTROCARDIOGRAM REPORT: CPT | Mod: ,,, | Performed by: INTERNAL MEDICINE

## 2022-11-28 PROCEDURE — 63600175 PHARM REV CODE 636 W HCPCS: Performed by: EMERGENCY MEDICINE

## 2022-11-28 PROCEDURE — 99285 EMERGENCY DEPT VISIT HI MDM: CPT | Mod: 25

## 2022-11-28 PROCEDURE — 87088 URINE BACTERIA CULTURE: CPT

## 2022-11-28 PROCEDURE — 93010 EKG 12-LEAD: ICD-10-PCS | Mod: ,,, | Performed by: INTERNAL MEDICINE

## 2022-11-28 PROCEDURE — 87086 URINE CULTURE/COLONY COUNT: CPT

## 2022-11-28 PROCEDURE — 99285 EMERGENCY DEPT VISIT HI MDM: CPT | Mod: CS,,, | Performed by: EMERGENCY MEDICINE

## 2022-11-28 PROCEDURE — 93005 ELECTROCARDIOGRAM TRACING: CPT

## 2022-11-28 PROCEDURE — 84484 ASSAY OF TROPONIN QUANT: CPT

## 2022-11-28 PROCEDURE — 25000242 PHARM REV CODE 250 ALT 637 W/ HCPCS: Performed by: EMERGENCY MEDICINE

## 2022-11-28 PROCEDURE — 94640 AIRWAY INHALATION TREATMENT: CPT

## 2022-11-28 PROCEDURE — 96374 THER/PROPH/DIAG INJ IV PUSH: CPT

## 2022-11-28 PROCEDURE — 83880 ASSAY OF NATRIURETIC PEPTIDE: CPT

## 2022-11-28 PROCEDURE — 99285 PR EMERGENCY DEPT VISIT,LEVEL V: ICD-10-PCS | Mod: CS,,, | Performed by: EMERGENCY MEDICINE

## 2022-11-28 PROCEDURE — 99900035 HC TECH TIME PER 15 MIN (STAT)

## 2022-11-28 PROCEDURE — 27000221 HC OXYGEN, UP TO 24 HOURS

## 2022-11-28 PROCEDURE — 82803 BLOOD GASES ANY COMBINATION: CPT

## 2022-11-28 PROCEDURE — 87147 CULTURE TYPE IMMUNOLOGIC: CPT

## 2022-11-28 RX ORDER — LEVALBUTEROL INHALATION SOLUTION 0.63 MG/3ML
0.63 SOLUTION RESPIRATORY (INHALATION)
Status: COMPLETED | OUTPATIENT
Start: 2022-11-28 | End: 2022-11-28

## 2022-11-28 RX ORDER — CEPHALEXIN 500 MG/1
500 CAPSULE ORAL 4 TIMES DAILY
Qty: 28 CAPSULE | Refills: 0 | Status: SHIPPED | OUTPATIENT
Start: 2022-11-28 | End: 2022-12-05

## 2022-11-28 RX ORDER — METHYLPREDNISOLONE SOD SUCC 125 MG
125 VIAL (EA) INJECTION
Status: COMPLETED | OUTPATIENT
Start: 2022-11-28 | End: 2022-11-28

## 2022-11-28 RX ADMIN — METHYLPREDNISOLONE SODIUM SUCCINATE 125 MG: 125 INJECTION, POWDER, FOR SOLUTION INTRAMUSCULAR; INTRAVENOUS at 02:11

## 2022-11-28 RX ADMIN — LEVALBUTEROL HYDROCHLORIDE 0.63 MG: 0.63 SOLUTION RESPIRATORY (INHALATION) at 02:11

## 2022-11-28 NOTE — ED PROVIDER NOTES
Encounter Date: 11/28/2022       History     Chief Complaint   Patient presents with    Shortness of Breath     Hx of COPD, states increased SOB over last few days on home O2 3 L NC     72-year-old female with history of COPD, GERD, and PAH who presents to the ED for chief complaint of shortness of breath that started 4-5 days ago.   is at bedside.  In the last 4-5 days, patient has been experiencing worsening SOB and central chest heaviness.  She normally uses 3 L O2 NC at home with an average saturation of 94-95%.  Of note, patient states she went to her pulmonologist last Friday and received steroids which has provided little relief.  She has also been using her inhaler and levalbuterol nebulizer with little relief.  She also endorses a cough.  Patient denies fevers, chills, abdominal pain, nausea, vomiting, headache, and changes in urination.    The history is provided by the patient. No  was used.   Review of patient's allergies indicates:   Allergen Reactions    Albuterol     Ipratropium analogues      Difficulty breathing     Past Medical History:   Diagnosis Date    Cataract     COPD (chronic obstructive pulmonary disease)     COVID-19     History of COVID-19 1/17/2021    Still with mild dyspnea. Encouraged return to pulmonary rehab Recommend scheduling vaccination when appointment is available.     History of retinal hemorrhage     Lobar pneumonia 11/14/2021    Recurrent in RLL, no endobronchial lesion Needs speech evaluation and MBSS for recurrent aspiration in setting of dysphagia  Will need pulmonary rehab at [facility].    Pathological fracture due to osteoporosis 7/6/2020    Retinal histoplasmosis     Secondary pulmonary arterial hypertension 7/3/2018    Secondary to emphysema and chronic respiratory failure, mild.     Past Surgical History:   Procedure Laterality Date    BRONCHOSCOPY WITH FLUOROSCOPY N/A 10/28/2019    Procedure: BRONCHOSCOPY, WITH FLUOROSCOPY;  Surgeon: Brooklynn  SABAS Ruiz MD;  Location: [facility] OR [address] [place] [address];  Service: Endoscopy;  Laterality: N/A;    HAND SURGERY       Family History   Problem Relation Age of Onset    Macular degeneration Mother     Colon cancer Mother     Stroke Father     Colon cancer Father     Amblyopia Neg Hx     Blindness Neg Hx     Cataracts Neg Hx     Diabetes Neg Hx     Glaucoma Neg Hx     Hypertension Neg Hx     Retinal detachment Neg Hx     Strabismus Neg Hx     Thyroid disease Neg Hx      Social History     Tobacco Use    Smoking status: Former     Packs/day: 1.00     Years: 36.00     Pack years: 36.00     Types: Cigarettes     Quit date: 2016     Years since quittin.2    Smokeless tobacco: Never    Tobacco comments:     pt states she does not remember date   Substance Use Topics    Alcohol use: Yes     Alcohol/week: 2.0 standard drinks     Types: 2 Glasses of wine per week     Comment: 1-2 glasses a day     Drug use: No     Review of Systems   Constitutional:  Negative for chills and fever.   HENT:  Negative for congestion and rhinorrhea.    Respiratory:  Positive for cough and shortness of breath.    Cardiovascular:  Positive for chest pain.   Gastrointestinal:  Negative for abdominal pain, constipation, diarrhea, nausea and vomiting.   Endocrine: Negative for polyuria.   Genitourinary:  Negative for difficulty urinating.   Musculoskeletal:  Negative for myalgias.   Skin:  Negative for rash.   Allergic/Immunologic: Negative for environmental allergies and food allergies.   Neurological:  Negative for headaches.     Physical Exam     Initial Vitals [22 1238]   BP Pulse Resp Temp SpO2   134/63 100 (!) 24 97.7 °F (36.5 °C) (!) 90 %      MAP       --         Physical Exam    Nursing note and vitals reviewed.  Constitutional: She appears well-developed. No distress.   Patient has pursed lip breathing and is on 3 L O2 NC at 100%.  She is able to talk in full sentences.   HENT:   Head: Normocephalic.   Mouth/Throat: Oropharynx is clear and  moist.   Eyes: Conjunctivae are normal. No scleral icterus.   Neck:   Normal range of motion.  Cardiovascular:  Normal rate, regular rhythm and normal heart sounds.           Pulmonary/Chest: No respiratory distress. She has wheezes. She has no rhonchi. She has no rales. She exhibits no tenderness.   Patient is tachypneic.  Wheezing heard in right upper lung field when patient coughs.  Decreased air movement in lower left and right lung fields.   Abdominal: Abdomen is soft. She exhibits no distension. no abdominal tenderness There is no rebound and no guarding.   Musculoskeletal:         General: Normal range of motion.      Cervical back: Normal range of motion.     Neurological: She is alert and oriented to person, place, and time. GCS score is 15. GCS eye subscore is 4. GCS verbal subscore is 5. GCS motor subscore is 6.   Skin: Skin is warm. Capillary refill takes less than 2 seconds.   Psychiatric: She has a normal mood and affect.       ED Course   Procedures  Labs Reviewed   COMPREHENSIVE METABOLIC PANEL - Abnormal; Notable for the following components:       Result Value    Sodium 133 (*)     Chloride 94 (*)     All other components within normal limits   CBC W/ AUTO DIFFERENTIAL - Abnormal; Notable for the following components:    MCH 31.1 (*)     MPV 9.0 (*)     All other components within normal limits   ISTAT PROCEDURE - Abnormal; Notable for the following components:    POC PH 7.457 (*)     POC PCO2 50.0 (*)     POC HCO3 35.4 (*)     POC SATURATED O2 88 (*)     POC TCO2 37 (*)     All other components within normal limits   B-TYPE NATRIURETIC PEPTIDE   TROPONIN I   URINALYSIS, REFLEX TO URINE CULTURE   SARS-COV2 (COVID) WITH FLU/RSV BY PCR        ECG Results              EKG 12-lead (Final result)  Result time 11/28/22 14:53:21      Final result by Interface, Lab In [facility] (11/28/22 14:53:21)                   Narrative:    Test Reason : R06.02,    Vent. Rate : 089 BPM     Atrial Rate : 089 BPM     P-R  Int : 186 ms          QRS Dur : 106 ms      QT Int : 364 ms       P-R-T Axes : 080 066 070 degrees     QTc Int : 442 ms    Normal sinus rhythm  Normal ECG  When compared with ECG of 20-SEP-2022 11:35,  No significant change was found  Confirmed by Raman CANNON MD ([id number]) on 11/28/2022 2:53:13 PM    Referred By: [email or web address]   SELF           Confirmed By:Raman CANNON MD                                  Imaging Results              X-Ray Chest AP Portable (Final result)  Result time 11/28/22 14:49:28      Final result by Tash Villa MD (11/28/22 14:49:28)                   Impression:      No acute cardiopulmonary abnormality.    Electronically signed by resident: Farhat Ventura  Date:    11/28/2022  Time:    14:34    Electronically signed by: Tash Villa  Date:    11/28/2022  Time:    14:49               Narrative:    EXAMINATION:  XR CHEST AP PORTABLE    CLINICAL HISTORY:  SOB;    TECHNIQUE:  Single frontal view of the chest was performed.    COMPARISON:  09/20/2022.    FINDINGS:  Monitoring leads overlie the thorax.  The trachea is midline.  Cardiomediastinal silhouette is within normal limits.  Upper lung zone predominant emphysema.  Biapical pleuroparenchymal scarring.  No focal consolidation.  No sizable pleural effusion.  Bones are intact.                                       Medications   levalbuterol nebulizer solution 0.63 mg (0.63 mg Nebulization Given 11/28/22 1443)   methylPREDNISolone sodium succinate injection 125 mg (125 mg Intravenous Given 11/28/22 1451)     Medical Decision Making:   Initial Assessment:   72-year-old female with history of COPD, GERD, and PAH who presents to the ED for chief complaint of shortness of breath that started 4-5 days ago.  Patient is sitting with 3 L O2 nasal cannula.  She is tachypneic.  Differential Diagnosis:   - COPD exacerbation:  Patient has a history of COPD and has worsening dyspnea.  - CHF:  Will evaluate with a chest x-ray and BNP.  - COVID vs influenza:   Will evaluate with a COVID flu RSV PCR.  - ACS:  Will evaluate with an EKG and troponin.  - PNA:  Will evaluate with a chest x-ray.  Clinical Tests:   Lab Tests: Ordered and Reviewed  Radiological Study: Ordered and Reviewed  Medical Tests: Ordered and Reviewed  ED Management:  Patient presents with SOB.  Obtained history and physical exam.  Ordered an EKG which shows normal sinus rhythm, no ST elevation.  Administered methylprednisolone and levalbuterol for dyspnea management.  Ordered labs for i-STAT, CBC, CMP, troponin, BNP, and COVID flu RSV PCR.  Ordered a chest x-ray.  I-STAT is not impressive for hypercapnia.  BNP and troponin are WNL.  Please refer to ED Course for additional details.    Patient is pending urinalysis and COVID flu RSV PCR.  Discussed patient with change of shift ED team.  Plan for patient to be discharged if pending labs are negative and if patient maintains oxygen saturation above 94% on 3 L NC, which is her standard home oxygen.           ED Course as of 11/28/22 1641   Mon Nov 28, 2022   1513 CXR shows no acute findings. [MD]   1540 Patient reassessed and patient now has air movement in her lower left and right lung fields. [MD]      ED Course User Index  [MD] Vasiliy Jeffries MD                 Clinical Impression:   Final diagnoses:  [R06.02] SOB (shortness of breath)  [J44.1] COPD exacerbation (Primary)               Vasiliy Jeffries MD  Resident  11/28/22 1641

## 2022-11-28 NOTE — ED TRIAGE NOTES
Pt came to ED with complaints of SOB that started four days ago. Pt reports normally usually oxygen on 3L at home. Pt reports a hx of COPD. Pt denies fever, chills, pain, numbness, or tingling. Pt AAOx4. Pt put on cardiac monitor and cont puls ox.

## 2022-11-28 NOTE — ED PROVIDER NOTES
ED Resident HAND-OFF NOTE:  2:51 PM 11/28/2022  Estefani Fam is a 72 y.o. female who presented to the ED on 11/28/2022 and has been managed by Dr. Jeffries, who reports patient C/O SOB. I assumed care of patient from off-going ED physician team at 2:51 PM pending workup.      On my exam, I appreciate:  BP (!) 150/83   Pulse 96   Temp 97.8 °F (36.6 °C) (Oral)   Resp 18   SpO2 98%         Disposition: I anticipate patient will be discharged.   I have discussed and counseled Estefani Fam regarding exam, results, diagnosis, treatment, and plan.  ______________________  Jose Suggs MD   Emergency Medicine Resident  2:51 PM 11/28/2022      UPDATE:   Laboratory workup largely unremarkable. UA consistent with Uti, so we will treat the patient with a course of Keflex outpatient. Her breathing feels back to baseline and she is satting 100%; I suspect she's also having a mild COPD exacerbation. She's been encouraged to finish her steroid course prescribed by other physician. Pt is clinically and hemodynamically stable. Patient will be discharged at this time. Patient has been given home care instructions, follow up instructions, and strict return precautions. They agree with and are comfortable with the plan.       Clinical Impression  :  SOB (shortness of breath)  COPD exacerbation  Urinary tract infection without hematuria, site unspecified (Primary)       Jose Suggs MD  Resident  11/28/22 0218

## 2022-11-28 NOTE — DISCHARGE INSTRUCTIONS
Diagnosis:   1. COPD exacerbation    2. SOB (shortness of breath)      Home Care Instructions:  - Take the Keflex four times daily for the next 7 days for your UTI  - You received steroids today. You may continue taking prednisone tomorrow.  - Continue using your levalbuterol nebulizer and inhaler as needed for wheezing and shortness of breath.    Follow-Up Plan:  - Follow-up with your primary care provider as needed.    Return to the Emergency Department for symptoms including but not limited to: worsening symptoms, shortness of breath or chest pain, vomiting with inability to hold down fluids, or other concerning symptoms.

## 2022-11-28 NOTE — PROVIDER PROGRESS NOTES - EMERGENCY DEPT.
Encounter Date: 11/28/2022    ED Physician Progress Notes            EKG interpretation:  NSR, rate 89, no ST changes, no ischemia, normal intervals.  Compared with prior EKG dated Sept 2022, grossly stable without significant change.

## 2022-11-29 LAB — BACTERIA UR CULT: ABNORMAL

## 2022-11-30 ENCOUNTER — EXTERNAL CHRONIC CARE MANAGEMENT (OUTPATIENT)
Dept: PRIMARY CARE CLINIC | Facility: CLINIC | Age: 72
End: 2022-11-30
Payer: MEDICARE

## 2022-11-30 PROCEDURE — 99490 PR CHRONIC CARE MGMT, 1ST 20 MIN: ICD-10-PCS | Mod: S$PBB,,, | Performed by: INTERNAL MEDICINE

## 2022-11-30 PROCEDURE — 99490 CHRNC CARE MGMT STAFF 1ST 20: CPT | Mod: S$PBB,,, | Performed by: INTERNAL MEDICINE

## 2022-11-30 PROCEDURE — 99490 CHRNC CARE MGMT STAFF 1ST 20: CPT | Mod: PBBFAC | Performed by: INTERNAL MEDICINE

## 2022-12-01 ENCOUNTER — HOSPITAL ENCOUNTER (OUTPATIENT)
Dept: RADIOLOGY | Facility: HOSPITAL | Age: 72
Discharge: HOME | End: 2022-12-01
Attending: NURSE PRACTITIONER
Payer: MEDICARE

## 2022-12-01 ENCOUNTER — OFFICE VISIT (OUTPATIENT)
Dept: ORTHOPEDICS | Facility: CLINIC | Age: 72
End: 2022-12-01
Payer: MEDICARE

## 2022-12-01 DIAGNOSIS — M25.532 LEFT WRIST PAIN: Primary | ICD-10-CM

## 2022-12-01 DIAGNOSIS — S52.502D CLOSED FRACTURE OF DISTAL END OF LEFT RADIUS WITH ROUTINE HEALING, UNSPECIFIED FRACTURE MORPHOLOGY, SUBSEQUENT ENCOUNTER: Primary | ICD-10-CM

## 2022-12-01 DIAGNOSIS — M25.532 LEFT WRIST PAIN: ICD-10-CM

## 2022-12-01 PROCEDURE — 73110 XR WRIST COMPLETE 3 VIEWS LEFT: ICD-10-PCS | Mod: 26,LT,, | Performed by: RADIOLOGY

## 2022-12-01 PROCEDURE — 73110 X-RAY EXAM OF WRIST: CPT | Mod: 26,LT,, | Performed by: RADIOLOGY

## 2022-12-01 PROCEDURE — 73110 X-RAY EXAM OF WRIST: CPT | Mod: TC,LT

## 2022-12-01 PROCEDURE — 99999 PR PBB SHADOW E&M-EST. PATIENT-LVL III: ICD-10-PCS | Mod: PBBFAC,,, | Performed by: NURSE PRACTITIONER

## 2022-12-01 PROCEDURE — 99214 OFFICE O/P EST MOD 30 MIN: CPT | Mod: S$PBB,,, | Performed by: NURSE PRACTITIONER

## 2022-12-01 PROCEDURE — 99999 PR PBB SHADOW E&M-EST. PATIENT-LVL III: CPT | Mod: PBBFAC,,, | Performed by: NURSE PRACTITIONER

## 2022-12-01 PROCEDURE — 99213 OFFICE O/P EST LOW 20 MIN: CPT | Mod: PBBFAC | Performed by: NURSE PRACTITIONER

## 2022-12-01 PROCEDURE — 99214 PR OFFICE/OUTPT VISIT, EST, LEVL IV, 30-39 MIN: ICD-10-PCS | Mod: S$PBB,,, | Performed by: NURSE PRACTITIONER

## 2022-12-05 ENCOUNTER — TELEPHONE (OUTPATIENT)
Dept: INTERNAL MEDICINE | Facility: CLINIC | Age: 72
End: 2022-12-05
Payer: MEDICARE

## 2022-12-05 NOTE — TELEPHONE ENCOUNTER
----- Message from Tash Clarke sent at 12/5/2022 10:15 AM CST -----  Contact: [phone]  Pt is requesting an audio appt for an ER f/u due to UTI. She only wants to see Dr Khan for visit for an audio visit. Please assist.

## 2022-12-05 NOTE — TELEPHONE ENCOUNTER
Called and scheduled pt for virtual appt and provided pt with the [phone]- number to get some assistance with the appt set up

## 2022-12-08 ENCOUNTER — OFFICE VISIT (OUTPATIENT)
Dept: INTERNAL MEDICINE | Facility: CLINIC | Age: 72
End: 2022-12-08
Payer: MEDICARE

## 2022-12-08 DIAGNOSIS — Z87.440 HISTORY OF UTI: Primary | ICD-10-CM

## 2022-12-08 DIAGNOSIS — Z12.9 CANCER SCREENING: ICD-10-CM

## 2022-12-08 PROCEDURE — 99214 PR OFFICE/OUTPT VISIT, EST, LEVL IV, 30-39 MIN: ICD-10-PCS | Mod: 95,,, | Performed by: INTERNAL MEDICINE

## 2022-12-08 PROCEDURE — 99214 OFFICE O/P EST MOD 30 MIN: CPT | Mod: 95,,, | Performed by: INTERNAL MEDICINE

## 2022-12-08 NOTE — PROGRESS NOTES
Answers submitted by the patient for this visit:  Review of Systems Questionnaire (Submitted on 2022)  activity change: No  unexpected weight change: No  rhinorrhea: No  trouble swallowing: No  visual disturbance: No  chest tightness: No  polyuria: Yes  difficulty urinating: No  menstrual problem: No  joint swelling: No  arthralgias: No  confusion: No  dysphoric mood: No    The patient location is: [place]  The chief complaint leading to consultation is:   Visit type: Virtual visit with synchronous audio and video  Total time spent with patient: 15 minutes  Each patient to whom he or she provides medical services by telemedicine is:  (1) informed of the relationship between the physician and patient and the respective role of any other health care provider with respect to management of the patient; and (2) notified that he or she may decline to receive medical services by telemedicine and may withdraw from such care at any time.      CHIEF COMPLAINT     No chief complaint on file.  TELEMEDICINE VISIT    HPI     Estefani Fam is 72 y.o. female here today for UTI f/u    Seen in ED  for concern for PNA. She was found to pyruia on UA and treated empirically for UTI. She was treated with keflex and cx grew Strep Agalactiae. Symptoms have resolved.    Patient also asked if she needed cologuard for colon cancer screening since her mother  from colon cancer.    Personally Reviewed Patient's Medical, surgical, family and social hx. Changes updated in [facility].  Care Team updated in Epic    Review of Systems:  Review of Systems   HENT:  Negative for hearing loss.    Eyes:  Negative for discharge.   Respiratory:  Negative for wheezing.    Cardiovascular:  Negative for chest pain and palpitations.   Gastrointestinal:  Negative for blood in stool, constipation, diarrhea and vomiting.   Genitourinary:  Negative for dysuria and hematuria.   Musculoskeletal:  Negative for neck pain.   Neurological:  Negative for weakness  and headaches.   Endo/Heme/Allergies:  Negative for polydipsia.     Health Maintenance:   Reviewed with patient  Due for the following:      PHYSICAL EXAM       Gen: Well Appearing, NAD  HEENT: PERR, EOMI  Chest: Normal work of breathing,   Neuro: A&Ox3,  speech normal  Mood; Mood normal, behavior normal, thought process linear       LABS     Labs reviewed; Notable for    ASSESSMENT     1. History of UTI        2. Cancer screening                  Plan     Estefani Fam is a 72 y.o. female  1. History of UTI  S/p treatment, no symptoms present today. Discussed precautions to repeat UA    2. Cancer screening  Discussed based on  End stage COPD. Do not think colon cancer screening is appropriate 2/2 risk of complications from C-scope and potential treatment.   Discussed Breast Cancer screening, think if she had localized disease she could potentially undergo lumpectomy so think it is fair to consider to continue to perform mammograms. She will think about and let me know if she wants to schedule.    Dustin Khan MD

## 2022-12-12 ENCOUNTER — TELEPHONE (OUTPATIENT)
Dept: INTERNAL MEDICINE | Facility: CLINIC | Age: 72
End: 2022-12-12
Payer: MEDICARE

## 2022-12-12 DIAGNOSIS — R82.90 FOUL SMELLING URINE: Primary | ICD-10-CM

## 2022-12-12 NOTE — TELEPHONE ENCOUNTER
----- Message from Tash Clarke sent at 12/12/2022  8:11 AM CST -----  Contact: [phone]  Pt was treated for a UTI last week. She states she still has foul smelling urine and is frequently going to the restroom. Can you please call her with advise on what she should do?      [id number] [place] - [address] AT [facility] [place] [address]  Phone: [phone] Fax: [phone]

## 2022-12-13 ENCOUNTER — PATIENT MESSAGE (OUTPATIENT)
Dept: INTERNAL MEDICINE | Facility: CLINIC | Age: 72
End: 2022-12-13
Payer: MEDICARE

## 2022-12-14 ENCOUNTER — TELEPHONE (OUTPATIENT)
Dept: INTERNAL MEDICINE | Facility: CLINIC | Age: 72
End: 2022-12-14
Payer: MEDICARE

## 2022-12-14 ENCOUNTER — PATIENT MESSAGE (OUTPATIENT)
Dept: INTERNAL MEDICINE | Facility: CLINIC | Age: 72
End: 2022-12-14
Payer: MEDICARE

## 2022-12-14 DIAGNOSIS — N39.0 URINARY TRACT INFECTION WITHOUT HEMATURIA, SITE UNSPECIFIED: Primary | ICD-10-CM

## 2022-12-14 NOTE — TELEPHONE ENCOUNTER
----- Message from Milagros Riddle sent at 12/14/2022  8:39 AM CST -----  Contact: self [phone]  Pt requesting a call stated left a message  2 days ago in regards to uti stated does not think it is gone.    Please call and advise

## 2022-12-14 NOTE — TELEPHONE ENCOUNTER
Pt went into ER on the 28th of Nov, for UTI. Pt states she was provided some medications for the UTI and finished all the meds on last week. Pt still having issues with frequent urination and states it still feels as if she has a UTI. Pt will like to be prescribed more medication that could possibly help with her condition.

## 2022-12-14 NOTE — TELEPHONE ENCOUNTER
Any other symptoms ?    She was given the appropriate antibiotic.  She needs to repeat urine and culture.  SHe may see UC or drop off here.

## 2022-12-15 ENCOUNTER — LAB VISIT (OUTPATIENT)
Dept: LAB | Facility: HOSPITAL | Age: 72
End: 2022-12-15
Attending: INTERNAL MEDICINE
Payer: MEDICARE

## 2022-12-15 DIAGNOSIS — R82.90 FOUL SMELLING URINE: ICD-10-CM

## 2022-12-15 DIAGNOSIS — N39.0 URINARY TRACT INFECTION WITHOUT HEMATURIA, SITE UNSPECIFIED: ICD-10-CM

## 2022-12-15 PROCEDURE — 81001 URINALYSIS AUTO W/SCOPE: CPT | Performed by: INTERNAL MEDICINE

## 2022-12-15 PROCEDURE — 87086 URINE CULTURE/COLONY COUNT: CPT | Mod: 59 | Performed by: INTERNAL MEDICINE

## 2022-12-15 PROCEDURE — 87086 URINE CULTURE/COLONY COUNT: CPT | Performed by: INTERNAL MEDICINE

## 2022-12-16 DIAGNOSIS — N30.90 CYSTITIS: Primary | ICD-10-CM

## 2022-12-16 LAB
BACTERIA #/AREA URNS AUTO: ABNORMAL /HPF
BILIRUB UR QL STRIP: NEGATIVE
CLARITY UR REFRACT.AUTO: CLEAR
COLOR UR AUTO: ABNORMAL
GLUCOSE UR QL STRIP: NEGATIVE
HGB UR QL STRIP: NEGATIVE
HYALINE CASTS UR QL AUTO: 2 /LPF
KETONES UR QL STRIP: NEGATIVE
LEUKOCYTE ESTERASE UR QL STRIP: ABNORMAL
MICROSCOPIC COMMENT: ABNORMAL
NITRITE UR QL STRIP: NEGATIVE
PH UR STRIP: 7 [PH] (ref 5–8)
PROT UR QL STRIP: NEGATIVE
RBC #/AREA URNS AUTO: 1 /HPF (ref 0–4)
SP GR UR STRIP: 1 (ref 1–1.03)
SQUAMOUS #/AREA URNS AUTO: 2 /HPF
URN SPEC COLLECT METH UR: ABNORMAL
WBC #/AREA URNS AUTO: 20 /HPF (ref 0–5)

## 2022-12-16 RX ORDER — NITROFURANTOIN 25; 75 MG/1; MG/1
100 CAPSULE ORAL 2 TIMES DAILY
Qty: 10 CAPSULE | Refills: 0 | Status: SHIPPED | OUTPATIENT
Start: 2022-12-16 | End: 2022-12-21

## 2022-12-17 LAB
BACTERIA UR CULT: NORMAL

## 2022-12-30 ENCOUNTER — TELEPHONE (OUTPATIENT)
Dept: INTERNAL MEDICINE | Facility: CLINIC | Age: 72
End: 2022-12-30
Payer: MEDICARE

## 2022-12-30 DIAGNOSIS — J44.0 COPD (CHRONIC OBSTRUCTIVE PULMONARY DISEASE) WITH ACUTE BRONCHITIS: ICD-10-CM

## 2022-12-30 DIAGNOSIS — J20.9 COPD (CHRONIC OBSTRUCTIVE PULMONARY DISEASE) WITH ACUTE BRONCHITIS: ICD-10-CM

## 2022-12-30 RX ORDER — LEVALBUTEROL INHALATION SOLUTION 0.63 MG/3ML
SOLUTION RESPIRATORY (INHALATION)
Qty: 750 ML | Refills: 2 | Status: SHIPPED | OUTPATIENT
Start: 2022-12-30 | End: 2023-12-27 | Stop reason: SDUPTHER

## 2022-12-30 NOTE — TELEPHONE ENCOUNTER
"----- Message from Sherrimatt Aguilera sent at 12/30/2022  8:52 AM CST -----  Contact: Pt Mobile [phone]  type: Lab    Caller is requesting to schedule their Lab appointment prior to annual appointment.  Order is not listed in EPIC.  Please enter order and contact patient to schedule.    Name of Caller: Patient called    Preferred Date and Time of Labs:    Date of Annual Physical Appointment: N/A    Where would they like the lab performed? N/A    Would the patient rather a call back or a response via [facility]?  Call    Best Call Back Number: Mobile [phone]    Additional Information: Patient would like to put in an order for a urine test.    "Do not send messages requesting lab orders prior to Physical appt on these providers: Silvia Tobias, Abiodun Blandon,  Bailey Gregorio and Tevin."       "

## 2022-12-30 NOTE — TELEPHONE ENCOUNTER
Called and spoke with pt. Pt wanted to get a urine order, pt was scheduled for an virtual appt with

## 2022-12-30 NOTE — TELEPHONE ENCOUNTER
No new care gaps identified.  [facility] Embedded Care Gaps. Reference number: [id number]. 12/30/2022   9:00:02 AM CST

## 2022-12-30 NOTE — TELEPHONE ENCOUNTER
----- Message from Sherri Aguilera sent at 12/30/2022  8:49 AM CST -----  Contact: Pt Mobile [phone]  Requesting an RX refill or new RX.  Is this a refill or new RX: Refill  RX name and strength levalbuterol (XOPENEX) 0.63 mg/3 mL nebulizer solution  Is this a 30 day or 90 day RX: N/A  Pharmacy name and phone #   [id number] - [place]  [address] [facility] [place] [address]  Phone: [phone] Fax: [phone]  The doctors have asked that we provide their patients with the following 2 reminders -- prescription refills can take up to 72 hours, and a friendly reminder that in the future you can use your [facility] account to request refills:   Comment: Patient said she does not know what quantity she usually get in the solution?

## 2022-12-30 NOTE — TELEPHONE ENCOUNTER
Refill Routing Note   Medication(s) are not appropriate for processing by [facility] for the following reason(s):      - Medication not previously prescribed by PCP    [facility] action(s):  Defer          Medication reconciliation completed: No     Appointments  past 12m or future 3m with PCP    Date Provider   Last Visit   12/8/2022 Dustin Khan MD   Next Visit   12/30/2022 Dustin Khan MD   ED visits in past 90 days: 1        Note composed:2:40 PM 12/30/2022

## 2022-12-31 ENCOUNTER — EXTERNAL CHRONIC CARE MANAGEMENT (OUTPATIENT)
Dept: PRIMARY CARE CLINIC | Facility: CLINIC | Age: 72
End: 2022-12-31
Payer: MEDICARE

## 2022-12-31 PROCEDURE — 99439 PR CHRONIC CARE MGMT, EA ADDTL 20 MIN: ICD-10-PCS | Mod: S$PBB,,, | Performed by: INTERNAL MEDICINE

## 2022-12-31 PROCEDURE — 99439 CHRNC CARE MGMT STAF EA ADDL: CPT | Mod: PBBFAC,27 | Performed by: INTERNAL MEDICINE

## 2022-12-31 PROCEDURE — 99439 CHRNC CARE MGMT STAF EA ADDL: CPT | Mod: S$PBB,,, | Performed by: INTERNAL MEDICINE

## 2022-12-31 PROCEDURE — 99490 CHRNC CARE MGMT STAFF 1ST 20: CPT | Mod: PBBFAC,27 | Performed by: INTERNAL MEDICINE

## 2022-12-31 PROCEDURE — 99490 CHRNC CARE MGMT STAFF 1ST 20: CPT | Mod: S$PBB,,, | Performed by: INTERNAL MEDICINE

## 2022-12-31 PROCEDURE — 99490 PR CHRONIC CARE MGMT, 1ST 20 MIN: ICD-10-PCS | Mod: S$PBB,,, | Performed by: INTERNAL MEDICINE

## 2023-01-03 ENCOUNTER — TELEPHONE (OUTPATIENT)
Dept: INTERNAL MEDICINE | Facility: CLINIC | Age: 73
End: 2023-01-03
Payer: MEDICARE

## 2023-01-03 DIAGNOSIS — R30.0 DYSURIA: Primary | ICD-10-CM

## 2023-01-03 NOTE — TELEPHONE ENCOUNTER
----- Message from Amanda Trivedi sent at 1/3/2023  8:45 AM CST -----  Contact: [phone] Patient  Pt is requesting orders for a UA. Please call and advise.

## 2023-01-03 NOTE — TELEPHONE ENCOUNTER
Returned pt call. Pt states she is still experiencing urinary frequency, denies dysuria. Pt wants to have UA rep[eated to make sure she still doesn't have a UTI. Pt states she is concerned about sepsis as she didn't have any symptoms last time and was actually septic. Please advise. Last urine cx was negative.

## 2023-01-04 ENCOUNTER — LAB VISIT (OUTPATIENT)
Dept: LAB | Facility: HOSPITAL | Age: 73
End: 2023-01-04
Attending: INTERNAL MEDICINE
Payer: MEDICARE

## 2023-01-04 DIAGNOSIS — R30.0 DYSURIA: ICD-10-CM

## 2023-01-04 LAB
BACTERIA #/AREA URNS AUTO: ABNORMAL /HPF
BILIRUB UR QL STRIP: NEGATIVE
CLARITY UR REFRACT.AUTO: CLEAR
COLOR UR AUTO: YELLOW
GLUCOSE UR QL STRIP: NEGATIVE
HGB UR QL STRIP: NEGATIVE
KETONES UR QL STRIP: NEGATIVE
LEUKOCYTE ESTERASE UR QL STRIP: ABNORMAL
MICROSCOPIC COMMENT: ABNORMAL
NITRITE UR QL STRIP: NEGATIVE
NON-SQ EPI CELLS #/AREA URNS AUTO: 2 /HPF
PH UR STRIP: 5 [PH] (ref 5–8)
PROT UR QL STRIP: NEGATIVE
RBC #/AREA URNS AUTO: 3 /HPF (ref 0–4)
SP GR UR STRIP: 1.01 (ref 1–1.03)
SQUAMOUS #/AREA URNS AUTO: 2 /HPF
URN SPEC COLLECT METH UR: ABNORMAL
WBC #/AREA URNS AUTO: 23 /HPF (ref 0–5)

## 2023-01-04 PROCEDURE — 81001 URINALYSIS AUTO W/SCOPE: CPT | Performed by: INTERNAL MEDICINE

## 2023-01-04 PROCEDURE — 87086 URINE CULTURE/COLONY COUNT: CPT | Performed by: INTERNAL MEDICINE

## 2023-01-04 NOTE — TELEPHONE ENCOUNTER
Called pt to relay message that PCP did order repeat UA, no answer. Left message for pt to return call.

## 2023-01-05 ENCOUNTER — PATIENT MESSAGE (OUTPATIENT)
Dept: INTERNAL MEDICINE | Facility: CLINIC | Age: 73
End: 2023-01-05

## 2023-01-05 ENCOUNTER — OFFICE VISIT (OUTPATIENT)
Dept: INTERNAL MEDICINE | Facility: CLINIC | Age: 73
End: 2023-01-05
Payer: MEDICARE

## 2023-01-05 DIAGNOSIS — I70.0 AORTIC ATHEROSCLEROSIS: ICD-10-CM

## 2023-01-05 DIAGNOSIS — J43.9 PULMONARY EMPHYSEMA, UNSPECIFIED EMPHYSEMA TYPE: ICD-10-CM

## 2023-01-05 DIAGNOSIS — R35.0 URINARY FREQUENCY: Primary | ICD-10-CM

## 2023-01-05 DIAGNOSIS — I27.21 SECONDARY PULMONARY ARTERIAL HYPERTENSION: ICD-10-CM

## 2023-01-05 LAB — BACTERIA UR CULT: NORMAL

## 2023-01-05 PROCEDURE — 99214 OFFICE O/P EST MOD 30 MIN: CPT | Mod: 95,,, | Performed by: INTERNAL MEDICINE

## 2023-01-05 PROCEDURE — 99214 PR OFFICE/OUTPT VISIT, EST, LEVL IV, 30-39 MIN: ICD-10-PCS | Mod: 95,,, | Performed by: INTERNAL MEDICINE

## 2023-01-05 RX ORDER — SOLIFENACIN SUCCINATE 5 MG/1
5 TABLET, FILM COATED ORAL DAILY
Qty: 30 TABLET | Refills: 11 | Status: SHIPPED | OUTPATIENT
Start: 2023-01-05 | End: 2023-10-10

## 2023-01-05 NOTE — PROGRESS NOTES
The patient location is: [place]  The chief complaint leading to consultation is:   Visit type: Virtual visit with synchronous audio and video  Total time spent with patient: 15 minutes  Each patient to whom he or she provides medical services by telemedicine is:  (1) informed of the relationship between the physician and patient and the respective role of any other health care provider with respect to management of the patient; and (2) notified that he or she may decline to receive medical services by telemedicine and may withdraw from such care at any time.      CHIEF COMPLAINT     No chief complaint on file.  TELEMEDICINE VISIT    HPI     Estefani Fam is 72 y.o. female here today for     Personally Reviewed Patient's Medical, surgical, family and social hx. Changes updated in [employer].  Care Team updated in Epic    Review of Systems:  ROS    Health Maintenance:   Reviewed with patient  Due for the following:      PHYSICAL EXAM       Gen: Well Appearing, NAD  HEENT: PERR, EOMI  Chest: Normal work of breathing,   Neuro: A&Ox3,  speech normal  Mood; Mood normal, behavior normal, thought process linear       LABS     Labs reviewed; Notable for  Urinalysis notable for leukocyte esterase, urine culture pending  ASSESSMENT     1. Urinary frequency  solifenacin (VESICARE) 5 MG tablet      2. Aortic atherosclerosis        3. Secondary pulmonary arterial hypertension        4. Pulmonary emphysema, unspecified emphysema type                  Plan     Estefani Fam is a 72 y.o. female COPD and chronic hypoxic respiratory on oxygen    1. Urinary frequency  Will check for urinary tract infection  Will try and treat for nighttime frequency symptoms with VESIcare.  Can stop if she does not get improvement  - solifenacin (VESICARE) 5 MG tablet; Take 1 tablet (5 mg total) by mouth once daily.  Dispense: 30 tablet; Refill: 11    2. Aortic atherosclerosis  Incidental finding on imaging    3. Secondary pulmonary arterial  hypertension  On chronic oxygen.  With improvement in symptoms.    4. Pulmonary emphysema, unspecified emphysema type  Continue current regimen Incruse and Campbell Khan MD        Answers submitted by the patient for this visit:  Back Pain Questionnaire (Submitted on 1/5/2023)  Chief Complaint: Back pain  Chronicity: new  Onset: yesterday  Frequency: intermittently  Progression since onset: unchanged  Pain location: sacro-iliac  Pain quality: aching  Radiates to: right thigh  Pain is: worse during the day  Aggravated by: coughing  bladder incontinence: Yes  bowel incontinence: No  leg pain: Yes  numbness: No  paresis: No  paresthesias: No  pelvic pain: No  perianal numbness: No  genital pain: No  Pain severity: mild  Improvement on treatment: mild

## 2023-01-06 ENCOUNTER — PATIENT MESSAGE (OUTPATIENT)
Dept: INTERNAL MEDICINE | Facility: CLINIC | Age: 73
End: 2023-01-06
Payer: MEDICARE

## 2023-01-06 NOTE — TELEPHONE ENCOUNTER
I spoke to her pharmacy. The drug is ready for  and does not require a PA. They ran the drug through her Medicare benefits and is covered under her insurance. José Miguel will call her.

## 2023-01-12 ENCOUNTER — PATIENT MESSAGE (OUTPATIENT)
Dept: PALLIATIVE MEDICINE | Facility: CLINIC | Age: 73
End: 2023-01-12
Payer: MEDICARE

## 2023-01-12 DIAGNOSIS — J43.2 CENTRILOBULAR EMPHYSEMA: Primary | Chronic | ICD-10-CM

## 2023-01-12 RX ORDER — UMECLIDINIUM 62.5 UG/1
62.5 AEROSOL, POWDER ORAL DAILY
Qty: 90 EACH | Refills: 3 | Status: SHIPPED | OUTPATIENT
Start: 2023-01-12 | End: 2023-12-27 | Stop reason: SDUPTHER

## 2023-01-12 RX ORDER — PREDNISONE 20 MG/1
40 TABLET ORAL DAILY
Qty: 10 TABLET | Refills: 0 | Status: SHIPPED | OUTPATIENT
Start: 2023-01-12 | End: 2023-01-17

## 2023-01-12 NOTE — TELEPHONE ENCOUNTER
----- Message from Azul Lawrence sent at 1/12/2023 12:39 PM CST -----  Contact: [phone] pt  Requesting an RX refill or new RX.refill  Is this a refill or new RX: refill  RX name and strength (copy/paste from chart):  umeclidinium (INCRUSE ELLIPTA) 62.5 mcg/actuation inhalation capsule 90 each   Is this a 30 day or 90 day RX: 90  Pharmacy name and phone # (copy/paste from chart):      [id number] - [place] - [facility] [facility] [facility] [place] [address]  Phone: [phone] Fax: [phone]     The doctors have asked that we provide their patients with the following 2 reminders -- prescription refills can take up to 72 hours, and a friendly reminder that in the future you can use your [facility] account to request refills:

## 2023-01-12 NOTE — TELEPHONE ENCOUNTER
No new care gaps identified.  [facility] Embedded Care Gaps. Reference number: [id number]. 1/12/2023   1:01:50 PM CST

## 2023-01-15 ENCOUNTER — PATIENT MESSAGE (OUTPATIENT)
Dept: INTERNAL MEDICINE | Facility: CLINIC | Age: 73
End: 2023-01-15
Payer: MEDICARE

## 2023-01-17 NOTE — TELEPHONE ENCOUNTER
Pt states she pulled a muscle and asking if PCP has any further suggestions. Pt states she has used a heating pad and taken Advil with some relief. Onset 3 weeks ago. Schedule visit for eval?

## 2023-01-23 ENCOUNTER — OFFICE VISIT (OUTPATIENT)
Dept: ORTHOPEDICS | Facility: CLINIC | Age: 73
End: 2023-01-23
Payer: MEDICARE

## 2023-01-23 ENCOUNTER — HOSPITAL ENCOUNTER (OUTPATIENT)
Dept: RADIOLOGY | Facility: HOSPITAL | Age: 73
Discharge: HOME | End: 2023-01-23
Attending: NURSE PRACTITIONER
Payer: MEDICARE

## 2023-01-23 DIAGNOSIS — M54.42 LEFT-SIDED LOW BACK PAIN WITH LEFT-SIDED SCIATICA, UNSPECIFIED CHRONICITY: ICD-10-CM

## 2023-01-23 DIAGNOSIS — M79.605 PAIN OF LEFT LOWER EXTREMITY: Primary | ICD-10-CM

## 2023-01-23 DIAGNOSIS — M25.552 LEFT HIP PAIN: Primary | ICD-10-CM

## 2023-01-23 DIAGNOSIS — M25.552 LEFT HIP PAIN: ICD-10-CM

## 2023-01-23 DIAGNOSIS — M79.605 PAIN OF LEFT LOWER EXTREMITY: ICD-10-CM

## 2023-01-23 PROCEDURE — 73502 X-RAY EXAM HIP UNI 2-3 VIEWS: CPT | Mod: TC,LT

## 2023-01-23 PROCEDURE — 99213 OFFICE O/P EST LOW 20 MIN: CPT | Mod: PBBFAC | Performed by: NURSE PRACTITIONER

## 2023-01-23 PROCEDURE — 99214 PR OFFICE/OUTPT VISIT, EST, LEVL IV, 30-39 MIN: ICD-10-PCS | Mod: S$PBB,,, | Performed by: NURSE PRACTITIONER

## 2023-01-23 PROCEDURE — 73502 XR HIP WITH PELVIS WHEN PERFORMED, 2 OR 3 VIEWS LEFT: ICD-10-PCS | Mod: 26,LT,, | Performed by: RADIOLOGY

## 2023-01-23 PROCEDURE — 72110 XR LUMBAR SPINE AP AND LAT WITH FLEX/EXT: ICD-10-PCS | Mod: 26,,, | Performed by: RADIOLOGY

## 2023-01-23 PROCEDURE — 72110 X-RAY EXAM L-2 SPINE 4/>VWS: CPT | Mod: 26,,, | Performed by: RADIOLOGY

## 2023-01-23 PROCEDURE — 73502 X-RAY EXAM HIP UNI 2-3 VIEWS: CPT | Mod: 26,LT,, | Performed by: RADIOLOGY

## 2023-01-23 PROCEDURE — 99214 OFFICE O/P EST MOD 30 MIN: CPT | Mod: S$PBB,,, | Performed by: NURSE PRACTITIONER

## 2023-01-23 PROCEDURE — 72110 X-RAY EXAM L-2 SPINE 4/>VWS: CPT | Mod: TC

## 2023-01-23 PROCEDURE — 99999 PR PBB SHADOW E&M-EST. PATIENT-LVL III: CPT | Mod: PBBFAC,,, | Performed by: NURSE PRACTITIONER

## 2023-01-23 PROCEDURE — 99999 PR PBB SHADOW E&M-EST. PATIENT-LVL III: ICD-10-PCS | Mod: PBBFAC,,, | Performed by: NURSE PRACTITIONER

## 2023-01-23 NOTE — PROGRESS NOTES
Subjective:      Patient ID: Estefani Fam is a 72 y.o. female.    Chief Complaint: Sciatica of the Lower Back and Pain of the Left Hip    Patient is a 72-year-old female presenting to Orthopedics with chief complaint of lower back pain with left-sided sciatica, and left hip pain that began a couple of weeks ago.  She denies any recent falls or injuries.  She is known to the clinic for a prior left ulnar fracture that was treated non operative.  She has chronic respiratory ailment that require supplemental oxygen and has taking prednisone on and off for her respiratory illness.  She is currently on Prolia for osteoporosis.    As stated above she complains of left-sided lower back in hip pain that radiates into the groin and down to her knee.  Than she denies any inciting injuries that would have caused this pain.  She is been using Advil as well as relief Factor to try and help minimize her pain but this has been to no avail.  She states she has difficulty placing weight down and walking on the left leg.      Review of Systems   Musculoskeletal:  Positive for back pain and joint pain.   All other systems reviewed and are negative.      Objective:        General    Vitals reviewed.  Constitutional: She is oriented to person, place, and time. She appears well-developed and well-nourished. No distress.   HENT:   Head: Normocephalic and atraumatic.   Eyes: Conjunctivae are normal. Pupils are equal, round, and reactive to light.   Neck: Neck supple.   Cardiovascular:  Intact distal pulses.            Pulmonary/Chest: Effort normal.   Neurological: She is alert and oriented to person, place, and time. She has normal reflexes.   Psychiatric: She has a normal mood and affect. Her behavior is normal. Judgment and thought content normal.         Left Hip Exam     Inspection   No deformity of hip.    Range of Motion   Extension:  abnormal   Flexion:  abnormal   External rotation:  abnormal   Internal rotation: abnormal      Tests   Log Roll: positive    Other   Sensation: normal      Back (L-Spine & T-Spine) / Neck (C-Spine) Exam     Back (L-Spine & T-Spine) Range of Motion   The patient has abnormal back ROM.      Muscle Strength   Left Lower Extremity   Hip Abduction: 4/5   Hip Adduction: 4/5   Hip Flexion: 4/5     Vascular Exam       Left Pulses  Dorsalis Pedis:      2+  Posterior Tibial:      2+      RADS:  Lumbar and hip x-ray were obtained and personally reviewed by me.  Findings show no acute fracture seen in the lumbar spine or in the left hip or pelvis.  Lumbar spine does show mild disc narrowing at L2-L3.  Moderate to severe disc narrowing back to phenomenon at L4-L5.  Mild-to-moderate disc narrowing at L5-S1.  Her left and right SI joint appeared to be intact.  The left hip x-ray shows no acute fracture but there appears to be some degenerative changes there.        Assessment:       Encounter Diagnoses   Name Primary?    Left hip pain Yes    Left-sided low back pain with left-sided sciatica, unspecified chronicity           Plan:       Estefani was seen today for sciatica and pain.    Diagnoses and all orders for this visit:    Left hip pain  -     X-Ray Hip 2 or 3 views Left (with Pelvis when performed); Future    Left-sided low back pain with left-sided sciatica, unspecified chronicity    72-year-old female with known history of osteoporosis currently on Prolia injections twice a year presents to Orthopedics with chief complaint of left-sided lower back pain and left hip pain that radiates down to her knee.  Denies any falls or injuries recently.  She has been using Advil in relief Factor to no avail.  She also reports taking a steroid pack about 2 weeks ago for 5 days but this did not relieve her symptoms either.  I advised the patient that x-rays of her left hip and back do not reveal acute fractures.  I advised her that I will await the final report from Radiology in may consider a CT scan of her hip versus  referral to back and spine for further treatment and evaluation.  In the meantime I recommend she continue taking the anti-inflammatory medicine as well as try Salonpas patches for symptom relief.

## 2023-01-24 ENCOUNTER — OFFICE VISIT (OUTPATIENT)
Dept: INTERNAL MEDICINE | Facility: CLINIC | Age: 73
End: 2023-01-24
Payer: MEDICARE

## 2023-01-24 ENCOUNTER — TELEPHONE (OUTPATIENT)
Dept: ORTHOPEDICS | Facility: CLINIC | Age: 73
End: 2023-01-24
Payer: MEDICARE

## 2023-01-24 DIAGNOSIS — R10.32 CHRONIC GROIN PAIN, LEFT: Primary | ICD-10-CM

## 2023-01-24 DIAGNOSIS — G89.29 CHRONIC GROIN PAIN, LEFT: Primary | ICD-10-CM

## 2023-01-24 PROCEDURE — 99214 PR OFFICE/OUTPT VISIT, EST, LEVL IV, 30-39 MIN: ICD-10-PCS | Mod: 95,,, | Performed by: INTERNAL MEDICINE

## 2023-01-24 PROCEDURE — 99214 OFFICE O/P EST MOD 30 MIN: CPT | Mod: 95,,, | Performed by: INTERNAL MEDICINE

## 2023-01-24 RX ORDER — MELOXICAM 7.5 MG/1
7.5 TABLET ORAL DAILY
Qty: 30 TABLET | Refills: 1 | Status: SHIPPED | OUTPATIENT
Start: 2023-01-24 | End: 2023-10-10

## 2023-01-24 NOTE — TELEPHONE ENCOUNTER
Spoke with patient, advised her radiologist has confirmed no fractures in her hip.  Her PCP has reached out to me and suggested NSAIDs and PT given her respiratory ailments and if that fails then IR hip injection.  I think this is reasonable.  Patient notified of above and all questions answered.

## 2023-01-24 NOTE — TELEPHONE ENCOUNTER
Called and spoke with pt in regards to her message. Pt says she has spoken to Marco today and to disregard the message.

## 2023-01-24 NOTE — PROGRESS NOTES
The patient location is: [place]  The chief complaint leading to consultation is:   Visit type: Virtual visit with synchronous audio and video  Total time spent with patient: 15 minutes  Each patient to whom he or she provides medical services by telemedicine is:  (1) informed of the relationship between the physician and patient and the respective role of any other health care provider with respect to management of the patient; and (2) notified that he or she may decline to receive medical services by telemedicine and may withdraw from such care at any time.      CHIEF COMPLAINT     No chief complaint on file.  TELEMEDICINE VISIT    HPI     Estefani Fam is 72 y.o. female here today for   Answers submitted by the patient for this visit:  Back Pain Questionnaire (Submitted on 1/23/2023)  Chief Complaint: Back pain  Chronicity: new  Onset: 1 to 4 weeks ago  Frequency: constantly  Progression since onset: waxing and waning  Pain location: lumbar spine  Pain quality: aching  Radiates to: left thigh  Pain is: the same all the time  Aggravated by: coughing  Stiffness is present: all day  bladder incontinence: No  bowel incontinence: No  leg pain: Yes  numbness: No  paresis: No  paresthesias: No  pelvic pain: No  perianal numbness: No  genital pain: No  Pain severity: severe  Improvement on treatment: no relief    Three weeks of left groin pain.  Started abruptly exacerbated by walking.  Pain radiates down her medial thigh between her hip and knee.  Patient denies any sensory deficits or motor weakness.  Reports that it feels the same rubbing the inside of her left thigh as it does rub on the inside of her right thigh.  She was seen by Orthopedics yesterday who got x-rays of both L-spine and hip.  Has gotten some relief with Advil at rest.    Personally Reviewed Patient's Medical, surgical, family and social hx. Changes updated in [employer].  Care Team updated in Epic    Review of Systems:  Review of Systems    Constitutional:  Negative for fever and weight loss.   Cardiovascular:  Negative for chest pain.   Gastrointestinal:  Negative for abdominal pain.   Genitourinary:  Negative for dysuria and hematuria.   Musculoskeletal:  Positive for back pain.   Neurological:  Positive for weakness. Negative for tingling and headaches.     Health Maintenance:   Reviewed with patient  Due for the following:      PHYSICAL EXAM       Gen: Well Appearing, NAD  HEENT: PERR, EOMI  Chest: Normal work of breathing,   Neuro: A&Ox3,  speech normal  Mood; Mood normal, behavior normal, thought process linear       LABS     Labs reviewed; Notable for  Lab Results   Component Value Date    CREATININE 0.6 11/28/2022    BUN 11 11/28/2022     (L) 11/28/2022    K 4.3 11/28/2022    CL 94 (L) 11/28/2022    CO2 29 11/28/2022     Hip xray  Bones of the pelvis and hips are intact moderate degenerative changes are seen in the left hip in the lumbosacral spine area.  No bony destruction is noted.  Mild degenerative changes seen in the right hip.     Lower thoracic and upper lumbar levocurvature.  No acute fractures, preserved vertebral body heights and pedicles.  Scattered lower thoracic and lumbar vertebral body anterolateral end plate osteophytes.  No spondylolysis or spondylolisthesis.  Flexion and extension views demonstrate no instability.  Mild disc narrowing and some opposing endplate sclerosis at L2-L3.  Moderate to severe disc narrowing vacuum phenomenon and opposing endplate sclerosis L4-L5.  Mild to moderate disc narrowing and opposing endplate sclerosis L5-S1.  Elsewhere, preserved disc spaces.  No significant facet arthropathic changes.  Intact right and left SI joints and no obvious narrowing of right or left hip joint spaces with mild right and left acetabular spurring.  11 mm midline and 16 mm right of midline mottled pelvic calcifications indeterminate but could relate to calcified fibroids.     ASSESSMENT     1. Chronic groin  pain, left  meloxicam (MOBIC) 7.5 MG tablet                Plan     Estefani Fam is a 72 y.o. female  1. Chronic groin pain, left  - meloxicam (MOBIC) 7.5 MG tablet; Take 1 tablet (7.5 mg total) by mouth once daily.  Dispense: 30 tablet; Refill: 1  Based on x-rays and clinical symptoms, I am more suspicious of left hip pathology then I am of radicular pain from her lower back. Will send epic message to Harshaw NP who saw patient yesterday.    Will try meloxicam and PT for now      Dustin Khan MD

## 2023-01-31 ENCOUNTER — EXTERNAL CHRONIC CARE MANAGEMENT (OUTPATIENT)
Dept: PRIMARY CARE CLINIC | Facility: CLINIC | Age: 73
End: 2023-01-31
Payer: MEDICARE

## 2023-01-31 PROCEDURE — 99490 PR CHRONIC CARE MGMT, 1ST 20 MIN: ICD-10-PCS | Mod: S$PBB,,, | Performed by: INTERNAL MEDICINE

## 2023-01-31 PROCEDURE — 99490 CHRNC CARE MGMT STAFF 1ST 20: CPT | Mod: S$PBB,,, | Performed by: INTERNAL MEDICINE

## 2023-01-31 PROCEDURE — 99490 CHRNC CARE MGMT STAFF 1ST 20: CPT | Mod: PBBFAC | Performed by: INTERNAL MEDICINE

## 2023-02-01 ENCOUNTER — CLINICAL SUPPORT (OUTPATIENT)
Dept: REHABILITATION | Facility: OTHER | Age: 73
End: 2023-02-01
Payer: MEDICARE

## 2023-02-01 DIAGNOSIS — R10.32 CHRONIC GROIN PAIN, LEFT: ICD-10-CM

## 2023-02-01 DIAGNOSIS — M25.552 LEFT HIP PAIN: ICD-10-CM

## 2023-02-01 DIAGNOSIS — R29.898 DECREASED STRENGTH OF LOWER EXTREMITY: ICD-10-CM

## 2023-02-01 DIAGNOSIS — M25.652 DECREASED RANGE OF LEFT HIP MOVEMENT: ICD-10-CM

## 2023-02-01 DIAGNOSIS — G89.29 CHRONIC GROIN PAIN, LEFT: ICD-10-CM

## 2023-02-01 PROBLEM — M25.659 DECREASED RANGE OF MOTION OF HIP: Status: ACTIVE | Noted: 2023-02-01

## 2023-02-01 PROCEDURE — 97162 PT EVAL MOD COMPLEX 30 MIN: CPT | Mod: PN

## 2023-02-01 PROCEDURE — 97110 THERAPEUTIC EXERCISES: CPT | Mod: PN

## 2023-02-02 NOTE — PLAN OF CARE
[facility]   Physical Therapy Initial Evaluation     Date: 2/1/2023   Name: Estefani Fam  Clinic Number: [id number]    Therapy Diagnosis:   Encounter Diagnoses   Name Primary?    Chronic groin pain, left     Left hip pain     Decreased strength of lower extremity     Decreased range of left hip movement      Physician: Dustin Khan MD    Physician Orders: PT Eval and Treat   Medical Diagnosis from Referral: R10.32,G89.29 (ICD-10-CM) - Chronic groin pain, left   Evaluation Date: 2/1/2023  Authorization Period Expiration: 1/24/2023  Plan of Care Expiration: 4/26/2023  Progress Note Due: 3/15/2023  Visit # / Visits authorized: 1/ 1   FOTO: 1/3    Precautions: Standard     Time In: 2:30 pm  Time Out: 3:15 pm   Total Appointment Time (timed & untimed codes): 30 minutes      SUBJECTIVE     Date of onset: 1 month ago     History of current condition - Estefani reports: that she began having groin pain when she is moving her left leg. Pain reports pain with walking/standing. Unable to walk a long time because of her COPD must use a wheelchair. Patient reports she is unable to go up steps at all due to the groin pain. Patient reports increased sedentary lifestyle due to the increase in pain.     Falls: None reported     Imaging, X-Ray: Bones of the pelvis and hips are intact moderate degenerative changes are seen in the left hip in the lumbosacral spine area.  No bony destruction is noted.  Mild degenerative changes seen in the right hip.    Prior Therapy: None  Social History: lives with their spouse  Occupation: None  Prior Level of Function: able to walk, stand, bed mobility, movement of left hip and go up steps without pain   Current Level of Function: unable to move left leg without discomfort, decreased walking/standing and stairs tolerance due to pain    Pain:  Current 8/10, worst 8/10, best 0/10   Location: left groin    Description: Throbbing  Aggravating Factors: Standing and  Walking, moving her left leg at all   Easing Factors: pain medication, lying down/no movement of left leg     Patients goals: stand and walk without pain, be able to move her left leg without pain, steps      Medical History:   Past Medical History:   Diagnosis Date    Cataract     COPD (chronic obstructive pulmonary disease)     COVID-19     History of COVID-19 1/17/2021    Still with mild dyspnea. Encouraged return to pulmonary rehab Recommend scheduling vaccination when appointment is available.     History of retinal hemorrhage     Lobar pneumonia 11/14/2021    Recurrent in RLL, no endobronchial lesion Needs speech evaluation and MBSS for recurrent aspiration in setting of dysphagia  Will need pulmonary rehab at [facility].    Pathological fracture due to osteoporosis 7/6/2020    Retinal histoplasmosis     Secondary pulmonary arterial hypertension 7/3/2018    Secondary to emphysema and chronic respiratory failure, mild.       Surgical History:   Estefani Fam  has a past surgical history that includes Hand surgery and Bronchoscopy with fluoroscopy (N/A, 10/28/2019).    Medications:   Estefani has a current medication list which includes the following prescription(s): ascorbic acid (vitamin c), azelastine, azelastine, azithromycin, breo ellipta, calcium carbonate, citalopram, fluticasone propionate, levalbuterol, meloxicam, multivit-iron-min-folic acid, pantoprazole, pulse oximeter, sodium chloride, solifenacin, incruse ellipta, and [DISCONTINUED] furosemide, and the following Facility-Administered Medications: albuterol.    Allergies:   Review of patient's allergies indicates:   Allergen Reactions    Albuterol     Ipratropium analogues      Difficulty breathing          OBJECTIVE     Observation: in wheelchair for long distance mobility     Posture: rounded shoulders, anterior trunk lean, bilateral femoral INTERNAL ROTATION, significant thoracic kyphosis, FHP, rounded shoulders     Hip Range of Motion:    Right active Right Passive Left active  Left Passive   Flexion 100 110  90 pain  110 pain    Abduction 45 45 45 pain  45   Extension NT NT NT NT   Ext. Rotation 45 50 30 45 pain    Int. Rotation 30 35 25 30       Lower Extremity Strength  Right LE  Left LE    Quadriceps: 4/5 Quadriceps: 3+/5 pain   Hamstrings: 4/5 Hamstrings: 4/5   Iliopsoas: 4/5 Iliopsoas: 3+/5 pain   Hip extension:  4/5 Hip extension: 4/5   PGM: NT PGM: NT   Hip ER: 4/5 Hip ER: 4/5   Hip IR: 4/5 Hip IR: 4/5   Glut Max NT Glut Max NT        Functional Tests:  SLS EO: unable due to pain   SIT TO STAND requires HHA due to pain     Flexibility: unable to test hip musculature due to no end-range achieved due to pain, decreased hamstring strength     Palpation: no TTP      Limitation/Restriction for FOTO 1/3 Survey    Therapist reviewed FOTO scores for Estefani Fam on 2/1/2023.   FOTO documents entered into [employer] - see Media section.    Limitation Score: 64% (limitation)         TREATMENT     Total Treatment time (time-based codes) separate from Evaluation: 30 minutes      Estefani received the treatments listed below:      therapeutic exercises to develop strength, ROM, and flexibility for 10 minutes including:  Seated hip flexion 5 sec holds, 2 x 10   Hip adduction isometric (Ball) 2 x 10   Supine clamshells 2 x 10   Bridges 2 x 10   Gentle hip ROM flexion 30 sec x 2       PATIENT EDUCATION AND HOME EXERCISES     Education provided:   - home exercise program, plan of care    Written Home Exercises Provided: yes. Exercises were reviewed and Estefani was able to demonstrate them prior to the end of the session.  Estefani demonstrated good  understanding of the education provided. See EMR under Patient Instructions for exercises provided during therapy sessions.    ASSESSMENT     Estefani is a 72 y.o. female referred to outpatient Physical Therapy with a medical diagnosis of R10.32,G89.29 (ICD-10-CM) - Chronic groin pain, left . Patient presents  with signs and symptoms of hip flexor and quad strain/tendonitis due to significant decreased activity and sedentary lifestyle. Patient demonstrates significant decreased tolerance to all LEFT LOWER EXTREMITY movements with increased pain during hip mobility, strength, and flexibility testing. Patient demonstrates decline in quality of life due to significant difficulty performing daily tasks such as transfers, bed mobility, ambulation, and stairs due to increased left hip pain.     Patient prognosis is Fair.   Patient will benefit from skilled outpatient Physical Therapy to address the deficits stated above and in the chart below, provide patient /family education, and to maximize patientt's level of independence.     Plan of care discussed with patient: Yes  Patient's spiritual, cultural and educational needs considered and patient is agreeable to the plan of care and goals as stated below:     Anticipated Barriers for therapy: co morbidities     Medical Necessity is demonstrated by the following  History  Co-morbidities and personal factors that may impact the plan of care Co-morbidities:   COPD/asthma and HTN    Personal Factors:   age     moderate   Examination  Body Structures and Functions, activity limitations and participation restrictions that may impact the plan of care Body Regions:   lower extremities    Body Systems:    gross symmetry  ROM  strength  gross coordinated movement  balance  gait  transfers  transitions    Participation Restrictions:   N/A    Activity limitations:   Learning and applying knowledge  no deficits    General Tasks and Commands  no deficits    Communication  no deficits    Mobility  lifting and carrying objects  walking  moving around using equipment (WC)    Self care  dressing    Domestic Life  shopping  cooking  doing house work (cleaning house, washing dishes, laundry)    Interactions/Relationships  no deficits    Life Areas  no deficits    Community and Social Life  no  deficits         moderate   Clinical Presentation evolving clinical presentation with changing clinical characteristics moderate   Decision Making/ Complexity Score: moderate     Short Term Goals (6 Weeks):   1. Pt will be compliant with HEP to supplement PT in restoring pain free function.  2. Pt will improve SIT TO STAND with no HHA with no increased left hip pain for improved transfers and functional mobility   3. Pt will improve impaired LE MMTs by 1/2 grade  to improve strength for functional tasks  4. Pt improve impaired hip ROM by 20 deg in all planes to improve mobility for normal movement patterns.   Long Term Goals (12 Weeks):  1. Pt will improve FOTO score to </= 42% limited to decrease perceived limitation with mobility  2. Pt will improve ambulation to 75 feet with </= 2/10 hip pain  3. Pt will improve impaired LE MMTs by 1 grade to improve strength for functional tasks.  4. Pt improve impaired hip ROM to WNL in all planes to improve mobility for normal movement patterns.      PLAN   Plan of care Certification: 2/1/2023 to 4/26/2023.    Outpatient Physical Therapy 2 times weekly for 12 weeks to include the following interventions: Gait Training, Manual Therapy, Moist Heat/ Ice, Neuromuscular Re-ed, Patient Education, Self Care, Therapeutic Activities, and Therapeutic Exercise.     Cecelia Miles, PT      I CERTIFY THE NEED FOR THESE SERVICES FURNISHED UNDER THIS PLAN OF TREATMENT AND WHILE UNDER MY CARE   Physician's comments:     Physician's Signature: ___________________________________________________

## 2023-02-15 ENCOUNTER — TELEPHONE (OUTPATIENT)
Dept: PALLIATIVE MEDICINE | Facility: CLINIC | Age: 73
End: 2023-02-15
Payer: MEDICARE

## 2023-02-15 ENCOUNTER — PATIENT MESSAGE (OUTPATIENT)
Dept: PALLIATIVE MEDICINE | Facility: CLINIC | Age: 73
End: 2023-02-15
Payer: MEDICARE

## 2023-02-15 ENCOUNTER — PATIENT MESSAGE (OUTPATIENT)
Dept: PULMONOLOGY | Facility: CLINIC | Age: 73
End: 2023-02-15
Payer: MEDICARE

## 2023-02-15 NOTE — TELEPHONE ENCOUNTER
Called patient to discuss message received regarding wheezing and shortness of breath. She is wondering if she has pneumonia again. Advised her to contact PCP and she states that she has called Dr. Ruiz's office and will wait to hear from them.

## 2023-02-16 ENCOUNTER — PATIENT MESSAGE (OUTPATIENT)
Dept: PULMONOLOGY | Facility: CLINIC | Age: 73
End: 2023-02-16
Payer: MEDICARE

## 2023-02-16 DIAGNOSIS — Z99.81 CHRONIC RESPIRATORY FAILURE WITH HYPOXIA, ON HOME OXYGEN THERAPY: Chronic | ICD-10-CM

## 2023-02-16 DIAGNOSIS — J43.2 CENTRILOBULAR EMPHYSEMA: Primary | Chronic | ICD-10-CM

## 2023-02-16 DIAGNOSIS — J96.11 CHRONIC RESPIRATORY FAILURE WITH HYPOXIA, ON HOME OXYGEN THERAPY: Chronic | ICD-10-CM

## 2023-02-28 ENCOUNTER — EXTERNAL CHRONIC CARE MANAGEMENT (OUTPATIENT)
Dept: PRIMARY CARE CLINIC | Facility: CLINIC | Age: 73
End: 2023-02-28
Payer: MEDICARE

## 2023-02-28 PROCEDURE — 99490 PR CHRONIC CARE MGMT, 1ST 20 MIN: ICD-10-PCS | Mod: S$PBB,,, | Performed by: INTERNAL MEDICINE

## 2023-02-28 PROCEDURE — 99490 CHRNC CARE MGMT STAFF 1ST 20: CPT | Mod: PBBFAC | Performed by: INTERNAL MEDICINE

## 2023-02-28 PROCEDURE — 99490 CHRNC CARE MGMT STAFF 1ST 20: CPT | Mod: S$PBB,,, | Performed by: INTERNAL MEDICINE

## 2023-03-01 ENCOUNTER — CLINICAL SUPPORT (OUTPATIENT)
Dept: REHABILITATION | Facility: OTHER | Age: 73
End: 2023-03-01
Payer: MEDICARE

## 2023-03-01 DIAGNOSIS — M25.652 DECREASED RANGE OF LEFT HIP MOVEMENT: ICD-10-CM

## 2023-03-01 DIAGNOSIS — R29.898 DECREASED STRENGTH OF LOWER EXTREMITY: ICD-10-CM

## 2023-03-01 DIAGNOSIS — M25.552 LEFT HIP PAIN: Primary | ICD-10-CM

## 2023-03-01 PROCEDURE — 97110 THERAPEUTIC EXERCISES: CPT | Mod: PN

## 2023-03-01 NOTE — PROGRESS NOTES
"    Physical Therapy Daily Treatment Note     Name: Estefani Fam  Clinic Number: [id number]    Therapy Diagnosis:   Encounter Diagnoses   Name Primary?    Left hip pain Yes    Decreased strength of lower extremity     Decreased range of left hip movement      Physician: Dustin Khan MD    Visit Date: 3/1/2023  Physician Orders: PT Eval and Treat   Medical Diagnosis from Referral: R10.32,G89.29 (ICD-10-CM) - Chronic groin pain, left   Evaluation Date: 2/1/2023  Authorization Period Expiration: 1/24/2023  Plan of Care Expiration: 4/26/2023  Progress Note Due: 3/15/2023  Visit # / Visits authorized: 1/ 1       Time In: 3:15 pm  Time Out: 3:45 pm  Total Billable Time: 30 minutes    Precautions: Standard    Subjective     Pt reports: she has some good days and some bad days, patient reports compliance with HOME EXERCISE PROGRAM and that it makes her sore.   She was compliant with home exercise program.  Response to previous treatment: no changes   Functional change: none    Pain:  Current 2/10, worst 8/10, best 0/10   Location: left groin     Objective     therapeutic exercises to develop strength, ROM, and flexibility for 10 minutes including:  Seated hip flexion 5 sec holds, 2 x 10   Hip adduction isometric (Ball) 2 x 10   Seated clamshells 5 sec holds, 2 x 10   SIT TO STAND 25" elevated high low table 1 x 7, 1 x 5  Standing heel raises 2 x 10   Standing hip flexion 2 x 10   Bridges 2 x 10   Gentle hip ROM flexion 30 sec x 2       Home Exercises Provided and Patient Education Provided     Education provided:   - importance of exercise, HOME EXERCISE PROGRAM performance, deconditioning, pursed lip breathing    Written Home Exercises Provided: yes.  Exercises were reviewed and Estefani was able to demonstrate them prior to the end of the session.  Estefani demonstrated fair  understanding of the education provided.     See EMR under Patient Instructions for exercises provided 3/1/2023.    Assessment     Patient " demonstrates increased tolerance to exercise with decreased reports of pain in left quadricep musculature throughout seated and progression to standing exercise today. Patient demonstrates significant decreased activity tolerance requiring significant rest breaks throughout exercises due to SHORTNESS OF BREATH with comorbidities(COPD). Updated HOME EXERCISE PROGRAM in patient instructions.     Estefani Is progressing well towards her goals.   Pt prognosis is Fair.     Pt will continue to benefit from skilled outpatient physical therapy to address the deficits listed in the problem list box on initial evaluation, provide pt/family education and to maximize pt's level of independence in the home and community environment.     Pt's spiritual, cultural and educational needs considered and pt agreeable to plan of care and goals.    Anticipated barriers to physical therapy: co morbidities     Short Term Goals (6 Weeks):   1. Pt will be compliant with HEP to supplement PT in restoring pain free function.  2. Pt will improve SIT TO STAND with no HHA with no increased left hip pain for improved transfers and functional mobility   3. Pt will improve impaired LE MMTs by 1/2 grade  to improve strength for functional tasks  4. Pt improve impaired hip ROM by 20 deg in all planes to improve mobility for normal movement patterns.   Long Term Goals (12 Weeks):  1. Pt will improve FOTO score to </= 42% limited to decrease perceived limitation with mobility  2. Pt will improve ambulation to 75 feet with </= 2/10 hip pain  3. Pt will improve impaired LE MMTs by 1 grade to improve strength for functional tasks.  4. Pt improve impaired hip ROM to WNL in all planes to improve mobility for normal movement patterns.      Plan     Plan of care Certification: 2/1/2023 to 4/26/2023.     Outpatient Physical Therapy 2 times weekly for 12 weeks to include the following interventions: Gait Training, Manual Therapy, Moist Heat/ Ice, Neuromuscular  Re-ed, Patient Education, Self Care, Therapeutic Activities, and Therapeutic Exercise.     Cecelia Miles, PT

## 2023-03-06 ENCOUNTER — CLINICAL SUPPORT (OUTPATIENT)
Dept: REHABILITATION | Facility: OTHER | Age: 73
End: 2023-03-06
Payer: MEDICARE

## 2023-03-06 DIAGNOSIS — M25.552 LEFT HIP PAIN: Primary | ICD-10-CM

## 2023-03-06 DIAGNOSIS — R29.898 DECREASED STRENGTH OF LOWER EXTREMITY: ICD-10-CM

## 2023-03-06 DIAGNOSIS — M25.652 DECREASED RANGE OF LEFT HIP MOVEMENT: ICD-10-CM

## 2023-03-06 PROCEDURE — 97110 THERAPEUTIC EXERCISES: CPT | Mod: PN | Performed by: PHYSICAL THERAPIST

## 2023-03-06 PROCEDURE — 97140 MANUAL THERAPY 1/> REGIONS: CPT | Mod: PN | Performed by: PHYSICAL THERAPIST

## 2023-03-06 NOTE — PROGRESS NOTES
"    Physical Therapy Daily Treatment Note     Name: Estefani Fam  Clinic Number: [id number]    Therapy Diagnosis:   Encounter Diagnoses   Name Primary?    Left hip pain Yes    Decreased strength of lower extremity     Decreased range of left hip movement      Physician: Dustin Khan MD    Visit Date: 3/6/2023  Physician Orders: PT Eval and Treat   Medical Diagnosis from Referral: R10.32,G89.29 (ICD-10-CM) - Chronic groin pain, left   Evaluation Date: 2/1/2023  Authorization Period Expiration: 1/24/2023  Plan of Care Expiration: 4/26/2023  Progress Note Due: 3/15/2023  Visit # / Visits authorized: 3/ 13       Time In: 1515  Time Out: 1555  Total Billable Time: 40 minutes    Precautions: Standard    Subjective     Pt reports: no changes, pt says she's feeling frustrated    She was compliant with home exercise program.  Response to previous treatment: no changes   Functional change: none    Pain:  Current 2/10, worst 8/10, best 0/10   Location: left groin     Objective     therapeutic exercises to develop strength, ROM, and flexibility for 30 minutes including:  Seated hip flexion 5 sec holds, 2 x 10   Hip adduction isometric (Ball) 2 x 10   Supine clamshells 5 sec holds, 2 x 10 (pilates ring)  +Piriformis IR/ER 2 x 30" each L  SIT TO STAND 25" elevated high low table 1 x 7, 1 x 5  Standing heel raises 2 x 10   Standing hip flexion 2 x 10   Bridges attempted stopped 2* LBP  SLR flex - attempted with TrA, stopped 2* L LE pain  Gentle hip ROM flexion 30 sec x 2     Manual therapy to L adductors for 10 minutes:  STM with rolling pin to L adductors supine wit knee flexed  Manual adductor stretch L    Home Exercises Provided and Patient Education Provided     Education provided:   - importance of exercise, HOME EXERCISE PROGRAM performance, deconditioning, pursed lip breathing    Written Home Exercises Provided: yes.  Exercises were reviewed and Estefani was able to demonstrate them prior to the end of the session.  " Estefani demonstrated fair  understanding of the education provided.     See EMR under Patient Instructions for exercises provided 3/1/2023.    Assessment     Fair tolerance to treatment today. Pt with pain transferring into bed and with transition between hooklying and supine. HOB elevated due to breathing difficulty with COPD. Tissue rolling to L adductors today with good tolerance. Manual stretching to adductors with some guarding noted. Attempted bridges and SLR, but pt unable to tolerate.     Estefani Is progressing well towards her goals.   Pt prognosis is Fair.     Pt will continue to benefit from skilled outpatient physical therapy to address the deficits listed in the problem list box on initial evaluation, provide pt/family education and to maximize pt's level of independence in the home and community environment.     Pt's spiritual, cultural and educational needs considered and pt agreeable to plan of care and goals.    Anticipated barriers to physical therapy: co morbidities     Short Term Goals (6 Weeks):   1. Pt will be compliant with HEP to supplement PT in restoring pain free function. (Progressing, not met)  2. Pt will improve SIT TO STAND with no HHA with no increased left hip pain for improved transfers and functional mobility. (Progressing, not met)  3. Pt will improve impaired LE MMTs by 1/2 grade  to improve strength for functional tasks. (Progressing, not met)  4. Pt improve impaired hip ROM by 20 deg in all planes to improve mobility for normal movement patterns. (Progressing, not met)    Long Term Goals (12 Weeks):  1. Pt will improve FOTO score to </= 42% limited to decrease perceived limitation with mobility. (Progressing, not met)  2. Pt will improve ambulation to 75 feet with </= 2/10 hip pain. (Progressing, not met)  3. Pt will improve impaired LE MMTs by 1 grade to improve strength for functional tasks. (Progressing, not met)  4. Pt improve impaired hip ROM to WNL in all planes to  improve mobility for normal movement patterns.  (Progressing, not met)    Plan     Plan of care Certification: 2/1/2023 to 4/26/2023.     Outpatient Physical Therapy 2 times weekly for 12 weeks to include the following interventions: Gait Training, Manual Therapy, Moist Heat/ Ice, Neuromuscular Re-ed, Patient Education, Self Care, Therapeutic Activities, and Therapeutic Exercise.     Licha Hernandez, PT

## 2023-03-08 ENCOUNTER — TELEPHONE (OUTPATIENT)
Dept: INTERNAL MEDICINE | Facility: CLINIC | Age: 73
End: 2023-03-08
Payer: MEDICARE

## 2023-03-08 NOTE — TELEPHONE ENCOUNTER
----- Message from Francesca Flowers sent at 3/8/2023  8:52 AM CST -----  Contact: [phone]  Pt is calling she states the pharmacy called her telling her the dr refused to refill her inhaler please give return call she states she is needing this

## 2023-03-08 NOTE — TELEPHONE ENCOUNTER
----- Message from Francesca Flowers sent at 3/8/2023  8:52 AM CST -----  Contact: [phone]  Pt is calling she states the pharmacy called her telling her the dr refused to refill her inhaler please give return call she states she is needing this

## 2023-03-08 NOTE — TELEPHONE ENCOUNTER
No new care gaps identified.  [facility] Embedded Care Gaps. Reference number: [id number]. 3/08/2023   9:04:34 AM CST

## 2023-03-08 NOTE — TELEPHONE ENCOUNTER
Refill Routing Note   Medication(s) are not appropriate for processing by [facility] for the following reason(s):         Clarification of medication (Rx) details  No active prescription written by PCP    [facility] action(s):  Defer         Appointments  past 12m or future 3m with PCP    Date Provider   Last Visit   1/24/2023 Dustin Khan MD   Next Visit   3/8/2023 Dustin Khan MD   ED visits in past 90 days: 0        Note composed:9:14 AM 03/08/2023

## 2023-03-09 RX ORDER — FLUTICASONE FUROATE AND VILANTEROL 200; 25 UG/1; UG/1
POWDER RESPIRATORY (INHALATION)
Qty: 60 EACH | Refills: 11 | Status: SHIPPED | OUTPATIENT
Start: 2023-03-09

## 2023-03-13 ENCOUNTER — CLINICAL SUPPORT (OUTPATIENT)
Dept: REHABILITATION | Facility: OTHER | Age: 73
End: 2023-03-13
Payer: MEDICARE

## 2023-03-13 ENCOUNTER — OFFICE VISIT (OUTPATIENT)
Dept: PALLIATIVE MEDICINE | Facility: CLINIC | Age: 73
End: 2023-03-13
Payer: MEDICARE

## 2023-03-13 DIAGNOSIS — F41.9 ANXIETY: ICD-10-CM

## 2023-03-13 DIAGNOSIS — J43.2 CENTRILOBULAR EMPHYSEMA: Primary | ICD-10-CM

## 2023-03-13 DIAGNOSIS — M25.652 DECREASED RANGE OF LEFT HIP MOVEMENT: ICD-10-CM

## 2023-03-13 DIAGNOSIS — R29.898 DECREASED STRENGTH OF LOWER EXTREMITY: ICD-10-CM

## 2023-03-13 DIAGNOSIS — M25.552 LEFT HIP PAIN: Primary | ICD-10-CM

## 2023-03-13 PROCEDURE — 97140 MANUAL THERAPY 1/> REGIONS: CPT | Mod: PN

## 2023-03-13 PROCEDURE — 99442 PR PHYSICIAN TELEPHONE EVALUATION 11-20 MIN: ICD-10-PCS | Mod: 95,,, | Performed by: STUDENT IN AN ORGANIZED HEALTH CARE EDUCATION/TRAINING PROGRAM

## 2023-03-13 PROCEDURE — 99442 PR PHYSICIAN TELEPHONE EVALUATION 11-20 MIN: CPT | Mod: 95,,, | Performed by: STUDENT IN AN ORGANIZED HEALTH CARE EDUCATION/TRAINING PROGRAM

## 2023-03-13 PROCEDURE — 97110 THERAPEUTIC EXERCISES: CPT | Mod: PN

## 2023-03-13 RX ORDER — CITALOPRAM 20 MG/1
20 TABLET, FILM COATED ORAL DAILY
Qty: 90 TABLET | Refills: 3 | Status: SHIPPED | OUTPATIENT
Start: 2023-03-13 | End: 2023-10-10 | Stop reason: CLARIF

## 2023-03-13 NOTE — PROGRESS NOTES
Established Patient - Audio Only Telehealth Visit     The patient location is: [facility]  The chief complaint leading to consultation is: dyspnea  Visit type: Virtual visit with audio only (telephone)  Total time spent with patient: 12min       The reason for the audio only service rather than synchronous audio and video virtual visit was related to technical difficulties or patient preference/necessity.     Each patient to whom I provide medical services by telemedicine is:  (1) informed of the relationship between the physician and patient and the respective role of any other health care provider with respect to management of the patient; and (2) notified that they may decline to receive medical services by telemedicine and may withdraw from such care at any time. Patient verbally consented to receive this service via voice-only telephone call.       HPI:     Centrilobular emphysema with chronic obstructive pulmonary disease (COPD), chornic hypoxic respiratory failure on supplemental oxygen (3 liters/minute), secondary pulmonary hypertension    As being feeling increased shortness of breath lately.  She has been unable to start pulmonary rehabilitation yet because she has significant back pain and is going to physical therapy for leg pain.  She is dependent on IADLs is able to do a little cooking.  She continues to be independent on ADLs she is able to shower independently.    She continues to use 3 L of oxygen she is satting 95% at rest and 93% on exertion.    Denies nausea, anorexia, insomnia.    Endorsed some worsening of anxiety lately, open to increasing citalopram.           Assessment and plan:       Centrilobular emphysema / Chronic respiratory failure with hypoxia, on home oxygen therapy / Secondary pulmonary arterial hypertension  -On Breo Elliptia  -On Acetylcysteine caps BID, on azythro prophylaxis, on Incruse ellipta and levalbuterol nebs  -Wants to attend pulmonary rehabilitation after she finishes PT  she is unsure if her referral still valid.  Suggested she calls the pulmonary rehabilitation place at [facility] and make sure it is current so that she can start as soon as she finishes PT       Anxiety  - will try again to increase Celexa to improve her anxiety- Rx for Celexa 20 mg daily sent to her pharmacy                     This service was not originating from a related E/M service provided within the previous 7 days nor will  to an E/M service or procedure within the next 24 hours or my soonest available appointment.  Prevailing standard of care was able to be met in this audio-only visit.

## 2023-03-13 NOTE — PROGRESS NOTES
"    Physical Therapy Daily Treatment Note     Name: Estefani Fam  Clinic Number: [id number]    Therapy Diagnosis:   Encounter Diagnoses   Name Primary?    Left hip pain Yes    Decreased strength of lower extremity     Decreased range of left hip movement      Physician: Dustin Khan MD    Visit Date: 3/13/2023  Physician Orders: PT Eval and Treat   Medical Diagnosis from Referral: R10.32,G89.29 (ICD-10-CM) - Chronic groin pain, left   Evaluation Date: 2/1/2023  Authorization Period Expiration: 1/24/2023  Plan of Care Expiration: 4/26/2023  Progress Note Due: 3/15/2023  Visit # / Visits authorized: 3/ 13       Time In: 3:15 pm  Time Out: 3:50 pm  Total Billable Time: 35d minutes    Precautions: Standard    Subjective     Pt reports: no changes, pt says she's feeling frustrated. Patient reports compliance with some of the home exercises.   She was compliant with home exercise program.  Response to previous treatment: no changes   Functional change: none    Pain:  Current 2/10, worst 8/10, best 0/10   Location: left groin     Objective     therapeutic exercises to develop strength, ROM, and flexibility for 25 minutes including:  Seated hip flexion 5 sec holds, 2 x 10   Hip adduction isometric (pilates ring) 2 x 10   Supine clamshells 5 sec holds, 2 x 10 (pilates ring)  +Piriformis IR/ER 2 x 30" each left  Supine adductor stretch 30 sec x 3     SIT TO STAND 25" elevated high low table 1 x 7, 1 x 5  Standing heel raises 2 x 10   Standing hip flexion 2 x 10   Bridges 2 x 10   STRAIGHT LEG RAISE flexion 2 x 10   Gentle hip ROM flexion 30 sec x 2     Manual therapy to L adductors for 10 minutes:  STM with rolling pin to L adductors supine wit knee flexed  Manual adductor stretch L    Home Exercises Provided and Patient Education Provided     Education provided:   - importance of exercise, HOME EXERCISE PROGRAM performance, deconditioning, pursed lip breathing    Written Home Exercises Provided: yes.  Exercises were " reviewed and Estefani was able to demonstrate them prior to the end of the session.  Estefani demonstrated fair  understanding of the education provided.     See EMR under Patient Instructions for exercises provided 3/1/2023.    Assessment   Patient able to tolerate STRAIGHT LEG RAISE and bridges today with no increase in BLE and low back pain. Patient required HOB elevated due to COPD and breathing compensations. Patient has significant decreased activity and exercise tolerance, therefore, truncated session after 35 minutes due to reports of increased fatigue and unable to perform more exercises at this time.     Estefani Is progressing well towards her goals.   Pt prognosis is Fair.     Pt will continue to benefit from skilled outpatient physical therapy to address the deficits listed in the problem list box on initial evaluation, provide pt/family education and to maximize pt's level of independence in the home and community environment.     Pt's spiritual, cultural and educational needs considered and pt agreeable to plan of care and goals.    Anticipated barriers to physical therapy: co morbidities     Short Term Goals (6 Weeks):   1. Pt will be compliant with HEP to supplement PT in restoring pain free function. (Progressing, not met)  2. Pt will improve SIT TO STAND with no HHA with no increased left hip pain for improved transfers and functional mobility. (Progressing, not met)  3. Pt will improve impaired LE MMTs by 1/2 grade  to improve strength for functional tasks. (Progressing, not met)  4. Pt improve impaired hip ROM by 20 deg in all planes to improve mobility for normal movement patterns. (Progressing, not met)    Long Term Goals (12 Weeks):  1. Pt will improve FOTO score to </= 42% limited to decrease perceived limitation with mobility. (Progressing, not met)  2. Pt will improve ambulation to 75 feet with </= 2/10 hip pain. (Progressing, not met)  3. Pt will improve impaired LE MMTs by 1 grade to  improve strength for functional tasks. (Progressing, not met)  4. Pt improve impaired hip ROM to WNL in all planes to improve mobility for normal movement patterns.  (Progressing, not met)    Plan     Plan of care Certification: 2/1/2023 to 4/26/2023.     Outpatient Physical Therapy 2 times weekly for 12 weeks to include the following interventions: Gait Training, Manual Therapy, Moist Heat/ Ice, Neuromuscular Re-ed, Patient Education, Self Care, Therapeutic Activities, and Therapeutic Exercise.     Cecelia Miles, PT

## 2023-03-15 ENCOUNTER — CLINICAL SUPPORT (OUTPATIENT)
Dept: REHABILITATION | Facility: OTHER | Age: 73
End: 2023-03-15
Payer: MEDICARE

## 2023-03-15 DIAGNOSIS — M25.652 DECREASED RANGE OF LEFT HIP MOVEMENT: ICD-10-CM

## 2023-03-15 DIAGNOSIS — M25.552 LEFT HIP PAIN: Primary | ICD-10-CM

## 2023-03-15 DIAGNOSIS — R29.898 DECREASED STRENGTH OF LOWER EXTREMITY: ICD-10-CM

## 2023-03-15 PROCEDURE — 97140 MANUAL THERAPY 1/> REGIONS: CPT | Mod: PN

## 2023-03-15 PROCEDURE — 97110 THERAPEUTIC EXERCISES: CPT | Mod: PN

## 2023-03-15 NOTE — PROGRESS NOTES
"    Physical Therapy Daily Treatment Note     Name: Estefani Fam  Clinic Number: [id number]    Therapy Diagnosis:   Encounter Diagnoses   Name Primary?    Left hip pain Yes    Decreased strength of lower extremity     Decreased range of left hip movement      Physician: Dustin Khan MD    Visit Date: 3/15/2023  Physician Orders: PT Eval and Treat   Medical Diagnosis from Referral: R10.32,G89.29 (ICD-10-CM) - Chronic groin pain, left   Evaluation Date: 2/1/2023  Authorization Period Expiration: 1/24/2023  Plan of Care Expiration: 4/26/2023  Progress Note Due: 3/15/2023  Visit # / Visits authorized: 4/ 13       Time In: 3:15 pm  Time Out: 3:45 pm  Total Billable Time: 30 minutes    Precautions: Standard    Subjective     Pt reports: increased pain following last session, thinks it is from the bridges. Patient reports that she was experiencing a little pain but did not say anything during the exercise. Patient reports walking was very painful at home.   She was compliant with home exercise program.  Response to previous treatment: no changes   Functional change: none    Pain:  Current 2/10, worst 8/10, best 0/10   Location: left groin     Objective     therapeutic exercises to develop strength, ROM, and flexibility for 25 minutes including:  Seated hip flexion 5 sec holds, 2 x 10   Hip adduction isometric (pilates ring) 2 x 10   Supine clamshells 5 sec holds, 2 x 10 (pilates ring)  +Piriformis IR/ER 2 x 30" each left  Supine adductor stretch 30 sec x 3   LONG ARC QUADRICEP 2 x 10   Seated hamstring curls GTB 2 x 10     SIT TO STAND 25" elevated high low table 1 x 7, 1 x 5  Standing heel raises 2 x 10   Standing hip flexion 2 x 10   Bridges 2 x 10   STRAIGHT LEG RAISE flexion 2 x 10   Gentle hip ROM flexion 30 sec x 2     Manual therapy to L adductors for 10 minutes:  STM with rolling pin to L adductors supine wit knee flexed  Manual adductor stretch L    Home Exercises Provided and Patient Education Provided "     Education provided:   - importance of exercise, HOME EXERCISE PROGRAM performance, deconditioning, pursed lip breathing    Written Home Exercises Provided: yes.  Exercises were reviewed and Estefani was able to demonstrate them prior to the end of the session.  Estefani demonstrated fair  understanding of the education provided.     See EMR under Patient Instructions for exercises provided 3/1/2023.    Assessment   Regression from exercises today with no STRAIGHT LEG RAISE or bridges today due to increased pain following last session. Performed seated strengthening exercises and supine hip isometrics with good patient tolerance/no increase in discomfort. However, decreased endurance and activity tolerance overall, therefore, session truncated at 30 minutes today. Patient reports relief status post manual therapy to left quadricep and adductor musculature.     Estefani Is progressing well towards her goals.   Pt prognosis is Fair.     Pt will continue to benefit from skilled outpatient physical therapy to address the deficits listed in the problem list box on initial evaluation, provide pt/family education and to maximize pt's level of independence in the home and community environment.     Pt's spiritual, cultural and educational needs considered and pt agreeable to plan of care and goals.    Anticipated barriers to physical therapy: co morbidities     Short Term Goals (6 Weeks):   1. Pt will be compliant with HEP to supplement PT in restoring pain free function. (Progressing, not met)  2. Pt will improve SIT TO STAND with no HHA with no increased left hip pain for improved transfers and functional mobility. (Progressing, not met)  3. Pt will improve impaired LE MMTs by 1/2 grade  to improve strength for functional tasks. (Progressing, not met)  4. Pt improve impaired hip ROM by 20 deg in all planes to improve mobility for normal movement patterns. (Progressing, not met)    Long Term Goals (12 Weeks):  1. Pt  will improve FOTO score to </= 42% limited to decrease perceived limitation with mobility. (Progressing, not met)  2. Pt will improve ambulation to 75 feet with </= 2/10 hip pain. (Progressing, not met)  3. Pt will improve impaired LE MMTs by 1 grade to improve strength for functional tasks. (Progressing, not met)  4. Pt improve impaired hip ROM to WNL in all planes to improve mobility for normal movement patterns.  (Progressing, not met)    Plan     Plan of care Certification: 2/1/2023 to 4/26/2023.     Outpatient Physical Therapy 2 times weekly for 12 weeks to include the following interventions: Gait Training, Manual Therapy, Moist Heat/ Ice, Neuromuscular Re-ed, Patient Education, Self Care, Therapeutic Activities, and Therapeutic Exercise.     Cecelia Miles, PT

## 2023-03-20 ENCOUNTER — CLINICAL SUPPORT (OUTPATIENT)
Dept: REHABILITATION | Facility: OTHER | Age: 73
End: 2023-03-20
Payer: MEDICARE

## 2023-03-20 DIAGNOSIS — R29.898 DECREASED STRENGTH OF LOWER EXTREMITY: ICD-10-CM

## 2023-03-20 DIAGNOSIS — M25.552 LEFT HIP PAIN: Primary | ICD-10-CM

## 2023-03-20 DIAGNOSIS — M25.652 DECREASED RANGE OF LEFT HIP MOVEMENT: ICD-10-CM

## 2023-03-20 PROCEDURE — 97110 THERAPEUTIC EXERCISES: CPT | Mod: PN | Performed by: PHYSICAL THERAPIST

## 2023-03-20 NOTE — PROGRESS NOTES
"    Physical Therapy Daily Treatment Note     Name: Estefani Fam  Clinic Number: [id number]    Therapy Diagnosis:   Encounter Diagnoses   Name Primary?    Left hip pain Yes    Decreased strength of lower extremity     Decreased range of left hip movement      Physician: Dustin Khan MD    Visit Date: 3/20/2023  Physician Orders: PT Eval and Treat   Medical Diagnosis from Referral: R10.32,G89.29 (ICD-10-CM) - Chronic groin pain, left   Evaluation Date: 2/1/2023  Authorization Period Expiration: 1/24/2023  Plan of Care Expiration: 4/26/2023  Progress Note Due: 3/15/2023  Visit # / Visits authorized: 6/ 13       Time In: 1515  Time Out: 1550  Total Billable Time: 35 minutes    Precautions: Standard    Subjective     Pt reports: pain is low at rest, but is still having severe pain with standing and walking. She would like to try to hold off on the rolling pin today.    She was compliant with home exercise program.  Response to previous treatment: no changes   Functional change: none    Pain:  Current 2/10, worst 8/10, best 0/10   Location: left groin     Objective     therapeutic exercises to develop strength, ROM, and flexibility for 35 minutes including:  Seated hip flexion 5 sec holds, 2 x 10 (isometric to ball L)  Hip adduction isometric (yoga block) 5" hold x 10   Supine clamshells 5 sec holds, 2 x 10 (pilates ring)  +Supine 3-way clam GTB 10x  +Piriformis IR/ER 2 x 30" each left  Supine adductor stretch 30 sec x 3   LONG ARC QUADRICEP 2 x 10   Seated hamstring curls GTB 2 x 10     SIT TO STAND 25" elevated high low table 1 x 7, 1 x 5  Standing heel raises 2 x 10   Standing hip flexion 2 x 10   Bridges 2 x 10   STRAIGHT LEG RAISE flexion 2 x 10   Gentle hip ROM flexion 30 sec x 2     Manual therapy to L adductors for 00 minutes:  STM with rolling pin to L adductors supine wit knee flexed  Manual adductor stretch L    Home Exercises Provided and Patient Education Provided     Education provided:   - " importance of exercise, HOME EXERCISE PROGRAM performance, deconditioning, pursed lip breathing    Written Home Exercises Provided: yes.  Exercises were reviewed and Estefani was able to demonstrate them prior to the end of the session.  Estefani demonstrated fair  understanding of the education provided.     See EMR under Patient Instructions for exercises provided 3/1/2023.    Assessment   Manual therapy held per pt request today. Fair tolerance to therex. Pain with active hip flexion, but able to perform isometric hip flexion without difficulty. Added supine 3-way clam for glut activation with no c/o pain (ROM limited as needed). Pt with fatigue towards end of session, requests to end early.     Estefani Is progressing well towards her goals.   Pt prognosis is Fair.     Pt will continue to benefit from skilled outpatient physical therapy to address the deficits listed in the problem list box on initial evaluation, provide pt/family education and to maximize pt's level of independence in the home and community environment.     Pt's spiritual, cultural and educational needs considered and pt agreeable to plan of care and goals.    Anticipated barriers to physical therapy: co morbidities     Short Term Goals (6 Weeks):   1. Pt will be compliant with HEP to supplement PT in restoring pain free function. (Progressing, not met)  2. Pt will improve SIT TO STAND with no HHA with no increased left hip pain for improved transfers and functional mobility. (Progressing, not met)  3. Pt will improve impaired LE MMTs by 1/2 grade  to improve strength for functional tasks. (Progressing, not met)  4. Pt improve impaired hip ROM by 20 deg in all planes to improve mobility for normal movement patterns. (Progressing, not met)    Long Term Goals (12 Weeks):  1. Pt will improve FOTO score to </= 42% limited to decrease perceived limitation with mobility. (Progressing, not met)  2. Pt will improve ambulation to 75 feet with </= 2/10  hip pain. (Progressing, not met)  3. Pt will improve impaired LE MMTs by 1 grade to improve strength for functional tasks. (Progressing, not met)  4. Pt improve impaired hip ROM to WNL in all planes to improve mobility for normal movement patterns.  (Progressing, not met)    Plan     Plan of care Certification: 2/1/2023 to 4/26/2023.     Outpatient Physical Therapy 2 times weekly for 12 weeks to include the following interventions: Gait Training, Manual Therapy, Moist Heat/ Ice, Neuromuscular Re-ed, Patient Education, Self Care, Therapeutic Activities, and Therapeutic Exercise.     Licha Hernandez, PT

## 2023-03-22 ENCOUNTER — PATIENT MESSAGE (OUTPATIENT)
Dept: PULMONOLOGY | Facility: CLINIC | Age: 73
End: 2023-03-22
Payer: MEDICARE

## 2023-03-27 ENCOUNTER — CLINICAL SUPPORT (OUTPATIENT)
Dept: REHABILITATION | Facility: OTHER | Age: 73
End: 2023-03-27
Payer: MEDICARE

## 2023-03-27 DIAGNOSIS — R29.898 DECREASED STRENGTH OF LOWER EXTREMITY: ICD-10-CM

## 2023-03-27 DIAGNOSIS — M25.652 DECREASED RANGE OF LEFT HIP MOVEMENT: ICD-10-CM

## 2023-03-27 DIAGNOSIS — M25.552 LEFT HIP PAIN: Primary | ICD-10-CM

## 2023-03-27 PROCEDURE — 97110 THERAPEUTIC EXERCISES: CPT | Mod: PN

## 2023-03-27 NOTE — PROGRESS NOTES
"  Outpatient Therapy Discharge Summary       Name: Estefani Fam  Clinic Number: [id number]    Therapy Diagnosis:   Encounter Diagnoses   Name Primary?    Left hip pain Yes    Decreased strength of lower extremity     Decreased range of left hip movement      Physician: Dustin Khan MD    Visit Date: 3/27/2023  Physician Orders: PT Eval and Treat   Medical Diagnosis from Referral: R10.32,G89.29 (ICD-10-CM) - Chronic groin pain, left   Evaluation Date: 2/1/2023  Authorization Period Expiration: 1/24/2023  Plan of Care Expiration: 4/26/2023  Progress Note Due: 3/15/2023  Visit # / Visits authorized: 7/ 13       Time In: 3:15 pm  Time Out: 3:40 pm  Total Billable Time: 35 minutes    Precautions: Standard    Subjective     Pt reports: pain is low at rest, but is still having severe pain with standing and walking. She would like to try to hold off on the rolling pin today.    She was compliant with home exercise program.  Response to previous treatment: no changes   Functional change: none    Pain:  Current 2/10, worst 8/10, best 0/10   Location: left groin     Objective   3/27/2023    Hip Range of Motion:    Right active Right Passive Left active  Left Passive   Flexion 100 110  90 pain  110 pain    Abduction 45 45 45 pain  45   Extension NT NT NT NT   Ext. Rotation 45 50 30 45 pain    Int. Rotation 30 35 25 30        Lower Extremity Strength  Right LE   Left LE     Quadriceps: 4/5 Quadriceps: 3+/5 pain   Hamstrings: 4/5 Hamstrings: 4/5   Iliopsoas: 4/5 Iliopsoas: 3+/5 pain   Hip extension:  4/5 Hip extension: 4/5   PGM: NT PGM: NT   Hip ER: 4/5 Hip ER: 4/5   Hip IR: 4/5 Hip IR: 4/5   Glut Max NT Glut Max NT             therapeutic exercises to develop strength, ROM, and flexibility for 35 minutes including:  Reassessment, review of HEP  Seated hip flexion 5 sec holds, 2 x 10 (isometric to ball L)  Hip adduction isometric (yoga block) 5" hold x 10   Supine clamshells 5 sec holds, 2 x 10 (pilates ring)  +Supine " "3-way clam GTB 10x  +Piriformis IR/ER 2 x 30" each left  Supine adductor stretch 30 sec x 3   LONG ARC QUADRICEP 2 x 10   Seated hamstring curls GTB 2 x 10     SIT TO STAND 25" elevated high low table 1 x 7, 1 x 5  Standing heel raises 2 x 10   Standing hip flexion 2 x 10   Bridges 2 x 10   STRAIGHT LEG RAISE flexion 2 x 10   Gentle hip ROM flexion 30 sec x 2     Manual therapy to L adductors for 00 minutes:  STM with rolling pin to L adductors supine wit knee flexed  Manual adductor stretch L    Home Exercises Provided and Patient Education Provided     Education provided:   - importance of exercise, HOME EXERCISE PROGRAM performance, deconditioning, pursed lip breathing    Written Home Exercises Provided: yes.  Exercises were reviewed and Estefani was able to demonstrate them prior to the end of the session.  Estefani demonstrated fair  understanding of the education provided.     See EMR under Patient Instructions for exercises provided 3/1/2023.    Assessment   Patient presents to OP Physical Therapy for assessment of progress under current POC with no significant changes and patient report of increased pain with exercises/therapy sessions. Patient reports that she thinks that she is getting worse and would like to discontinue therapy at this time. Patient continues to demonstrate signs and symptoms consistent with quadricep strain. Patient demonstrates significant decreased activity and tissue tolerance limited exercises and functional mobility training at this time. Patient is being referred back to referring provider for further investigation and treatment options at this time due to adverse reaction to exercise prescription.     Pt prognosis is Fair.     Anticipated barriers to physical therapy: co morbidities     Short Term Goals (6 Weeks):   1. Pt will be compliant with HEP to supplement PT in restoring pain free function. (Progressing, not met)  2. Pt will improve SIT TO STAND with no HHA with no " increased left hip pain for improved transfers and functional mobility. (Progressing, not met)  3. Pt will improve impaired LE MMTs by 1/2 grade  to improve strength for functional tasks. (Progressing, not met)  4. Pt improve impaired hip ROM by 20 deg in all planes to improve mobility for normal movement patterns. (Progressing, not met)    Long Term Goals (12 Weeks):  1. Pt will improve FOTO score to </= 42% limited to decrease perceived limitation with mobility. (Progressing, not met)  2. Pt will improve ambulation to 75 feet with </= 2/10 hip pain. (Progressing, not met)  3. Pt will improve impaired LE MMTs by 1 grade to improve strength for functional tasks. (Progressing, not met)  4. Pt improve impaired hip ROM to WNL in all planes to improve mobility for normal movement patterns.  (Progressing, not met)    Discharge reason: Patient requested discharge    Plan   This patient is discharged from Physical Therapy    Cecelia Miles, PT

## 2023-03-28 RX ORDER — PANTOPRAZOLE SODIUM 40 MG/1
40 TABLET, DELAYED RELEASE ORAL DAILY
Qty: 90 TABLET | Refills: 3 | Status: SHIPPED | OUTPATIENT
Start: 2023-03-28

## 2023-03-28 NOTE — TELEPHONE ENCOUNTER
----- Message from Gaby Sellers sent at 3/28/2023  9:23 AM CDT -----  Contact: Pharmacy  Requesting an RX refill or new RX.  Is this a refill or new RX:   RX name and strength pantoprazole (PROTONIX) 40 MG tablet      Is this a 30 day or 90 day RX:   Pharmacy name and phone # [id number] - [place] - [address] AT [facility] [address]  The doctors have asked that we provide their patients with the following 2 reminders -- prescription refills can take up to 72 hours, and a friendly reminder that in the future you can use your [facility] account to request refills:

## 2023-03-28 NOTE — TELEPHONE ENCOUNTER
No new care gaps identified.  [facility] Embedded Care Gaps. Reference number: [id number]. 3/28/2023   11:35:27 AM GUZMANT

## 2023-03-28 NOTE — TELEPHONE ENCOUNTER
Refill Decision Note   Estefani Cristel  is requesting a refill authorization.  Brief Assessment and Rationale for Refill:  Approve     Medication Therapy Plan:         Comments:     No Care Gaps recommended.     Note composed:12:34 PM 03/28/2023

## 2023-03-29 NOTE — TELEPHONE ENCOUNTER
Returned pt call. Pt states she was DC from PT on Monday for her leg pain. States the PT has not helped at all over the last 2 months and pt asking what is the next step?    Pt also states she started with urinary frequency and cloudy urine onset 1 week ago. Pt asking if she can drop off urine specimen. Pt states she doesn't think she can come into clinic with her leg pain.

## 2023-03-29 NOTE — TELEPHONE ENCOUNTER
----- Message from Neftali Pierce sent at 3/29/2023 11:06 AM CDT -----  Contact: [phone]  Pt said she is still waiting for a call back about her leg. Please call pt back.

## 2023-03-30 ENCOUNTER — TELEPHONE (OUTPATIENT)
Dept: INTERNAL MEDICINE | Facility: CLINIC | Age: 73
End: 2023-03-30
Payer: MEDICARE

## 2023-03-30 NOTE — TELEPHONE ENCOUNTER
----- Message from Dustin Khan MD sent at 3/30/2023  8:07 AM CDT -----  Contact: [phone]  Can we schedule visit for patient?    Do not have enough info from message to make clinical decision    Dustin Khan    ===View-only below thi    ----- Message -----  From: Gemini Zavala MA  Sent: 3/28/2023   9:24 AM CDT  To: Dustin Khan MD      ----- Message -----  From: Tash Clarke  Sent: 3/28/2023   8:58 AM CDT  To: Bill Chan Staff    Pt was d/c from PT yesterday for her left leg b/c they told her she is not making any progress. She still is having a lot of pain in her left leg and would like to know what her next step is from here. Please advise.

## 2023-03-30 NOTE — TELEPHONE ENCOUNTER
Reached out to pt to get virtual appt scheduled. Pt stated she can not do an in clinic appt due to her legs being in so much pain.

## 2023-03-31 ENCOUNTER — EXTERNAL CHRONIC CARE MANAGEMENT (OUTPATIENT)
Dept: PRIMARY CARE CLINIC | Facility: CLINIC | Age: 73
End: 2023-03-31
Payer: MEDICARE

## 2023-03-31 PROCEDURE — 99490 CHRNC CARE MGMT STAFF 1ST 20: CPT | Mod: PBBFAC | Performed by: INTERNAL MEDICINE

## 2023-03-31 PROCEDURE — 99490 PR CHRONIC CARE MGMT, 1ST 20 MIN: ICD-10-PCS | Mod: S$PBB,,, | Performed by: INTERNAL MEDICINE

## 2023-03-31 PROCEDURE — 99490 CHRNC CARE MGMT STAFF 1ST 20: CPT | Mod: S$PBB,,, | Performed by: INTERNAL MEDICINE

## 2023-04-03 ENCOUNTER — OFFICE VISIT (OUTPATIENT)
Dept: INTERNAL MEDICINE | Facility: CLINIC | Age: 73
End: 2023-04-03
Payer: MEDICARE

## 2023-04-03 DIAGNOSIS — R30.0 DYSURIA: ICD-10-CM

## 2023-04-03 DIAGNOSIS — R10.32 CHRONIC GROIN PAIN, LEFT: Primary | ICD-10-CM

## 2023-04-03 DIAGNOSIS — M16.12 ARTHRITIS OF LEFT HIP: ICD-10-CM

## 2023-04-03 DIAGNOSIS — G89.29 CHRONIC GROIN PAIN, LEFT: Primary | ICD-10-CM

## 2023-04-03 PROCEDURE — 99214 OFFICE O/P EST MOD 30 MIN: CPT | Mod: 95,,, | Performed by: INTERNAL MEDICINE

## 2023-04-03 PROCEDURE — 99214 PR OFFICE/OUTPT VISIT, EST, LEVL IV, 30-39 MIN: ICD-10-PCS | Mod: 95,,, | Performed by: INTERNAL MEDICINE

## 2023-04-03 NOTE — Clinical Note
Saw our mutual patient, she was unable to participate in PT 2/2 groin pain, which I am assuming is intraarticular hip pain.    I think an ICS to her left hip could potentially be diagnostic and therapeutic. Wanted to know if you guys did that in the OR or if I need to send her to IR.  Thanks, Balbir

## 2023-04-03 NOTE — PROGRESS NOTES
The patient location is: [place]  The chief complaint leading to consultation is:   Visit type: Virtual visit with synchronous audio and video  Total time spent with patient: 15 minutes  Each patient to whom he or she provides medical services by telemedicine is:  (1) informed of the relationship between the physician and patient and the respective role of any other health care provider with respect to management of the patient; and (2) notified that he or she may decline to receive medical services by telemedicine and may withdraw from such care at any time.      CHIEF COMPLAINT     No chief complaint on file.  TELEMEDICINE VISIT    HPI     Estefani Fam is 72 y.o. female here today for     Failed PT 2/2 worsening pain with therapy.   Personally Reviewed Patient's Medical, surgical, family and social hx. Changes updated in [employer].  Care Team updated in Epic    Review of Systems:  Review of Systems   Constitutional:  Negative for malaise/fatigue.   Eyes:  Negative for blurred vision.   Respiratory:  Negative for shortness of breath.    Cardiovascular:  Negative for chest pain, palpitations and PND.   Musculoskeletal:  Positive for joint pain.        Left hip pain   Neurological:  Negative for headaches.     Health Maintenance:   Reviewed with patient  Due for the following:      PHYSICAL EXAM       Gen: Well Appearing, NAD  HEENT: PERR, EOMI  Chest: Normal work of breathing,   Neuro: A&Ox3,  speech normal  Mood; Mood normal, behavior normal, thought process linear       LABS     Labs reviewed; Notable for    ASSESSMENT     1. Chronic groin pain, left        2. Dysuria  Urinalysis, Reflex to Urine Culture Urine, Clean Catch                Plan     Estefani Fam is a 72 y.o. female  1. Chronic groin pain, left  Will participate in physical therapy secondary to left inguinal pain.  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  Going to reach out to orthopedist team who saw her and  see if they think that she would be a candidate for an intra-articular steroid injection into her left hip.  Think this could potentially be diagnostic and therapeutic.    2. Dysuria  - Urinalysis, Reflex to Urine Culture Urine, Clean Catch; Future        Dustin Khan MD        Answers submitted by the patient for this visit:  High Blood Pressure Questionnaire (Submitted on 4/3/2023)  Chief Complaint: Hypertension  Chronicity: recurrent  Onset: more than 1 year ago  anxiety: No  peripheral edema: No  sweats: No  Compliance problems: no compliance problems  Past treatments: nothing

## 2023-04-04 ENCOUNTER — TELEPHONE (OUTPATIENT)
Dept: ORTHOPEDICS | Facility: CLINIC | Age: 73
End: 2023-04-04
Payer: MEDICARE

## 2023-04-04 NOTE — TELEPHONE ENCOUNTER
----- Message from Rosetta Riddle sent at 4/4/2023 11:15 AM CDT -----  Regarding: pt advice  Contact: pt [phone]  Pt calling to ask if Dr. Macdonald has heard from  regarding next step in leg issues. Pls call

## 2023-04-04 NOTE — TELEPHONE ENCOUNTER
Called and informed pt Marco has not yet spoke with Dr. Khan. Informed pt I will contact her if we have any new updates. Pt verbally understood and was satisfied.

## 2023-04-06 ENCOUNTER — TELEPHONE (OUTPATIENT)
Dept: INTERNAL MEDICINE | Facility: CLINIC | Age: 73
End: 2023-04-06
Payer: MEDICARE

## 2023-04-06 ENCOUNTER — LAB VISIT (OUTPATIENT)
Dept: LAB | Facility: HOSPITAL | Age: 73
End: 2023-04-06
Attending: INTERNAL MEDICINE
Payer: MEDICARE

## 2023-04-06 DIAGNOSIS — R30.0 DYSURIA: ICD-10-CM

## 2023-04-06 DIAGNOSIS — M16.12 ARTHRITIS OF LEFT HIP: ICD-10-CM

## 2023-04-06 DIAGNOSIS — G89.29 CHRONIC GROIN PAIN, LEFT: Primary | ICD-10-CM

## 2023-04-06 DIAGNOSIS — R10.32 CHRONIC GROIN PAIN, LEFT: Primary | ICD-10-CM

## 2023-04-06 LAB
BACTERIA #/AREA URNS AUTO: ABNORMAL /HPF
BILIRUB UR QL STRIP: NEGATIVE
CLARITY UR REFRACT.AUTO: CLEAR
COLOR UR AUTO: YELLOW
GLUCOSE UR QL STRIP: NEGATIVE
HGB UR QL STRIP: NEGATIVE
KETONES UR QL STRIP: NEGATIVE
LEUKOCYTE ESTERASE UR QL STRIP: ABNORMAL
MICROSCOPIC COMMENT: ABNORMAL
NITRITE UR QL STRIP: NEGATIVE
NON-SQ EPI CELLS #/AREA URNS AUTO: 0 /HPF
PH UR STRIP: 8 [PH] (ref 5–8)
PROT UR QL STRIP: NEGATIVE
RBC #/AREA URNS AUTO: 1 /HPF (ref 0–4)
SP GR UR STRIP: 1.01 (ref 1–1.03)
SQUAMOUS #/AREA URNS AUTO: 4 /HPF
URN SPEC COLLECT METH UR: ABNORMAL
WBC #/AREA URNS AUTO: 11 /HPF (ref 0–5)

## 2023-04-06 PROCEDURE — 87086 URINE CULTURE/COLONY COUNT: CPT | Performed by: INTERNAL MEDICINE

## 2023-04-06 PROCEDURE — 81001 URINALYSIS AUTO W/SCOPE: CPT | Performed by: INTERNAL MEDICINE

## 2023-04-06 NOTE — TELEPHONE ENCOUNTER
----- Message from Jocelynn Bermeo sent at 4/6/2023 11:53 AM CDT -----  Contact: Self/[phone]  Pt said that she is calling in regards to needing to get a return call from the nurse, pt stated that she would like to know what the doctor wants her to do about her leg. Please advise

## 2023-04-06 NOTE — ADDENDUM NOTE
Addended by: REHAN PATEL on: 4/6/2023 07:38 AM     Modules accepted: Orders     Winlevi Pregnancy And Lactation Text: This medication is considered safe during pregnancy and breastfeeding.

## 2023-04-06 NOTE — TELEPHONE ENCOUNTER
Returned pt call. Pt asking if PCP has reached out to orthopedics in regards to a steroid injection. Pt states PCP was going to get back to her. Pt also states she dropped her urine sample off this morning. UA in progress.

## 2023-04-07 ENCOUNTER — PATIENT MESSAGE (OUTPATIENT)
Dept: INTERNAL MEDICINE | Facility: CLINIC | Age: 73
End: 2023-04-07
Payer: MEDICARE

## 2023-04-08 LAB
BACTERIA UR CULT: NORMAL
BACTERIA UR CULT: NORMAL

## 2023-04-11 ENCOUNTER — TELEPHONE (OUTPATIENT)
Dept: INTERNAL MEDICINE | Facility: CLINIC | Age: 73
End: 2023-04-11
Payer: MEDICARE

## 2023-04-11 NOTE — TELEPHONE ENCOUNTER
----- Message from Leonela Zaragoza sent at 4/11/2023 11:07 AM CDT -----  Contact: [phone]  Pt called to have someone explain her recent UA and culture results. Pt says she has been calling for these results for about 4 days. Please Advise  Please Advise

## 2023-04-11 NOTE — TELEPHONE ENCOUNTER
Called and spoke to pt. Pt denies dysuria. Pt does complaining of urinary frequency. Pt states she is experiencing the same symptoms she was when she dropped off sample. Pt states she is going tomorrow at 1p for her hip injection.

## 2023-04-12 ENCOUNTER — HOSPITAL ENCOUNTER (OUTPATIENT)
Dept: RADIOLOGY | Facility: HOSPITAL | Age: 73
Discharge: HOME | End: 2023-04-12
Attending: INTERNAL MEDICINE
Payer: MEDICARE

## 2023-04-12 DIAGNOSIS — M16.12 ARTHRITIS OF LEFT HIP: ICD-10-CM

## 2023-04-12 PROCEDURE — 20610 FL ASPIRATION INJECTION MAJOR JOINT LEFT W FLUORO: ICD-10-PCS | Mod: LT,,, | Performed by: RADIOLOGY

## 2023-04-12 PROCEDURE — 25000003 PHARM REV CODE 250: Performed by: INTERNAL MEDICINE

## 2023-04-12 PROCEDURE — 20610 DRAIN/INJ JOINT/BURSA W/O US: CPT | Mod: LT,,, | Performed by: RADIOLOGY

## 2023-04-12 PROCEDURE — 25500020 PHARM REV CODE 255: Performed by: INTERNAL MEDICINE

## 2023-04-12 PROCEDURE — 77002 FL ASPIRATION INJECTION MAJOR JOINT LEFT W FLUORO: ICD-10-PCS | Mod: 26,,, | Performed by: RADIOLOGY

## 2023-04-12 PROCEDURE — 20610 DRAIN/INJ JOINT/BURSA W/O US: CPT | Mod: LT

## 2023-04-12 PROCEDURE — 77002 NEEDLE LOCALIZATION BY XRAY: CPT | Mod: 26,,, | Performed by: RADIOLOGY

## 2023-04-12 RX ORDER — BUPIVACAINE HYDROCHLORIDE 2.5 MG/ML
3.5 INJECTION, SOLUTION EPIDURAL; INFILTRATION; INTRACAUDAL ONCE
Status: COMPLETED | OUTPATIENT
Start: 2023-04-12 | End: 2023-04-12

## 2023-04-12 RX ORDER — METHYLPREDNISOLONE ACETATE 40 MG/ML
40 INJECTION, SUSPENSION INTRA-ARTICULAR; INTRALESIONAL; INTRAMUSCULAR; SOFT TISSUE ONCE
Status: DISCONTINUED | OUTPATIENT
Start: 2023-04-12 | End: 2023-04-13 | Stop reason: HOSPADM

## 2023-04-12 RX ORDER — LIDOCAINE HYDROCHLORIDE 10 MG/ML
3 INJECTION INFILTRATION; PERINEURAL ONCE
Status: COMPLETED | OUTPATIENT
Start: 2023-04-12 | End: 2023-04-12

## 2023-04-12 RX ADMIN — IOHEXOL 1 ML: 300 INJECTION, SOLUTION INTRAVENOUS at 01:04

## 2023-04-12 RX ADMIN — LIDOCAINE HYDROCHLORIDE 3 ML: 10 INJECTION, SOLUTION INFILTRATION; PERINEURAL at 01:04

## 2023-04-12 RX ADMIN — BUPIVACAINE HYDROCHLORIDE 8.75 MG: 2.5 INJECTION, SOLUTION EPIDURAL; INFILTRATION; INTRACAUDAL; PERINEURAL at 01:04

## 2023-04-13 ENCOUNTER — TELEPHONE (OUTPATIENT)
Dept: FAMILY MEDICINE | Facility: CLINIC | Age: 73
End: 2023-04-13
Payer: MEDICARE

## 2023-04-13 NOTE — TELEPHONE ENCOUNTER
Spoke with the Patient and she requested a call back after 1 month to be scheduled for an EAWV appointment.  Patient verbally understands.

## 2023-04-14 ENCOUNTER — PATIENT MESSAGE (OUTPATIENT)
Dept: INTERNAL MEDICINE | Facility: CLINIC | Age: 73
End: 2023-04-14
Payer: MEDICARE

## 2023-04-17 RX ORDER — AZITHROMYCIN 250 MG/1
250 TABLET, FILM COATED ORAL
Qty: 36 TABLET | Refills: 3 | Status: ON HOLD | OUTPATIENT
Start: 2023-04-17 | End: 2023-08-17 | Stop reason: HOSPADM

## 2023-04-17 NOTE — TELEPHONE ENCOUNTER
----- Message from Anna Jaquezerson sent at 4/17/2023 11:11 AM CDT -----  Contact: JUDY [phone]  Requesting an RX refill or new RX.    Is this a refill or new RX: --New RX--    RX name and strength (copy/paste from chart):    1.azithromycin (Z-JERSEY) 250 MG tablet    Is this a 30 day or 90 day RX: --90-days--    Pharmacy name and phone # (copy/paste from chart):    [id number] - [place] - [address] AT [facility] & [place] [address] [address] [place] [address]  Phone: [phone] Fax: [phone]      Comment: Pt states that the pulmonologist nurse informed her that she has to get this medication through her PCP for her COPD. She would like to see if this can be sent today? I informed of the 72 hour policy, but she supposed to take it on Monday, Wednesday and Friday. Please call to advise.

## 2023-04-17 NOTE — TELEPHONE ENCOUNTER
Refill Routing Note   Medication(s) are not appropriate for processing by [facility] for the following reason(s):      Medication outside of protocol    [facility] action(s):  Route            Appointments  past 12m or future 3m with PCP    Date Provider   Last Visit   4/3/2023 Dustin Khan MD   Next Visit   Visit date not found Dustin Khan MD   ED visits in past 90 days: 0        Note composed:12:24 PM 04/17/2023

## 2023-04-25 ENCOUNTER — INFUSION (OUTPATIENT)
Dept: INFECTIOUS DISEASES | Facility: HOSPITAL | Age: 73
End: 2023-04-25
Payer: MEDICARE

## 2023-04-25 VITALS
TEMPERATURE: 99 F | OXYGEN SATURATION: 92 % | SYSTOLIC BLOOD PRESSURE: 134 MMHG | DIASTOLIC BLOOD PRESSURE: 66 MMHG | HEART RATE: 108 BPM | RESPIRATION RATE: 20 BRPM | BODY MASS INDEX: 20.38 KG/M2 | WEIGHT: 134.06 LBS

## 2023-04-25 DIAGNOSIS — M80.80XA PATHOLOGICAL FRACTURE DUE TO OSTEOPOROSIS, UNSPECIFIED FRACTURE SITE, UNSPECIFIED OSTEOPOROSIS TYPE, INITIAL ENCOUNTER: Primary | ICD-10-CM

## 2023-04-25 DIAGNOSIS — M81.0 OSTEOPOROSIS, UNSPECIFIED OSTEOPOROSIS TYPE, UNSPECIFIED PATHOLOGICAL FRACTURE PRESENCE: ICD-10-CM

## 2023-04-25 DIAGNOSIS — M81.6 LOCALIZED OSTEOPOROSIS OF LEQUESNE: ICD-10-CM

## 2023-04-25 PROCEDURE — 63600175 PHARM REV CODE 636 W HCPCS: Mod: JZ,JG | Performed by: PHYSICIAN ASSISTANT

## 2023-04-25 PROCEDURE — 96372 THER/PROPH/DIAG INJ SC/IM: CPT

## 2023-04-25 RX ADMIN — DENOSUMAB 60 MG: 60 INJECTION SUBCUTANEOUS at 03:04

## 2023-04-25 NOTE — PROGRESS NOTES
Patient received prolia 60mg injection sub-Q in left arm. Patient tolerated injection well, left via own O2 portable tank, @ 3L/NC with  at side.    Patient is taking vitamin D supplement.

## 2023-04-30 ENCOUNTER — EXTERNAL CHRONIC CARE MANAGEMENT (OUTPATIENT)
Dept: PRIMARY CARE CLINIC | Facility: CLINIC | Age: 73
End: 2023-04-30
Payer: MEDICARE

## 2023-04-30 PROCEDURE — 99490 CHRNC CARE MGMT STAFF 1ST 20: CPT | Mod: PBBFAC | Performed by: INTERNAL MEDICINE

## 2023-04-30 PROCEDURE — 99490 PR CHRONIC CARE MGMT, 1ST 20 MIN: ICD-10-PCS | Mod: S$PBB,,, | Performed by: INTERNAL MEDICINE

## 2023-04-30 PROCEDURE — 99490 CHRNC CARE MGMT STAFF 1ST 20: CPT | Mod: S$PBB,,, | Performed by: INTERNAL MEDICINE

## 2023-05-19 PROBLEM — Z99.81 ON HOME OXYGEN THERAPY: Status: ACTIVE | Noted: 2023-05-19

## 2023-05-23 ENCOUNTER — PATIENT MESSAGE (OUTPATIENT)
Dept: RESEARCH | Facility: HOSPITAL | Age: 73
End: 2023-05-23
Payer: MEDICARE

## 2023-05-31 ENCOUNTER — EXTERNAL CHRONIC CARE MANAGEMENT (OUTPATIENT)
Dept: PRIMARY CARE CLINIC | Facility: CLINIC | Age: 73
End: 2023-05-31
Payer: MEDICARE

## 2023-05-31 PROCEDURE — 99490 CHRNC CARE MGMT STAFF 1ST 20: CPT | Mod: S$PBB,,, | Performed by: INTERNAL MEDICINE

## 2023-05-31 PROCEDURE — 99490 PR CHRONIC CARE MGMT, 1ST 20 MIN: ICD-10-PCS | Mod: S$PBB,,, | Performed by: INTERNAL MEDICINE

## 2023-05-31 PROCEDURE — 99490 CHRNC CARE MGMT STAFF 1ST 20: CPT | Mod: PBBFAC | Performed by: INTERNAL MEDICINE

## 2023-06-30 ENCOUNTER — EXTERNAL CHRONIC CARE MANAGEMENT (OUTPATIENT)
Dept: PRIMARY CARE CLINIC | Facility: CLINIC | Age: 73
End: 2023-06-30
Payer: MEDICARE

## 2023-06-30 ENCOUNTER — PATIENT MESSAGE (OUTPATIENT)
Dept: PULMONOLOGY | Facility: CLINIC | Age: 73
End: 2023-06-30
Payer: MEDICARE

## 2023-06-30 DIAGNOSIS — J43.2 CENTRILOBULAR EMPHYSEMA: Primary | Chronic | ICD-10-CM

## 2023-06-30 PROCEDURE — 99490 PR CHRONIC CARE MGMT, 1ST 20 MIN: ICD-10-PCS | Mod: S$PBB,,, | Performed by: INTERNAL MEDICINE

## 2023-06-30 PROCEDURE — 99490 CHRNC CARE MGMT STAFF 1ST 20: CPT | Mod: PBBFAC | Performed by: INTERNAL MEDICINE

## 2023-06-30 PROCEDURE — 99490 CHRNC CARE MGMT STAFF 1ST 20: CPT | Mod: S$PBB,,, | Performed by: INTERNAL MEDICINE

## 2023-07-24 ENCOUNTER — HOSPITAL ENCOUNTER (OUTPATIENT)
Dept: PULMONOLOGY | Facility: OTHER | Age: 73
Discharge: HOME | End: 2023-07-24
Attending: INTERNAL MEDICINE
Payer: MEDICARE

## 2023-07-24 VITALS — BODY MASS INDEX: 20.31 KG/M2 | WEIGHT: 134 LBS | HEIGHT: 68 IN

## 2023-07-24 DIAGNOSIS — J44.9 COPD (CHRONIC OBSTRUCTIVE PULMONARY DISEASE): Primary | ICD-10-CM

## 2023-07-24 DIAGNOSIS — J43.2 CENTRILOBULAR EMPHYSEMA: Chronic | ICD-10-CM

## 2023-07-24 PROCEDURE — 94618 PULMONARY STRESS TESTING: CPT

## 2023-07-24 PROCEDURE — 27000221 HC OXYGEN, UP TO 24 HOURS

## 2023-07-24 NOTE — PROGRESS NOTES
"[facility] ([place])  Six Minute Walk     SUMMARY     Ordering Provider: Brooklynn Ruiz      Performing nurse/tech/RT: CEDRIC Husain RRT  Diagnosis: COPD  Height: 5' 8" (172.7 cm)  Weight: 60.8 kg (134 lb)  BMI (Calculated): 20.4   Patient Race:             Phase Oxygen Assessment Supplemental O2 Heart   Rate Blood Pressure Jason Dyspnea Scale Rating   Resting 93 % 3 L/M 108 bpm 133/72 4   Exercise        Minute        1           2           3           4           5           6  93 % 3 L/M 108 bpm (!) 145/91 5-6   Recovery        Minute        1           2           3           4 95 % 3 L/M 106 bpm 112/68 4     Six Minute Walk Summary  6MWT Status: not completed  Patient Reported: Dyspnea  Patient stopped 1 minute into walk.  Could not restart due to Dyspnea  Distance: 90 ft.     Interpretation:                                                     Predicted Distance Meters (Calculated): 470.27 meters                        "

## 2023-07-31 ENCOUNTER — EXTERNAL CHRONIC CARE MANAGEMENT (OUTPATIENT)
Dept: PRIMARY CARE CLINIC | Facility: CLINIC | Age: 73
End: 2023-07-31
Payer: MEDICARE

## 2023-07-31 ENCOUNTER — HOSPITAL ENCOUNTER (OUTPATIENT)
Dept: PULMONOLOGY | Facility: OTHER | Age: 73
Discharge: HOME | End: 2023-07-31
Attending: INTERNAL MEDICINE
Payer: MEDICARE

## 2023-07-31 PROCEDURE — 27000221 HC OXYGEN, UP TO 24 HOURS

## 2023-07-31 PROCEDURE — 99490 CHRNC CARE MGMT STAFF 1ST 20: CPT | Mod: PBBFAC | Performed by: INTERNAL MEDICINE

## 2023-07-31 PROCEDURE — 94626 PHY/QHP OP PULM RHB W/MNTR: CPT

## 2023-07-31 PROCEDURE — 99490 CHRNC CARE MGMT STAFF 1ST 20: CPT | Mod: S$PBB,,, | Performed by: INTERNAL MEDICINE

## 2023-07-31 PROCEDURE — 99490 PR CHRONIC CARE MGMT, 1ST 20 MIN: ICD-10-PCS | Mod: S$PBB,,, | Performed by: INTERNAL MEDICINE

## 2023-08-10 ENCOUNTER — HOSPITAL ENCOUNTER (INPATIENT)
Facility: HOSPITAL | Age: 73
LOS: 6 days | Discharge: HOME HEALTH | DRG: 191 | End: 2023-08-17
Attending: EMERGENCY MEDICINE | Admitting: INTERNAL MEDICINE
Payer: MEDICARE

## 2023-08-10 DIAGNOSIS — Z99.81 ON HOME OXYGEN THERAPY: ICD-10-CM

## 2023-08-10 DIAGNOSIS — R00.0 TACHYCARDIA: ICD-10-CM

## 2023-08-10 DIAGNOSIS — I48.91 ATRIAL FIBRILLATION WITH RVR: ICD-10-CM

## 2023-08-10 DIAGNOSIS — I48.91 ATRIAL FIBRILLATION: ICD-10-CM

## 2023-08-10 DIAGNOSIS — Z99.81 CHRONIC RESPIRATORY FAILURE WITH HYPOXIA, ON HOME OXYGEN THERAPY: Chronic | ICD-10-CM

## 2023-08-10 DIAGNOSIS — J44.1 COPD EXACERBATION: Primary | ICD-10-CM

## 2023-08-10 DIAGNOSIS — J96.11 CHRONIC RESPIRATORY FAILURE WITH HYPOXIA, ON HOME OXYGEN THERAPY: Chronic | ICD-10-CM

## 2023-08-10 DIAGNOSIS — I48.91 A-FIB: ICD-10-CM

## 2023-08-10 DIAGNOSIS — R53.81 DEBILITY: ICD-10-CM

## 2023-08-10 LAB
ALBUMIN SERPL BCP-MCNC: 3.7 G/DL (ref 3.5–5.2)
ALLENS TEST: ABNORMAL
ALP SERPL-CCNC: 68 U/L (ref 55–135)
ALT SERPL W/O P-5'-P-CCNC: 21 U/L (ref 10–44)
ANION GAP SERPL CALC-SCNC: 14 MMOL/L (ref 8–16)
AST SERPL-CCNC: 20 U/L (ref 10–40)
BASOPHILS # BLD AUTO: 0.08 K/UL (ref 0–0.2)
BASOPHILS NFR BLD: 1 % (ref 0–1.9)
BILIRUB SERPL-MCNC: 0.2 MG/DL (ref 0.1–1)
BNP SERPL-MCNC: 17 PG/ML (ref 0–99)
BUN SERPL-MCNC: 12 MG/DL (ref 8–23)
CALCIUM SERPL-MCNC: 9.1 MG/DL (ref 8.7–10.5)
CHLORIDE SERPL-SCNC: 98 MMOL/L (ref 95–110)
CO2 SERPL-SCNC: 23 MMOL/L (ref 23–29)
CREAT SERPL-MCNC: 0.5 MG/DL (ref 0.5–1.4)
DELSYS: ABNORMAL
DIFFERENTIAL METHOD: ABNORMAL
EOSINOPHIL # BLD AUTO: 0.5 K/UL (ref 0–0.5)
EOSINOPHIL NFR BLD: 5.6 % (ref 0–8)
ERYTHROCYTE [DISTWIDTH] IN BLOOD BY AUTOMATED COUNT: 12.7 % (ref 11.5–14.5)
EST. GFR  (NO RACE VARIABLE): >60 ML/MIN/1.73 M^2
FLOW: 3
GLUCOSE SERPL-MCNC: 121 MG/DL (ref 70–110)
HCO3 UR-SCNC: 28.9 MMOL/L (ref 24–28)
HCT VFR BLD AUTO: 37.8 % (ref 37–48.5)
HGB BLD-MCNC: 12.1 G/DL (ref 12–16)
IMM GRANULOCYTES # BLD AUTO: 0.03 K/UL (ref 0–0.04)
IMM GRANULOCYTES NFR BLD AUTO: 0.4 % (ref 0–0.5)
LYMPHOCYTES # BLD AUTO: 1 K/UL (ref 1–4.8)
LYMPHOCYTES NFR BLD: 11.8 % (ref 18–48)
MCH RBC QN AUTO: 30.7 PG (ref 27–31)
MCHC RBC AUTO-ENTMCNC: 32 G/DL (ref 32–36)
MCV RBC AUTO: 96 FL (ref 82–98)
MODE: ABNORMAL
MONOCYTES # BLD AUTO: 0.8 K/UL (ref 0.3–1)
MONOCYTES NFR BLD: 10 % (ref 4–15)
NEUTROPHILS # BLD AUTO: 5.8 K/UL (ref 1.8–7.7)
NEUTROPHILS NFR BLD: 71.2 % (ref 38–73)
NRBC BLD-RTO: 0 /100 WBC
PCO2 BLDA: 43.6 MMHG (ref 35–45)
PH SMN: 7.43 [PH] (ref 7.35–7.45)
PLATELET # BLD AUTO: 360 K/UL (ref 150–450)
PMV BLD AUTO: 8.7 FL (ref 9.2–12.9)
PO2 BLDA: 60 MMHG (ref 40–60)
POC BE: 5 MMOL/L
POC SATURATED O2: 91 % (ref 95–100)
POC TCO2: 30 MMOL/L (ref 24–29)
POTASSIUM SERPL-SCNC: 3.8 MMOL/L (ref 3.5–5.1)
PROT SERPL-MCNC: 6.4 G/DL (ref 6–8.4)
RBC # BLD AUTO: 3.94 M/UL (ref 4–5.4)
SAMPLE: ABNORMAL
SARS-COV-2 RDRP RESP QL NAA+PROBE: NEGATIVE
SITE: ABNORMAL
SODIUM SERPL-SCNC: 135 MMOL/L (ref 136–145)
TROPONIN I SERPL DL<=0.01 NG/ML-MCNC: 0.02 NG/ML (ref 0–0.03)
WBC # BLD AUTO: 8.07 K/UL (ref 3.9–12.7)

## 2023-08-10 PROCEDURE — 27000221 HC OXYGEN, UP TO 24 HOURS

## 2023-08-10 PROCEDURE — 94640 AIRWAY INHALATION TREATMENT: CPT

## 2023-08-10 PROCEDURE — 94761 N-INVAS EAR/PLS OXIMETRY MLT: CPT

## 2023-08-10 PROCEDURE — 80053 COMPREHEN METABOLIC PANEL: CPT | Performed by: EMERGENCY MEDICINE

## 2023-08-10 PROCEDURE — 84484 ASSAY OF TROPONIN QUANT: CPT | Performed by: EMERGENCY MEDICINE

## 2023-08-10 PROCEDURE — 82803 BLOOD GASES ANY COMBINATION: CPT

## 2023-08-10 PROCEDURE — 81001 URINALYSIS AUTO W/SCOPE: CPT | Performed by: EMERGENCY MEDICINE

## 2023-08-10 PROCEDURE — 25000242 PHARM REV CODE 250 ALT 637 W/ HCPCS: Performed by: EMERGENCY MEDICINE

## 2023-08-10 PROCEDURE — G0378 HOSPITAL OBSERVATION PER HR: HCPCS

## 2023-08-10 PROCEDURE — 85025 COMPLETE CBC W/AUTO DIFF WBC: CPT | Performed by: EMERGENCY MEDICINE

## 2023-08-10 PROCEDURE — 83880 ASSAY OF NATRIURETIC PEPTIDE: CPT | Performed by: EMERGENCY MEDICINE

## 2023-08-10 PROCEDURE — U0002 COVID-19 LAB TEST NON-CDC: HCPCS | Performed by: EMERGENCY MEDICINE

## 2023-08-10 PROCEDURE — 99285 EMERGENCY DEPT VISIT HI MDM: CPT | Mod: 25

## 2023-08-10 PROCEDURE — 99900035 HC TECH TIME PER 15 MIN (STAT)

## 2023-08-10 RX ORDER — ENOXAPARIN SODIUM 100 MG/ML
40 INJECTION SUBCUTANEOUS EVERY 24 HOURS
Status: DISCONTINUED | OUTPATIENT
Start: 2023-08-11 | End: 2023-08-15

## 2023-08-10 RX ORDER — SODIUM CHLORIDE 0.9 % (FLUSH) 0.9 %
3 SYRINGE (ML) INJECTION
Status: DISCONTINUED | OUTPATIENT
Start: 2023-08-11 | End: 2023-08-17 | Stop reason: HOSPADM

## 2023-08-10 RX ORDER — TALC
6 POWDER (GRAM) TOPICAL NIGHTLY PRN
Status: DISCONTINUED | OUTPATIENT
Start: 2023-08-11 | End: 2023-08-17 | Stop reason: HOSPADM

## 2023-08-10 RX ORDER — ACETAMINOPHEN 500 MG
1000 TABLET ORAL EVERY 8 HOURS PRN
Status: DISCONTINUED | OUTPATIENT
Start: 2023-08-11 | End: 2023-08-17 | Stop reason: HOSPADM

## 2023-08-10 RX ORDER — SODIUM CHLORIDE 0.9 % (FLUSH) 0.9 %
10 SYRINGE (ML) INJECTION
Status: DISCONTINUED | OUTPATIENT
Start: 2023-08-10 | End: 2023-08-17 | Stop reason: HOSPADM

## 2023-08-10 RX ORDER — LEVALBUTEROL INHALATION SOLUTION 0.63 MG/3ML
0.63 SOLUTION RESPIRATORY (INHALATION)
Status: DISCONTINUED | OUTPATIENT
Start: 2023-08-11 | End: 2023-08-10

## 2023-08-10 RX ORDER — LEVALBUTEROL 1.25 MG/.5ML
SOLUTION, CONCENTRATE RESPIRATORY (INHALATION)
Status: COMPLETED
Start: 2023-08-10 | End: 2023-08-14

## 2023-08-10 RX ORDER — PANTOPRAZOLE SODIUM 40 MG/1
40 TABLET, DELAYED RELEASE ORAL DAILY
Status: DISCONTINUED | OUTPATIENT
Start: 2023-08-11 | End: 2023-08-12

## 2023-08-10 RX ORDER — ONDANSETRON 2 MG/ML
4 INJECTION INTRAMUSCULAR; INTRAVENOUS EVERY 12 HOURS PRN
Status: DISCONTINUED | OUTPATIENT
Start: 2023-08-11 | End: 2023-08-17 | Stop reason: HOSPADM

## 2023-08-10 RX ORDER — TALC
6 POWDER (GRAM) TOPICAL NIGHTLY PRN
Status: DISCONTINUED | OUTPATIENT
Start: 2023-08-10 | End: 2023-08-17 | Stop reason: HOSPADM

## 2023-08-10 RX ORDER — OXYBUTYNIN CHLORIDE 5 MG/1
5 TABLET, EXTENDED RELEASE ORAL DAILY
Status: DISCONTINUED | OUTPATIENT
Start: 2023-08-11 | End: 2023-08-17 | Stop reason: HOSPADM

## 2023-08-10 RX ORDER — AZITHROMYCIN 250 MG/1
500 TABLET, FILM COATED ORAL EVERY 24 HOURS
Status: DISCONTINUED | OUTPATIENT
Start: 2023-08-11 | End: 2023-08-14

## 2023-08-10 RX ORDER — CITALOPRAM 20 MG/1
20 TABLET, FILM COATED ORAL DAILY
Status: DISCONTINUED | OUTPATIENT
Start: 2023-08-11 | End: 2023-08-17 | Stop reason: HOSPADM

## 2023-08-10 RX ORDER — POLYETHYLENE GLYCOL 3350 17 G/17G
17 POWDER, FOR SOLUTION ORAL DAILY
Status: DISCONTINUED | OUTPATIENT
Start: 2023-08-11 | End: 2023-08-17 | Stop reason: HOSPADM

## 2023-08-10 RX ORDER — LEVALBUTEROL INHALATION SOLUTION 1.25 MG/3ML
1.25 SOLUTION RESPIRATORY (INHALATION)
Status: COMPLETED | OUTPATIENT
Start: 2023-08-10 | End: 2023-08-10

## 2023-08-10 RX ORDER — LEVALBUTEROL 1.25 MG/.5ML
1.25 SOLUTION, CONCENTRATE RESPIRATORY (INHALATION)
Status: DISCONTINUED | OUTPATIENT
Start: 2023-08-11 | End: 2023-08-17 | Stop reason: HOSPADM

## 2023-08-10 RX ORDER — FLUTICASONE FUROATE AND VILANTEROL 200; 25 UG/1; UG/1
1 POWDER RESPIRATORY (INHALATION) DAILY
Status: DISCONTINUED | OUTPATIENT
Start: 2023-08-11 | End: 2023-08-17 | Stop reason: HOSPADM

## 2023-08-10 RX ORDER — PREDNISONE 20 MG/1
40 TABLET ORAL DAILY
Status: DISCONTINUED | OUTPATIENT
Start: 2023-08-11 | End: 2023-08-13

## 2023-08-10 RX ADMIN — LEVALBUTEROL 1.25 MG: 1.25 SOLUTION RESPIRATORY (INHALATION) at 07:08

## 2023-08-11 LAB
ADENOVIRUS: NOT DETECTED
ANION GAP SERPL CALC-SCNC: 10 MMOL/L (ref 8–16)
BACTERIA #/AREA URNS AUTO: NORMAL /HPF
BASOPHILS # BLD AUTO: 0.02 K/UL (ref 0–0.2)
BASOPHILS NFR BLD: 0.3 % (ref 0–1.9)
BILIRUB UR QL STRIP: NEGATIVE
BORDETELLA PARAPERTUSSIS (IS1001): NOT DETECTED
BORDETELLA PERTUSSIS (PTXP): NOT DETECTED
BUN SERPL-MCNC: 8 MG/DL (ref 8–23)
CALCIUM SERPL-MCNC: 9.1 MG/DL (ref 8.7–10.5)
CHLAMYDIA PNEUMONIAE: NOT DETECTED
CHLORIDE SERPL-SCNC: 98 MMOL/L (ref 95–110)
CLARITY UR REFRACT.AUTO: CLEAR
CO2 SERPL-SCNC: 28 MMOL/L (ref 23–29)
COLOR UR AUTO: YELLOW
CORONAVIRUS 229E, COMMON COLD VIRUS: NOT DETECTED
CORONAVIRUS HKU1, COMMON COLD VIRUS: NOT DETECTED
CORONAVIRUS NL63, COMMON COLD VIRUS: NOT DETECTED
CORONAVIRUS OC43, COMMON COLD VIRUS: NOT DETECTED
CREAT SERPL-MCNC: 0.6 MG/DL (ref 0.5–1.4)
DIFFERENTIAL METHOD: ABNORMAL
EOSINOPHIL # BLD AUTO: 0 K/UL (ref 0–0.5)
EOSINOPHIL NFR BLD: 0 % (ref 0–8)
ERYTHROCYTE [DISTWIDTH] IN BLOOD BY AUTOMATED COUNT: 12.7 % (ref 11.5–14.5)
EST. GFR  (NO RACE VARIABLE): >60 ML/MIN/1.73 M^2
FLUBV RNA NPH QL NAA+NON-PROBE: NOT DETECTED
GLUCOSE SERPL-MCNC: 134 MG/DL (ref 70–110)
GLUCOSE UR QL STRIP: NEGATIVE
HCT VFR BLD AUTO: 39.9 % (ref 37–48.5)
HGB BLD-MCNC: 12.8 G/DL (ref 12–16)
HGB UR QL STRIP: NEGATIVE
HPIV1 RNA NPH QL NAA+NON-PROBE: NOT DETECTED
HPIV2 RNA NPH QL NAA+NON-PROBE: NOT DETECTED
HPIV3 RNA NPH QL NAA+NON-PROBE: NOT DETECTED
HPIV4 RNA NPH QL NAA+NON-PROBE: NOT DETECTED
HUMAN METAPNEUMOVIRUS: NOT DETECTED
IMM GRANULOCYTES # BLD AUTO: 0.02 K/UL (ref 0–0.04)
IMM GRANULOCYTES NFR BLD AUTO: 0.3 % (ref 0–0.5)
INFLUENZA A (SUBTYPES H1,H1-2009,H3): NOT DETECTED
KETONES UR QL STRIP: ABNORMAL
LEUKOCYTE ESTERASE UR QL STRIP: ABNORMAL
LYMPHOCYTES # BLD AUTO: 0.4 K/UL (ref 1–4.8)
LYMPHOCYTES NFR BLD: 7.3 % (ref 18–48)
MAGNESIUM SERPL-MCNC: 2.3 MG/DL (ref 1.6–2.6)
MCH RBC QN AUTO: 30.5 PG (ref 27–31)
MCHC RBC AUTO-ENTMCNC: 32.1 G/DL (ref 32–36)
MCV RBC AUTO: 95 FL (ref 82–98)
MICROSCOPIC COMMENT: NORMAL
MONOCYTES # BLD AUTO: 0.4 K/UL (ref 0.3–1)
MONOCYTES NFR BLD: 5.9 % (ref 4–15)
MYCOPLASMA PNEUMONIAE: NOT DETECTED
NEUTROPHILS # BLD AUTO: 5.1 K/UL (ref 1.8–7.7)
NEUTROPHILS NFR BLD: 86.2 % (ref 38–73)
NITRITE UR QL STRIP: NEGATIVE
NRBC BLD-RTO: 0 /100 WBC
PH UR STRIP: 7 [PH] (ref 5–8)
PHOSPHATE SERPL-MCNC: 2.9 MG/DL (ref 2.7–4.5)
PLATELET # BLD AUTO: 372 K/UL (ref 150–450)
PMV BLD AUTO: 9.1 FL (ref 9.2–12.9)
POTASSIUM SERPL-SCNC: 4.6 MMOL/L (ref 3.5–5.1)
PROCALCITONIN SERPL IA-MCNC: <0.02 NG/ML
PROT UR QL STRIP: NEGATIVE
RBC # BLD AUTO: 4.2 M/UL (ref 4–5.4)
RBC #/AREA URNS AUTO: 4 /HPF (ref 0–4)
RESPIRATORY INFECTION PANEL SOURCE: NORMAL
RSV RNA NPH QL NAA+NON-PROBE: NOT DETECTED
RV+EV RNA NPH QL NAA+NON-PROBE: NOT DETECTED
SARS-COV-2 RNA RESP QL NAA+PROBE: NOT DETECTED
SODIUM SERPL-SCNC: 136 MMOL/L (ref 136–145)
SP GR UR STRIP: 1.02 (ref 1–1.03)
SQUAMOUS #/AREA URNS AUTO: 4 /HPF
URN SPEC COLLECT METH UR: ABNORMAL
WBC # BLD AUTO: 5.93 K/UL (ref 3.9–12.7)
WBC #/AREA URNS AUTO: 4 /HPF (ref 0–5)

## 2023-08-11 PROCEDURE — 63600175 PHARM REV CODE 636 W HCPCS: Performed by: FAMILY MEDICINE

## 2023-08-11 PROCEDURE — 94640 AIRWAY INHALATION TREATMENT: CPT

## 2023-08-11 PROCEDURE — 99900035 HC TECH TIME PER 15 MIN (STAT)

## 2023-08-11 PROCEDURE — 25000242 PHARM REV CODE 250 ALT 637 W/ HCPCS: Performed by: FAMILY MEDICINE

## 2023-08-11 PROCEDURE — 36415 COLL VENOUS BLD VENIPUNCTURE: CPT | Performed by: FAMILY MEDICINE

## 2023-08-11 PROCEDURE — 80048 BASIC METABOLIC PNL TOTAL CA: CPT | Performed by: FAMILY MEDICINE

## 2023-08-11 PROCEDURE — 94761 N-INVAS EAR/PLS OXIMETRY MLT: CPT

## 2023-08-11 PROCEDURE — 87798 DETECT AGENT NOS DNA AMP: CPT | Performed by: INTERNAL MEDICINE

## 2023-08-11 PROCEDURE — 84100 ASSAY OF PHOSPHORUS: CPT | Performed by: FAMILY MEDICINE

## 2023-08-11 PROCEDURE — 25000003 PHARM REV CODE 250: Performed by: EMERGENCY MEDICINE

## 2023-08-11 PROCEDURE — 99223 1ST HOSP IP/OBS HIGH 75: CPT | Mod: ,,, | Performed by: FAMILY MEDICINE

## 2023-08-11 PROCEDURE — 63700000 PHARM REV CODE 250 ALT 637 W/O HCPCS: Performed by: FAMILY MEDICINE

## 2023-08-11 PROCEDURE — 85025 COMPLETE CBC W/AUTO DIFF WBC: CPT | Performed by: FAMILY MEDICINE

## 2023-08-11 PROCEDURE — 27000221 HC OXYGEN, UP TO 24 HOURS

## 2023-08-11 PROCEDURE — 25000003 PHARM REV CODE 250: Performed by: FAMILY MEDICINE

## 2023-08-11 PROCEDURE — 83735 ASSAY OF MAGNESIUM: CPT | Performed by: FAMILY MEDICINE

## 2023-08-11 PROCEDURE — 99223 PR INITIAL HOSPITAL CARE,LEVL III: ICD-10-PCS | Mod: ,,, | Performed by: FAMILY MEDICINE

## 2023-08-11 PROCEDURE — 84145 PROCALCITONIN (PCT): CPT | Performed by: FAMILY MEDICINE

## 2023-08-11 PROCEDURE — 21400001 HC TELEMETRY ROOM

## 2023-08-11 RX ADMIN — POLYETHYLENE GLYCOL 3350 17 G: 17 POWDER, FOR SOLUTION ORAL at 08:08

## 2023-08-11 RX ADMIN — PANTOPRAZOLE SODIUM 40 MG: 40 TABLET, DELAYED RELEASE ORAL at 08:08

## 2023-08-11 RX ADMIN — CEFTRIAXONE 1 G: 1 INJECTION, POWDER, FOR SOLUTION INTRAMUSCULAR; INTRAVENOUS at 12:08

## 2023-08-11 RX ADMIN — AZITHROMYCIN 500 MG: 250 TABLET, FILM COATED ORAL at 08:08

## 2023-08-11 RX ADMIN — LEVALBUTEROL 1.25 MG: 1.25 SOLUTION, CONCENTRATE RESPIRATORY (INHALATION) at 08:08

## 2023-08-11 RX ADMIN — Medication 6 MG: at 12:08

## 2023-08-11 RX ADMIN — ACETAMINOPHEN 1000 MG: 500 TABLET ORAL at 10:08

## 2023-08-11 RX ADMIN — CEFTRIAXONE 1 G: 1 INJECTION, POWDER, FOR SOLUTION INTRAMUSCULAR; INTRAVENOUS at 11:08

## 2023-08-11 RX ADMIN — Medication 6 MG: at 08:08

## 2023-08-11 RX ADMIN — FLUTICASONE FUROATE AND VILANTEROL TRIFENATATE 1 PUFF: 200; 25 POWDER RESPIRATORY (INHALATION) at 01:08

## 2023-08-11 RX ADMIN — ENOXAPARIN SODIUM 40 MG: 40 INJECTION SUBCUTANEOUS at 04:08

## 2023-08-11 RX ADMIN — PREDNISONE 40 MG: 20 TABLET ORAL at 08:08

## 2023-08-11 RX ADMIN — OXYBUTYNIN CHLORIDE 5 MG: 5 TABLET, EXTENDED RELEASE ORAL at 08:08

## 2023-08-11 RX ADMIN — CITALOPRAM HYDROBROMIDE 20 MG: 20 TABLET ORAL at 08:08

## 2023-08-11 RX ADMIN — LEVALBUTEROL 1.25 MG: 1.25 SOLUTION, CONCENTRATE RESPIRATORY (INHALATION) at 01:08

## 2023-08-11 NOTE — SUBJECTIVE & OBJECTIVE
Past Medical History:   Diagnosis Date    Cataract     COPD (chronic obstructive pulmonary disease)     COVID-19     History of COVID-19 1/17/2021    Still with mild dyspnea. Encouraged return to pulmonary rehab Recommend scheduling vaccination when appointment is available.     History of retinal hemorrhage     Lobar pneumonia 11/14/2021    Recurrent in RLL, no endobronchial lesion Needs speech evaluation and MBSS for recurrent aspiration in setting of dysphagia  Will need pulmonary rehab at [facility].    Pathological fracture due to osteoporosis 7/6/2020    Retinal histoplasmosis     Secondary pulmonary arterial hypertension 7/3/2018    Secondary to emphysema and chronic respiratory failure, mild.       Past Surgical History:   Procedure Laterality Date    BRONCHOSCOPY WITH FLUOROSCOPY N/A 10/28/2019    Procedure: BRONCHOSCOPY, WITH FLUOROSCOPY;  Surgeon: Brooklynn Ruiz MD;  Location: [facility] OR [address] [place] [address];  Service: Endoscopy;  Laterality: N/A;    HAND SURGERY         Review of patient's allergies indicates:   Allergen Reactions    Albuterol     Ipratropium analogues      Difficulty breathing    Epinephrine        No current facility-administered medications on file prior to encounter.     Current Outpatient Medications on File Prior to Encounter   Medication Sig    ascorbic acid, vitamin C, (VITAMIN C) 500 MG tablet Take 500 mg by mouth 2 (two) times daily.     azelastine (ASTELIN) 137 mcg (0.1 %) nasal spray USE 1 SPRAY IN EACH NOSTRIL TWICE DAILY OR AS DIRECTED    azelastine (ASTELIN) 137 mcg (0.1 %) nasal spray USE 1 SPRAY IN EACH NOSTRIL TWICE DAILY OR AS DIRECTED    azithromycin (Z-JERSEY) 250 MG tablet Take 1 tablet (250 mg total) by mouth every Mon, Wed, Fri.    calcium carbonate (OS-STEPHANIE) 600 mg calcium (1,500 mg) Tab Take 1 tablet (600 mg total) by mouth once daily. Take Levofloxacin antibiotic at least 2 hours before calcium.    citalopram (CELEXA) 20 MG tablet Take 1 tablet (20 mg total) by mouth once daily.     fluticasone furoate-vilanteroL (BREO ELLIPTA) 200-25 mcg/dose DsDv diskus inhaler INHALE 1 PUFF INTO THE LUNGS DAILY    fluticasone propionate (FLONASE) 50 mcg/actuation nasal spray INSTILL 1 SPRAY IN EACH NOSTRIL EVERY DAY    levalbuterol (XOPENEX) 0.63 mg/3 mL nebulizer solution USE 1 VIAL IN NEBULIZER EVERY 8 HOURS AS NEEDED FOR WHEEZE Strength: 0.63 mg/3 mL    meloxicam (MOBIC) 7.5 MG tablet Take 1 tablet (7.5 mg total) by mouth once daily.    MULTIVIT-IRON-MIN-FOLIC ACID 3,500-18-0.4 UNIT-MG-MG ORAL CHEW Take 1 tablet by mouth once daily. Take Levofloxacin antibiotic at least 2 hours before multivitamin.    pantoprazole (PROTONIX) 40 MG tablet Take 1 tablet (40 mg total) by mouth once daily.    pulse oximeter (PULSE OXIMETER) device by Apply Externally route 2 (two) times a day. Use twice daily at 8 AM and 3 PM and record the value in MyChart as directed.    sodium chloride (OCEAN) 0.65 % nasal spray 1 spray by Nasal route 2 (two) times daily.    solifenacin (VESICARE) 5 MG tablet Take 1 tablet (5 mg total) by mouth once daily.    umeclidinium (INCRUSE ELLIPTA) 62.5 mcg/actuation inhalation capsule Inhale 62.5 mcg into the lungs once daily.    [DISCONTINUED] furosemide (LASIX) 20 MG tablet Take 1 tablet (20 mg total) by mouth once daily. for 3 days     Family History       Problem Relation (Age of Onset)    Colon cancer Mother, Father    Macular degeneration Mother    Stroke Father          Tobacco Use    Smoking status: Former     Current packs/day: 0.00     Average packs/day: 1 pack/day for 36.0 years (36.0 ttl pk-yrs)     Types: Cigarettes     Start date: 1980     Quit date: 2016     Years since quittin.9    Smokeless tobacco: Never    Tobacco comments:     pt states she does not remember date   Substance and Sexual Activity    Alcohol use: Yes     Alcohol/week: 2.0 standard drinks of alcohol     Types: 2 Glasses of wine per week     Comment: 1-2 glasses a day     Drug use: No    Sexual  activity: Yes     Partners: Male     Review of Systems   Constitutional:  Positive for fatigue. Negative for chills and fever.   HENT:  Negative for sore throat and trouble swallowing.    Eyes:  Negative for photophobia and visual disturbance.   Respiratory:  Positive for cough, shortness of breath and wheezing.    Cardiovascular:  Negative for chest pain, palpitations and leg swelling.   Gastrointestinal:  Negative for abdominal distention, abdominal pain, diarrhea, nausea and vomiting.   Genitourinary:  Negative for dysuria and hematuria.   Musculoskeletal:  Negative for arthralgias, neck pain and neck stiffness.   Skin:  Negative for rash and wound.   Neurological:  Positive for weakness. Negative for seizures, syncope, numbness and headaches.   Psychiatric/Behavioral:  Negative for confusion and decreased concentration.      Objective:     Vital Signs (Most Recent):  Temp: 97.5 °F (36.4 °C) (08/10/23 2331)  Pulse: (!) 112 (08/10/23 2331)  Resp: (!) 22 (08/10/23 2331)  BP: (!) 142/65 (08/10/23 2331)  SpO2: (!) 94 % (08/10/23 2331) Vital Signs (24h Range):  Temp:  [97.5 °F (36.4 °C)-98.3 °F (36.8 °C)] 97.5 °F (36.4 °C)  Pulse:  [104-117] 112  Resp:  [18-39] 22  SpO2:  [93 %-99 %] 94 %  BP: (134-159)/(62-85) 142/65        There is no height or weight on file to calculate BMI.     Physical Exam  Constitutional:       General: She is not in acute distress.     Appearance: She is not toxic-appearing or diaphoretic.   HENT:      Head: Normocephalic and atraumatic.      Nose: Nose normal.   Eyes:      General: No scleral icterus.     Extraocular Movements: Extraocular movements intact.      Pupils: Pupils are equal, round, and reactive to light.   Cardiovascular:      Rate and Rhythm: Regular rhythm. Tachycardia present.   Pulmonary:      Effort: Pulmonary effort is normal. No respiratory distress.      Breath sounds: Wheezing present. No rales.   Abdominal:      General: Abdomen is flat. There is no distension.       Palpations: Abdomen is soft.      Tenderness: There is no abdominal tenderness. There is no guarding.   Musculoskeletal:         General: Normal range of motion.      Cervical back: Normal range of motion and neck supple. No rigidity.      Right lower leg: No edema.      Left lower leg: No edema.   Skin:     General: Skin is warm and dry.      Coloration: Skin is not jaundiced.   Neurological:      General: No focal deficit present.      Mental Status: She is alert and oriented to person, place, and time.      Cranial Nerves: No cranial nerve deficit.   Psychiatric:         Mood and Affect: Mood normal.         Behavior: Behavior normal.              CRANIAL NERVES     CN III, IV, VI   Pupils are equal, round, and reactive to light.       Significant Labs: All pertinent labs within the past 24 hours have been reviewed.  CBC:   Recent Labs   Lab 08/10/23  1932   WBC 8.07   HGB 12.1   HCT 37.8        CMP:   Recent Labs   Lab 08/10/23  1932   *   K 3.8   CL 98   CO2 23   *   BUN 12   CREATININE 0.5   CALCIUM 9.1   PROT 6.4   ALBUMIN 3.7   BILITOT 0.2   ALKPHOS 68   AST 20   ALT 21   ANIONGAP 14       Significant Imaging: I have reviewed all pertinent imaging results/findings within the past 24 hours.

## 2023-08-11 NOTE — ED NOTES
Telemetry Verification   Patient placed on Telemetry Box  Verified with War Room  Tech    Box # [id number]   rate 108   Rhythm Sinus tach

## 2023-08-11 NOTE — PLAN OF CARE
Problem: Adult Inpatient Plan of Care  Goal: Patient-Specific Goal (Individualized)  Outcome: Met

## 2023-08-11 NOTE — HOSPITAL COURSE
Estefani Fam is a 72 yo F admitted to hospital medicine for further management of COPD exacerbation. Started on prednisone, CAP coverage, and levalbuterol treatments. Afebrile, no leukocytosis. Satting well on home 3L. Still with severe weakness and SOB. PT/OT consulted. Attempted to complete 6 WMT with therapy, but became very SOB and anxious with just a few steps. Repeat CT with stable but severe bullous emphysema with scarring, no focal consolidation. Pulmonology consulted, who recommended doing a longer course of antibiotics (5 days) and steroids (10 days). She can resume 3x weekly azithromycin at discharge. On 8/15, began having palpitations, EKG in a-fib with RVR, given metoprolol which improved the rate and she spontaneously converted back sinus rhythm. Cards consulted, [id number] score of 2, hold anticoagulation at this time. Will start on diltiazem for rate control. Echo with EF of 65%, unable to assess diastolic function 2/2 E-A fusion, normal CVP. Plan to discharge home when medically ready with pulmonology and EP f/u. She was recently established with pulmonary rehab, which she will also need to continue. Will need repeat 6MWT prior to discharge. Home health ordered at discharge.

## 2023-08-11 NOTE — H&P
[facility] - Observation [address] [place] [address] Medicine  History & Physical    Patient Name: Estefani Fam  MRN: [id number]  Patient Class: OP- Observation  Admission Date: 8/10/2023  Attending Physician: Gianluca Zarate MD   Primary Care Provider: Dustin Khan MD         Patient information was obtained from patient, past medical records and ER records.     Subjective:     Principal Problem:Chronic obstructive pulmonary disease with acute exacerbation    Chief Complaint:   Chief Complaint   Patient presents with    Shortness of Breath     Hx, COPD, She tried a nebulizer at home did not help, allergic to albuterol and epinephrine. 125 mg Solumedrol and 1 gm magnesium given PTA.        HPI: 73-year-old female with past medical history as noted below, COPD on 3 L oxygen at baseline, coming in with 3 weeks of worsening shortness of breath.  She also feels like she has a worsening cough which is productive of clear sputum.  She was last time she felt like that she had pneumonia.  No fevers at home which she says she does not get a fever when she gets infections.  No sick contacts.  She is been using her leave albuterol at home with minimal improvement in her symptoms.  No chest pain.  No abdominal pain.  No vomiting diarrhea.  No leg swelling.    In the ED patient afebrile and hemodynamically stable saturating well on 3L NC. Patient with continued dyspnea despite solumedrol, iv mag, and levalbuterol. Patient admitted to the care of medicine for further evaluation and management of COPD exacerbation.      Past Medical History:   Diagnosis Date    Cataract     COPD (chronic obstructive pulmonary disease)     COVID-19     History of COVID-19 1/17/2021    Still with mild dyspnea. Encouraged return to pulmonary rehab Recommend scheduling vaccination when appointment is available.     History of retinal hemorrhage     Lobar pneumonia 11/14/2021    Recurrent in RLL, no endobronchial lesion Needs speech evaluation and  MBSS for recurrent aspiration in setting of dysphagia  Will need pulmonary rehab at [facility].    Pathological fracture due to osteoporosis 7/6/2020    Retinal histoplasmosis     Secondary pulmonary arterial hypertension 7/3/2018    Secondary to emphysema and chronic respiratory failure, mild.       Past Surgical History:   Procedure Laterality Date    BRONCHOSCOPY WITH FLUOROSCOPY N/A 10/28/2019    Procedure: BRONCHOSCOPY, WITH FLUOROSCOPY;  Surgeon: Brooklynn Ruiz MD;  Location: [facility] OR [address] [place] [address];  Service: Endoscopy;  Laterality: N/A;    HAND SURGERY         Review of patient's allergies indicates:   Allergen Reactions    Albuterol     Ipratropium analogues      Difficulty breathing    Epinephrine        No current facility-administered medications on file prior to encounter.     Current Outpatient Medications on File Prior to Encounter   Medication Sig    ascorbic acid, vitamin C, (VITAMIN C) 500 MG tablet Take 500 mg by mouth 2 (two) times daily.     azelastine (ASTELIN) 137 mcg (0.1 %) nasal spray USE 1 SPRAY IN EACH NOSTRIL TWICE DAILY OR AS DIRECTED    azelastine (ASTELIN) 137 mcg (0.1 %) nasal spray USE 1 SPRAY IN EACH NOSTRIL TWICE DAILY OR AS DIRECTED    azithromycin (Z-JERSEY) 250 MG tablet Take 1 tablet (250 mg total) by mouth every Mon, Wed, Fri.    calcium carbonate (OS-STEPHANIE) 600 mg calcium (1,500 mg) Tab Take 1 tablet (600 mg total) by mouth once daily. Take Levofloxacin antibiotic at least 2 hours before calcium.    citalopram (CELEXA) 20 MG tablet Take 1 tablet (20 mg total) by mouth once daily.    fluticasone furoate-vilanteroL (BREO ELLIPTA) 200-25 mcg/dose DsDv diskus inhaler INHALE 1 PUFF INTO THE LUNGS DAILY    fluticasone propionate (FLONASE) 50 mcg/actuation nasal spray INSTILL 1 SPRAY IN EACH NOSTRIL EVERY DAY    levalbuterol (XOPENEX) 0.63 mg/3 mL nebulizer solution USE 1 VIAL IN NEBULIZER EVERY 8 HOURS AS NEEDED FOR WHEEZE Strength: 0.63 mg/3 mL    meloxicam (MOBIC) 7.5 MG  tablet Take 1 tablet (7.5 mg total) by mouth once daily.    MULTIVIT-IRON-MIN-FOLIC ACID 3,500-18-0.4 UNIT-MG-MG ORAL CHEW Take 1 tablet by mouth once daily. Take Levofloxacin antibiotic at least 2 hours before multivitamin.    pantoprazole (PROTONIX) 40 MG tablet Take 1 tablet (40 mg total) by mouth once daily.    pulse oximeter (PULSE OXIMETER) device by Apply Externally route 2 (two) times a day. Use twice daily at 8 AM and 3 PM and record the value in [facility] as directed.    sodium chloride (OCEAN) 0.65 % nasal spray 1 spray by Nasal route 2 (two) times daily.    solifenacin (VESICARE) 5 MG tablet Take 1 tablet (5 mg total) by mouth once daily.    umeclidinium (INCRUSE ELLIPTA) 62.5 mcg/actuation inhalation capsule Inhale 62.5 mcg into the lungs once daily.    [DISCONTINUED] furosemide (LASIX) 20 MG tablet Take 1 tablet (20 mg total) by mouth once daily. for 3 days     Family History       Problem Relation (Age of Onset)    Colon cancer Mother, Father    Macular degeneration Mother    Stroke Father          Tobacco Use    Smoking status: Former     Current packs/day: 0.00     Average packs/day: 1 pack/day for 36.0 years (36.0 ttl pk-yrs)     Types: Cigarettes     Start date: 1980     Quit date: 2016     Years since quittin.9    Smokeless tobacco: Never    Tobacco comments:     pt states she does not remember date   Substance and Sexual Activity    Alcohol use: Yes     Alcohol/week: 2.0 standard drinks of alcohol     Types: 2 Glasses of wine per week     Comment: 1-2 glasses a day     Drug use: No    Sexual activity: Yes     Partners: Male     Review of Systems   Constitutional:  Positive for fatigue. Negative for chills and fever.   HENT:  Negative for sore throat and trouble swallowing.    Eyes:  Negative for photophobia and visual disturbance.   Respiratory:  Positive for cough, shortness of breath and wheezing.    Cardiovascular:  Negative for chest pain, palpitations and leg  swelling.   Gastrointestinal:  Negative for abdominal distention, abdominal pain, diarrhea, nausea and vomiting.   Genitourinary:  Negative for dysuria and hematuria.   Musculoskeletal:  Negative for arthralgias, neck pain and neck stiffness.   Skin:  Negative for rash and wound.   Neurological:  Positive for weakness. Negative for seizures, syncope, numbness and headaches.   Psychiatric/Behavioral:  Negative for confusion and decreased concentration.      Objective:     Vital Signs (Most Recent):  Temp: 97.5 °F (36.4 °C) (08/10/23 2331)  Pulse: (!) 112 (08/10/23 2331)  Resp: (!) 22 (08/10/23 2331)  BP: (!) 142/65 (08/10/23 2331)  SpO2: (!) 94 % (08/10/23 2331) Vital Signs (24h Range):  Temp:  [97.5 °F (36.4 °C)-98.3 °F (36.8 °C)] 97.5 °F (36.4 °C)  Pulse:  [104-117] 112  Resp:  [18-39] 22  SpO2:  [93 %-99 %] 94 %  BP: (134-159)/(62-85) 142/65        There is no height or weight on file to calculate BMI.     Physical Exam  Constitutional:       General: She is not in acute distress.     Appearance: She is not toxic-appearing or diaphoretic.   HENT:      Head: Normocephalic and atraumatic.      Nose: Nose normal.   Eyes:      General: No scleral icterus.     Extraocular Movements: Extraocular movements intact.      Pupils: Pupils are equal, round, and reactive to light.   Cardiovascular:      Rate and Rhythm: Regular rhythm. Tachycardia present.   Pulmonary:      Effort: Pulmonary effort is normal. No respiratory distress.      Breath sounds: Wheezing present. No rales.   Abdominal:      General: Abdomen is flat. There is no distension.      Palpations: Abdomen is soft.      Tenderness: There is no abdominal tenderness. There is no guarding.   Musculoskeletal:         General: Normal range of motion.      Cervical back: Normal range of motion and neck supple. No rigidity.      Right lower leg: No edema.      Left lower leg: No edema.   Skin:     General: Skin is warm and dry.      Coloration: Skin is not jaundiced.    Neurological:      General: No focal deficit present.      Mental Status: She is alert and oriented to person, place, and time.      Cranial Nerves: No cranial nerve deficit.   Psychiatric:         Mood and Affect: Mood normal.         Behavior: Behavior normal.              CRANIAL NERVES     CN III, IV, VI   Pupils are equal, round, and reactive to light.       Significant Labs: All pertinent labs within the past 24 hours have been reviewed.  CBC:   Recent Labs   Lab 08/10/23  1932   WBC 8.07   HGB 12.1   HCT 37.8        CMP:   Recent Labs   Lab 08/10/23  1932   *   K 3.8   CL 98   CO2 23   *   BUN 12   CREATININE 0.5   CALCIUM 9.1   PROT 6.4   ALBUMIN 3.7   BILITOT 0.2   ALKPHOS 68   AST 20   ALT 21   ANIONGAP 14       Significant Imaging: I have reviewed all pertinent imaging results/findings within the past 24 hours.    Assessment/Plan:     * Chronic obstructive pulmonary disease with acute exacerbation  - IP COPD Pathway initiated  - sp solumedrol 125mg iv x 1 and iv mag x1 with EMS  - start prednisone 40mg daily  - start ceftriaxone and azithromycin  - levalbuterol neb q6h while awake  - monitor pulse Ox and supplemental O2 as needed  - continue home LABA-ICS  - further management pending clinical course and future study review      Chronic respiratory failure with hypoxia, on home oxygen therapy  Patient with Hypercapnic and Hypoxic Respiratory failure which is Acute on chronic.  she is on home oxygen at 2 LPM. Supplemental oxygen was provided and noted- Oxygen Concentration (%):  [3] 3    .   Signs/symptoms of respiratory failure include- tachypnea, increased work of breathing and wheezing. Contributing diagnoses includes - COPD Labs and images were reviewed. Patient Has not had a recent ABG. Will treat underlying causes and adjust management of respiratory failure as follows:    - COPD management as above      VTE Risk Mitigation (From admission, onward)         Ordered     enoxaparin  injection 40 mg  Daily         08/10/23 2329     IP VTE HIGH RISK PATIENT  Once         08/10/23 2329     Place sequential compression device  Until discontinued         08/10/23 2329     Place sequential compression device  Until discontinued         08/10/23 2325                   On 08/11/2023, patient should be placed in hospital observation services under my care.        Chucky Ann MD  Department of Hospital Medicine  [facility] - Observation 11H

## 2023-08-11 NOTE — PLAN OF CARE
Problem: Adult Inpatient Plan of Care  Goal: Plan of Care Review  Outcome: Ongoing, Progressing  Goal: Patient-Specific Goal (Individualized)  Outcome: Ongoing, Progressing  Goal: Absence of Hospital-Acquired Illness or Injury  Outcome: Ongoing, Progressing  Goal: Optimal Comfort and Wellbeing  Outcome: Ongoing, Progressing  Goal: Readiness for Transition of Care  Outcome: Ongoing, Progressing     Problem: Adjustment to Illness COPD (Chronic Obstructive Pulmonary Disease)  Goal: Optimal Chronic Illness Coping  Outcome: Ongoing, Progressing     Problem: Functional Ability Impaired COPD (Chronic Obstructive Pulmonary Disease)  Goal: Optimal Level of Functional Breathitt  Outcome: Ongoing, Progressing     Problem: Infection COPD (Chronic Obstructive Pulmonary Disease)  Goal: Absence of Infection Signs and Symptoms  Outcome: Ongoing, Progressing     Problem: Oral Intake Inadequate COPD (Chronic Obstructive Pulmonary Disease)  Goal: Improved Nutrition Intake  Outcome: Ongoing, Progressing     Problem: Respiratory Compromise COPD (Chronic Obstructive Pulmonary Disease)  Goal: Effective Oxygenation and Ventilation  Outcome: Ongoing, Progressing     Problem: Skin Injury Risk Increased  Goal: Skin Health and Integrity  Outcome: Ongoing, Progressing

## 2023-08-11 NOTE — NURSING
Patient lying in bed SOB from using the bedside commode. Spo2 88% on 3L NC, after patient rest for a few minute Spo2 91% NAD noted. Call light in reach nurse will continue care.

## 2023-08-11 NOTE — ED PROVIDER NOTES
Encounter Date: 8/10/2023       History     Chief Complaint   Patient presents with    Shortness of Breath     Hx, COPD, She tried a nebulizer at home did not help, allergic to albuterol and epinephrine. 125 mg Solumedrol and 1 gm magnesium given PTA.     HPI  73-year-old female with past medical history as noted below, COPD on 3 L oxygen at baseline, coming in with 3 weeks of worsening shortness of breath.  She also feels like she has a worsening cough which is productive of clear sputum.  She was last time she felt like that she had pneumonia.  No fevers at home which she says she does not get a fever when she gets infections.  No sick contacts.  She is been using her leave albuterol at home with minimal improvement in her symptoms.  No chest pain.  No abdominal pain.  No vomiting diarrhea.  No leg swelling.    Review of patient's allergies indicates:   Allergen Reactions    Albuterol     Ipratropium analogues      Difficulty breathing    Epinephrine      Past Medical History:   Diagnosis Date    Cataract     COPD (chronic obstructive pulmonary disease)     COVID-19     History of COVID-19 1/17/2021    Still with mild dyspnea. Encouraged return to pulmonary rehab Recommend scheduling vaccination when appointment is available.     History of retinal hemorrhage     Lobar pneumonia 11/14/2021    Recurrent in RLL, no endobronchial lesion Needs speech evaluation and MBSS for recurrent aspiration in setting of dysphagia  Will need pulmonary rehab at [facility].    Pathological fracture due to osteoporosis 7/6/2020    Retinal histoplasmosis     Secondary pulmonary arterial hypertension 7/3/2018    Secondary to emphysema and chronic respiratory failure, mild.     Past Surgical History:   Procedure Laterality Date    BRONCHOSCOPY WITH FLUOROSCOPY N/A 10/28/2019    Procedure: BRONCHOSCOPY, WITH FLUOROSCOPY;  Surgeon: Brooklynn Ruiz MD;  Location: [facility] OR [address] [place] [address];  Service: Endoscopy;  Laterality: N/A;    HAND SURGERY        Family History   Problem Relation Age of Onset    Macular degeneration Mother     Colon cancer Mother     Stroke Father     Colon cancer Father     Amblyopia Neg Hx     Blindness Neg Hx     Cataracts Neg Hx     Diabetes Neg Hx     Glaucoma Neg Hx     Hypertension Neg Hx     Retinal detachment Neg Hx     Strabismus Neg Hx     Thyroid disease Neg Hx      Social History     Tobacco Use    Smoking status: Former     Current packs/day: 0.00     Average packs/day: 1 pack/day for 36.0 years (36.0 ttl pk-yrs)     Types: Cigarettes     Start date: 1980     Quit date: 2016     Years since quittin.9    Smokeless tobacco: Never    Tobacco comments:     pt states she does not remember date   Substance Use Topics    Alcohol use: Yes     Alcohol/week: 2.0 standard drinks of alcohol     Types: 2 Glasses of wine per week     Comment: 1-2 glasses a day     Drug use: No     Review of Systems    Physical Exam     Initial Vitals [08/10/23 1846]   BP Pulse Resp Temp SpO2   (!) 153/85 107 (!) 26 98 °F (36.7 °C) (!) 94 %      MAP       --         Physical Exam    Physical Exam:  CONSTITUTIONAL: Well developed, well nourished, in no acute distress.  HENT: Normocephalic, atraumatic    EYES: Sclerae anicteric   NECK: Supple, no thyroid enlargement  CARDIOVASCULAR: Regular rate and rhythm without any murmurs, gallops, rubs.  No lower extremity swelling or tenderness to palpation.  RESPIRATORY: Speaking in full sentences. Breathing comfortably. Auscultation of the lungs revealed moderate air movement with wheezing, no rales, no rhonchi.  ABDOMEN: Soft and nontender, no masses, no rebound or guarding   NEUROLOGIC: Alert, interacting normally. No facial droop.   MSK: Moving all four extremities.  Skin: Warm and dry. No visible rash on exposed areas of skin.    Psych: Mood and affect normal.       ED Course   Procedures  Labs Reviewed   CBC W/ AUTO DIFFERENTIAL - Abnormal; Notable for the following components:       Result Value     RBC 3.94 (*)     MPV 8.7 (*)     Lymph % 11.8 (*)     All other components within normal limits   COMPREHENSIVE METABOLIC PANEL - Abnormal; Notable for the following components:    Sodium 135 (*)     Glucose 121 (*)     All other components within normal limits   ISTAT PROCEDURE - Abnormal; Notable for the following components:    POC HCO3 28.9 (*)     POC SATURATED O2 91 (*)     POC TCO2 30 (*)     All other components within normal limits   B-TYPE NATRIURETIC PEPTIDE   TROPONIN I   SARS-COV-2 RNA AMPLIFICATION, QUAL   URINALYSIS, REFLEX TO URINE CULTURE          Imaging Results              X-Ray Chest AP Portable (Final result)  Result time 08/10/23 20:54:08      Final result by Josh Mcdowell MD (08/10/23 20:54:08)                   Impression:      Chronic findings as above, similar to prior.  No acute findings.    No significant change from prior study.      Electronically signed by: Josh Mcdowell MD  Date:    08/10/2023  Time:    20:54               Narrative:    EXAMINATION:  XR CHEST AP PORTABLE    CLINICAL HISTORY:  SOB;    TECHNIQUE:  Single frontal view of the chest was performed.    COMPARISON:  11/28/2022.    FINDINGS:  Hyperinflated lungs with emphysematous architecture in the upper lobes.  Apical pleuroparenchymal scarring, right more than left, similar to prior.    Chronic blunting left CP angle, similar to prior, possibly representing scarring or trace effusion.    Heart and lungs  appear unchanged when allowing for differences in technique and positioning.    Remote healed right clavicle fracture, similar to prior.                                       Medications   levalbuterol (XOPENEX) 1.25 mg/0.5 mL nebulizer solution (  Canceled Entry 8/10/23 2000)   sodium chloride 0.9% flush 10 mL (has no administration in time range)   melatonin tablet 6 mg (has no administration in time range)   citalopram tablet 20 mg (has no administration in time range)   fluticasone furoate-vilanteroL 200-25  mcg/dose diskus inhaler 1 puff (has no administration in time range)   pantoprazole EC tablet 40 mg (has no administration in time range)   sodium chloride 0.65 % nasal spray 1 spray (has no administration in time range)   oxybutynin 24 hr tablet 5 mg (has no administration in time range)   sodium chloride 0.9% flush 3 mL (has no administration in time range)   predniSONE tablet 40 mg (has no administration in time range)   enoxaparin injection 40 mg (has no administration in time range)   melatonin tablet 6 mg (has no administration in time range)   ondansetron injection 4 mg (has no administration in time range)   polyethylene glycol packet 17 g (has no administration in time range)   cefTRIAXone (ROCEPHIN) 1 g in dextrose 5 % in water (D5W) 100 mL IVPB (MB+) (has no administration in time range)     And   azithromycin tablet 500 mg (has no administration in time range)   acetaminophen tablet 1,000 mg (has no administration in time range)   levalbuterol nebulizer solution 1.25 mg (has no administration in time range)   levalbuterol nebulizer solution 1.25 mg (1.25 mg Nebulization Given 8/10/23 1940)     Medical Decision Making:   Independently Interpreted Test(s):   I have ordered and independently interpreted EKG Reading(s) - see prior notes  Clinical Tests:   Lab Tests: Reviewed and Ordered  Radiological Study: Ordered and Reviewed  Medical Tests: Ordered and Reviewed    73-year-old female with past medical history as noted coming in with shortness of breath, wheezing, cough for last 3 weeks.  She feels she has significantly reduced exercise tolerance.  On exam, no acute distress she is wheezing, no signs of fluid overload.    Differential includes COPD exacerbation, pneumonia, metabolic hematologic abnormalities.  No signs of fluid overload, low suspicion for CHF.  No unilateral leg swelling or pain, no recent surgeries, no history of PE DVT, no recent travel, low suspicion for PE at this time.    Will risk  stratify for above etiologies labs, EKG, chest x-ray.  She already got Solu-Medrol and Mag with EMS will give 3 doses of levo albuterol given the fact that she is allergic to regular albuterol.    Disposition based on ED workup and patient's symptomatology.    Update:  She remains tachypneic, tachycardia also remains, she has her baseline saturation with nasal cannula.  Not retaining CO2.    Labs are unremarkable, chest x-ray similar to prior.  Patient re-evaluated, she does not feel significantly improved, she remains tachypneic although not in acute distress, continues to feel weak, will admit to Hospital Medicine for COPD exacerbation.  She feels improved with steroids and serial nebs further workup for other etiologies of her shortness of breath may be indicated.    Discussed with hospital medicine.    Decision was made to hospitalize the patient.        Chest x-ray, independently interpreted, the emphysematous lungs, no focal large consolidations.                          Clinical Impression:   Final diagnoses:  [J44.1] COPD exacerbation (Primary)        ED Disposition Condition    Observation Stable                Leander Rodriges MD  08/10/23 3076

## 2023-08-11 NOTE — ASSESSMENT & PLAN NOTE
- IP COPD Pathway initiated  - sp solumedrol 125mg iv x 1 and iv mag x1 with EMS  - start prednisone 40mg daily  - start ceftriaxone and azithromycin  - levalbuterol neb q6h while awake  - monitor pulse Ox and supplemental O2 as needed  - continue home LABA-ICS  - further management pending clinical course and future study review

## 2023-08-11 NOTE — HPI
73-year-old female with past medical history as noted below, COPD on 3 L oxygen at baseline, coming in with 3 weeks of worsening shortness of breath.  She also feels like she has a worsening cough which is productive of clear sputum.  She was last time she felt like that she had pneumonia.  No fevers at home which she says she does not get a fever when she gets infections.  No sick contacts.  She is been using her leave albuterol at home with minimal improvement in her symptoms.  No chest pain.  No abdominal pain.  No vomiting diarrhea.  No leg swelling.    In the ED patient afebrile and hemodynamically stable saturating well on 3L NC. Patient with continued dyspnea despite solumedrol, iv mag, and levalbuterol. Patient admitted to the care of medicine for further evaluation and management of COPD exacerbation.

## 2023-08-11 NOTE — CARE UPDATE
Brief Hospital Medicine Update    Estefani Fam is a 72 yo F with PMHx of COPD and chronic respiratory failure (on home 3L) admitted to hospital medicine overnight for further management of COPD exacerbation. Started on prednisone, CAP coverage, and levalbuterol treatments. Afebrile, no leukocytosis. Patient seen this morning and reports still feeling very short of breath, weak and fatigued with minimal movement. Sitting up straight to help with shortness of breath. On exam, decreased air movement throughout lungs with some expiratory wheezes noted. Pursed lip breathing noted at times during the interview. She is satting well on home 3L.    Plan:  - levalbuterol treatments q6hr  - prednisone 40 mg daily  - continue azithromycin and ceftriaxone    Antonieta Umanzor PA-C  Department of Hospital Medicine   [facility] - Observation 11H

## 2023-08-11 NOTE — ED NOTES
"Estefani Fam, a 73 y.o. female presents to the ED w/ complaint of worsening sob x2 weeks. Denies recent sick contacts, fever, chills, cough, or sore throat. Reports giving herself 1 nebulizer Tx at home with no relief. Hx of COPD, wears 3L NC at baseline. Per EMS, received 125mg solumedrol and 1g magnesium before arrival. Pt states she "feels much better after the medications EMS gave her."    Triage note:  Chief Complaint   Patient presents with    Shortness of Breath     Hx, COPD, She tried a nebulizer at home did not help, allergic to albuterol and epinephrine. 125 mg Solumedrol and 1 gm magnesium given PTA.     Review of patient's allergies indicates:   Allergen Reactions    Albuterol     Ipratropium analogues      Difficulty breathing    Epinephrine      Past Medical History:   Diagnosis Date    Cataract     COPD (chronic obstructive pulmonary disease)     COVID-19     History of COVID-19 1/17/2021    Still with mild dyspnea. Encouraged return to pulmonary rehab Recommend scheduling vaccination when appointment is available.     History of retinal hemorrhage     Lobar pneumonia 11/14/2021    Recurrent in RLL, no endobronchial lesion Needs speech evaluation and MBSS for recurrent aspiration in setting of dysphagia  Will need pulmonary rehab at [facility].    Pathological fracture due to osteoporosis 7/6/2020    Retinal histoplasmosis     Secondary pulmonary arterial hypertension 7/3/2018    Secondary to emphysema and chronic respiratory failure, mild.    Patient identifiers for Estefani Fam checked and correct.    LOC: The patient is awake, alert and aware of environment with an appropriate affect, the patient is oriented x 4 and speaking appropriately.  APPEARANCE: Patient resting comfortably and in no acute distress, patient is clean and well groomed, patient's clothing is properly fastened.  SKIN: The skin is warm and dry, color consistent with ethnicity, patient has normal skin turgor and moist " mucus membranes, skin intact, no breakdown or bruising noted.  MUSCULOSKELETAL: Patient moving all extremities well, no obvious swelling or deformities noted.  RESPIRATORY: Airway is open and patent, respirations are spontaneous and even, patient has a normal effort. +sob, tachypneic  CARDIAC: Patient has a normal rate and rhythm, no periphreal edema noted, capillary refill < 3 seconds. Normal +2 pedal pulses present.  ABDOMEN: Soft and non tender to palpation, no distention noted. Patient denies any nausea, vomiting, diarrhea, or constipation.   NEUROLOGIC: Eyes open spontaneously, PERRL, behavior appropriate to situation, follows commands, facial expression symmetrical, bilateral hand grasp equal and even, purposeful motor response noted, normal sensation in all extremities.

## 2023-08-11 NOTE — ASSESSMENT & PLAN NOTE
Patient with Hypercapnic and Hypoxic Respiratory failure which is Acute on chronic.  she is on home oxygen at 2 LPM. Supplemental oxygen was provided and noted- Oxygen Concentration (%):  [3] 3    .   Signs/symptoms of respiratory failure include- tachypnea, increased work of breathing and wheezing. Contributing diagnoses includes - COPD Labs and images were reviewed. Patient Has not had a recent ABG. Will treat underlying causes and adjust management of respiratory failure as follows:    - COPD management as above

## 2023-08-12 LAB
ANION GAP SERPL CALC-SCNC: 12 MMOL/L (ref 8–16)
BASOPHILS # BLD AUTO: 0.05 K/UL (ref 0–0.2)
BASOPHILS NFR BLD: 0.6 % (ref 0–1.9)
BUN SERPL-MCNC: 10 MG/DL (ref 8–23)
CALCIUM SERPL-MCNC: 8.6 MG/DL (ref 8.7–10.5)
CHLORIDE SERPL-SCNC: 96 MMOL/L (ref 95–110)
CO2 SERPL-SCNC: 27 MMOL/L (ref 23–29)
CREAT SERPL-MCNC: 0.6 MG/DL (ref 0.5–1.4)
DIFFERENTIAL METHOD: ABNORMAL
EOSINOPHIL # BLD AUTO: 0.2 K/UL (ref 0–0.5)
EOSINOPHIL NFR BLD: 3 % (ref 0–8)
ERYTHROCYTE [DISTWIDTH] IN BLOOD BY AUTOMATED COUNT: 12.9 % (ref 11.5–14.5)
EST. GFR  (NO RACE VARIABLE): >60 ML/MIN/1.73 M^2
GLUCOSE SERPL-MCNC: 89 MG/DL (ref 70–110)
HCT VFR BLD AUTO: 40 % (ref 37–48.5)
HGB BLD-MCNC: 12.4 G/DL (ref 12–16)
IMM GRANULOCYTES # BLD AUTO: 0.02 K/UL (ref 0–0.04)
IMM GRANULOCYTES NFR BLD AUTO: 0.3 % (ref 0–0.5)
LYMPHOCYTES # BLD AUTO: 1.8 K/UL (ref 1–4.8)
LYMPHOCYTES NFR BLD: 23.4 % (ref 18–48)
MAGNESIUM SERPL-MCNC: 2.2 MG/DL (ref 1.6–2.6)
MCH RBC QN AUTO: 29.8 PG (ref 27–31)
MCHC RBC AUTO-ENTMCNC: 31 G/DL (ref 32–36)
MCV RBC AUTO: 96 FL (ref 82–98)
MONOCYTES # BLD AUTO: 0.9 K/UL (ref 0.3–1)
MONOCYTES NFR BLD: 11.7 % (ref 4–15)
NEUTROPHILS # BLD AUTO: 4.8 K/UL (ref 1.8–7.7)
NEUTROPHILS NFR BLD: 61 % (ref 38–73)
NRBC BLD-RTO: 0 /100 WBC
PHOSPHATE SERPL-MCNC: 2.8 MG/DL (ref 2.7–4.5)
PLATELET # BLD AUTO: 357 K/UL (ref 150–450)
PMV BLD AUTO: 8.9 FL (ref 9.2–12.9)
POTASSIUM SERPL-SCNC: 3.6 MMOL/L (ref 3.5–5.1)
RBC # BLD AUTO: 4.16 M/UL (ref 4–5.4)
SODIUM SERPL-SCNC: 135 MMOL/L (ref 136–145)
WBC # BLD AUTO: 7.88 K/UL (ref 3.9–12.7)

## 2023-08-12 PROCEDURE — 94640 AIRWAY INHALATION TREATMENT: CPT

## 2023-08-12 PROCEDURE — 63700000 PHARM REV CODE 250 ALT 637 W/O HCPCS: Performed by: FAMILY MEDICINE

## 2023-08-12 PROCEDURE — 63600175 PHARM REV CODE 636 W HCPCS

## 2023-08-12 PROCEDURE — 94668 MNPJ CHEST WALL SBSQ: CPT

## 2023-08-12 PROCEDURE — 99233 SBSQ HOSP IP/OBS HIGH 50: CPT | Mod: ,,,

## 2023-08-12 PROCEDURE — 99900035 HC TECH TIME PER 15 MIN (STAT)

## 2023-08-12 PROCEDURE — 27000221 HC OXYGEN, UP TO 24 HOURS

## 2023-08-12 PROCEDURE — 94761 N-INVAS EAR/PLS OXIMETRY MLT: CPT

## 2023-08-12 PROCEDURE — 25000003 PHARM REV CODE 250: Performed by: FAMILY MEDICINE

## 2023-08-12 PROCEDURE — 80048 BASIC METABOLIC PNL TOTAL CA: CPT | Performed by: FAMILY MEDICINE

## 2023-08-12 PROCEDURE — 83735 ASSAY OF MAGNESIUM: CPT | Performed by: FAMILY MEDICINE

## 2023-08-12 PROCEDURE — 25000003 PHARM REV CODE 250

## 2023-08-12 PROCEDURE — 94667 MNPJ CHEST WALL 1ST: CPT

## 2023-08-12 PROCEDURE — 25000242 PHARM REV CODE 250 ALT 637 W/ HCPCS: Performed by: INTERNAL MEDICINE

## 2023-08-12 PROCEDURE — 85025 COMPLETE CBC W/AUTO DIFF WBC: CPT | Performed by: FAMILY MEDICINE

## 2023-08-12 PROCEDURE — 99233 PR SUBSEQUENT HOSPITAL CARE,LEVL III: ICD-10-PCS | Mod: ,,,

## 2023-08-12 PROCEDURE — 27100171 HC OXYGEN HIGH FLOW UP TO 24 HOURS

## 2023-08-12 PROCEDURE — 25000242 PHARM REV CODE 250 ALT 637 W/ HCPCS: Performed by: FAMILY MEDICINE

## 2023-08-12 PROCEDURE — 25000242 PHARM REV CODE 250 ALT 637 W/ HCPCS

## 2023-08-12 PROCEDURE — 84100 ASSAY OF PHOSPHORUS: CPT | Performed by: FAMILY MEDICINE

## 2023-08-12 PROCEDURE — 21400001 HC TELEMETRY ROOM

## 2023-08-12 PROCEDURE — 36415 COLL VENOUS BLD VENIPUNCTURE: CPT | Performed by: FAMILY MEDICINE

## 2023-08-12 PROCEDURE — 63600175 PHARM REV CODE 636 W HCPCS: Performed by: FAMILY MEDICINE

## 2023-08-12 RX ORDER — PREDNISONE 20 MG/1
20 TABLET ORAL ONCE
Status: COMPLETED | OUTPATIENT
Start: 2023-08-12 | End: 2023-08-12

## 2023-08-12 RX ORDER — GUAIFENESIN 600 MG/1
600 TABLET, EXTENDED RELEASE ORAL 2 TIMES DAILY
Status: DISCONTINUED | OUTPATIENT
Start: 2023-08-12 | End: 2023-08-17 | Stop reason: HOSPADM

## 2023-08-12 RX ADMIN — PANTOPRAZOLE SODIUM 40 MG: 40 TABLET, DELAYED RELEASE ORAL at 08:08

## 2023-08-12 RX ADMIN — CITALOPRAM HYDROBROMIDE 20 MG: 20 TABLET ORAL at 08:08

## 2023-08-12 RX ADMIN — LEVALBUTEROL 1.25 MG: 1.25 SOLUTION, CONCENTRATE RESPIRATORY (INHALATION) at 02:08

## 2023-08-12 RX ADMIN — FLUTICASONE FUROATE AND VILANTEROL TRIFENATATE 1 PUFF: 200; 25 POWDER RESPIRATORY (INHALATION) at 10:08

## 2023-08-12 RX ADMIN — GUAIFENESIN 600 MG: 600 TABLET, EXTENDED RELEASE ORAL at 08:08

## 2023-08-12 RX ADMIN — ENOXAPARIN SODIUM 40 MG: 40 INJECTION SUBCUTANEOUS at 05:08

## 2023-08-12 RX ADMIN — OXYBUTYNIN CHLORIDE 5 MG: 5 TABLET, EXTENDED RELEASE ORAL at 08:08

## 2023-08-12 RX ADMIN — GUAIFENESIN 600 MG: 600 TABLET, EXTENDED RELEASE ORAL at 11:08

## 2023-08-12 RX ADMIN — LEVALBUTEROL 1.25 MG: 1.25 SOLUTION, CONCENTRATE RESPIRATORY (INHALATION) at 08:08

## 2023-08-12 RX ADMIN — PREDNISONE 20 MG: 20 TABLET ORAL at 11:08

## 2023-08-12 RX ADMIN — LEVALBUTEROL 1.25 MG: 1.25 SOLUTION, CONCENTRATE RESPIRATORY (INHALATION) at 07:08

## 2023-08-12 RX ADMIN — AZITHROMYCIN 500 MG: 250 TABLET, FILM COATED ORAL at 08:08

## 2023-08-12 RX ADMIN — ACETAMINOPHEN 1000 MG: 500 TABLET ORAL at 08:08

## 2023-08-12 RX ADMIN — PREDNISONE 40 MG: 20 TABLET ORAL at 08:08

## 2023-08-12 RX ADMIN — TIOTROPIUM BROMIDE INHALATION SPRAY 2 PUFF: 3.12 SPRAY, METERED RESPIRATORY (INHALATION) at 10:08

## 2023-08-12 RX ADMIN — POLYETHYLENE GLYCOL 3350 17 G: 17 POWDER, FOR SOLUTION ORAL at 08:08

## 2023-08-12 NOTE — NURSING
Pt AAOX4. No distress noted. NC on 3L. Bed in lowest position. Side rails up x2. Bed alarm activated. Call bell and personal belongs within reach. Telemetry maintained. Safety precautions maintained. Pt free of falls or injuries. No further issues or concerns at this time. Plan of care continues.

## 2023-08-13 PROBLEM — R53.81 DEBILITY: Status: ACTIVE | Noted: 2023-08-13

## 2023-08-13 LAB
ANION GAP SERPL CALC-SCNC: 5 MMOL/L (ref 8–16)
BASOPHILS # BLD AUTO: 0.06 K/UL (ref 0–0.2)
BASOPHILS NFR BLD: 0.7 % (ref 0–1.9)
BUN SERPL-MCNC: 13 MG/DL (ref 8–23)
CALCIUM SERPL-MCNC: 8.7 MG/DL (ref 8.7–10.5)
CHLORIDE SERPL-SCNC: 96 MMOL/L (ref 95–110)
CO2 SERPL-SCNC: 33 MMOL/L (ref 23–29)
CREAT SERPL-MCNC: 0.6 MG/DL (ref 0.5–1.4)
DIFFERENTIAL METHOD: ABNORMAL
EOSINOPHIL # BLD AUTO: 0.2 K/UL (ref 0–0.5)
EOSINOPHIL NFR BLD: 2.7 % (ref 0–8)
ERYTHROCYTE [DISTWIDTH] IN BLOOD BY AUTOMATED COUNT: 12.9 % (ref 11.5–14.5)
EST. GFR  (NO RACE VARIABLE): >60 ML/MIN/1.73 M^2
GLUCOSE SERPL-MCNC: 90 MG/DL (ref 70–110)
HCT VFR BLD AUTO: 39 % (ref 37–48.5)
HGB BLD-MCNC: 12.5 G/DL (ref 12–16)
IMM GRANULOCYTES # BLD AUTO: 0.03 K/UL (ref 0–0.04)
IMM GRANULOCYTES NFR BLD AUTO: 0.4 % (ref 0–0.5)
LYMPHOCYTES # BLD AUTO: 1.9 K/UL (ref 1–4.8)
LYMPHOCYTES NFR BLD: 22.5 % (ref 18–48)
MAGNESIUM SERPL-MCNC: 2.2 MG/DL (ref 1.6–2.6)
MCH RBC QN AUTO: 30.8 PG (ref 27–31)
MCHC RBC AUTO-ENTMCNC: 32.1 G/DL (ref 32–36)
MCV RBC AUTO: 96 FL (ref 82–98)
MONOCYTES # BLD AUTO: 1 K/UL (ref 0.3–1)
MONOCYTES NFR BLD: 12.1 % (ref 4–15)
NEUTROPHILS # BLD AUTO: 5.1 K/UL (ref 1.8–7.7)
NEUTROPHILS NFR BLD: 61.6 % (ref 38–73)
NRBC BLD-RTO: 0 /100 WBC
PHOSPHATE SERPL-MCNC: 2.8 MG/DL (ref 2.7–4.5)
PLATELET # BLD AUTO: 351 K/UL (ref 150–450)
PMV BLD AUTO: 8.9 FL (ref 9.2–12.9)
POTASSIUM SERPL-SCNC: 3.4 MMOL/L (ref 3.5–5.1)
RBC # BLD AUTO: 4.06 M/UL (ref 4–5.4)
SODIUM SERPL-SCNC: 134 MMOL/L (ref 136–145)
WBC # BLD AUTO: 8.26 K/UL (ref 3.9–12.7)

## 2023-08-13 PROCEDURE — 25000003 PHARM REV CODE 250: Performed by: FAMILY MEDICINE

## 2023-08-13 PROCEDURE — 25000242 PHARM REV CODE 250 ALT 637 W/ HCPCS: Performed by: FAMILY MEDICINE

## 2023-08-13 PROCEDURE — 94799 UNLISTED PULMONARY SVC/PX: CPT

## 2023-08-13 PROCEDURE — 94640 AIRWAY INHALATION TREATMENT: CPT

## 2023-08-13 PROCEDURE — 84100 ASSAY OF PHOSPHORUS: CPT | Performed by: FAMILY MEDICINE

## 2023-08-13 PROCEDURE — 99233 SBSQ HOSP IP/OBS HIGH 50: CPT | Mod: ,,,

## 2023-08-13 PROCEDURE — 99900035 HC TECH TIME PER 15 MIN (STAT)

## 2023-08-13 PROCEDURE — 97165 OT EVAL LOW COMPLEX 30 MIN: CPT

## 2023-08-13 PROCEDURE — 25000242 PHARM REV CODE 250 ALT 637 W/ HCPCS

## 2023-08-13 PROCEDURE — 25000003 PHARM REV CODE 250: Performed by: INTERNAL MEDICINE

## 2023-08-13 PROCEDURE — 85025 COMPLETE CBC W/AUTO DIFF WBC: CPT | Performed by: FAMILY MEDICINE

## 2023-08-13 PROCEDURE — 94664 DEMO&/EVAL PT USE INHALER: CPT

## 2023-08-13 PROCEDURE — 25000003 PHARM REV CODE 250

## 2023-08-13 PROCEDURE — 27000221 HC OXYGEN, UP TO 24 HOURS

## 2023-08-13 PROCEDURE — 63700000 PHARM REV CODE 250 ALT 637 W/O HCPCS: Performed by: FAMILY MEDICINE

## 2023-08-13 PROCEDURE — 94668 MNPJ CHEST WALL SBSQ: CPT

## 2023-08-13 PROCEDURE — 99233 PR SUBSEQUENT HOSPITAL CARE,LEVL III: ICD-10-PCS | Mod: ,,,

## 2023-08-13 PROCEDURE — 21400001 HC TELEMETRY ROOM

## 2023-08-13 PROCEDURE — 80048 BASIC METABOLIC PNL TOTAL CA: CPT | Performed by: FAMILY MEDICINE

## 2023-08-13 PROCEDURE — 25000242 PHARM REV CODE 250 ALT 637 W/ HCPCS: Performed by: INTERNAL MEDICINE

## 2023-08-13 PROCEDURE — 36415 COLL VENOUS BLD VENIPUNCTURE: CPT | Performed by: FAMILY MEDICINE

## 2023-08-13 PROCEDURE — 63600175 PHARM REV CODE 636 W HCPCS

## 2023-08-13 PROCEDURE — 94761 N-INVAS EAR/PLS OXIMETRY MLT: CPT

## 2023-08-13 PROCEDURE — 63600175 PHARM REV CODE 636 W HCPCS: Performed by: FAMILY MEDICINE

## 2023-08-13 PROCEDURE — 97530 THERAPEUTIC ACTIVITIES: CPT

## 2023-08-13 PROCEDURE — 83735 ASSAY OF MAGNESIUM: CPT | Performed by: FAMILY MEDICINE

## 2023-08-13 RX ORDER — POTASSIUM CHLORIDE 20 MEQ/1
40 TABLET, EXTENDED RELEASE ORAL ONCE
Status: COMPLETED | OUTPATIENT
Start: 2023-08-13 | End: 2023-08-13

## 2023-08-13 RX ORDER — ACETYLCYSTEINE 200 MG/ML
4 SOLUTION ORAL; RESPIRATORY (INHALATION) 2 TIMES DAILY
Status: DISCONTINUED | OUTPATIENT
Start: 2023-08-13 | End: 2023-08-13

## 2023-08-13 RX ORDER — HYDROXYZINE PAMOATE 25 MG/1
25 CAPSULE ORAL EVERY 6 HOURS PRN
Status: DISCONTINUED | OUTPATIENT
Start: 2023-08-13 | End: 2023-08-17 | Stop reason: HOSPADM

## 2023-08-13 RX ORDER — BUSPIRONE HYDROCHLORIDE 5 MG/1
5 TABLET ORAL 2 TIMES DAILY
Status: DISCONTINUED | OUTPATIENT
Start: 2023-08-13 | End: 2023-08-17 | Stop reason: HOSPADM

## 2023-08-13 RX ORDER — PREDNISONE 20 MG/1
20 TABLET ORAL ONCE
Status: COMPLETED | OUTPATIENT
Start: 2023-08-13 | End: 2023-08-13

## 2023-08-13 RX ADMIN — OXYBUTYNIN CHLORIDE 5 MG: 5 TABLET, EXTENDED RELEASE ORAL at 09:08

## 2023-08-13 RX ADMIN — FLUTICASONE FUROATE AND VILANTEROL TRIFENATATE 1 PUFF: 200; 25 POWDER RESPIRATORY (INHALATION) at 07:08

## 2023-08-13 RX ADMIN — GUAIFENESIN 600 MG: 600 TABLET, EXTENDED RELEASE ORAL at 09:08

## 2023-08-13 RX ADMIN — LEVALBUTEROL 1.25 MG: 1.25 SOLUTION, CONCENTRATE RESPIRATORY (INHALATION) at 11:08

## 2023-08-13 RX ADMIN — ENOXAPARIN SODIUM 40 MG: 40 INJECTION SUBCUTANEOUS at 05:08

## 2023-08-13 RX ADMIN — TIOTROPIUM BROMIDE INHALATION SPRAY 2 PUFF: 3.12 SPRAY, METERED RESPIRATORY (INHALATION) at 07:08

## 2023-08-13 RX ADMIN — LEVALBUTEROL 1.25 MG: 1.25 SOLUTION, CONCENTRATE RESPIRATORY (INHALATION) at 07:08

## 2023-08-13 RX ADMIN — CITALOPRAM HYDROBROMIDE 20 MG: 20 TABLET ORAL at 09:08

## 2023-08-13 RX ADMIN — LEVALBUTEROL 1.25 MG: 1.25 SOLUTION, CONCENTRATE RESPIRATORY (INHALATION) at 08:08

## 2023-08-13 RX ADMIN — GUAIFENESIN 600 MG: 600 TABLET, EXTENDED RELEASE ORAL at 08:08

## 2023-08-13 RX ADMIN — Medication 6 MG: at 12:08

## 2023-08-13 RX ADMIN — POLYETHYLENE GLYCOL 3350 17 G: 17 POWDER, FOR SOLUTION ORAL at 09:08

## 2023-08-13 RX ADMIN — BUSPIRONE HYDROCHLORIDE 5 MG: 5 TABLET ORAL at 08:08

## 2023-08-13 RX ADMIN — PREDNISONE 40 MG: 20 TABLET ORAL at 09:08

## 2023-08-13 RX ADMIN — BUSPIRONE HYDROCHLORIDE 5 MG: 5 TABLET ORAL at 11:08

## 2023-08-13 RX ADMIN — PANTOPRAZOLE SODIUM 60 MG: 20 TABLET, DELAYED RELEASE ORAL at 09:08

## 2023-08-13 RX ADMIN — PREDNISONE 20 MG: 20 TABLET ORAL at 05:08

## 2023-08-13 RX ADMIN — POTASSIUM CHLORIDE 40 MEQ: 1500 TABLET, EXTENDED RELEASE ORAL at 09:08

## 2023-08-13 RX ADMIN — ACETYLCYSTEINE 4 ML: 200 INHALANT RESPIRATORY (INHALATION) at 11:08

## 2023-08-13 RX ADMIN — AZITHROMYCIN 500 MG: 250 TABLET, FILM COATED ORAL at 09:08

## 2023-08-13 RX ADMIN — ACETAMINOPHEN 1000 MG: 500 TABLET ORAL at 08:08

## 2023-08-13 RX ADMIN — CEFTRIAXONE 1 G: 1 INJECTION, POWDER, FOR SOLUTION INTRAMUSCULAR; INTRAVENOUS at 12:08

## 2023-08-13 NOTE — PT/OT/SLP EVAL
"Occupational Therapy   Evaluation    Name: Estefani Fam  MRN: [id number]  Admitting Diagnosis: Chronic obstructive pulmonary disease with acute exacerbation  Recent Surgery: * No surgery found *      Recommendations:     Discharge Recommendations: other (see comments)  Discharge Equipment Recommendations:  shower chair  Barriers to discharge:       Assessment:     Estefani Fam is a 73 y.o. female with a medical diagnosis of Chronic obstructive pulmonary disease with acute exacerbation. Pt noted with  dyspnea with mobility this date. Pt requires increased recovery time including seated rest breaks during functional mobility tasks. Pt  completes  bed mobility with Supervision but requires extensive time for recovery. OT educated on pursed lip breathing to maximize oxygen intake.   Pt able to walk ~12' x 2 trials with CGA/ HHA and  seated rest break between trials.   OT provided education on energy conservation and  task modifications for ADLs 2/2 to Pt's impaired cardiopulmonary response to activity.  She presents with deficits listed below. Performance deficits affecting function: impaired endurance, impaired cardiopulmonary response to activity, weakness.      Rehab Prognosis: Good; patient would benefit from acute skilled OT services to address these deficits and reach maximum level of function.       Plan:     Patient to be seen 3 x/week to address the above listed problems via self-care/home management, therapeutic activities, therapeutic exercises  Plan of Care Expires: 09/13/23  Plan of Care Reviewed with: patient    Subjective     Chief Complaint: " I have to sit down, this is the farthest I can go"  Patient/Family Comments/goals: Return home at [facility]    Occupational Profile:  Living Environment: Pt lives with her  in a 2SH with 0 DEONTE. Pt's hospital bed is located on 1st floor now with [facility].    Previous level of function: Pt reports Mod (I)  in ADLs and functional mobility, requiring seated rest " breaks and increased timing for activity completion.   Pt  typically able to walk from bedroom to car without rest break, but for last ~3 weeks  longer distances have become more difficult. Pt reports walking from bed to on suite bathroom is difficult.   Roles and Routines: Pt is wife  who enjoys spending time with family.   Equipment Used at Home: nebulizer, oxygen, hospital bed  Assistance upon Discharge: Pt's  and son will be able to assist as needed.     Pain/Comfort:  Pain Rating 1: 0/10    Patients cultural, spiritual, [employer] conflicts given the current situation: no    Objective:     Communicated with: JOEL Landry prior to session.  Patient found HOB elevated with pulse ox (continuous), telemetry, oxygen upon OT entry to room.    General Precautions: Standard, fall  Orthopedic Precautions: N/A  Braces: N/A  Respiratory Status: Nasal cannula, flow 3 L/min    Occupational Performance:    Bed Mobility:    Patient completed Scooting/Bridging with stand by assistance  Patient completed Supine to Sit with stand by assistance  Patient completed Sit to Supine with stand by assistance    Functional Mobility/Transfers:  Patient completed Sit <> Stand Transfer with stand by assistance  with  no assistive device   Functional Mobility: Pt walked ~12 x 2 trials with CGA / HHA. Pt with no noted LOB. Requiring seated breaks between trials    Activities of Daily Living:  Upper Body Dressing: minimum assistance to jeffrey gown  and simulate jacket  Lower Body Dressing: stand by assistance while seated EOB to jeffrey non slip socks using figure 4 technique    Cognitive/Visual Perceptual:  Cognitive/Psychosocial Skills:     -       Oriented to: Person, Place, Time, and Situation   -       Follows Commands/attention:Follows multistep  commands  -       Communication: clear/fluent  -       Safety awareness/insight to disability: intact   -       Mood/Affect/Coping skills/emotional control: Appropriate to situation, Cooperative,  and Pleasant    Physical Exam:  Dominant hand: -       R  Upper Extremity Range of Motion:     -       Right Upper Extremity: WNL  -       Left Upper Extremity: WNL  Upper Extremity Strength: -       Right Upper Extremity: 4/5  -       Left Upper Extremity: 4/5   Strength: -       Right Upper Extremity: WNL  -       Left Upper Extremity: WNL    AMPAC 6 Click ADL:  AMPAC Total Score: 22    Treatment & Education:  Role of OT and OT POC  Fall Prevention/Safety Awareness Training   Educated on energy conservation techniques and task modifications for  ADL completion  Educated on continued therapy participation and impact on increasing functional independence.  Educated on importance of progressive mobilization to increase strength and endurance.      Patient left HOB elevated with all lines intact, call button in reach, and RN notified    GOALS:   Multidisciplinary Problems       Occupational Therapy Goals          Problem: Occupational Therapy    Goal Priority Disciplines Outcome Interventions   Occupational Therapy Goal     OT, PT/OT Ongoing, Progressing    Description: Goals to be met by: 8/27/2023     Patient will increase functional independence with ADLs by performing:    UE Dressing with Modified King.  LE Dressing with Modified King.  Grooming while seated at sink with Stand-by Assistance.  Toileting from toilet with Stand-by Assistance for hygiene and clothing management.   Toilet transfer to toilet with Stand-by Assistance.                         History:     Past Medical History:   Diagnosis Date    Cataract     COPD (chronic obstructive pulmonary disease)     COVID-19     History of COVID-19 1/17/2021    Still with mild dyspnea. Encouraged return to pulmonary rehab Recommend scheduling vaccination when appointment is available.     History of retinal hemorrhage     Lobar pneumonia 11/14/2021    Recurrent in RLL, no endobronchial lesion Needs speech evaluation and MBSS for recurrent  aspiration in setting of dysphagia  Will need pulmonary rehab at [facility].    Pathological fracture due to osteoporosis 7/6/2020    Retinal histoplasmosis     Secondary pulmonary arterial hypertension 7/3/2018    Secondary to emphysema and chronic respiratory failure, mild.         Past Surgical History:   Procedure Laterality Date    BRONCHOSCOPY WITH FLUOROSCOPY N/A 10/28/2019    Procedure: BRONCHOSCOPY, WITH FLUOROSCOPY;  Surgeon: Brooklynn Ruiz MD;  Location: [facility] OR [address] [place] [address];  Service: Endoscopy;  Laterality: N/A;    HAND SURGERY         Time Tracking:     OT Date of Treatment: 08/13/23  OT Start Time: 0926  OT Stop Time: 0947  OT Total Time (min): 21 min    Billable Minutes:Evaluation 8  Therapeutic Activity 12    8/13/2023

## 2023-08-13 NOTE — PROGRESS NOTES
08/13/23 1132   Vital Signs   Temp 97.9 °F (36.6 °C)   Temp Source Tympanic   Pulse (!) 122   Heart Rate Source Monitor   Resp (!) 22   SpO2 (!) 94 %   BP (!) 187/86   BP Location Left arm   Patient Position Sitting     Alerted by ANGEL Cervantes that patient was having a reaction to a first dose of acetylcysteine 200 mg. Began to show signs of worsening SOB, tachypnea and tachycardia. HENRY Fung and MD Elliot                                                                                                                                                                                                                                                                                                                                                                                                                                                                                                                                                                                                                                                                                                                                                                                                                                                                                                                                                                                                                                                                                                                                                                                                                                                                                                                                                                                                                                                                                                                                                                                                                                                                                                                                                                                                                                                                                                                                                                                                                                                                                                                                                                                                                                                                                                                                                                                                                                                                                                                                                                                                                                                                    notified. JOEL Landry and JOEL Dominguez at bedside. Care continues.

## 2023-08-13 NOTE — SUBJECTIVE & OBJECTIVE
Interval History: NAEON. This morning, started mucomyst and during the treatment patient became very SOB, tachycardic and tachypneic so treatment was stopped. O2 was increased to 4L. 6 MWT ordered. Unable to complete 6 MWTwith home 3L with OT as she became SOB after a few steps and requested to return to bed. CT completed today with severe bullous emphysema and scarring. Pulm consulted and will evaluate patient tomorrow.    Review of Systems   Constitutional:  Positive for fatigue. Negative for chills and fever.   HENT:  Positive for congestion.    Respiratory:  Positive for cough, shortness of breath and wheezing. Negative for chest tightness.    Cardiovascular:  Negative for chest pain and leg swelling.   Gastrointestinal:  Negative for abdominal pain and nausea.   Neurological:  Positive for weakness. Negative for dizziness.     Objective:     Vital Signs (Most Recent):  Temp: 98.5 °F (36.9 °C) (08/13/23 1511)  Pulse: 93 (08/13/23 1511)  Resp: 18 (08/13/23 1511)  BP: (!) 146/67 (08/13/23 1511)  SpO2: (!) 93 % (08/13/23 1511) Vital Signs (24h Range):  Temp:  [97.7 °F (36.5 °C)-98.5 °F (36.9 °C)] 98.5 °F (36.9 °C)  Pulse:  [] 93  Resp:  [12-22] 18  SpO2:  [88 %-98 %] 93 %  BP: (128-187)/(63-86) 146/67     Weight: 93.9 kg (207 lb)  Body mass index is 31.48 kg/m².    Intake/Output Summary (Last 24 hours) at 8/13/2023 1518  Last data filed at 8/13/2023 1513  Gross per 24 hour   Intake --   Output 1500 ml   Net -1500 ml         Physical Exam  Vitals and nursing note reviewed.   Constitutional:       Appearance: She is well-developed.   Eyes:      Pupils: Pupils are equal, round, and reactive to light.   Cardiovascular:      Rate and Rhythm: Normal rate and regular rhythm.   Pulmonary:      Breath sounds: Wheezing present.      Comments: Increased effort. Tachypnea. Diminished air movement throughout lung fields. Pursed lip breathing at times noted. On 3L NC  Abdominal:      Palpations: Abdomen is soft.       Tenderness: There is no abdominal tenderness.   Musculoskeletal:         General: No tenderness.   Skin:     General: Skin is warm and dry.   Neurological:      Mental Status: She is alert and oriented to person, place, and time.   Psychiatric:         Behavior: Behavior normal.           Significant Labs: All pertinent labs within the past 24 hours have been reviewed.  BMP:   Recent Labs   Lab 08/13/23  0553   GLU 90   *   K 3.4*   CL 96   CO2 33*   BUN 13   CREATININE 0.6   CALCIUM 8.7   MG 2.2     CBC:   Recent Labs   Lab 08/12/23  0513 08/13/23  0553   WBC 7.88 8.26   HGB 12.4 12.5   HCT 40.0 39.0    351       Significant Imaging: I have reviewed all pertinent imaging results/findings within the past 24 hours.

## 2023-08-13 NOTE — ASSESSMENT & PLAN NOTE
Patient with Hypercapnic and Hypoxic Respiratory failure which is Acute on chronic.  she is on home oxygen at 2 LPM. Supplemental oxygen was provided and noted-      Signs/symptoms of respiratory failure include- tachypnea, increased work of breathing and wheezing. Contributing diagnoses includes - COPD Labs and images were reviewed. Patient Has not had a recent ABG. Will treat underlying causes and adjust management of respiratory failure as follows:    - COPD management as above

## 2023-08-13 NOTE — PROGRESS NOTES
[facility] - Observation [address] [place] [address] Medicine  Progress Note    Patient Name: Estefani Fam  MRN: [id number]  Patient Class: IP- Inpatient   Admission Date: 8/10/2023  Length of Stay: 1 days  Attending Physician: Gianluca Zarate MD  Primary Care Provider: Dustin Khan MD        Subjective:     Principal Problem:Chronic obstructive pulmonary disease with acute exacerbation        HPI:  73-year-old female with past medical history as noted below, COPD on 3 L oxygen at baseline, coming in with 3 weeks of worsening shortness of breath.  She also feels like she has a worsening cough which is productive of clear sputum.  She was last time she felt like that she had pneumonia.  No fevers at home which she says she does not get a fever when she gets infections.  No sick contacts.  She is been using her leave albuterol at home with minimal improvement in her symptoms.  No chest pain.  No abdominal pain.  No vomiting diarrhea.  No leg swelling.    In the ED patient afebrile and hemodynamically stable saturating well on 3L NC. Patient with continued dyspnea despite solumedrol, iv mag, and levalbuterol. Patient admitted to the care of medicine for further evaluation and management of COPD exacerbation.      Overview/Hospital Course:  Estefani Fam is a 72 yo F admitted to hospital medicine for further management of COPD exacerbation. Started on prednisone, CAP coverage, and levalbuterol treatments. Afebrile, no leukocytosis. Satting well on home 3L. Still with severe weakness and SOB. Plan to discharge home when medically ready with pulmonology f/u. Will need repeat 6MWT prior to discharge. She was recently established with pulmonary rehab.       Interval History: NAEON. VSSAF. Satting 93-94% on home 3L. Patient seen this morning with  at bedside. Reports that she is still feeling extremely weak and fatigued just getting up to use the bedside commode, which is not her baseline. Also c/o some congestion.  Cough is mostly nonproductive. PT consulted as patient is much weaker than her baseline.    Review of Systems   Constitutional:  Positive for fatigue. Negative for chills and fever.   HENT:  Positive for congestion.    Respiratory:  Positive for cough, shortness of breath and wheezing. Negative for chest tightness.    Cardiovascular:  Negative for chest pain and leg swelling.   Gastrointestinal:  Negative for abdominal pain and nausea.   Neurological:  Positive for weakness. Negative for dizziness.     Objective:     Vital Signs (Most Recent):  Temp: 97.8 °F (36.6 °C) (08/12/23 1922)  Pulse: 106 (08/12/23 1922)  Resp: 20 (08/12/23 1922)  BP: 137/67 (08/12/23 1922)  SpO2: (!) 94 % (08/12/23 1922) Vital Signs (24h Range):  Temp:  [97.2 °F (36.2 °C)-98.3 °F (36.8 °C)] 97.8 °F (36.6 °C)  Pulse:  [] 106  Resp:  [16-20] 20  SpO2:  [90 %-98 %] 94 %  BP: (130-169)/(61-85) 137/67     Weight: 93.9 kg (207 lb)  Body mass index is 31.48 kg/m².    Intake/Output Summary (Last 24 hours) at 8/12/2023 1925  Last data filed at 8/12/2023 1535  Gross per 24 hour   Intake 476 ml   Output 600 ml   Net -124 ml         Physical Exam  Vitals and nursing note reviewed.   Constitutional:       Appearance: She is well-developed.   Eyes:      Pupils: Pupils are equal, round, and reactive to light.   Cardiovascular:      Rate and Rhythm: Normal rate and regular rhythm.   Pulmonary:      Effort: Pulmonary effort is normal.      Breath sounds: Wheezing present.      Comments: Diminished air movement throughout lung fields. Pursed lip breathing at times noted  Abdominal:      Palpations: Abdomen is soft.      Tenderness: There is no abdominal tenderness.   Musculoskeletal:         General: No tenderness.   Skin:     General: Skin is warm and dry.   Neurological:      Mental Status: She is alert and oriented to person, place, and time.   Psychiatric:         Behavior: Behavior normal.             Significant Labs: All pertinent labs within the  past 24 hours have been reviewed.  BMP:   Recent Labs   Lab 08/12/23  0513   GLU 89   *   K 3.6   CL 96   CO2 27   BUN 10   CREATININE 0.6   CALCIUM 8.6*   MG 2.2     CBC:   Recent Labs   Lab 08/10/23  1932 08/11/23  0612 08/12/23  0513   WBC 8.07 5.93 7.88   HGB 12.1 12.8 12.4   HCT 37.8 39.9 40.0    372 357       Significant Imaging: I have reviewed all pertinent imaging results/findings within the past 24 hours.      Assessment/Plan:      * Chronic obstructive pulmonary disease with acute exacerbation  - IP COPD Pathway initiated  - sp solumedrol 125mg iv x 1 and iv mag x1 with EMS  - start prednisone 40mg daily>> increased to 60 mg daily  - start ceftriaxone and azithromycin  - levalbuterol neb q6h while awake  - monitor pulse Ox and supplemental O2 as needed  - continue home LABA-ICS and LAMA  - started mucinex, IS and chest physiotherapy   - will need 6 MWT prior to discharge  - follows with OP pulm and was recently established with pulmonary rehab    Chronic respiratory failure with hypoxia, on home oxygen therapy  Patient with Hypercapnic and Hypoxic Respiratory failure which is Acute on chronic.  she is on home oxygen at 2 LPM. Supplemental oxygen was provided and noted- Oxygen Concentration (%):  [3] 3    .   Signs/symptoms of respiratory failure include- tachypnea, increased work of breathing and wheezing. Contributing diagnoses includes - COPD Labs and images were reviewed. Patient Has not had a recent ABG. Will treat underlying causes and adjust management of respiratory failure as follows:    - COPD management as above    Former heavy tobacco smoker  - 60 pack year history  - quit in 2016      VTE Risk Mitigation (From admission, onward)         Ordered     enoxaparin injection 40 mg  Daily         08/10/23 2329     IP VTE HIGH RISK PATIENT  Once         08/10/23 2329     Place sequential compression device  Until discontinued         08/10/23 2329     Place sequential compression device   Until discontinued         08/10/23 2325                Discharge Planning   RENARD: 8/13/2023     Code Status: Full Code   Is the patient medically ready for discharge?: No    Reason for patient still in hospital (select all that apply): Patient trending condition, Treatment and PT / OT recommendations  Discharge Plan A: Home with family                  Antonieta Umanzor PA-C  Department of Hospital Medicine   [facility] - Observ[facility] 11H

## 2023-08-13 NOTE — ASSESSMENT & PLAN NOTE
- IP COPD Pathway initiated  - sp solumedrol 125mg iv x 1 and iv mag x1 with EMS  - start prednisone 40mg daily>> increased to 60 mg daily  - start ceftriaxone and azithromycin  - levalbuterol neb q6h while awake  - monitor pulse Ox and supplemental O2 as needed  - continue home LABA-ICS and LAMA  - started mucinex, IS and chest physiotherapy   - will need 6 MWT prior to discharge  - follows with OP pulm and was recently established with pulmonary rehab

## 2023-08-13 NOTE — PROGRESS NOTES
[facility] [address] [place] [address] Medicine  Progress Note    Patient Name: Estefani Fam  MRN: [id number]  Patient Class: IP- Inpatient   Admission Date: 8/10/2023  Length of Stay: 2 days  Attending Physician: Gianluca Zarate MD  Primary Care Provider: Dustin Khan MD        Subjective:     Principal Problem:Chronic obstructive pulmonary disease with acute exacerbation        HPI:  73-year-old female with past medical history as noted below, COPD on 3 L oxygen at baseline, coming in with 3 weeks of worsening shortness of breath.  She also feels like she has a worsening cough which is productive of clear sputum.  She was last time she felt like that she had pneumonia.  No fevers at home which she says she does not get a fever when she gets infections.  No sick contacts.  She is been using her leave albuterol at home with minimal improvement in her symptoms.  No chest pain.  No abdominal pain.  No vomiting diarrhea.  No leg swelling.    In the ED patient afebrile and hemodynamically stable saturating well on 3L NC. Patient with continued dyspnea despite solumedrol, iv mag, and levalbuterol. Patient admitted to the care of medicine for further evaluation and management of COPD exacerbation.      Overview/Hospital Course:  Estefani Fam is a 74 yo F admitted to hospital medicine for further management of COPD exacerbation. Started on prednisone, CAP coverage, and levalbuterol treatments. Afebrile, no leukocytosis. Satting well on home 3L. Still with severe weakness and SOB. PT/OT consulted. Attempted to complete 6 WMT with therapy, but became very SOB and anxious with just a few steps. Repeat CT with stable but severe bullous emphysema with scarring, no focal consolidation. Pulmonology consulted for further recs. Plan to discharge home when medically ready with pulmonology f/u. Will need repeat 6MWT prior to discharge. She was recently established with pulmonary rehab.       Interval History: LALO.  This morning, started mucomyst and during the treatment patient became very SOB, tachycardic and tachypneic so treatment was stopped. O2 was increased to 4L. 6 MWT ordered. Unable to complete 6 MWTwith home 3L with OT as she became SOB after a few steps and requested to return to bed. CT completed today with severe bullous emphysema and scarring. Pulm consulted and will evaluate patient tomorrow.    Review of Systems   Constitutional:  Positive for fatigue. Negative for chills and fever.   HENT:  Positive for congestion.    Respiratory:  Positive for cough, shortness of breath and wheezing. Negative for chest tightness.    Cardiovascular:  Negative for chest pain and leg swelling.   Gastrointestinal:  Negative for abdominal pain and nausea.   Neurological:  Positive for weakness. Negative for dizziness.     Objective:     Vital Signs (Most Recent):  Temp: 98.5 °F (36.9 °C) (08/13/23 1511)  Pulse: 93 (08/13/23 1511)  Resp: 18 (08/13/23 1511)  BP: (!) 146/67 (08/13/23 1511)  SpO2: (!) 93 % (08/13/23 1511) Vital Signs (24h Range):  Temp:  [97.7 °F (36.5 °C)-98.5 °F (36.9 °C)] 98.5 °F (36.9 °C)  Pulse:  [] 93  Resp:  [12-22] 18  SpO2:  [88 %-98 %] 93 %  BP: (128-187)/(63-86) 146/67     Weight: 93.9 kg (207 lb)  Body mass index is 31.48 kg/m².    Intake/Output Summary (Last 24 hours) at 8/13/2023 1518  Last data filed at 8/13/2023 1513  Gross per 24 hour   Intake --   Output 1500 ml   Net -1500 ml         Physical Exam  Vitals and nursing note reviewed.   Constitutional:       Appearance: She is well-developed.   Eyes:      Pupils: Pupils are equal, round, and reactive to light.   Cardiovascular:      Rate and Rhythm: Normal rate and regular rhythm.   Pulmonary:      Breath sounds: Wheezing present.      Comments: Increased effort. Tachypnea. Diminished air movement throughout lung fields. Pursed lip breathing at times noted. On 3L NC  Abdominal:      Palpations: Abdomen is soft.      Tenderness: There is no  abdominal tenderness.   Musculoskeletal:         General: No tenderness.   Skin:     General: Skin is warm and dry.   Neurological:      Mental Status: She is alert and oriented to person, place, and time.   Psychiatric:         Behavior: Behavior normal.           Significant Labs: All pertinent labs within the past 24 hours have been reviewed.  BMP:   Recent Labs   Lab 08/13/23  0553   GLU 90   *   K 3.4*   CL 96   CO2 33*   BUN 13   CREATININE 0.6   CALCIUM 8.7   MG 2.2     CBC:   Recent Labs   Lab 08/12/23  0513 08/13/23  0553   WBC 7.88 8.26   HGB 12.4 12.5   HCT 40.0 39.0    351       Significant Imaging: I have reviewed all pertinent imaging results/findings within the past 24 hours.      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  No focal consolidation.   - will need 6 MWT prior to discharge  - Pulm consulted as patient is slow to show any improvement in symptoms  - follows with OP pulm and was recently established with pulmonary rehab    Debility  - increased weakness the past few weeks and unable to walk more than a few steps due to severe SOB  - PT/OT consulted    Chronic respiratory failure with hypoxia, on home oxygen therapy  Patient with Hypercapnic and Hypoxic Respiratory failure which is Acute on chronic.  she is on home oxygen at 2 LPM. Supplemental oxygen was provided and noted-      Signs/symptoms of respiratory failure include- tachypnea, increased work of breathing and wheezing. Contributing diagnoses  includes - COPD Labs and images were reviewed. Patient Has not had a recent ABG. Will treat underlying causes and adjust management of respiratory failure as follows:    - COPD management as above    Former heavy tobacco smoker  - 60 pack year history  - quit in 2016      VTE Risk Mitigation (From admission, onward)         Ordered     enoxaparin injection 40 mg  Daily         08/10/23 2329     IP VTE HIGH RISK PATIENT  Once         08/10/23 2329     Place sequential compression device  Until discontinued         08/10/23 2329     Place sequential compression device  Until discontinued         08/10/23 2325                Discharge Planning   RENARD: 8/15/2023     Code Status: Full Code   Is the patient medically ready for discharge?: No    Reason for patient still in hospital (select all that apply): Patient trending condition, Consult recommendations and PT / OT recommendations  Discharge Plan A: Home with family                  Antonieta Umanzor PA-C  Department of Hospital Medicine   [facility] 11[facility]

## 2023-08-13 NOTE — PLAN OF CARE
Evaluation completed. Goals established.    Problem: Occupational Therapy  Goal: Occupational Therapy Goal  Description: Goals to be met by: 8/27/2023     Patient will increase functional independence with ADLs by performing:    UE Dressing with Modified [place].  LE Dressing with Modified [place].  Grooming while seated at sink with Stand-by Assistance.  Toileting from toilet with Stand-by Assistance for hygiene and clothing management.   Toilet transfer to toilet with Stand-by Assistance.    Outcome: Ongoing, Progressing   Noam Hoyos OTR/L

## 2023-08-13 NOTE — ASSESSMENT & PLAN NOTE
- increased weakness the past few weeks and unable to walk more than a few steps due to severe SOB  - PT/OT consulted

## 2023-08-13 NOTE — NURSING
No distress overnight, occasional coughing fit noted, oxygen titrated down by respiratory to 2L with saturation of 95-98%. GRUBER persists, oob to bsc independently. Knows to call for assistance as needed. Call bell within reach.

## 2023-08-13 NOTE — ASSESSMENT & PLAN NOTE
- IP COPD Pathway initiated  - sp solumedrol 125mg iv x 1 and iv mag x1 with EMS  - start prednisone 40mg daily>> increased to 60 mg daily  - start ceftriaxone and azithromycin  - levalbuterol neb q6h while awake  - monitor pulse Ox and supplemental O2 as needed  - continue home LABA-ICS and LAMA  - started mucinex, IS and chest physiotherapy  - CT chest Grossly stable severe bullous emphysema with scarring anomaly in the lung apices.  No focal consolidation.   - will need 6 MWT prior to discharge  - Pulm consulted as patient is slow to show any improvement in symptoms  - follows with OP pulm and was recently established with pulmonary rehab

## 2023-08-13 NOTE — SUBJECTIVE & OBJECTIVE
Interval History: LALO. JOSE ASAF. Satting 93-94% on home 3L. Patient seen this morning with  at bedside. Reports that she is still feeling extremely weak and fatigued just getting up to use the bedside commode, which is not her baseline. Also c/o some congestion. Cough is mostly nonproductive.     Review of Systems   Constitutional:  Positive for fatigue. Negative for chills and fever.   HENT:  Positive for congestion.    Respiratory:  Positive for cough, shortness of breath and wheezing. Negative for chest tightness.    Cardiovascular:  Negative for chest pain and leg swelling.   Gastrointestinal:  Negative for abdominal pain and nausea.   Neurological:  Positive for weakness. Negative for dizziness.     Objective:     Vital Signs (Most Recent):  Temp: 97.8 °F (36.6 °C) (08/12/23 1922)  Pulse: 106 (08/12/23 1922)  Resp: 20 (08/12/23 1922)  BP: 137/67 (08/12/23 1922)  SpO2: (!) 94 % (08/12/23 1922) Vital Signs (24h Range):  Temp:  [97.2 °F (36.2 °C)-98.3 °F (36.8 °C)] 97.8 °F (36.6 °C)  Pulse:  [] 106  Resp:  [16-20] 20  SpO2:  [90 %-98 %] 94 %  BP: (130-169)/(61-85) 137/67     Weight: 93.9 kg (207 lb)  Body mass index is 31.48 kg/m².    Intake/Output Summary (Last 24 hours) at 8/12/2023 1925  Last data filed at 8/12/2023 1535  Gross per 24 hour   Intake 476 ml   Output 600 ml   Net -124 ml         Physical Exam  Vitals and nursing note reviewed.   Constitutional:       Appearance: She is well-developed.   Eyes:      Pupils: Pupils are equal, round, and reactive to light.   Cardiovascular:      Rate and Rhythm: Normal rate and regular rhythm.   Pulmonary:      Effort: Pulmonary effort is normal.      Breath sounds: Wheezing present.      Comments: Diminished air movement throughout lung fields. Pursed lip breathing at times noted  Abdominal:      Palpations: Abdomen is soft.      Tenderness: There is no abdominal tenderness.   Musculoskeletal:         General: No tenderness.   Skin:     General: Skin is  warm and dry.   Neurological:      Mental Status: She is alert and oriented to person, place, and time.   Psychiatric:         Behavior: Behavior normal.             Significant Labs: All pertinent labs within the past 24 hours have been reviewed.  BMP:   Recent Labs   Lab 08/12/23  0513   GLU 89   *   K 3.6   CL 96   CO2 27   BUN 10   CREATININE 0.6   CALCIUM 8.6*   MG 2.2     CBC:   Recent Labs   Lab 08/10/23  1932 08/11/23  0612 08/12/23  0513   WBC 8.07 5.93 7.88   HGB 12.1 12.8 12.4   HCT 37.8 39.9 40.0    372 357       Significant Imaging: I have reviewed all pertinent imaging results/findings within the past 24 hours.

## 2023-08-14 LAB
ANION GAP SERPL CALC-SCNC: 8 MMOL/L (ref 8–16)
BASOPHILS # BLD AUTO: 0.04 K/UL (ref 0–0.2)
BASOPHILS NFR BLD: 0.5 % (ref 0–1.9)
BUN SERPL-MCNC: 13 MG/DL (ref 8–23)
CALCIUM SERPL-MCNC: 9.1 MG/DL (ref 8.7–10.5)
CHLORIDE SERPL-SCNC: 95 MMOL/L (ref 95–110)
CO2 SERPL-SCNC: 32 MMOL/L (ref 23–29)
CREAT SERPL-MCNC: 0.6 MG/DL (ref 0.5–1.4)
DIFFERENTIAL METHOD: ABNORMAL
EOSINOPHIL # BLD AUTO: 0.1 K/UL (ref 0–0.5)
EOSINOPHIL NFR BLD: 0.7 % (ref 0–8)
ERYTHROCYTE [DISTWIDTH] IN BLOOD BY AUTOMATED COUNT: 12.9 % (ref 11.5–14.5)
EST. GFR  (NO RACE VARIABLE): >60 ML/MIN/1.73 M^2
GLUCOSE SERPL-MCNC: 96 MG/DL (ref 70–110)
HCT VFR BLD AUTO: 38.1 % (ref 37–48.5)
HGB BLD-MCNC: 12.3 G/DL (ref 12–16)
IMM GRANULOCYTES # BLD AUTO: 0.04 K/UL (ref 0–0.04)
IMM GRANULOCYTES NFR BLD AUTO: 0.5 % (ref 0–0.5)
LYMPHOCYTES # BLD AUTO: 1.2 K/UL (ref 1–4.8)
LYMPHOCYTES NFR BLD: 14.3 % (ref 18–48)
MAGNESIUM SERPL-MCNC: 2.3 MG/DL (ref 1.6–2.6)
MCH RBC QN AUTO: 30.4 PG (ref 27–31)
MCHC RBC AUTO-ENTMCNC: 32.3 G/DL (ref 32–36)
MCV RBC AUTO: 94 FL (ref 82–98)
MONOCYTES # BLD AUTO: 1 K/UL (ref 0.3–1)
MONOCYTES NFR BLD: 12.4 % (ref 4–15)
NEUTROPHILS # BLD AUTO: 5.8 K/UL (ref 1.8–7.7)
NEUTROPHILS NFR BLD: 71.6 % (ref 38–73)
NRBC BLD-RTO: 0 /100 WBC
PHOSPHATE SERPL-MCNC: 3 MG/DL (ref 2.7–4.5)
PLATELET # BLD AUTO: 332 K/UL (ref 150–450)
PMV BLD AUTO: 8.7 FL (ref 9.2–12.9)
POTASSIUM SERPL-SCNC: 4.5 MMOL/L (ref 3.5–5.1)
RBC # BLD AUTO: 4.04 M/UL (ref 4–5.4)
SODIUM SERPL-SCNC: 135 MMOL/L (ref 136–145)
WBC # BLD AUTO: 8.05 K/UL (ref 3.9–12.7)

## 2023-08-14 PROCEDURE — 94664 DEMO&/EVAL PT USE INHALER: CPT

## 2023-08-14 PROCEDURE — 84100 ASSAY OF PHOSPHORUS: CPT | Performed by: FAMILY MEDICINE

## 2023-08-14 PROCEDURE — 99233 SBSQ HOSP IP/OBS HIGH 50: CPT | Mod: ,,,

## 2023-08-14 PROCEDURE — 36415 COLL VENOUS BLD VENIPUNCTURE: CPT | Performed by: FAMILY MEDICINE

## 2023-08-14 PROCEDURE — 21400001 HC TELEMETRY ROOM

## 2023-08-14 PROCEDURE — 25000003 PHARM REV CODE 250: Performed by: INTERNAL MEDICINE

## 2023-08-14 PROCEDURE — 94799 UNLISTED PULMONARY SVC/PX: CPT

## 2023-08-14 PROCEDURE — 83735 ASSAY OF MAGNESIUM: CPT | Performed by: FAMILY MEDICINE

## 2023-08-14 PROCEDURE — 25000003 PHARM REV CODE 250: Performed by: FAMILY MEDICINE

## 2023-08-14 PROCEDURE — 25000242 PHARM REV CODE 250 ALT 637 W/ HCPCS: Performed by: FAMILY MEDICINE

## 2023-08-14 PROCEDURE — 80048 BASIC METABOLIC PNL TOTAL CA: CPT | Performed by: FAMILY MEDICINE

## 2023-08-14 PROCEDURE — 99900035 HC TECH TIME PER 15 MIN (STAT)

## 2023-08-14 PROCEDURE — 99223 1ST HOSP IP/OBS HIGH 75: CPT | Mod: GC,,, | Performed by: INTERNAL MEDICINE

## 2023-08-14 PROCEDURE — 25000242 PHARM REV CODE 250 ALT 637 W/ HCPCS

## 2023-08-14 PROCEDURE — 27000221 HC OXYGEN, UP TO 24 HOURS

## 2023-08-14 PROCEDURE — 94761 N-INVAS EAR/PLS OXIMETRY MLT: CPT

## 2023-08-14 PROCEDURE — 94640 AIRWAY INHALATION TREATMENT: CPT

## 2023-08-14 PROCEDURE — 85025 COMPLETE CBC W/AUTO DIFF WBC: CPT | Performed by: FAMILY MEDICINE

## 2023-08-14 PROCEDURE — 25000003 PHARM REV CODE 250

## 2023-08-14 PROCEDURE — 63600175 PHARM REV CODE 636 W HCPCS

## 2023-08-14 PROCEDURE — 99233 PR SUBSEQUENT HOSPITAL CARE,LEVL III: ICD-10-PCS | Mod: ,,,

## 2023-08-14 PROCEDURE — 99223 PR INITIAL HOSPITAL CARE,LEVL III: ICD-10-PCS | Mod: GC,,, | Performed by: INTERNAL MEDICINE

## 2023-08-14 PROCEDURE — 63600175 PHARM REV CODE 636 W HCPCS: Performed by: FAMILY MEDICINE

## 2023-08-14 RX ADMIN — Medication 6 MG: at 09:08

## 2023-08-14 RX ADMIN — LEVALBUTEROL 1.25 MG: 1.25 SOLUTION, CONCENTRATE RESPIRATORY (INHALATION) at 09:08

## 2023-08-14 RX ADMIN — PREDNISONE 60 MG: 50 TABLET ORAL at 09:08

## 2023-08-14 RX ADMIN — POLYETHYLENE GLYCOL 3350 17 G: 17 POWDER, FOR SOLUTION ORAL at 09:08

## 2023-08-14 RX ADMIN — CITALOPRAM HYDROBROMIDE 20 MG: 20 TABLET ORAL at 09:08

## 2023-08-14 RX ADMIN — GUAIFENESIN 600 MG: 600 TABLET, EXTENDED RELEASE ORAL at 09:08

## 2023-08-14 RX ADMIN — TIOTROPIUM BROMIDE INHALATION SPRAY 2 PUFF: 3.12 SPRAY, METERED RESPIRATORY (INHALATION) at 07:08

## 2023-08-14 RX ADMIN — BUSPIRONE HYDROCHLORIDE 5 MG: 5 TABLET ORAL at 09:08

## 2023-08-14 RX ADMIN — LEVALBUTEROL 1.25 MG: 1.25 SOLUTION, CONCENTRATE RESPIRATORY (INHALATION) at 01:08

## 2023-08-14 RX ADMIN — LEVALBUTEROL 1.25 MG: 1.25 SOLUTION, CONCENTRATE RESPIRATORY (INHALATION) at 07:08

## 2023-08-14 RX ADMIN — CEFTRIAXONE 1 G: 1 INJECTION, POWDER, FOR SOLUTION INTRAMUSCULAR; INTRAVENOUS at 12:08

## 2023-08-14 RX ADMIN — PANTOPRAZOLE SODIUM 60 MG: 20 TABLET, DELAYED RELEASE ORAL at 09:08

## 2023-08-14 RX ADMIN — OXYBUTYNIN CHLORIDE 5 MG: 5 TABLET, EXTENDED RELEASE ORAL at 09:08

## 2023-08-14 RX ADMIN — ENOXAPARIN SODIUM 40 MG: 40 INJECTION SUBCUTANEOUS at 04:08

## 2023-08-14 RX ADMIN — FLUTICASONE FUROATE AND VILANTEROL TRIFENATATE 1 PUFF: 200; 25 POWDER RESPIRATORY (INHALATION) at 07:08

## 2023-08-14 NOTE — PLAN OF CARE
Problem: Adult Inpatient Plan of Care  Goal: Plan of Care Review  Outcome: Ongoing, Progressing  Goal: Absence of Hospital-Acquired Illness or Injury  Outcome: Ongoing, Progressing  Goal: Optimal Comfort and Wellbeing  Outcome: Ongoing, Progressing  Goal: Readiness for Transition of Care  Outcome: Ongoing, Progressing     Problem: Adjustment to Illness COPD (Chronic Obstructive Pulmonary Disease)  Goal: Optimal Chronic Illness Coping  Outcome: Ongoing, Progressing     Problem: Functional Ability Impaired COPD (Chronic Obstructive Pulmonary Disease)  Goal: Optimal Level of Functional [place]  Outcome: Ongoing, Progressing     Problem: Infection COPD (Chronic Obstructive Pulmonary Disease)  Goal: Absence of Infection Signs and Symptoms  Outcome: Ongoing, Progressing     Problem: Oral Intake Inadequate COPD (Chronic Obstructive Pulmonary Disease)  Goal: Improved Nutrition Intake  Outcome: Ongoing, Progressing     Problem: Respiratory Compromise COPD (Chronic Obstructive Pulmonary Disease)  Goal: Effective Oxygenation and Ventilation  Outcome: Ongoing, Progressing     Problem: Skin Injury Risk Increased  Goal: Skin Health and Integrity  Outcome: Ongoing, Progressing

## 2023-08-14 NOTE — ASSESSMENT & PLAN NOTE
- increased weakness the past few weeks and unable to walk more than a few steps due to severe SOB  - PT/OT consulted, patient unable to work much with OT 2/2 dyspnea    - OT recommending shower chair at discharge

## 2023-08-14 NOTE — SUBJECTIVE & OBJECTIVE
Interval History: NAEON. Satting well on home 3L. States she is feeling a little bit better today but still with tachypnea and pursed lip breathing while resting in bed. Continuing COPD pathway, pulm consulted for further recs.     Review of Systems   Constitutional:  Positive for fatigue. Negative for chills and fever.   HENT:  Positive for congestion.    Respiratory:  Positive for cough, shortness of breath and wheezing. Negative for chest tightness.    Cardiovascular:  Negative for chest pain and leg swelling.   Gastrointestinal:  Negative for abdominal pain and nausea.   Neurological:  Positive for weakness (Generalized). Negative for dizziness.     Objective:     Vital Signs (Most Recent):  Temp: 97.9 °F (36.6 °C) (08/14/23 0841)  Pulse: 90 (08/14/23 1058)  Resp: 20 (08/14/23 0841)  BP: (!) 147/68 (08/14/23 0841)  SpO2: 98 % (08/14/23 1100) Vital Signs (24h Range):  Temp:  [97.5 °F (36.4 °C)-98.5 °F (36.9 °C)] 97.9 °F (36.6 °C)  Pulse:  [] 90  Resp:  [16-22] 20  SpO2:  [88 %-100 %] 98 %  BP: (123-187)/(67-86) 147/68     Weight: 93.9 kg (207 lb)  Body mass index is 31.48 kg/m².    Intake/Output Summary (Last 24 hours) at 8/14/2023 1129  Last data filed at 8/14/2023 0546  Gross per 24 hour   Intake 200 ml   Output 750 ml   Net -550 ml         Physical Exam  Vitals and nursing note reviewed.   Constitutional:       Appearance: She is well-developed.   Eyes:      Pupils: Pupils are equal, round, and reactive to light.   Cardiovascular:      Rate and Rhythm: Normal rate and regular rhythm.   Pulmonary:      Breath sounds: Wheezing present.      Comments: Increased effort. Tachypnea. Diminished air movement throughout lung fields. Pursed lip breathing at times noted. On 3L NC  Abdominal:      Palpations: Abdomen is soft.      Tenderness: There is no abdominal tenderness.   Musculoskeletal:         General: No tenderness.   Skin:     General: Skin is warm and dry.   Neurological:      Mental Status: She is alert  and oriented to person, place, and time.   Psychiatric:         Behavior: Behavior normal.             Significant Labs: All pertinent labs within the past 24 hours have been reviewed.  CBC:   Recent Labs   Lab 08/13/23  0553 08/14/23  0406   WBC 8.26 8.05   HGB 12.5 12.3   HCT 39.0 38.1    332     CMP:   Recent Labs   Lab 08/13/23  0553 08/14/23  0406   * 135*   K 3.4* 4.5   CL 96 95   CO2 33* 32*   GLU 90 96   BUN 13 13   CREATININE 0.6 0.6   CALCIUM 8.7 9.1   ANIONGAP 5* 8       Significant Imaging: I have reviewed all pertinent imaging results/findings within the past 24 hours.

## 2023-08-14 NOTE — PLAN OF CARE
Problem: Adult Inpatient Plan of Care  Goal: Absence of Hospital-Acquired Illness or Injury  Outcome: Ongoing, Progressing  Goal: Optimal Comfort and Wellbeing  Outcome: Ongoing, Progressing     Problem: Adjustment to Illness COPD (Chronic Obstructive Pulmonary Disease)  Goal: Optimal Chronic Illness Coping  Outcome: Ongoing, Progressing     Problem: Functional Ability Impaired COPD (Chronic Obstructive Pulmonary Disease)  Goal: Optimal Level of Functional [place]  Outcome: Ongoing, Progressing     Problem: Infection COPD (Chronic Obstructive Pulmonary Disease)  Goal: Absence of Infection Signs and Symptoms  Outcome: Ongoing, Progressing     Problem: Oral Intake Inadequate COPD (Chronic Obstructive Pulmonary Disease)  Goal: Improved Nutrition Intake  Outcome: Ongoing, Progressing     Problem: Respiratory Compromise COPD (Chronic Obstructive Pulmonary Disease)  Goal: Effective Oxygenation and Ventilation  Outcome: Ongoing, Progressing     Problem: Skin Injury Risk Increased  Goal: Skin Health and Integrity  Outcome: Ongoing, Progressing     Pt progressing toward goals. No distress noted. No falls or injuries during shift. Pt bed in lowest position. Side rails x2. Call bell and personal belongs within reach. Telemetry maintained. Safety precautions maintained.

## 2023-08-14 NOTE — PROGRESS NOTES
[facility] [address] [place] [address] Medicine  Progress Note    Patient Name: Estefani Fam  MRN: [id number]  Patient Class: IP- Inpatient   Admission Date: 8/10/2023  Length of Stay: 3 days  Attending Physician: Gianluca Zarate MD  Primary Care Provider: Dustin Khan MD        Subjective:     Principal Problem:Chronic obstructive pulmonary disease with acute exacerbation        HPI:  73-year-old female with past medical history as noted below, COPD on 3 L oxygen at baseline, coming in with 3 weeks of worsening shortness of breath.  She also feels like she has a worsening cough which is productive of clear sputum.  She was last time she felt like that she had pneumonia.  No fevers at home which she says she does not get a fever when she gets infections.  No sick contacts.  She is been using her leave albuterol at home with minimal improvement in her symptoms.  No chest pain.  No abdominal pain.  No vomiting diarrhea.  No leg swelling.    In the ED patient afebrile and hemodynamically stable saturating well on 3L NC. Patient with continued dyspnea despite solumedrol, iv mag, and levalbuterol. Patient admitted to the care of medicine for further evaluation and management of COPD exacerbation.      Overview/Hospital Course:  Estefani Fam is a 72 yo F admitted to hospital medicine for further management of COPD exacerbation. Started on prednisone, CAP coverage, and levalbuterol treatments. Afebrile, no leukocytosis. Satting well on home 3L. Still with severe weakness and SOB. PT/OT consulted. Attempted to complete 6 WMT with therapy, but became very SOB and anxious with just a few steps. Repeat CT with stable but severe bullous emphysema with scarring, no focal consolidation. Pulmonology consulted for further recs. Plan to discharge home when medically ready with pulmonology f/u. Will need repeat 6MWT prior to discharge. She was recently established with pulmonary rehab.       Interval History: LALO.  Satting well on home 3L. States she is feeling a little bit better today but still with tachypnea and pursed lip breathing while resting in bed. Continuing COPD pathway, pulm consulted for further recs.     Review of Systems   Constitutional:  Positive for fatigue. Negative for chills and fever.   HENT:  Positive for congestion.    Respiratory:  Positive for cough, shortness of breath and wheezing. Negative for chest tightness.    Cardiovascular:  Negative for chest pain and leg swelling.   Gastrointestinal:  Negative for abdominal pain and nausea.   Neurological:  Positive for weakness (Generalized). Negative for dizziness.     Objective:     Vital Signs (Most Recent):  Temp: 97.9 °F (36.6 °C) (08/14/23 0841)  Pulse: 90 (08/14/23 1058)  Resp: 20 (08/14/23 0841)  BP: (!) 147/68 (08/14/23 0841)  SpO2: 98 % (08/14/23 1100) Vital Signs (24h Range):  Temp:  [97.5 °F (36.4 °C)-98.5 °F (36.9 °C)] 97.9 °F (36.6 °C)  Pulse:  [] 90  Resp:  [16-22] 20  SpO2:  [88 %-100 %] 98 %  BP: (123-187)/(67-86) 147/68     Weight: 93.9 kg (207 lb)  Body mass index is 31.48 kg/m².    Intake/Output Summary (Last 24 hours) at 8/14/2023 1129  Last data filed at 8/14/2023 0546  Gross per 24 hour   Intake 200 ml   Output 750 ml   Net -550 ml         Physical Exam  Vitals and nursing note reviewed.   Constitutional:       Appearance: She is well-developed.   Eyes:      Pupils: Pupils are equal, round, and reactive to light.   Cardiovascular:      Rate and Rhythm: Normal rate and regular rhythm.   Pulmonary:      Breath sounds: Wheezing present.      Comments: Increased effort. Tachypnea. Diminished air movement throughout lung fields. Pursed lip breathing at times noted. On 3L NC  Abdominal:      Palpations: Abdomen is soft.      Tenderness: There is no abdominal tenderness.   Musculoskeletal:         General: No tenderness.   Skin:     General: Skin is warm and dry.   Neurological:      Mental Status: She is alert and oriented to person,  place, and time.   Psychiatric:         Behavior: Behavior normal.             Significant Labs: All pertinent labs within the past 24 hours have been reviewed.  CBC:   Recent Labs   Lab 08/13/23  0553 08/14/23  0406   WBC 8.26 8.05   HGB 12.5 12.3   HCT 39.0 38.1    332     CMP:   Recent Labs   Lab 08/13/23  0553 08/14/23  0406   * 135*   K 3.4* 4.5   CL 96 95   CO2 33* 32*   GLU 90 96   BUN 13 13   CREATININE 0.6 0.6   CALCIUM 8.7 9.1   ANIONGAP 5* 8       Significant Imaging: I have reviewed all pertinent imaging results/findings within the past 24 hours.      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  No focal consolidation.   - will need 6 MWT prior to discharge  - Pulm consulted as patient is slow to show any improvement in symptoms  - follows with OP pulm and was recently established with pulmonary rehab    Debility  - increased weakness the past few weeks and unable to walk more than a few steps due to severe SOB  - PT/OT consulted, patient unable to work much with OT 2/2 dyspnea    - OT recommending shower chair at discharge     Chronic respiratory failure with hypoxia, on home oxygen therapy  Patient with Hypercapnic and Hypoxic Respiratory failure which is Acute on chronic.  she is on home oxygen at 2 LPM. Supplemental oxygen was provided and noted-      Signs/symptoms of respiratory failure include- tachypnea, increased work of breathing and wheezing. Contributing diagnoses includes - COPD Labs and images were  reviewed. Patient Has not had a recent ABG. Will treat underlying causes and adjust management of respiratory failure as follows:    - COPD management as above    Former heavy tobacco smoker  - 60 pack year history  - quit in 2016      VTE Risk Mitigation (From admission, onward)         Ordered     enoxaparin injection 40 mg  Daily         08/10/23 2329     IP VTE HIGH RISK PATIENT  Once         08/10/23 2329     Place sequential compression device  Until discontinued         08/10/23 2329     Place sequential compression device  Until discontinued         08/10/23 2325                Discharge Planning   RENARD: 8/15/2023     Code Status: Full Code   Is the patient medically ready for discharge?: No    Reason for patient still in hospital (select all that apply): Patient trending condition, Treatment, Consult recommendations and PT / OT recommendations  Discharge Plan A: Home with family                  Antonieta Umanzor PA-C  Department of Hospital Medicine   [facility]

## 2023-08-15 PROBLEM — I48.91 ATRIAL FIBRILLATION WITH RVR: Status: ACTIVE | Noted: 2023-08-15

## 2023-08-15 LAB
ANION GAP SERPL CALC-SCNC: 6 MMOL/L (ref 8–16)
ASCENDING AORTA: 3.26 CM
AV INDEX (PROSTH): 0.71
AV MEAN GRADIENT: 4 MMHG
AV PEAK GRADIENT: 8 MMHG
AV VALVE AREA BY VELOCITY RATIO: 1.9 CM²
AV VALVE AREA: 2.09 CM²
AV VELOCITY RATIO: 0.65
BASOPHILS # BLD AUTO: 0.06 K/UL (ref 0–0.2)
BASOPHILS NFR BLD: 0.7 % (ref 0–1.9)
BSA FOR ECHO PROCEDURE: 2.12 M2
BUN SERPL-MCNC: 11 MG/DL (ref 8–23)
CALCIUM SERPL-MCNC: 8.5 MG/DL (ref 8.7–10.5)
CHLORIDE SERPL-SCNC: 94 MMOL/L (ref 95–110)
CO2 SERPL-SCNC: 33 MMOL/L (ref 23–29)
CREAT SERPL-MCNC: 0.5 MG/DL (ref 0.5–1.4)
CV ECHO LV RWT: 0.29 CM
DIFFERENTIAL METHOD: ABNORMAL
DOP CALC AO PEAK VEL: 1.4 M/S
DOP CALC AO VTI: 23.92 CM
DOP CALC LVOT AREA: 2.9 CM2
DOP CALC LVOT DIAMETER: 1.93 CM
DOP CALC LVOT PEAK VEL: 0.91 M/S
DOP CALC LVOT STROKE VOLUME: 49.94 CM3
DOP CALCLVOT PEAK VEL VTI: 17.08 CM
E WAVE DECELERATION TIME: 136.42 MSEC
E/A RATIO: 0.6
E/E' RATIO: 6.93 M/S
ECHO LV POSTERIOR WALL: 0.71 CM (ref 0.6–1.1)
EJECTION FRACTION: 65 %
EOSINOPHIL # BLD AUTO: 0.2 K/UL (ref 0–0.5)
EOSINOPHIL NFR BLD: 2.6 % (ref 0–8)
ERYTHROCYTE [DISTWIDTH] IN BLOOD BY AUTOMATED COUNT: 12.6 % (ref 11.5–14.5)
EST. GFR  (NO RACE VARIABLE): >60 ML/MIN/1.73 M^2
FRACTIONAL SHORTENING: 20 % (ref 28–44)
GLUCOSE SERPL-MCNC: 85 MG/DL (ref 70–110)
HCT VFR BLD AUTO: 39.4 % (ref 37–48.5)
HGB BLD-MCNC: 12.5 G/DL (ref 12–16)
IMM GRANULOCYTES # BLD AUTO: 0.03 K/UL (ref 0–0.04)
IMM GRANULOCYTES NFR BLD AUTO: 0.4 % (ref 0–0.5)
INTERVENTRICULAR SEPTUM: 0.87 CM (ref 0.6–1.1)
LA MAJOR: 3.71 CM
LA MINOR: 3.32 CM
LA WIDTH: 3.37 CM
LEFT ATRIUM SIZE: 2.76 CM
LEFT ATRIUM VOLUME INDEX MOD: 13.4 ML/M2
LEFT ATRIUM VOLUME INDEX: 13.4 ML/M2
LEFT ATRIUM VOLUME MOD: 27.7 CM3
LEFT ATRIUM VOLUME: 27.7 CM3
LEFT INTERNAL DIMENSION IN SYSTOLE: 3.9 CM (ref 2.1–4)
LEFT VENTRICLE DIASTOLIC VOLUME INDEX: 54.46 ML/M2
LEFT VENTRICLE DIASTOLIC VOLUME: 112.73 ML
LEFT VENTRICLE MASS INDEX: 62 G/M2
LEFT VENTRICLE SYSTOLIC VOLUME INDEX: 31.8 ML/M2
LEFT VENTRICLE SYSTOLIC VOLUME: 65.85 ML
LEFT VENTRICULAR INTERNAL DIMENSION IN DIASTOLE: 4.9 CM (ref 3.5–6)
LEFT VENTRICULAR MASS: 129.1 G
LV LATERAL E/E' RATIO: 6.5 M/S
LV SEPTAL E/E' RATIO: 7.43 M/S
LYMPHOCYTES # BLD AUTO: 2 K/UL (ref 1–4.8)
LYMPHOCYTES NFR BLD: 24 % (ref 18–48)
MAGNESIUM SERPL-MCNC: 2.2 MG/DL (ref 1.6–2.6)
MCH RBC QN AUTO: 29.8 PG (ref 27–31)
MCHC RBC AUTO-ENTMCNC: 31.7 G/DL (ref 32–36)
MCV RBC AUTO: 94 FL (ref 82–98)
MONOCYTES # BLD AUTO: 1.1 K/UL (ref 0.3–1)
MONOCYTES NFR BLD: 13.6 % (ref 4–15)
MV PEAK A VEL: 0.86 M/S
MV PEAK E VEL: 0.52 M/S
MV STENOSIS PRESSURE HALF TIME: 39.56 MS
MV VALVE AREA P 1/2 METHOD: 5.56 CM2
NEUTROPHILS # BLD AUTO: 4.8 K/UL (ref 1.8–7.7)
NEUTROPHILS NFR BLD: 58.7 % (ref 38–73)
NRBC BLD-RTO: 0 /100 WBC
PHOSPHATE SERPL-MCNC: 2.9 MG/DL (ref 2.7–4.5)
PLATELET # BLD AUTO: 359 K/UL (ref 150–450)
PMV BLD AUTO: 8.8 FL (ref 9.2–12.9)
POTASSIUM SERPL-SCNC: 3.7 MMOL/L (ref 3.5–5.1)
RA MAJOR: 3.57 CM
RA PRESSURE ESTIMATED: 3 MMHG
RA WIDTH: 3.38 CM
RBC # BLD AUTO: 4.19 M/UL (ref 4–5.4)
RIGHT VENTRICULAR END-DIASTOLIC DIMENSION: 3.82 CM
SINUS: 3.26 CM
SODIUM SERPL-SCNC: 133 MMOL/L (ref 136–145)
STJ: 3 CM
TDI LATERAL: 0.08 M/S
TDI SEPTAL: 0.07 M/S
TDI: 0.08 M/S
TRICUSPID ANNULAR PLANE SYSTOLIC EXCURSION: 1.79 CM
WBC # BLD AUTO: 8.14 K/UL (ref 3.9–12.7)
Z-SCORE OF LEFT VENTRICULAR DIMENSION IN END DIASTOLE: -2.5
Z-SCORE OF LEFT VENTRICULAR DIMENSION IN END SYSTOLE: 0.1

## 2023-08-15 PROCEDURE — 21400001 HC TELEMETRY ROOM

## 2023-08-15 PROCEDURE — 80048 BASIC METABOLIC PNL TOTAL CA: CPT | Performed by: FAMILY MEDICINE

## 2023-08-15 PROCEDURE — 94640 AIRWAY INHALATION TREATMENT: CPT

## 2023-08-15 PROCEDURE — 94761 N-INVAS EAR/PLS OXIMETRY MLT: CPT

## 2023-08-15 PROCEDURE — 25000242 PHARM REV CODE 250 ALT 637 W/ HCPCS: Performed by: FAMILY MEDICINE

## 2023-08-15 PROCEDURE — 25000003 PHARM REV CODE 250: Performed by: FAMILY MEDICINE

## 2023-08-15 PROCEDURE — 27000221 HC OXYGEN, UP TO 24 HOURS

## 2023-08-15 PROCEDURE — 93010 ELECTROCARDIOGRAM REPORT: CPT | Mod: ,,, | Performed by: INTERNAL MEDICINE

## 2023-08-15 PROCEDURE — 99233 PR SUBSEQUENT HOSPITAL CARE,LEVL III: ICD-10-PCS | Mod: ,,,

## 2023-08-15 PROCEDURE — 99233 SBSQ HOSP IP/OBS HIGH 50: CPT | Mod: ,,,

## 2023-08-15 PROCEDURE — 93005 ELECTROCARDIOGRAM TRACING: CPT

## 2023-08-15 PROCEDURE — 93010 EKG 12-LEAD: ICD-10-PCS | Mod: ,,, | Performed by: INTERNAL MEDICINE

## 2023-08-15 PROCEDURE — 84100 ASSAY OF PHOSPHORUS: CPT | Performed by: FAMILY MEDICINE

## 2023-08-15 PROCEDURE — 63700000 PHARM REV CODE 250 ALT 637 W/O HCPCS

## 2023-08-15 PROCEDURE — 99900035 HC TECH TIME PER 15 MIN (STAT)

## 2023-08-15 PROCEDURE — 99222 PR INITIAL HOSPITAL CARE,LEVL II: ICD-10-PCS | Mod: ,,, | Performed by: INTERNAL MEDICINE

## 2023-08-15 PROCEDURE — 85025 COMPLETE CBC W/AUTO DIFF WBC: CPT | Performed by: FAMILY MEDICINE

## 2023-08-15 PROCEDURE — 36415 COLL VENOUS BLD VENIPUNCTURE: CPT | Performed by: FAMILY MEDICINE

## 2023-08-15 PROCEDURE — 25000003 PHARM REV CODE 250

## 2023-08-15 PROCEDURE — 63600175 PHARM REV CODE 636 W HCPCS

## 2023-08-15 PROCEDURE — 25000003 PHARM REV CODE 250: Performed by: INTERNAL MEDICINE

## 2023-08-15 PROCEDURE — 94668 MNPJ CHEST WALL SBSQ: CPT

## 2023-08-15 PROCEDURE — 83735 ASSAY OF MAGNESIUM: CPT | Performed by: FAMILY MEDICINE

## 2023-08-15 PROCEDURE — 99222 1ST HOSP IP/OBS MODERATE 55: CPT | Mod: ,,, | Performed by: INTERNAL MEDICINE

## 2023-08-15 RX ORDER — DILTIAZEM HYDROCHLORIDE 5 MG/ML
10 INJECTION INTRAVENOUS ONCE
Status: DISCONTINUED | OUTPATIENT
Start: 2023-08-15 | End: 2023-08-15

## 2023-08-15 RX ORDER — DILTIAZEM HYDROCHLORIDE 30 MG/1
30 TABLET, FILM COATED ORAL EVERY 6 HOURS
Status: DISCONTINUED | OUTPATIENT
Start: 2023-08-15 | End: 2023-08-17 | Stop reason: HOSPADM

## 2023-08-15 RX ORDER — DILTIAZEM HYDROCHLORIDE 5 MG/ML
10 INJECTION INTRAVENOUS
Status: DISCONTINUED | OUTPATIENT
Start: 2023-08-15 | End: 2023-08-17 | Stop reason: HOSPADM

## 2023-08-15 RX ORDER — METOPROLOL TARTRATE 1 MG/ML
5 INJECTION, SOLUTION INTRAVENOUS EVERY 5 MIN PRN
Status: DISCONTINUED | OUTPATIENT
Start: 2023-08-15 | End: 2023-08-15

## 2023-08-15 RX ORDER — AZITHROMYCIN 250 MG/1
500 TABLET, FILM COATED ORAL DAILY
Status: DISCONTINUED | OUTPATIENT
Start: 2023-08-15 | End: 2023-08-17 | Stop reason: HOSPADM

## 2023-08-15 RX ADMIN — CITALOPRAM HYDROBROMIDE 20 MG: 20 TABLET ORAL at 08:08

## 2023-08-15 RX ADMIN — TIOTROPIUM BROMIDE INHALATION SPRAY 2 PUFF: 3.12 SPRAY, METERED RESPIRATORY (INHALATION) at 07:08

## 2023-08-15 RX ADMIN — FLUTICASONE FUROATE AND VILANTEROL TRIFENATATE 1 PUFF: 200; 25 POWDER RESPIRATORY (INHALATION) at 07:08

## 2023-08-15 RX ADMIN — AZITHROMYCIN 500 MG: 250 TABLET, FILM COATED ORAL at 08:08

## 2023-08-15 RX ADMIN — APIXABAN 5 MG: 5 TABLET, FILM COATED ORAL at 09:08

## 2023-08-15 RX ADMIN — BUSPIRONE HYDROCHLORIDE 5 MG: 5 TABLET ORAL at 09:08

## 2023-08-15 RX ADMIN — Medication 6 MG: at 09:08

## 2023-08-15 RX ADMIN — METOROPROLOL TARTRATE 5 MG: 5 INJECTION, SOLUTION INTRAVENOUS at 09:08

## 2023-08-15 RX ADMIN — LEVALBUTEROL 1.25 MG: 1.25 SOLUTION, CONCENTRATE RESPIRATORY (INHALATION) at 08:08

## 2023-08-15 RX ADMIN — DILTIAZEM HYDROCHLORIDE 30 MG: 30 TABLET, FILM COATED ORAL at 07:08

## 2023-08-15 RX ADMIN — CEFTRIAXONE 1 G: 1 INJECTION, POWDER, FOR SOLUTION INTRAMUSCULAR; INTRAVENOUS at 08:08

## 2023-08-15 RX ADMIN — PANTOPRAZOLE SODIUM 60 MG: 20 TABLET, DELAYED RELEASE ORAL at 08:08

## 2023-08-15 RX ADMIN — LEVALBUTEROL 1.25 MG: 1.25 SOLUTION, CONCENTRATE RESPIRATORY (INHALATION) at 02:08

## 2023-08-15 RX ADMIN — OXYBUTYNIN CHLORIDE 5 MG: 5 TABLET, EXTENDED RELEASE ORAL at 08:08

## 2023-08-15 RX ADMIN — PREDNISONE 60 MG: 50 TABLET ORAL at 08:08

## 2023-08-15 RX ADMIN — GUAIFENESIN 600 MG: 600 TABLET, EXTENDED RELEASE ORAL at 09:08

## 2023-08-15 RX ADMIN — LEVALBUTEROL 1.25 MG: 1.25 SOLUTION, CONCENTRATE RESPIRATORY (INHALATION) at 07:08

## 2023-08-15 RX ADMIN — APIXABAN 5 MG: 5 TABLET, FILM COATED ORAL at 10:08

## 2023-08-15 RX ADMIN — GUAIFENESIN 600 MG: 600 TABLET, EXTENDED RELEASE ORAL at 08:08

## 2023-08-15 RX ADMIN — BUSPIRONE HYDROCHLORIDE 5 MG: 5 TABLET ORAL at 08:08

## 2023-08-15 NOTE — ASSESSMENT & PLAN NOTE
Patient with Hypercapnic and Hypoxic Respiratory failure which is Acute on chronic.  she is on home oxygen at 3 LPM. Supplemental oxygen was provided and noted-      .   Signs/symptoms of respiratory failure include- tachypnea, increased work of breathing, respiratory distress, use of accessory muscles and wheezing. Contributing diagnoses includes - COPD Labs and images were reviewed. Patient Has recent ABG, which has been reviewed. Will treat underlying causes and adjust management of respiratory failure as follows-     Continue supplemental O2  - Goal O2 sat in setting of chronic CO2 retention and severe COPD is 88-92%  - avoid hyperoxygenation

## 2023-08-15 NOTE — SUBJECTIVE & OBJECTIVE
Past Medical History:   Diagnosis Date    Cataract     COPD (chronic obstructive pulmonary disease)     COVID-19     History of COVID-19 1/17/2021    Still with mild dyspnea. Encouraged return to pulmonary rehab Recommend scheduling vaccination when appointment is available.     History of retinal hemorrhage     Lobar pneumonia 11/14/2021    Recurrent in RLL, no endobronchial lesion Needs speech evaluation and MBSS for recurrent aspiration in setting of dysphagia  Will need pulmonary rehab at [facility].    Pathological fracture due to osteoporosis 7/6/2020    Retinal histoplasmosis     Secondary pulmonary arterial hypertension 7/3/2018    Secondary to emphysema and chronic respiratory failure, mild.       Past Surgical History:   Procedure Laterality Date    BRONCHOSCOPY WITH FLUOROSCOPY N/A 10/28/2019    Procedure: BRONCHOSCOPY, WITH FLUOROSCOPY;  Surgeon: Brooklynn Ruiz MD;  Location: [facility] OR [address] [place] [address];  Service: Endoscopy;  Laterality: N/A;    HAND SURGERY         Review of patient's allergies indicates:   Allergen Reactions    Albuterol     Ipratropium analogues      Difficulty breathing    Epinephrine        No current facility-administered medications on file prior to encounter.     Current Outpatient Medications on File Prior to Encounter   Medication Sig    ascorbic acid, vitamin C, (VITAMIN C) 500 MG tablet Take 500 mg by mouth 2 (two) times daily.     azelastine (ASTELIN) 137 mcg (0.1 %) nasal spray USE 1 SPRAY IN EACH NOSTRIL TWICE DAILY OR AS DIRECTED    azelastine (ASTELIN) 137 mcg (0.1 %) nasal spray USE 1 SPRAY IN EACH NOSTRIL TWICE DAILY OR AS DIRECTED    azithromycin (Z-JERSEY) 250 MG tablet Take 1 tablet (250 mg total) by mouth every Mon, Wed, Fri.    calcium carbonate (OS-STEPHANIE) 600 mg calcium (1,500 mg) Tab Take 1 tablet (600 mg total) by mouth once daily. Take Levofloxacin antibiotic at least 2 hours before calcium.    citalopram (CELEXA) 20 MG tablet Take 1 tablet (20 mg total) by mouth once daily.     fluticasone furoate-vilanteroL (BREO ELLIPTA) 200-25 mcg/dose DsDv diskus inhaler INHALE 1 PUFF INTO THE LUNGS DAILY    fluticasone propionate (FLONASE) 50 mcg/actuation nasal spray INSTILL 1 SPRAY IN EACH NOSTRIL EVERY DAY    levalbuterol (XOPENEX) 0.63 mg/3 mL nebulizer solution USE 1 VIAL IN NEBULIZER EVERY 8 HOURS AS NEEDED FOR WHEEZE Strength: 0.63 mg/3 mL    meloxicam (MOBIC) 7.5 MG tablet Take 1 tablet (7.5 mg total) by mouth once daily.    MULTIVIT-IRON-MIN-FOLIC ACID 3,500-18-0.4 UNIT-MG-MG ORAL CHEW Take 1 tablet by mouth once daily. Take Levofloxacin antibiotic at least 2 hours before multivitamin.    pantoprazole (PROTONIX) 40 MG tablet Take 1 tablet (40 mg total) by mouth once daily.    pulse oximeter (PULSE OXIMETER) device by Apply Externally route 2 (two) times a day. Use twice daily at 8 AM and 3 PM and record the value in MyChart as directed.    sodium chloride (OCEAN) 0.65 % nasal spray 1 spray by Nasal route 2 (two) times daily.    solifenacin (VESICARE) 5 MG tablet Take 1 tablet (5 mg total) by mouth once daily.    umeclidinium (INCRUSE ELLIPTA) 62.5 mcg/actuation inhalation capsule Inhale 62.5 mcg into the lungs once daily.    [DISCONTINUED] furosemide (LASIX) 20 MG tablet Take 1 tablet (20 mg total) by mouth once daily. for 3 days     Family History       Problem Relation (Age of Onset)    Colon cancer Mother, Father    Macular degeneration Mother    Stroke Father          Tobacco Use    Smoking status: Former     Current packs/day: 0.00     Average packs/day: 1 pack/day for 36.0 years (36.0 ttl pk-yrs)     Types: Cigarettes     Start date: 1980     Quit date: 2016     Years since quittin.9    Smokeless tobacco: Never    Tobacco comments:     pt states she does not remember date   Substance and Sexual Activity    Alcohol use: Yes     Alcohol/week: 2.0 standard drinks of alcohol     Types: 2 Glasses of wine per week     Comment: 1-2 glasses a day     Drug use: No    Sexual  activity: Yes     Partners: Male     Review of Systems   Constitutional: Positive for malaise/fatigue. Negative for fever.   HENT:  Positive for congestion.    Cardiovascular:  Positive for dyspnea on exertion. Negative for chest pain, irregular heartbeat, leg swelling, near-syncope, palpitations and syncope.   Skin:  Positive for color change (discoloration of skin at ankles).   Gastrointestinal:  Positive for diarrhea. Negative for abdominal pain, constipation, nausea and vomiting.   Neurological:  Positive for weakness. Negative for dizziness, focal weakness, light-headedness and loss of balance.   Psychiatric/Behavioral:  Negative for memory loss.      Objective:     Vital Signs (Most Recent):  Temp: 97.5 °F (36.4 °C) (08/15/23 1138)  Pulse: 94 (08/15/23 1319)  Resp: 18 (08/15/23 1138)  BP: 121/67 (08/15/23 1138)  SpO2: 96 % (08/15/23 1138) Vital Signs (24h Range):  Temp:  [97.3 °F (36.3 °C)-99.3 °F (37.4 °C)] 97.5 °F (36.4 °C)  Pulse:  [] 94  Resp:  [16-20] 18  SpO2:  [93 %-99 %] 96 %  BP: (121-166)/(67-83) 121/67     Weight: 93.9 kg (207 lb)  Body mass index is 31.48 kg/m².    SpO2: 96 %         Intake/Output Summary (Last 24 hours) at 8/15/2023 1436  Last data filed at 8/14/2023 1646  Gross per 24 hour   Intake --   Output 500 ml   Net -500 ml       Lines/Drains/Airways       Peripheral Intravenous Line  Duration                  Peripheral IV - Single Lumen 08/10/23 20 G Anterior;Left Forearm 5 days                     Physical Exam  Vitals and nursing note reviewed.   Constitutional:       General: She is not in acute distress.     Appearance: Normal appearance. She is not ill-appearing, toxic-appearing or diaphoretic.   HENT:      Head: Normocephalic and atraumatic.   Eyes:      General: No scleral icterus.     Conjunctiva/sclera: Conjunctivae normal.   Neck:      Vascular: No carotid bruit.   Cardiovascular:      Rate and Rhythm: Normal rate and regular rhythm.      Pulses: Normal pulses.      Heart  sounds: Normal heart sounds. No murmur heard.     No friction rub. No gallop.   Pulmonary:      Effort: No respiratory distress.      Breath sounds: Wheezing present.      Comments: On 3L/min oxygen NC  Abdominal:      General: Abdomen is flat.      Palpations: Abdomen is soft.   Musculoskeletal:      Right lower leg: No edema.      Left lower leg: No edema.   Skin:     General: Skin is warm and dry.   Neurological:      General: No focal deficit present.      Mental Status: She is alert and oriented to person, place, and time.   Psychiatric:         Mood and Affect: Mood normal.          Significant Labs: CMP   Recent Labs   Lab 08/14/23  0406 08/15/23  0318   * 133*   K 4.5 3.7   CL 95 94*   CO2 32* 33*   GLU 96 85   BUN 13 11   CREATININE 0.6 0.5   CALCIUM 9.1 8.5*   ANIONGAP 8 6*    and CBC   Recent Labs   Lab 08/14/23  0406 08/15/23  0318   WBC 8.05 8.14   HGB 12.3 12.5   HCT 38.1 39.4    359

## 2023-08-15 NOTE — HPI
73 year old with history of severe COPD who presented with 3 weeks of worsening of shortness of breath. The patient is on 3L NC at home at baseline. She reports she has had worsening shortness of breath since starting pulmonary rehab, eventually she had worsening cough and sputum production so she came in for evaluation. She used her home albuterol nebulizer with minimal improvement in symptoms. She denies fevers, chills, nausea, vomiting, or diarrhea. She denies chest pain and does not endorse any sick contacts. She reports she wears her oxygen 24/7 and normally has saturations in the high 90's, even with exertion.     In the ED the patient was hemodynamically stable, satting in the upper 90's on 3L NC. She was treated with solumedrol, IV magnesium, and bronchodilator therapy. She was started on antibiotics and admitted to hospital medicine.While admitted, patient had significant shortness of breath while attempting a 6 minute walk test and it was aborted. A repeat CT was ordered which showed stable changes of severe COPD. Pulmonology was consulted for further recommendations regarding patient's COPD. Of note, the patient has followed with Dr. Ruiz in pulmonary clinic who has explained severity of her COPD to her. The patient has established with palliative care and has previously been on morphine for breathlessness. She takes Breo, Encruse, albuterol, and 3x weekly azithromycin for home for COPD.

## 2023-08-15 NOTE — PLAN OF CARE
Problem: Adult Inpatient Plan of Care  Goal: Plan of Care Review  Outcome: Ongoing, Progressing  Goal: Absence of Hospital-Acquired Illness or Injury  Outcome: Ongoing, Progressing  Goal: Optimal Comfort and Wellbeing  Outcome: Ongoing, Progressing  Goal: Readiness for Transition of Care  Outcome: Ongoing, Progressing     Problem: Adjustment to Illness COPD (Chronic Obstructive Pulmonary Disease)  Goal: Optimal Chronic Illness Coping  Outcome: Ongoing, Progressing     Problem: Functional Ability Impaired COPD (Chronic Obstructive Pulmonary Disease)  Goal: Optimal Level of Functional [place]  Outcome: Ongoing, Progressing     Problem: Infection COPD (Chronic Obstructive Pulmonary Disease)  Goal: Absence of Infection Signs and Symptoms  Outcome: Ongoing, Progressing     Problem: Oral Intake Inadequate COPD (Chronic Obstructive Pulmonary Disease)  Goal: Improved Nutrition Intake  Outcome: Ongoing, Progressing     Problem: Respiratory Compromise COPD (Chronic Obstructive Pulmonary Disease)  Goal: Effective Oxygenation and Ventilation  Outcome: Ongoing, Progressing     Problem: Skin Injury Risk Increased  Goal: Skin Health and Integrity  Outcome: Ongoing, Progressing

## 2023-08-15 NOTE — HPI
73 year old with history of severe COPD c/b centrilobular emphysema, former smoker (quit in 2017, 60 pk-yr), COVID-19, recurrent lobar pneumonia, osteoporosis, GERD, retinal histoplasmosis and pulmonary arterial hypertension. She presented to the ED on 8/10/23 with 3 weeks of worsening of shortness of breath. She was admitted for treatment of chronic obstructive pulmonary disease with an acute exacerbation. She is on 3L NC at home at baseline. She takes Breo, Encruse, albuterol, and 3x weekly azithromycin for home for COPD.     Cardiology was consulted for an episode of atrial fibrillation this morning at 9:00am captured on EKG. She was given 1 dose of IV metoprolol and started on apixiban.  Tele at patient examination at 14:00 showed patient was in normal sinus rhythm. She denies history of HTN, CHF, prior stroke, and previous knowledge of being in atrial fibrillation. She denies nausea, vomiting, constipation, syncope, palpitations and dizziness. She continues to endorse fatigue, shortness or breath, and wheezing.     Notable labs on admission: BNP 17    Recent Cardiac Investigations:   Echo 2/15/22 (largely unchanged from 2019 except L atrial enlargement)  -The left ventricle is normal in size with normal systolic function.  -The estimated ejection fraction is 65%.  -Normal left ventricular diastolic function.  -Tachycardia was present during the study  -Normal right ventricular size with normal right ventricular systolic function.  -Mild tricuspid regurgitation.  -There are segmental left ventricular wall motion abnormalities.  -Mild left atrial enlargement.  -Normal central venous pressure (3 mmHg).  -The estimated PA systolic pressure is 28 mmHg.    2D echo with doppler flow 7/3/18  -Pulmonary hypertension. The estimated PA systolic pressure is 45 mmHg.     EKG 8/15/23  Atrial fibrillation with rapid ventricular response   Incomplete right bundle branch block   Nonspecific ST abnormality   Abnormal ECG   When  compared with ECG of 28-NOV-2022 14:11,   Atrial fibrillation has replaced Sinus rhythm   Vent. rate has increased BY  56 BPM   Incomplete right bundle branch block is now Present

## 2023-08-15 NOTE — CARE UPDATE
"RAPID RESPONSE NURSE PROACTIVE ROUNDING NOTE       Time of Visit: 935    Admit Date: 8/10/2023  LOS: 4  Code Status: Full Code   Date of Visit: 08/15/2023  : 1950  Age: 73 y.o.  Sex: female  Race: White  Bed: 1111/1111 A:   MRN: [id number]  Was the patient discharged from an ICU this admission? No   Was the patient discharged from a PACU within last 24 hours? No   Did the patient receive conscious sedation/general anesthesia in last 24 hours? No  Was the patient in the ED within the past 24 hours? No  Was the patient on NIPPV within the past 24 hours? No   Attending Physician: Gianluca Zarate MD  Primary Service: [facility]   Time spent at the bedside: < 15 min    SITUATION    Notified by charge RN during rounding.  Reason for alert: A-fib w/ RVR  Called to evaluate the patient for Dysrythmia    BACKGROUND     Why is the patient in the hospital?: Chronic obstructive pulmonary disease with acute exacerbation    Patient has a past medical history of Cataract, COPD (chronic obstructive pulmonary disease), COVID-19, History of COVID-19, History of retinal hemorrhage, Lobar pneumonia, Pathological fracture due to osteoporosis, Retinal histoplasmosis, and Secondary pulmonary arterial hypertension.    Last Vitals:  Temp: 97.5 °F (36.4 °C) (08/15 1138)  Pulse: 94 (08/15 1319)  Resp: 18 (08/15 1138)  BP: 121/67 (08/15 1138)  SpO2: 96 % (08/15 1138)    24 Hours Vitals Range:  Temp:  [97.3 °F (36.3 °C)-99.3 °F (37.4 °C)]   Pulse:  []   Resp:  [16-20]   BP: (121-166)/(67-83)   SpO2:  [93 %-99 %]     Labs:  Recent Labs     23  0553 23  0406 08/15/23  0318   WBC 8.26 8.05 8.14   HGB 12.5 12.3 12.5   HCT 39.0 38.1 39.4    332 359       Recent Labs     23  0553 23  0406 08/15/23  0318   * 135* 133*   K 3.4* 4.5 3.7   CL 96 95 94*   CO2 33* 32* 33*   BUN 13 13 11   CREATININE 0.6 0.6 0.5   GLU 90 96 85   PHOS 2.8 3.0 2.9   MG 2.2 2.3 2.2        No results for input(s): "PH", " ""PCO2", "PO2", "HCO3", "POCSATURATED", "BE" in the last 72 hours.     ASSESSMENT    Physical Exam  Cardiovascular:      Rate and Rhythm: Tachycardia present. Rhythm irregular.   Pulmonary:      Effort: Pulmonary effort is normal.   Neurological:      Mental Status: She is alert.   Psychiatric:         Mood and Affect: Mood normal.         Behavior: Behavior normal.         Thought Content: Thought content normal.         Judgment: Judgment normal.         INTERVENTIONS    The patient was seen for Cardiac problem. Staff concerns included tachycardia. The following interventions were performed: continuous cardiac monitoring continued and continuous pulse ox monitoring continued.    RECOMMENDATIONS    -Continuous Telemetry  -Maintain IV access  -Educate patient on symptoms of A-fib    PROVIDER ESCALATION    Yes/No  No    Orders received and case discussed with NA.    Disposition: Remain in room 1111.    FOLLOW-UP    charge Faith MALDONADO  updated on plan of care. Instructed to call the Rapid Response Nurse, Renan Tsai RN at [phone] for additional questions or concerns.            "

## 2023-08-15 NOTE — PT/OT/SLP PROGRESS
Physical Therapy      Patient Name:  Estefani Fam   MRN:  [id number]    PT orders received. PT to bedside for PT evaluation. Pt not medically appropriate at this time. PT will continue to monitor and return when appropriate.

## 2023-08-15 NOTE — NURSING
Patient awake in bed. 02 at 3 liters, NC. Telemetry monitor in place. Continuous pulse ox in place. PIV capped to LUE. Incentive spirometer at bedside. Bed in low, locked position. Call light within reach. No obvious distress noted. Will continue POC.

## 2023-08-15 NOTE — CONSULTS
[facility]  Pulmonology  Consult Note    Patient Name: Estefani Fam  MRN: [id number]  Admission Date: 8/10/2023  Hospital Length of Stay: 3 days  Code Status: Full Code  Attending Physician: Gianluca Zarate MD  Primary Care Provider: Dustin Khan MD   Principal Problem: Chronic obstructive pulmonary disease with acute exacerbation    Inpatient consult to Pulmonology  Consult performed by: Joseph Jones MD  Consult ordered by: Antonieta Umanzor PA-C        Subjective:     HPI:  73 year old with history of severe COPD who presented with 3 weeks of worsening of shortness of breath. The patient is on 3L NC at home at baseline. She reports she has had worsening shortness of breath since starting pulmonary rehab, eventually she had worsening cough and sputum production so she came in for evaluation. She used her home albuterol nebulizer with minimal improvement in symptoms. She denies fevers, chills, nausea, vomiting, or diarrhea. She denies chest pain and does not endorse any sick contacts. She reports she wears her oxygen 24/7 and normally has saturations in the high 90's, even with exertion.     In the ED the patient was hemodynamically stable, satting in the upper 90's on 3L NC. She was treated with solumedrol, IV magnesium, and bronchodilator therapy. She was started on antibiotics and admitted to hospital medicine.While admitted, patient had significant shortness of breath while attempting a 6 minute walk test and it was aborted. A repeat CT was ordered which showed stable changes of severe COPD. Pulmonology was consulted for further recommendations regarding patient's COPD. Of note, the patient has followed with Dr. Ruiz in pulmonary clinic who has explained severity of her COPD to her. The patient has established with palliative care and has previously been on morphine for breathlessness. She takes Breo, Encruse, albuterol, and 3x weekly azithromycin for home for COPD.      Past Medical  History:   Diagnosis Date    Cataract     COPD (chronic obstructive pulmonary disease)     COVID-19     History of COVID-19 2021    Still with mild dyspnea. Encouraged return to pulmonary rehab Recommend scheduling vaccination when appointment is available.     History of retinal hemorrhage     Lobar pneumonia 2021    Recurrent in RLL, no endobronchial lesion Needs speech evaluation and MBSS for recurrent aspiration in setting of dysphagia  Will need pulmonary rehab at [facility].    Pathological fracture due to osteoporosis 2020    Retinal histoplasmosis     Secondary pulmonary arterial hypertension 7/3/2018    Secondary to emphysema and chronic respiratory failure, mild.       Past Surgical History:   Procedure Laterality Date    BRONCHOSCOPY WITH FLUOROSCOPY N/A 10/28/2019    Procedure: BRONCHOSCOPY, WITH FLUOROSCOPY;  Surgeon: Brooklynn Ruiz MD;  Location: [facility] OR [address] [place] [address];  Service: Endoscopy;  Laterality: N/A;    HAND SURGERY         Review of patient's allergies indicates:   Allergen Reactions    Albuterol     Ipratropium analogues      Difficulty breathing    Epinephrine        Family History       Problem Relation (Age of Onset)    Colon cancer Mother, Father    Macular degeneration Mother    Stroke Father          Tobacco Use    Smoking status: Former     Current packs/day: 0.00     Average packs/day: 1 pack/day for 36.0 years (36.0 ttl pk-yrs)     Types: Cigarettes     Start date: 1980     Quit date: 2016     Years since quittin.9    Smokeless tobacco: Never    Tobacco comments:     pt states she does not remember date   Substance and Sexual Activity    Alcohol use: Yes     Alcohol/week: 2.0 standard drinks of alcohol     Types: 2 Glasses of wine per week     Comment: 1-2 glasses a day     Drug use: No    Sexual activity: Yes     Partners: Male         Review of Systems   Constitutional:  Positive for fatigue. Negative for chills and fever.   HENT:  Negative  for sinus pain, sore throat and trouble swallowing.    Eyes:  Negative for photophobia and visual disturbance.   Respiratory:  Positive for cough, shortness of breath and wheezing.    Cardiovascular:  Negative for chest pain, palpitations and leg swelling.   Gastrointestinal:  Negative for abdominal distention, abdominal pain, diarrhea, nausea and vomiting.   Genitourinary:  Negative for dysuria, flank pain and hematuria.   Musculoskeletal:  Negative for arthralgias, neck pain and neck stiffness.   Skin:  Negative for rash and wound.   Neurological:  Positive for weakness. Negative for syncope and headaches.   Psychiatric/Behavioral:  Negative for confusion.      Objective:     Vital Signs (Most Recent):  Temp: 97.9 °F (36.6 °C) (08/14/23 1944)  Pulse: 84 (08/14/23 2158)  Resp: 20 (08/14/23 2158)  BP: 134/83 (08/14/23 1944)  SpO2: 97 % (08/14/23 2158) Vital Signs (24h Range):  Temp:  [97.6 °F (36.4 °C)-99.3 °F (37.4 °C)] 97.9 °F (36.6 °C)  Pulse:  [] 84  Resp:  [16-20] 20  SpO2:  [94 %-100 %] 97 %  BP: (120-161)/(67-83) 134/83     Weight: 93.9 kg (207 lb)  Body mass index is 31.48 kg/m².      Intake/Output Summary (Last 24 hours) at 8/14/2023 2226  Last data filed at 8/14/2023 1646  Gross per 24 hour   Intake 200 ml   Output 1600 ml   Net -1400 ml        Physical Exam  Vitals and nursing note reviewed.   Constitutional:       Appearance: She is well-developed. She is not diaphoretic.   HENT:      Head: Normocephalic and atraumatic.      Right Ear: External ear normal.      Left Ear: External ear normal.      Nose: Nose normal.      Mouth/Throat:      Mouth: Mucous membranes are moist.   Eyes:      General: No scleral icterus.     Pupils: Pupils are equal, round, and reactive to light.   Cardiovascular:      Rate and Rhythm: Normal rate and regular rhythm.   Pulmonary:      Effort: Respiratory distress present.      Breath sounds: No wheezing.      Comments: Pursed lip breathing, minimal air movement on  auscultation  Abdominal:      General: Abdomen is flat. There is no distension.      Palpations: Abdomen is soft.      Tenderness: There is no abdominal tenderness.   Musculoskeletal:         General: No tenderness.      Cervical back: Normal range of motion and neck supple.   Skin:     General: Skin is warm and dry.   Neurological:      General: No focal deficit present.      Mental Status: She is alert and oriented to person, place, and time.   Psychiatric:         Mood and Affect: Mood normal.         Behavior: Behavior normal.          Vents:  Oxygen Concentration (%): 32 (08/11/23 2021)    Lines/Drains/Airways       Peripheral Intravenous Line  Duration                  Peripheral IV - Single Lumen 08/10/23 20 G Anterior;Left Forearm 4 days                    Significant Labs:    CBC/Anemia Profile:  Recent Labs   Lab 08/13/23  0553 08/14/23  0406   WBC 8.26 8.05   HGB 12.5 12.3   HCT 39.0 38.1    332   MCV 96 94   RDW 12.9 12.9        Chemistries:  Recent Labs   Lab 08/13/23  0553 08/14/23  0406   * 135*   K 3.4* 4.5   CL 96 95   CO2 33* 32*   BUN 13 13   CREATININE 0.6 0.6   CALCIUM 8.7 9.1   MG 2.2 2.3   PHOS 2.8 3.0       All pertinent labs within the past 24 hours have been reviewed.    Significant Imaging:   I have reviewed all pertinent imaging results/findings within the past 24 hours.    CT Chest 8/13  Diffuse panlobular, bullous COPD. Linear areas of possible scarring noted, notably in right upper lobe. Mosaic attenuation indicative of air trapping noted, however no inspiratory / expiratory films to compare.    Assessment/Plan:     Pulmonary  * Chronic obstructive pulmonary disease with acute exacerbation  Patient with severe COPD, FEV1 of 34% in 2019. Severe diffusion limitation. She is currently on triple therapy at home as well as 3x weekly azithromycin. Has been treated in the past with morphine for breathlessness. Patient is attending pulmonary rehab but has had difficulty with the  heat lately. Now admitted for COPD exacerbation. Reports some improvement in breathing with steroids and nebulizer therapy, but not back to normal. Had an episode of significant SOB with ambulation, prompting pulmonary evaluation.    - Continue steroids, will likely need more than 5 days, can extend steroids to 10 days  - Continue antibiotics to complete 5 days of coverage, then resume 3x weekly azithromycin  - Continue home controllers, patient will resume those on discharge without changes as well  - bronchodilator therapy scheduled and PRN  - Goal O2 sat is 88-92% to avoid hyperoxygenation in chronic CO2 retention  - No further mucomyst, will not benefit this pathology and patient did not tolerate it    Pulmonary rehab will give patient the most benefit for symptom now as unfortunately her COPD is severe. Fortunately she has not had many exacerbations so focus should be to preserve as much functional status as possible      Chronic respiratory failure with hypoxia, on home oxygen therapy  Patient with Hypercapnic and Hypoxic Respiratory failure which is Acute on chronic.  she is on home oxygen at 3 LPM. Supplemental oxygen was provided and noted-      .   Signs/symptoms of respiratory failure include- tachypnea, increased work of breathing, respiratory distress, use of accessory muscles and wheezing. Contributing diagnoses includes - COPD Labs and images were reviewed. Patient Has recent ABG, which has been reviewed. Will treat underlying causes and adjust management of respiratory failure as follows-     Continue supplemental O2  - Goal O2 sat in setting of chronic CO2 retention and severe COPD is 88-92%  - avoid hyperoxygenation          Thank you for your consult. Pulmonology will sign off. Please do not hesitate to reach back out of if we can be of any further assistance in the care of this patient.     Joseph Jones MD  Pulmonology  [facility]

## 2023-08-15 NOTE — SUBJECTIVE & OBJECTIVE
Past Medical History:   Diagnosis Date    Cataract     COPD (chronic obstructive pulmonary disease)     COVID-19     History of COVID-19 2021    Still with mild dyspnea. Encouraged return to pulmonary rehab Recommend scheduling vaccination when appointment is available.     History of retinal hemorrhage     Lobar pneumonia 2021    Recurrent in RLL, no endobronchial lesion Needs speech evaluation and MBSS for recurrent aspiration in setting of dysphagia  Will need pulmonary rehab at [facility].    Pathological fracture due to osteoporosis 2020    Retinal histoplasmosis     Secondary pulmonary arterial hypertension 7/3/2018    Secondary to emphysema and chronic respiratory failure, mild.       Past Surgical History:   Procedure Laterality Date    BRONCHOSCOPY WITH FLUOROSCOPY N/A 10/28/2019    Procedure: BRONCHOSCOPY, WITH FLUOROSCOPY;  Surgeon: Brooklynn Ruiz MD;  Location: [facility] OR [address] [place] [address];  Service: Endoscopy;  Laterality: N/A;    HAND SURGERY         Review of patient's allergies indicates:   Allergen Reactions    Albuterol     Ipratropium analogues      Difficulty breathing    Epinephrine        Family History       Problem Relation (Age of Onset)    Colon cancer Mother, Father    Macular degeneration Mother    Stroke Father          Tobacco Use    Smoking status: Former     Current packs/day: 0.00     Average packs/day: 1 pack/day for 36.0 years (36.0 ttl pk-yrs)     Types: Cigarettes     Start date: 1980     Quit date: 2016     Years since quittin.9    Smokeless tobacco: Never    Tobacco comments:     pt states she does not remember date   Substance and Sexual Activity    Alcohol use: Yes     Alcohol/week: 2.0 standard drinks of alcohol     Types: 2 Glasses of wine per week     Comment: 1-2 glasses a day     Drug use: No    Sexual activity: Yes     Partners: Male         Review of Systems   Constitutional:  Positive for fatigue. Negative for chills and fever.   HENT:  Negative for sinus  pain, sore throat and trouble swallowing.    Eyes:  Negative for photophobia and visual disturbance.   Respiratory:  Positive for cough, shortness of breath and wheezing.    Cardiovascular:  Negative for chest pain, palpitations and leg swelling.   Gastrointestinal:  Negative for abdominal distention, abdominal pain, diarrhea, nausea and vomiting.   Genitourinary:  Negative for dysuria, flank pain and hematuria.   Musculoskeletal:  Negative for arthralgias, neck pain and neck stiffness.   Skin:  Negative for rash and wound.   Neurological:  Positive for weakness. Negative for syncope and headaches.   Psychiatric/Behavioral:  Negative for confusion.      Objective:     Vital Signs (Most Recent):  Temp: 97.9 °F (36.6 °C) (08/14/23 1944)  Pulse: 84 (08/14/23 2158)  Resp: 20 (08/14/23 2158)  BP: 134/83 (08/14/23 1944)  SpO2: 97 % (08/14/23 2158) Vital Signs (24h Range):  Temp:  [97.6 °F (36.4 °C)-99.3 °F (37.4 °C)] 97.9 °F (36.6 °C)  Pulse:  [] 84  Resp:  [16-20] 20  SpO2:  [94 %-100 %] 97 %  BP: (120-161)/(67-83) 134/83     Weight: 93.9 kg (207 lb)  Body mass index is 31.48 kg/m².      Intake/Output Summary (Last 24 hours) at 8/14/2023 2226  Last data filed at 8/14/2023 1646  Gross per 24 hour   Intake 200 ml   Output 1600 ml   Net -1400 ml        Physical Exam  Vitals and nursing note reviewed.   Constitutional:       Appearance: She is well-developed. She is not diaphoretic.   HENT:      Head: Normocephalic and atraumatic.      Right Ear: External ear normal.      Left Ear: External ear normal.      Nose: Nose normal.      Mouth/Throat:      Mouth: Mucous membranes are moist.   Eyes:      General: No scleral icterus.     Pupils: Pupils are equal, round, and reactive to light.   Cardiovascular:      Rate and Rhythm: Normal rate and regular rhythm.   Pulmonary:      Effort: Respiratory distress present.      Breath sounds: No wheezing.      Comments: Pursed lip breathing, minimal air movement on  auscultation  Abdominal:      General: Abdomen is flat. There is no distension.      Palpations: Abdomen is soft.      Tenderness: There is no abdominal tenderness.   Musculoskeletal:         General: No tenderness.      Cervical back: Normal range of motion and neck supple.   Skin:     General: Skin is warm and dry.   Neurological:      General: No focal deficit present.      Mental Status: She is alert and oriented to person, place, and time.   Psychiatric:         Mood and Affect: Mood normal.         Behavior: Behavior normal.          Vents:  Oxygen Concentration (%): 32 (08/11/23 2021)    Lines/Drains/Airways       Peripheral Intravenous Line  Duration                  Peripheral IV - Single Lumen 08/10/23 20 G Anterior;Left Forearm 4 days                    Significant Labs:    CBC/Anemia Profile:  Recent Labs   Lab 08/13/23  0553 08/14/23  0406   WBC 8.26 8.05   HGB 12.5 12.3   HCT 39.0 38.1    332   MCV 96 94   RDW 12.9 12.9        Chemistries:  Recent Labs   Lab 08/13/23  0553 08/14/23  0406   * 135*   K 3.4* 4.5   CL 96 95   CO2 33* 32*   BUN 13 13   CREATININE 0.6 0.6   CALCIUM 8.7 9.1   MG 2.2 2.3   PHOS 2.8 3.0       All pertinent labs within the past 24 hours have been reviewed.    Significant Imaging:   I have reviewed all pertinent imaging results/findings within the past 24 hours.    CT Chest 8/13  Diffuse panlobular, bullous COPD. Linear areas of possible scarring noted, notably in right upper lobe. Mosaic attenuation indicative of air trapping noted, however no inspiratory / expiratory films to compare.

## 2023-08-15 NOTE — ASSESSMENT & PLAN NOTE
73 year old with history of severe COPD c/b centrilobular emphysema, former smoker (quit in 2017, 60 pk-yr), COVID-19, recurrent lobar pneumonia, osteoporosis, GERD, retinal histoplasmosis and pulmonary arterial hypertension who presented to the ED on 8/10/23 with 3 weeks of worsening of shortness of breath. She was admitted for treatment of chronic obstructive pulmonary disease with an acute exacerbation.Cardiology was consulted for an episode of atrial fibrillation this morning at 9:00am captured on EKG.     Given 1 dose of IV metoprolol and started on apixiban. Tele at patient examination at 14:00 showed patient was in normal sinus rhythm. Given new onset atrial fibrillation in the setting of an acute exacerbation of COPD, atrial fibrillation may be secondary to comorbidity and treatment should be by managing underlying cause.    Our recommendations are as follows:    - If she converts back to atrial fibrillation recommend calcium channel blocker for rate control over metoprolol given history of COPD  - Recommend outpatient follow up with EP  - Patient's [id number] score: 2  - Recommend stopping anticoagulation with apixiban  - TTE given new onset atrial fibrillation  - Synchronized cardioversion if patient reverts to atrial fibrillation and becomes hemodynamically unstable.  - Cardiology will sign off on this patient. Please call with any additional questions or concerns.

## 2023-08-15 NOTE — ASSESSMENT & PLAN NOTE
- IP COPD Pathway initiated  - sp solumedrol 125mg iv x 1 and iv mag x1 with EMS  - start prednisone 40mg daily>> increased to 60 mg daily  - start ceftriaxone and azithromycin  - levalbuterol neb q6h while awake  - monitor pulse Ox and supplemental O2 as needed  - continue home LABA-ICS and LAMA  - started mucinex, IS and chest physiotherapy  - CT chest Grossly stable severe bullous emphysema with scarring anomaly in the lung apices.  No focal consolidation.   - will need 6 MWT prior to discharge  - Pulm consulted:  - Continue steroids, will likely need more than 5 days, can extend steroids to 10 days  - Continue antibiotics to complete 5 days of coverage, then resume 3x weekly azithromycin  - Continue home controllers, patient will resume those on discharge without changes as well  - bronchodilator therapy scheduled and PRN  - Goal O2 sat is 88-92% to avoid hyperoxygenation in chronic CO2 retention  - No further mucomyst, will not benefit this pathology and patient did not tolerate it  - follows with OP pulm and was recently established with pulmonary rehab

## 2023-08-15 NOTE — SUBJECTIVE & OBJECTIVE
Interval History: ANGI MAY, satting well on home O2. States that she is feeling better today, was able to work with PT and ambulate to the door. Became tachycardic this morning with HR ranging 130-160s. EKG showing A fib with RVR. Given IV metoprolol 5 mg x1 and converted to sinus rhythm. Echo ordered and cardiology consulted for further recs.    Review of Systems   Constitutional:  Positive for fatigue. Negative for chills and fever.   Respiratory:  Positive for cough, shortness of breath and wheezing. Negative for chest tightness.    Cardiovascular:  Negative for chest pain and leg swelling.   Gastrointestinal:  Negative for abdominal pain and nausea.   Neurological:  Positive for weakness (Generalized). Negative for dizziness.     Objective:     Vital Signs (Most Recent):  Temp: 97.5 °F (36.4 °C) (08/15/23 1138)  Pulse: (!) 114 (08/15/23 1138)  Resp: 18 (08/15/23 1138)  BP: 121/67 (08/15/23 1138)  SpO2: 96 % (08/15/23 1138) Vital Signs (24h Range):  Temp:  [97.3 °F (36.3 °C)-99.3 °F (37.4 °C)] 97.5 °F (36.4 °C)  Pulse:  [] 114  Resp:  [16-20] 18  SpO2:  [93 %-99 %] 96 %  BP: (121-166)/(67-83) 121/67     Weight: 93.9 kg (207 lb)  Body mass index is 31.48 kg/m².    Intake/Output Summary (Last 24 hours) at 8/15/2023 1310  Last data filed at 8/14/2023 1646  Gross per 24 hour   Intake --   Output 500 ml   Net -500 ml         Physical Exam  Vitals and nursing note reviewed.   Constitutional:       Appearance: She is well-developed.   Eyes:      Pupils: Pupils are equal, round, and reactive to light.   Cardiovascular:      Rate and Rhythm: Normal rate and regular rhythm.   Pulmonary:      Breath sounds: No wheezing.      Comments: Increased effort. Tachypnea. Diminished air movement throughout lung fields. Pursed lip breathing at times noted. On 3L NC  Abdominal:      Palpations: Abdomen is soft.      Tenderness: There is no abdominal tenderness.   Musculoskeletal:         General: No tenderness.   Skin:      General: Skin is warm and dry.   Neurological:      Mental Status: She is alert and oriented to person, place, and time.   Psychiatric:         Behavior: Behavior normal.             Significant Labs: All pertinent labs within the past 24 hours have been reviewed.  BMP:   Recent Labs   Lab 08/15/23  0318   GLU 85   *   K 3.7   CL 94*   CO2 33*   BUN 11   CREATININE 0.5   CALCIUM 8.5*   MG 2.2     CBC:   Recent Labs   Lab 08/14/23  0406 08/15/23  0318   WBC 8.05 8.14   HGB 12.3 12.5   HCT 38.1 39.4    359       Significant Imaging: I have reviewed all pertinent imaging results/findings within the past 24 hours.

## 2023-08-15 NOTE — ASSESSMENT & PLAN NOTE
Patient with Paroxysmal (<7 days) atrial fibrillation which is controlled currently with Calcium Channel Blocker. Patient is currently in sinus rhythm.[id number] Score: 2. Anticoagulation indicated. Anticoagulation done with eliquis.  - noted on tele and EKG on 08/15. No history of A fib  - Cardiology consulted:   - Recommend diltiazem for rate control over metoprolol given history of COPD   - Recommend outpatient follow up with EP   - Patient's [id number] score: 2   - HAS-BLED Score: 1   - Continuous telemetry    - TTE given new onset atrial fibrillation   - Synchronized cardioversion if patient reverts to atrial fibrillation and becomes hemodynamically unstable.

## 2023-08-15 NOTE — ASSESSMENT & PLAN NOTE
Patient with severe COPD, FEV1 of 34% in 2019. Severe diffusion limitation. She is currently on triple therapy at home as well as 3x weekly azithromycin. Has been treated in the past with morphine for breathlessness. Patient is attending pulmonary rehab but has had difficulty with the heat lately. Now admitted for COPD exacerbation. Reports some improvement in breathing with steroids and nebulizer therapy, but not back to normal. Had an episode of significant SOB with ambulation, prompting pulmonary evaluation.    - Continue steroids, will likely need more than 5 days, can extend steroids to 10 days  - Continue antibiotics to complete 5 days of coverage, then resume 3x weekly azithromycin  - Continue home controllers, patient will resume those on discharge without changes as well  - bronchodilator therapy scheduled and PRN  - Goal O2 sat is 88-92% to avoid hyperoxygenation in chronic CO2 retention  - No further mucomyst, will not benefit this pathology and patient did not tolerate it    Pulmonary rehab will give patient the most benefit for symptom now as unfortunately her COPD is severe. Fortunately she has not had many exacerbations so focus should be to preserve as much functional status as possible

## 2023-08-15 NOTE — PT/OT/SLP PROGRESS
Occupational Therapy      Patient Name:  Estefani Fam   MRN:  [id number]    Patient not seen today secondary to Hold per nurse request when medically appropriate. Will follow-up per plan of care.    8/15/2023

## 2023-08-15 NOTE — PROGRESS NOTES
[facility] [address] [place] [address] Medicine  Progress Note    Patient Name: Estefani Fam  MRN: [id number]  Patient Class: IP- Inpatient   Admission Date: 8/10/2023  Length of Stay: 4 days  Attending Physician: Gianluca Zarate MD  Primary Care Provider: Dustin Khan MD        Subjective:     Principal Problem:Chronic obstructive pulmonary disease with acute exacerbation        HPI:  73-year-old female with past medical history as noted below, COPD on 3 L oxygen at baseline, coming in with 3 weeks of worsening shortness of breath.  She also feels like she has a worsening cough which is productive of clear sputum.  She was last time she felt like that she had pneumonia.  No fevers at home which she says she does not get a fever when she gets infections.  No sick contacts.  She is been using her leave albuterol at home with minimal improvement in her symptoms.  No chest pain.  No abdominal pain.  No vomiting diarrhea.  No leg swelling.    In the ED patient afebrile and hemodynamically stable saturating well on 3L NC. Patient with continued dyspnea despite solumedrol, iv mag, and levalbuterol. Patient admitted to the care of medicine for further evaluation and management of COPD exacerbation.      Overview/Hospital Course:  Estefani Fam is a 72 yo F admitted to hospital medicine for further management of COPD exacerbation. Started on prednisone, CAP coverage, and levalbuterol treatments. Afebrile, no leukocytosis. Satting well on home 3L. Still with severe weakness and SOB. PT/OT consulted. Attempted to complete 6 WMT with therapy, but became very SOB and anxious with just a few steps. Repeat CT with stable but severe bullous emphysema with scarring, no focal consolidation. Pulmonology consulted, who recommended doing a longer course of antibiotics (5 days) and steroids (10 days). She can resume 3x weekly azithromycin at discharge. On 8/15, began having palpitations, EKG in a-fib with RVR, given  metoprolol which improved the rate and she spontaneously converted back sinus rhythm. Cards consulted. Echo pending. Plan to discharge home when medically ready with pulmonology and cards f/u. She was recently established with pulmonary rehab, which she will also need to continue. Will need repeat 6MWT prior to discharge.       Interval History: ANGI MAY, satting well on home O2. States that she is feeling better today, was able to work with PT and ambulate to the door. Became tachycardic this morning with HR ranging 130-160s. EKG showing A fib with RVR. Given IV metoprolol 5 mg x1 and converted to sinus rhythm. Echo ordered and cardiology consulted for further recs.    Review of Systems   Constitutional:  Positive for fatigue. Negative for chills and fever.   Respiratory:  Positive for cough, shortness of breath and wheezing. Negative for chest tightness.    Cardiovascular:  Negative for chest pain and leg swelling.   Gastrointestinal:  Negative for abdominal pain and nausea.   Neurological:  Positive for weakness (Generalized). Negative for dizziness.     Objective:     Vital Signs (Most Recent):  Temp: 97.5 °F (36.4 °C) (08/15/23 1138)  Pulse: (!) 114 (08/15/23 1138)  Resp: 18 (08/15/23 1138)  BP: 121/67 (08/15/23 1138)  SpO2: 96 % (08/15/23 1138) Vital Signs (24h Range):  Temp:  [97.3 °F (36.3 °C)-99.3 °F (37.4 °C)] 97.5 °F (36.4 °C)  Pulse:  [] 114  Resp:  [16-20] 18  SpO2:  [93 %-99 %] 96 %  BP: (121-166)/(67-83) 121/67     Weight: 93.9 kg (207 lb)  Body mass index is 31.48 kg/m².    Intake/Output Summary (Last 24 hours) at 8/15/2023 1310  Last data filed at 8/14/2023 1646  Gross per 24 hour   Intake --   Output 500 ml   Net -500 ml         Physical Exam  Vitals and nursing note reviewed.   Constitutional:       Appearance: She is well-developed.   Eyes:      Pupils: Pupils are equal, round, and reactive to light.   Cardiovascular:      Rate and Rhythm: Normal rate and regular rhythm.   Pulmonary:       Breath sounds: No wheezing.      Comments: Increased effort. Tachypnea. Diminished air movement throughout lung fields. Pursed lip breathing at times noted. On 3L NC  Abdominal:      Palpations: Abdomen is soft.      Tenderness: There is no abdominal tenderness.   Musculoskeletal:         General: No tenderness.   Skin:     General: Skin is warm and dry.   Neurological:      Mental Status: She is alert and oriented to person, place, and time.   Psychiatric:         Behavior: Behavior normal.             Significant Labs: All pertinent labs within the past 24 hours have been reviewed.  BMP:   Recent Labs   Lab 08/15/23  0318   GLU 85   *   K 3.7   CL 94*   CO2 33*   BUN 11   CREATININE 0.5   CALCIUM 8.5*   MG 2.2     CBC:   Recent Labs   Lab 08/14/23  0406 08/15/23  0318   WBC 8.05 8.14   HGB 12.3 12.5   HCT 38.1 39.4    359       Significant Imaging: I have reviewed all pertinent imaging results/findings within the past 24 hours.      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  No focal consolidation.   - will need 6 MWT prior to discharge  - Pulm consulted:  - Continue steroids, will likely need more than 5 days, can extend steroids to 10 days  - Continue antibiotics to complete 5 days of coverage, then resume 3x weekly azithromycin  - Continue home controllers, patient will resume those on discharge without changes as well  - bronchodilator therapy scheduled and PRN  - Goal O2 sat is 88-92% to avoid hyperoxygenation in chronic  CO2 retention  - No further mucomyst, will not benefit this pathology and patient did not tolerate it  - follows with OP pulm and was recently established with pulmonary rehab    Atrial fibrillation with RVR  Patient with Paroxysmal (<7 days) atrial fibrillation which is controlled currently with Calcium Channel Blocker. Patient is currently in sinus rhythm.[id number] Score: 2. Anticoagulation indicated. Anticoagulation done with eliquis.  - noted on tele and EKG on 08/15. No history of A fib  - Cardiology consulted:   - Recommend diltiazem for rate control over metoprolol given history of COPD   - Recommend outpatient follow up with EP   - Patient's [id number] score: 2   - HAS-BLED Score: 1   - Continuous telemetry    - TTE given new onset atrial fibrillation   - Synchronized cardioversion if patient reverts to atrial fibrillation and becomes hemodynamically unstable.    Debility  - increased weakness the past few weeks and unable to walk more than a few steps due to severe SOB  - PT/OT consulted, patient unable to work much with OT 2/2 dyspnea    - OT recommending shower chair at discharge     Chronic respiratory failure with hypoxia, on home oxygen therapy  Patient with Hypercapnic and Hypoxic Respiratory failure which is Acute on chronic.  she is on home oxygen at 2 LPM. Supplemental oxygen was provided and noted-      Signs/symptoms of respiratory failure include- tachypnea, increased work of breathing and wheezing. Contributing diagnoses includes - COPD Labs and images were reviewed. Patient Has not had a recent ABG. Will treat underlying causes and adjust management of respiratory failure as follows:    - COPD management as above    Former heavy tobacco smoker  - 60 pack year history  - quit in 2016      VTE Risk Mitigation (From admission, onward)         Ordered     apixaban tablet 5 mg  2 times daily         08/15/23 4224     IP VTE HIGH RISK PATIENT  Once         08/10/23 4519     [facility] sequential  compression device  Until discontinued         08/10/23 2329     Place sequential compression device  Until discontinued         08/10/23 2325                Discharge Planning   RENARD: 8/16/2023     Code Status: Full Code   Is the patient medically ready for discharge?: No    Reason for patient still in hospital (select all that apply): Patient new problem, Patient trending condition, Treatment, Consult recommendations and PT / OT recommendations  Discharge Plan A: Home with family                  Antonieta Umanzor PA-C  Department of Hospital Medicine   [facility] - [facility] 11[facility]

## 2023-08-15 NOTE — PLAN OF CARE
Problem: Adult Inpatient Plan of Care  Goal: Absence of Hospital-Acquired Illness or Injury  Outcome: Ongoing, Progressing  Goal: Optimal Comfort and Wellbeing  Outcome: Ongoing, Progressing     Problem: Adjustment to Illness COPD (Chronic Obstructive Pulmonary Disease)  Goal: Optimal Chronic Illness Coping  Outcome: Ongoing, Progressing     Problem: Functional Ability Impaired COPD (Chronic Obstructive Pulmonary Disease)  Goal: Optimal Level of Functional [place]  Outcome: Ongoing, Progressing     Problem: Infection COPD (Chronic Obstructive Pulmonary Disease)  Goal: Absence of Infection Signs and Symptoms  Outcome: Ongoing, Progressing     Problem: Skin Injury Risk Increased  Goal: Skin Health and Integrity  Outcome: Ongoing, Progressing

## 2023-08-15 NOTE — CONSULTS
[facility] 11[facility]  Cardiology  Consult Note    Patient Name: Estefani Fam  MRN: [id number]  Admission Date: 8/10/2023  Hospital Length of Stay: 4 days  Code Status: Full Code   Attending Provider: Gianluca Zarate MD   Consulting Provider: Juan Luis Azevedo MD  Primary Care Physician: Dustin Khan MD  Principal Problem:Chronic obstructive pulmonary disease with acute exacerbation    Patient information was obtained from patient and ER records.     Inpatient consult to Cardiology  Consult performed by: Juan Luis Azevedo MD  Consult ordered by: Gianluca Zarate MD        Subjective:     Chief Complaint:  Shortness of breath     HPI:   73 year old with history of severe COPD c/b centrilobular emphysema, former smoker (quit in 2017, 60 pk-yr), COVID-19, recurrent lobar pneumonia, osteoporosis, GERD, retinal histoplasmosis and pulmonary arterial hypertension. She presented to the ED on 8/10/23 with 3 weeks of worsening of shortness of breath. She was admitted for treatment of chronic obstructive pulmonary disease with an acute exacerbation. She is on 3L NC at home at baseline. She takes Breo, [place], albuterol, and 3x weekly azithromycin for home for COPD.     Cardiology was consulted for an episode of atrial fibrillation this morning at 9:00am captured on EKG. She was given 1 dose of IV metoprolol and started on apixiban.  Tele at patient examination at 14:00 showed patient was in normal sinus rhythm. She denies history of HTN, CHF, prior stroke, and previous knowledge of being in atrial fibrillation. She denies nausea, vomiting, constipation, syncope, palpitations and dizziness. She continues to endorse fatigue, shortness or breath, and wheezing.     Notable labs on admission: BNP 17    Recent Cardiac Investigations:   Echo 2/15/22 (largely unchanged from 2019 except L atrial enlargement)  -The left ventricle is normal in size with normal systolic function.  -The estimated ejection fraction is  65%.  -Normal left ventricular diastolic function.  -Tachycardia was present during the study  -Normal right ventricular size with normal right ventricular systolic function.  -Mild tricuspid regurgitation.  -There are segmental left ventricular wall motion abnormalities.  -Mild left atrial enlargement.  -Normal central venous pressure (3 mmHg).  -The estimated PA systolic pressure is 28 mmHg.    2D echo with doppler flow 7/3/18  -Pulmonary hypertension. The estimated PA systolic pressure is 45 mmHg.     EKG 8/15/23  Atrial fibrillation with rapid ventricular response   Incomplete right bundle branch block   Nonspecific ST abnormality   Abnormal ECG   When compared with ECG of 28-NOV-2022 14:11,   Atrial fibrillation has replaced Sinus rhythm   Vent. rate has increased BY  56 BPM   Incomplete right bundle branch block is now Present          Past Medical History:   Diagnosis Date    Cataract     COPD (chronic obstructive pulmonary disease)     COVID-19     History of COVID-19 1/17/2021    Still with mild dyspnea. Encouraged return to pulmonary rehab Recommend scheduling vaccination when appointment is available.     History of retinal hemorrhage     Lobar pneumonia 11/14/2021    Recurrent in RLL, no endobronchial lesion Needs speech evaluation and MBSS for recurrent aspiration in setting of dysphagia  Will need pulmonary [facility] at [facility].    Pathological fracture due to osteoporosis 7/6/2020    Retinal histoplasmosis     Secondary pulmonary arterial hypertension 7/3/2018    Secondary to emphysema and chronic respiratory failure, mild.       Past Surgical History:   Procedure Laterality Date    BRONCHOSCOPY WITH FLUOROSCOPY N/A 10/28/2019    Procedure: BRONCHOSCOPY, WITH FLUOROSCOPY;  Surgeon: Brooklynn Ruiz MD;  Location: [facility] OR [address] [place] [address];  Service: Endoscopy;  Laterality: N/A;    HAND SURGERY         Review of patient's allergies indicates:   Allergen Reactions    Albuterol     Ipratropium analogues       Difficulty breathing    Epinephrine        No current facility-administered medications on file prior to encounter.     Current Outpatient Medications on File Prior to Encounter   Medication Sig    ascorbic acid, vitamin C, (VITAMIN C) 500 MG tablet Take 500 mg by mouth 2 (two) times daily.     azelastine (ASTELIN) 137 mcg (0.1 %) nasal spray USE 1 SPRAY IN EACH NOSTRIL TWICE DAILY OR AS DIRECTED    azelastine (ASTELIN) 137 mcg (0.1 %) nasal spray USE 1 SPRAY IN EACH NOSTRIL TWICE DAILY OR AS DIRECTED    azithromycin (Z-JERSEY) 250 MG tablet Take 1 tablet (250 mg total) by mouth every Mon, Wed, Fri.    calcium carbonate (OS-STEPHANIE) 600 mg calcium (1,500 mg) Tab Take 1 tablet (600 mg total) by mouth once daily. Take Levofloxacin antibiotic at least 2 hours before calcium.    citalopram (CELEXA) 20 MG tablet Take 1 tablet (20 mg total) by mouth once daily.    fluticasone furoate-vilanteroL (BREO ELLIPTA) 200-25 mcg/dose DsDv diskus inhaler INHALE 1 PUFF INTO THE LUNGS DAILY    fluticasone propionate (FLONASE) 50 mcg/actuation nasal spray INSTILL 1 SPRAY IN EACH NOSTRIL EVERY DAY    levalbuterol (XOPENEX) 0.63 mg/3 mL nebulizer solution USE 1 VIAL IN NEBULIZER EVERY 8 HOURS AS NEEDED FOR WHEEZE Strength: 0.63 mg/3 mL    meloxicam (MOBIC) 7.5 MG tablet Take 1 tablet (7.5 mg total) by mouth once daily.    MULTIVIT-IRON-MIN-FOLIC ACID 3,500-18-0.4 UNIT-MG-MG ORAL CHEW Take 1 tablet by mouth once daily. Take Levofloxacin antibiotic at least 2 hours before multivitamin.    pantoprazole (PROTONIX) 40 MG tablet Take 1 tablet (40 mg total) by mouth once daily.    pulse oximeter (PULSE OXIMETER) device by Apply Externally route 2 (two) times a day. Use twice daily at 8 AM and 3 PM and record the value in TRIBAXhart as directed.    sodium chloride (OCEAN) 0.65 % nasal spray 1 spray by Nasal route 2 (two) times daily.    solifenacin (VESICARE) 5 MG tablet Take 1 tablet (5 mg total) by mouth once daily.    umeclidinium  (INCRUSE ELLIPTA) 62.5 mcg/actuation inhalation capsule Inhale 62.5 mcg into the lungs once daily.    [DISCONTINUED] furosemide (LASIX) 20 MG tablet Take 1 tablet (20 mg total) by mouth once daily. for 3 days     Family History       Problem Relation (Age of Onset)    Colon cancer Mother, Father    Macular degeneration Mother    Stroke Father          Tobacco Use    Smoking status: Former     Current packs/day: 0.00     Average packs/day: 1 pack/day for 36.0 years (36.0 ttl pk-yrs)     Types: Cigarettes     Start date: 1980     Quit date: 2016     Years since quittin.9    Smokeless tobacco: Never    Tobacco comments:     pt states she does not remember date   Substance and Sexual Activity    Alcohol use: Yes     Alcohol/week: 2.0 standard drinks of alcohol     Types: 2 Glasses of wine per week     Comment: 1-2 glasses a day     Drug use: No    Sexual activity: Yes     Partners: Male     Review of Systems   Constitutional: Positive for malaise/fatigue. Negative for fever.   HENT:  Positive for congestion.    Cardiovascular:  Positive for dyspnea on exertion. Negative for chest pain, irregular heartbeat, leg swelling, near-syncope, palpitations and syncope.   Skin:  Positive for color change (discoloration of skin at ankles).   Gastrointestinal:  Positive for diarrhea. Negative for abdominal pain, constipation, nausea and vomiting.   Neurological:  Positive for weakness. Negative for dizziness, focal weakness, light-headedness and loss of balance.   Psychiatric/Behavioral:  Negative for memory loss.      Objective:     Vital Signs (Most Recent):  Temp: 97.5 °F (36.4 °C) (08/15/23 1138)  Pulse: 94 (08/15/23 1319)  Resp: 18 (08/15/23 1138)  BP: 121/67 (08/15/23 1138)  SpO2: 96 % (08/15/23 1138) Vital Signs (24h Range):  Temp:  [97.3 °F (36.3 °C)-99.3 °F (37.4 °C)] 97.5 °F (36.4 °C)  Pulse:  [] 94  Resp:  [16-20] 18  SpO2:  [93 %-99 %] 96 %  BP: (121-166)/(67-83) 121/67     Weight: 93.9 kg (207  lb)  Body mass index is 31.48 kg/m².    SpO2: 96 %         Intake/Output Summary (Last 24 hours) at 8/15/2023 1436  Last data filed at 8/14/2023 1646  Gross per 24 hour   Intake --   Output 500 ml   Net -500 ml       Lines/Drains/Airways       Peripheral Intravenous Line  Duration                  Peripheral IV - Single Lumen 08/10/23 20 G Anterior;Left Forearm 5 days                     Physical Exam  Vitals and nursing note reviewed.   Constitutional:       General: She is not in acute distress.     Appearance: Normal appearance. She is not ill-appearing, toxic-appearing or diaphoretic.   HENT:      Head: Normocephalic and atraumatic.   Eyes:      General: No scleral icterus.     Conjunctiva/sclera: Conjunctivae normal.   Neck:      Vascular: No carotid bruit.   Cardiovascular:      Rate and Rhythm: Normal rate and regular rhythm.      Pulses: Normal pulses.      Heart sounds: Normal heart sounds. No murmur heard.     No friction rub. No gallop.   Pulmonary:      Effort: No respiratory distress.      Breath sounds: Wheezing present.      Comments: On 3L/min oxygen NC  Abdominal:      General: Abdomen is flat.      Palpations: Abdomen is soft.   Musculoskeletal:      Right lower leg: No edema.      Left lower leg: No edema.   Skin:     General: Skin is warm and dry.   Neurological:      General: No focal deficit present.      Mental Status: She is alert and oriented to person, place, and time.   Psychiatric:         Mood and Affect: Mood normal.          Significant Labs: CMP   Recent Labs   Lab 08/14/23  0406 08/15/23  0318   * 133*   K 4.5 3.7   CL 95 94*   CO2 32* 33*   GLU 96 85   BUN 13 11   CREATININE 0.6 0.5   CALCIUM 9.1 8.5*   ANIONGAP 8 6*    and CBC   Recent Labs   Lab 08/14/23  0406 08/15/23  0318   WBC 8.05 8.14   HGB 12.3 12.5   HCT 38.1 39.4    359         Assessment and Plan:     Atrial fibrillation with RVR  73 year old with history of severe COPD c/b centrilobular emphysema, former  smoker (quit in 2017, 60 pk-yr), COVID-19, recurrent lobar pneumonia, osteoporosis, GERD, retinal histoplasmosis and pulmonary arterial hypertension who presented to the ED on 8/10/23 with 3 weeks of worsening of shortness of breath. She was admitted for treatment of chronic obstructive pulmonary disease with an acute exacerbation.Cardiology was consulted for an episode of atrial fibrillation this morning at 9:00am captured on EKG.     Given 1 dose of IV metoprolol and started on apixiban. Tele at patient examination at 14:00 showed patient was in normal sinus rhythm. Given new onset atrial fibrillation in the setting of an acute exacerbation of COPD, atrial fibrillation may be secondary to comorbidity and treatment should be by managing underlying cause.    Our recommendations are as follows:    - If she converts back to atrial fibrillation recommend calcium channel blocker for rate control over metoprolol given history of COPD  - Recommend outpatient follow up with EP  - Patient's [id number] score: 2  - Recommend stopping anticoagulation with apixiban  - TTE given new onset atrial fibrillation  - Synchronized cardioversion if patient reverts to atrial fibrillation and becomes hemodynamically unstable.  - Cardiology will sign off on this patient. Please call with any additional questions or concerns.              VTE Risk Mitigation (From admission, onward)         Ordered     apixaban tablet 5 mg  2 times daily         08/15/23 0927     IP VTE HIGH RISK PATIENT  Once         08/10/23 2329     Place sequential compression device  Until discontinued         08/10/23 2329     Place sequential compression device  Until discontinued         08/10/23 2320                Thank you for your consult. I will sign off. Please contact us if you have any additional questions.    Juan Luis Azevedo MD  Cardiology   [facility] - [facility]

## 2023-08-16 ENCOUNTER — TELEPHONE (OUTPATIENT)
Dept: PULMONOLOGY | Facility: CLINIC | Age: 73
End: 2023-08-16
Payer: MEDICARE

## 2023-08-16 DIAGNOSIS — I48.91 ATRIAL FIBRILLATION WITH RVR: Primary | ICD-10-CM

## 2023-08-16 LAB
ANION GAP SERPL CALC-SCNC: 10 MMOL/L (ref 8–16)
BASOPHILS # BLD AUTO: 0.07 K/UL (ref 0–0.2)
BASOPHILS NFR BLD: 0.9 % (ref 0–1.9)
BUN SERPL-MCNC: 13 MG/DL (ref 8–23)
CALCIUM SERPL-MCNC: 8.6 MG/DL (ref 8.7–10.5)
CHLORIDE SERPL-SCNC: 96 MMOL/L (ref 95–110)
CO2 SERPL-SCNC: 30 MMOL/L (ref 23–29)
CREAT SERPL-MCNC: 0.6 MG/DL (ref 0.5–1.4)
DIFFERENTIAL METHOD: ABNORMAL
EOSINOPHIL # BLD AUTO: 0.4 K/UL (ref 0–0.5)
EOSINOPHIL NFR BLD: 5 % (ref 0–8)
ERYTHROCYTE [DISTWIDTH] IN BLOOD BY AUTOMATED COUNT: 12.8 % (ref 11.5–14.5)
EST. GFR  (NO RACE VARIABLE): >60 ML/MIN/1.73 M^2
GLUCOSE SERPL-MCNC: 84 MG/DL (ref 70–110)
HCT VFR BLD AUTO: 40.9 % (ref 37–48.5)
HGB BLD-MCNC: 12.9 G/DL (ref 12–16)
IMM GRANULOCYTES # BLD AUTO: 0.03 K/UL (ref 0–0.04)
IMM GRANULOCYTES NFR BLD AUTO: 0.4 % (ref 0–0.5)
LYMPHOCYTES # BLD AUTO: 2.2 K/UL (ref 1–4.8)
LYMPHOCYTES NFR BLD: 27.5 % (ref 18–48)
MAGNESIUM SERPL-MCNC: 2.3 MG/DL (ref 1.6–2.6)
MCH RBC QN AUTO: 30.4 PG (ref 27–31)
MCHC RBC AUTO-ENTMCNC: 31.5 G/DL (ref 32–36)
MCV RBC AUTO: 96 FL (ref 82–98)
MONOCYTES # BLD AUTO: 1.1 K/UL (ref 0.3–1)
MONOCYTES NFR BLD: 14.2 % (ref 4–15)
NEUTROPHILS # BLD AUTO: 4.1 K/UL (ref 1.8–7.7)
NEUTROPHILS NFR BLD: 52 % (ref 38–73)
NRBC BLD-RTO: 0 /100 WBC
PHOSPHATE SERPL-MCNC: 3.6 MG/DL (ref 2.7–4.5)
PLATELET # BLD AUTO: 329 K/UL (ref 150–450)
PMV BLD AUTO: 8.6 FL (ref 9.2–12.9)
POTASSIUM SERPL-SCNC: 4.2 MMOL/L (ref 3.5–5.1)
RBC # BLD AUTO: 4.25 M/UL (ref 4–5.4)
SODIUM SERPL-SCNC: 136 MMOL/L (ref 136–145)
WBC # BLD AUTO: 7.95 K/UL (ref 3.9–12.7)

## 2023-08-16 PROCEDURE — 94668 MNPJ CHEST WALL SBSQ: CPT

## 2023-08-16 PROCEDURE — 36415 COLL VENOUS BLD VENIPUNCTURE: CPT | Performed by: FAMILY MEDICINE

## 2023-08-16 PROCEDURE — 97161 PT EVAL LOW COMPLEX 20 MIN: CPT

## 2023-08-16 PROCEDURE — 80048 BASIC METABOLIC PNL TOTAL CA: CPT | Performed by: FAMILY MEDICINE

## 2023-08-16 PROCEDURE — 94664 DEMO&/EVAL PT USE INHALER: CPT

## 2023-08-16 PROCEDURE — 21400001 HC TELEMETRY ROOM

## 2023-08-16 PROCEDURE — 63600175 PHARM REV CODE 636 W HCPCS

## 2023-08-16 PROCEDURE — 99233 PR SUBSEQUENT HOSPITAL CARE,LEVL III: ICD-10-PCS | Mod: ,,,

## 2023-08-16 PROCEDURE — 27000221 HC OXYGEN, UP TO 24 HOURS

## 2023-08-16 PROCEDURE — 25000242 PHARM REV CODE 250 ALT 637 W/ HCPCS: Performed by: FAMILY MEDICINE

## 2023-08-16 PROCEDURE — 97110 THERAPEUTIC EXERCISES: CPT

## 2023-08-16 PROCEDURE — 25000003 PHARM REV CODE 250: Performed by: FAMILY MEDICINE

## 2023-08-16 PROCEDURE — 27000646 HC AEROBIKA DEVICE

## 2023-08-16 PROCEDURE — 99233 SBSQ HOSP IP/OBS HIGH 50: CPT | Mod: ,,,

## 2023-08-16 PROCEDURE — 97116 GAIT TRAINING THERAPY: CPT

## 2023-08-16 PROCEDURE — 94640 AIRWAY INHALATION TREATMENT: CPT

## 2023-08-16 PROCEDURE — 63700000 PHARM REV CODE 250 ALT 637 W/O HCPCS

## 2023-08-16 PROCEDURE — 94761 N-INVAS EAR/PLS OXIMETRY MLT: CPT

## 2023-08-16 PROCEDURE — 25000003 PHARM REV CODE 250: Performed by: INTERNAL MEDICINE

## 2023-08-16 PROCEDURE — 85025 COMPLETE CBC W/AUTO DIFF WBC: CPT | Performed by: FAMILY MEDICINE

## 2023-08-16 PROCEDURE — 94799 UNLISTED PULMONARY SVC/PX: CPT

## 2023-08-16 PROCEDURE — 83735 ASSAY OF MAGNESIUM: CPT | Performed by: FAMILY MEDICINE

## 2023-08-16 PROCEDURE — 25000003 PHARM REV CODE 250: Performed by: EMERGENCY MEDICINE

## 2023-08-16 PROCEDURE — 84100 ASSAY OF PHOSPHORUS: CPT | Performed by: FAMILY MEDICINE

## 2023-08-16 PROCEDURE — 25000003 PHARM REV CODE 250

## 2023-08-16 PROCEDURE — 99900035 HC TECH TIME PER 15 MIN (STAT)

## 2023-08-16 PROCEDURE — 97535 SELF CARE MNGMENT TRAINING: CPT

## 2023-08-16 RX ORDER — PREDNISONE 20 MG/1
60 TABLET ORAL DAILY
Qty: 12 TABLET | Refills: 0 | Status: SHIPPED | OUTPATIENT
Start: 2023-08-16 | End: 2023-08-21

## 2023-08-16 RX ORDER — BUSPIRONE HYDROCHLORIDE 5 MG/1
5 TABLET ORAL 2 TIMES DAILY
Qty: 60 TABLET | Refills: 11 | Status: SHIPPED | OUTPATIENT
Start: 2023-08-16 | End: 2024-08-15

## 2023-08-16 RX ORDER — DILTIAZEM HYDROCHLORIDE 120 MG/1
120 CAPSULE, EXTENDED RELEASE ORAL DAILY
Qty: 30 CAPSULE | Refills: 11 | Status: SHIPPED | OUTPATIENT
Start: 2023-08-16 | End: 2024-08-15

## 2023-08-16 RX ORDER — TALC
6 POWDER (GRAM) TOPICAL NIGHTLY PRN
Qty: 30 TABLET | Refills: 0 | Status: SHIPPED | OUTPATIENT
Start: 2023-08-16 | End: 2023-08-31 | Stop reason: SDUPTHER

## 2023-08-16 RX ORDER — HYDROXYZINE PAMOATE 25 MG/1
25 CAPSULE ORAL EVERY 6 HOURS PRN
Qty: 60 CAPSULE | Refills: 0 | Status: ON HOLD | OUTPATIENT
Start: 2023-08-16 | End: 2023-10-18 | Stop reason: HOSPADM

## 2023-08-16 RX ADMIN — CITALOPRAM HYDROBROMIDE 20 MG: 20 TABLET ORAL at 08:08

## 2023-08-16 RX ADMIN — BUSPIRONE HYDROCHLORIDE 5 MG: 5 TABLET ORAL at 08:08

## 2023-08-16 RX ADMIN — LEVALBUTEROL 1.25 MG: 1.25 SOLUTION, CONCENTRATE RESPIRATORY (INHALATION) at 07:08

## 2023-08-16 RX ADMIN — PREDNISONE 60 MG: 50 TABLET ORAL at 12:08

## 2023-08-16 RX ADMIN — PANTOPRAZOLE SODIUM 60 MG: 20 TABLET, DELAYED RELEASE ORAL at 08:08

## 2023-08-16 RX ADMIN — ACETAMINOPHEN 1000 MG: 500 TABLET ORAL at 08:08

## 2023-08-16 RX ADMIN — DILTIAZEM HYDROCHLORIDE 30 MG: 30 TABLET, FILM COATED ORAL at 08:08

## 2023-08-16 RX ADMIN — TIOTROPIUM BROMIDE INHALATION SPRAY 2 PUFF: 3.12 SPRAY, METERED RESPIRATORY (INHALATION) at 07:08

## 2023-08-16 RX ADMIN — GUAIFENESIN 600 MG: 600 TABLET, EXTENDED RELEASE ORAL at 08:08

## 2023-08-16 RX ADMIN — AZITHROMYCIN 500 MG: 250 TABLET, FILM COATED ORAL at 08:08

## 2023-08-16 RX ADMIN — CEFTRIAXONE 1 G: 1 INJECTION, POWDER, FOR SOLUTION INTRAMUSCULAR; INTRAVENOUS at 08:08

## 2023-08-16 RX ADMIN — OXYBUTYNIN CHLORIDE 5 MG: 5 TABLET, EXTENDED RELEASE ORAL at 08:08

## 2023-08-16 RX ADMIN — POLYETHYLENE GLYCOL 3350 17 G: 17 POWDER, FOR SOLUTION ORAL at 08:08

## 2023-08-16 RX ADMIN — DILTIAZEM HYDROCHLORIDE 30 MG: 30 TABLET, FILM COATED ORAL at 11:08

## 2023-08-16 RX ADMIN — FLUTICASONE FUROATE AND VILANTEROL TRIFENATATE 1 PUFF: 200; 25 POWDER RESPIRATORY (INHALATION) at 07:08

## 2023-08-16 RX ADMIN — DILTIAZEM HYDROCHLORIDE 30 MG: 30 TABLET, FILM COATED ORAL at 01:08

## 2023-08-16 RX ADMIN — DILTIAZEM HYDROCHLORIDE 30 MG: 30 TABLET, FILM COATED ORAL at 06:08

## 2023-08-16 RX ADMIN — Medication 6 MG: at 11:08

## 2023-08-16 RX ADMIN — LEVALBUTEROL 1.25 MG: 1.25 SOLUTION, CONCENTRATE RESPIRATORY (INHALATION) at 02:08

## 2023-08-16 RX ADMIN — LEVALBUTEROL 1.25 MG: 1.25 SOLUTION, CONCENTRATE RESPIRATORY (INHALATION) at 10:08

## 2023-08-16 NOTE — PLAN OF CARE
Continue toward set goals. Patient making great progress.     Goals to be met by: 8/27/2023     Patient will increase functional independence with ADLs by performing:    UE Dressing with Modified [place].  LE Dressing with Modified [place].  Grooming while seated at sink with Stand-by Assistance.  Toileting from toilet with Stand-by Assistance for hygiene and clothing management. Met 8/16  Toilet transfer to toilet with Stand-by Assistance. Met 8/16

## 2023-08-16 NOTE — PLAN OF CARE
Problem: Adult Inpatient Plan of Care  Goal: Plan of Care Review  Outcome: Ongoing, Progressing  Goal: Absence of Hospital-Acquired Illness or Injury  Outcome: Ongoing, Progressing     Problem: Functional Ability Impaired COPD (Chronic Obstructive Pulmonary Disease)  Goal: Optimal Level of Functional [place]  Outcome: Ongoing, Progressing     Problem: Infection COPD (Chronic Obstructive Pulmonary Disease)  Goal: Absence of Infection Signs and Symptoms  Outcome: Ongoing, Progressing     Problem: Oral Intake Inadequate COPD (Chronic Obstructive Pulmonary Disease)  Goal: Improved Nutrition Intake  Outcome: Ongoing, Progressing     Problem: Fall Injury Risk  Goal: Absence of Fall and Fall-Related Injury  Outcome: Ongoing, Progressing     Problem: Skin Injury Risk Increased  Goal: Skin Health and Integrity  Outcome: Ongoing, Progressing

## 2023-08-16 NOTE — ASSESSMENT & PLAN NOTE
Patient with Paroxysmal (<7 days) atrial fibrillation which is controlled currently with Calcium Channel Blocker. Patient is currently in sinus rhythm.[id number] Score: 2. Anticoagulation indicated. Anticoagulation done with eliquis.  - noted on tele and EKG on 08/15. No history of A fib  - Cardiology consulted:  - If she converts back to atrial fibrillation recommend calcium channel blocker for rate control over metoprolol given history of COPD  - Recommend outpatient follow up with EP  - Patient's [id number] score: 2  - Recommend stopping anticoagulation with apixiban  - TTE given new onset atrial fibrillation  - Synchronized cardioversion if patient reverts to atrial fibrillation and becomes hemodynamically unstable.  - echo reviewed  - HR better controlled on diltiazem so will continue  - will stop AC as she has history of retinal hemorrhages

## 2023-08-16 NOTE — PROGRESS NOTES
[facility] - Observation [address] [place] [address] Medicine  Progress Note    Patient Name: Estefani Fam  MRN: [id number]  Patient Class: IP- Inpatient   Admission Date: 8/10/2023  Length of Stay: 5 days  Attending Physician: Gianluca Zarate MD  Primary Care Provider: Dustin Khan MD        Subjective:     Principal Problem:Chronic obstructive pulmonary disease with acute exacerbation        HPI:  73-year-old female with past medical history as noted below, COPD on 3 L oxygen at baseline, coming in with 3 weeks of worsening shortness of breath.  She also feels like she has a worsening cough which is productive of clear sputum.  She was last time she felt like that she had pneumonia.  No fevers at home which she says she does not get a fever when she gets infections.  No sick contacts.  She is been using her leave albuterol at home with minimal improvement in her symptoms.  No chest pain.  No abdominal pain.  No vomiting diarrhea.  No leg swelling.    In the ED patient afebrile and hemodynamically stable saturating well on 3L NC. Patient with continued dyspnea despite solumedrol, iv mag, and levalbuterol. Patient admitted to the care of medicine for further evaluation and management of COPD exacerbation.      Overview/Hospital Course:  Estefani Fam is a 74 yo F admitted to hospital medicine for further management of COPD exacerbation. Started on prednisone, CAP coverage, and levalbuterol treatments. Afebrile, no leukocytosis. Satting well on home 3L. Still with severe weakness and SOB. PT/OT consulted. Attempted to complete 6 WMT with therapy, but became very SOB and anxious with just a few steps. Repeat CT with stable but severe bullous emphysema with scarring, no focal consolidation. Pulmonology consulted, who recommended doing a longer course of antibiotics (5 days) and steroids (10 days). She can resume 3x weekly azithromycin at discharge. On 8/15, began having palpitations, EKG in a-fib with RVR, given  metoprolol which improved the rate and she spontaneously converted back sinus rhythm. Cards consulted, [id number] score of 2, hold anticoagulation at this time. Will start on diltiazem for rate control. Echo with EF of 65%, unable to assess diastolic function 2/2 E-A fusion, normal CVP. Plan to discharge home when medically ready with pulmonology and EP f/u. She was recently established with pulmonary rehab, which she will also need to continue. Will need repeat 6MWT prior to discharge. Home health ordered at discharge.       Interval History: ANGI MAY. States her SOB and weakness continues to improve. Discussed over oxygenating on her home 3L, decreased O2 to 2L with improvement. Able to ambulate to and from her door x 3 with therapy. Still in NSR, started on diltiazem with improvement in palpitations. Pt reports history of retinal hemorrhages and states she was told to never take blood thinners, stopping eliquis.    Review of Systems   Constitutional:  Negative for chills and fever.   Respiratory:  Positive for shortness of breath (continues to improve). Negative for chest tightness.    Cardiovascular:  Negative for chest pain and leg swelling.   Gastrointestinal:  Negative for abdominal pain and nausea.   Neurological:  Negative for dizziness and weakness.     Objective:     Vital Signs (Most Recent):  Temp: 97.8 °F (36.6 °C) (08/16/23 1158)  Pulse: 83 (08/16/23 1158)  Resp: 18 (08/16/23 1158)  BP: (!) 119/58 (08/16/23 1158)  SpO2: 95 % (08/16/23 1158) Vital Signs (24h Range):  Temp:  [97.7 °F (36.5 °C)-98 °F (36.7 °C)] 97.8 °F (36.6 °C)  Pulse:  [] 83  Resp:  [16-20] 18  SpO2:  [93 %-100 %] 95 %  BP: (119-154)/(58-83) 119/58     Weight: 93.9 kg (207 lb)  Body mass index is 31.47 kg/m².  No intake or output data in the 24 hours ending 08/16/23 1230      Physical Exam  Vitals and nursing note reviewed.   Constitutional:       Appearance: She is well-developed.   Eyes:      Pupils: Pupils are equal,  round, and reactive to light.   Cardiovascular:      Rate and Rhythm: Normal rate and regular rhythm.   Pulmonary:      Breath sounds: No wheezing.      Comments: Diminished air movement throughout lung fields. Pursed lip breathing at times noted. On 2L NC  Abdominal:      Palpations: Abdomen is soft.      Tenderness: There is no abdominal tenderness.   Musculoskeletal:         General: No tenderness.   Skin:     General: Skin is warm and dry.   Neurological:      Mental Status: She is alert and oriented to person, place, and time.   Psychiatric:         Behavior: Behavior normal.             Significant Labs: All pertinent labs within the past 24 hours have been reviewed.  BMP:   Recent Labs   Lab 08/16/23 0512   GLU 84      K 4.2   CL 96   CO2 30*   BUN 13   CREATININE 0.6   CALCIUM 8.6*   MG 2.3     CBC:   Recent Labs   Lab 08/15/23  0318 08/16/23 0512   WBC 8.14 7.95   HGB 12.5 12.9   HCT 39.4 40.9    329       Significant Imaging: I have reviewed all pertinent imaging results/findings within the past 24 hours.      Assessment/Plan:      * Chronic obstructive pulmonary disease with acute exacerbation  - IP COPD Pathway initiated  - sp solumedrol 125mg iv x 1 and iv mag x1 with EMS  - start prednisone 40mg daily>> increased to 60 mg daily   - start ceftriaxone and azithromycin (completed 5 day course)  - levalbuterol neb q6h while awake  - monitor pulse Ox and supplemental O2 as needed  - continue home LABA-ICS and LAMA  - started mucinex, IS and chest physiotherapy  - CT chest Grossly stable severe bullous emphysema with scarring anomaly in the lung apices.  No focal consolidation.   - will need 6 MWT prior to discharge  - Pulm consulted:  - Continue steroids, will likely need more than 5 days, can extend steroids to 10 days  - Continue antibiotics to complete 5 days of coverage, then resume 3x weekly azithromycin  - Continue home controllers, patient will resume those on discharge without changes  as well  - bronchodilator therapy scheduled and PRN  - Goal O2 sat is 88-92% to avoid hyperoxygenation in chronic CO2 retention  - No further mucomyst, will not benefit this pathology and patient did not tolerate it  - follows with OP pulm and was recently established with pulmonary rehab    Atrial fibrillation with RVR  Patient with Paroxysmal (<7 days) atrial fibrillation which is controlled currently with Calcium Channel Blocker. Patient is currently in sinus rhythm.[id number] Score: 2. Anticoagulation indicated. Anticoagulation done with eliquis.  - noted on tele and EKG on 08/15. No history of A fib  - Cardiology consulted:  - If she converts back to atrial fibrillation recommend calcium channel blocker for rate control over metoprolol given history of COPD  - Recommend outpatient follow up with EP  - Patient's [id number] score: 2  - Recommend stopping anticoagulation with apixiban  - TTE given new onset atrial fibrillation  - Synchronized cardioversion if patient reverts to atrial fibrillation and becomes hemodynamically unstable.  - echo reviewed  - HR better controlled on diltiazem so will continue  - will stop AC as she has history of retinal hemorrhages    Debility  - increased weakness the past few weeks and unable to walk more than a few steps due to severe SOB  - PT/OT consulted, patient unable to work much with OT 2/2 dyspnea    - OT recommending shower chair at discharge     Chronic respiratory failure with hypoxia, on home oxygen therapy  Patient with Hypercapnic and Hypoxic Respiratory failure which is Acute on chronic.  she is on home oxygen at 2 LPM. Supplemental oxygen was provided and noted-      Signs/symptoms of respiratory failure include- tachypnea, increased work of breathing and wheezing. Contributing diagnoses includes - COPD Labs and images were reviewed. Patient Has not had a recent ABG. Will treat underlying causes and adjust management of respiratory failure as follows:    - COPD  management as above    Former heavy tobacco smoker  - 60 pack year history  - quit in 2016      VTE Risk Mitigation (From admission, onward)         Ordered     IP VTE HIGH RISK PATIENT  Once         08/10/23 2329     Place sequential compression device  Until discontinued         08/10/23 2329     Place sequential compression device  Until discontinued         08/10/23 2325                Discharge Planning   RENARD: 8/16/2023     Code Status: Full Code   Is the patient medically ready for discharge?: No    Reason for patient still in hospital (select all that apply): Patient trending condition and Treatment  Discharge Plan A: Home with family                  Antonieta Umanzor PA-C  Department of Hospital Medicine   [facility]

## 2023-08-16 NOTE — CARE UPDATE
[facility] - [facility]ation 11H      HOME HEALTH ORDERS  FACE TO FACE ENCOUNTER    Patient Name: Estefani Fam  YOB: 1950    PCP: Dustin Khan MD   PCP Address: [address] [facility] [place] [address]  PCP Phone Number: [phone]  PCP Fax: [phone]    Encounter Date: 8/10/23    Admit to Home Health    Diagnoses:  Active Hospital Problems    Diagnosis  POA    *Chronic obstructive pulmonary disease with acute exacerbation [J44.1]  Yes    Atrial fibrillation with RVR [I48.91]  Clinically Undetermined    Debility [R53.81]  Yes    Chronic respiratory failure with hypoxia, on home oxygen therapy [J96.11, Z99.81]  Not Applicable     Chronic     Continues to benefit from supplemental oxygen at 3L/M      Former heavy tobacco smoker [Z87.891]  Not Applicable     Chronic      Resolved Hospital Problems   No resolved problems to display.       Follow Up Appointments:  Future Appointments   Date Time Provider Department Center   8/21/2023  1:30 PM [facility]   8/23/2023  1:30 PM [facility], [facility]   8/28/2023  1:30 PM [facility]UL[facility]ARY REHAB, [facility]   8/30/2023  1:30 PM PULMONARY REHAB, [facility]   9/6/2023  1:30 PM PULMONARY REHAB, [facility]   9/11/2023  1:30 PM PULMONARY REHAB, [facility]   9/13/2023  1:30 PM PULMONARY REHAB, [facility]   9/18/2023  1:30 PM PULMONARY REHAB, [facility]   9/20/2023  1:30 PM PULMONARY REHAB, [facility]   9/25/2023  1:30 PM PULMONARY REHAB, [facility]   9/27/2023  1:30 PM [facility], [facility]   10/2/2023  1:30 PM [facility]   10/4/2023  1:30 PM [facility]   10/9/2023  1:30 PM [facility]AB, [facility]  [facility] [place]   10/11/2023  1:30 PM [facility]HAB, [facility] [place]   10/16/2023  1:30 PM [facility] REHAB, [facility] [place]   10/18/2023  1:30 PM [facility]ARY REHAB, [facility] [place]   10/23/2023  1:30 PM [facility]   10/25/2023  1:30 PM [facility] REHAB, [facility] [place]   10/26/2023  2:45 PM INJECTION NOM AMB INF [facility]   10/30/2023  1:30 PM [facility]ARY REHAB, [facility] [place]   11/1/2023  1:30 PM PULMONARY REHAB, [facility]   11/6/2023  1:30 PM PULMONARY REHAB, [facility]   11/8/2023 11:00 AM [facility]   11/8/2023  1:30 PM PULMONARY REHAB, [facility]   11/13/2023  1:30 PM PULMONARY REHAB, [facility]   11/15/2023  1:30 PM PULMONARY REHAB, [facility]   11/20/2023  1:30 PM PULMONARY REHAB, [facility]   11/22/2023  1:30 PM PULMONARY REHAB, [facility]       Allergies:  Review of patient's allergies indicates:   Allergen Reactions    Albuterol     Ipratropium analogues      Difficulty breathing    Epinephrine        Medications: Review discharge medications with patient and family and provide education.    Current Facility-Administered Medications   Medication Dose Route Frequency Provider Last Rate Last Admin    acetaminophen tablet 1,000 mg  1,000 mg Oral Q8H PRN Chucky Ann MD   1,000 mg at 08/13/23 2042    azithromycin tablet 500 mg  500 mg Oral Daily Polly Hoyos PA-C   500 mg at 08/16/23 0852    busPIRone tablet 5 mg  5 mg Oral BID Gianluca Zarate MD   5 mg at 08/16/23 0852    cefTRIAXone (ROCEPHIN) 1 g in dextrose 5 % in water (D5W) 100 mL IVPB (MB+)  1 g Intravenous Q24H Polly Hoyos PA-C   Stopped at 08/16/23 0921    citalopram tablet 20 mg  20 mg Oral Daily Seth  Chucky DENNY MD   20 mg at 08/16/23 0852    diltiaZEM injection 10 mg  10 mg Intravenous Q15 Min PRN Antonieta Umanzor PA-C        diltiaZEM tablet 30 mg  30 mg Oral Q6H Antonieta Umanzor PA-C   30 mg at 08/16/23 0853    fluticasone furoate-vilanteroL 200-25 mcg/dose diskus inhaler 1 puff  1 puff Inhalation Daily Chucky Ann MD   1 puff at 08/16/23 0719    guaiFENesin 12 hr tablet 600 mg  600 mg Oral BID Antonieta Umanzor PA-C   600 mg at 08/16/23 0852    hydrOXYzine pamoate capsule 25 mg  25 mg Oral Q6H PRN Gianluca Zarate MD        levalbuterol nebulizer solution 1.25 mg  1.25 mg Nebulization Q6H WAKE Chucky Ann MD   1.25 mg at 08/16/23 0719    melatonin tablet 6 mg  6 mg Oral Nightly PRN Leander Rodriges MD   6 mg at 08/11/23 0010    melatonin tablet 6 mg  6 mg Oral Nightly PRN Chucky Ann MD   6 mg at 08/15/23 2108    ondansetron injection 4 mg  4 mg Intravenous Q12H PRN Chucky Ann MD        oxybutynin 24 hr tablet 5 mg  5 mg Oral Daily Chucky Ann MD   5 mg at 08/16/23 0852    pantoprazole EC tablet 60 mg  60 mg Oral Daily Antonieta Umanzor PA-C   60 mg at 08/16/23 0852    polyethylene glycol packet 17 g  17 g Oral Daily Chucky Ann MD   17 g at 08/16/23 0851    predniSONE tablet 60 mg  60 mg Oral Daily Antonieta Umanzor PA-C        sodium chloride 0.65 % nasal spray 1 spray  1 spray Each Nostril BID PRN Chucky Ann MD        sodium chloride 0.9% flush 10 mL  10 mL Intravenous PRN Leander Rodriges MD        sodium chloride 0.9% flush 3 mL  3 mL Intravenous PRN Chucky Ann MD        tiotropium bromide 2.5 mcg/actuation inhaler 2 puff  2 puff Inhalation Daily Gianluca Zarate MD   2 puff at 08/16/23 0765     Current Discharge Medication List        START taking these medications    Details   busPIRone (BUSPAR) 5 MG Tab Take 1 tablet (5 mg total) by mouth 2 (two) times daily.  Qty: 60 tablet, Refills: 11      diltiaZEM (DILACOR XR) 120 MG CDCR  Take 1 capsule (120 mg total) by mouth once daily.  Qty: 30 capsule, Refills: 11      hydrOXYzine pamoate (VISTARIL) 25 MG Cap Take 1 capsule (25 mg total) by mouth every 6 (six) hours as needed (Anxiety).  Qty: 60 capsule, Refills: 0      melatonin (MELATIN) 3 mg tablet Take 2 tablets (6 mg total) by mouth nightly as needed for Insomnia.  Qty: 30 tablet, Refills: 0      predniSONE (DELTASONE) 20 MG tablet Take 3 tablets (60 mg total) by mouth once daily. for 4 days  Qty: 12 tablet, Refills: 0           CONTINUE these medications which have NOT CHANGED    Details   ascorbic acid, vitamin C, (VITAMIN C) 500 MG tablet Take 500 mg by mouth 2 (two) times daily.       azelastine (ASTELIN) 137 mcg (0.1 %) nasal spray USE 1 SPRAY IN EACH NOSTRIL TWICE DAILY OR AS DIRECTED  Qty: 30 mL, Refills: 3      azithromycin (Z-JERSEY) 250 MG tablet Take 1 tablet (250 mg total) by mouth every Mon, Wed, Fri.  Qty: 36 tablet, Refills: 3      calcium carbonate (OS-STEPHANIE) 600 mg calcium (1,500 mg) Tab Take 1 tablet (600 mg total) by mouth once daily. Take Levofloxacin antibiotic at least 2 hours before calcium.  Refills: 0      citalopram (CELEXA) 20 MG tablet Take 1 tablet (20 mg total) by mouth once daily.  Qty: 90 tablet, Refills: 3    Associated Diagnoses: Anxiety      fluticasone furoate-vilanteroL (BREO ELLIPTA) 200-25 mcg/dose DsDv diskus inhaler INHALE 1 PUFF INTO THE LUNGS DAILY  Qty: 60 each, Refills: 11      fluticasone propionate (FLONASE) 50 mcg/actuation nasal spray INSTILL 1 SPRAY IN EACH NOSTRIL EVERY DAY  Qty: 16 g, Refills: 0      levalbuterol (XOPENEX) 0.63 mg/3 mL nebulizer solution USE 1 VIAL IN NEBULIZER EVERY 8 HOURS AS NEEDED FOR WHEEZE Strength: 0.63 mg/3 mL  Qty: 750 mL, Refills: 2    Associated Diagnoses: COPD (chronic obstructive pulmonary disease) with acute bronchitis      meloxicam (MOBIC) 7.5 MG tablet Take 1 tablet (7.5 mg total) by mouth once daily.  Qty: 30 tablet, Refills: 1    Associated Diagnoses: Chronic  groin pain, left      MULTIVIT-IRON-MIN-FOLIC ACID 3,500-18-0.4 UNIT-MG-MG ORAL CHEW Take 1 tablet by mouth once daily. Take Levofloxacin antibiotic at least 2 hours before multivitamin.  Refills: 0      pantoprazole (PROTONIX) 40 MG tablet Take 1 tablet (40 mg total) by mouth once daily.  Qty: 90 tablet, Refills: 3      pulse oximeter (PULSE OXIMETER) device by Apply Externally route 2 (two) times a day. Use twice daily at 8 AM and 3 PM and record the value in [place] as directed.  Qty: 1 each, Refills: 0    Comments: This is a NO CHARGE item.  Please override price to zero.  DO NOT PRINT.  NORMAL MODE e-PRESCRIBE ONLY.      sodium chloride (OCEAN) 0.65 % nasal spray 1 spray by Nasal route 2 (two) times daily.  Qty: 88 mL, Refills: 12      solifenacin (VESICARE) 5 MG tablet Take 1 tablet (5 mg total) by mouth once daily.  Qty: 30 tablet, Refills: 11    Associated Diagnoses: Urinary frequency      umeclidinium (INCRUSE ELLIPTA) 62.5 mcg/actuation inhalation capsule Inhale 62.5 mcg into the lungs once daily.  Qty: 90 each, Refills: 3               I have seen and examined this patient within the last 30 days. My clinical findings that support the need for the home health skilled services and home bound status are the following:no   Medical restrictions requiring assistance of another human to leave home due to  Dyspnea on exertion (SOB).     Diet:   regular diet        Referrals/ Consults  Physical Therapy to evaluate and treat. Evaluate for home safety and equipment needs; Establish/upgrade home exercise program. Perform / instruct on therapeutic exercises, gait training, transfer training, and Range of Motion.  Occupational Therapy to evaluate and treat. Evaluate home environment for safety and equipment needs. Perform/Instruct on transfers, ADL training, ROM, and therapeutic exercises.  Aide to provide assistance with personal care, ADLs, and vital signs.    Activities:   activity as tolerated    Nursing:   Agency  to admit patient within 24 hours of hospital discharge unless specified on physician order or at patient request    SN to complete comprehensive assessment including routine vital signs. Instruct on disease process and s/s of complications to report to MD. Review/verify medication list sent home with the patient at time of discharge  and instruct patient/caregiver as needed. Frequency may be adjusted depending on start of care date.     Skilled nurse to perform up to 3 visits PRN for symptoms related to diagnosis    Notify MD if SBP > 160 or < 90; DBP > 90 or < 50; HR > 120 or < 50; Temp > 101; O2 < 88%    Ok to schedule additional visits based on staff availability and patient request on consecutive days within the home health episode.    When multiple disciplines ordered:    Start of Care occurs on Sunday - Wednesday schedule remaining discipline evaluations as ordered on separate consecutive days following the start of care.    Thursday SOC -schedule subsequent evaluations Friday and Monday the following week.     Friday - Saturday SOC - schedule subsequent discipline evaluations on consecutive days starting Monday of the following week.      Miscellaneous   Home Oxygen:  Oxygen at 3 L/min nasal canula to be used:  Continuously., Notify physician if oxygen saturation less than 88%, and Consult respiratory therapy for instruction on home oxygen use    Home Health Aide:  Physical Therapy Three times weekly, Occupational Therapy Three times weekly, and Home Health Aide Three times weekly, Respiratory therapy three times a week    Wound Care Orders  no    I certify that this patient is confined to her home and needs intermittent skilled nursing care, physical therapy, and occupational therapy.           Initial (On Arrival)

## 2023-08-16 NOTE — SUBJECTIVE & OBJECTIVE
Interval History: ANGI MAY. States her SOB and weakness continues to improve. Discussed over oxygenating on her home 3L, decreased O2 to 2L with improvement. Able to ambulate to and from her door x 3 with therapy. Still in NSR, started on diltiazem with improvement in palpitations. Pt reports history of retinal hemorrhages and states she was told to never take blood thinners, stopping eliquis.    Review of Systems   Constitutional:  Negative for chills and fever.   Respiratory:  Positive for shortness of breath (continues to improve). Negative for chest tightness.    Cardiovascular:  Negative for chest pain and leg swelling.   Gastrointestinal:  Negative for abdominal pain and nausea.   Neurological:  Negative for dizziness and weakness.     Objective:     Vital Signs (Most Recent):  Temp: 97.8 °F (36.6 °C) (08/16/23 1158)  Pulse: 83 (08/16/23 1158)  Resp: 18 (08/16/23 1158)  BP: (!) 119/58 (08/16/23 1158)  SpO2: 95 % (08/16/23 1158) Vital Signs (24h Range):  Temp:  [97.7 °F (36.5 °C)-98 °F (36.7 °C)] 97.8 °F (36.6 °C)  Pulse:  [] 83  Resp:  [16-20] 18  SpO2:  [93 %-100 %] 95 %  BP: (119-154)/(58-83) 119/58     Weight: 93.9 kg (207 lb)  Body mass index is 31.47 kg/m².  No intake or output data in the 24 hours ending 08/16/23 1230      Physical Exam  Vitals and nursing note reviewed.   Constitutional:       Appearance: She is well-developed.   Eyes:      Pupils: Pupils are equal, round, and reactive to light.   Cardiovascular:      Rate and Rhythm: Normal rate and regular rhythm.   Pulmonary:      Breath sounds: No wheezing.      Comments: Diminished air movement throughout lung fields. Pursed lip breathing at times noted. On 2L NC  Abdominal:      Palpations: Abdomen is soft.      Tenderness: There is no abdominal tenderness.   Musculoskeletal:         General: No tenderness.   Skin:     General: Skin is warm and dry.   Neurological:      Mental Status: She is alert and oriented to person, place, and time.    Psychiatric:         Behavior: Behavior normal.             Significant Labs: All pertinent labs within the past 24 hours have been reviewed.  BMP:   Recent Labs   Lab 08/16/23  0512   GLU 84      K 4.2   CL 96   CO2 30*   BUN 13   CREATININE 0.6   CALCIUM 8.6*   MG 2.3     CBC:   Recent Labs   Lab 08/15/23  0318 08/16/23  0512   WBC 8.14 7.95   HGB 12.5 12.9   HCT 39.4 40.9    329       Significant Imaging: I have reviewed all pertinent imaging results/findings within the past 24 hours.

## 2023-08-16 NOTE — ASSESSMENT & PLAN NOTE
- IP COPD Pathway initiated  - sp solumedrol 125mg iv x 1 and iv mag x1 with EMS  - start prednisone 40mg daily>> increased to 60 mg daily   - start ceftriaxone and azithromycin (completed 5 day course)  - levalbuterol neb q6h while awake  - monitor pulse Ox and supplemental O2 as needed  - continue home LABA-ICS and LAMA  - started mucinex, IS and chest physiotherapy  - CT chest Grossly stable severe bullous emphysema with scarring anomaly in the lung apices.  No focal consolidation.   - will need 6 MWT prior to discharge  - Pulm consulted:  - Continue steroids, will likely need more than 5 days, can extend steroids to 10 days  - Continue antibiotics to complete 5 days of coverage, then resume 3x weekly azithromycin  - Continue home controllers, patient will resume those on discharge without changes as well  - bronchodilator therapy scheduled and PRN  - Goal O2 sat is 88-92% to avoid hyperoxygenation in chronic CO2 retention  - No further mucomyst, will not benefit this pathology and patient did not tolerate it  - follows with OP pulm and was recently established with pulmonary rehab

## 2023-08-16 NOTE — PLAN OF CARE
Problem: Adult Inpatient Plan of Care  Goal: Plan of Care Review  Outcome: Ongoing, Progressing     Problem: Infection COPD (Chronic Obstructive Pulmonary Disease)  Goal: Absence of Infection Signs and Symptoms  Outcome: Ongoing, Progressing     Problem: Fall Injury Risk  Goal: Absence of Fall and Fall-Related Injury  Outcome: Ongoing, Progressing

## 2023-08-16 NOTE — PLAN OF CARE
08/16/23 1503   Post-Acute Status   Post-Acute Authorization Home Health   Home Health Status Referrals Sent     Pt is expected to discharge home with home health. SW sent referrals via [facility] and is waiting for an accepting agency.    BROOKE will continue to follow up.      Mary Lowe LMSW  [facility]  Ext. [phone]

## 2023-08-16 NOTE — PLAN OF CARE
08/16/23 1506   Post-Acute Status   Post-Acute Authorization Home Health   Home Health Status Set-up Complete/Auth obtained     SW received notification that pt has been accepted with [facility] and they will start services on tomorrow, 8/17.    Mary Lowe LMSW  [facility]  Ext. [phone]

## 2023-08-16 NOTE — TELEPHONE ENCOUNTER
Spoke with pt, informed her that I have received her message. Patient states that she did not contact our department and does not need appointment./ I verbalized to pt that I understand.

## 2023-08-16 NOTE — PT/OT/SLP PROGRESS
"Occupational Therapy   Treatment    Name: Estefani Fam  MRN: [id number]  Admitting Diagnosis:  Chronic obstructive pulmonary disease with acute exacerbation       Recommendations:     Discharge Recommendations: home health OT  Discharge Equipment Recommendations:  shower chair  Barriers to discharge:  None    Assessment:     Estefani Fam is a 73 y.o. female with a medical diagnosis of Chronic obstructive pulmonary disease with acute exacerbation. Pt tolerated OT session well with good participation and motivation. Pt is progressing well overall but does remain limited mainly due to shortness of breath causing fatigue and weakness. Pt would continue to benefit from skilled OT services to maximize functional (I) & facilitate safe discharge. Performance deficits affecting function are weakness, impaired endurance, impaired self care skills, impaired functional mobility, impaired balance, decreased coordination.     Rehab Prognosis:  Good; patient would benefit from acute skilled OT services to address these deficits and reach maximum level of function.       Plan:     Patient to be seen 3 x/week to address the above listed problems via self-care/home management, therapeutic activities, therapeutic exercises, neuromuscular re-education  Plan of Care Expires: 09/13/23  Plan of Care Reviewed with: patient    Subjective     Chief Complaint: "I feel much better than the other day"  Patient/Family Comments/goals: Return home  Pain/Comfort:  Pain Rating 1: 0/10    Objective:     Communicated with: JOEL Zavala prior to session.  Patient found  sitting on BSC  with PureWick, peripheral IV, telemetry upon OT entry to room.    General Precautions: Standard, fall    Orthopedic Precautions:N/A  Braces: N/A  Respiratory Status: Nasal cannula, flow 3 L/min     Occupational Performance:     Bed Mobility:    Patient completed Rolling/Turning to Left with  stand by assistance  Patient completed Rolling/Turning to Right with stand " by assistance  Patient completed Scooting/Bridging with stand by assistance  Patient completed Supine to Sit with stand by assistance     Functional Mobility/Transfers:  Patient completed Sit <> Stand Transfer with stand by assistance  with  no assistive device   Patient completed Toilet Transfer Stand Pivot technique with independence with  no AD  Functional Mobility: Patient completed transfer from [facility] to bed with independence. After help with fastening and donning of new brief patient laid supine. Patient then sat on opposite side of bed to complete exercise program with therapist.     Activities of Daily Living:  Upper Body Dressing: contact guard assistance to don 2nd gown  Lower Body Dressing: supervision set up of socks  Toileting: independence at bedside commode      [facility] 6 Click ADL: 22    Treatment & Education:  Treatment as stated above. After stated above treatment, patient completed HEP seated EOB with therapist. 5x reps all motions all planes with use of red theraband. Patient encouraged to complete exercises at least once per day to maintain strength and to attempt to progress to further reps to help build endurance. Patient also completed 10x sit to stand exercises (in 5x increments with seated rest break in between).   -Education on energy conservation, breathing techniques, and task modification to maximize safety and (I) during ADLs and mobility  -Education on importance of OOB activity to improve overall activity tolerance and promote recovery  -Pt educated to call for assistance and to transfer with hospital staff only  -Provided education regarding role of OT, POC, & discharge recommendations with pt verbalizing understanding.  Pt had no further questions & when asked whether there were any concerns pt reported none.      Patient left supine with all lines intact, call button in reach, and nursing notified    GOALS:   Multidisciplinary Problems       Occupational Therapy Goals           Problem: Occupational Therapy    Goal Priority Disciplines Outcome Interventions   Occupational Therapy Goal     OT, PT/OT Ongoing, Progressing    Description: Goals to be met by: 8/27/2023     Patient will increase functional independence with ADLs by performing:    UE Dressing with Modified [place].  LE Dressing with Modified [place].  Grooming while seated at sink with Stand-by Assistance.  Toileting from toilet with Stand-by Assistance for hygiene and clothing management.   Toilet transfer to toilet with Stand-by Assistance.                         Time Tracking:     OT Date of Treatment: 08/16/23  OT Start Time: 0959  OT Stop Time: 1030  OT Total Time (min): 31 min    Billable Minutes:Self Care/Home Management 16  Therapeutic Exercise 15    OT/GREGORIA: OT          8/16/2023

## 2023-08-16 NOTE — PLAN OF CARE
Evaluation Completed 8/16/2023  Problem: Physical Therapy  Goal: Physical Therapy Goal  Description: Goals to be met by 8/30/2023    1. Pt supine to sit with independence-not met  2. Pt sit to supine with independence-not met  3. Pt sit to stand with independence with no AD-not met  4. Pt to perform gait 150ft with independence with no AD-not met  Outcome: Ongoing, Progressing   Ervin Rivera, SPT

## 2023-08-16 NOTE — PT/OT/SLP EVAL
Physical Therapy  Evaluation and Treatment    Patient Name:  Estefani Fam   MRN:  [id number]    Recent Surgery: * No surgery found *      Recommendations:     Discharge Recommendations:  other (see comments)   Discharge Equipment Recommendations: none   Barriers to discharge: Increased level of assist    Highest Level of Mobility: 3 x 20 ft with no AD  Assistance Required: SBA    Assessment:     Estefani Fam is a 73 y.o. female admitted with a medical diagnosis of Chronic obstructive pulmonary disease with acute exacerbation. She presents with the following impairments/functional limitations:  impaired endurance, impaired cardiopulmonary response to activity    Pt agreeable to PT session. Pt reports independent PLOF. Pt is currently limited by above listed deficits and now requires SBA for bed mobility, transfers, and gait. Pt vital monitor throughout treatment, vitals remained stable. Pt O2 saturation after gait training 96%. Pt required rest breaks in between trials due to SOB.     Pt would benefit from continued skilled acute PT 3x/wk to address above listed functional deficits, provide patient/caregiver education, reduce fall risk, and maximize (I) and safety with functional mobility. After hospital discharge, pt would benefit from HHPT to maximize rehab potential.    Rehab Prognosis: Good; patient would benefit from acute skilled PT services to address these deficits and reach maximum level of function.      Plan:     During this hospitalization, patient to be seen 3 x/week to address the identified rehab impairments via gait training, therapeutic activities, therapeutic exercises, neuromuscular re-education and progress toward the following goals:    Plan of Care Expires:  09/16/23    This plan of care has been discussed with the patient/caregiver, who was included in its development and is in agreement with the identified goals and treatment plan.     Subjective     Communicated with RN prior to  "session.  Patient agreeable to participate.     Chief Complaint: SOB  Patient/Family Comments/goals: I just had my grand daughter yesterday.     Pain/Comfort:  Pain Rating 1: 0/10    Patients cultural, spiritual, Restoration conflicts given the current situation: no    Patient's living environment is as follows:  Living Environment: Pt lives with  in 2  with no DEONTE. Bathroom set-up: walk-in shower with shower bench and grab bars.   Prior Level of Function: independent with mobility and ADLs  DME used: hospital bed, bedside commode, grab bar  DME owned (not currently used): none  Upon discharge, patient will have assistance from:  and house keeper.     Objective:     Patient found sitting edge of bed with peripheral IV, telemetry, pulse ox (continuous), oxygen  upon PT entry to room. With OT present. On second visit, pt was up in bed side chair with above lines upon PT entry.     General Precautions: Standard, fall   BP (!) 119/58 (BP Location: Right arm, Patient Position: Sitting)   Pulse 83   Temp 97.8 °F (36.6 °C) (Oral)   Resp 18   Ht 5' 8" (1.727 m)   Wt 93.9 kg (207 lb)   SpO2 95%   Breastfeeding No   BMI 31.47 kg/m²   Oxygen Device: nasal cannula      Exams:    Cognition:  Patient is oriented to Person, Place, Time, Situation  Follows multistep  commands      Postural examination/scapula alignment: Slouched posture and pt has increased shoulder elevation with inhalation.     Lower Extremity Range of Motion:  Right Lower Extremity: WFL  Left Lower Extremity: WFL    Lower Extremity Strength  Right Lower Extremity: WFL  Left Lower Extremity: WFL     Sensation:   Light touch sensation: Intact BLEs    Functional Mobility:    Bed Mobility:  Not assessed pt found at EOB and return to bedside chair.     Transfers:   Sit to Stand Transfer: Stand-by Assistance  from bed/bedside chair with no AD   Bed to Chair: Stand-by Assistance with no AD              Gait:  Patient received gait training 1 x 6 " ft feet with Stand-by Assistance and  no AD  to bedside chair.   3 x 20 ft with SBA and no AD. Pt required ~ 3 minute recovery time in between trials due to SOB.   Gait Assessment: decreased concetta  Comments: All lines remained intact throughout ambulation trial, gait belt utilized, portable Supplemental O2 3L/min utilized    Balance:  Static Sit:   Supervision at EOB x 5 minutes  Normal: Patient able to maintain steady balance without handhold support.  Dynamic sit:  Supervision   Normal: Patient able to maintain steady balance without handhold support.  Static Stand:   Stand-By Assist with no AD  Good: Patient able to maintain balance without handhold support, limited postural sway  Dynamic Stand:  Stand-By Assist with no AD  Good: Patient accepts moderate challenge;         Therapeutic Activities/Exercises     Patient assisted with functional mobility as noted above  Discussed at length benefits of PT as well as d/c recommendations. Pt agreeable  Pt educated on proper lowering when sitting in chair, Pt require verbal cuing due to rapid descent when transfering to bedside chair.   Pt educated to breathe through nose out of mouth, pt tended to mouth breathing when recovering from activity.   Patient educated on the importance of early mobility, OOB to prevent functional decline during hospital stay  Patient was instructed to utilize staff assistance for mobility/transfers.  Patient is appropriate to transfer with RN/PCT assist  Patient educated on PT POC and role of PT in acute care  White board updated to include patient's safest level of mobility with staff assistance, RN also updated    AM-PAC 6 CLICK MOBILITY  Turning over in bed (including adjusting bedclothes, sheets and blankets)?: 3  Sitting down on and standing up from a chair with arms (e.g., wheelchair, bedside commode, etc.): 4  Moving from lying on back to sitting on the side of the bed?: 3  Moving to and from a bed to a chair (including a  wheelchair)?: 4  Need to walk in hospital room?: 3  Climbing 3-5 steps with a railing?: 3  Basic Mobility Total Score: 20      Patient left up in chair with all lines intact, call button in reach, and nurse notified.      History/Goals:     PAST MEDICAL HISTORY:  Past Medical History:   Diagnosis Date    Cataract     COPD (chronic obstructive pulmonary disease)     COVID-19     History of COVID-19 1/17/2021    Still with mild dyspnea. Encouraged return to pulmonary rehab Recommend scheduling vaccination when appointment is available.     History of retinal hemorrhage     Lobar pneumonia 11/14/2021    Recurrent in RLL, no endobronchial lesion Needs speech evaluation and MBSS for recurrent aspiration in setting of dysphagia  Will need pulmonary rehab at [facility].    Pathological fracture due to osteoporosis 7/6/2020    Retinal histoplasmosis     Secondary pulmonary arterial hypertension 7/3/2018    Secondary to emphysema and chronic respiratory failure, mild.       Past Surgical History:   Procedure Laterality Date    BRONCHOSCOPY WITH FLUOROSCOPY N/A 10/28/2019    Procedure: BRONCHOSCOPY, WITH FLUOROSCOPY;  Surgeon: Brooklynn Ruiz MD;  Location: [facility] OR [address] [place] [address];  Service: Endoscopy;  Laterality: N/A;    HAND SURGERY         GOALS:   Multidisciplinary Problems       Physical Therapy Goals          Problem: Physical Therapy    Goal Priority Disciplines Outcome Goal Variances Interventions   Physical Therapy Goal     PT, PT/OT Ongoing, Progressing     Description: Goals to be met by 8/30/2023    1. Pt supine to sit with independence-not met  2. Pt sit to supine with independence-not met  3. Pt sit to stand with independence with no AD-not met  4. Pt to perform gait 150ft with independence with no AD-not met                       Time Tracking:     PT Received On: 08/16/23  PT Start Time: 1026 (PT returned from 11:)     PT Stop Time: 1039  PT Total Time (min): 32 min     Billable Minutes: Evaluation 10 and Gait  Training 22      Ervin Rivera, SPT  08/16/2023

## 2023-08-16 NOTE — TELEPHONE ENCOUNTER
----- Message from Keith Gregorio sent at 8/16/2023  2:55 PM CDT -----  Contact: [phone]  BARBARA QUINN calling regarding Appointment for Pt would like a call back from the nurse to help get schedule in for a consult.

## 2023-08-17 ENCOUNTER — TELEPHONE (OUTPATIENT)
Dept: PHARMACY | Facility: CLINIC | Age: 73
End: 2023-08-17
Payer: MEDICARE

## 2023-08-17 VITALS
HEART RATE: 110 BPM | WEIGHT: 207 LBS | RESPIRATION RATE: 19 BRPM | SYSTOLIC BLOOD PRESSURE: 142 MMHG | HEIGHT: 68 IN | DIASTOLIC BLOOD PRESSURE: 65 MMHG | BODY MASS INDEX: 31.37 KG/M2 | OXYGEN SATURATION: 92 % | TEMPERATURE: 98 F

## 2023-08-17 LAB
ANION GAP SERPL CALC-SCNC: 9 MMOL/L (ref 8–16)
BASOPHILS # BLD AUTO: 0.04 K/UL (ref 0–0.2)
BASOPHILS NFR BLD: 0.4 % (ref 0–1.9)
BUN SERPL-MCNC: 11 MG/DL (ref 8–23)
CALCIUM SERPL-MCNC: 8.9 MG/DL (ref 8.7–10.5)
CHLORIDE SERPL-SCNC: 95 MMOL/L (ref 95–110)
CO2 SERPL-SCNC: 31 MMOL/L (ref 23–29)
CREAT SERPL-MCNC: 0.6 MG/DL (ref 0.5–1.4)
DIFFERENTIAL METHOD: ABNORMAL
EOSINOPHIL # BLD AUTO: 0.1 K/UL (ref 0–0.5)
EOSINOPHIL NFR BLD: 1.5 % (ref 0–8)
ERYTHROCYTE [DISTWIDTH] IN BLOOD BY AUTOMATED COUNT: 12.8 % (ref 11.5–14.5)
EST. GFR  (NO RACE VARIABLE): >60 ML/MIN/1.73 M^2
GLUCOSE SERPL-MCNC: 79 MG/DL (ref 70–110)
HCT VFR BLD AUTO: 39.6 % (ref 37–48.5)
HGB BLD-MCNC: 12.8 G/DL (ref 12–16)
IMM GRANULOCYTES # BLD AUTO: 0.04 K/UL (ref 0–0.04)
IMM GRANULOCYTES NFR BLD AUTO: 0.4 % (ref 0–0.5)
LYMPHOCYTES # BLD AUTO: 1.5 K/UL (ref 1–4.8)
LYMPHOCYTES NFR BLD: 15.7 % (ref 18–48)
MAGNESIUM SERPL-MCNC: 2.4 MG/DL (ref 1.6–2.6)
MCH RBC QN AUTO: 30.5 PG (ref 27–31)
MCHC RBC AUTO-ENTMCNC: 32.3 G/DL (ref 32–36)
MCV RBC AUTO: 94 FL (ref 82–98)
MONOCYTES # BLD AUTO: 1.1 K/UL (ref 0.3–1)
MONOCYTES NFR BLD: 11.3 % (ref 4–15)
NEUTROPHILS # BLD AUTO: 6.6 K/UL (ref 1.8–7.7)
NEUTROPHILS NFR BLD: 70.7 % (ref 38–73)
NRBC BLD-RTO: 0 /100 WBC
PHOSPHATE SERPL-MCNC: 3 MG/DL (ref 2.7–4.5)
PLATELET # BLD AUTO: 376 K/UL (ref 150–450)
PMV BLD AUTO: 9 FL (ref 9.2–12.9)
POTASSIUM SERPL-SCNC: 3.6 MMOL/L (ref 3.5–5.1)
RBC # BLD AUTO: 4.2 M/UL (ref 4–5.4)
SODIUM SERPL-SCNC: 135 MMOL/L (ref 136–145)
WBC # BLD AUTO: 9.29 K/UL (ref 3.9–12.7)

## 2023-08-17 PROCEDURE — 85025 COMPLETE CBC W/AUTO DIFF WBC: CPT | Performed by: FAMILY MEDICINE

## 2023-08-17 PROCEDURE — 99239 HOSP IP/OBS DSCHRG MGMT >30: CPT | Mod: ,,, | Performed by: INTERNAL MEDICINE

## 2023-08-17 PROCEDURE — 25000003 PHARM REV CODE 250

## 2023-08-17 PROCEDURE — 27000221 HC OXYGEN, UP TO 24 HOURS

## 2023-08-17 PROCEDURE — 83735 ASSAY OF MAGNESIUM: CPT | Performed by: FAMILY MEDICINE

## 2023-08-17 PROCEDURE — 84100 ASSAY OF PHOSPHORUS: CPT | Performed by: FAMILY MEDICINE

## 2023-08-17 PROCEDURE — 25000003 PHARM REV CODE 250: Performed by: INTERNAL MEDICINE

## 2023-08-17 PROCEDURE — 63700000 PHARM REV CODE 250 ALT 637 W/O HCPCS

## 2023-08-17 PROCEDURE — 94761 N-INVAS EAR/PLS OXIMETRY MLT: CPT

## 2023-08-17 PROCEDURE — 99239 PR HOSPITAL DISCHARGE DAY,>30 MIN: ICD-10-PCS | Mod: ,,, | Performed by: INTERNAL MEDICINE

## 2023-08-17 PROCEDURE — 99900035 HC TECH TIME PER 15 MIN (STAT)

## 2023-08-17 PROCEDURE — 25000242 PHARM REV CODE 250 ALT 637 W/ HCPCS: Performed by: FAMILY MEDICINE

## 2023-08-17 PROCEDURE — 25000003 PHARM REV CODE 250: Performed by: FAMILY MEDICINE

## 2023-08-17 PROCEDURE — 94640 AIRWAY INHALATION TREATMENT: CPT

## 2023-08-17 PROCEDURE — 94668 MNPJ CHEST WALL SBSQ: CPT

## 2023-08-17 PROCEDURE — 36415 COLL VENOUS BLD VENIPUNCTURE: CPT | Performed by: FAMILY MEDICINE

## 2023-08-17 PROCEDURE — 94799 UNLISTED PULMONARY SVC/PX: CPT

## 2023-08-17 PROCEDURE — 80048 BASIC METABOLIC PNL TOTAL CA: CPT | Performed by: FAMILY MEDICINE

## 2023-08-17 PROCEDURE — 63600175 PHARM REV CODE 636 W HCPCS

## 2023-08-17 RX ADMIN — LEVALBUTEROL 1.25 MG: 1.25 SOLUTION, CONCENTRATE RESPIRATORY (INHALATION) at 08:08

## 2023-08-17 RX ADMIN — PANTOPRAZOLE SODIUM 60 MG: 20 TABLET, DELAYED RELEASE ORAL at 09:08

## 2023-08-17 RX ADMIN — DILTIAZEM HYDROCHLORIDE 30 MG: 30 TABLET, FILM COATED ORAL at 01:08

## 2023-08-17 RX ADMIN — FLUTICASONE FUROATE AND VILANTEROL TRIFENATATE 1 PUFF: 200; 25 POWDER RESPIRATORY (INHALATION) at 08:08

## 2023-08-17 RX ADMIN — TIOTROPIUM BROMIDE INHALATION SPRAY 2 PUFF: 3.12 SPRAY, METERED RESPIRATORY (INHALATION) at 08:08

## 2023-08-17 RX ADMIN — GUAIFENESIN 600 MG: 600 TABLET, EXTENDED RELEASE ORAL at 09:08

## 2023-08-17 RX ADMIN — DILTIAZEM HYDROCHLORIDE 30 MG: 30 TABLET, FILM COATED ORAL at 05:08

## 2023-08-17 RX ADMIN — LEVALBUTEROL 1.25 MG: 1.25 SOLUTION, CONCENTRATE RESPIRATORY (INHALATION) at 01:08

## 2023-08-17 RX ADMIN — OXYBUTYNIN CHLORIDE 5 MG: 5 TABLET, EXTENDED RELEASE ORAL at 09:08

## 2023-08-17 RX ADMIN — CITALOPRAM HYDROBROMIDE 20 MG: 20 TABLET ORAL at 09:08

## 2023-08-17 RX ADMIN — BUSPIRONE HYDROCHLORIDE 5 MG: 5 TABLET ORAL at 09:08

## 2023-08-17 RX ADMIN — CEFTRIAXONE 1 G: 1 INJECTION, POWDER, FOR SOLUTION INTRAMUSCULAR; INTRAVENOUS at 09:08

## 2023-08-17 RX ADMIN — PREDNISONE 60 MG: 50 TABLET ORAL at 09:08

## 2023-08-17 RX ADMIN — AZITHROMYCIN 500 MG: 250 TABLET, FILM COATED ORAL at 09:08

## 2023-08-17 NOTE — PLAN OF CARE
[facility]  Discharge Final Note    Primary Care Provider: Dustin Khan MD    Expected Discharge Date: 8/17/2023    Patient discharged to home via ambulance transportation.     Patient's bedside nurse and patient/family notified of the above.      Final Discharge Note (most recent)       Final Note - 08/17/23 1601          Final Note    Assessment Type Final Discharge Note (P)      Anticipated Discharge Disposition Home-Health Care Svc (P)         Post-Acute Status    Post-Acute Authorization Home Health (P)      Home Health Status Set-up Complete/Auth obtained (P)                      Important Message from Medicare  Important Message from Medicare regarding Discharge Appeal Rights: Given to patient/caregiver, Explained to patient/caregiver, Signed/date by patient/caregiver     Date IMM was signed: 08/16/23  Time IMM was signed: 0942    Contact Info       [facility] 9th Fl   Specialty: Pulmonology    [address]  [facility] [address]   Phone: [phone]       Next Steps: Follow up    Instructions: Message sent for nurse to call and schedule follow up appointment; however, if you do not hear from someone within 48 hours contact the number listed.            Future Appointments   Date Time Provider Department Center   8/21/2023  1:30 PM [facility]HAB, [facility]   8/22/2023  1:45 PM EKG, APPT [facility] EKG [facility]   8/22/2023  2:00 PM Farhat Walls MD [facility] ARRHYTH [facility]   8/23/2023  1:30 PM PULMONARY REHAB, [facility]   8/28/2023  1:30 PM PULMONARY REHAB, [facility]   8/30/2023  1:30 PM PULMONARY REHAB, [facility]   9/6/2023  1:30 PM PULMONARY REHAB, [facility]   9/11/2023  1:30 PM PULMONARY REHAB, [facility]   9/13/2023  1:30 PM PULMONARY REHAB, [facility]   9/18/2023  1:30 PM PULMONARY REHAB, [facility]  [facility]   9/20/2023  1:30 PM PULMONARY REHAB, [facility]   9/25/2023  1:30 PM PULMONARY REHAB, [facility]   9/27/2023  1:30 PM PULMONARY REHAB, [facility]   10/2/2023  1:30 PM PULMONARY REHAB, [facility]   10/4/2023  1:30 PM PULMONARY REHAB, [facility]   10/9/2023  1:30 PM PULMONARY REHAB, Buddhist [facility]   10/11/2023  1:30 PM PULMONARY REHAB, [facility]   10/16/2023  1:30 PM PULMONARY REHAB, [facility]   10/18/2023  1:30 PM PULMONARY REHAB, [facility]   10/23/2023  1:30 PM PULMONARY REHAB, [facility]   10/25/2023  1:30 PM PULMONARY REHAB, [facility]   10/26/2023  2:45 PM INJECTION [facility] AMB INF [facility]   10/30/2023  1:30 PM PULMONARY REHAB, [facility]   11/1/2023  1:30 PM PULMONARY REHAB, [facility]   11/6/2023  1:30 PM PULMONARY REHAB, [facility]   11/8/2023 11:00 AM Srinivasa Gallagher OD [facility]   11/8/2023  1:30 PM PULMONARY REHAB, [facility]   11/13/2023  1:30 PM PULMONARY REHAB, [facility]   11/15/2023  1:30 PM [facility]AB[facility]   11/20/2023  1:30 PM [facility]AB[facility]   11/22/2023  1:30 PM [facility]ARY REHAB, [facility]        scheduled post-discharge follow-up appointment and information added to AVS.     Patient has been accepted with Monserrat Lowe LMSW  [facility]  Ext. [phone]

## 2023-08-17 NOTE — PLAN OF CARE
Problem: Adult Inpatient Plan of Care  Goal: Plan of Care Review  Outcome: Ongoing, Progressing  Goal: Absence of Hospital-Acquired Illness or Injury  Outcome: Ongoing, Progressing  Goal: Optimal Comfort and Wellbeing  Outcome: Ongoing, Progressing  Goal: Readiness for Transition of Care  Outcome: Ongoing, Progressing     Problem: Adjustment to Illness COPD (Chronic Obstructive Pulmonary Disease)  Goal: Optimal Chronic Illness Coping  Outcome: Ongoing, Progressing     Problem: Functional Ability Impaired COPD (Chronic Obstructive Pulmonary Disease)  Goal: Optimal Level of Functional [place]  Outcome: Ongoing, Progressing     Problem: Infection COPD (Chronic Obstructive Pulmonary Disease)  Goal: Absence of Infection Signs and Symptoms  Outcome: Ongoing, Progressing     Problem: Respiratory Compromise COPD (Chronic Obstructive Pulmonary Disease)  Goal: Effective Oxygenation and Ventilation  Outcome: Ongoing, Progressing     Problem: Skin Injury Risk Increased  Goal: Skin Health and Integrity  Outcome: Ongoing, Progressing     Problem: Fall Injury Risk  Goal: Absence of Fall and Fall-Related Injury  Outcome: Ongoing, Progressing

## 2023-08-17 NOTE — DISCHARGE SUMMARY
[facility] - Observation [address] [place] [address] Medicine  Discharge Summary      Patient Name: Estefani Fam  MRN: [id number]  Admission Date: 8/10/2023  Hospital Length of Stay: 6 days  Discharge Date and Time:  08/17/2023 10:40 AM  Attending Physician: Star Blanchard MD   Discharging Provider: Star Blanchard MD  Discharge Provider Team: [facility]  Primary Care Provider: Dustin Khan MD            * No surgery found *      Hospital Course: 3 yo F admitted to hospital medicine for further management of COPD exacerbation. Started on prednisone, CAP coverage, and levalbuterol treatments. Afebrile, no leukocytosis. Satting well on home 3L. . Repeat CT with stable but severe bullous emphysema with scarring, no focal consolidation. Pulmonology consulted, who recommended doing a longer course of antibiotics (5 days) and steroids (10 days). She can resume 3x weekly azithromycin at discharge. On 8/15, patient had and was found to be in a-fib with RVR, given metoprolol which improved the rate and she spontaneously converted back sinus rhythm. Cards consulted, [id number] score of 2, hold anticoagulation at this time. She was started on diltiazem for rate control. Echo with EF of 65%, unable to assess diastolic function 2/2 . Patient was back to baseline and ready to go home. She was d/c on sterids per pulm recommendations.  She is to follow up with EP and pulmonary. Home health ordered at discharge.         On examination.  Frail elderly female in no distress.  She had global decrease in air entry, no significant rhonchi or rales noted, on 3 L nasal cannula.  Abdomen is soft and nontender, no pedal edema noted.  Patient alert and oriented to time place and person with no focal deficits    Consults:   Consults (From admission, onward)          Status Ordering Provider     Inpatient consult to Cardiology  Once        Provider:  (Not yet assigned)    Completed BRENDAN OLSON     Inpatient consult to Pulmonology  Once       "  Provider:  (Not yet assigned)    Completed EDILIA MOORE consult to case management  Once        Provider:  (Not yet assigned)    Acknowledged BRENDAN OLSON            Final Active Diagnoses:    Diagnosis Date Noted POA    PRINCIPAL PROBLEM:  Chronic obstructive pulmonary disease with acute exacerbation [J44.1] 12/28/2018 Yes    Atrial fibrillation with RVR [I48.91] 08/15/2023 No    Debility [R53.81] 08/13/2023 Yes    Chronic respiratory failure with hypoxia, on home oxygen therapy [J96.11, Z99.81] 03/23/2019 Not Applicable     Chronic    Former heavy tobacco smoker [Z87.891] 07/02/2018 Not Applicable     Chronic      Problems Resolved During this Admission:      Discharged Condition: fair    Disposition: Home or Self Care    Follow Up:   Follow-up Information       [facility] 9th Fl Follow up.    Specialty: Pulmonology  Why: Message sent for nurse to call and schedule follow up appointment; however, if you do not hear from someone within 48 hours contact the number listed.  Contact information:  [address]  [facility] [address] [phone]  Additional information:  Main Building, 9th Floor   Please park in [facility] and use Clinic elevator                         Patient Instructions:      BATH/SHOWER CHAIR FOR HOME USE     Order Specific Question Answer Comments   Height: 5' 8" (1.727 m)    Weight: 93.9 kg (207 lb)    Does patient have medical equipment at home? nebulizer    Does patient have medical equipment at home? oxygen    Does patient have medical equipment at home? hospital bed    Length of need (1-99 months): 99    Type: With back      Ambulatory referral/consult to Outpatient Case Management   Referral Priority: Routine Referral Type: Consultation   Referral Reason: Specialty Services Required   Number of Visits Requested: 1     Ambulatory referral/consult to Electrophysiology   Standing Status: Future   Referral Priority: Urgent Referral Type: " Consultation   Referral Reason: Specialty Services Required   Requested Specialty: Electrophysiology   Number of Visits Requested: 1     Ambulatory referral/consult to Pulmonology   Standing Status: Future   Referral Priority: Urgent Referral Type: Consultation   Referral Reason: Specialty Services Required   Referred to Provider: RADHA AVALOS Requested Specialty: Pulmonary Disease   Number of Visits Requested: 1     Activity as tolerated     Medications:  Reconciled Home Medications:      Medication List        START taking these medications      busPIRone 5 MG Tab  Commonly known as: BUSPAR  Take 1 tablet (5 mg total) by mouth 2 (two) times daily.     diltiaZEM 120 MG Cdcr  Commonly known as: DILACOR XR  Take 1 capsule (120 mg total) by mouth once daily.     hydrOXYzine pamoate 25 MG Cap  Commonly known as: VISTARIL  Take 1 capsule (25 mg total) by mouth every 6 (six) hours as needed (Anxiety).     melatonin 3 mg tablet  Commonly known as: MELATIN  Take 2 tablets (6 mg total) by mouth nightly as needed for Insomnia.     predniSONE 20 MG tablet  Commonly known as: DELTASONE  Take 3 tablets (60 mg total) by mouth once daily. for 4 days            CHANGE how you take these medications      azelastine 137 mcg (0.1 %) nasal spray  Commonly known as: ASTELIN  USE 1 SPRAY IN EACH NOSTRIL TWICE DAILY OR AS DIRECTED  What changed: Another medication with the same name was removed. Continue taking this medication, and follow the directions you see here.            CONTINUE taking these medications      ascorbic acid (vitamin C) 500 MG tablet  Commonly known as: VITAMIN C  Take 500 mg by mouth 2 (two) times daily.     calcium carbonate 600 mg calcium (1,500 mg) Tab  Commonly known as: OS-STEPHANIE  Take 1 tablet (600 mg total) by mouth once daily. Take Levofloxacin antibiotic at least 2 hours before calcium.     citalopram 20 MG tablet  Commonly known as: CeleXA  Take 1 tablet (20 mg total) by mouth once daily.     fluticasone  furoate-vilanteroL 200-25 mcg/dose Dsdv diskus inhaler  Commonly known as: BREO ELLIPTA  INHALE 1 PUFF INTO THE LUNGS DAILY     fluticasone propionate 50 mcg/actuation nasal spray  Commonly known as: FLONASE  INSTILL 1 SPRAY IN EACH NOSTRIL EVERY DAY     INCRUSE ELLIPTA 62.5 mcg/actuation inhalation capsule  Generic drug: umeclidinium  Inhale 62.5 mcg into the lungs once daily.     levalbuterol 0.63 mg/3 mL nebulizer solution  Commonly known as: XOPENEX  USE 1 VIAL IN NEBULIZER EVERY 8 HOURS AS NEEDED FOR WHEEZE Strength: 0.63 mg/3 mL     meloxicam 7.5 MG tablet  Commonly known as: MOBIC  Take 1 tablet (7.5 mg total) by mouth once daily.     multivit-iron-min-folic acid 3,500-18-0.4 unit-mg-mg Chew  Commonly known as: CENTRUM  Take 1 tablet by mouth once daily. Take Levofloxacin antibiotic at least 2 hours before multivitamin.     pantoprazole 40 MG tablet  Commonly known as: PROTONIX  Take 1 tablet (40 mg total) by mouth once daily.     pulse oximeter device  Commonly known as: pulse oximeter  by Apply Externally route 2 (two) times a day. Use twice daily at 8 AM and 3 PM and record the value in [employer] as directed.     sodium chloride 0.65 % nasal spray  Commonly known as: OCEAN  1 spray by Nasal route 2 (two) times daily.     solifenacin 5 MG tablet  Commonly known as: VESICARE  Take 1 tablet (5 mg total) by mouth once daily.            STOP taking these medications      azithromycin 250 MG tablet  Commonly known as: Z-JERSEY                  Pending Diagnostic Studies:       Procedure Component Value Units Date/Time    EKG 12-lead [[id number]]     Order Status: Sent Lab Status: No result           Indwelling Lines/Drains at time of discharge:   Lines/Drains/Airways       None                   Time spent on the discharge of patient: 40 minutes         Star Blanchard MD  Department of Hospital Medicine  [facility] - Observation 11H

## 2023-08-17 NOTE — PLAN OF CARE
Pt calm and cooperative, NADN. Safety maintianed; bed alarm set and call light within reach; no falls or injuries this shift.    Problem: Adult Inpatient Plan of Care  Goal: Plan of Care Review  Outcome: Ongoing, Progressing  Goal: Absence of Hospital-Acquired Illness or Injury  Outcome: Ongoing, Progressing  Goal: Optimal Comfort and Wellbeing  Outcome: Ongoing, Progressing  Goal: Readiness for Transition of Care  Outcome: Ongoing, Progressing     Problem: Adjustment to Illness COPD (Chronic Obstructive Pulmonary Disease)  Goal: Optimal Chronic Illness Coping  Outcome: Ongoing, Progressing     Problem: Functional Ability Impaired COPD (Chronic Obstructive Pulmonary Disease)  Goal: Optimal Level of Functional [place]  Outcome: Ongoing, Progressing     Problem: Infection COPD (Chronic Obstructive Pulmonary Disease)  Goal: Absence of Infection Signs and Symptoms  Outcome: Ongoing, Progressing     Problem: Oral Intake Inadequate COPD (Chronic Obstructive Pulmonary Disease)  Goal: Improved Nutrition Intake  Outcome: Ongoing, Progressing     Problem: Respiratory Compromise COPD (Chronic Obstructive Pulmonary Disease)  Goal: Effective Oxygenation and Ventilation  Outcome: Ongoing, Progressing     Problem: Skin Injury Risk Increased  Goal: Skin Health and Integrity  Outcome: Ongoing, Progressing     Problem: Fall Injury Risk  Goal: Absence of Fall and Fall-Related Injury  Outcome: Ongoing, Progressing

## 2023-08-17 NOTE — PLAN OF CARE
Problem: Functional Ability Impaired COPD (Chronic Obstructive Pulmonary Disease)  Goal: Optimal Level of Functional [place]  Outcome: Ongoing, Progressing     Problem: Infection COPD (Chronic Obstructive Pulmonary Disease)  Goal: Absence of Infection Signs and Symptoms  8/17/2023 1605 by Sally Mujica LPN  Outcome: Ongoing, Progressing  8/17/2023 1600 by Sally Mujica LPN  Outcome: Ongoing, Progressing     Problem: Oral Intake Inadequate COPD (Chronic Obstructive Pulmonary Disease)  Goal: Improved Nutrition Intake  Outcome: Ongoing, Progressing

## 2023-08-17 NOTE — PLAN OF CARE
SW was informed by pt's nurse that pt is reporting she will need transportation home.    SW arranged stretcher transport to [address] [place] [id number] via [facility]. Requested  time is 2:00PM. Requested  time does not guarantee arrival time.      Mary Lowe LMSW  [facility]  Ext. [phone]

## 2023-08-18 NOTE — PLAN OF CARE
CHW met with patient/family at bedside. Patient experience rounding completed and reviewed the following.     Do you know your discharge plan? Yes or No,        If yes, what is the plan? Home Health    Have you discussed your needs and preferences with your SW/CM? Yes      If you are discharging home, do you have help at home? Yes     Do you think you will need help additional at home at discharge? Yes      Do you currently have difficulty keeping up with bills, affording medicine or buying food? No    Assigned SW/CM notified of any patient/family needs or concerns. Appropriate resources provided to address patient's needs.

## 2023-08-21 PROCEDURE — G0180 PR HOME HEALTH MD CERTIFICATION: ICD-10-PCS | Mod: ,,, | Performed by: INTERNAL MEDICINE

## 2023-08-21 PROCEDURE — G0180 MD CERTIFICATION HHA PATIENT: HCPCS | Mod: ,,, | Performed by: INTERNAL MEDICINE

## 2023-08-22 ENCOUNTER — OFFICE VISIT (OUTPATIENT)
Dept: ELECTROPHYSIOLOGY | Facility: CLINIC | Age: 73
End: 2023-08-22
Payer: MEDICARE

## 2023-08-22 ENCOUNTER — HOSPITAL ENCOUNTER (OUTPATIENT)
Dept: CARDIOLOGY | Facility: CLINIC | Age: 73
Discharge: HOME | End: 2023-08-22
Payer: MEDICARE

## 2023-08-22 VITALS
DIASTOLIC BLOOD PRESSURE: 80 MMHG | SYSTOLIC BLOOD PRESSURE: 130 MMHG | HEART RATE: 86 BPM | HEIGHT: 68 IN | WEIGHT: 207 LBS | BODY MASS INDEX: 31.37 KG/M2

## 2023-08-22 DIAGNOSIS — I48.91 ATRIAL FIBRILLATION WITH RVR: ICD-10-CM

## 2023-08-22 PROCEDURE — 99999 PR PBB SHADOW E&M-EST. PATIENT-LVL IV: ICD-10-PCS | Mod: PBBFAC,,, | Performed by: INTERNAL MEDICINE

## 2023-08-22 PROCEDURE — 93010 ELECTROCARDIOGRAM REPORT: CPT | Mod: S$PBB,,, | Performed by: INTERNAL MEDICINE

## 2023-08-22 PROCEDURE — 99205 PR OFFICE/OUTPT VISIT, NEW, LEVL V, 60-74 MIN: ICD-10-PCS | Mod: S$PBB,,, | Performed by: INTERNAL MEDICINE

## 2023-08-22 PROCEDURE — 99999 PR PBB SHADOW E&M-EST. PATIENT-LVL IV: CPT | Mod: PBBFAC,,, | Performed by: INTERNAL MEDICINE

## 2023-08-22 PROCEDURE — 93005 ELECTROCARDIOGRAM TRACING: CPT | Mod: PBBFAC | Performed by: INTERNAL MEDICINE

## 2023-08-22 PROCEDURE — 99214 OFFICE O/P EST MOD 30 MIN: CPT | Mod: PBBFAC | Performed by: INTERNAL MEDICINE

## 2023-08-22 PROCEDURE — 93010 RHYTHM STRIP: ICD-10-PCS | Mod: S$PBB,,, | Performed by: INTERNAL MEDICINE

## 2023-08-22 PROCEDURE — 99205 OFFICE O/P NEW HI 60 MIN: CPT | Mod: S$PBB,,, | Performed by: INTERNAL MEDICINE

## 2023-08-22 NOTE — PROGRESS NOTES
Subjective:   Patient ID:  Estefani Fam is a 73 y.o. female who presents for evaluation of Atrial Fibrillation      73 yoF here for AF management. She was in the hospital for COPD exacerbation 8/10/23. She had erratic BP 8/15/23 leading to ECG that showed AF/RVR. She converted to NSR within a few hours. She has an occular condition precluding OAC use. No OAC was started. She was placed on diltiazem 120 mg qd. No recurrence. Normal EF in AF.     Echo 8/23:  ·  Left Ventricle: The left ventricle is normal in size. Ventricular mass is normal. Normal wall thickness. Normal wall motion. There is normal systolic function. Ejection fraction by visual approximation is 65%. Unable to assess diastolic functionUnable to assess due to E-A fusion.  ·  Right Ventricle: Normal right ventricular cavity size. Wall thickness is normal. Right ventricle wall motion  is normal. Systolic function is normal.  ·  Aortic Valve: There is mild aortic valve sclerosis.  ·  Mitral Valve: Mild mitral annular calcification.  ·  IVC/SVC: Normal venous pressure at 3 mmHg.      Past Medical History:  No date: Cataract  No date: COPD (chronic obstructive pulmonary disease)  No date: COVID-19  1/17/2021: History of COVID-19      Comment:  Still with mild dyspnea. Encouraged return to pulmonary                rehab Recommend scheduling vaccination when appointment                is available.   No date: History of retinal hemorrhage  11/14/2021: Lobar pneumonia      Comment:  Recurrent in RLL, no endobronchial lesion Needs speech                evaluation and MBSS for recurrent aspiration in setting                of dysphagia  Will need pulmonary rehab at [facility].  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    Past Surgical History:  10/28/2019: BRONCHOSCOPY WITH  FLUOROSCOPY; N/A      Comment:  Procedure: BRONCHOSCOPY, WITH FLUOROSCOPY;  Surgeon:                Brooklynn Ruiz MD;  Location: [facility] OR [address] [place] [address];  Service:                Endoscopy;  Laterality: N/A;  No date: HAND SURGERY    Social History    Socioeconomic History      Marital status:     Tobacco Use      Smoking status: Former        Packs/day: 0        Years: 1 pack/day for 36.0 years (36.0 ttl pk-yrs)        Types: Cigarettes        Start date: 1980        Quit date: 2016        Years since quittin.9      Smokeless tobacco: Never      Tobacco comments: pt states she does not remember date    Substance and Sexual Activity      Alcohol use: Yes        Alcohol/week: 2.0 standard drinks of alcohol        Types: 2 Glasses of wine per week        Comment: 1-2 glasses a day       Drug use: No      Sexual activity: Yes        Partners: Male      Review of patient's family history indicates:  Problem: Macular degeneration      Relation: Mother          Age of Onset: (Not Specified)  Problem: Colon cancer      Relation: Mother          Age of Onset: (Not Specified)  Problem: Stroke      Relation: Father          Age of Onset: (Not Specified)  Problem: Colon cancer      Relation: Father          Age of Onset: (Not Specified)  Problem: Amblyopia      Relation: Neg Hx          Age of Onset: (Not Specified)  Problem: Blindness      Relation: Neg Hx          Age of Onset: (Not Specified)  Problem: Cataracts      Relation: Neg Hx          Age of Onset: (Not Specified)  Problem: Diabetes      Relation: Neg Hx          Age of Onset: (Not Specified)  Problem: Glaucoma      Relation: Neg Hx          Age of Onset: (Not Specified)  Problem: Hypertension      Relation: Neg Hx          Age of Onset: (Not Specified)  Problem: Retinal detachment      Relation: Neg Hx          Age of Onset: (Not Specified)  Problem: Strabismus      Relation: Neg Hx          Age of Onset: (Not Specified)  Problem: Thyroid disease       Relation: Neg Hx          Age of Onset: (Not Specified)      Current Outpatient Medications:  ascorbic acid, vitamin C, (VITAMIN C) 500 MG tablet, Take 500 mg by mouth 2 (two) times daily. , Disp: , Rfl:   azelastine (ASTELIN) 137 mcg (0.1 %) nasal spray, USE 1 SPRAY IN EACH NOSTRIL TWICE DAILY OR AS DIRECTED, Disp: 30 mL, Rfl: 3  busPIRone (BUSPAR) 5 MG Tab, Take 1 tablet (5 mg total) by mouth 2 (two) times daily., Disp: 60 tablet, Rfl: 11  calcium carbonate (OS-STEPHANIE) 600 mg calcium (1,500 mg) Tab, Take 1 tablet (600 mg total) by mouth once daily. Take Levofloxacin antibiotic at least 2 hours before calcium., Disp: , Rfl: 0  citalopram (CELEXA) 20 MG tablet, Take 1 tablet (20 mg total) by mouth once daily., Disp: 90 tablet, Rfl: 3  diltiaZEM (DILACOR XR) 120 MG CDCR, Take 1 capsule (120 mg total) by mouth once daily., Disp: 30 capsule, Rfl: 11  fluticasone furoate-vilanteroL (BREO ELLIPTA) 200-25 mcg/dose DsDv diskus inhaler, INHALE 1 PUFF INTO THE LUNGS DAILY, Disp: 60 each, Rfl: 11  fluticasone propionate (FLONASE) 50 mcg/actuation nasal spray, INSTILL 1 SPRAY IN EACH NOSTRIL EVERY DAY, Disp: 16 g, Rfl: 0  hydrOXYzine pamoate (VISTARIL) 25 MG Cap, Take 1 capsule (25 mg total) by mouth every 6 (six) hours as needed (Anxiety)., Disp: 60 capsule, Rfl: 0  levalbuterol (XOPENEX) 0.63 mg/3 mL nebulizer solution, USE 1 VIAL IN NEBULIZER EVERY 8 HOURS AS NEEDED FOR WHEEZE Strength: 0.63 mg/3 mL, Disp: 750 mL, Rfl: 2  melatonin (MELATIN) 3 mg tablet, Take 2 tablets (6 mg total) by mouth nightly as needed for Insomnia., Disp: 30 tablet, Rfl: 0  meloxicam (MOBIC) 7.5 MG tablet, Take 1 tablet (7.5 mg total) by mouth once daily., Disp: 30 tablet, Rfl: 1  MULTIVIT-IRON-MIN-FOLIC ACID 3,500-18-0.4 UNIT-MG-MG ORAL CHEW, Take 1 tablet by mouth once daily. Take Levofloxacin antibiotic at least 2 hours before multivitamin., Disp: , Rfl: 0  pantoprazole (PROTONIX) 40 MG tablet, Take 1 tablet (40 mg total) by mouth once daily., Disp:  90 tablet, Rfl: 3  pulse oximeter (PULSE OXIMETER) device, by Apply Externally route 2 (two) times a day. Use twice daily at 8 AM and 3 PM and record the value in MyChart as directed., Disp: 1 each, Rfl: 0  sodium chloride (OCEAN) 0.65 % nasal spray, 1 spray by Nasal route 2 (two) times daily., Disp: 88 mL, Rfl: 12  solifenacin (VESICARE) 5 MG tablet, Take 1 tablet (5 mg total) by mouth once daily., Disp: 30 tablet, Rfl: 11  umeclidinium (INCRUSE ELLIPTA) 62.5 mcg/actuation inhalation capsule, Inhale 62.5 mcg into the lungs once daily., Disp: 90 each, Rfl: 3    Current Facility-Administered Medications:  albuterol inhaler 2 puff, 2 puff, Inhalation, Q20 Min PRN, Miles Jackson MD                Review of Systems   Constitutional: Negative.   HENT: Negative.     Eyes: Negative.    Cardiovascular:  Positive for irregular heartbeat and palpitations. Negative for chest pain, dyspnea on exertion, leg swelling, near-syncope and syncope.   Respiratory: Negative.  Negative for shortness of breath.    Endocrine: Negative.    Hematologic/Lymphatic: Negative.    Skin: Negative.    Musculoskeletal: Negative.    Gastrointestinal: Negative.    Genitourinary: Negative.    Neurological: Negative.  Negative for dizziness and light-headedness.   Psychiatric/Behavioral: Negative.     Allergic/Immunologic: Negative.        Objective:   Physical Exam  Vitals reviewed.   Constitutional:       General: She is not in acute distress.     Appearance: She is well-developed.   HENT:      Head: Normocephalic and atraumatic.   Eyes:      Pupils: Pupils are equal, round, and reactive to light.   Neck:      Thyroid: No thyromegaly.      Vascular: No JVD.   Cardiovascular:      Rate and Rhythm: Normal rate and regular rhythm.      Chest Wall: PMI is not displaced.      Heart sounds: Normal heart sounds, S1 normal and S2 normal. No murmur heard.     No friction rub. No gallop.   Pulmonary:      Effort: Pulmonary effort is normal. No respiratory  distress.      Breath sounds: Normal breath sounds. No wheezing or rales.   Abdominal:      General: Bowel sounds are normal. There is no distension.      Palpations: Abdomen is soft.      Tenderness: There is no abdominal tenderness. There is no guarding or rebound.   Musculoskeletal:         General: No tenderness. Normal range of motion.      Cervical back: Normal range of motion.   Skin:     General: Skin is warm and dry.      Findings: No erythema or rash.   Neurological:      Mental Status: She is alert and oriented to person, place, and time.      Cranial Nerves: No cranial nerve deficit.   Psychiatric:         Behavior: Behavior normal.         Thought Content: Thought content normal.         Judgment: Judgment normal.     ECG (my read): NSR nl NE, QRS    Assessment:      1. Atrial fibrillation with RVR        Plan:   73 yoF here for AF management. She has a few hours of AF in the height of her recent illness (COPD exacerbation, pneumonia, steroid use). She has a major contraindication for OAC due to her occular condition. Would not start OAC. Agree with diltiazem 120 mg qd. RTC 6m

## 2023-08-26 DIAGNOSIS — F41.9 ANXIETY: ICD-10-CM

## 2023-08-28 ENCOUNTER — OUTPATIENT CASE MANAGEMENT (OUTPATIENT)
Dept: ADMINISTRATIVE | Facility: OTHER | Age: 73
End: 2023-08-28
Payer: MEDICARE

## 2023-08-28 RX ORDER — CITALOPRAM 10 MG/1
TABLET ORAL
Qty: 90 TABLET | Refills: 3 | Status: ON HOLD | OUTPATIENT
Start: 2023-08-28 | End: 2023-10-18 | Stop reason: HOSPADM

## 2023-08-28 NOTE — PROGRESS NOTES
SW contacted patient regarding referral. SW explained to patient reason for referral to assist with community resources. Patient denied having any needs. SW will close case as no needs.

## 2023-08-29 ENCOUNTER — TELEPHONE (OUTPATIENT)
Dept: INTERNAL MEDICINE | Facility: CLINIC | Age: 73
End: 2023-08-29
Payer: MEDICARE

## 2023-08-31 ENCOUNTER — EXTERNAL CHRONIC CARE MANAGEMENT (OUTPATIENT)
Dept: PRIMARY CARE CLINIC | Facility: CLINIC | Age: 73
End: 2023-08-31
Payer: MEDICARE

## 2023-08-31 PROCEDURE — 99490 CHRNC CARE MGMT STAFF 1ST 20: CPT | Mod: PBBFAC | Performed by: INTERNAL MEDICINE

## 2023-08-31 PROCEDURE — 99490 CHRNC CARE MGMT STAFF 1ST 20: CPT | Mod: S$PBB,,, | Performed by: INTERNAL MEDICINE

## 2023-08-31 PROCEDURE — 99490 PR CHRONIC CARE MGMT, 1ST 20 MIN: ICD-10-PCS | Mod: S$PBB,,, | Performed by: INTERNAL MEDICINE

## 2023-08-31 RX ORDER — TALC
6 POWDER (GRAM) TOPICAL NIGHTLY PRN
Qty: 30 TABLET | Refills: 0 | Status: SHIPPED | OUTPATIENT
Start: 2023-08-31 | End: 2023-10-10 | Stop reason: SDUPTHER

## 2023-08-31 NOTE — TELEPHONE ENCOUNTER
Refill Routing Note   Medication(s) are not appropriate for processing by [facility] for the following reason(s):      Medication outside of protocol    [facility] action(s):  Route Care Due:  None identified              Appointments  past 12m or future 3m with PCP    Date Provider   Last Visit   4/3/2023 Dustin Khan MD   Next Visit   9/5/2023 Dustin Khan MD   ED visits in past 90 days: 0        Note composed:11:35 AM 08/31/2023

## 2023-08-31 NOTE — TELEPHONE ENCOUNTER
----- Message from Evon Doizer sent at 8/31/2023  8:14 AM CDT -----  Contact: [phone]  Patient is requesting a Hospital Follow Up as a virtual.     Please call and advise.     Thank You       Duplicate msg

## 2023-08-31 NOTE — TELEPHONE ENCOUNTER
----- Message from Evon Dozier sent at 8/31/2023  8:15 AM CDT -----  Contact: [phone]  Requesting an RX refill or new RX.  Is this a refill or new RX:   RX name and strength (copy/paste from chart):  melatonin (MELATIN) 3 mg tablet  Is this a 30 day or 90 day RX:   Pharmacy name and phone # (copy/paste from chart):    [id number] - [address]  [facility] [address]  [address]  [facility] [place] [address]  Phone: [phone] Fax: [phone]

## 2023-09-05 ENCOUNTER — OFFICE VISIT (OUTPATIENT)
Dept: INTERNAL MEDICINE | Facility: CLINIC | Age: 73
End: 2023-09-05
Payer: MEDICARE

## 2023-09-05 DIAGNOSIS — Z09 HOSPITAL DISCHARGE FOLLOW-UP: Primary | ICD-10-CM

## 2023-09-05 DIAGNOSIS — G47.00 INSOMNIA, UNSPECIFIED TYPE: ICD-10-CM

## 2023-09-05 DIAGNOSIS — J44.1 CHRONIC OBSTRUCTIVE PULMONARY DISEASE WITH ACUTE EXACERBATION: ICD-10-CM

## 2023-09-05 PROCEDURE — 99214 PR OFFICE/OUTPT VISIT, EST, LEVL IV, 30-39 MIN: ICD-10-PCS | Mod: 95,,, | Performed by: INTERNAL MEDICINE

## 2023-09-05 PROCEDURE — 99214 OFFICE O/P EST MOD 30 MIN: CPT | Mod: 95,,, | Performed by: INTERNAL MEDICINE

## 2023-09-05 RX ORDER — GUAIFENESIN, PSEUDOEPHEDRINE HYDROCHLORIDE 600; 60 MG/1; MG/1
1 TABLET, EXTENDED RELEASE ORAL DAILY
Qty: 30 TABLET | Refills: 2 | Status: SHIPPED | OUTPATIENT
Start: 2023-09-05 | End: 2023-10-05

## 2023-09-05 RX ORDER — TALC
6 POWDER (GRAM) TOPICAL NIGHTLY PRN
Qty: 180 TABLET | Refills: 3 | Status: SHIPPED | OUTPATIENT
Start: 2023-09-05

## 2023-09-05 NOTE — PROGRESS NOTES
The patient location is: [place]  The chief complaint leading to consultation is:   Visit type: Virtual visit with synchronous audio and video  Total time spent with patient: 15 minutes  Each patient to whom he or she provides medical services by telemedicine is:  (1) informed of the relationship between the physician and patient and the respective role of any other health care provider with respect to management of the patient; and (2) notified that he or she may decline to receive medical services by telemedicine and may withdraw from such care at any time.      CHIEF COMPLAINT     No chief complaint on file.  TELEMEDICINE VISIT    HPI     Estefani Fam is 73 y.o. female  severe COPD c/b centrilobular emphysema, former smoker (quit in 2017, 60 pk-yr), COVID-19, recurrent lobar pneumonia, osteoporosis, GERD, retinal histoplasmosis and pulmonary arterial hypertension.      Hospitalized from 810-817 for COPD exacerbation.  She was treated with steroids antibiotics.  Hospitalization complicated by AFib with RVR.  She converted back to sinus rhythm.  Was discharged on diltiazem.  She is been seen by Cardiology since discharge.  Decision to continue diltiazem as rate-controlling agent      Personally Reviewed Patient's Medical, surgical, family and social hx. Changes updated in [facility].  Care Team updated in Epic    Review of Systems:  Review of Systems   Respiratory:  Positive for cough, sputum production and wheezing. Negative for hemoptysis and shortness of breath.    Cardiovascular:  Negative for orthopnea and claudication.       Health Maintenance:   Reviewed with patient  Due for the following:      PHYSICAL EXAM       Gen: Well Appearing, NAD  HEENT: PERR, EOMI  Chest: Normal work of breathing,   Neuro: A&Ox3,  speech normal  Mood; Mood normal, behavior normal, thought process linear       LABS     Labs reviewed; Notable for  Lab Results   Component Value Date    WBC 9.29 08/17/2023    HGB 12.8 08/17/2023    HCT  39.6 08/17/2023    MCV 94 08/17/2023     08/17/2023         Chemistry        Component Value Date/Time     (L) 08/17/2023 0545    K 3.6 08/17/2023 0545    CL 95 08/17/2023 0545    CO2 31 (H) 08/17/2023 0545    BUN 11 08/17/2023 0545    CREATININE 0.6 08/17/2023 0545    GLU 79 08/17/2023 0545        Component Value Date/Time    CALCIUM 8.9 08/17/2023 0545    ALKPHOS 68 08/10/2023 1932    AST 20 08/10/2023 1932    ALT 21 08/10/2023 1932    BILITOT 0.2 08/10/2023 1932    ESTGFRAFRICA >60.0 04/07/2022 0551    EGFRNONAA >60.0 04/07/2022 0551        Ct chest 8/17  Grossly stable severe bullous emphysema with scarring anomaly in the lung apices.  No focal consolidation.     Fluid within the esophagus, which could represent reflux versus delayed esophageal emptying.     Several scattered pulmonary micro nodules measuring up to 0.3 cm.  For multiple solid nodules all <6 mm, Fleischner Society 2017 guidelines recommend no routine follow up for a low risk patient, or follow up with non-contrast chest CT at 12 months after discovery in a high risk patient.  ASSESSMENT     1. Hospital discharge follow-up        2. Chronic obstructive pulmonary disease with acute exacerbation  pseudoephedrine-guaiFENesin  mg (MUCINEX D)  mg per tablet      3. Insomnia, unspecified type  melatonin (MELATIN) 3 mg tablet                Plan     Estefani Fam is a 73 y.o. female  severe COPD c/b centrilobular emphysema, former smoker (quit in 2017, 60 pk-yr), COVID-19, recurrent lobar pneumonia, osteoporosis, GERD, retinal histoplasmosis and pulmonary arterial hypertension.   1. Hospital discharge follow-up    Patient contacted within 48 hours of discharge,  Symptoms required hospitalization improving,   medications reconciled   follow up appts appear scheduled and verified with patient.    2. Chronic obstructive pulmonary disease with acute exacerbation  Continue breo and incruse  Will add mucinex D as  recommended  Continue home O2  - pseudoephedrine-guaiFENesin  mg (MUCINEX D)  mg per tablet; Take 1 tablet by mouth once daily.  Dispense: 30 tablet; Refill: 2    3. Insomnia, unspecified type  - melatonin (MELATIN) 3 mg tablet; Take 2 tablets (6 mg total) by mouth nightly as needed for Insomnia.  Dispense: 180 tablet; Refill: 3        Dustin Khan MD      So they thought that    Answers submitted by the patient for this visit:  Review of Systems Questionnaire (Submitted on 9/5/2023)  activity change: No  unexpected weight change: No  rhinorrhea: No  trouble swallowing: No  visual disturbance: No  chest tightness: No  polyuria: No  difficulty urinating: No  menstrual problem: No  joint swelling: No  arthralgias: No  confusion: No  dysphoric mood: No

## 2023-09-21 ENCOUNTER — EXTERNAL HOME HEALTH (OUTPATIENT)
Dept: HOME HEALTH SERVICES | Facility: HOSPITAL | Age: 73
End: 2023-09-21
Payer: MEDICARE

## 2023-09-22 ENCOUNTER — PATIENT MESSAGE (OUTPATIENT)
Dept: PULMONOLOGY | Facility: CLINIC | Age: 73
End: 2023-09-22
Payer: MEDICARE

## 2023-09-22 RX ORDER — AZELASTINE 1 MG/ML
1 SPRAY, METERED NASAL 2 TIMES DAILY
Qty: 30 ML | Refills: 11 | Status: SHIPPED | OUTPATIENT
Start: 2023-09-22 | End: 2024-09-21

## 2023-09-29 DIAGNOSIS — M81.0 OSTEOPOROSIS, UNSPECIFIED OSTEOPOROSIS TYPE, UNSPECIFIED PATHOLOGICAL FRACTURE PRESENCE: Primary | ICD-10-CM

## 2023-09-30 ENCOUNTER — EXTERNAL CHRONIC CARE MANAGEMENT (OUTPATIENT)
Dept: PRIMARY CARE CLINIC | Facility: CLINIC | Age: 73
End: 2023-09-30
Payer: MEDICARE

## 2023-09-30 PROCEDURE — 99490 CHRNC CARE MGMT STAFF 1ST 20: CPT | Mod: PBBFAC | Performed by: INTERNAL MEDICINE

## 2023-09-30 PROCEDURE — 99490 CHRNC CARE MGMT STAFF 1ST 20: CPT | Mod: S$PBB,,, | Performed by: INTERNAL MEDICINE

## 2023-09-30 PROCEDURE — 99490 PR CHRONIC CARE MGMT, 1ST 20 MIN: ICD-10-PCS | Mod: S$PBB,,, | Performed by: INTERNAL MEDICINE

## 2023-10-06 NOTE — TELEPHONE ENCOUNTER
Pt recently seen on 12/08/2022 for UTI and stating she is still experiencing foul smelling urine and frequency.    Please advise,  Thank You.     Yes

## 2023-10-09 ENCOUNTER — HOSPITAL ENCOUNTER (INPATIENT)
Facility: HOSPITAL | Age: 73
LOS: 8 days | Discharge: HOSPICE | DRG: 190 | End: 2023-10-18
Attending: EMERGENCY MEDICINE | Admitting: FAMILY MEDICINE
Payer: MEDICARE

## 2023-10-09 DIAGNOSIS — Z51.5 PALLIATIVE CARE ENCOUNTER: ICD-10-CM

## 2023-10-09 DIAGNOSIS — R06.00 DYSPNEA, UNSPECIFIED TYPE: ICD-10-CM

## 2023-10-09 DIAGNOSIS — J44.1 COPD EXACERBATION: Primary | ICD-10-CM

## 2023-10-09 DIAGNOSIS — Z71.89 ADVANCED CARE PLANNING/COUNSELING DISCUSSION: ICD-10-CM

## 2023-10-09 DIAGNOSIS — R00.0 TACHYCARDIA: ICD-10-CM

## 2023-10-09 DIAGNOSIS — J96.21 ACUTE ON CHRONIC RESPIRATORY FAILURE WITH HYPOXIA AND HYPERCAPNIA: ICD-10-CM

## 2023-10-09 DIAGNOSIS — R06.02 SHORTNESS OF BREATH: ICD-10-CM

## 2023-10-09 DIAGNOSIS — Z71.89 GOALS OF CARE, COUNSELING/DISCUSSION: ICD-10-CM

## 2023-10-09 DIAGNOSIS — J96.22 ACUTE ON CHRONIC RESPIRATORY FAILURE WITH HYPOXIA AND HYPERCAPNIA: ICD-10-CM

## 2023-10-09 DIAGNOSIS — I48.91 ATRIAL FIBRILLATION WITH RVR: ICD-10-CM

## 2023-10-09 DIAGNOSIS — F41.9 ANXIETY: ICD-10-CM

## 2023-10-09 DIAGNOSIS — R82.90 ABNORMAL FINDING ON URINALYSIS: ICD-10-CM

## 2023-10-09 PROBLEM — K21.9 GERD (GASTROESOPHAGEAL REFLUX DISEASE): Chronic | Status: RESOLVED | Noted: 2022-04-04 | Resolved: 2023-10-09

## 2023-10-09 LAB
ALBUMIN SERPL BCP-MCNC: 3.6 G/DL (ref 3.5–5.2)
ALLENS TEST: ABNORMAL
ALP SERPL-CCNC: 78 U/L (ref 55–135)
ALT SERPL W/O P-5'-P-CCNC: 19 U/L (ref 10–44)
ANION GAP SERPL CALC-SCNC: 9 MMOL/L (ref 8–16)
AST SERPL-CCNC: 18 U/L (ref 10–40)
BASOPHILS # BLD AUTO: 0.07 K/UL (ref 0–0.2)
BASOPHILS NFR BLD: 1 % (ref 0–1.9)
BILIRUB SERPL-MCNC: 0.3 MG/DL (ref 0.1–1)
BUN SERPL-MCNC: 11 MG/DL (ref 8–23)
CALCIUM SERPL-MCNC: 8.6 MG/DL (ref 8.7–10.5)
CHLORIDE SERPL-SCNC: 101 MMOL/L (ref 95–110)
CO2 SERPL-SCNC: 26 MMOL/L (ref 23–29)
CREAT SERPL-MCNC: 0.6 MG/DL (ref 0.5–1.4)
DELSYS: ABNORMAL
DIFFERENTIAL METHOD: ABNORMAL
EOSINOPHIL # BLD AUTO: 0.5 K/UL (ref 0–0.5)
EOSINOPHIL NFR BLD: 7.4 % (ref 0–8)
ERYTHROCYTE [DISTWIDTH] IN BLOOD BY AUTOMATED COUNT: 12.6 % (ref 11.5–14.5)
EST. GFR  (NO RACE VARIABLE): >60 ML/MIN/1.73 M^2
GLUCOSE SERPL-MCNC: 111 MG/DL (ref 70–110)
HCO3 UR-SCNC: 32.8 MMOL/L (ref 24–28)
HCT VFR BLD AUTO: 38.5 % (ref 37–48.5)
HCT VFR BLD CALC: 42 %PCV (ref 36–54)
HCV AB SERPL QL IA: NORMAL
HGB BLD-MCNC: 12.4 G/DL (ref 12–16)
HIV 1+2 AB+HIV1 P24 AG SERPL QL IA: NORMAL
IMM GRANULOCYTES # BLD AUTO: 0.02 K/UL (ref 0–0.04)
IMM GRANULOCYTES NFR BLD AUTO: 0.3 % (ref 0–0.5)
LYMPHOCYTES # BLD AUTO: 1.3 K/UL (ref 1–4.8)
LYMPHOCYTES NFR BLD: 17.6 % (ref 18–48)
MCH RBC QN AUTO: 30.6 PG (ref 27–31)
MCHC RBC AUTO-ENTMCNC: 32.2 G/DL (ref 32–36)
MCV RBC AUTO: 95 FL (ref 82–98)
MONOCYTES # BLD AUTO: 0.8 K/UL (ref 0.3–1)
MONOCYTES NFR BLD: 10.9 % (ref 4–15)
NEUTROPHILS # BLD AUTO: 4.6 K/UL (ref 1.8–7.7)
NEUTROPHILS NFR BLD: 62.8 % (ref 38–73)
NRBC BLD-RTO: 0 /100 WBC
PCO2 BLDA: 60.6 MMHG (ref 35–45)
PH SMN: 7.34 [PH] (ref 7.35–7.45)
PLATELET # BLD AUTO: 339 K/UL (ref 150–450)
PMV BLD AUTO: 9 FL (ref 9.2–12.9)
PO2 BLDA: 47 MMHG (ref 40–60)
POC BE: 7 MMOL/L
POC IONIZED CALCIUM: 1.27 MMOL/L (ref 1.06–1.42)
POC SATURATED O2: 78 % (ref 95–100)
POC TCO2: 35 MMOL/L (ref 24–29)
POTASSIUM BLD-SCNC: 4.2 MMOL/L (ref 3.5–5.1)
POTASSIUM SERPL-SCNC: 3.9 MMOL/L (ref 3.5–5.1)
PROT SERPL-MCNC: 6.1 G/DL (ref 6–8.4)
RBC # BLD AUTO: 4.05 M/UL (ref 4–5.4)
SAMPLE: ABNORMAL
SARS-COV-2 RDRP RESP QL NAA+PROBE: NEGATIVE
SITE: ABNORMAL
SODIUM BLD-SCNC: 134 MMOL/L (ref 136–145)
SODIUM SERPL-SCNC: 136 MMOL/L (ref 136–145)
WBC # BLD AUTO: 7.33 K/UL (ref 3.9–12.7)

## 2023-10-09 PROCEDURE — 94761 N-INVAS EAR/PLS OXIMETRY MLT: CPT

## 2023-10-09 PROCEDURE — 27000221 HC OXYGEN, UP TO 24 HOURS

## 2023-10-09 PROCEDURE — 96375 TX/PRO/DX INJ NEW DRUG ADDON: CPT

## 2023-10-09 PROCEDURE — 80053 COMPREHEN METABOLIC PANEL: CPT | Performed by: EMERGENCY MEDICINE

## 2023-10-09 PROCEDURE — 99900035 HC TECH TIME PER 15 MIN (STAT)

## 2023-10-09 PROCEDURE — 82803 BLOOD GASES ANY COMBINATION: CPT

## 2023-10-09 PROCEDURE — 87389 HIV-1 AG W/HIV-1&-2 AB AG IA: CPT | Performed by: PHYSICIAN ASSISTANT

## 2023-10-09 PROCEDURE — G0378 HOSPITAL OBSERVATION PER HR: HCPCS

## 2023-10-09 PROCEDURE — 63600175 PHARM REV CODE 636 W HCPCS: Performed by: EMERGENCY MEDICINE

## 2023-10-09 PROCEDURE — 93010 EKG 12-LEAD: ICD-10-PCS | Mod: ,,, | Performed by: INTERNAL MEDICINE

## 2023-10-09 PROCEDURE — 93010 ELECTROCARDIOGRAM REPORT: CPT | Mod: ,,, | Performed by: INTERNAL MEDICINE

## 2023-10-09 PROCEDURE — 25000003 PHARM REV CODE 250

## 2023-10-09 PROCEDURE — 86803 HEPATITIS C AB TEST: CPT | Performed by: PHYSICIAN ASSISTANT

## 2023-10-09 PROCEDURE — 94640 AIRWAY INHALATION TREATMENT: CPT

## 2023-10-09 PROCEDURE — 63700000 PHARM REV CODE 250 ALT 637 W/O HCPCS

## 2023-10-09 PROCEDURE — 93005 ELECTROCARDIOGRAM TRACING: CPT

## 2023-10-09 PROCEDURE — 99223 PR INITIAL HOSPITAL CARE,LEVL III: ICD-10-PCS | Mod: AI,GC,, | Performed by: FAMILY MEDICINE

## 2023-10-09 PROCEDURE — 63600175 PHARM REV CODE 636 W HCPCS

## 2023-10-09 PROCEDURE — 25000242 PHARM REV CODE 250 ALT 637 W/ HCPCS

## 2023-10-09 PROCEDURE — 94640 AIRWAY INHALATION TREATMENT: CPT | Mod: XB

## 2023-10-09 PROCEDURE — U0002 COVID-19 LAB TEST NON-CDC: HCPCS | Performed by: EMERGENCY MEDICINE

## 2023-10-09 PROCEDURE — 99291 CRITICAL CARE FIRST HOUR: CPT

## 2023-10-09 PROCEDURE — 99223 1ST HOSP IP/OBS HIGH 75: CPT | Mod: AI,GC,, | Performed by: FAMILY MEDICINE

## 2023-10-09 PROCEDURE — 96365 THER/PROPH/DIAG IV INF INIT: CPT

## 2023-10-09 PROCEDURE — 25000242 PHARM REV CODE 250 ALT 637 W/ HCPCS: Performed by: EMERGENCY MEDICINE

## 2023-10-09 PROCEDURE — 85025 COMPLETE CBC W/AUTO DIFF WBC: CPT | Performed by: EMERGENCY MEDICINE

## 2023-10-09 RX ORDER — LEVALBUTEROL 1.25 MG/.5ML
1.25 SOLUTION, CONCENTRATE RESPIRATORY (INHALATION) EVERY 8 HOURS
Status: DISCONTINUED | OUTPATIENT
Start: 2023-10-10 | End: 2023-10-09

## 2023-10-09 RX ORDER — OXYBUTYNIN CHLORIDE 5 MG/1
5 TABLET, EXTENDED RELEASE ORAL DAILY
Status: DISCONTINUED | OUTPATIENT
Start: 2023-10-10 | End: 2023-10-18 | Stop reason: HOSPADM

## 2023-10-09 RX ORDER — ASCORBIC ACID 500 MG
500 TABLET ORAL 2 TIMES DAILY
Status: DISCONTINUED | OUTPATIENT
Start: 2023-10-09 | End: 2023-10-18 | Stop reason: HOSPADM

## 2023-10-09 RX ORDER — MELOXICAM 7.5 MG/1
7.5 TABLET ORAL DAILY
Status: DISCONTINUED | OUTPATIENT
Start: 2023-10-10 | End: 2023-10-18 | Stop reason: HOSPADM

## 2023-10-09 RX ORDER — POLYETHYLENE GLYCOL 3350 17 G/17G
17 POWDER, FOR SOLUTION ORAL DAILY
Status: DISCONTINUED | OUTPATIENT
Start: 2023-10-10 | End: 2023-10-18 | Stop reason: HOSPADM

## 2023-10-09 RX ORDER — SODIUM CHLORIDE 0.9 % (FLUSH) 0.9 %
3 SYRINGE (ML) INJECTION
Status: DISCONTINUED | OUTPATIENT
Start: 2023-10-09 | End: 2023-10-18 | Stop reason: HOSPADM

## 2023-10-09 RX ORDER — ONDANSETRON 8 MG/1
8 TABLET, ORALLY DISINTEGRATING ORAL EVERY 8 HOURS PRN
Status: DISCONTINUED | OUTPATIENT
Start: 2023-10-09 | End: 2023-10-18 | Stop reason: HOSPADM

## 2023-10-09 RX ORDER — PREDNISONE 20 MG/1
60 TABLET ORAL DAILY
Status: DISCONTINUED | OUTPATIENT
Start: 2023-10-10 | End: 2023-10-10

## 2023-10-09 RX ORDER — DILTIAZEM HYDROCHLORIDE 120 MG/1
120 CAPSULE, COATED, EXTENDED RELEASE ORAL DAILY
Status: DISCONTINUED | OUTPATIENT
Start: 2023-10-10 | End: 2023-10-18 | Stop reason: HOSPADM

## 2023-10-09 RX ORDER — ENOXAPARIN SODIUM 100 MG/ML
40 INJECTION SUBCUTANEOUS EVERY 24 HOURS
Status: DISCONTINUED | OUTPATIENT
Start: 2023-10-10 | End: 2023-10-18 | Stop reason: HOSPADM

## 2023-10-09 RX ORDER — LEVALBUTEROL 1.25 MG/.5ML
1.25 SOLUTION, CONCENTRATE RESPIRATORY (INHALATION)
Status: COMPLETED | OUTPATIENT
Start: 2023-10-09 | End: 2023-10-09

## 2023-10-09 RX ORDER — AZELASTINE 1 MG/ML
1 SPRAY, METERED NASAL 2 TIMES DAILY
Status: DISCONTINUED | OUTPATIENT
Start: 2023-10-09 | End: 2023-10-18 | Stop reason: HOSPADM

## 2023-10-09 RX ORDER — BUSPIRONE HYDROCHLORIDE 5 MG/1
5 TABLET ORAL 2 TIMES DAILY
Status: DISCONTINUED | OUTPATIENT
Start: 2023-10-09 | End: 2023-10-18 | Stop reason: HOSPADM

## 2023-10-09 RX ORDER — AMOXICILLIN 250 MG
1 CAPSULE ORAL 2 TIMES DAILY
Status: DISCONTINUED | OUTPATIENT
Start: 2023-10-09 | End: 2023-10-18 | Stop reason: HOSPADM

## 2023-10-09 RX ORDER — METHYLPREDNISOLONE SOD SUCC 125 MG
125 VIAL (EA) INJECTION
Status: COMPLETED | OUTPATIENT
Start: 2023-10-09 | End: 2023-10-09

## 2023-10-09 RX ORDER — FLUTICASONE FUROATE AND VILANTEROL 100; 25 UG/1; UG/1
1 POWDER RESPIRATORY (INHALATION) DAILY
Status: DISCONTINUED | OUTPATIENT
Start: 2023-10-10 | End: 2023-10-18 | Stop reason: HOSPADM

## 2023-10-09 RX ORDER — GUAIFENESIN 600 MG/1
600 TABLET, EXTENDED RELEASE ORAL 2 TIMES DAILY
Status: DISCONTINUED | OUTPATIENT
Start: 2023-10-09 | End: 2023-10-18 | Stop reason: HOSPADM

## 2023-10-09 RX ORDER — LEVALBUTEROL 1.25 MG/.5ML
1.25 SOLUTION, CONCENTRATE RESPIRATORY (INHALATION)
Status: DISCONTINUED | OUTPATIENT
Start: 2023-10-09 | End: 2023-10-10

## 2023-10-09 RX ORDER — ACETAMINOPHEN 325 MG/1
650 TABLET ORAL EVERY 8 HOURS PRN
Status: DISCONTINUED | OUTPATIENT
Start: 2023-10-09 | End: 2023-10-18 | Stop reason: HOSPADM

## 2023-10-09 RX ORDER — CITALOPRAM 20 MG/1
20 TABLET, FILM COATED ORAL DAILY
Status: DISCONTINUED | OUTPATIENT
Start: 2023-10-10 | End: 2023-10-18 | Stop reason: HOSPADM

## 2023-10-09 RX ORDER — AZITHROMYCIN 250 MG/1
500 TABLET, FILM COATED ORAL DAILY
Status: DISCONTINUED | OUTPATIENT
Start: 2023-10-09 | End: 2023-10-10

## 2023-10-09 RX ORDER — MAGNESIUM SULFATE HEPTAHYDRATE 40 MG/ML
2 INJECTION, SOLUTION INTRAVENOUS
Status: COMPLETED | OUTPATIENT
Start: 2023-10-09 | End: 2023-10-09

## 2023-10-09 RX ORDER — CALCIUM CARBONATE 200(500)MG
500 TABLET,CHEWABLE ORAL DAILY
Status: DISCONTINUED | OUTPATIENT
Start: 2023-10-10 | End: 2023-10-18 | Stop reason: HOSPADM

## 2023-10-09 RX ORDER — TALC
6 POWDER (GRAM) TOPICAL NIGHTLY PRN
Status: DISCONTINUED | OUTPATIENT
Start: 2023-10-09 | End: 2023-10-18 | Stop reason: HOSPADM

## 2023-10-09 RX ADMIN — AZELASTINE 137 MCG: 1 SPRAY, METERED NASAL at 11:10

## 2023-10-09 RX ADMIN — METHYLPREDNISOLONE SODIUM SUCCINATE 125 MG: 125 INJECTION, POWDER, FOR SOLUTION INTRAMUSCULAR; INTRAVENOUS at 06:10

## 2023-10-09 RX ADMIN — LEVALBUTEROL 1.25 MG: 1.25 SOLUTION, CONCENTRATE RESPIRATORY (INHALATION) at 05:10

## 2023-10-09 RX ADMIN — ACETAMINOPHEN 650 MG: 325 TABLET ORAL at 10:10

## 2023-10-09 RX ADMIN — SENNOSIDES AND DOCUSATE SODIUM 1 TABLET: 50; 8.6 TABLET ORAL at 10:10

## 2023-10-09 RX ADMIN — MAGNESIUM SULFATE HEPTAHYDRATE 2 G: 40 INJECTION, SOLUTION INTRAVENOUS at 07:10

## 2023-10-09 RX ADMIN — AZITHROMYCIN 500 MG: 250 TABLET, FILM COATED ORAL at 07:10

## 2023-10-09 RX ADMIN — CEFTRIAXONE 1 G: 1 INJECTION, POWDER, FOR SOLUTION INTRAMUSCULAR; INTRAVENOUS at 06:10

## 2023-10-09 RX ADMIN — LEVALBUTEROL 1.25 MG: 1.25 SOLUTION, CONCENTRATE RESPIRATORY (INHALATION) at 10:10

## 2023-10-09 RX ADMIN — GUAIFENESIN 600 MG: 600 TABLET, EXTENDED RELEASE ORAL at 10:10

## 2023-10-09 RX ADMIN — BUSPIRONE HYDROCHLORIDE 5 MG: 5 TABLET ORAL at 10:10

## 2023-10-09 RX ADMIN — Medication 500 MG: at 10:10

## 2023-10-09 NOTE — Clinical Note
Diagnosis: COPD exacerbation [231423]   Future Attending Provider: FARHAN ROSEN III [[id number]]   Admitting Provider:: FARHAN ROSEN III [[id number]]

## 2023-10-09 NOTE — ED TRIAGE NOTES
Estefani Fam, a 73 y.o. female presents to the ED w/ complaint of SOB. Pt states that she's felt congested and SOB for the past week. Pt has HX of COPD. Denies any N/V, chest pain.     Triage note:  Chief Complaint   Patient presents with    Shortness of Breath     Arrived via EMS from home with c/o SOB. Hx of COPD.      Review of patient's allergies indicates:   Allergen Reactions    Albuterol     Epinephrine     Ipratropium      Past Medical History:   Diagnosis Date    Cataract     COPD (chronic obstructive pulmonary disease)     COVID-19     History of COVID-19 1/17/2021    Still with mild dyspnea. Encouraged return to pulmonary rehab Recommend scheduling vaccination when appointment is available.     History of retinal hemorrhage     Lobar pneumonia 11/14/2021    Recurrent in RLL, no endobronchial lesion Needs speech evaluation and MBSS for recurrent aspiration in setting of dysphagia  Will need pulmonary rehab at [facility].    Pathological fracture due to osteoporosis 7/6/2020    Retinal histoplasmosis     Secondary pulmonary arterial hypertension 7/3/2018    Secondary to emphysema and chronic respiratory failure, mild.

## 2023-10-10 LAB
ALBUMIN SERPL BCP-MCNC: 3.5 G/DL (ref 3.5–5.2)
ALP SERPL-CCNC: 74 U/L (ref 55–135)
ALT SERPL W/O P-5'-P-CCNC: 18 U/L (ref 10–44)
ANION GAP SERPL CALC-SCNC: 7 MMOL/L (ref 8–16)
AST SERPL-CCNC: 19 U/L (ref 10–40)
BACTERIA #/AREA URNS AUTO: ABNORMAL /HPF
BASOPHILS # BLD AUTO: 0.01 K/UL (ref 0–0.2)
BASOPHILS NFR BLD: 0.2 % (ref 0–1.9)
BILIRUB SERPL-MCNC: 0.2 MG/DL (ref 0.1–1)
BILIRUB UR QL STRIP: NEGATIVE
BUN SERPL-MCNC: 12 MG/DL (ref 8–23)
CALCIUM SERPL-MCNC: 8.4 MG/DL (ref 8.7–10.5)
CHLORIDE SERPL-SCNC: 97 MMOL/L (ref 95–110)
CLARITY UR REFRACT.AUTO: ABNORMAL
CO2 SERPL-SCNC: 29 MMOL/L (ref 23–29)
COLOR UR AUTO: YELLOW
CREAT SERPL-MCNC: 0.5 MG/DL (ref 0.5–1.4)
DIFFERENTIAL METHOD: ABNORMAL
EOSINOPHIL # BLD AUTO: 0 K/UL (ref 0–0.5)
EOSINOPHIL NFR BLD: 0.2 % (ref 0–8)
ERYTHROCYTE [DISTWIDTH] IN BLOOD BY AUTOMATED COUNT: 12.8 % (ref 11.5–14.5)
EST. GFR  (NO RACE VARIABLE): >60 ML/MIN/1.73 M^2
GLUCOSE SERPL-MCNC: 180 MG/DL (ref 70–110)
GLUCOSE UR QL STRIP: ABNORMAL
HCT VFR BLD AUTO: 36.1 % (ref 37–48.5)
HGB BLD-MCNC: 11.7 G/DL (ref 12–16)
HGB UR QL STRIP: NEGATIVE
IMM GRANULOCYTES # BLD AUTO: 0.01 K/UL (ref 0–0.04)
IMM GRANULOCYTES NFR BLD AUTO: 0.2 % (ref 0–0.5)
KETONES UR QL STRIP: ABNORMAL
LEUKOCYTE ESTERASE UR QL STRIP: ABNORMAL
LYMPHOCYTES # BLD AUTO: 0.3 K/UL (ref 1–4.8)
LYMPHOCYTES NFR BLD: 5.4 % (ref 18–48)
MAGNESIUM SERPL-MCNC: 2.9 MG/DL (ref 1.6–2.6)
MCH RBC QN AUTO: 30.2 PG (ref 27–31)
MCHC RBC AUTO-ENTMCNC: 32.4 G/DL (ref 32–36)
MCV RBC AUTO: 93 FL (ref 82–98)
MICROSCOPIC COMMENT: ABNORMAL
MONOCYTES # BLD AUTO: 0.1 K/UL (ref 0.3–1)
MONOCYTES NFR BLD: 1.3 % (ref 4–15)
NEUTROPHILS # BLD AUTO: 4.3 K/UL (ref 1.8–7.7)
NEUTROPHILS NFR BLD: 92.7 % (ref 38–73)
NITRITE UR QL STRIP: NEGATIVE
NRBC BLD-RTO: 0 /100 WBC
PH UR STRIP: 7 [PH] (ref 5–8)
PHOSPHATE SERPL-MCNC: 2.8 MG/DL (ref 2.7–4.5)
PLATELET # BLD AUTO: 312 K/UL (ref 150–450)
PMV BLD AUTO: 9 FL (ref 9.2–12.9)
POCT GLUCOSE: 145 MG/DL (ref 70–110)
POCT GLUCOSE: 165 MG/DL (ref 70–110)
POTASSIUM SERPL-SCNC: 4 MMOL/L (ref 3.5–5.1)
PROT SERPL-MCNC: 6.1 G/DL (ref 6–8.4)
PROT UR QL STRIP: ABNORMAL
RBC # BLD AUTO: 3.88 M/UL (ref 4–5.4)
RBC #/AREA URNS AUTO: 2 /HPF (ref 0–4)
SODIUM SERPL-SCNC: 133 MMOL/L (ref 136–145)
SP GR UR STRIP: 1.03 (ref 1–1.03)
SQUAMOUS #/AREA URNS AUTO: 21 /HPF
URN SPEC COLLECT METH UR: ABNORMAL
WBC # BLD AUTO: 4.67 K/UL (ref 3.9–12.7)
WBC #/AREA URNS AUTO: 45 /HPF (ref 0–5)
YEAST UR QL AUTO: ABNORMAL

## 2023-10-10 PROCEDURE — 63600175 PHARM REV CODE 636 W HCPCS

## 2023-10-10 PROCEDURE — 85025 COMPLETE CBC W/AUTO DIFF WBC: CPT

## 2023-10-10 PROCEDURE — 80053 COMPREHEN METABOLIC PANEL: CPT

## 2023-10-10 PROCEDURE — 25000242 PHARM REV CODE 250 ALT 637 W/ HCPCS

## 2023-10-10 PROCEDURE — 25000242 PHARM REV CODE 250 ALT 637 W/ HCPCS: Performed by: FAMILY MEDICINE

## 2023-10-10 PROCEDURE — 99232 SBSQ HOSP IP/OBS MODERATE 35: CPT | Mod: GC,,, | Performed by: FAMILY MEDICINE

## 2023-10-10 PROCEDURE — 63700000 PHARM REV CODE 250 ALT 637 W/O HCPCS

## 2023-10-10 PROCEDURE — 81001 URINALYSIS AUTO W/SCOPE: CPT

## 2023-10-10 PROCEDURE — 94640 AIRWAY INHALATION TREATMENT: CPT

## 2023-10-10 PROCEDURE — 25000003 PHARM REV CODE 250

## 2023-10-10 PROCEDURE — 99232 PR SUBSEQUENT HOSPITAL CARE,LEVL II: ICD-10-PCS | Mod: GC,,, | Performed by: FAMILY MEDICINE

## 2023-10-10 PROCEDURE — 94664 DEMO&/EVAL PT USE INHALER: CPT

## 2023-10-10 PROCEDURE — 21400001 HC TELEMETRY ROOM

## 2023-10-10 PROCEDURE — 94667 MNPJ CHEST WALL 1ST: CPT

## 2023-10-10 PROCEDURE — 27000221 HC OXYGEN, UP TO 24 HOURS

## 2023-10-10 PROCEDURE — 93010 EKG 12-LEAD: ICD-10-PCS | Mod: ,,, | Performed by: INTERNAL MEDICINE

## 2023-10-10 PROCEDURE — 87086 URINE CULTURE/COLONY COUNT: CPT

## 2023-10-10 PROCEDURE — 27000646 HC AEROBIKA DEVICE

## 2023-10-10 PROCEDURE — 84100 ASSAY OF PHOSPHORUS: CPT

## 2023-10-10 PROCEDURE — 94761 N-INVAS EAR/PLS OXIMETRY MLT: CPT

## 2023-10-10 PROCEDURE — 83735 ASSAY OF MAGNESIUM: CPT

## 2023-10-10 PROCEDURE — 99900035 HC TECH TIME PER 15 MIN (STAT)

## 2023-10-10 PROCEDURE — 93005 ELECTROCARDIOGRAM TRACING: CPT

## 2023-10-10 PROCEDURE — 93010 ELECTROCARDIOGRAM REPORT: CPT | Mod: ,,, | Performed by: INTERNAL MEDICINE

## 2023-10-10 RX ORDER — CALCIUM CARBONATE 600 MG
600 TABLET ORAL DAILY
COMMUNITY

## 2023-10-10 RX ORDER — METHYLPREDNISOLONE SOD SUCC 125 MG
40 VIAL (EA) INJECTION EVERY 8 HOURS
Status: DISCONTINUED | OUTPATIENT
Start: 2023-10-10 | End: 2023-10-12

## 2023-10-10 RX ORDER — INSULIN ASPART 100 [IU]/ML
0-5 INJECTION, SOLUTION INTRAVENOUS; SUBCUTANEOUS
Status: DISCONTINUED | OUTPATIENT
Start: 2023-10-10 | End: 2023-10-18 | Stop reason: HOSPADM

## 2023-10-10 RX ORDER — LEVALBUTEROL 1.25 MG/.5ML
1.25 SOLUTION, CONCENTRATE RESPIRATORY (INHALATION)
Status: DISCONTINUED | OUTPATIENT
Start: 2023-10-10 | End: 2023-10-13

## 2023-10-10 RX ORDER — LEVALBUTEROL 1.25 MG/.5ML
1.25 SOLUTION, CONCENTRATE RESPIRATORY (INHALATION)
Status: DISCONTINUED | OUTPATIENT
Start: 2023-10-10 | End: 2023-10-10

## 2023-10-10 RX ORDER — IBUPROFEN 200 MG
16 TABLET ORAL
Status: DISCONTINUED | OUTPATIENT
Start: 2023-10-10 | End: 2023-10-18 | Stop reason: HOSPADM

## 2023-10-10 RX ORDER — GLUCAGON 1 MG
1 KIT INJECTION
Status: DISCONTINUED | OUTPATIENT
Start: 2023-10-10 | End: 2023-10-18 | Stop reason: HOSPADM

## 2023-10-10 RX ORDER — IBUPROFEN 200 MG
24 TABLET ORAL
Status: DISCONTINUED | OUTPATIENT
Start: 2023-10-10 | End: 2023-10-18 | Stop reason: HOSPADM

## 2023-10-10 RX ORDER — LORAZEPAM 2 MG/ML
1 INJECTION INTRAMUSCULAR EVERY 6 HOURS PRN
Status: DISCONTINUED | OUTPATIENT
Start: 2023-10-10 | End: 2023-10-18 | Stop reason: HOSPADM

## 2023-10-10 RX ORDER — LEVALBUTEROL INHALATION SOLUTION 0.31 MG/3ML
0.31 SOLUTION RESPIRATORY (INHALATION) EVERY 4 HOURS PRN
Status: DISCONTINUED | OUTPATIENT
Start: 2023-10-10 | End: 2023-10-10

## 2023-10-10 RX ORDER — LEVALBUTEROL INHALATION SOLUTION 0.63 MG/3ML
0.63 SOLUTION RESPIRATORY (INHALATION) EVERY 4 HOURS PRN
Status: DISCONTINUED | OUTPATIENT
Start: 2023-10-10 | End: 2023-10-16

## 2023-10-10 RX ORDER — SODIUM CHLORIDE FOR INHALATION 3 %
4 VIAL, NEBULIZER (ML) INHALATION EVERY 4 HOURS
Status: DISCONTINUED | OUTPATIENT
Start: 2023-10-10 | End: 2023-10-15

## 2023-10-10 RX ADMIN — METHYLPREDNISOLONE SODIUM SUCCINATE 40 MG: 125 INJECTION, POWDER, FOR SOLUTION INTRAMUSCULAR; INTRAVENOUS at 10:10

## 2023-10-10 RX ADMIN — SODIUM CHLORIDE SOLN NEBU 3% 4 ML: 3 NEBU SOLN at 03:10

## 2023-10-10 RX ADMIN — ENOXAPARIN SODIUM 40 MG: 40 INJECTION SUBCUTANEOUS at 05:10

## 2023-10-10 RX ADMIN — SENNOSIDES AND DOCUSATE SODIUM 1 TABLET: 50; 8.6 TABLET ORAL at 10:10

## 2023-10-10 RX ADMIN — GUAIFENESIN 600 MG: 600 TABLET, EXTENDED RELEASE ORAL at 08:10

## 2023-10-10 RX ADMIN — LEVALBUTEROL 1.25 MG: 1.25 SOLUTION, CONCENTRATE RESPIRATORY (INHALATION) at 03:10

## 2023-10-10 RX ADMIN — METHYLPREDNISOLONE SODIUM SUCCINATE 40 MG: 125 INJECTION, POWDER, FOR SOLUTION INTRAMUSCULAR; INTRAVENOUS at 11:10

## 2023-10-10 RX ADMIN — AZELASTINE 137 MCG: 1 SPRAY, METERED NASAL at 08:10

## 2023-10-10 RX ADMIN — PIPERACILLIN AND TAZOBACTAM 4.5 G: 4; .5 INJECTION, POWDER, LYOPHILIZED, FOR SOLUTION INTRAVENOUS; PARENTERAL at 11:10

## 2023-10-10 RX ADMIN — ACETAMINOPHEN 650 MG: 325 TABLET ORAL at 08:10

## 2023-10-10 RX ADMIN — CITALOPRAM HYDROBROMIDE 20 MG: 20 TABLET ORAL at 08:10

## 2023-10-10 RX ADMIN — PIPERACILLIN AND TAZOBACTAM 4.5 G: 4; .5 INJECTION, POWDER, LYOPHILIZED, FOR SOLUTION INTRAVENOUS; PARENTERAL at 06:10

## 2023-10-10 RX ADMIN — AZITHROMYCIN 500 MG: 250 TABLET, FILM COATED ORAL at 08:10

## 2023-10-10 RX ADMIN — BUSPIRONE HYDROCHLORIDE 5 MG: 5 TABLET ORAL at 10:10

## 2023-10-10 RX ADMIN — MELOXICAM 7.5 MG: 7.5 TABLET ORAL at 08:10

## 2023-10-10 RX ADMIN — FLUTICASONE FUROATE AND VILANTEROL TRIFENATATE 1 PUFF: 100; 25 POWDER RESPIRATORY (INHALATION) at 11:10

## 2023-10-10 RX ADMIN — Medication 500 MG: at 10:10

## 2023-10-10 RX ADMIN — LEVALBUTEROL 1.25 MG: 1.25 SOLUTION, CONCENTRATE RESPIRATORY (INHALATION) at 08:10

## 2023-10-10 RX ADMIN — BUSPIRONE HYDROCHLORIDE 5 MG: 5 TABLET ORAL at 08:10

## 2023-10-10 RX ADMIN — SODIUM CHLORIDE SOLN NEBU 3% 4 ML: 3 NEBU SOLN at 08:10

## 2023-10-10 RX ADMIN — OXYBUTYNIN CHLORIDE 5 MG: 5 TABLET, EXTENDED RELEASE ORAL at 08:10

## 2023-10-10 RX ADMIN — GUAIFENESIN 600 MG: 600 TABLET, EXTENDED RELEASE ORAL at 10:10

## 2023-10-10 RX ADMIN — LEVALBUTEROL 1.25 MG: 1.25 SOLUTION, CONCENTRATE RESPIRATORY (INHALATION) at 11:10

## 2023-10-10 RX ADMIN — LORAZEPAM 1 MG: 2 INJECTION INTRAMUSCULAR; INTRAVENOUS at 04:10

## 2023-10-10 RX ADMIN — PREDNISONE 60 MG: 20 TABLET ORAL at 08:10

## 2023-10-10 RX ADMIN — CALCIUM CARBONATE (ANTACID) CHEW TAB 500 MG 500 MG: 500 CHEW TAB at 08:10

## 2023-10-10 RX ADMIN — DILTIAZEM HYDROCHLORIDE 120 MG: 120 CAPSULE, COATED, EXTENDED RELEASE ORAL at 08:10

## 2023-10-10 RX ADMIN — Medication 500 MG: at 08:10

## 2023-10-10 RX ADMIN — SODIUM CHLORIDE SOLN NEBU 3% 4 ML: 3 NEBU SOLN at 11:10

## 2023-10-10 NOTE — ED NOTES
Nurses Note -- 4 Eyes      10/10/2023   9:21 AM      Skin assessed during: Daily Assessment      [x] No Altered Skin Integrity Present    []Prevention Measures Documented      [] Yes- Altered Skin Integrity Present or Discovered   [] LDA Added if Not in Epic (Describe Wound)   [] New Altered Skin Integrity was Present on Admit and Documented in LDA   [] Wound Image Taken    Wound Care Consulted? No    Attending Nurse:  Libby Garcia RN/Staff Member:   JOEL Delvalle

## 2023-10-10 NOTE — PLAN OF CARE
[facility] - Emergency Dept  Initial Discharge Assessment       Primary Care Provider: Dustin Khan MD    Admission Diagnosis: COPD exacerbation [J44.1]    Admission Date: 10/9/2023  Expected Discharge Date:     Transition of Care Barriers: (P) Transportation    Payor: MEDICARE / Plan: MEDICARE PART A & B / Product Type: Government /     Extended Emergency Contact Information  Primary Emergency Contact: Miguelangel Quinn  Address: [address] [place] [id number] [place]  Mobile Phone: [phone]  Relation: Spouse  Secondary Emergency Contact: MIGUELANGEL QUINN IV  [facility]  Mobile Phone: [phone]  Relation: Son    Discharge Plan A: (P) Home, Home with family, Home Health  Discharge Plan B: (P) Home, Home with family, Home Health      [facility] [facility] - [place], [place] - [address] [facility]  [address] [facility] [address]  Phone: [phone] Fax: [phone]    [id number] - [place], [place] - [address] [facility] [address] [facility]  [address] [place] [address]  Phone: [phone] Fax: [phone]      Initial Assessment (most recent)       Adult Discharge Assessment - 10/09/23 2214          Discharge Assessment    Assessment Type Discharge Planning Assessment (P)      Confirmed/corrected address, phone number and insurance Yes (P)      Confirmed Demographics Correct on Facesheet (P)      Source of Information patient (P)      When was your last doctors appointment? -- (P)    Virtual in August    Does patient/caregiver understand observation status Yes (P)      Communicated RENARD with patient/caregiver Yes (P)      Reason For Admission SOB (P)      People in Home spouse (P)      Facility Arrived From: Home (P)      Do you expect to return to your current living situation? Yes (P)      Do you have help at home or someone to help you manage your care at home? Yes (P)      Who are your  caregiver(s) and their phone number(s)?  (P)      Prior to hospitilization cognitive status: Alert/Oriented (P)      Current cognitive status: Alert/Oriented (P)      Home Accessibility wheelchair accessible (P)      Home Layout Able to live on 1st floor (P)      Equipment Currently Used at Home oxygen (P)    [facility] Provides oxygen    Readmission within 30 days? No (P)      Patient currently being followed by outpatient case management? No (P)      Do you currently have service(s) that help you manage your care at home? Yes (P)      Name and Contact number of agency HH but doesnt remember name (P)      Is the pt/caregiver preference to resume services with current agency Yes (P)      Do you take prescription medications? Yes (P)      Do you have prescription coverage? Yes (P)      Coverage Medicare (P)      Do you have any problems affording any of your prescribed medications? No (P)      Is the patient taking medications as prescribed? yes (P)      Who is going to help you get home at discharge?  (P)      How do you get to doctors appointments? family or friend will provide (P)      Are you on dialysis? No (P)      Do you take coumadin? No (P)      DME Needed Upon Discharge  none (P)      Discharge Plan discussed with: Patient (P)      Transition of Care Barriers Transportation (P)      Discharge Plan A Home;Home with family;Home Health (P)      Discharge Plan B Home;Home with family;Home Health (P)         Physical Activity    On average, how many days per week do you engage in moderate to strenuous exercise (like a brisk walk)? 0 days (P)      On average, how many minutes do you engage in exercise at this level? 0 min (P)         Financial Resource Strain    How hard is it for you to pay for the very basics like food, housing, medical care, and heating? Not hard at all (P)         Housing Stability    In the last 12 months, was there a time when you were not able to pay the mortgage or rent  on time? No (P)      In the last 12 months, how many places have you lived? 1 (P)      In the last 12 months, was there a time when you did not have a steady place to sleep or slept in a shelter (including now)? No (P)         Transportation Needs    In the past 12 months, has lack of transportation kept you from medical appointments or from getting medications? No (P)      In the past 12 months, has lack of transportation kept you from meetings, work, or from getting things needed for daily living? No (P)         Food Insecurity    Within the past 12 months, you worried that your food would run out before you got the money to buy more. Never true (P)      Within the past 12 months, the food you bought just didn't last and you didn't have money to get more. Never true (P)         Stress    Do you feel stress - tense, restless, nervous, or anxious, or unable to sleep at night because your mind is troubled all the time - these days? Not at all (P)         Social Connections    In a typical week, how many times do you talk on the phone with family, friends, or neighbors? More than three times a week (P)      How often do you get together with friends or relatives? Never (P)      How often do you attend Zoroastrian or Yarsanism services? Never (P)      Do you belong to any clubs or organizations such as Zoroastrian groups, unions, fraternal or athletic groups, or school groups? No (P)      How often do you attend meetings of the clubs or organizations you belong to? Never (P)      Are you , , , , never , or living with a partner?  (P)         Alcohol Use    Q1: How often do you have a drink containing alcohol? 4 or more times a week (P)      Q2: How many drinks containing alcohol do you have on a typical day when you are drinking? 1 or 2 (P)      Q3: How often do you have six or more drinks on one occasion? Never (P)         OTHER    Name(s) of People in Home  (P)

## 2023-10-10 NOTE — HOSPITAL COURSE
Patient admitted for a COPD exacerbation. Started on Zosyn in the setting of recent hospitalization and IV abx. Oxygen weaned to maintain SpO2 88-92%. Scheduled duonebs w/ PRN available in addition to IV SoluMedrol. Hyperglycemia 2/2 steroids on sliding scale insulin. Antibiotics, steroids and breathing treatments de-escalated as tolerated. Palliative consulted for patient's dyspnea and agreeable to oral morphine. Patient considering hospice; outpatient palliative appointment scheduled.

## 2023-10-10 NOTE — ED NOTES
Telemetry Verification   Patient placed on Telemetry Box  Verified with War Room  Box # [id number]   Monitor Tech Andrés   Rate    Rhythm

## 2023-10-10 NOTE — ED NOTES
RT performed scheduled ordered breathing tx; subsequently pt became increasingly anxious w/ notable dyspnea. 88% on 5L, 120 bpm. Pt endorses anxiety in addition SOB.  Hospital medicine updated on pt status, RT called back to bedside for re-assessment.

## 2023-10-10 NOTE — SUBJECTIVE & OBJECTIVE
Past Medical History:   Diagnosis Date    Cataract     COPD (chronic obstructive pulmonary disease)     COVID-19     History of COVID-19 1/17/2021    Still with mild dyspnea. Encouraged return to pulmonary rehab Recommend scheduling vaccination when appointment is available.     History of retinal hemorrhage     Lobar pneumonia 11/14/2021    Recurrent in RLL, no endobronchial lesion Needs speech evaluation and MBSS for recurrent aspiration in setting of dysphagia  Will need pulmonary rehab at [facility].    Pathological fracture due to osteoporosis 7/6/2020    Retinal histoplasmosis     Secondary pulmonary arterial hypertension 7/3/2018    Secondary to emphysema and chronic respiratory failure, mild.       Past Surgical History:   Procedure Laterality Date    BRONCHOSCOPY WITH FLUOROSCOPY N/A 10/28/2019    Procedure: BRONCHOSCOPY, WITH FLUOROSCOPY;  Surgeon: Brooklynn Ruiz MD;  Location: [facility] OR [address] [place] [address];  Service: Endoscopy;  Laterality: N/A;    HAND SURGERY         Review of patient's allergies indicates:   Allergen Reactions    Albuterol     Epinephrine     Ipratropium        Current Facility-Administered Medications on File Prior to Encounter   Medication    albuterol inhaler 2 puff     Current Outpatient Medications on File Prior to Encounter   Medication Sig    busPIRone (BUSPAR) 5 MG Tab Take 1 tablet (5 mg total) by mouth 2 (two) times daily.    calcium carbonate (OS-STEPHANIE) 600 mg calcium (1,500 mg) Tab Take 1 tablet (600 mg total) by mouth once daily. Take Levofloxacin antibiotic at least 2 hours before calcium.    citalopram (CELEXA) 10 MG tablet TAKE 1 AND 1/2 TABLETS(15 MG) BY MOUTH EVERY DAY    diltiaZEM (DILACOR XR) 120 MG CDCR Take 1 capsule (120 mg total) by mouth once daily.    levalbuterol (XOPENEX) 0.63 mg/3 mL nebulizer solution USE 1 VIAL IN NEBULIZER EVERY 8 HOURS AS NEEDED FOR WHEEZE Strength: 0.63 mg/3 mL    MULTIVIT-IRON-MIN-FOLIC ACID 3,500-18-0.4 UNIT-MG-MG ORAL CHEW Take 1 tablet by mouth once  daily. Take Levofloxacin antibiotic at least 2 hours before multivitamin.    pantoprazole (PROTONIX) 40 MG tablet Take 1 tablet (40 mg total) by mouth once daily.    umeclidinium (INCRUSE ELLIPTA) 62.5 mcg/actuation inhalation capsule Inhale 62.5 mcg into the lungs once daily.    ascorbic acid, vitamin C, (VITAMIN C) 500 MG tablet Take 500 mg by mouth 2 (two) times daily.     azelastine (ASTELIN) 137 mcg (0.1 %) nasal spray 1 spray (137 mcg total) by Nasal route 2 (two) times daily.    calcium carbonate (CALTRATE 600 ORAL) Take by mouth.    citalopram (CELEXA) 20 MG tablet Take 1 tablet (20 mg total) by mouth once daily.    fluticasone furoate-vilanteroL (BREO ELLIPTA) 200-25 mcg/dose DsDv diskus inhaler INHALE 1 PUFF INTO THE LUNGS DAILY    hydrOXYzine pamoate (VISTARIL) 25 MG Cap Take 1 capsule (25 mg total) by mouth every 6 (six) hours as needed (Anxiety).    melatonin (MELATIN) 3 mg tablet Take 2 tablets (6 mg total) by mouth nightly as needed for Insomnia.    melatonin (MELATIN) 3 mg tablet Take 2 tablets (6 mg total) by mouth nightly as needed for Insomnia.    meloxicam (MOBIC) 7.5 MG tablet Take 1 tablet (7.5 mg total) by mouth once daily. (Patient not taking: Reported on 8/22/2023)    pulse oximeter (PULSE OXIMETER) device by Apply Externally route 2 (two) times a day. Use twice daily at 8 AM and 3 PM and record the value in [facility] as directed.    sodium chloride (OCEAN) 0.65 % nasal spray 1 spray by Nasal route 2 (two) times daily.    solifenacin (VESICARE) 5 MG tablet Take 1 tablet (5 mg total) by mouth once daily.    [DISCONTINUED] furosemide (LASIX) 20 MG tablet Take 1 tablet (20 mg total) by mouth once daily. for 3 days     Family History       Problem Relation (Age of Onset)    Colon cancer Mother, Father    Macular degeneration Mother    Stroke Father          Tobacco Use    Smoking status: Former     Current packs/day: 0.00     Average packs/day: 1 pack/day for 36.0 years (36.0 ttl pk-yrs)      Types: Cigarettes     Start date: 1980     Quit date: 2016     Years since quittin.1    Smokeless tobacco: Never    Tobacco comments:     pt states she does not remember date   Substance and Sexual Activity    Alcohol use: Yes     Alcohol/week: 2.0 standard drinks of alcohol     Types: 2 Glasses of wine per week     Comment: 1-2 glasses a day     Drug use: No    Sexual activity: Yes     Partners: Male     Review of Systems   Constitutional:  Positive for diaphoresis. Negative for chills and fever.   Respiratory:  Positive for shortness of breath and wheezing. Negative for chest tightness.    Cardiovascular:  Negative for chest pain, palpitations and leg swelling.   Gastrointestinal:  Negative for abdominal distention, constipation, diarrhea, nausea and vomiting.   Genitourinary:  Negative for dysuria.   Musculoskeletal:  Negative for back pain.   Neurological:  Negative for dizziness and headaches.   Psychiatric/Behavioral:  Negative for agitation and confusion.      Objective:     Vital Signs (Most Recent):  Temp: 98.4 °F (36.9 °C) (10/09/23 1718)  Pulse: 103 (10/09/23 1925)  Resp: 18 (10/09/23 1925)  BP: (!) 128/58 (10/09/23 1925)  SpO2: 95 % (10/09/23 1925) Vital Signs (24h Range):  Temp:  [98.4 °F (36.9 °C)] 98.4 °F (36.9 °C)  Pulse:  [100-103] 103  Resp:  [18-28] 18  SpO2:  [95 %-100 %] 95 %  BP: (128-148)/(58-80) 128/58        There is no height or weight on file to calculate BMI.     Physical Exam  Constitutional:       General: She is in acute distress.   HENT:      Head: Normocephalic and atraumatic.      Nose: Nose normal.      Comments: Nasal canula in place     Mouth/Throat:      Mouth: Mucous membranes are dry.      Pharynx: Oropharynx is clear.      Comments: Lips pursed  Eyes:      General:         Right eye: No discharge.         Left eye: No discharge.      Extraocular Movements: Extraocular movements intact.      Conjunctiva/sclera: Conjunctivae normal.   Cardiovascular:      Rate and  Rhythm: Normal rate and regular rhythm.      Heart sounds: No murmur heard.  Pulmonary:      Effort: Respiratory distress present.      Breath sounds: Wheezing present.   Chest:      Chest wall: No tenderness.   Abdominal:      General: Abdomen is flat.      Palpations: Abdomen is soft.      Tenderness: There is no abdominal tenderness.   Musculoskeletal:         General: No swelling. Normal range of motion.      Right lower leg: Edema present.      Left lower leg: No edema.   Skin:     General: Skin is warm and dry.      Coloration: Skin is pale.   Neurological:      General: No focal deficit present.      Mental Status: She is alert. Mental status is at baseline.                Significant Labs: All pertinent labs within the past 24 hours have been reviewed.    Significant Imaging: I have reviewed all pertinent imaging results/findings within the past 24 hours.

## 2023-10-10 NOTE — ASSESSMENT & PLAN NOTE
"Patient with Hypercapnic and Hypoxic Respiratory failure which is Acute on chronic.  she is on home oxygen at 2.5-3 LPM. Supplemental oxygen was provided and noted-      .   Signs/symptoms of respiratory failure include- tachypnea, increased work of breathing and respiratory distress. Contributing diagnoses includes - COPD Labs and images were reviewed. Patient Has not had a recent ABG. Will treat underlying causes and adjust management of respiratory failure.     -- See "COPD with acute exacerbation"  "

## 2023-10-10 NOTE — HPI
Ms. Fam is a 72 y/o F with PMHx of COPD c/b centrilobular emphysema, recurrent pneumonia, pHTN, former smoker, GERD, Afib on diltiazem not AC, and anxiety who presented to [facility] ED for shortness of breath in the last week. During this time she felt congested with a cough but had not produced sputum. She was previously hospitalized in August for an exacerbation and she required a prolonged steroid course. She denied fevers, chills, N/V, chest pain, new weakness, new fatigue. At baseline she is on 3L NC with dyspnea on exertion.    In ED, given O2, nebs, mag and ceftriaxone. CXR with no evidence of PNA. No leukocytosos. VBG with pH 7.34, CO2 60.6. Pt admitted for treatment of COPD exacerbation.

## 2023-10-10 NOTE — ASSESSMENT & PLAN NOTE
On COPD pathway. Wears 2.5 to 3L NC at home. At baseline, mobility is limited by SOB and is unable to walk ten steps without feeling SOB. VBG with pCO2 61.     S/p methylpred and IV mag in ED    -- Ceftriaxone and azithromycin for CAP coverage  -- Prednisone 60 mg daily (dose during previous exacerbation)  -- Levalbuterol nebs q6h while awake  -- Mucinex, chest PT  -- Home Breo  -- Supplemental O2 for SaO2 > 88%

## 2023-10-10 NOTE — NURSING
Nurses Note -- 4 Eyes      10/10/2023   6:22 PM      Skin assessed during: Admit      [x] No Pressure Injuries Present    []Prevention Measures Documented      [] Yes- Altered Skin Integrity Present or Discovered   [] LDA Added if Not in Epic (Describe Wound)   [] New Altered Skin Integrity was Present on Admit and Documented in LDA   [] Wound Image Taken    Wound Care Consulted? No    Attending Nurse:  Sophia Jones RN     Second RN/Staff Member:  Luz Maria CARTWRIGHT LPN

## 2023-10-10 NOTE — H&P
[facility]  [facility]  History & Physical    Patient Name: Estefani Fam  MRN: [id number]  Patient Class: OP- Observation  Admission Date: 10/9/2023  Attending Physician: Sánchez Pike III*   Primary Care Provider: Dustin Khan MD     Patient information was obtained from patient, spouse/SO, past medical records and ER records.     Subjective:     Principal Problem: COPD exacerbation    Chief Complaint:   Chief Complaint   Patient presents with    Shortness of Breath     Arrived via EMS from home with c/o SOB. Hx of COPD.     COPD        HPI: Ms. Fam is a 74 y/o F with PMHx of COPD c/b centrilobular emphysema, recurrent pneumonia, pHTN, former smoker, GERD, Afib on diltiazem not AC, and anxiety who presented to [facility] ED for shortness of breath in the last week. During this time she felt congested with a cough but had not produced sputum. She was previously hospitalized in August for an exacerbation and she required a prolonged steroid course. She denied fevers, chills, N/V, chest pain, new weakness, new fatigue. At baseline she is on 3L NC with dyspnea on exertion.    In ED, given O2, nebs, mag and ceftriaxone. CXR with no evidence of PNA. No leukocytosos. VBG with pH 7.34, CO2 60.6. Pt admitted for treatment of COPD exacerbation.       Past Medical History:   Diagnosis Date    Cataract     COPD (chronic obstructive pulmonary disease)     COVID-19     History of COVID-19 1/17/2021    Still with mild dyspnea. Encouraged return to pulmonary rehab Recommend scheduling vaccination when appointment is available.     History of retinal hemorrhage     Lobar pneumonia 11/14/2021    Recurrent in RLL, no endobronchial lesion Needs speech evaluation and MBSS for recurrent aspiration in setting of dysphagia  Will need pulmonary rehab at [facility].    Pathological fracture due to osteoporosis 7/6/2020    Retinal histoplasmosis     Secondary pulmonary arterial hypertension 7/3/2018     Secondary to emphysema and chronic respiratory failure, mild.       Past Surgical History:   Procedure Laterality Date    BRONCHOSCOPY WITH FLUOROSCOPY N/A 10/28/2019    Procedure: BRONCHOSCOPY, WITH FLUOROSCOPY;  Surgeon: Brooklynn Ruiz MD;  Location: [facility] OR [address] [place] [address];  Service: Endoscopy;  Laterality: N/A;    HAND SURGERY         Review of patient's allergies indicates:   Allergen Reactions    Albuterol     Epinephrine     Ipratropium        Current Facility-Administered Medications on File Prior to Encounter   Medication    albuterol inhaler 2 puff     Current Outpatient Medications on File Prior to Encounter   Medication Sig    busPIRone (BUSPAR) 5 MG Tab Take 1 tablet (5 mg total) by mouth 2 (two) times daily.    calcium carbonate (OS-STEPHANIE) 600 mg calcium (1,500 mg) Tab Take 1 tablet (600 mg total) by mouth once daily. Take Levofloxacin antibiotic at least 2 hours before calcium.    citalopram (CELEXA) 10 MG tablet TAKE 1 AND 1/2 TABLETS(15 MG) BY MOUTH EVERY DAY    diltiaZEM (DILACOR XR) 120 MG CDCR Take 1 capsule (120 mg total) by mouth once daily.    levalbuterol (XOPENEX) 0.63 mg/3 mL nebulizer solution USE 1 VIAL IN NEBULIZER EVERY 8 HOURS AS NEEDED FOR WHEEZE Strength: 0.63 mg/3 mL    MULTIVIT-IRON-MIN-FOLIC ACID 3,500-18-0.4 UNIT-MG-MG ORAL CHEW Take 1 tablet by mouth once daily. Take Levofloxacin antibiotic at least 2 hours before multivitamin.    pantoprazole (PROTONIX) 40 MG tablet Take 1 tablet (40 mg total) by mouth once daily.    umeclidinium (INCRUSE ELLIPTA) 62.5 mcg/actuation inhalation capsule Inhale 62.5 mcg into the lungs once daily.    ascorbic acid, vitamin C, (VITAMIN C) 500 MG tablet Take 500 mg by mouth 2 (two) times daily.     azelastine (ASTELIN) 137 mcg (0.1 %) nasal spray 1 spray (137 mcg total) by Nasal route 2 (two) times daily.    calcium carbonate (CALTRATE 600 ORAL) Take by mouth.    citalopram (CELEXA) 20 MG tablet Take 1 tablet (20 mg total) by mouth once  daily.    fluticasone furoate-vilanteroL (BREO ELLIPTA) 200-25 mcg/dose DsDv diskus inhaler INHALE 1 PUFF INTO THE LUNGS DAILY    hydrOXYzine pamoate (VISTARIL) 25 MG Cap Take 1 capsule (25 mg total) by mouth every 6 (six) hours as needed (Anxiety).    melatonin (MELATIN) 3 mg tablet Take 2 tablets (6 mg total) by mouth nightly as needed for Insomnia.    melatonin (MELATIN) 3 mg tablet Take 2 tablets (6 mg total) by mouth nightly as needed for Insomnia.    meloxicam (MOBIC) 7.5 MG tablet Take 1 tablet (7.5 mg total) by mouth once daily. (Patient not taking: Reported on 2023)    pulse oximeter (PULSE OXIMETER) device by Apply Externally route 2 (two) times a day. Use twice daily at 8 AM and 3 PM and record the value in MyChart as directed.    sodium chloride (OCEAN) 0.65 % nasal spray 1 spray by Nasal route 2 (two) times daily.    solifenacin (VESICARE) 5 MG tablet Take 1 tablet (5 mg total) by mouth once daily.    [DISCONTINUED] furosemide (LASIX) 20 MG tablet Take 1 tablet (20 mg total) by mouth once daily. for 3 days     Family History       Problem Relation (Age of Onset)    Colon cancer Mother, Father    Macular degeneration Mother    Stroke Father          Tobacco Use    Smoking status: Former     Current packs/day: 0.00     Average packs/day: 1 pack/day for 36.0 years (36.0 ttl pk-yrs)     Types: Cigarettes     Start date: 1980     Quit date: 2016     Years since quittin.1    Smokeless tobacco: Never    Tobacco comments:     pt states she does not remember date   Substance and Sexual Activity    Alcohol use: Yes     Alcohol/week: 2.0 standard drinks of alcohol     Types: 2 Glasses of wine per week     Comment: 1-2 glasses a day     Drug use: No    Sexual activity: Yes     Partners: Male     Review of Systems   Constitutional:  Positive for diaphoresis. Negative for chills and fever.   Respiratory:  Positive for shortness of breath and wheezing. Negative for chest tightness.     Cardiovascular:  Negative for chest pain, palpitations and leg swelling.   Gastrointestinal:  Negative for abdominal distention, constipation, diarrhea, nausea and vomiting.   Genitourinary:  Negative for dysuria.   Musculoskeletal:  Negative for back pain.   Neurological:  Negative for dizziness and headaches.   Psychiatric/Behavioral:  Negative for agitation and confusion.      Objective:     Vital Signs (Most Recent):  Temp: 98.4 °F (36.9 °C) (10/09/23 1718)  Pulse: 103 (10/09/23 1925)  Resp: 18 (10/09/23 1925)  BP: (!) 128/58 (10/09/23 1925)  SpO2: 95 % (10/09/23 1925) Vital Signs (24h Range):  Temp:  [98.4 °F (36.9 °C)] 98.4 °F (36.9 °C)  Pulse:  [100-103] 103  Resp:  [18-28] 18  SpO2:  [95 %-100 %] 95 %  BP: (128-148)/(58-80) 128/58        There is no height or weight on file to calculate BMI.     Physical Exam  Constitutional:       General: She is in acute distress.   HENT:      Head: Normocephalic and atraumatic.      Nose: Nose normal.      Comments: Nasal canula in place     Mouth/Throat:      Mouth: Mucous membranes are dry.      Pharynx: Oropharynx is clear.      Comments: Lips pursed  Eyes:      General:         Right eye: No discharge.         Left eye: No discharge.      Extraocular Movements: Extraocular movements intact.      Conjunctiva/sclera: Conjunctivae normal.   Cardiovascular:      Rate and Rhythm: Normal rate and regular rhythm.      Heart sounds: No murmur heard.  Pulmonary:      Effort: Respiratory distress present.      Breath sounds: Wheezing present.   Chest:      Chest wall: No tenderness.   Abdominal:      General: Abdomen is flat.      Palpations: Abdomen is soft.      Tenderness: There is no abdominal tenderness.   Musculoskeletal:         General: No swelling. Normal range of motion.      Right lower leg: Edema present.      Left lower leg: No edema.   Skin:     General: Skin is warm and dry.      Coloration: Skin is pale.   Neurological:      General: No focal deficit present.  "     Mental Status: She is alert. Mental status is at baseline.                Significant Labs: All pertinent labs within the past 24 hours have been reviewed.    Significant Imaging: I have reviewed all pertinent imaging results/findings within the past 24 hours.    Assessment/Plan:     Atrial fibrillation with RVR  Patient with Paroxysmal (<7 days) atrial fibrillation which is controlled currently with Calcium Channel Blocker. Patient is currently in sinus rhythm.[id number] Score: 2.  Anticoagulation not indicated due to CHADSVASc.    -- Continue home meds    Chronic obstructive pulmonary disease with acute exacerbation  On COPD pathway. Wears 2.5 to 3L NC at home. At baseline, mobility is limited by SOB and is unable to walk ten steps without feeling SOB. VBG with pCO2 61.     S/p methylpred and IV mag in ED    -- Ceftriaxone and azithromycin for CAP coverage  -- Prednisone 60 mg daily (dose during previous exacerbation)  -- Levalbuterol nebs q6h while awake  -- Mucinex, chest PT  -- Home Breo  -- Supplemental O2 for SaO2 > 88%    Former heavy tobacco smoker  Former smoker. 60 py history.    Acute on chronic respiratory failure with hypoxia  Patient with Hypercapnic and Hypoxic Respiratory failure which is Acute on chronic.  she is on home oxygen at 2.5-3 LPM. Supplemental oxygen was provided and noted-      .   Signs/symptoms of respiratory failure include- tachypnea, increased work of breathing and respiratory distress. Contributing diagnoses includes - COPD Labs and images were reviewed. Patient Has not had a recent ABG. Will treat underlying causes and adjust management of respiratory failure.     -- See "COPD with acute exacerbation"      VTE Risk Mitigation (From admission, onward)         Ordered     enoxaparin injection 40 mg  Daily         10/09/23 1923                   Melany Chni MD  Department of Hospital Medicine  [facility]  "

## 2023-10-10 NOTE — RESPIRATORY THERAPY
[facility] RESPIRATORY THERAPY PROACTIVE NOTE           Time of visit: 1630     Code Status: Full Code   : 1950  Bed: 50050/34114 A:   MRN: [id number]  Time spent at the bedside: < 15 min    SITUATION    Evaluated patient for: respiratory     BACKGROUND    Why is the patient in the hospital?: <principal problem not specified>    Patient has a past medical history of Cataract, COPD (chronic obstructive pulmonary disease), COVID-19, History of COVID-19, History of retinal hemorrhage, Lobar pneumonia, Pathological fracture due to osteoporosis, Retinal histoplasmosis, and Secondary pulmonary arterial hypertension.    24 Hours Vitals Range:  Temp:  [98 °F (36.7 °C)-98.9 °F (37.2 °C)]   Pulse:  []   Resp:  [16-24]   BP: (126-194)/(58-84)   SpO2:  [88 %-99 %]     Labs:    Recent Labs     10/09/23  1742 10/10/23  0326    133*   K 3.9 4.0    97   CO2 26 29   BUN 11 12   CREATININE 0.6 0.5   * 180*   PHOS  --  2.8   MG  --  2.9*        Recent Labs     10/09/23  1754   PH 7.341*   PCO2 60.6*   PO2 47   HCO3 32.8*   POCSATURATED 78   BE 7       ASSESSMENT/INTERVENTIONS  Spoke with pt due to increase HR with TX. Pt stated she gets anxious when she takes the tx with a mask. We will get her the mouth piece to take them with to help keep her HR down with decreased agitation.     Last VS   Temp: 98.6 °F (37 °C) (10/10 1557)  Pulse: 110 (10/10 1615)  Resp: 21 (10/10 1615)  BP: 146/70 (10/10 1615)  SpO2: 97 % (10/10 1615)    Level of Consciousness: Level of Consciousness (AVPU): alert  Respiratory Effort: Respiratory Effort: Short of breath Expansion/Accessory Muscle Usage: Expansion/Accessory Muscles/Retractions: expansion symmetric  All Lung Field Breath Sounds: All Lung Fields Breath Sounds: Anterior:, Lateral:, wheezes, expiratory, diminished  O2 Device/Concentration: 5  NIPPV: No Surgical airway: No  ETCO2 monitored:    Ambu at bedside:      Active Orders   Respiratory Care    ACAPELLA TREATMENT  Q4H     Frequency: Q4H     Number of Occurrences: Until Specified    Chest physiotherapy Q6H WAKE     Frequency: Q6H WAKE     Number of Occurrences: Until Specified     Order Questions:      Indications: LOBAR OR > ATELECTASIS    Inhalation Treatment Q4H     Frequency: Q4H     Number of Occurrences: Until Specified    Inhalation Treatment Q4H PRN     Frequency: Q4H PRN     Number of Occurrences: Until Specified    Inhalation Treatment Q4H WAKE     Frequency: Q4H WAKE     Number of Occurrences: Until Specified    MDI Daily     Frequency: Daily     Number of Occurrences: Until Specified    Oxygen Continuous     Frequency: Continuous     Number of Occurrences: Until Specified     Order Questions:      Device type: Low flow      Device: Nasal Cannula (1- 5 Liters)      LPM: 2.5      Titrate O2 per Oxygen Titration Protocol: No      To maintain SpO2 goal of: >= 88% (Comment: 88-90%)      Notify MD of: Inability to achieve desired SpO2; Sudden change in patient status and requires 20% increase in FiO2; Patient requires >60% FiO2    Pulse Oximetry Q4H     Frequency: Q4H     Number of Occurrences: Until Specified     Order Comments: Q4h with Vitals. Notify MD if < or = 88%         RECOMMENDATIONS    We recommend: RRT Recs: Continue POC per primary team.    FOLLOW-UP    Please call back the Rapid Response RT, Altagracia Fontana RRT at x [phone] for any questions or concerns.

## 2023-10-10 NOTE — ASSESSMENT & PLAN NOTE
Patient with Paroxysmal (<7 days) atrial fibrillation which is controlled currently with Calcium Channel Blocker. Patient is currently in sinus rhythm.[id number] Score: 2.  Anticoagulation not indicated due to CHADSVASc.    -- Continue home meds

## 2023-10-10 NOTE — PHARMACY MED REC
"  Admission Medication History     The home medication history was taken by Kristin Hart.    You may go to "Admission" then "Reconcile Home Medications" tabs to review and/or act upon these items.     The home medication list has been updated by the Pharmacy department.   Please read ALL comments highlighted in yellow.   Please address this information as you see fit.    Feel free to contact us if you have any questions or require assistance.      The medications listed below were removed from the home medication list. Please reorder if appropriate:  Patient reports no longer taking the following medication(s):  MELOXICAM 7.5 MG  SOLIFENACIN 5 MG    Medications listed below were obtained from: Patient  Current Outpatient Medications on File Prior to Encounter   Medication Sig    ascorbic acid, vitamin C, (VITAMIN C) 500 MG tablet   Take 500 mg by mouth once daily.    azelastine (ASTELIN) 137 mcg (0.1 %) nasal spray   1 spray by Nasal route 2 (two) times daily as needed for Rhinitis.    busPIRone (BUSPAR) 5 MG Tab   Take 1 tablet (5 mg total) by mouth 2 (two) times daily.    calcium carbonate (OS-STEPHANIE) 600 mg calcium (1,500 mg) Tab   Take 600 mg by mouth once daily.    carboxymethylcellulose sodium (REFRESH OPHT)   Place 1 drop into both eyes daily as needed (Dry eye).    chlorpheniramine/dextromethorp (CORICIDIN HBP COUGH AND COLD ORAL)   Take 1 tablet by mouth daily as needed (Cold symptoms).    citalopram (CELEXA) 10 MG tablet   Take 10 mg by mouth 2 (two) times a day.    diltiaZEM (DILACOR XR) 120 MG CDCR   Take 1 capsule (120 mg total) by mouth once daily.    fluticasone furoate-vilanteroL (BREO ELLIPTA) 200-25 mcg/dose DsDv diskus inhaler   INHALE 1 PUFF INTO THE LUNGS DAILY    guaifenesin/dextromethorphan (GUAIFENESIN DM ORAL)   Take 1 tablet by mouth daily as needed (Congestion).    hydrOXYzine pamoate (VISTARIL) 25 MG Cap   Take 25 mg by mouth daily as needed (Anxiety).    levalbuterol (XOPENEX) 0.63 mg/3 mL " nebulizer solution USE 1 VIAL IN NEBULIZER EVERY 8 HOURS AS NEEDED FOR WHEEZE Strength: 0.63 mg/3 mL      melatonin (MELATIN) 3 mg tablet   Take 2 tablets (6 mg total) by mouth nightly as needed for Insomnia.    MULTIVIT-IRON-MIN-FOLIC ACID 3,500-18-0.4 UNIT-MG-MG ORAL CHEW   Take 1 tablet by mouth once daily. (Centrum Silver Women)    pantoprazole (PROTONIX) 40 MG tablet   Take 1 tablet (40 mg total) by mouth once daily.    sodium chloride (OCEAN) 0.65 % nasal spray   1 spray by Nasal route daily as needed for Congestion.    umeclidinium (INCRUSE ELLIPTA) 62.5 mcg/actuation inhalation capsule   Inhale 62.5 mcg into the lungs once daily.    UNABLE TO FIND Omega XL - Take 1 tablet by mouth twice daily.      pulse oximeter (PULSE OXIMETER) device by Apply Externally route 2 (two) times a day. Use twice daily at 8 AM and 3 PM and record the value in [facility] as directed.               Kristin Hart  [id number]                .

## 2023-10-10 NOTE — ED NOTES
Pt noted w/ increased SOB, tachypnea, pursed lip breathing. 93% on 3L NC, titrated to 4L NC. Respiratory contacted to request pt re-assessment. Hospital medicine updated.

## 2023-10-10 NOTE — PROGRESS NOTES
[facility]  Hospital Medicine  Progress Note    Patient Name: Estefani Fam  MRN: [id number]  Patient Class: IP- Inpatient   Admission Date: 10/9/2023  Length of Stay: 0 days  Attending Physician: Sánchez Pike III*  Primary Care Provider: Dustin Khan MD        Subjective:     Principal Problem: COPD exacerbation      HPI:  Ms. Fam is a 74 y/o F with PMHx of COPD c/b centrilobular emphysema, recurrent pneumonia, pHTN, former smoker, GERD, Afib on diltiazem not AC, and anxiety who presented to [facility] ED for shortness of breath in the last week. During this time she felt congested with a cough but had not produced sputum. She was previously hospitalized in August for an exacerbation and she required a prolonged steroid course. She denied fevers, chills, N/V, chest pain, new weakness, new fatigue. At baseline she is on 3L NC with dyspnea on exertion.    In ED, given O2, nebs, mag and ceftriaxone. CXR with no evidence of PNA. No leukocytosos. VBG with pH 7.34, CO2 60.6. Pt admitted for treatment of COPD exacerbation.       Overview/Hospital Course:  Oxygen weaned as tolerated. Antibiotics with pseudomonal coverage given recent hospitalization requiring IV abx. Hyperglycemia 2/2 steroids on sliding scale insulin. Pt declines PT/OT during hospitalization.      Interval History: NAEON. Received breathing treatment with symptomatic improvement.     Review of Systems   Constitutional:  Negative for chills and fever.   Respiratory:  Positive for shortness of breath and wheezing. Negative for chest tightness.    Cardiovascular:  Negative for chest pain, palpitations and leg swelling.   Gastrointestinal:  Negative for abdominal distention, constipation, diarrhea, nausea and vomiting.   Genitourinary:  Negative for dysuria.   Musculoskeletal:  Negative for back pain.   Neurological:  Negative for dizziness and headaches.   Psychiatric/Behavioral:  Negative for agitation and confusion.       Objective:     Vital Signs (Most Recent):  Temp: 98.8 °F (37.1 °C) (10/10/23 1250)  Pulse: 103 (10/10/23 1250)  Resp: (!) 22 (10/10/23 1141)  BP: (!) 126/58 (10/10/23 1250)  SpO2: 95 % (10/10/23 1250) Vital Signs (24h Range):  Temp:  [98 °F (36.7 °C)-98.9 °F (37.2 °C)] 98.8 °F (37.1 °C)  Pulse:  [] 103  Resp:  [16-28] 22  SpO2:  [95 %-100 %] 95 %  BP: (126-148)/(58-80) 126/58     Weight: 57.2 kg (126 lb)  Body mass index is 19.16 kg/m².    Intake/Output Summary (Last 24 hours) at 10/10/2023 4675  Last data filed at 10/9/2023 7217  Gross per 24 hour   Intake 50 ml   Output --   Net 50 ml         Physical Exam  Constitutional:       General: She is not in acute distress.  HENT:      Head: Normocephalic and atraumatic.      Nose: Nose normal.      Comments: Nasal canula in place     Mouth/Throat:      Mouth: Mucous membranes are dry.      Pharynx: Oropharynx is clear.      Comments: Lips pursed  Eyes:      General:         Right eye: No discharge.         Left eye: No discharge.      Extraocular Movements: Extraocular movements intact.      Conjunctiva/sclera: Conjunctivae normal.   Cardiovascular:      Rate and Rhythm: Normal rate and regular rhythm.      Heart sounds: No murmur heard.  Pulmonary:      Effort: Respiratory distress present.      Breath sounds: Wheezing present.   Chest:      Chest wall: No tenderness.   Abdominal:      General: Abdomen is flat.      Palpations: Abdomen is soft.      Tenderness: There is no abdominal tenderness.   Musculoskeletal:         General: No swelling. Normal range of motion.      Right lower leg: No edema.      Left lower leg: No edema.   Skin:     General: Skin is warm and dry.      Coloration: Skin is pale.   Neurological:      General: No focal deficit present.      Mental Status: She is alert. Mental status is at baseline.             Significant Labs: All pertinent labs within the past 24 hours have been reviewed.    Significant Imaging: I have reviewed all  "pertinent imaging results/findings within the past 24 hours.      Assessment/Plan:      Atrial fibrillation with RVR  Patient with Paroxysmal (<7 days) atrial fibrillation which is controlled currently with Calcium Channel Blocker. Patient is currently in sinus rhythm.[id number] Score: 2.  Anticoagulation not indicated due to CHADSVASc.    -- Continue home meds    Chronic obstructive pulmonary disease with acute exacerbation  On COPD pathway. Wears 2.5 to 3L NC at home. At baseline, mobility is limited by SOB and is unable to walk ten steps without feeling SOB. VBG with pCO2 61.     S/p methylpred and IV mag in ED    -- Zosyn to cover pseudomonas due to recent hospitalization requiring IV Abx  -- Solumedrol 40 mg TID  -- Levalbuterol nebs q4h while awake  -- Mucinex, chest PT  -- Home Breo  -- Supplemental O2 for SaO2 > 88%    Former heavy tobacco smoker  Former smoker. 36 py history.    Acute on chronic respiratory failure with hypoxia  Patient with Hypercapnic and Hypoxic Respiratory failure which is Acute on chronic.  she is on home oxygen at 2.5-3 LPM. Supplemental oxygen was provided and noted-      .   Signs/symptoms of respiratory failure include- tachypnea, increased work of breathing and respiratory distress. Contributing diagnoses includes - COPD Labs and images were reviewed. Patient Has not had a recent ABG. Will treat underlying causes and adjust management of respiratory failure.     -- See "COPD with acute exacerbation"      VTE Risk Mitigation (From admission, onward)         Ordered     enoxaparin injection 40 mg  Daily         10/09/23 1923                Discharge Planning   RENARD:      Code Status: Full Code   Is the patient medically ready for discharge?:     Reason for patient still in hospital (select all that apply): Treatment  Discharge Plan A: Home, Home with family, Home Health              Melany Chin MD  Department of Hospital Medicine   [facility]    "

## 2023-10-10 NOTE — CARE UPDATE
RAPID RESPONSE NURSE PROACTIVE ROUNDING NOTE       Time of Visit: 1630    Admit Date: 10/9/2023  LOS: 0  Code Status: Full Code   Date of Visit: 10/10/2023  : 1950  Age: 73 y.o.  Sex: female  Race: White  Bed: [facility]/ED12:   MRN: [id number]  Was the patient discharged from an ICU this admission? No   Was the patient discharged from a PACU within last 24 hours? No   Did the patient receive conscious sedation/general anesthesia in last 24 hours? No  Was the patient in the ED within the past 24 hours? Yes  Was the patient on NIPPV within the past 24 hours? No   Attending Physician: Sánchez Pike III*  Primary Service: [facility] 1   Time spent at the bedside: < 15 min    SITUATION    Notified by charge RN via phone call.  Reason for alert: Tachypnea  Called to evaluate the patient for Respiratory    BACKGROUND     Why is the patient in the hospital?: <principal problem not specified>    Patient has a past medical history of Cataract, COPD (chronic obstructive pulmonary disease), COVID-19, History of COVID-19, History of retinal hemorrhage, Lobar pneumonia, Pathological fracture due to osteoporosis, Retinal histoplasmosis, and Secondary pulmonary arterial hypertension.    Last Vitals:  Temp: 98.6 °F (37 °C) (10/10 1557)  Pulse: 110 (10/10 1615)  Resp: 21 (10/10 161)  BP: 146/70 (10/10 161)  SpO2: 97 % (10/10 1615)    24 Hours Vitals Range:  Temp:  [98 °F (36.7 °C)-98.9 °F (37.2 °C)]   Pulse:  []   Resp:  [16-28]   BP: (126-194)/(58-84)   SpO2:  [88 %-100 %]     Labs:  Recent Labs     10/09/23  1742 10/09/23  1754 10/10/23  0326   WBC 7.33  --  4.67   HGB 12.4  --  11.7*   HCT 38.5 42 36.1*     --  312       Recent Labs     10/09/23  1742 10/10/23  0326    133*   K 3.9 4.0    97   CO2 26 29   BUN 11 12   CREATININE 0.6 0.5   * 180*   PHOS  --  2.8   MG  --  2.9*        Recent Labs     10/09/23  1754   PH 7.341*   PCO2 60.6*   PO2 47   HCO3 32.8*   POCSATURATED 78   BE 7         ASSESSMENT    Physical Exam  Vitals and nursing note reviewed.   HENT:      Head: Normocephalic.   Cardiovascular:      Rate and Rhythm: Tachycardia present.      Heart sounds: Normal heart sounds.   Neurological:      Mental Status: She is alert.      GCS: GCS eye subscore is 4. GCS verbal subscore is 5. GCS motor subscore is 6.         INTERVENTIONS    The patient was seen for Respiratory problem. Staff concerns included tachypnea. The following interventions were performed: continuous pulse ox monitoring continued and continuous cardiac monitoring continued.    RECOMMENDATIONS    -Use mouth piece for breathing treatment rather than mask to reduce anxiety during treatment  -maintain IV Access  -continuous telemetry      PROVIDER ESCALATION    Yes/No  No        Disposition: Remain in room [id number].    FOLLOW-UP    charge La MALDONADO  updated on plan of care. Instructed to call the Rapid Response Nurse, Renan Tsai RN at [phone] for additional questions or concerns.

## 2023-10-10 NOTE — ED NOTES
"Pt endorses decreased SOB, reports she "breathing better." Pt in no acute distress at this time.   "

## 2023-10-10 NOTE — ASSESSMENT & PLAN NOTE
On COPD pathway. Wears 2.5 to 3L NC at home. At baseline, mobility is limited by SOB and is unable to walk ten steps without feeling SOB. VBG with pCO2 61.     S/p methylpred and IV mag in ED    -- Zosyn to cover pseudomonas due to recent hospitalization requiring IV Abx  -- Solumedrol 40 mg TID  -- Levalbuterol nebs q4h while awake  -- Mucinex, chest PT  -- Home Breo  -- Supplemental O2 for SaO2 > 88%

## 2023-10-10 NOTE — ASSESSMENT & PLAN NOTE
Patient with Paroxysmal (<7 days) atrial fibrillation which is controlled currently with Calcium Channel Blocker. Patient is currently in sinus rhythm.[id number] Score: 2.  Anticoagulation not indicated due to CHADSVASc.    -- Continue home meds

## 2023-10-10 NOTE — PLAN OF CARE
Patient admitted to 16w, AAOx4, oriented to call light system. Patient reports ambulatory at home, educated on calling for assistance. Non skid socks out of bed. BSC to be placed next to bed. POC continues.     Problem: Adult Inpatient Plan of Care  Goal: Plan of Care Review  Outcome: Ongoing, Progressing  Goal: Patient-Specific Goal (Individualized)  Outcome: Ongoing, Progressing  Goal: Absence of Hospital-Acquired Illness or Injury  Outcome: Ongoing, Progressing  Goal: Optimal Comfort and Wellbeing  Outcome: Ongoing, Progressing  Goal: Readiness for Transition of Care  Outcome: Ongoing, Progressing     Problem: Adjustment to Illness COPD (Chronic Obstructive Pulmonary Disease)  Goal: Optimal Chronic Illness Coping  Outcome: Ongoing, Progressing     Problem: Functional Ability Impaired COPD (Chronic Obstructive Pulmonary Disease)  Goal: Optimal Level of Functional [place]  Outcome: Ongoing, Progressing     Problem: Skin Injury Risk Increased  Goal: Skin Health and Integrity  Outcome: Ongoing, Progressing

## 2023-10-11 LAB
ADENOVIRUS: NOT DETECTED
ALBUMIN SERPL BCP-MCNC: 3.5 G/DL (ref 3.5–5.2)
ALP SERPL-CCNC: 64 U/L (ref 55–135)
ALT SERPL W/O P-5'-P-CCNC: 24 U/L (ref 10–44)
ANION GAP SERPL CALC-SCNC: 7 MMOL/L (ref 8–16)
AST SERPL-CCNC: 21 U/L (ref 10–40)
BACTERIA UR CULT: NORMAL
BACTERIA UR CULT: NORMAL
BASOPHILS # BLD AUTO: 0.01 K/UL (ref 0–0.2)
BASOPHILS NFR BLD: 0.1 % (ref 0–1.9)
BILIRUB SERPL-MCNC: 0.2 MG/DL (ref 0.1–1)
BORDETELLA PARAPERTUSSIS (IS1001): NOT DETECTED
BORDETELLA PERTUSSIS (PTXP): NOT DETECTED
BUN SERPL-MCNC: 11 MG/DL (ref 8–23)
CALCIUM SERPL-MCNC: 8.6 MG/DL (ref 8.7–10.5)
CHLAMYDIA PNEUMONIAE: NOT DETECTED
CHLORIDE SERPL-SCNC: 99 MMOL/L (ref 95–110)
CO2 SERPL-SCNC: 28 MMOL/L (ref 23–29)
CORONAVIRUS 229E, COMMON COLD VIRUS: NOT DETECTED
CORONAVIRUS HKU1, COMMON COLD VIRUS: NOT DETECTED
CORONAVIRUS NL63, COMMON COLD VIRUS: NOT DETECTED
CORONAVIRUS OC43, COMMON COLD VIRUS: NOT DETECTED
CREAT SERPL-MCNC: 0.6 MG/DL (ref 0.5–1.4)
DIFFERENTIAL METHOD: ABNORMAL
EOSINOPHIL # BLD AUTO: 0 K/UL (ref 0–0.5)
EOSINOPHIL NFR BLD: 0 % (ref 0–8)
ERYTHROCYTE [DISTWIDTH] IN BLOOD BY AUTOMATED COUNT: 13 % (ref 11.5–14.5)
EST. GFR  (NO RACE VARIABLE): >60 ML/MIN/1.73 M^2
FLUBV RNA NPH QL NAA+NON-PROBE: NOT DETECTED
GLUCOSE SERPL-MCNC: 144 MG/DL (ref 70–110)
HCT VFR BLD AUTO: 37.9 % (ref 37–48.5)
HGB BLD-MCNC: 12.1 G/DL (ref 12–16)
HPIV1 RNA NPH QL NAA+NON-PROBE: NOT DETECTED
HPIV2 RNA NPH QL NAA+NON-PROBE: NOT DETECTED
HPIV3 RNA NPH QL NAA+NON-PROBE: NOT DETECTED
HPIV4 RNA NPH QL NAA+NON-PROBE: NOT DETECTED
HUMAN METAPNEUMOVIRUS: NOT DETECTED
IMM GRANULOCYTES # BLD AUTO: 0.08 K/UL (ref 0–0.04)
IMM GRANULOCYTES NFR BLD AUTO: 0.7 % (ref 0–0.5)
INFLUENZA A (SUBTYPES H1,H1-2009,H3): NOT DETECTED
LYMPHOCYTES # BLD AUTO: 0.3 K/UL (ref 1–4.8)
LYMPHOCYTES NFR BLD: 2.7 % (ref 18–48)
MAGNESIUM SERPL-MCNC: 2.3 MG/DL (ref 1.6–2.6)
MCH RBC QN AUTO: 30.5 PG (ref 27–31)
MCHC RBC AUTO-ENTMCNC: 31.9 G/DL (ref 32–36)
MCV RBC AUTO: 96 FL (ref 82–98)
MONOCYTES # BLD AUTO: 0.7 K/UL (ref 0.3–1)
MONOCYTES NFR BLD: 5.7 % (ref 4–15)
MYCOPLASMA PNEUMONIAE: NOT DETECTED
NEUTROPHILS # BLD AUTO: 10.9 K/UL (ref 1.8–7.7)
NEUTROPHILS NFR BLD: 90.8 % (ref 38–73)
NRBC BLD-RTO: 0 /100 WBC
PHOSPHATE SERPL-MCNC: 3 MG/DL (ref 2.7–4.5)
PLATELET # BLD AUTO: 352 K/UL (ref 150–450)
PMV BLD AUTO: 9.2 FL (ref 9.2–12.9)
POCT GLUCOSE: 120 MG/DL (ref 70–110)
POCT GLUCOSE: 129 MG/DL (ref 70–110)
POCT GLUCOSE: 136 MG/DL (ref 70–110)
POCT GLUCOSE: 149 MG/DL (ref 70–110)
POCT GLUCOSE: 216 MG/DL (ref 70–110)
POTASSIUM SERPL-SCNC: 4 MMOL/L (ref 3.5–5.1)
PROT SERPL-MCNC: 6 G/DL (ref 6–8.4)
RBC # BLD AUTO: 3.97 M/UL (ref 4–5.4)
RESPIRATORY INFECTION PANEL SOURCE: NORMAL
RSV RNA NPH QL NAA+NON-PROBE: NOT DETECTED
RV+EV RNA NPH QL NAA+NON-PROBE: NOT DETECTED
SARS-COV-2 RNA RESP QL NAA+PROBE: NOT DETECTED
SODIUM SERPL-SCNC: 134 MMOL/L (ref 136–145)
WBC # BLD AUTO: 11.97 K/UL (ref 3.9–12.7)

## 2023-10-11 PROCEDURE — 25000003 PHARM REV CODE 250

## 2023-10-11 PROCEDURE — 36415 COLL VENOUS BLD VENIPUNCTURE: CPT

## 2023-10-11 PROCEDURE — 94668 MNPJ CHEST WALL SBSQ: CPT

## 2023-10-11 PROCEDURE — 25000242 PHARM REV CODE 250 ALT 637 W/ HCPCS: Performed by: FAMILY MEDICINE

## 2023-10-11 PROCEDURE — 80053 COMPREHEN METABOLIC PANEL: CPT

## 2023-10-11 PROCEDURE — 99232 PR SUBSEQUENT HOSPITAL CARE,LEVL II: ICD-10-PCS | Mod: GC,,, | Performed by: FAMILY MEDICINE

## 2023-10-11 PROCEDURE — 83735 ASSAY OF MAGNESIUM: CPT

## 2023-10-11 PROCEDURE — 99900035 HC TECH TIME PER 15 MIN (STAT)

## 2023-10-11 PROCEDURE — 94761 N-INVAS EAR/PLS OXIMETRY MLT: CPT

## 2023-10-11 PROCEDURE — 87798 DETECT AGENT NOS DNA AMP: CPT | Mod: 59

## 2023-10-11 PROCEDURE — 84100 ASSAY OF PHOSPHORUS: CPT

## 2023-10-11 PROCEDURE — 87633 RESP VIRUS 12-25 TARGETS: CPT

## 2023-10-11 PROCEDURE — 94664 DEMO&/EVAL PT USE INHALER: CPT

## 2023-10-11 PROCEDURE — 21400001 HC TELEMETRY ROOM

## 2023-10-11 PROCEDURE — 25000242 PHARM REV CODE 250 ALT 637 W/ HCPCS

## 2023-10-11 PROCEDURE — 85025 COMPLETE CBC W/AUTO DIFF WBC: CPT

## 2023-10-11 PROCEDURE — 63600175 PHARM REV CODE 636 W HCPCS

## 2023-10-11 PROCEDURE — 99232 SBSQ HOSP IP/OBS MODERATE 35: CPT | Mod: GC,,, | Performed by: FAMILY MEDICINE

## 2023-10-11 PROCEDURE — 94640 AIRWAY INHALATION TREATMENT: CPT

## 2023-10-11 PROCEDURE — 27000221 HC OXYGEN, UP TO 24 HOURS

## 2023-10-11 RX ADMIN — GUAIFENESIN 600 MG: 600 TABLET, EXTENDED RELEASE ORAL at 09:10

## 2023-10-11 RX ADMIN — LEVALBUTEROL 1.25 MG: 1.25 SOLUTION, CONCENTRATE RESPIRATORY (INHALATION) at 09:10

## 2023-10-11 RX ADMIN — MELOXICAM 7.5 MG: 7.5 TABLET ORAL at 10:10

## 2023-10-11 RX ADMIN — SODIUM CHLORIDE SOLN NEBU 3% 4 ML: 3 NEBU SOLN at 12:10

## 2023-10-11 RX ADMIN — CALCIUM CARBONATE (ANTACID) CHEW TAB 500 MG 500 MG: 500 CHEW TAB at 10:10

## 2023-10-11 RX ADMIN — CITALOPRAM HYDROBROMIDE 20 MG: 20 TABLET ORAL at 10:10

## 2023-10-11 RX ADMIN — SODIUM CHLORIDE SOLN NEBU 3% 4 ML: 3 NEBU SOLN at 09:10

## 2023-10-11 RX ADMIN — LEVALBUTEROL 1.25 MG: 1.25 SOLUTION, CONCENTRATE RESPIRATORY (INHALATION) at 12:10

## 2023-10-11 RX ADMIN — METHYLPREDNISOLONE SODIUM SUCCINATE 40 MG: 125 INJECTION, POWDER, FOR SOLUTION INTRAMUSCULAR; INTRAVENOUS at 07:10

## 2023-10-11 RX ADMIN — ACETAMINOPHEN 650 MG: 325 TABLET ORAL at 02:10

## 2023-10-11 RX ADMIN — ENOXAPARIN SODIUM 40 MG: 40 INJECTION SUBCUTANEOUS at 05:10

## 2023-10-11 RX ADMIN — GUAIFENESIN 600 MG: 600 TABLET, EXTENDED RELEASE ORAL at 10:10

## 2023-10-11 RX ADMIN — SODIUM CHLORIDE SOLN NEBU 3% 4 ML: 3 NEBU SOLN at 03:10

## 2023-10-11 RX ADMIN — Medication 6 MG: at 09:10

## 2023-10-11 RX ADMIN — PIPERACILLIN AND TAZOBACTAM 4.5 G: 4; .5 INJECTION, POWDER, LYOPHILIZED, FOR SOLUTION INTRAVENOUS; PARENTERAL at 05:10

## 2023-10-11 RX ADMIN — SENNOSIDES AND DOCUSATE SODIUM 1 TABLET: 50; 8.6 TABLET ORAL at 09:10

## 2023-10-11 RX ADMIN — DILTIAZEM HYDROCHLORIDE 120 MG: 120 CAPSULE, COATED, EXTENDED RELEASE ORAL at 10:10

## 2023-10-11 RX ADMIN — BUSPIRONE HYDROCHLORIDE 5 MG: 5 TABLET ORAL at 10:10

## 2023-10-11 RX ADMIN — OXYBUTYNIN CHLORIDE 5 MG: 5 TABLET, EXTENDED RELEASE ORAL at 10:10

## 2023-10-11 RX ADMIN — METHYLPREDNISOLONE SODIUM SUCCINATE 40 MG: 125 INJECTION, POWDER, FOR SOLUTION INTRAMUSCULAR; INTRAVENOUS at 02:10

## 2023-10-11 RX ADMIN — Medication 500 MG: at 09:10

## 2023-10-11 RX ADMIN — SENNOSIDES AND DOCUSATE SODIUM 1 TABLET: 50; 8.6 TABLET ORAL at 10:10

## 2023-10-11 RX ADMIN — PIPERACILLIN AND TAZOBACTAM 4.5 G: 4; .5 INJECTION, POWDER, LYOPHILIZED, FOR SOLUTION INTRAVENOUS; PARENTERAL at 10:10

## 2023-10-11 RX ADMIN — PIPERACILLIN AND TAZOBACTAM 4.5 G: 4; .5 INJECTION, POWDER, LYOPHILIZED, FOR SOLUTION INTRAVENOUS; PARENTERAL at 02:10

## 2023-10-11 RX ADMIN — Medication 500 MG: at 10:10

## 2023-10-11 RX ADMIN — METHYLPREDNISOLONE SODIUM SUCCINATE 40 MG: 125 INJECTION, POWDER, FOR SOLUTION INTRAMUSCULAR; INTRAVENOUS at 09:10

## 2023-10-11 RX ADMIN — LEVALBUTEROL 1.25 MG: 1.25 SOLUTION, CONCENTRATE RESPIRATORY (INHALATION) at 03:10

## 2023-10-11 RX ADMIN — BUSPIRONE HYDROCHLORIDE 5 MG: 5 TABLET ORAL at 09:10

## 2023-10-11 NOTE — PROGRESS NOTES
[facility] - Intensive Care ([address] Medicine  Progress Note    Patient Name: Estefani Fam  MRN: [id number]  Patient Class: IP- Inpatient   Admission Date: 10/9/2023  Length of Stay: 1 days  Attending Physician: Sánchez Pike III*  Primary Care Provider: Dustin Khan MD        Subjective:     Principal Problem:<principal problem not specified>        HPI:  Ms. Fam is a 72 y/o F with PMHx of COPD c/b centrilobular emphysema, recurrent pneumonia, pHTN, former smoker, GERD, Afib on diltiazem not AC, and anxiety who presented to [facility] ED for shortness of breath in the last week. During this time she felt congested with a cough but had not produced sputum. She was previously hospitalized in August for an exacerbation and she required a prolonged steroid course. She denied fevers, chills, N/V, chest pain, new weakness, new fatigue. At baseline she is on 3L NC with dyspnea on exertion.    In ED, given O2, nebs, mag and ceftriaxone. CXR with no evidence of PNA. No leukocytosos. VBG with pH 7.34, CO2 60.6. Pt admitted for treatment of COPD exacerbation.       Overview/Hospital Course:  Patient admitted for a COPD exacerbation. Started on Zosyn in the setting of recent hospitalization and IV abx. Oxygen weaned to maintain SpO2 88-92%. Scheduled duonebs w/ PRN available in addition to IV SoluMedrol. Hyperglycemia 2/2 steroids on sliding scale insulin. Work of breathing improving but continues to have poor air movement throughout lung fields.       Interval History: No acute events overnight. Work of breathing improving this morning on 3L NC. Continue current treatment w/ plans to de-escalate antiobiotics and IV steroids tomorrow if she continues to show clinical improvement.     Review of Systems   Constitutional:  Negative for chills and fever.   Respiratory:  Positive for shortness of breath and wheezing. Negative for chest tightness.    Cardiovascular:  Negative for chest pain, palpitations  and leg swelling.   Gastrointestinal:  Negative for abdominal distention, constipation, diarrhea, nausea and vomiting.   Genitourinary:  Negative for dysuria.   Musculoskeletal:  Negative for back pain.   Neurological:  Negative for dizziness and headaches.   Psychiatric/Behavioral:  Negative for agitation and confusion.      Objective:     Vital Signs (Most Recent):  Temp: 98.3 °F (36.8 °C) (10/11/23 1541)  Pulse: 93 (10/11/23 1541)  Resp: 17 (10/11/23 1541)  BP: (!) 148/68 (10/11/23 1541)  SpO2: (!) 94 % (10/11/23 1541) Vital Signs (24h Range):  Temp:  [97.1 °F (36.2 °C)-99 °F (37.2 °C)] 98.3 °F (36.8 °C)  Pulse:  [] 93  Resp:  [17-25] 17  SpO2:  [88 %-99 %] 94 %  BP: (115-194)/(58-84) 148/68     Weight: 57.6 kg (126 lb 15.8 oz)  Body mass index is 19.31 kg/m².    Intake/Output Summary (Last 24 hours) at 10/11/2023 1551  Last data filed at 10/11/2023 0800  Gross per 24 hour   Intake --   Output 750 ml   Net -750 ml         Physical Exam  Constitutional:       General: She is not in acute distress.  HENT:      Head: Normocephalic and atraumatic.      Nose: Nose normal.      Comments: Nasal canula in place     Mouth/Throat:      Mouth: Mucous membranes are dry.      Pharynx: Oropharynx is clear.      Comments: Lips pursed  Eyes:      General:         Right eye: No discharge.         Left eye: No discharge.      Extraocular Movements: Extraocular movements intact.      Conjunctiva/sclera: Conjunctivae normal.   Cardiovascular:      Rate and Rhythm: Normal rate and regular rhythm.      Heart sounds: No murmur heard.  Pulmonary:      Effort: Respiratory distress present.      Breath sounds: Wheezing present.   Chest:      Chest wall: No tenderness.   Abdominal:      General: Abdomen is flat.      Palpations: Abdomen is soft.      Tenderness: There is no abdominal tenderness.   Musculoskeletal:         General: No swelling. Normal range of motion.      Right lower leg: No edema.      Left lower leg: No edema.    Skin:     General: Skin is warm and dry.      Coloration: Skin is pale.   Neurological:      General: No focal deficit present.      Mental Status: She is alert. Mental status is at baseline.             Significant Labs: All pertinent labs within the past 24 hours have been reviewed.  BMP:   Recent Labs   Lab 10/11/23  0522   *   *   K 4.0   CL 99   CO2 28   BUN 11   CREATININE 0.6   CALCIUM 8.6*   MG 2.3     CBC:   Recent Labs   Lab 10/09/23  1742 10/09/23  1754 10/10/23  0326 10/11/23  0522   WBC 7.33  --  4.67 11.97   HGB 12.4  --  11.7* 12.1   HCT 38.5 42 36.1* 37.9     --  312 352       Significant Imaging: I have reviewed all pertinent imaging results/findings within the past 24 hours.      Assessment/Plan:      Atrial fibrillation with RVR  Patient with Paroxysmal (<7 days) atrial fibrillation which is controlled currently with Calcium Channel Blocker. Patient is currently in sinus rhythm.[id number] Score: 2.  Anticoagulation not indicated due to CHADSVASc.    -- Continue home meds    Chronic obstructive pulmonary disease with acute exacerbation  On COPD pathway. Wears 2.5 to 3L NC at home. At baseline, mobility is limited by SOB and is unable to walk ten steps without feeling SOB. VBG with pCO2 61.     S/p methylpred and IV mag in ED    -- Zosyn to cover pseudomonas due to recent hospitalization requiring IV Abx with plans to de-escalate as clinically appropriate    -- Solumedrol 40 mg TID  -- Levalbuterol nebs q4h while awake  -- Mucinex, chest PT  -- Home Breo  -- Supplemental O2 for SaO2 > 88%    Former heavy tobacco smoker  Former smoker. 36 py history.    Acute on chronic respiratory failure with hypoxia  Patient with Hypercapnic and Hypoxic Respiratory failure which is Acute on chronic.  she is on home oxygen at 2.5-3 LPM. Supplemental oxygen was provided and noted-      .   Signs/symptoms of respiratory failure include- tachypnea, increased work of breathing and respiratory  "distress. Contributing diagnoses includes - COPD Labs and images were reviewed. Patient Has not had a recent ABG. Will treat underlying causes and adjust management of respiratory failure.     -- See "COPD with acute exacerbation"      VTE Risk Mitigation (From admission, onward)         Ordered     Place sequential compression device  Until discontinued         10/10/23 1818     enoxaparin injection 40 mg  Daily         10/09/23 1923                Discharge Planning   RENARD:      Code Status: Full Code   Is the patient medically ready for discharge?:     Reason for patient still in hospital (select all that apply): Patient trending condition  Discharge Plan A: Home, Home with family, Home Health                  Jael Starr MD  Department of Hospital Medicine   [facility] - Intensive Care ([place] [id number])    "

## 2023-10-11 NOTE — SUBJECTIVE & OBJECTIVE
Interval History: No acute events overnight. Work of breathing improving this morning on 3L NC. Continue current treatment w/ plans to de-escalate antiobiotics and IV steroids tomorrow if she continues to show clinical improvement.     Review of Systems   Constitutional:  Negative for chills and fever.   Respiratory:  Positive for shortness of breath and wheezing. Negative for chest tightness.    Cardiovascular:  Negative for chest pain, palpitations and leg swelling.   Gastrointestinal:  Negative for abdominal distention, constipation, diarrhea, nausea and vomiting.   Genitourinary:  Negative for dysuria.   Musculoskeletal:  Negative for back pain.   Neurological:  Negative for dizziness and headaches.   Psychiatric/Behavioral:  Negative for agitation and confusion.      Objective:     Vital Signs (Most Recent):  Temp: 98.3 °F (36.8 °C) (10/11/23 1541)  Pulse: 93 (10/11/23 1541)  Resp: 17 (10/11/23 1541)  BP: (!) 148/68 (10/11/23 1541)  SpO2: (!) 94 % (10/11/23 1541) Vital Signs (24h Range):  Temp:  [97.1 °F (36.2 °C)-99 °F (37.2 °C)] 98.3 °F (36.8 °C)  Pulse:  [] 93  Resp:  [17-25] 17  SpO2:  [88 %-99 %] 94 %  BP: (115-194)/(58-84) 148/68     Weight: 57.6 kg (126 lb 15.8 oz)  Body mass index is 19.31 kg/m².    Intake/Output Summary (Last 24 hours) at 10/11/2023 1551  Last data filed at 10/11/2023 0800  Gross per 24 hour   Intake --   Output 750 ml   Net -750 ml         Physical Exam  Constitutional:       General: She is not in acute distress.  HENT:      Head: Normocephalic and atraumatic.      Nose: Nose normal.      Comments: Nasal canula in place     Mouth/Throat:      Mouth: Mucous membranes are dry.      Pharynx: Oropharynx is clear.      Comments: Lips pursed  Eyes:      General:         Right eye: No discharge.         Left eye: No discharge.      Extraocular Movements: Extraocular movements intact.      Conjunctiva/sclera: Conjunctivae normal.   Cardiovascular:      Rate and Rhythm: Normal rate and  regular rhythm.      Heart sounds: No murmur heard.  Pulmonary:      Effort: Respiratory distress present.      Breath sounds: Wheezing present.   Chest:      Chest wall: No tenderness.   Abdominal:      General: Abdomen is flat.      Palpations: Abdomen is soft.      Tenderness: There is no abdominal tenderness.   Musculoskeletal:         General: No swelling. Normal range of motion.      Right lower leg: No edema.      Left lower leg: No edema.   Skin:     General: Skin is warm and dry.      Coloration: Skin is pale.   Neurological:      General: No focal deficit present.      Mental Status: She is alert. Mental status is at baseline.             Significant Labs: All pertinent labs within the past 24 hours have been reviewed.  BMP:   Recent Labs   Lab 10/11/23  0522   *   *   K 4.0   CL 99   CO2 28   BUN 11   CREATININE 0.6   CALCIUM 8.6*   MG 2.3     CBC:   Recent Labs   Lab 10/09/23  1742 10/09/23  1754 10/10/23  0326 10/11/23  0522   WBC 7.33  --  4.67 11.97   HGB 12.4  --  11.7* 12.1   HCT 38.5 42 36.1* 37.9     --  312 352       Significant Imaging: I have reviewed all pertinent imaging results/findings within the past 24 hours.

## 2023-10-11 NOTE — ASSESSMENT & PLAN NOTE
Patient with Paroxysmal (<7 days) atrial fibrillation which is controlled currently with Calcium Channel Blocker. Patient is currently in sinus rhythm.[id number] Score: 2.  Anticoagulation not indicated due to CHADSVASc.    -- Continue home meds

## 2023-10-11 NOTE — ASSESSMENT & PLAN NOTE
On COPD pathway. Wears 2.5 to 3L NC at home. At baseline, mobility is limited by SOB and is unable to walk ten steps without feeling SOB. VBG with pCO2 61.     S/p methylpred and IV mag in ED    -- Zosyn to cover pseudomonas due to recent hospitalization requiring IV Abx with plans to de-escalate as clinically appropriate    -- Solumedrol 40 mg TID  -- Levalbuterol nebs q4h while awake  -- Mucinex, chest PT  -- Home Breo  -- Supplemental O2 for SaO2 > 88%

## 2023-10-11 NOTE — PLAN OF CARE
SW placed OPCM referral via [facility].    BROOKE will continue to follow.                KURTIS Parham, LMSW  [facility]  Case Management  Ext. [phone]

## 2023-10-12 PROBLEM — R82.90 ABNORMAL FINDING ON URINALYSIS: Status: ACTIVE | Noted: 2023-10-12

## 2023-10-12 LAB
ALBUMIN SERPL BCP-MCNC: 3.5 G/DL (ref 3.5–5.2)
ALP SERPL-CCNC: 67 U/L (ref 55–135)
ALT SERPL W/O P-5'-P-CCNC: 38 U/L (ref 10–44)
ANION GAP SERPL CALC-SCNC: 4 MMOL/L (ref 8–16)
AST SERPL-CCNC: 26 U/L (ref 10–40)
BASOPHILS # BLD AUTO: 0.01 K/UL (ref 0–0.2)
BASOPHILS NFR BLD: 0.1 % (ref 0–1.9)
BILIRUB SERPL-MCNC: 0.3 MG/DL (ref 0.1–1)
BUN SERPL-MCNC: 13 MG/DL (ref 8–23)
CALCIUM SERPL-MCNC: 8.8 MG/DL (ref 8.7–10.5)
CHLORIDE SERPL-SCNC: 96 MMOL/L (ref 95–110)
CO2 SERPL-SCNC: 33 MMOL/L (ref 23–29)
CREAT SERPL-MCNC: 0.6 MG/DL (ref 0.5–1.4)
DIFFERENTIAL METHOD: ABNORMAL
EOSINOPHIL # BLD AUTO: 0 K/UL (ref 0–0.5)
EOSINOPHIL NFR BLD: 0 % (ref 0–8)
ERYTHROCYTE [DISTWIDTH] IN BLOOD BY AUTOMATED COUNT: 12.7 % (ref 11.5–14.5)
EST. GFR  (NO RACE VARIABLE): >60 ML/MIN/1.73 M^2
GLUCOSE SERPL-MCNC: 134 MG/DL (ref 70–110)
HCT VFR BLD AUTO: 38.2 % (ref 37–48.5)
HGB BLD-MCNC: 12.2 G/DL (ref 12–16)
IMM GRANULOCYTES # BLD AUTO: 0.05 K/UL (ref 0–0.04)
IMM GRANULOCYTES NFR BLD AUTO: 0.5 % (ref 0–0.5)
LYMPHOCYTES # BLD AUTO: 0.4 K/UL (ref 1–4.8)
LYMPHOCYTES NFR BLD: 3.8 % (ref 18–48)
MAGNESIUM SERPL-MCNC: 2.3 MG/DL (ref 1.6–2.6)
MCH RBC QN AUTO: 30.4 PG (ref 27–31)
MCHC RBC AUTO-ENTMCNC: 31.9 G/DL (ref 32–36)
MCV RBC AUTO: 95 FL (ref 82–98)
MONOCYTES # BLD AUTO: 0.9 K/UL (ref 0.3–1)
MONOCYTES NFR BLD: 8.5 % (ref 4–15)
NEUTROPHILS # BLD AUTO: 9.3 K/UL (ref 1.8–7.7)
NEUTROPHILS NFR BLD: 87.1 % (ref 38–73)
NRBC BLD-RTO: 0 /100 WBC
PHOSPHATE SERPL-MCNC: 3.3 MG/DL (ref 2.7–4.5)
PLATELET # BLD AUTO: 354 K/UL (ref 150–450)
PMV BLD AUTO: 8.6 FL (ref 9.2–12.9)
POCT GLUCOSE: 105 MG/DL (ref 70–110)
POCT GLUCOSE: 113 MG/DL (ref 70–110)
POCT GLUCOSE: 135 MG/DL (ref 70–110)
POTASSIUM SERPL-SCNC: 4.2 MMOL/L (ref 3.5–5.1)
PROT SERPL-MCNC: 6 G/DL (ref 6–8.4)
RBC # BLD AUTO: 4.01 M/UL (ref 4–5.4)
SODIUM SERPL-SCNC: 133 MMOL/L (ref 136–145)
WBC # BLD AUTO: 10.69 K/UL (ref 3.9–12.7)

## 2023-10-12 PROCEDURE — 94640 AIRWAY INHALATION TREATMENT: CPT

## 2023-10-12 PROCEDURE — 25000242 PHARM REV CODE 250 ALT 637 W/ HCPCS: Performed by: STUDENT IN AN ORGANIZED HEALTH CARE EDUCATION/TRAINING PROGRAM

## 2023-10-12 PROCEDURE — 36415 COLL VENOUS BLD VENIPUNCTURE: CPT

## 2023-10-12 PROCEDURE — 25000003 PHARM REV CODE 250

## 2023-10-12 PROCEDURE — 25000242 PHARM REV CODE 250 ALT 637 W/ HCPCS

## 2023-10-12 PROCEDURE — 85025 COMPLETE CBC W/AUTO DIFF WBC: CPT

## 2023-10-12 PROCEDURE — 99900035 HC TECH TIME PER 15 MIN (STAT)

## 2023-10-12 PROCEDURE — 63600175 PHARM REV CODE 636 W HCPCS

## 2023-10-12 PROCEDURE — 94664 DEMO&/EVAL PT USE INHALER: CPT

## 2023-10-12 PROCEDURE — 27000221 HC OXYGEN, UP TO 24 HOURS

## 2023-10-12 PROCEDURE — 25000242 PHARM REV CODE 250 ALT 637 W/ HCPCS: Performed by: FAMILY MEDICINE

## 2023-10-12 PROCEDURE — 94761 N-INVAS EAR/PLS OXIMETRY MLT: CPT

## 2023-10-12 PROCEDURE — 99232 PR SUBSEQUENT HOSPITAL CARE,LEVL II: ICD-10-PCS | Mod: GC,,, | Performed by: STUDENT IN AN ORGANIZED HEALTH CARE EDUCATION/TRAINING PROGRAM

## 2023-10-12 PROCEDURE — 63600175 PHARM REV CODE 636 W HCPCS: Performed by: STUDENT IN AN ORGANIZED HEALTH CARE EDUCATION/TRAINING PROGRAM

## 2023-10-12 PROCEDURE — 63700000 PHARM REV CODE 250 ALT 637 W/O HCPCS

## 2023-10-12 PROCEDURE — 21400001 HC TELEMETRY ROOM

## 2023-10-12 PROCEDURE — 84100 ASSAY OF PHOSPHORUS: CPT

## 2023-10-12 PROCEDURE — 99232 SBSQ HOSP IP/OBS MODERATE 35: CPT | Mod: GC,,, | Performed by: STUDENT IN AN ORGANIZED HEALTH CARE EDUCATION/TRAINING PROGRAM

## 2023-10-12 PROCEDURE — 94668 MNPJ CHEST WALL SBSQ: CPT

## 2023-10-12 PROCEDURE — 80053 COMPREHEN METABOLIC PANEL: CPT

## 2023-10-12 PROCEDURE — 83735 ASSAY OF MAGNESIUM: CPT

## 2023-10-12 RX ORDER — AZITHROMYCIN 250 MG/1
500 TABLET, FILM COATED ORAL DAILY
Status: DISCONTINUED | OUTPATIENT
Start: 2023-10-12 | End: 2023-10-13

## 2023-10-12 RX ORDER — MAGNESIUM SULFATE HEPTAHYDRATE 40 MG/ML
2 INJECTION, SOLUTION INTRAVENOUS ONCE
Status: COMPLETED | OUTPATIENT
Start: 2023-10-12 | End: 2023-10-12

## 2023-10-12 RX ORDER — LOPERAMIDE HYDROCHLORIDE 2 MG/1
2 CAPSULE ORAL ONCE
Status: COMPLETED | OUTPATIENT
Start: 2023-10-12 | End: 2023-10-12

## 2023-10-12 RX ADMIN — SODIUM CHLORIDE SOLN NEBU 3% 4 ML: 3 NEBU SOLN at 04:10

## 2023-10-12 RX ADMIN — METHYLPREDNISOLONE SODIUM SUCCINATE 40 MG: 40 INJECTION, POWDER, FOR SOLUTION INTRAMUSCULAR; INTRAVENOUS at 05:10

## 2023-10-12 RX ADMIN — MAGNESIUM SULFATE HEPTAHYDRATE 2 G: 40 INJECTION, SOLUTION INTRAVENOUS at 01:10

## 2023-10-12 RX ADMIN — LEVALBUTEROL 1.25 MG: 1.25 SOLUTION, CONCENTRATE RESPIRATORY (INHALATION) at 01:10

## 2023-10-12 RX ADMIN — SODIUM CHLORIDE SOLN NEBU 3% 4 ML: 3 NEBU SOLN at 12:10

## 2023-10-12 RX ADMIN — GUAIFENESIN 600 MG: 600 TABLET, EXTENDED RELEASE ORAL at 09:10

## 2023-10-12 RX ADMIN — LEVALBUTEROL 1.25 MG: 1.25 SOLUTION, CONCENTRATE RESPIRATORY (INHALATION) at 03:10

## 2023-10-12 RX ADMIN — SODIUM CHLORIDE SOLN NEBU 3% 4 ML: 3 NEBU SOLN at 08:10

## 2023-10-12 RX ADMIN — PIPERACILLIN AND TAZOBACTAM 4.5 G: 4; .5 INJECTION, POWDER, LYOPHILIZED, FOR SOLUTION INTRAVENOUS; PARENTERAL at 03:10

## 2023-10-12 RX ADMIN — PIPERACILLIN AND TAZOBACTAM 4.5 G: 4; .5 INJECTION, POWDER, LYOPHILIZED, FOR SOLUTION INTRAVENOUS; PARENTERAL at 10:10

## 2023-10-12 RX ADMIN — AZITHROMYCIN 500 MG: 250 TABLET, FILM COATED ORAL at 03:10

## 2023-10-12 RX ADMIN — DILTIAZEM HYDROCHLORIDE 120 MG: 120 CAPSULE, COATED, EXTENDED RELEASE ORAL at 08:10

## 2023-10-12 RX ADMIN — POLYETHYLENE GLYCOL 3350 17 G: 17 POWDER, FOR SOLUTION ORAL at 08:10

## 2023-10-12 RX ADMIN — ENOXAPARIN SODIUM 40 MG: 40 INJECTION SUBCUTANEOUS at 04:10

## 2023-10-12 RX ADMIN — AZELASTINE 137 MCG: 1 SPRAY, METERED NASAL at 08:10

## 2023-10-12 RX ADMIN — FLUTICASONE FUROATE AND VILANTEROL TRIFENATATE 1 PUFF: 100; 25 POWDER RESPIRATORY (INHALATION) at 09:10

## 2023-10-12 RX ADMIN — SENNOSIDES AND DOCUSATE SODIUM 1 TABLET: 50; 8.6 TABLET ORAL at 08:10

## 2023-10-12 RX ADMIN — CALCIUM CARBONATE (ANTACID) CHEW TAB 500 MG 500 MG: 500 CHEW TAB at 08:10

## 2023-10-12 RX ADMIN — CITALOPRAM HYDROBROMIDE 20 MG: 20 TABLET ORAL at 08:10

## 2023-10-12 RX ADMIN — SODIUM CHLORIDE, POTASSIUM CHLORIDE, SODIUM LACTATE AND CALCIUM CHLORIDE 500 ML: 600; 310; 30; 20 INJECTION, SOLUTION INTRAVENOUS at 01:10

## 2023-10-12 RX ADMIN — LOPERAMIDE HYDROCHLORIDE 2 MG: 2 CAPSULE ORAL at 09:10

## 2023-10-12 RX ADMIN — ACETAMINOPHEN 650 MG: 325 TABLET ORAL at 12:10

## 2023-10-12 RX ADMIN — Medication 6 MG: at 11:10

## 2023-10-12 RX ADMIN — MELOXICAM 7.5 MG: 7.5 TABLET ORAL at 08:10

## 2023-10-12 RX ADMIN — LEVALBUTEROL 1.25 MG: 1.25 SOLUTION, CONCENTRATE RESPIRATORY (INHALATION) at 08:10

## 2023-10-12 RX ADMIN — TIOTROPIUM BROMIDE INHALATION SPRAY 2 PUFF: 3.12 SPRAY, METERED RESPIRATORY (INHALATION) at 01:10

## 2023-10-12 RX ADMIN — SODIUM CHLORIDE SOLN NEBU 3% 4 ML: 3 NEBU SOLN at 01:10

## 2023-10-12 RX ADMIN — METHYLPREDNISOLONE SODIUM SUCCINATE 40 MG: 125 INJECTION, POWDER, FOR SOLUTION INTRAMUSCULAR; INTRAVENOUS at 06:10

## 2023-10-12 RX ADMIN — BUSPIRONE HYDROCHLORIDE 5 MG: 5 TABLET ORAL at 08:10

## 2023-10-12 RX ADMIN — CEFEPIME 1 G: 1 INJECTION, POWDER, FOR SOLUTION INTRAMUSCULAR; INTRAVENOUS at 05:10

## 2023-10-12 RX ADMIN — BUSPIRONE HYDROCHLORIDE 5 MG: 5 TABLET ORAL at 09:10

## 2023-10-12 RX ADMIN — SODIUM CHLORIDE SOLN NEBU 3% 4 ML: 3 NEBU SOLN at 03:10

## 2023-10-12 RX ADMIN — Medication 500 MG: at 08:10

## 2023-10-12 RX ADMIN — OXYBUTYNIN CHLORIDE 5 MG: 5 TABLET, EXTENDED RELEASE ORAL at 08:10

## 2023-10-12 RX ADMIN — GUAIFENESIN 600 MG: 600 TABLET, EXTENDED RELEASE ORAL at 08:10

## 2023-10-12 RX ADMIN — Medication 500 MG: at 09:10

## 2023-10-12 NOTE — ASSESSMENT & PLAN NOTE
Patient with Paroxysmal (<7 days) atrial fibrillation which is controlled currently with Calcium Channel Blocker. Patient is currently in sinus rhythm.[id number] Score: 2.  Eliquis contraindicated due to ocular condition per cards.    -- Continue home dilt

## 2023-10-12 NOTE — ASSESSMENT & PLAN NOTE
Pt requesting UA on admission. Denied dysuria but endorsed increased frequency. However, pt with hx of OAB. UA with bacteria and 3+ LEs. Cultures with no predominant bacteria.    -- On abx for COPD exacerbation

## 2023-10-12 NOTE — SUBJECTIVE & OBJECTIVE
Interval History: Periods of tachypnea and hypertension overnight. On 3L NC satting well, however respiratory effort still increased from baseline.     Review of Systems   Constitutional:  Negative for chills and fever.   Respiratory:  Positive for shortness of breath and wheezing. Negative for chest tightness.    Cardiovascular:  Negative for chest pain, palpitations and leg swelling.   Gastrointestinal:  Negative for abdominal distention, constipation, diarrhea, nausea and vomiting.   Genitourinary:  Negative for dysuria.   Musculoskeletal:  Negative for back pain.   Neurological:  Negative for dizziness and headaches.   Psychiatric/Behavioral:  Negative for agitation and confusion.      Objective:     Vital Signs (Most Recent):  Temp: 98.2 °F (36.8 °C) (10/12/23 1127)  Pulse: 100 (10/12/23 1401)  Resp: 20 (10/12/23 1335)  BP: (!) 142/67 (10/12/23 1127)  SpO2: 98 % (10/12/23 1335) Vital Signs (24h Range):  Temp:  [97.3 °F (36.3 °C)-98.3 °F (36.8 °C)] 98.2 °F (36.8 °C)  Pulse:  [] 100  Resp:  [17-24] 20  SpO2:  [93 %-98 %] 98 %  BP: (136-170)/(66-79) 142/67     Weight: 57.6 kg (126 lb 15.8 oz)  Body mass index is 19.31 kg/m².    Intake/Output Summary (Last 24 hours) at 10/12/2023 1451  Last data filed at 10/12/2023 1345  Gross per 24 hour   Intake --   Output 1550 ml   Net -1550 ml         Physical Exam  Constitutional:       General: She is not in acute distress.  HENT:      Head: Normocephalic and atraumatic.      Nose: Nose normal.      Comments: Nasal canula in place     Mouth/Throat:      Mouth: Mucous membranes are dry.      Pharynx: Oropharynx is clear.      Comments: Lips pursed  Eyes:      General:         Right eye: No discharge.         Left eye: No discharge.      Extraocular Movements: Extraocular movements intact.      Conjunctiva/sclera: Conjunctivae normal.   Cardiovascular:      Rate and Rhythm: Normal rate and regular rhythm.      Heart sounds: No murmur heard.  Pulmonary:      Effort:  Respiratory distress present.      Breath sounds: Wheezing present.   Chest:      Chest wall: No tenderness.   Abdominal:      General: Abdomen is flat.      Palpations: Abdomen is soft.      Tenderness: There is no abdominal tenderness.   Musculoskeletal:         General: No swelling. Normal range of motion.      Right lower leg: No edema.      Left lower leg: No edema.   Skin:     General: Skin is warm and dry.      Coloration: Skin is pale.   Neurological:      General: No focal deficit present.      Mental Status: She is alert. Mental status is at baseline.             Significant Labs: All pertinent labs within the past 24 hours have been reviewed.    Significant Imaging: I have reviewed all pertinent imaging results/findings within the past 24 hours.

## 2023-10-12 NOTE — ASSESSMENT & PLAN NOTE
On COPD pathway. Wears 2.5 to 3L NC at home. At baseline, mobility is limited by SOB and is unable to walk ten steps without feeling SOB. VBG with pCO2 61.     S/p methylpred and IV mag in ED    -- De-escalating abx  -- De-escalating solumedrol 40 mg BID  -- Levalbuterol nebs q4h while awake  -- Mucinex, chest PT  -- Home Breo  -- Supplemental O2 for SaO2 > 88%  -- Pt saw Dr. Ruiz outpatient but most recent visit was 2022. Will need outpatient f/u.

## 2023-10-12 NOTE — PROGRESS NOTES
[facility] - Int[facility] Care ([address] Medicine  Progress Note    Patient Name: Estefani Fam  MRN: [id number]  Patient Class: IP- Inpatient   Admission Date: 10/9/2023  Length of Stay: 2 days  Attending Physician: Nadine Gallagher MD  Primary Care Provider: Dustin Khan MD        Subjective:     Principal Problem: COPD exacerbation        HPI:  Ms. Fam is a 74 y/o F with PMHx of COPD c/b centrilobular emphysema, recurrent pneumonia, pHTN, former smoker, GERD, Afib on diltiazem not AC, and anxiety who [place] [facility] [place] for shortness of breath in the last week. During this time she felt congested with a cough but had not produced sputum. She was previously hospitalized in August for an exacerbation and she required a prolonged steroid course. She denied fevers, chills, N/V, chest pain, new weakness, new fatigue. At baseline she is on 3L NC with dyspnea on exertion.    In ED, given O2, nebs, mag and ceftriaxone. CXR with no evidence of PNA. No leukocytosos. VBG with pH 7.34, CO2 60.6. Pt admitted for treatment of COPD exacerbation.       Overview/Hospital Course:  Patient admitted for a COPD exacerbation. Started on Zosyn in the setting of recent hospitalization and IV abx. Oxygen weaned to maintain SpO2 88-92%. Scheduled duonebs w/ PRN available in addition to IV SoluMedrol. Hyperglycemia 2/2 steroids on sliding scale insulin. Work of breathing improving but continues to have poor air movement throughout lung fields.       Interval History: Periods of tachypnea and hypertension overnight. On 3L NC satting well, however respiratory effort still increased from baseline.     Review of Systems   Constitutional:  Negative for chills and fever.   Respiratory:  Positive for shortness of breath and wheezing. Negative for chest tightness.    Cardiovascular:  Negative for chest pain, palpitations and leg swelling.   Gastrointestinal:  Negative for abdominal distention, constipation, diarrhea,  nausea and vomiting.   Genitourinary:  Negative for dysuria.   Musculoskeletal:  Negative for back pain.   Neurological:  Negative for dizziness and headaches.   Psychiatric/Behavioral:  Negative for agitation and confusion.      Objective:     Vital Signs (Most Recent):  Temp: 98.2 °F (36.8 °C) (10/12/23 1127)  Pulse: 100 (10/12/23 1401)  Resp: 20 (10/12/23 1335)  BP: (!) 142/67 (10/12/23 1127)  SpO2: 98 % (10/12/23 1335) Vital Signs (24h Range):  Temp:  [97.3 °F (36.3 °C)-98.3 °F (36.8 °C)] 98.2 °F (36.8 °C)  Pulse:  [] 100  Resp:  [17-24] 20  SpO2:  [93 %-98 %] 98 %  BP: (136-170)/(66-79) 142/67     Weight: 57.6 kg (126 lb 15.8 oz)  Body mass index is 19.31 kg/m².    Intake/Output Summary (Last 24 hours) at 10/12/2023 1451  Last data filed at 10/12/2023 1345  Gross per 24 hour   Intake --   Output 1550 ml   Net -1550 ml         Physical Exam  Constitutional:       General: She is not in acute distress.  HENT:      Head: Normocephalic and atraumatic.      Nose: Nose normal.      Comments: Nasal canula in place     Mouth/Throat:      Mouth: Mucous membranes are dry.      Pharynx: Oropharynx is clear.      Comments: Lips pursed  Eyes:      General:         Right eye: No discharge.         Left eye: No discharge.      Extraocular Movements: Extraocular movements intact.      Conjunctiva/sclera: Conjunctivae normal.   Cardiovascular:      Rate and Rhythm: Normal rate and regular rhythm.      Heart sounds: No murmur heard.  Pulmonary:      Effort: Respiratory distress present.      Breath sounds: Wheezing present.   Chest:      Chest wall: No tenderness.   Abdominal:      General: Abdomen is flat.      Palpations: Abdomen is soft.      Tenderness: There is no abdominal tenderness.   Musculoskeletal:         General: No swelling. Normal range of motion.      Right lower leg: No edema.      Left lower leg: No edema.   Skin:     General: Skin is warm and dry.      Coloration: Skin is pale.   Neurological:       General: No focal deficit present.      Mental Status: She is alert. Mental status is at baseline.             Significant Labs: All pertinent labs within the past 24 hours have been reviewed.    Significant Imaging: I have reviewed all pertinent imaging results/findings within the past 24 hours.      Assessment/Plan:      Abnormal finding on urinalysis  Pt requesting UA on admission. Denied dysuria but endorsed increased frequency. However, pt with hx of OAB. UA with bacteria and 3+ LEs. Cultures with no predominant bacteria.    -- On abx for COPD exacerbation    Atrial fibrillation with RVR  Patient with Paroxysmal (<7 days) atrial fibrillation which is controlled currently with Calcium Channel Blocker. Patient is currently in sinus rhythm.[id number] Score: 2.  Eliquis contraindicated due to ocular condition per cards.    -- Continue home dilt    Chronic obstructive pulmonary disease with acute exacerbation  On COPD pathway. Wears 2.5 to 3L NC at home. At baseline, mobility is limited by SOB and is unable to walk ten steps without feeling SOB. VBG with pCO2 61.     S/p methylpred and IV mag in ED    -- De-escalating abx  -- De-escalating solumedrol 40 mg BID  -- Levalbuterol nebs q4h while awake  -- Mucinex, chest PT  -- Home Breo  -- Supplemental O2 for SaO2 > 88%  -- Pt saw Dr. Ruiz outpatient but most recent visit was 2022. Will need outpatient f/u.    Former heavy tobacco smoker  Former smoker. 36 py history.    Acute on chronic respiratory failure with hypoxia  Patient with Hypercapnic and Hypoxic Respiratory failure which is Acute on chronic.  she is on home oxygen at 2.5-3 LPM. Supplemental oxygen was provided and noted-      .   Signs/symptoms of respiratory failure include- tachypnea, increased work of breathing and respiratory distress. Contributing diagnoses includes - COPD Labs and images were reviewed. Patient Has not had a recent ABG. Will treat underlying causes and adjust management of respiratory  "failure.     -- See "COPD with acute exacerbation"      VTE Risk Mitigation (From admission, onward)         Ordered     Place sequential compression device  Until discontinued         10/10/23 1818     enoxaparin injection 40 mg  Daily         10/09/23 1923                Discharge Planning   RENARD:      Code Status: Full Code   Is the patient medically ready for discharge?:     Reason for patient still in hospital (select all that apply): Treatment  Discharge Plan A: Home, Home with family, Home Health      Melany Chin MD  Department of Hospital Medicine   [facility] - Intensive Care ([place] [id number])    "

## 2023-10-12 NOTE — PLAN OF CARE
[facility] - [facility] ([facility])  Discharge Reassessment    Primary Care Provider: Dustin Khan MD    Expected Discharge Date: 10/16/2023    Reassessment (most recent)       Discharge Reassessment - 10/12/23 1628          Discharge Reassessment    Assessment Type Discharge Planning Reassessment (P)      Did the patient's condition or plan change since previous assessment? No (P)      Discharge Plan discussed with: Patient;Spouse/sig other (P)      Name(s) and Number(s) Osvaldo Fam (Spouse)   [phone] (P)      Communicated RENARD with patient/caregiver Yes (P)      Discharge Plan A Home Health (P)      Discharge Plan B Home with family (P)      DME Needed Upon Discharge  none (P)      Why the patient remains in the hospital Requires continued medical care (P)         Post-Acute Status    Coverage MEDICARE - MEDICARE PART A & B (P)                                  KURTIS Parham, LMSW  [facility]  Case Management  Ext. [phone]

## 2023-10-13 PROBLEM — J96.21 ACUTE ON CHRONIC RESPIRATORY FAILURE WITH HYPOXIA AND HYPERCAPNIA: Status: ACTIVE | Noted: 2023-10-13

## 2023-10-13 PROBLEM — Z71.89 ADVANCED CARE PLANNING/COUNSELING DISCUSSION: Status: ACTIVE | Noted: 2023-10-13

## 2023-10-13 PROBLEM — J96.22 ACUTE ON CHRONIC RESPIRATORY FAILURE WITH HYPOXIA AND HYPERCAPNIA: Status: ACTIVE | Noted: 2023-10-13

## 2023-10-13 PROBLEM — J44.1 COPD EXACERBATION: Status: ACTIVE | Noted: 2023-10-13

## 2023-10-13 PROBLEM — Z71.89 GOALS OF CARE, COUNSELING/DISCUSSION: Status: ACTIVE | Noted: 2023-10-13

## 2023-10-13 PROBLEM — Z51.5 PALLIATIVE CARE ENCOUNTER: Status: ACTIVE | Noted: 2023-10-13

## 2023-10-13 PROBLEM — F41.9 ANXIETY: Status: ACTIVE | Noted: 2023-10-13

## 2023-10-13 PROBLEM — R06.00 DYSPNEA: Status: ACTIVE | Noted: 2023-10-13

## 2023-10-13 LAB
ALBUMIN SERPL BCP-MCNC: 3.6 G/DL (ref 3.5–5.2)
ALP SERPL-CCNC: 73 U/L (ref 55–135)
ALT SERPL W/O P-5'-P-CCNC: 56 U/L (ref 10–44)
ANION GAP SERPL CALC-SCNC: 8 MMOL/L (ref 8–16)
AST SERPL-CCNC: 33 U/L (ref 10–40)
BASOPHILS # BLD AUTO: 0.02 K/UL (ref 0–0.2)
BASOPHILS NFR BLD: 0.2 % (ref 0–1.9)
BILIRUB SERPL-MCNC: 0.3 MG/DL (ref 0.1–1)
BUN SERPL-MCNC: 16 MG/DL (ref 8–23)
CALCIUM SERPL-MCNC: 8.7 MG/DL (ref 8.7–10.5)
CHLORIDE SERPL-SCNC: 95 MMOL/L (ref 95–110)
CO2 SERPL-SCNC: 32 MMOL/L (ref 23–29)
CREAT SERPL-MCNC: 0.6 MG/DL (ref 0.5–1.4)
DIFFERENTIAL METHOD: ABNORMAL
EOSINOPHIL # BLD AUTO: 0 K/UL (ref 0–0.5)
EOSINOPHIL NFR BLD: 0 % (ref 0–8)
ERYTHROCYTE [DISTWIDTH] IN BLOOD BY AUTOMATED COUNT: 12.7 % (ref 11.5–14.5)
EST. GFR  (NO RACE VARIABLE): >60 ML/MIN/1.73 M^2
GLUCOSE SERPL-MCNC: 111 MG/DL (ref 70–110)
HCT VFR BLD AUTO: 40.9 % (ref 37–48.5)
HGB BLD-MCNC: 12.8 G/DL (ref 12–16)
IMM GRANULOCYTES # BLD AUTO: 0.04 K/UL (ref 0–0.04)
IMM GRANULOCYTES NFR BLD AUTO: 0.4 % (ref 0–0.5)
LYMPHOCYTES # BLD AUTO: 0.6 K/UL (ref 1–4.8)
LYMPHOCYTES NFR BLD: 5.2 % (ref 18–48)
MAGNESIUM SERPL-MCNC: 2.6 MG/DL (ref 1.6–2.6)
MCH RBC QN AUTO: 30.3 PG (ref 27–31)
MCHC RBC AUTO-ENTMCNC: 31.3 G/DL (ref 32–36)
MCV RBC AUTO: 97 FL (ref 82–98)
MONOCYTES # BLD AUTO: 1.5 K/UL (ref 0.3–1)
MONOCYTES NFR BLD: 13.1 % (ref 4–15)
NEUTROPHILS # BLD AUTO: 9.1 K/UL (ref 1.8–7.7)
NEUTROPHILS NFR BLD: 81.1 % (ref 38–73)
NRBC BLD-RTO: 0 /100 WBC
PHOSPHATE SERPL-MCNC: 3.2 MG/DL (ref 2.7–4.5)
PLATELET # BLD AUTO: 306 K/UL (ref 150–450)
PMV BLD AUTO: 10 FL (ref 9.2–12.9)
POCT GLUCOSE: 131 MG/DL (ref 70–110)
POCT GLUCOSE: 155 MG/DL (ref 70–110)
POCT GLUCOSE: 91 MG/DL (ref 70–110)
POCT GLUCOSE: 96 MG/DL (ref 70–110)
POTASSIUM SERPL-SCNC: 4 MMOL/L (ref 3.5–5.1)
PROT SERPL-MCNC: 6.3 G/DL (ref 6–8.4)
RBC # BLD AUTO: 4.22 M/UL (ref 4–5.4)
SODIUM SERPL-SCNC: 135 MMOL/L (ref 136–145)
WBC # BLD AUTO: 11.24 K/UL (ref 3.9–12.7)

## 2023-10-13 PROCEDURE — 27000221 HC OXYGEN, UP TO 24 HOURS

## 2023-10-13 PROCEDURE — 36415 COLL VENOUS BLD VENIPUNCTURE: CPT

## 2023-10-13 PROCEDURE — 80053 COMPREHEN METABOLIC PANEL: CPT

## 2023-10-13 PROCEDURE — 99497 ADVNCD CARE PLAN 30 MIN: CPT | Mod: 25,,, | Performed by: CLINICAL NURSE SPECIALIST

## 2023-10-13 PROCEDURE — 85025 COMPLETE CBC W/AUTO DIFF WBC: CPT

## 2023-10-13 PROCEDURE — 63600175 PHARM REV CODE 636 W HCPCS

## 2023-10-13 PROCEDURE — 99232 SBSQ HOSP IP/OBS MODERATE 35: CPT | Mod: GC,,, | Performed by: STUDENT IN AN ORGANIZED HEALTH CARE EDUCATION/TRAINING PROGRAM

## 2023-10-13 PROCEDURE — 99232 PR SUBSEQUENT HOSPITAL CARE,LEVL II: ICD-10-PCS | Mod: GC,,, | Performed by: STUDENT IN AN ORGANIZED HEALTH CARE EDUCATION/TRAINING PROGRAM

## 2023-10-13 PROCEDURE — 99223 PR INITIAL HOSPITAL CARE,LEVL III: ICD-10-PCS | Mod: 25,,, | Performed by: CLINICAL NURSE SPECIALIST

## 2023-10-13 PROCEDURE — 99900035 HC TECH TIME PER 15 MIN (STAT)

## 2023-10-13 PROCEDURE — 25000242 PHARM REV CODE 250 ALT 637 W/ HCPCS

## 2023-10-13 PROCEDURE — 63600175 PHARM REV CODE 636 W HCPCS: Performed by: STUDENT IN AN ORGANIZED HEALTH CARE EDUCATION/TRAINING PROGRAM

## 2023-10-13 PROCEDURE — 21400001 HC TELEMETRY ROOM

## 2023-10-13 PROCEDURE — 27000646 HC AEROBIKA DEVICE

## 2023-10-13 PROCEDURE — 83735 ASSAY OF MAGNESIUM: CPT

## 2023-10-13 PROCEDURE — 94761 N-INVAS EAR/PLS OXIMETRY MLT: CPT

## 2023-10-13 PROCEDURE — 94640 AIRWAY INHALATION TREATMENT: CPT

## 2023-10-13 PROCEDURE — 25000003 PHARM REV CODE 250: Performed by: STUDENT IN AN ORGANIZED HEALTH CARE EDUCATION/TRAINING PROGRAM

## 2023-10-13 PROCEDURE — 99223 1ST HOSP IP/OBS HIGH 75: CPT | Mod: 25,,, | Performed by: CLINICAL NURSE SPECIALIST

## 2023-10-13 PROCEDURE — 94668 MNPJ CHEST WALL SBSQ: CPT

## 2023-10-13 PROCEDURE — 63700000 PHARM REV CODE 250 ALT 637 W/O HCPCS

## 2023-10-13 PROCEDURE — 84100 ASSAY OF PHOSPHORUS: CPT

## 2023-10-13 PROCEDURE — 25000242 PHARM REV CODE 250 ALT 637 W/ HCPCS: Performed by: FAMILY MEDICINE

## 2023-10-13 PROCEDURE — 25000003 PHARM REV CODE 250

## 2023-10-13 PROCEDURE — 94664 DEMO&/EVAL PT USE INHALER: CPT

## 2023-10-13 PROCEDURE — 99497 PR ADVNCD CARE PLAN 30 MIN: ICD-10-PCS | Mod: 25,,, | Performed by: CLINICAL NURSE SPECIALIST

## 2023-10-13 RX ORDER — MORPHINE SULFATE ORAL SOLUTION 10 MG/5ML
2 SOLUTION ORAL
Status: DISCONTINUED | OUTPATIENT
Start: 2023-10-13 | End: 2023-10-13

## 2023-10-13 RX ORDER — LEVALBUTEROL 1.25 MG/.5ML
1.25 SOLUTION, CONCENTRATE RESPIRATORY (INHALATION) 3 TIMES DAILY PRN
Status: DISCONTINUED | OUTPATIENT
Start: 2023-10-13 | End: 2023-10-18 | Stop reason: HOSPADM

## 2023-10-13 RX ORDER — MORPHINE SULFATE ORAL SOLUTION 10 MG/5ML
2.5 SOLUTION ORAL EVERY 4 HOURS PRN
Status: DISCONTINUED | OUTPATIENT
Start: 2023-10-13 | End: 2023-10-18 | Stop reason: HOSPADM

## 2023-10-13 RX ORDER — AZITHROMYCIN 250 MG/1
500 TABLET, FILM COATED ORAL DAILY
Status: COMPLETED | OUTPATIENT
Start: 2023-10-13 | End: 2023-10-14

## 2023-10-13 RX ADMIN — MORPHINE SULFATE 2.5 MG: 10 SOLUTION ORAL at 05:10

## 2023-10-13 RX ADMIN — Medication 6 MG: at 09:10

## 2023-10-13 RX ADMIN — LEVALBUTEROL 1.25 MG: 1.25 SOLUTION, CONCENTRATE RESPIRATORY (INHALATION) at 07:10

## 2023-10-13 RX ADMIN — BUSPIRONE HYDROCHLORIDE 5 MG: 5 TABLET ORAL at 09:10

## 2023-10-13 RX ADMIN — MELOXICAM 7.5 MG: 7.5 TABLET ORAL at 08:10

## 2023-10-13 RX ADMIN — SENNOSIDES AND DOCUSATE SODIUM 1 TABLET: 50; 8.6 TABLET ORAL at 09:10

## 2023-10-13 RX ADMIN — AZELASTINE 137 MCG: 1 SPRAY, METERED NASAL at 08:10

## 2023-10-13 RX ADMIN — DILTIAZEM HYDROCHLORIDE 120 MG: 120 CAPSULE, COATED, EXTENDED RELEASE ORAL at 09:10

## 2023-10-13 RX ADMIN — SENNOSIDES AND DOCUSATE SODIUM 1 TABLET: 50; 8.6 TABLET ORAL at 08:10

## 2023-10-13 RX ADMIN — SODIUM CHLORIDE SOLN NEBU 3% 4 ML: 3 NEBU SOLN at 07:10

## 2023-10-13 RX ADMIN — CEFEPIME 1 G: 1 INJECTION, POWDER, FOR SOLUTION INTRAMUSCULAR; INTRAVENOUS at 02:10

## 2023-10-13 RX ADMIN — SODIUM CHLORIDE SOLN NEBU 3% 4 ML: 3 NEBU SOLN at 03:10

## 2023-10-13 RX ADMIN — Medication 500 MG: at 08:10

## 2023-10-13 RX ADMIN — ENOXAPARIN SODIUM 40 MG: 40 INJECTION SUBCUTANEOUS at 04:10

## 2023-10-13 RX ADMIN — GUAIFENESIN 600 MG: 600 TABLET, EXTENDED RELEASE ORAL at 09:10

## 2023-10-13 RX ADMIN — SODIUM CHLORIDE SOLN NEBU 3% 4 ML: 3 NEBU SOLN at 11:10

## 2023-10-13 RX ADMIN — PREDNISONE 30 MG: 5 TABLET ORAL at 05:10

## 2023-10-13 RX ADMIN — TIOTROPIUM BROMIDE INHALATION SPRAY 2 PUFF: 3.12 SPRAY, METERED RESPIRATORY (INHALATION) at 07:10

## 2023-10-13 RX ADMIN — CITALOPRAM HYDROBROMIDE 20 MG: 20 TABLET ORAL at 09:10

## 2023-10-13 RX ADMIN — GUAIFENESIN 600 MG: 600 TABLET, EXTENDED RELEASE ORAL at 08:10

## 2023-10-13 RX ADMIN — OXYBUTYNIN CHLORIDE 5 MG: 5 TABLET, EXTENDED RELEASE ORAL at 09:10

## 2023-10-13 RX ADMIN — SODIUM CHLORIDE SOLN NEBU 3% 4 ML: 3 NEBU SOLN at 09:10

## 2023-10-13 RX ADMIN — SODIUM CHLORIDE SOLN NEBU 3% 4 ML: 3 NEBU SOLN at 05:10

## 2023-10-13 RX ADMIN — CALCIUM CARBONATE (ANTACID) CHEW TAB 500 MG 500 MG: 500 CHEW TAB at 08:10

## 2023-10-13 RX ADMIN — AZITHROMYCIN 500 MG: 250 TABLET, FILM COATED ORAL at 08:10

## 2023-10-13 RX ADMIN — SODIUM CHLORIDE SOLN NEBU 3% 4 ML: 3 NEBU SOLN at 02:10

## 2023-10-13 RX ADMIN — METHYLPREDNISOLONE SODIUM SUCCINATE 40 MG: 40 INJECTION, POWDER, FOR SOLUTION INTRAMUSCULAR; INTRAVENOUS at 06:10

## 2023-10-13 RX ADMIN — FLUTICASONE FUROATE AND VILANTEROL TRIFENATATE 1 PUFF: 100; 25 POWDER RESPIRATORY (INHALATION) at 08:10

## 2023-10-13 RX ADMIN — POLYETHYLENE GLYCOL 3350 17 G: 17 POWDER, FOR SOLUTION ORAL at 08:10

## 2023-10-13 RX ADMIN — CEFEPIME 1 G: 1 INJECTION, POWDER, FOR SOLUTION INTRAMUSCULAR; INTRAVENOUS at 10:10

## 2023-10-13 RX ADMIN — CEFEPIME 1 G: 1 INJECTION, POWDER, FOR SOLUTION INTRAMUSCULAR; INTRAVENOUS at 05:10

## 2023-10-13 RX ADMIN — Medication 500 MG: at 09:10

## 2023-10-13 NOTE — SUBJECTIVE & OBJECTIVE
Interval History:     Past Medical History:   Diagnosis Date    Cataract     COPD (chronic obstructive pulmonary disease)     COVID-19     History of COVID-19 1/17/2021    Still with mild dyspnea. Encouraged return to pulmonary rehab Recommend scheduling vaccination when appointment is available.     History of retinal hemorrhage     Lobar pneumonia 11/14/2021    Recurrent in RLL, no endobronchial lesion Needs speech evaluation and MBSS for recurrent aspiration in setting of dysphagia  Will need pulmonary rehab at [facility] [place], operated by [facility].    Pathological fracture due to osteoporosis 7/6/2020    Retinal histoplasmosis     Secondary pulmonary arterial hypertension 7/3/2018    Secondary to emphysema and chronic respiratory failure, mild.       Past Surgical History:   Procedure Laterality Date    BRONCHOSCOPY WITH FLUOROSCOPY N/A 10/28/2019    Procedure: BRONCHOSCOPY, WITH FLUOROSCOPY;  Surgeon: Brooklynn Ruiz MD;  Location: [facility] OR [address] [place] [address];  Service: Endoscopy;  Laterality: N/A;    HAND SURGERY         Review of patient's allergies indicates:   Allergen Reactions    Albuterol     Epinephrine     Ipratropium        Medications:  Continuous Infusions:  Scheduled Meds:   ascorbic acid (vitamin C)  500 mg Oral BID    azelastine  1 spray Nasal BID    azithromycin  500 mg Oral Daily    busPIRone  5 mg Oral BID    calcium carbonate  500 mg Oral Daily    ceFEPime (MAXIPIME) IVPB  1 g Intravenous Q8H    citalopram  20 mg Oral Daily    diltiaZEM  120 mg Oral Daily    enoxparin  40 mg Subcutaneous Daily    fluticasone furoate-vilanteroL  1 puff Inhalation Daily    guaiFENesin  600 mg Oral BID    meloxicam  7.5 mg Oral Daily    oxybutynin  5 mg Oral Daily    polyethylene glycol  17 g Oral Daily    predniSONE  30 mg Oral Once    Followed by    [START ON 10/14/2023] predniSONE  60 mg Oral Daily    senna-docusate 8.6-50 mg  1 tablet Oral BID    sodium chloride 3%  4 mL Nebulization Q4H    tiotropium bromide  2 puff Inhalation Daily     PRN  Meds:acetaminophen, dextrose 10%, dextrose 10%, glucagon (human recombinant), glucose, glucose, influenza 65up-adj, insulin aspart U-100, levalbuterol, levalbuterol, lorazepam, melatonin, ondansetron, sodium chloride, sodium chloride 0.9%    Family History       Problem Relation (Age of Onset)    Colon cancer Mother, Father    Macular degeneration Mother    Stroke Father          Tobacco Use    Smoking status: Former     Current packs/day: 0.00     Average packs/day: 1 pack/day for 36.0 years (36.0 ttl pk-yrs)     Types: Cigarettes     Start date: 1980     Quit date: 2016     Years since quittin.1    Smokeless tobacco: Never    Tobacco comments:     pt states she does not remember date   Substance and Sexual Activity    Alcohol use: Yes     Alcohol/week: 2.0 standard drinks of alcohol     Types: 2 Glasses of wine per week     Comment: 1-2 glasses a day     Drug use: No    Sexual activity: Yes     Partners: Male       Review of Systems   Constitutional:  Positive for fatigue.   HENT:  Negative for congestion.    Respiratory:  Positive for cough and shortness of breath.    Gastrointestinal: Negative.    Musculoskeletal: Negative.    Skin:  Positive for pallor.   Neurological:  Positive for weakness.   Psychiatric/Behavioral:  The patient is nervous/anxious.      Objective:     Vital Signs (Most Recent):  Temp: 98.3 °F (36.8 °C) (10/13/23 1158)  Pulse: 97 (10/13/23 1158)  Resp: 19 (10/13/23 1158)  BP: (!) 155/88 (10/13/23 1158)  SpO2: (!) 92 % (10/13/23 1158) Vital Signs (24h Range):  Temp:  [97.6 °F (36.4 °C)-98.5 °F (36.9 °C)] 98.3 °F (36.8 °C)  Pulse:  [] 97  Resp:  [16-20] 19  SpO2:  [91 %-98 %] 92 %  BP: (129-165)/(64-88) 155/88     Weight: 57.6 kg (126 lb 15.8 oz)  Body mass index is 19.31 kg/m².       Physical Exam  Vitals and nursing note reviewed.   Constitutional:       General: She is not in acute distress.  Cardiovascular:      Rate and Rhythm: Normal rate and regular rhythm.    Pulmonary:      Comments: Tachypnea,  labored, supplemental oxygen   Skin:     General: Skin is warm and dry.   Neurological:      Mental Status: She is alert.      Motor: Weakness present.   Psychiatric:         Behavior: Behavior normal.         Thought Content: Thought content normal.         Judgment: Judgment normal.      Comments: Appears anxious         Review of Symptoms      Symptom Assessment (ESAS 0-10 Scale)  Pain:  0  Dyspnea:  6  Anxiety:  0  Nausea:  0  Depression:  0  Anorexia:  0  Fatigue:  0  Insomnia:  0  Restlessness:  0  Agitation:  0     CAM / Delirium:  Negative  Constipation:  Negative  Diarrhea:  Negative    Anxiety:  Is nervous/anxious    Bowel Management Plan (BMP):  Yes      Pain Assessment:  OME in 24 hours:  0  Location(s):      Living Arrangements:  Lives with spouse    Psychosocial/Cultural:   See Palliative Psychosocial Note: Yes   45 years, together for 55 yrs,   and mother,one son, 2 grandchildren - granddaughters.  Enjoyed shopping and loves being with family.  Continues to live to see her granddaughters   **Primary  to Follow**  Palliative Care  Consult: Yes    Spiritual:  F - Myranda and Belief:  Shinto   A - Address in Care:  [place] to seeing  and having  visits. [facility]  following       Advance Care Planning   Advance Directives:   Living Will: No        Oral Declaration: No    LaPOST: No    Do Not Resuscitate Status: No    Medical Power of : No        Oral Declaration: No      Decision Making:  Patient answered questions and Family answered questions  Goals of Care: The patient and family endorses that what is most important right now is to focus on symptom/pain control and extending life as long as possible.   Accordingly, we have decided that the best plan to meet the patient's goals includes continuing with treatment         Significant Labs: All pertinent labs within the past 24 hours have  been reviewed.  CBC:   Recent Labs   Lab 10/13/23  0459   WBC 11.24   HGB 12.8   HCT 40.9   MCV 97        BMP:  Recent Labs   Lab 10/13/23  0459   *   *   K 4.0   CL 95   CO2 32*   BUN 16   CREATININE 0.6   CALCIUM 8.7   MG 2.6     LFT:  Lab Results   Component Value Date    AST 33 10/13/2023    ALKPHOS 73 10/13/2023    BILITOT 0.3 10/13/2023     Albumin:   Albumin   Date Value Ref Range Status   10/13/2023 3.6 3.5 - 5.2 g/dL Final     Protein:   Total Protein   Date Value Ref Range Status   10/13/2023 6.3 6.0 - 8.4 g/dL Final     Lactic acid:   Lab Results   Component Value Date    LACTATE 1.0 09/20/2022    LACTATE 1.1 07/02/2018       Significant Imaging: I have reviewed all pertinent imaging results/findings within the past 24 hours.  10/9/23 CXR no acute processes findings suggestive of underlying COPD/empjysema

## 2023-10-13 NOTE — PLAN OF CARE
Problem: Skin Injury Risk Increased  Goal: Skin Health and Integrity  Outcome: Ongoing, Progressing     Problem: Skin Injury Risk Increased  Goal: Skin Health and Integrity  Outcome: Ongoing, Progressing     Problem: Fall Injury Risk  Goal: Absence of Fall and Fall-Related Injury  Outcome: Ongoing, Progressing     Problem: Fall Injury Risk  Goal: Absence of Fall and Fall-Related Injury  Outcome: Ongoing, Progressing     Problem: Coping Ineffective  Goal: Effective Coping  Outcome: Ongoing, Progressing     Problem: Coping Ineffective  Goal: Effective Coping  Outcome: Ongoing, Progressing   Pt AAO X 4; able to express needs.  Imodium given for loose stool x4 at start of shift.  Patient desat's when o2 in not connected.  Currently o2 is at 3L NC.  Order is to maintain Sat's >88 % .  Safety maintained.  Bed in low position,  call  light in reach.

## 2023-10-13 NOTE — PLAN OF CARE
Problem: Adult Inpatient Plan of Care  Goal: Absence of Hospital-Acquired Illness or Injury  Outcome: Ongoing, Progressing  Goal: Readiness for Transition of Care  Outcome: Ongoing, Progressing     Problem: Fall Injury Risk  Goal: Absence of Fall and Fall-Related Injury  Outcome: Ongoing, Progressing     Problem: Coping Ineffective  Goal: Effective Coping  Outcome: Ongoing, Progressing

## 2023-10-13 NOTE — ASSESSMENT & PLAN NOTE
"Palliative medicine consulted for symptom management - dyspnea as discussed with primary team Dr Chin .  for Mrs. Fam a 72 yo lady with pmh of COPD who [place] [facility] with chief complaint of shortness of breath.   Admitted to hospital medicine with COPD exacerbation.  At time of this encounter the patient is awake, alert and oriented x3,  No acute distress, supplemental oxygen in use.   is at bedside.     Advance Care Planning     - no ACP documents received   - Mrs. Fam appears decisional   - next of kin for medical decisions should she loose this ability is:   kieran - Osvaldo Fam [phone]  - full code per primary team- resuscitation not discussed at this time     Goals of Care  - pal med ALEXANDER and Devyn DUTTON met with patient and  at bedside.  Pal med introduced   - patient and family have previous experience with pal med - followed in out patient clinic   - Mrs. Fam reports she has been having COPD for several years now and is aware it is progressing as the shortness has gotten worse.  She reports not knowing what  Her prognosis is or knowing what she should expect.   -  states most important is to have better management of the shortness of breath.  Reports his wife lives to spend more time with their baby granddaughters - 13 month old and 3 month old   - Mrs. Fam has been reluctant to use medications other than anti anxiety meds to manage the shortness of breath.  - use of oral morphine solution discussed  Including the risks and benefits. Explained the dose used is less than doses for  Pain and if she is amenable will take only when needed   - Mrs. Fam states she does not wish to feel "loopy" and would like to trial the medication while she is in the hospital  - also offered to reschedule out patient pal med clinic for continued symptom management and goals of care - referral sent  - patient also states interest in pulmonary rehab program at [facility]             Symptom Management   Dyspnea   - related to COPD  - at rest and on exertion  - 6/10 intensity   - mostly bed or wheelchair bound   - discussed strategies to reduce shortness of breath  Use of fan at bedside, bundling activity to avoid exertion and breathlessness  - use of opioids - low dose oral morphine solution as needed. Patient is amenable     Recommendations  - oral morphine solution 2.5 mg by mouth every 4 hrs as needed for dyspnea     Anxiety   - reports generalized anxiety that I worsened when feeling short of breath   - continue current medical management     COPD with Acute Exacerbation/Acute on Chronic Hypoxic Respiratory Failure/ Afib with RVR/ UTI  - managed per primary team and specialty consultants  - continue current medical management   - patient states [facility]   - pal med following for symptom management and goals of care see above     Plan/Recommendations  - see above symptom management recommendations  - patient and family would benefit from continued education about progressive nature of COPD, prognosis and what to expect in the future.  - will f/u in outpatient pal med clinic - scheduled for 10/20/23 11 AM virtual visit as patient's request     Dr. Gallagher's primary team notified of the above.      Thank you for consult and opportunity to participate in Mrs. Cortés's care.

## 2023-10-13 NOTE — CONSULTS
[facility] - Intensive Care ([place] [address])  Palliative Medicine  Consult Note    Patient Name: Estefani Fam  MRN: [id number]  Admission Date: 10/9/2023  Hospital Length of Stay: 3 days  Code Status: Full Code   Attending Provider: Nadine Gallagher MD  Consulting Provider: STEPHAN Henao  Primary Care Physician: Dustin Khan MD  Principal Problem:<principal problem not specified>    Patient information was obtained from patient, spouse/SO, past medical records and primary team.      Inpatient consult to Palliative Care  Consult performed by: Shilpa Arzate CNS  Consult ordered by: Melany Chin MD  Reason for consult: symptom managemetn         Assessment/Plan:     Palliative Care  Palliative care encounter  Palliative medicine consulted for symptom management - dyspnea as discussed with primary team Dr Chin .  for Mrs. Fam a 72 yo lady with pmh of COPD who presented to [facility] with chief complaint of shortness of breath.   Admitted to hospital medicine with COPD exacerbation.  At time of this encounter the patient is awake, alert and oriented x3,  No acute distress, supplemental oxygen in use.   is at bedside.     Advance Care Planning     - no ACP documents received   - Mrs. Fam appears decisional   - next of kin for medical decisions should she loose this ability is:   kieran - Osvaldo Fam [phone]  - full code per primary team- resuscitation not discussed at this time     Goals of Care  - pal med ALEXANDER and Devyn DUTTON met with patient and  at bedside.  Pal med introduced   - patient and family have previous experience with pal med - followed in out patient clinic   - Mrs. Fam reports she has been having COPD for several years now and is aware it is progressing as the shortness has gotten worse.  She reports not knowing what  Her prognosis is or knowing what she should expect.   -  states most important is to have better management of  "the shortness of breath.  Reports his wife lives to spend more time with their baby granddaughters - 13 month old and 3 month old   - Mrs. Fam has been reluctant to use medications other than anti anxiety meds to manage the shortness of breath.  - use of oral morphine solution discussed  Including the risks and benefits. Explained the dose used is less than doses for  Pain and if she is amenable will take only when needed   - Mrs. Fam states she does not wish to feel "loopy" and would like to trial the medication while she is in the hospital  - also offered to reschedule out patient pal med clinic for continued symptom management and goals of care - referral sent  - patient also states interest in pulmonary rehab program at [facility]            Symptom Management   Dyspnea   - related to COPD  - at rest and on exertion  - 6/10 intensity   - mostly bed or wheelchair bound   - discussed strategies to reduce shortness of breath  Use of fan at bedside, bundling activity to avoid exertion and breathlessness  - use of opioids - low dose oral morphine solution as needed. Patient is amenable     Recommendations  - oral morphine solution 2.5 mg by mouth every 4 hrs as needed for dyspnea     Anxiety   - reports generalized anxiety that I worsened when feeling short of breath   - continue current medical management     COPD with Acute Exacerbation/Acute on Chronic Hypoxic Respiratory Failure/ Afib with RVR/ UTI  - managed per primary team and specialty consultants  - continue current medical management   - patient states interest in pulmonary rehab program at [facility]   - pal med following for symptom management and goals of care see above     Plan/Recommendations  - see above symptom management recommendations  - patient and family would benefit from continued education about progressive nature of COPD, prognosis and what to expect in the future.  - will f/u in outpatient pal med clinic - scheduled for 10/20/23 " 11 AM virtual visit as patient's request     Dr. Gallagher's primary team notified of the above.      Thank you for consult and opportunity to participate in Mrs. Cortés's care.         Thank you for your consult. I will follow-up with patient. Please contact us if you have any additional questions.    Subjective:     HPI:   HPI obtained from chart review    As per H&P Ms. Fam is a 72 yo lady with PMH of:  COPD c/b centrilobular emphysema, recurrent pneumonia, pHTN, former smoker, GERD, Afib on diltiazem , and anxiety  She presented to  [facility]  ED  with chief complaint of shortness of breath over the past week.  In addition she reports congestion and non productive cough.  Previously hospitalized in August for  COPD exacerbation that required a prolonged steroid course.     No c/o  fevers, chills, N/V, chest pain, new weakness, new fatigue. At baseline she is on 3L NC with dyspnea on exertion.     In ED, given O2, nebs, mag and ceftriaxone. CXR with no evidence of PNA. No leukocytosos. VBG with pH 7.34, CO2 60.6. Pt admitted for treatment of COPD exacerbation.     Admitted to hospital medicine for further management    Pal med consulted for symptom management- dyspnea.        Hospital Course:  No notes on file    Interval History:     Past Medical History:   Diagnosis Date    Cataract     COPD (chronic obstructive pulmonary disease)     COVID-19     History of COVID-19 1/17/2021    Still with mild dyspnea. Encouraged return to pulmonary rehab Recommend scheduling vaccination when appointment is available.     History of retinal hemorrhage     Lobar pneumonia 11/14/2021    Recurrent in RLL, no endobronchial lesion Needs speech evaluation and MBSS for recurrent aspiration in setting of dysphagia  Will need pulmonary rehab at [facility].    Pathological fracture due to osteoporosis 7/6/2020    Retinal histoplasmosis     Secondary pulmonary arterial hypertension 7/3/2018    Secondary to emphysema and  chronic respiratory failure, mild.       Past Surgical History:   Procedure Laterality Date    BRONCHOSCOPY WITH FLUOROSCOPY N/A 10/28/2019    Procedure: BRONCHOSCOPY, WITH FLUOROSCOPY;  Surgeon: Brooklynn Ruiz MD;  Location: [facility] OR [address] [place] [address];  Service: Endoscopy;  Laterality: N/A;    HAND SURGERY         Review of patient's allergies indicates:   Allergen Reactions    Albuterol     Epinephrine     Ipratropium        Medications:  Continuous Infusions:  Scheduled Meds:   ascorbic acid (vitamin C)  500 mg Oral BID    azelastine  1 spray Nasal BID    azithromycin  500 mg Oral Daily    busPIRone  5 mg Oral BID    calcium carbonate  500 mg Oral Daily    ceFEPime (MAXIPIME) IVPB  1 g Intravenous Q8H    citalopram  20 mg Oral Daily    diltiaZEM  120 mg Oral Daily    enoxparin  40 mg Subcutaneous Daily    fluticasone furoate-vilanteroL  1 puff Inhalation Daily    guaiFENesin  600 mg Oral BID    meloxicam  7.5 mg Oral Daily    oxybutynin  5 mg Oral Daily    polyethylene glycol  17 g Oral Daily    predniSONE  30 mg Oral Once    Followed by    [START ON 10/14/2023] predniSONE  60 mg Oral Daily    senna-docusate 8.6-50 mg  1 tablet Oral BID    sodium chloride 3%  4 mL Nebulization Q4H    tiotropium bromide  2 puff Inhalation Daily     PRN Meds:acetaminophen, dextrose 10%, dextrose 10%, glucagon (human recombinant), glucose, glucose, influenza 65up-adj, insulin aspart U-100, levalbuterol, levalbuterol, lorazepam, melatonin, ondansetron, sodium chloride, sodium chloride 0.9%    Family History       Problem Relation (Age of Onset)    Colon cancer Mother, Father    Macular degeneration Mother    Stroke Father          Tobacco Use    Smoking status: Former     Current packs/day: 0.00     Average packs/day: 1 pack/day for 36.0 years (36.0 ttl pk-yrs)     Types: Cigarettes     Start date: 1980     Quit date: 2016     Years since quittin.1    Smokeless tobacco: Never    Tobacco comments:     pt  states she does not remember date   Substance and Sexual Activity    Alcohol use: Yes     Alcohol/week: 2.0 standard drinks of alcohol     Types: 2 Glasses of wine per week     Comment: 1-2 glasses a day     Drug use: No    Sexual activity: Yes     Partners: Male       Review of Systems   Constitutional:  Positive for fatigue.   HENT:  Negative for congestion.    Respiratory:  Positive for cough and shortness of breath.    Gastrointestinal: Negative.    Musculoskeletal: Negative.    Skin:  Positive for pallor.   Neurological:  Positive for weakness.   Psychiatric/Behavioral:  The patient is nervous/anxious.      Objective:     Vital Signs (Most Recent):  Temp: 98.3 °F (36.8 °C) (10/13/23 1158)  Pulse: 97 (10/13/23 1158)  Resp: 19 (10/13/23 1158)  BP: (!) 155/88 (10/13/23 1158)  SpO2: (!) 92 % (10/13/23 1158) Vital Signs (24h Range):  Temp:  [97.6 °F (36.4 °C)-98.5 °F (36.9 °C)] 98.3 °F (36.8 °C)  Pulse:  [] 97  Resp:  [16-20] 19  SpO2:  [91 %-98 %] 92 %  BP: (129-165)/(64-88) 155/88     Weight: 57.6 kg (126 lb 15.8 oz)  Body mass index is 19.31 kg/m².       Physical Exam  Vitals and nursing note reviewed.   Constitutional:       General: She is not in acute distress.  Cardiovascular:      Rate and Rhythm: Normal rate and regular rhythm.   Pulmonary:      Comments: Tachypnea,  labored, supplemental oxygen   Skin:     General: Skin is warm and dry.   Neurological:      Mental Status: She is alert.      Motor: Weakness present.   Psychiatric:         Behavior: Behavior normal.         Thought Content: Thought content normal.         Judgment: Judgment normal.      Comments: Appears anxious         Review of Symptoms      Symptom Assessment (ESAS 0-10 Scale)  Pain:  0  Dyspnea:  6  Anxiety:  0  Nausea:  0  Depression:  0  Anorexia:  0  Fatigue:  0  Insomnia:  0  Restlessness:  0  Agitation:  0     CAM / Delirium:  Negative  Constipation:  Negative  Diarrhea:  Negative    Anxiety:  Is nervous/anxious    Bowel  Management Plan (BMP):  Yes      Pain Assessment:  OME in 24 hours:  0  Location(s):      Living Arrangements:  Lives with spouse    Psychosocial/Cultural:   See Palliative Psychosocial Note: Yes   45 years, together for 55 yrs,   and mother,one son, 2 grandchildren - granddaughters.  Enjoyed shopping and loves being with family.  Continues to live to see her granddaughters   **Primary  to Follow**  Palliative Care  Consult: Yes    Spiritual:  F - Myranda and Belief:  Latter-day   A - Address in Care:  [place] to seeing  and having  visits. Eucharistic  following       Advance Care Planning   Advance Directives:   Living Will: No        Oral Declaration: No    LaPOST: No    Do Not Resuscitate Status: No    Medical Power of : No        Oral Declaration: No      Decision Making:  Patient answered questions and Family answered questions  Goals of Care: The patient and family endorses that what is most important right now is to focus on symptom/pain control and extending life as long as possible.   Accordingly, we have decided that the best plan to meet the patient's goals includes continuing with treatment         Significant Labs: All pertinent labs within the past 24 hours have been reviewed.  CBC:   Recent Labs   Lab 10/13/23  0459   WBC 11.24   HGB 12.8   HCT 40.9   MCV 97        BMP:  Recent Labs   Lab 10/13/23  0459   *   *   K 4.0   CL 95   CO2 32*   BUN 16   CREATININE 0.6   CALCIUM 8.7   MG 2.6     LFT:  Lab Results   Component Value Date    AST 33 10/13/2023    ALKPHOS 73 10/13/2023    BILITOT 0.3 10/13/2023     Albumin:   Albumin   Date Value Ref Range Status   10/13/2023 3.6 3.5 - 5.2 g/dL Final     Protein:   Total Protein   Date Value Ref Range Status   10/13/2023 6.3 6.0 - 8.4 g/dL Final     Lactic acid:   Lab Results   Component Value Date    LACTATE 1.0 09/20/2022    LACTATE 1.1 07/02/2018       Significant  Imaging: I have reviewed all pertinent imaging results/findings within the past 24 hours.  10/9/23 CXR no acute processes findings suggestive of underlying COPD/empjysema    20 mins spent in advanced care planning     Shilpa Arzate, CNS  Palliative Medicine  [facility] - Intensive Care ([facility]-)

## 2023-10-13 NOTE — PROGRESS NOTES
[facility] - Intensive Care ([address] Medicine  Progress Note    Patient Name: Estefani Fam  MRN: [id number]  Patient Class: IP- Inpatient   Admission Date: 10/9/2023  Length of Stay: 3 days  Attending Physician: Nadine Gallagher MD  Primary Care Provider: Dustin Khan MD      Subjective:     Principal Problem: COPD exacerbation      HPI:  Ms. Fam is a 74 y/o F with PMHx of COPD c/b centrilobular emphysema, recurrent pneumonia, pHTN, former smoker, GERD, Afib on diltiazem not AC, and anxiety who presented to [facility] ED for shortness of breath in the last week. During this time she felt congested with a cough but had not produced sputum. She was previously hospitalized in August for an exacerbation and she required a prolonged steroid course. She denied fevers, chills, N/V, chest pain, new weakness, new fatigue. At baseline she is on 3L NC with dyspnea on exertion.    In ED, given O2, nebs, mag and ceftriaxone. CXR with no evidence of PNA. No leukocytosos. VBG with pH 7.34, CO2 60.6. Pt admitted for treatment of COPD exacerbation.       Overview/Hospital Course:  Patient admitted for a COPD exacerbation. Started on Zosyn in the setting of recent hospitalization and IV abx. Oxygen weaned to maintain SpO2 88-92%. Scheduled duonebs w/ PRN available in addition to IV SoluMedrol. Hyperglycemia 2/2 steroids on sliding scale insulin. Antibiotics, steroids and breathing treatments de-escalated as tolerated. Palliative consulted for patient's dyspnea and agreeable to oral morphine.    Review of Systems   Constitutional:  Negative for chills and fever.   Respiratory:  Positive for shortness of breath and wheezing. Negative for chest tightness.    Cardiovascular:  Negative for chest pain, palpitations and leg swelling.   Gastrointestinal:  Negative for abdominal distention, constipation, diarrhea, nausea and vomiting.   Genitourinary:  Negative for dysuria.   Musculoskeletal:  Negative for back pain.    Neurological:  Negative for dizziness and headaches.   Psychiatric/Behavioral:  Negative for agitation and confusion.       Objective:      Vital Signs (Most Recent):  Temp: 97.6 °F (36.4 °C) (10/13/23 0725)  Pulse: 99 (10/13/23 0747)  Resp: 16 (10/13/23 0747)  BP: (!) 140/64 (10/13/23 0725)  SpO2: 97 % (10/13/23 0747) Vital Signs (24h Range):  Temp:  [97.6 °F (36.4 °C)-98.5 °F (36.9 °C)] 97.6 °F (36.4 °C)  Pulse:  [] 99  Resp:  [16-20] 16  SpO2:  [91 %-98 %] 97 %  BP: (129-165)/(64-81) 140/64      Weight: 57.6 kg (126 lb 15.8 oz)  Body mass index is 19.31 kg/m².     Intake/Output Summary (Last 24 hours) at 10/13/2023 1107  Last data filed at 10/13/2023 0509      Gross per 24 hour   Intake 240 ml   Output 2100 ml   Net -1860 ml      Physical Exam  Constitutional:       General: She is not in acute distress.  HENT:      Head: Normocephalic and atraumatic.      Nose: Nose normal.      Comments: Nasal canula in place     Mouth/Throat:      Mouth: Mucous membranes are dry.      Pharynx: Oropharynx is clear.      Comments: Lips pursed  Eyes:      General:         Right eye: No discharge.         Left eye: No discharge.      Extraocular Movements: Extraocular movements intact.      Conjunctiva/sclera: Conjunctivae normal.   Cardiovascular:      Rate and Rhythm: Normal rate and regular rhythm.      Heart sounds: No murmur heard.  Pulmonary:      Effort: Respiratory distress present.      Breath sounds: Wheezing present.   Chest:      Chest wall: No tenderness.   Abdominal:      General: Abdomen is flat.      Palpations: Abdomen is soft.      Tenderness: There is no abdominal tenderness.   Musculoskeletal:         General: No swelling. Normal range of motion.      Right lower leg: No edema.      Left lower leg: No edema.   Skin:     General: Skin is warm and dry.      Coloration: Skin is pale.   Neurological:      General: No focal deficit present.      Mental Status: She is alert. Mental status is at baseline.     "      Assessment/Plan:      Abnormal finding on urinalysis  Pt requesting UA on admission. Denied dysuria but endorsed increased frequency. However, pt with hx of OAB. UA with bacteria and 3+ LEs. Cultures with no predominant bacteria.    -- On abx for COPD exacerbation    Atrial fibrillation with RVR  Patient with Paroxysmal (<7 days) atrial fibrillation which is controlled currently with Calcium Channel Blocker. Patient is currently in sinus rhythm.[id number] Score: 2.  Eliquis contraindicated due to ocular condition per cards.    -- Continue home dilt    Chronic obstructive pulmonary disease with acute exacerbation  On COPD pathway. Wears 2.5 to 3L NC at home. At baseline, mobility is limited by SOB and is unable to walk ten steps without feeling SOB. VBG with pCO2 61.     S/p methylpred and IV mag in ED    -- De-escalating abx  -- De-escalating to PO prednisone  -- Levalbuterol nebs q4h PRN while awake  -- Mucinex, chest PT  -- Home Breo  -- Supplemental O2 for SaO2 > 88%   -- Pt saw Dr. Ruiz outpatient but most recent visit was 2022. Will need outpatient f/u.  -- Palliative consulted for evaluation of oral morphine, appreciate recs    Former heavy tobacco smoker  Former smoker. 36 py history.    Acute on chronic respiratory failure with hypoxia  Patient with Hypercapnic and Hypoxic Respiratory failure which is Acute on chronic.  she is on home oxygen at 2.5-3 LPM. Supplemental oxygen was provided and noted-      .   Signs/symptoms of respiratory failure include- tachypnea, increased work of breathing and respiratory distress. Contributing diagnoses includes - COPD Labs and images were reviewed. Patient Has not had a recent ABG. Will treat underlying causes and adjust management of respiratory failure.     -- See "COPD with acute exacerbation"      VTE Risk Mitigation (From admission, onward)           Ordered     Place sequential compression device  Until discontinued         10/10/23 1818     enoxaparin " injection 40 mg  Daily         10/09/23 1923                    Discharge Planning   RENARD: 10/16/2023     Code Status: Full Code   Is the patient medically ready for discharge?:     Reason for patient still in hospital (select all that apply): Treatment  Discharge Plan A: Home Health            Melany Chin MD  Department of Hospital Medicine   [facility] - Intensive Care ([place]-[facility])

## 2023-10-13 NOTE — SUBJECTIVE & OBJECTIVE
Interval History: NAEON. Pt with increased work of breathing however appears less distressed than on admission. Titrated to 3L and satting well. Palliative to see today for evaluation of oral morphine for dyspnea.    Review of Systems   Constitutional:  Negative for chills and fever.   Respiratory:  Positive for shortness of breath and wheezing. Negative for chest tightness.    Cardiovascular:  Negative for chest pain, palpitations and leg swelling.   Gastrointestinal:  Negative for abdominal distention, constipation, diarrhea, nausea and vomiting.   Genitourinary:  Negative for dysuria.   Musculoskeletal:  Negative for back pain.   Neurological:  Negative for dizziness and headaches.   Psychiatric/Behavioral:  Negative for agitation and confusion.      Objective:     Vital Signs (Most Recent):  Temp: 97.6 °F (36.4 °C) (10/13/23 0725)  Pulse: 99 (10/13/23 0747)  Resp: 16 (10/13/23 0747)  BP: (!) 140/64 (10/13/23 0725)  SpO2: 97 % (10/13/23 0747) Vital Signs (24h Range):  Temp:  [97.6 °F (36.4 °C)-98.5 °F (36.9 °C)] 97.6 °F (36.4 °C)  Pulse:  [] 99  Resp:  [16-20] 16  SpO2:  [91 %-98 %] 97 %  BP: (129-165)/(64-81) 140/64     Weight: 57.6 kg (126 lb 15.8 oz)  Body mass index is 19.31 kg/m².    Intake/Output Summary (Last 24 hours) at 10/13/2023 1107  Last data filed at 10/13/2023 0509  Gross per 24 hour   Intake 240 ml   Output 2100 ml   Net -1860 ml         Physical Exam  Constitutional:       General: She is not in acute distress.  HENT:      Head: Normocephalic and atraumatic.      Nose: Nose normal.      Comments: Nasal canula in place     Mouth/Throat:      Mouth: Mucous membranes are dry.      Pharynx: Oropharynx is clear.      Comments: Lips pursed  Eyes:      General:         Right eye: No discharge.         Left eye: No discharge.      Extraocular Movements: Extraocular movements intact.      Conjunctiva/sclera: Conjunctivae normal.   Cardiovascular:      Rate and Rhythm: Normal rate and regular rhythm.       Heart sounds: No murmur heard.  Pulmonary:      Effort: Respiratory distress present.      Breath sounds: Wheezing present.   Chest:      Chest wall: No tenderness.   Abdominal:      General: Abdomen is flat.      Palpations: Abdomen is soft.      Tenderness: There is no abdominal tenderness.   Musculoskeletal:         General: No swelling. Normal range of motion.      Right lower leg: No edema.      Left lower leg: No edema.   Skin:     General: Skin is warm and dry.      Coloration: Skin is pale.   Neurological:      General: No focal deficit present.      Mental Status: She is alert. Mental status is at baseline.             Significant Labs: All pertinent labs within the past 24 hours have been reviewed.    Significant Imaging: I have reviewed all pertinent imaging results/findings within the past 24 hours.

## 2023-10-13 NOTE — HPI
HPI obtained from chart review    As per H&P Ms. Fam is a 74 yo lady with PMH of:  COPD c/b centrilobular emphysema, recurrent pneumonia, pHTN, former smoker, GERD, Afib on diltiazem , and anxiety  She presented to  [facility]  ED  with chief complaint of shortness of breath over the past week.  In addition she reports congestion and non productive cough.  Previously hospitalized in August for  COPD exacerbation that required a prolonged steroid course.     No c/o  fevers, chills, N/V, chest pain, new weakness, new fatigue. At baseline she is on 3L NC with dyspnea on exertion.     In ED, given O2, nebs, mag and ceftriaxone. CXR with no evidence of PNA. No leukocytosos. VBG with pH 7.34, CO2 60.6. Pt admitted for treatment of COPD exacerbation.     Admitted to hospital medicine for further management    Pal med consulted for symptom management- dyspnea.

## 2023-10-13 NOTE — ASSESSMENT & PLAN NOTE
On COPD pathway. Wears 2.5 to 3L NC at home. At baseline, mobility is limited by SOB and is unable to walk ten steps without feeling SOB. VBG with pCO2 61.     S/p methylpred and IV mag in ED    -- De-escalating abx  -- De-escalating to PO prednisone  -- Levalbuterol nebs q4h PRN while awake  -- Mucinex, chest PT  -- Home Breo  -- Supplemental O2 for SaO2 > 88%   -- Pt saw Dr. Ruiz outpatient, turned to palliative care  -- Palliative consulted for evaluation of oral morphine, appreciate recs

## 2023-10-13 NOTE — ASSESSMENT & PLAN NOTE
On COPD pathway. Wears 2.5 to 3L NC at home. At baseline, mobility is limited by SOB and is unable to walk ten steps without feeling SOB. VBG with pCO2 61.     S/p methylpred and IV mag in ED    -- De-escalating abx  -- De-escalating solumedrol 40 mg BID  -- Levalbuterol nebs q4h PRN while awake  -- Mucinex, chest PT  -- Home Breo  -- Supplemental O2 for SaO2 > 88%   -- Pt saw Dr. Ruiz outpatient but most recent visit was 2022. Will need outpatient f/u.  -- Palliative consulted for evaluation of oral morphine, appreciate recs

## 2023-10-14 LAB
ALBUMIN SERPL BCP-MCNC: 3.7 G/DL (ref 3.5–5.2)
ALP SERPL-CCNC: 74 U/L (ref 55–135)
ALT SERPL W/O P-5'-P-CCNC: 44 U/L (ref 10–44)
ANION GAP SERPL CALC-SCNC: 7 MMOL/L (ref 8–16)
AST SERPL-CCNC: 21 U/L (ref 10–40)
BASOPHILS # BLD AUTO: 0.01 K/UL (ref 0–0.2)
BASOPHILS NFR BLD: 0.1 % (ref 0–1.9)
BILIRUB SERPL-MCNC: 0.3 MG/DL (ref 0.1–1)
BUN SERPL-MCNC: 17 MG/DL (ref 8–23)
CALCIUM SERPL-MCNC: 8.6 MG/DL (ref 8.7–10.5)
CHLORIDE SERPL-SCNC: 96 MMOL/L (ref 95–110)
CO2 SERPL-SCNC: 30 MMOL/L (ref 23–29)
CREAT SERPL-MCNC: 0.6 MG/DL (ref 0.5–1.4)
DIFFERENTIAL METHOD: ABNORMAL
EOSINOPHIL # BLD AUTO: 0 K/UL (ref 0–0.5)
EOSINOPHIL NFR BLD: 0.5 % (ref 0–8)
ERYTHROCYTE [DISTWIDTH] IN BLOOD BY AUTOMATED COUNT: 12.7 % (ref 11.5–14.5)
EST. GFR  (NO RACE VARIABLE): >60 ML/MIN/1.73 M^2
GLUCOSE SERPL-MCNC: 82 MG/DL (ref 70–110)
HCT VFR BLD AUTO: 43 % (ref 37–48.5)
HGB BLD-MCNC: 13.7 G/DL (ref 12–16)
IMM GRANULOCYTES # BLD AUTO: 0.03 K/UL (ref 0–0.04)
IMM GRANULOCYTES NFR BLD AUTO: 0.3 % (ref 0–0.5)
LYMPHOCYTES # BLD AUTO: 1.1 K/UL (ref 1–4.8)
LYMPHOCYTES NFR BLD: 12.7 % (ref 18–48)
MAGNESIUM SERPL-MCNC: 2.3 MG/DL (ref 1.6–2.6)
MCH RBC QN AUTO: 30.4 PG (ref 27–31)
MCHC RBC AUTO-ENTMCNC: 31.9 G/DL (ref 32–36)
MCV RBC AUTO: 95 FL (ref 82–98)
MONOCYTES # BLD AUTO: 1.4 K/UL (ref 0.3–1)
MONOCYTES NFR BLD: 16 % (ref 4–15)
NEUTROPHILS # BLD AUTO: 6.2 K/UL (ref 1.8–7.7)
NEUTROPHILS NFR BLD: 70.4 % (ref 38–73)
NRBC BLD-RTO: 0 /100 WBC
PHOSPHATE SERPL-MCNC: 3.5 MG/DL (ref 2.7–4.5)
PLATELET # BLD AUTO: 362 K/UL (ref 150–450)
PMV BLD AUTO: 9.2 FL (ref 9.2–12.9)
POCT GLUCOSE: 100 MG/DL (ref 70–110)
POCT GLUCOSE: 124 MG/DL (ref 70–110)
POCT GLUCOSE: 257 MG/DL (ref 70–110)
POCT GLUCOSE: 81 MG/DL (ref 70–110)
POTASSIUM SERPL-SCNC: 3.7 MMOL/L (ref 3.5–5.1)
PROT SERPL-MCNC: 6.3 G/DL (ref 6–8.4)
RBC # BLD AUTO: 4.51 M/UL (ref 4–5.4)
SODIUM SERPL-SCNC: 133 MMOL/L (ref 136–145)
WBC # BLD AUTO: 8.82 K/UL (ref 3.9–12.7)

## 2023-10-14 PROCEDURE — 25000242 PHARM REV CODE 250 ALT 637 W/ HCPCS

## 2023-10-14 PROCEDURE — 94668 MNPJ CHEST WALL SBSQ: CPT

## 2023-10-14 PROCEDURE — 99900035 HC TECH TIME PER 15 MIN (STAT)

## 2023-10-14 PROCEDURE — 21400001 HC TELEMETRY ROOM

## 2023-10-14 PROCEDURE — 99232 SBSQ HOSP IP/OBS MODERATE 35: CPT | Mod: GC,,, | Performed by: INTERNAL MEDICINE

## 2023-10-14 PROCEDURE — 94640 AIRWAY INHALATION TREATMENT: CPT

## 2023-10-14 PROCEDURE — 94761 N-INVAS EAR/PLS OXIMETRY MLT: CPT

## 2023-10-14 PROCEDURE — 25000242 PHARM REV CODE 250 ALT 637 W/ HCPCS: Performed by: STUDENT IN AN ORGANIZED HEALTH CARE EDUCATION/TRAINING PROGRAM

## 2023-10-14 PROCEDURE — 94664 DEMO&/EVAL PT USE INHALER: CPT

## 2023-10-14 PROCEDURE — 85025 COMPLETE CBC W/AUTO DIFF WBC: CPT

## 2023-10-14 PROCEDURE — 25000003 PHARM REV CODE 250: Performed by: STUDENT IN AN ORGANIZED HEALTH CARE EDUCATION/TRAINING PROGRAM

## 2023-10-14 PROCEDURE — 63600175 PHARM REV CODE 636 W HCPCS

## 2023-10-14 PROCEDURE — 27000221 HC OXYGEN, UP TO 24 HOURS

## 2023-10-14 PROCEDURE — 63700000 PHARM REV CODE 250 ALT 637 W/O HCPCS

## 2023-10-14 PROCEDURE — 25000003 PHARM REV CODE 250

## 2023-10-14 PROCEDURE — 27000646 HC AEROBIKA DEVICE

## 2023-10-14 PROCEDURE — 99232 PR SUBSEQUENT HOSPITAL CARE,LEVL II: ICD-10-PCS | Mod: GC,,, | Performed by: INTERNAL MEDICINE

## 2023-10-14 PROCEDURE — 84100 ASSAY OF PHOSPHORUS: CPT

## 2023-10-14 PROCEDURE — 36415 COLL VENOUS BLD VENIPUNCTURE: CPT

## 2023-10-14 PROCEDURE — 83735 ASSAY OF MAGNESIUM: CPT

## 2023-10-14 PROCEDURE — 80053 COMPREHEN METABOLIC PANEL: CPT

## 2023-10-14 RX ORDER — DILTIAZEM HYDROCHLORIDE 30 MG/1
60 TABLET, FILM COATED ORAL ONCE
Status: COMPLETED | OUTPATIENT
Start: 2023-10-14 | End: 2023-10-14

## 2023-10-14 RX ADMIN — CEFEPIME 1 G: 1 INJECTION, POWDER, FOR SOLUTION INTRAMUSCULAR; INTRAVENOUS at 09:10

## 2023-10-14 RX ADMIN — MORPHINE SULFATE 2.5 MG: 10 SOLUTION ORAL at 06:10

## 2023-10-14 RX ADMIN — MORPHINE SULFATE 2.5 MG: 10 SOLUTION ORAL at 01:10

## 2023-10-14 RX ADMIN — MELOXICAM 7.5 MG: 7.5 TABLET ORAL at 09:10

## 2023-10-14 RX ADMIN — AZELASTINE 137 MCG: 1 SPRAY, METERED NASAL at 10:10

## 2023-10-14 RX ADMIN — CALCIUM CARBONATE (ANTACID) CHEW TAB 500 MG 500 MG: 500 CHEW TAB at 09:10

## 2023-10-14 RX ADMIN — Medication 6 MG: at 09:10

## 2023-10-14 RX ADMIN — SODIUM CHLORIDE SOLN NEBU 3% 4 ML: 3 NEBU SOLN at 08:10

## 2023-10-14 RX ADMIN — SENNOSIDES AND DOCUSATE SODIUM 1 TABLET: 50; 8.6 TABLET ORAL at 09:10

## 2023-10-14 RX ADMIN — POLYETHYLENE GLYCOL 3350 17 G: 17 POWDER, FOR SOLUTION ORAL at 09:10

## 2023-10-14 RX ADMIN — GUAIFENESIN 600 MG: 600 TABLET, EXTENDED RELEASE ORAL at 09:10

## 2023-10-14 RX ADMIN — Medication 500 MG: at 09:10

## 2023-10-14 RX ADMIN — OXYBUTYNIN CHLORIDE 5 MG: 5 TABLET, EXTENDED RELEASE ORAL at 09:10

## 2023-10-14 RX ADMIN — SODIUM CHLORIDE SOLN NEBU 3% 4 ML: 3 NEBU SOLN at 01:10

## 2023-10-14 RX ADMIN — CEFEPIME 1 G: 1 INJECTION, POWDER, FOR SOLUTION INTRAMUSCULAR; INTRAVENOUS at 10:10

## 2023-10-14 RX ADMIN — BUSPIRONE HYDROCHLORIDE 5 MG: 5 TABLET ORAL at 09:10

## 2023-10-14 RX ADMIN — AZELASTINE 137 MCG: 1 SPRAY, METERED NASAL at 09:10

## 2023-10-14 RX ADMIN — CEFEPIME 1 G: 1 INJECTION, POWDER, FOR SOLUTION INTRAMUSCULAR; INTRAVENOUS at 03:10

## 2023-10-14 RX ADMIN — FLUTICASONE FUROATE AND VILANTEROL TRIFENATATE 1 PUFF: 100; 25 POWDER RESPIRATORY (INHALATION) at 08:10

## 2023-10-14 RX ADMIN — TIOTROPIUM BROMIDE INHALATION SPRAY 2 PUFF: 3.12 SPRAY, METERED RESPIRATORY (INHALATION) at 08:10

## 2023-10-14 RX ADMIN — AZITHROMYCIN 500 MG: 250 TABLET, FILM COATED ORAL at 09:10

## 2023-10-14 RX ADMIN — Medication 500 MG: at 10:10

## 2023-10-14 RX ADMIN — PREDNISONE 60 MG: 5 TABLET ORAL at 09:10

## 2023-10-14 RX ADMIN — DILTIAZEM HYDROCHLORIDE 120 MG: 120 CAPSULE, COATED, EXTENDED RELEASE ORAL at 09:10

## 2023-10-14 RX ADMIN — SODIUM CHLORIDE SOLN NEBU 3% 4 ML: 3 NEBU SOLN at 04:10

## 2023-10-14 RX ADMIN — LEVALBUTEROL 1.25 MG: 1.25 SOLUTION, CONCENTRATE RESPIRATORY (INHALATION) at 09:10

## 2023-10-14 RX ADMIN — CITALOPRAM HYDROBROMIDE 20 MG: 20 TABLET ORAL at 09:10

## 2023-10-14 RX ADMIN — ENOXAPARIN SODIUM 40 MG: 40 INJECTION SUBCUTANEOUS at 05:10

## 2023-10-14 RX ADMIN — DILTIAZEM HYDROCHLORIDE 60 MG: 30 TABLET, FILM COATED ORAL at 03:10

## 2023-10-14 NOTE — SUBJECTIVE & OBJECTIVE
Interval History: NAEON. Spoke with palliative care who recommended morphine for comfort, pt stated it helped her feel comfortable and calm. On baseline O2.     Review of Systems   Constitutional:  Negative for chills and fever.   Respiratory:  Positive for cough and shortness of breath. Negative for chest tightness and wheezing.    Cardiovascular:  Negative for chest pain, palpitations and leg swelling.   Gastrointestinal:  Negative for abdominal distention, constipation, diarrhea, nausea and vomiting.   Genitourinary:  Negative for dysuria.   Musculoskeletal:  Negative for back pain.   Neurological:  Negative for dizziness and headaches.   Psychiatric/Behavioral:  Negative for agitation and confusion.      Objective:     Vital Signs (Most Recent):  Temp: 98.7 °F (37.1 °C) (10/14/23 1147)  Pulse: 100 (10/14/23 1159)  Resp: 18 (10/14/23 1147)  BP: (!) 139/59 (10/14/23 1147)  SpO2: (!) 93 % (10/14/23 1147) Vital Signs (24h Range):  Temp:  [97.7 °F (36.5 °C)-98.7 °F (37.1 °C)] 98.7 °F (37.1 °C)  Pulse:  [] 100  Resp:  [16-24] 18  SpO2:  [93 %-99 %] 93 %  BP: (139-164)/(59-83) 139/59     Weight: 59.8 kg (131 lb 13.4 oz)  Body mass index is 20.05 kg/m².    Intake/Output Summary (Last 24 hours) at 10/14/2023 1220  Last data filed at 10/14/2023 0722  Gross per 24 hour   Intake 918.13 ml   Output 850 ml   Net 68.13 ml         Physical Exam  Constitutional:       General: She is not in acute distress.  HENT:      Head: Normocephalic and atraumatic.      Nose: Nose normal.      Comments: Nasal canula in place     Mouth/Throat:      Mouth: Mucous membranes are dry.      Pharynx: Oropharynx is clear.      Comments: Lips pursed  Eyes:      General:         Right eye: No discharge.         Left eye: No discharge.      Extraocular Movements: Extraocular movements intact.      Conjunctiva/sclera: Conjunctivae normal.   Cardiovascular:      Rate and Rhythm: Normal rate and regular rhythm.      Heart sounds: No murmur  heard.  Pulmonary:      Effort: Respiratory distress present.      Breath sounds: No wheezing.      Comments: Aeration improved  Chest:      Chest wall: No tenderness.   Abdominal:      General: Abdomen is flat.      Palpations: Abdomen is soft.      Tenderness: There is no abdominal tenderness.   Musculoskeletal:         General: No swelling. Normal range of motion.      Right lower leg: No edema.      Left lower leg: No edema.   Skin:     General: Skin is warm and dry.      Coloration: Skin is pale.   Neurological:      General: No focal deficit present.      Mental Status: She is alert. Mental status is at baseline.             Significant Labs: All pertinent labs within the past 24 hours have been reviewed.    Significant Imaging: I have reviewed all pertinent imaging results/findings within the past 24 hours.

## 2023-10-14 NOTE — PLAN OF CARE
Problem: Adult Inpatient Plan of Care  Goal: Patient-Specific Goal (Individualized)  Outcome: Ongoing, Progressing  Goal: Readiness for Transition of Care  Outcome: Ongoing, Progressing     Problem: Skin Injury Risk Increased  Goal: Skin Health and Integrity  Outcome: Ongoing, Progressing     Problem: Fall Injury Risk  Goal: Absence of Fall and Fall-Related Injury  Outcome: Ongoing, Progressing

## 2023-10-14 NOTE — PROGRESS NOTES
[facility] - Intensive Care ([address] Medicine  Progress Note    Patient Name: Estefani Fam  MRN: [id number]  Patient Class: IP- Inpatient   Admission Date: 10/9/2023  Length of Stay: 4 days  Attending Physician: Pollo Siegel MD  Primary Care Provider: Dustin Khan MD      Subjective:     Principal Problem: COPD exacerbation      HPI:  Ms. Fam is a 72 y/o F with PMHx of COPD c/b centrilobular emphysema, recurrent pneumonia, pHTN, former smoker, GERD, Afib on diltiazem not AC, and anxiety who presented to [facility] ED for shortness of breath in the last week. During this time she felt congested with a cough but had not produced sputum. She was previously hospitalized in August for an exacerbation and she required a prolonged steroid course. She denied fevers, chills, N/V, chest pain, new weakness, new fatigue. At baseline she is on 3L NC with dyspnea on exertion.    In ED, given O2, nebs, mag and ceftriaxone. CXR with no evidence of PNA. No leukocytosos. VBG with pH 7.34, CO2 60.6. Pt admitted for treatment of COPD exacerbation.       Overview/Hospital Course:  Patient admitted for a COPD exacerbation. Started on Zosyn in the setting of recent hospitalization and IV abx. Oxygen weaned to maintain SpO2 88-92%. Scheduled duonebs w/ PRN available in addition to IV SoluMedrol. Hyperglycemia 2/2 steroids on sliding scale insulin. Antibiotics, steroids and breathing treatments de-escalated as tolerated. Palliative consulted for patient's dyspnea and agreeable to oral morphine.      Interval History: NAEON. Spoke with palliative care who recommended morphine for comfort, pt stated it helped her feel comfortable and calm. On baseline O2.     Review of Systems   Constitutional:  Negative for chills and fever.   Respiratory:  Positive for cough and shortness of breath. Negative for chest tightness and wheezing.    Cardiovascular:  Negative for chest pain, palpitations and leg swelling.    Gastrointestinal:  Negative for abdominal distention, constipation, diarrhea, nausea and vomiting.   Genitourinary:  Negative for dysuria.   Musculoskeletal:  Negative for back pain.   Neurological:  Negative for dizziness and headaches.   Psychiatric/Behavioral:  Negative for agitation and confusion.      Objective:     Vital Signs (Most Recent):  Temp: 98.7 °F (37.1 °C) (10/14/23 1147)  Pulse: 100 (10/14/23 1159)  Resp: 18 (10/14/23 1147)  BP: (!) 139/59 (10/14/23 1147)  SpO2: (!) 93 % (10/14/23 1147) Vital Signs (24h Range):  Temp:  [97.7 °F (36.5 °C)-98.7 °F (37.1 °C)] 98.7 °F (37.1 °C)  Pulse:  [] 100  Resp:  [16-24] 18  SpO2:  [93 %-99 %] 93 %  BP: (139-164)/(59-83) 139/59     Weight: 59.8 kg (131 lb 13.4 oz)  Body mass index is 20.05 kg/m².    Intake/Output Summary (Last 24 hours) at 10/14/2023 1220  Last data filed at 10/14/2023 0722  Gross per 24 hour   Intake 918.13 ml   Output 850 ml   Net 68.13 ml         Physical Exam  Constitutional:       General: She is not in acute distress.  HENT:      Head: Normocephalic and atraumatic.      Nose: Nose normal.      Comments: Nasal canula in place     Mouth/Throat:      Mouth: Mucous membranes are dry.      Pharynx: Oropharynx is clear.      Comments: Lips pursed  Eyes:      General:         Right eye: No discharge.         Left eye: No discharge.      Extraocular Movements: Extraocular movements intact.      Conjunctiva/sclera: Conjunctivae normal.   Cardiovascular:      Rate and Rhythm: Normal rate and regular rhythm.      Heart sounds: No murmur heard.  Pulmonary:      Effort: Respiratory distress present.      Breath sounds: No wheezing.      Comments: Aeration improved  Chest:      Chest wall: No tenderness.   Abdominal:      General: Abdomen is flat.      Palpations: Abdomen is soft.      Tenderness: There is no abdominal tenderness.   Musculoskeletal:         General: No swelling. Normal range of motion.      Right lower leg: No edema.      Left  lower leg: No edema.   Skin:     General: Skin is warm and dry.      Coloration: Skin is pale.   Neurological:      General: No focal deficit present.      Mental Status: She is alert. Mental status is at baseline.             Significant Labs: All pertinent labs within the past 24 hours have been reviewed.    Significant Imaging: I have reviewed all pertinent imaging results/findings within the past 24 hours.      Assessment/Plan:      Abnormal finding on urinalysis  Pt requesting UA on admission. Denied dysuria but endorsed increased frequency. However, pt with hx of OAB. UA with bacteria and 3+ LEs. Cultures with no predominant bacteria.    -- On abx for COPD exacerbation    Atrial fibrillation with RVR  Patient with Paroxysmal (<7 days) atrial fibrillation which is controlled currently with Calcium Channel Blocker. Patient is currently in sinus rhythm.[id number] Score: 2.  Eliquis contraindicated due to ocular condition per cards.    -- Continue home dilt    Chronic obstructive pulmonary disease with acute exacerbation  On COPD pathway. Wears 2.5 to 3L NC at home. At baseline, mobility is limited by SOB and is unable to walk ten steps without feeling SOB. VBG with pCO2 61.     S/p methylpred and IV mag in ED    -- De-escalating abx  -- De-escalating to PO prednisone  -- Levalbuterol nebs q4h PRN while awake  -- Mucinex, chest PT  -- Home Breo  -- Supplemental O2 for SaO2 > 88%   -- Pt saw Dr. Ruiz outpatient, turned to palliative care  -- Palliative consulted for evaluation of oral morphine, appreciate recs    Former heavy tobacco smoker  Former smoker. 36 py history.    Acute on chronic respiratory failure with hypoxia  Patient with Hypercapnic and Hypoxic Respiratory failure which is Acute on chronic.  she is on home oxygen at 2.5-3 LPM. Supplemental oxygen was provided and noted-      .   Signs/symptoms of respiratory failure include- tachypnea, increased work of breathing and respiratory distress.  "Contributing diagnoses includes - COPD Labs and images were reviewed. Patient Has not had a recent ABG. Will treat underlying causes and adjust management of respiratory failure.     -- See "COPD with acute exacerbation"      VTE Risk Mitigation (From admission, onward)         Ordered     Place sequential compression device  Until discontinued         10/10/23 1818     enoxaparin injection 40 mg  Daily         10/09/23 1923                Discharge Planning   RENARD: 10/16/2023     Code Status: Full Code   Is the patient medically ready for discharge?:     Reason for patient still in hospital (select all that apply): Treatment  Discharge Plan A: Home Health            Melany Chin MD  Department of Hospital Medicine   [facility] - Intensive Care ([place] [id number])    "

## 2023-10-14 NOTE — PLAN OF CARE
Problem: Skin Injury Risk Increased  Goal: Skin Health and Integrity  Outcome: Ongoing, Progressing     Problem: Skin Injury Risk Increased  Goal: Skin Health and Integrity  Outcome: Ongoing, Progressing     Problem: Fall Injury Risk  Goal: Absence of Fall and Fall-Related Injury  Outcome: Ongoing, Progressing     Problem: Fall Injury Risk  Goal: Absence of Fall and Fall-Related Injury  Outcome: Ongoing, Progressing     Problem: Coping Ineffective  Goal: Effective Coping  Outcome: Ongoing, Progressing     Problem: Coping Ineffective  Goal: Effective Coping  Outcome: Ongoing, Progressing     Problem: Adjustment to Illness COPD (Chronic Obstructive Pulmonary Disease)  Goal: Optimal Chronic Illness Coping  Outcome: Ongoing, Progressing     Problem: Adjustment to Illness COPD (Chronic Obstructive Pulmonary Disease)  Goal: Optimal Chronic Illness Coping  Outcome: Ongoing, Progressing     Problem: Functional Ability Impaired COPD (Chronic Obstructive Pulmonary Disease)  Goal: Optimal Level of Functional [place]  Outcome: Ongoing, Progressing     Problem: Functional Ability Impaired COPD (Chronic Obstructive Pulmonary Disease)  Goal: Optimal Level of Functional [place]  Outcome: Ongoing, Progressing     Problem: Infection COPD (Chronic Obstructive Pulmonary Disease)  Goal: Absence of Infection Signs and Symptoms  Outcome: Ongoing, Progressing     Problem: Infection COPD (Chronic Obstructive Pulmonary Disease)  Goal: Absence of Infection Signs and Symptoms  Outcome: Ongoing, Progressing     Problem: Respiratory Compromise COPD (Chronic Obstructive Pulmonary Disease)  Goal: Effective Oxygenation and Ventilation  Outcome: Ongoing, Progressing     Problem: Respiratory Compromise COPD (Chronic Obstructive Pulmonary Disease)  Goal: Effective Oxygenation and Ventilation  Outcome: Ongoing, Progressing     Problem: Skin Injury Risk Increased  Goal: Skin Health and Integrity  Outcome: Ongoing, Progressing     Problem:  Skin Injury Risk Increased  Goal: Skin Health and Integrity  Outcome: Ongoing, Progressing   No acute events throughout night.Pt AAO X 4; able to express needs.  C/o pain  analgesic given as ordered. Safety maintained.  Bed in low position,  call  light in reach.    Will continue to monitor   Problem: Adjustment to Illness COPD (Chronic Obstructive Pulmonary Disease)  Goal: Optimal Chronic Illness Coping  Outcome: Ongoing, Progressing     Problem: Adjustment to Illness COPD (Chronic Obstructive Pulmonary Disease)  Goal: Optimal Chronic Illness Coping  Outcome: Ongoing, Progressing     Problem: Functional Ability Impaired COPD (Chronic Obstructive Pulmonary Disease)  Goal: Optimal Level of Functional [place]  Outcome: Ongoing, Progressing     Problem: Functional Ability Impaired COPD (Chronic Obstructive Pulmonary Disease)  Goal: Optimal Level of Functional [place]  Outcome: Ongoing, Progressing     Problem: Infection COPD (Chronic Obstructive Pulmonary Disease)  Goal: Absence of Infection Signs and Symptoms  Outcome: Ongoing, Progressing     Problem: Infection COPD (Chronic Obstructive Pulmonary Disease)  Goal: Absence of Infection Signs and Symptoms  Outcome: Ongoing, Progressing     Problem: Respiratory Compromise COPD (Chronic Obstructive Pulmonary Disease)  Goal: Effective Oxygenation and Ventilation  Outcome: Ongoing, Progressing     Problem: Respiratory Compromise COPD (Chronic Obstructive Pulmonary Disease)  Goal: Effective Oxygenation and Ventilation  Outcome: Ongoing, Progressing     Problem: Skin Injury Risk Increased  Goal: Skin Health and Integrity  Outcome: Ongoing, Progressing     Problem: Skin Injury Risk Increased  Goal: Skin Health and Integrity  Outcome: Ongoing, Progressing   Pt AAO X 4; able to express needs.  No c/o pain.  SOB this morning. Oral Morphine provided immediate relief.  Zofran given for nausea.   Safety maintained.  Bed in low position,  call  light in reach.

## 2023-10-15 LAB
ALBUMIN SERPL BCP-MCNC: 3.2 G/DL (ref 3.5–5.2)
ALP SERPL-CCNC: 66 U/L (ref 55–135)
ALT SERPL W/O P-5'-P-CCNC: 34 U/L (ref 10–44)
ANION GAP SERPL CALC-SCNC: 6 MMOL/L (ref 8–16)
AST SERPL-CCNC: 17 U/L (ref 10–40)
BASOPHILS # BLD AUTO: 0.03 K/UL (ref 0–0.2)
BASOPHILS NFR BLD: 0.4 % (ref 0–1.9)
BILIRUB SERPL-MCNC: 0.3 MG/DL (ref 0.1–1)
BUN SERPL-MCNC: 18 MG/DL (ref 8–23)
CALCIUM SERPL-MCNC: 9 MG/DL (ref 8.7–10.5)
CHLORIDE SERPL-SCNC: 97 MMOL/L (ref 95–110)
CO2 SERPL-SCNC: 32 MMOL/L (ref 23–29)
CREAT SERPL-MCNC: 0.6 MG/DL (ref 0.5–1.4)
DIFFERENTIAL METHOD: ABNORMAL
EOSINOPHIL # BLD AUTO: 0.2 K/UL (ref 0–0.5)
EOSINOPHIL NFR BLD: 1.9 % (ref 0–8)
ERYTHROCYTE [DISTWIDTH] IN BLOOD BY AUTOMATED COUNT: 12.6 % (ref 11.5–14.5)
EST. GFR  (NO RACE VARIABLE): >60 ML/MIN/1.73 M^2
GLUCOSE SERPL-MCNC: 86 MG/DL (ref 70–110)
HCT VFR BLD AUTO: 40 % (ref 37–48.5)
HGB BLD-MCNC: 12.8 G/DL (ref 12–16)
IMM GRANULOCYTES # BLD AUTO: 0.04 K/UL (ref 0–0.04)
IMM GRANULOCYTES NFR BLD AUTO: 0.5 % (ref 0–0.5)
LYMPHOCYTES # BLD AUTO: 1.2 K/UL (ref 1–4.8)
LYMPHOCYTES NFR BLD: 13.9 % (ref 18–48)
MAGNESIUM SERPL-MCNC: 2.2 MG/DL (ref 1.6–2.6)
MCH RBC QN AUTO: 30.4 PG (ref 27–31)
MCHC RBC AUTO-ENTMCNC: 32 G/DL (ref 32–36)
MCV RBC AUTO: 95 FL (ref 82–98)
MONOCYTES # BLD AUTO: 1.3 K/UL (ref 0.3–1)
MONOCYTES NFR BLD: 15.7 % (ref 4–15)
NEUTROPHILS # BLD AUTO: 5.6 K/UL (ref 1.8–7.7)
NEUTROPHILS NFR BLD: 67.6 % (ref 38–73)
NRBC BLD-RTO: 0 /100 WBC
PHOSPHATE SERPL-MCNC: 3.7 MG/DL (ref 2.7–4.5)
PLATELET # BLD AUTO: 333 K/UL (ref 150–450)
PMV BLD AUTO: 9 FL (ref 9.2–12.9)
POCT GLUCOSE: 108 MG/DL (ref 70–110)
POCT GLUCOSE: 134 MG/DL (ref 70–110)
POCT GLUCOSE: 231 MG/DL (ref 70–110)
POCT GLUCOSE: 77 MG/DL (ref 70–110)
POTASSIUM SERPL-SCNC: 4.7 MMOL/L (ref 3.5–5.1)
PROT SERPL-MCNC: 5.4 G/DL (ref 6–8.4)
RBC # BLD AUTO: 4.21 M/UL (ref 4–5.4)
SODIUM SERPL-SCNC: 135 MMOL/L (ref 136–145)
WBC # BLD AUTO: 8.28 K/UL (ref 3.9–12.7)

## 2023-10-15 PROCEDURE — 21400001 HC TELEMETRY ROOM

## 2023-10-15 PROCEDURE — 63600175 PHARM REV CODE 636 W HCPCS

## 2023-10-15 PROCEDURE — 80053 COMPREHEN METABOLIC PANEL: CPT

## 2023-10-15 PROCEDURE — 99232 PR SUBSEQUENT HOSPITAL CARE,LEVL II: ICD-10-PCS | Mod: GC,,, | Performed by: INTERNAL MEDICINE

## 2023-10-15 PROCEDURE — 94640 AIRWAY INHALATION TREATMENT: CPT

## 2023-10-15 PROCEDURE — 84100 ASSAY OF PHOSPHORUS: CPT

## 2023-10-15 PROCEDURE — 99900035 HC TECH TIME PER 15 MIN (STAT)

## 2023-10-15 PROCEDURE — 25000003 PHARM REV CODE 250

## 2023-10-15 PROCEDURE — 85025 COMPLETE CBC W/AUTO DIFF WBC: CPT

## 2023-10-15 PROCEDURE — 83735 ASSAY OF MAGNESIUM: CPT

## 2023-10-15 PROCEDURE — 36415 COLL VENOUS BLD VENIPUNCTURE: CPT

## 2023-10-15 PROCEDURE — 25000242 PHARM REV CODE 250 ALT 637 W/ HCPCS

## 2023-10-15 PROCEDURE — 25000003 PHARM REV CODE 250: Performed by: STUDENT IN AN ORGANIZED HEALTH CARE EDUCATION/TRAINING PROGRAM

## 2023-10-15 PROCEDURE — 25000242 PHARM REV CODE 250 ALT 637 W/ HCPCS: Performed by: STUDENT IN AN ORGANIZED HEALTH CARE EDUCATION/TRAINING PROGRAM

## 2023-10-15 PROCEDURE — 27000221 HC OXYGEN, UP TO 24 HOURS

## 2023-10-15 PROCEDURE — 99232 SBSQ HOSP IP/OBS MODERATE 35: CPT | Mod: GC,,, | Performed by: INTERNAL MEDICINE

## 2023-10-15 PROCEDURE — 94664 DEMO&/EVAL PT USE INHALER: CPT

## 2023-10-15 PROCEDURE — 94761 N-INVAS EAR/PLS OXIMETRY MLT: CPT

## 2023-10-15 RX ORDER — PREDNISONE 20 MG/1
40 TABLET ORAL DAILY
Status: DISCONTINUED | OUTPATIENT
Start: 2023-10-16 | End: 2023-10-18

## 2023-10-15 RX ORDER — SODIUM CHLORIDE FOR INHALATION 3 %
4 VIAL, NEBULIZER (ML) INHALATION 3 TIMES DAILY
Status: DISCONTINUED | OUTPATIENT
Start: 2023-10-15 | End: 2023-10-18 | Stop reason: HOSPADM

## 2023-10-15 RX ADMIN — PREDNISONE 60 MG: 5 TABLET ORAL at 08:10

## 2023-10-15 RX ADMIN — MELOXICAM 7.5 MG: 7.5 TABLET ORAL at 08:10

## 2023-10-15 RX ADMIN — BUSPIRONE HYDROCHLORIDE 5 MG: 5 TABLET ORAL at 08:10

## 2023-10-15 RX ADMIN — POLYETHYLENE GLYCOL 3350 17 G: 17 POWDER, FOR SOLUTION ORAL at 08:10

## 2023-10-15 RX ADMIN — CALCIUM CARBONATE (ANTACID) CHEW TAB 500 MG 500 MG: 500 CHEW TAB at 08:10

## 2023-10-15 RX ADMIN — LEVALBUTEROL 1.25 MG: 1.25 SOLUTION, CONCENTRATE RESPIRATORY (INHALATION) at 08:10

## 2023-10-15 RX ADMIN — DILTIAZEM HYDROCHLORIDE 120 MG: 120 CAPSULE, COATED, EXTENDED RELEASE ORAL at 08:10

## 2023-10-15 RX ADMIN — ENOXAPARIN SODIUM 40 MG: 40 INJECTION SUBCUTANEOUS at 05:10

## 2023-10-15 RX ADMIN — SODIUM CHLORIDE SOLN NEBU 3% 4 ML: 3 NEBU SOLN at 08:10

## 2023-10-15 RX ADMIN — Medication 6 MG: at 08:10

## 2023-10-15 RX ADMIN — FLUTICASONE FUROATE AND VILANTEROL TRIFENATATE 1 PUFF: 100; 25 POWDER RESPIRATORY (INHALATION) at 08:10

## 2023-10-15 RX ADMIN — LEVALBUTEROL 1.25 MG: 1.25 SOLUTION, CONCENTRATE RESPIRATORY (INHALATION) at 02:10

## 2023-10-15 RX ADMIN — Medication 500 MG: at 08:10

## 2023-10-15 RX ADMIN — SENNOSIDES AND DOCUSATE SODIUM 1 TABLET: 50; 8.6 TABLET ORAL at 08:10

## 2023-10-15 RX ADMIN — TIOTROPIUM BROMIDE INHALATION SPRAY 2 PUFF: 3.12 SPRAY, METERED RESPIRATORY (INHALATION) at 08:10

## 2023-10-15 RX ADMIN — SODIUM CHLORIDE SOLN NEBU 3% 4 ML: 3 NEBU SOLN at 02:10

## 2023-10-15 RX ADMIN — AZELASTINE 137 MCG: 1 SPRAY, METERED NASAL at 08:10

## 2023-10-15 RX ADMIN — MORPHINE SULFATE 2.5 MG: 10 SOLUTION ORAL at 11:10

## 2023-10-15 RX ADMIN — LEVALBUTEROL 1.25 MG: 1.25 SOLUTION, CONCENTRATE RESPIRATORY (INHALATION) at 09:10

## 2023-10-15 RX ADMIN — GUAIFENESIN 600 MG: 600 TABLET, EXTENDED RELEASE ORAL at 08:10

## 2023-10-15 RX ADMIN — OXYBUTYNIN CHLORIDE 5 MG: 5 TABLET, EXTENDED RELEASE ORAL at 08:10

## 2023-10-15 RX ADMIN — CITALOPRAM HYDROBROMIDE 20 MG: 20 TABLET ORAL at 08:10

## 2023-10-15 NOTE — ASSESSMENT & PLAN NOTE
Pt requesting UA on admission. Denied dysuria but endorsed increased frequency. However, pt with hx of OAB. UA with bacteria and 3+ LEs. Cultures with no predominant bacteria.    -- Was on abx for COPD exacerbation

## 2023-10-15 NOTE — PLAN OF CARE
Problem: Adult Inpatient Plan of Care  Goal: Plan of Care Review  Outcome: Ongoing, Progressing  Goal: Patient-Specific Goal (Individualized)  Outcome: Ongoing, Progressing  Goal: Absence of Hospital-Acquired Illness or Injury  Outcome: Ongoing, Progressing  Goal: Optimal Comfort and Wellbeing  Outcome: Ongoing, Progressing  Goal: Readiness for Transition of Care  Outcome: Ongoing, Progressing     Problem: Functional Ability Impaired COPD (Chronic Obstructive Pulmonary Disease)  Goal: Optimal Level of Functional [place]  Outcome: Ongoing, Progressing     Problem: Infection COPD (Chronic Obstructive Pulmonary Disease)  Goal: Absence of Infection Signs and Symptoms  Outcome: Ongoing, Progressing     Problem: Oral Intake Inadequate COPD (Chronic Obstructive Pulmonary Disease)  Goal: Improved Nutrition Intake  Outcome: Ongoing, Progressing     Problem: Respiratory Compromise COPD (Chronic Obstructive Pulmonary Disease)  Goal: Effective Oxygenation and Ventilation  Outcome: Ongoing, Progressing     Problem: Skin Injury Risk Increased  Goal: Skin Health and Integrity  Outcome: Ongoing, Progressing     Problem: Fall Injury Risk  Goal: Absence of Fall and Fall-Related Injury  Outcome: Ongoing, Progressing     Problem: Coping Ineffective  Goal: Effective Coping  Outcome: Ongoing, Progressing

## 2023-10-15 NOTE — ASSESSMENT & PLAN NOTE
Patient with Paroxysmal (<7 days) atrial fibrillation which is controlled currently with Calcium Channel Blocker. Patient is currently in sinus rhythm.[id number] Score: 2.  Eliquis contraindicated due to ocular condition per cards.    -- Continue home dilt

## 2023-10-15 NOTE — SUBJECTIVE & OBJECTIVE
Interval History: NAEON. On 3L NC. NSR. Pt states she is a 6 out of 10 today but does not feel ready for a 6 minute walk test. She kindly refused PT/OT.    Review of Systems   Constitutional:  Negative for chills and fever.   Respiratory:  Positive for cough and shortness of breath. Negative for chest tightness and wheezing.    Cardiovascular:  Negative for chest pain, palpitations and leg swelling.   Gastrointestinal:  Negative for abdominal distention, constipation, diarrhea, nausea and vomiting.   Genitourinary:  Negative for dysuria.   Musculoskeletal:  Negative for back pain.   Neurological:  Negative for dizziness and headaches.   Psychiatric/Behavioral:  Negative for agitation and confusion.      Objective:     Vital Signs (Most Recent):  Temp: 97.8 °F (36.6 °C) (10/15/23 0319)  Pulse: 80 (10/15/23 0319)  Resp: 17 (10/15/23 0319)  BP: 136/70 (10/15/23 0319)  SpO2: 97 % (10/15/23 0319) Vital Signs (24h Range):  Temp:  [97.8 °F (36.6 °C)-98.9 °F (37.2 °C)] 97.8 °F (36.6 °C)  Pulse:  [] 80  Resp:  [17-26] 17  SpO2:  [93 %-98 %] 97 %  BP: (130-142)/(59-71) 136/70     Weight: 59.8 kg (131 lb 13.4 oz)  Body mass index is 20.05 kg/m².    Intake/Output Summary (Last 24 hours) at 10/15/2023 0802  Last data filed at 10/15/2023 0655  Gross per 24 hour   Intake 240 ml   Output 400 ml   Net -160 ml         Physical Exam  Constitutional:       General: She is not in acute distress.  HENT:      Head: Normocephalic and atraumatic.      Nose: Nose normal.      Comments: Nasal canula in place     Mouth/Throat:      Mouth: Mucous membranes are dry.      Pharynx: Oropharynx is clear.      Comments: Lips pursed  Eyes:      General:         Right eye: No discharge.         Left eye: No discharge.      Extraocular Movements: Extraocular movements intact.      Conjunctiva/sclera: Conjunctivae normal.   Cardiovascular:      Rate and Rhythm: Normal rate and regular rhythm.      Heart sounds: No murmur heard.  Pulmonary:       Effort: Respiratory distress present.      Breath sounds: No wheezing.      Comments: Aeration improved  Chest:      Chest wall: No tenderness.   Abdominal:      General: Abdomen is flat.      Palpations: Abdomen is soft.      Tenderness: There is no abdominal tenderness.   Musculoskeletal:         General: No swelling. Normal range of motion.      Right lower leg: No edema.      Left lower leg: No edema.   Skin:     General: Skin is warm and dry.      Coloration: Skin is not pale.   Neurological:      General: No focal deficit present.      Mental Status: She is alert. Mental status is at baseline.             Significant Labs: All pertinent labs within the past 24 hours have been reviewed.    Significant Imaging: I have reviewed all pertinent imaging results/findings within the past 24 hours.

## 2023-10-15 NOTE — PLAN OF CARE
Problem: Adult Inpatient Plan of Care  Goal: Plan of Care Review  Outcome: Ongoing, Progressing  Goal: Optimal Comfort and Wellbeing  Outcome: Ongoing, Progressing     Problem: Fall Injury Risk  Goal: Absence of Fall and Fall-Related Injury  Outcome: Ongoing, Progressing     Problem: Coping Ineffective  Goal: Effective Coping  Outcome: Ongoing, Progressing

## 2023-10-15 NOTE — ASSESSMENT & PLAN NOTE
On COPD pathway. Wears 2.5 to 3L NC at home. At baseline, mobility is limited by SOB and is unable to walk ten steps without feeling SOB. VBG with pCO2 61.     S/p methylpred and IV mag in ED    -- Finished 5 days of Abx  -- De-escalating to PO prednisone  -- Levalbuterol nebs q4h PRN while awake  -- Mucinex, chest PT  -- Home Breo  -- Supplemental O2 for SaO2 > 88%   -- Pt saw Dr. Ruiz outpatient, turned to palliative care  -- Palliative consulted for evaluation of oral morphine, appreciate recs

## 2023-10-15 NOTE — PROGRESS NOTES
[facility] - [facility] ([address] Medicine  Progress Note    Patient Name: Estefani Fam  MRN: [id number]  Patient Class: IP- Inpatient   Admission Date: 10/9/2023  Length of Stay: 5 days  Attending Physician: Pollo Siegel MD  Primary Care Provider: Dustin Khan MD      Subjective:     Principal Problem: COPD exacerbation      HPI:  Ms. Fam is a 74 y/o F with PMHx of COPD c/b centrilobular emphysema, recurrent pneumonia, pHTN, former smoker, GERD, Afib on diltiazem not AC, and anxiety who [place] [facility] [place]ness of breath in the last week. During this time she felt congested with a cough but had not produced sputum. She was previously hospitalized in August for an exacerbation and she required a prolonged steroid course. She denied fevers, chills, N/V, chest pain, new weakness, new fatigue. At baseline she is on 3L NC with dyspnea on exertion.    In ED, given O2, nebs, mag and ceftriaxone. CXR with no evidence of PNA. No leukocytosos. VBG with pH 7.34, CO2 60.6. Pt admitted for treatment of COPD exacerbation.       Overview/Hospital Course:  Patient admitted for a COPD exacerbation. Started on Zosyn in the setting of recent hospitalization and IV abx. Oxygen weaned to maintain SpO2 88-92%. Scheduled duonebs w/ PRN available in addition to IV SoluMedrol. Hyperglycemia 2/2 steroids on sliding scale insulin. Antibiotics, steroids and breathing treatments de-escalated as tolerated. Palliative consulted for patient's dyspnea and agreeable to oral morphine.      Interval History: NAEON. On 3L NC. NSR. Pt states she is a 6 out of 10 today but does not feel ready for a 6 minute walk test. She kindly refused PT/OT.    Review of Systems   Constitutional:  Negative for chills and fever.   Respiratory:  Positive for cough and shortness of breath. Negative for chest tightness and wheezing.    Cardiovascular:  Negative for chest pain, palpitations and leg swelling.   Gastrointestinal:   Negative for abdominal distention, constipation, diarrhea, nausea and vomiting.   Genitourinary:  Negative for dysuria.   Musculoskeletal:  Negative for back pain.   Neurological:  Negative for dizziness and headaches.   Psychiatric/Behavioral:  Negative for agitation and confusion.      Objective:     Vital Signs (Most Recent):  Temp: 97.8 °F (36.6 °C) (10/15/23 0319)  Pulse: 80 (10/15/23 0319)  Resp: 17 (10/15/23 0319)  BP: 136/70 (10/15/23 0319)  SpO2: 97 % (10/15/23 0319) Vital Signs (24h Range):  Temp:  [97.8 °F (36.6 °C)-98.9 °F (37.2 °C)] 97.8 °F (36.6 °C)  Pulse:  [] 80  Resp:  [17-26] 17  SpO2:  [93 %-98 %] 97 %  BP: (130-142)/(59-71) 136/70     Weight: 59.8 kg (131 lb 13.4 oz)  Body mass index is 20.05 kg/m².    Intake/Output Summary (Last 24 hours) at 10/15/2023 0802  Last data filed at 10/15/2023 0655  Gross per 24 hour   Intake 240 ml   Output 400 ml   Net -160 ml         Physical Exam  Constitutional:       General: She is not in acute distress.  HENT:      Head: Normocephalic and atraumatic.      Nose: Nose normal.      Comments: Nasal canula in place     Mouth/Throat:      Mouth: Mucous membranes are dry.      Pharynx: Oropharynx is clear.      Comments: Lips pursed  Eyes:      General:         Right eye: No discharge.         Left eye: No discharge.      Extraocular Movements: Extraocular movements intact.      Conjunctiva/sclera: Conjunctivae normal.   Cardiovascular:      Rate and Rhythm: Normal rate and regular rhythm.      Heart sounds: No murmur heard.  Pulmonary:      Effort: Respiratory distress present.      Breath sounds: No wheezing.      Comments: Aeration improved  Chest:      Chest wall: No tenderness.   Abdominal:      General: Abdomen is flat.      Palpations: Abdomen is soft.      Tenderness: There is no abdominal tenderness.   Musculoskeletal:         General: No swelling. Normal range of motion.      Right lower leg: No edema.      Left lower leg: No edema.   Skin:      General: Skin is warm and dry.      Coloration: Skin is not pale.   Neurological:      General: No focal deficit present.      Mental Status: She is alert. Mental status is at baseline.             Significant Labs: All pertinent labs within the past 24 hours have been reviewed.    Significant Imaging: I have reviewed all pertinent imaging results/findings within the past 24 hours.      Assessment/Plan:      Abnormal finding on urinalysis  Pt requesting UA on admission. Denied dysuria but endorsed increased frequency. However, pt with hx of OAB. UA with bacteria and 3+ LEs. Cultures with no predominant bacteria.    -- Was on abx for COPD exacerbation    Atrial fibrillation with RVR  Patient with Paroxysmal (<7 days) atrial fibrillation which is controlled currently with Calcium Channel Blocker. Patient is currently in sinus rhythm.[id number] Score: 2.  Eliquis contraindicated due to ocular condition per cards.    -- Continue home dilt    Chronic obstructive pulmonary disease with acute exacerbation  On COPD pathway. Wears 2.5 to 3L NC at home. At baseline, mobility is limited by SOB and is unable to walk ten steps without feeling SOB. VBG with pCO2 61.     S/p methylpred and IV mag in ED    -- Finished 5 days of Abx  -- De-escalating to PO prednisone  -- Levalbuterol nebs q4h PRN while awake  -- Mucinex, chest PT  -- Home Breo  -- Supplemental O2 for SaO2 > 88%   -- Pt saw Dr. Ruiz outpatient, turned to palliative care  -- Palliative consulted for evaluation of oral morphine, appreciate recs    Former heavy tobacco smoker  Former smoker. 36 py history.    Acute on chronic respiratory failure with hypoxia  Patient with Hypercapnic and Hypoxic Respiratory failure which is Acute on chronic.  she is on home oxygen at 2.5-3 LPM. Supplemental oxygen was provided and noted-      .   Signs/symptoms of respiratory failure include- tachypnea, increased work of breathing and respiratory distress. Contributing diagnoses  "includes - COPD Labs and images were reviewed. Patient Has not had a recent ABG. Will treat underlying causes and adjust management of respiratory failure.     -- See "COPD with acute exacerbation"      VTE Risk Mitigation (From admission, onward)         Ordered     Place sequential compression device  Until discontinued         10/10/23 1818     enoxaparin injection 40 mg  Daily         10/09/23 1923                Discharge Planning   RENARD: 10/16/2023     Code Status: Full Code   Is the patient medically ready for discharge?:     Reason for patient still in hospital (select all that apply): Treatment  Discharge Plan A: Home Health              Melany Chin MD  Department of Hospital Medicine   [facility] - Intensive Care ([place] [id number])     "

## 2023-10-16 LAB
ALBUMIN SERPL BCP-MCNC: 3.2 G/DL (ref 3.5–5.2)
ALP SERPL-CCNC: 58 U/L (ref 55–135)
ALT SERPL W/O P-5'-P-CCNC: 30 U/L (ref 10–44)
ANION GAP SERPL CALC-SCNC: 7 MMOL/L (ref 8–16)
AST SERPL-CCNC: 17 U/L (ref 10–40)
BASOPHILS # BLD AUTO: 0.02 K/UL (ref 0–0.2)
BASOPHILS NFR BLD: 0.2 % (ref 0–1.9)
BILIRUB SERPL-MCNC: 0.4 MG/DL (ref 0.1–1)
BUN SERPL-MCNC: 16 MG/DL (ref 8–23)
CALCIUM SERPL-MCNC: 9.1 MG/DL (ref 8.7–10.5)
CHLORIDE SERPL-SCNC: 94 MMOL/L (ref 95–110)
CO2 SERPL-SCNC: 35 MMOL/L (ref 23–29)
CREAT SERPL-MCNC: 0.5 MG/DL (ref 0.5–1.4)
DIFFERENTIAL METHOD: ABNORMAL
EOSINOPHIL # BLD AUTO: 0.2 K/UL (ref 0–0.5)
EOSINOPHIL NFR BLD: 1.9 % (ref 0–8)
ERYTHROCYTE [DISTWIDTH] IN BLOOD BY AUTOMATED COUNT: 12.6 % (ref 11.5–14.5)
EST. GFR  (NO RACE VARIABLE): >60 ML/MIN/1.73 M^2
GLUCOSE SERPL-MCNC: 89 MG/DL (ref 70–110)
HCT VFR BLD AUTO: 38.9 % (ref 37–48.5)
HGB BLD-MCNC: 12.5 G/DL (ref 12–16)
IMM GRANULOCYTES # BLD AUTO: 0.06 K/UL (ref 0–0.04)
IMM GRANULOCYTES NFR BLD AUTO: 0.7 % (ref 0–0.5)
LYMPHOCYTES # BLD AUTO: 1.3 K/UL (ref 1–4.8)
LYMPHOCYTES NFR BLD: 16 % (ref 18–48)
MAGNESIUM SERPL-MCNC: 2.1 MG/DL (ref 1.6–2.6)
MCH RBC QN AUTO: 30.6 PG (ref 27–31)
MCHC RBC AUTO-ENTMCNC: 32.1 G/DL (ref 32–36)
MCV RBC AUTO: 95 FL (ref 82–98)
MONOCYTES # BLD AUTO: 1 K/UL (ref 0.3–1)
MONOCYTES NFR BLD: 12.2 % (ref 4–15)
NEUTROPHILS # BLD AUTO: 5.7 K/UL (ref 1.8–7.7)
NEUTROPHILS NFR BLD: 69 % (ref 38–73)
NRBC BLD-RTO: 0 /100 WBC
PHOSPHATE SERPL-MCNC: 3.9 MG/DL (ref 2.7–4.5)
PLATELET # BLD AUTO: 347 K/UL (ref 150–450)
PMV BLD AUTO: 9 FL (ref 9.2–12.9)
POCT GLUCOSE: 117 MG/DL (ref 70–110)
POCT GLUCOSE: 121 MG/DL (ref 70–110)
POCT GLUCOSE: 89 MG/DL (ref 70–110)
POCT GLUCOSE: 99 MG/DL (ref 70–110)
POTASSIUM SERPL-SCNC: 4.1 MMOL/L (ref 3.5–5.1)
PROT SERPL-MCNC: 5.3 G/DL (ref 6–8.4)
RBC # BLD AUTO: 4.09 M/UL (ref 4–5.4)
SODIUM SERPL-SCNC: 136 MMOL/L (ref 136–145)
WBC # BLD AUTO: 8.23 K/UL (ref 3.9–12.7)

## 2023-10-16 PROCEDURE — 94640 AIRWAY INHALATION TREATMENT: CPT

## 2023-10-16 PROCEDURE — 99233 SBSQ HOSP IP/OBS HIGH 50: CPT | Mod: GC,,, | Performed by: INTERNAL MEDICINE

## 2023-10-16 PROCEDURE — 84100 ASSAY OF PHOSPHORUS: CPT

## 2023-10-16 PROCEDURE — 36415 COLL VENOUS BLD VENIPUNCTURE: CPT

## 2023-10-16 PROCEDURE — 25000003 PHARM REV CODE 250

## 2023-10-16 PROCEDURE — 25000003 PHARM REV CODE 250: Performed by: STUDENT IN AN ORGANIZED HEALTH CARE EDUCATION/TRAINING PROGRAM

## 2023-10-16 PROCEDURE — 99497 PR ADVNCD CARE PLAN 30 MIN: ICD-10-PCS | Mod: ,,, | Performed by: INTERNAL MEDICINE

## 2023-10-16 PROCEDURE — 25000242 PHARM REV CODE 250 ALT 637 W/ HCPCS: Performed by: STUDENT IN AN ORGANIZED HEALTH CARE EDUCATION/TRAINING PROGRAM

## 2023-10-16 PROCEDURE — 99497 ADVNCD CARE PLAN 30 MIN: CPT | Mod: ,,, | Performed by: CLINICAL NURSE SPECIALIST

## 2023-10-16 PROCEDURE — 27000646 HC AEROBIKA DEVICE

## 2023-10-16 PROCEDURE — 25000242 PHARM REV CODE 250 ALT 637 W/ HCPCS

## 2023-10-16 PROCEDURE — 99497 ADVNCD CARE PLAN 30 MIN: CPT | Mod: ,,, | Performed by: INTERNAL MEDICINE

## 2023-10-16 PROCEDURE — 94668 MNPJ CHEST WALL SBSQ: CPT

## 2023-10-16 PROCEDURE — 94664 DEMO&/EVAL PT USE INHALER: CPT

## 2023-10-16 PROCEDURE — 83735 ASSAY OF MAGNESIUM: CPT

## 2023-10-16 PROCEDURE — 99233 PR SUBSEQUENT HOSPITAL CARE,LEVL III: ICD-10-PCS | Mod: ,,, | Performed by: CLINICAL NURSE SPECIALIST

## 2023-10-16 PROCEDURE — 94761 N-INVAS EAR/PLS OXIMETRY MLT: CPT

## 2023-10-16 PROCEDURE — 63600175 PHARM REV CODE 636 W HCPCS

## 2023-10-16 PROCEDURE — 99233 SBSQ HOSP IP/OBS HIGH 50: CPT | Mod: ,,, | Performed by: CLINICAL NURSE SPECIALIST

## 2023-10-16 PROCEDURE — 85025 COMPLETE CBC W/AUTO DIFF WBC: CPT

## 2023-10-16 PROCEDURE — 27000221 HC OXYGEN, UP TO 24 HOURS

## 2023-10-16 PROCEDURE — 99497 PR ADVNCD CARE PLAN 30 MIN: ICD-10-PCS | Mod: ,,, | Performed by: CLINICAL NURSE SPECIALIST

## 2023-10-16 PROCEDURE — 99233 PR SUBSEQUENT HOSPITAL CARE,LEVL III: ICD-10-PCS | Mod: GC,,, | Performed by: INTERNAL MEDICINE

## 2023-10-16 PROCEDURE — 99900035 HC TECH TIME PER 15 MIN (STAT)

## 2023-10-16 PROCEDURE — 21400001 HC TELEMETRY ROOM

## 2023-10-16 PROCEDURE — 80053 COMPREHEN METABOLIC PANEL: CPT

## 2023-10-16 RX ADMIN — SODIUM CHLORIDE SOLN NEBU 3% 4 ML: 3 NEBU SOLN at 08:10

## 2023-10-16 RX ADMIN — POLYETHYLENE GLYCOL 3350 17 G: 17 POWDER, FOR SOLUTION ORAL at 08:10

## 2023-10-16 RX ADMIN — Medication 6 MG: at 08:10

## 2023-10-16 RX ADMIN — LEVALBUTEROL 1.25 MG: 1.25 SOLUTION, CONCENTRATE RESPIRATORY (INHALATION) at 08:10

## 2023-10-16 RX ADMIN — MORPHINE SULFATE 2.5 MG: 10 SOLUTION ORAL at 04:10

## 2023-10-16 RX ADMIN — BUSPIRONE HYDROCHLORIDE 5 MG: 5 TABLET ORAL at 08:10

## 2023-10-16 RX ADMIN — OXYBUTYNIN CHLORIDE 5 MG: 5 TABLET, EXTENDED RELEASE ORAL at 08:10

## 2023-10-16 RX ADMIN — FLUTICASONE FUROATE AND VILANTEROL TRIFENATATE 1 PUFF: 100; 25 POWDER RESPIRATORY (INHALATION) at 08:10

## 2023-10-16 RX ADMIN — SENNOSIDES AND DOCUSATE SODIUM 1 TABLET: 50; 8.6 TABLET ORAL at 08:10

## 2023-10-16 RX ADMIN — LEVALBUTEROL 1.25 MG: 1.25 SOLUTION, CONCENTRATE RESPIRATORY (INHALATION) at 02:10

## 2023-10-16 RX ADMIN — AZELASTINE 137 MCG: 1 SPRAY, METERED NASAL at 10:10

## 2023-10-16 RX ADMIN — ENOXAPARIN SODIUM 40 MG: 40 INJECTION SUBCUTANEOUS at 06:10

## 2023-10-16 RX ADMIN — GUAIFENESIN 600 MG: 600 TABLET, EXTENDED RELEASE ORAL at 08:10

## 2023-10-16 RX ADMIN — SODIUM CHLORIDE SOLN NEBU 3% 4 ML: 3 NEBU SOLN at 02:10

## 2023-10-16 RX ADMIN — MORPHINE SULFATE 2.5 MG: 10 SOLUTION ORAL at 08:10

## 2023-10-16 RX ADMIN — CALCIUM CARBONATE (ANTACID) CHEW TAB 500 MG 500 MG: 500 CHEW TAB at 08:10

## 2023-10-16 RX ADMIN — Medication 500 MG: at 08:10

## 2023-10-16 RX ADMIN — PREDNISONE 40 MG: 20 TABLET ORAL at 08:10

## 2023-10-16 RX ADMIN — MORPHINE SULFATE 2.5 MG: 10 SOLUTION ORAL at 10:10

## 2023-10-16 RX ADMIN — TIOTROPIUM BROMIDE INHALATION SPRAY 2 PUFF: 3.12 SPRAY, METERED RESPIRATORY (INHALATION) at 08:10

## 2023-10-16 RX ADMIN — CITALOPRAM HYDROBROMIDE 20 MG: 20 TABLET ORAL at 08:10

## 2023-10-16 RX ADMIN — MELOXICAM 7.5 MG: 7.5 TABLET ORAL at 08:10

## 2023-10-16 RX ADMIN — DILTIAZEM HYDROCHLORIDE 120 MG: 120 CAPSULE, COATED, EXTENDED RELEASE ORAL at 08:10

## 2023-10-16 NOTE — PLAN OF CARE
Problem: Adult Inpatient Plan of Care  Goal: Plan of Care Review  Outcome: Ongoing, Progressing  Goal: Optimal Comfort and Wellbeing  Outcome: Ongoing, Progressing     Problem: Functional Ability Impaired COPD (Chronic Obstructive Pulmonary Disease)  Goal: Optimal Level of Functional [place]  Outcome: Ongoing, Progressing     Problem: Respiratory Compromise COPD (Chronic Obstructive Pulmonary Disease)  Goal: Effective Oxygenation and Ventilation  Outcome: Ongoing, Progressing     Problem: Skin Injury Risk Increased  Goal: Skin Health and Integrity  Outcome: Ongoing, Progressing     Problem: Fall Injury Risk  Goal: Absence of Fall and Fall-Related Injury  Outcome: Ongoing, Progressing

## 2023-10-16 NOTE — PROGRESS NOTES
[facility] - Intensive Care ([place] [address])  Palliative Medicine  Progress Note    Patient Name: Estefani Fam  MRN: [id number]  Admission Date: 10/9/2023  Hospital Length of Stay: 6 days  Code Status: DNR   Attending Provider: Pollo Siegel MD  Consulting Provider: STEPHAN Henao  Primary Care Physician: Dustin Khan MD  Principal Problem:<principal problem not specified>    Patient information was obtained from patient, spouse/SO and primary team.      Assessment/Plan:     Palliative Care  Palliative care encounter  Palliative medicine  Follow up  for symptom management - dyspnea as discussed with primary team Dr Chin .  for Mrs. Fam a 72 yo lady with pmh of COPD who [place] [facility] with chief complaint of shortness of breath.   Admitted to hospital medicine with COPD exacerbation.  At time of this encounter the patient is awake, alert and oriented x3,  No acute distress, supplemental oxygen in use.   is at bedside.     Advance Care Planning     - no ACP documents received   - Mrs. Fam appears decisional   - next of kin for medical decisions should she loose this ability is:   kieran Fam [phone]  - DNR per primary team     Goals of Care  - pal med APRN  Patient,   And son at bedside.  Pal med re- introduced   - - Mrs. Fam reports she has been having COPD for several years now and is aware it is progressing as the shortness has gotten worse.  She reports not knowing what  Her prognosis is or knowing what she should expect.   -  states most important is to have better management of the shortness of breath.  Reports his wife lives to spend more time with their baby granddaughters - 13 month old and 3 month old   - Mrs. Fam reports good effectiveness with use of oral morphine solution as needed for dyspnea.   - over the weekend patient and family made decision to consider hospice care.  Mrs. Fam states having good symptom  management will also increase her quality of life.    - Hospice education provided and will reinforce as needed.  Primary team  - to provide hospice resources.   - Today patient expressed interest in [facility] - referrals to be placed.               Symptom Management   Dyspnea   - related to COPD  - at rest and on exertion  - 6/10 intensity   - use of oral morphine solution has been effective -   As only required two doses in last 24 hrs.   - mostly bed or wheelchair bound   - discussed strategies to reduce shortness of breath  Use of fan at bedside, bundling activity to avoid exertion and breathlessness    Recommendations  -  Continue oral morphine solution 2.5 mg by mouth every 4 hrs as needed for dyspnea     Anxiety   - reports generalized anxiety that I worsened when feeling short of breath   - continue current medical management     COPD with Acute Exacerbation/Acute on Chronic Hypoxic Respiratory Failure/ Afib with RVR/ UTI  - managed per primary team and specialty consultants  - continue current medical management   - patient considering home hospice    - pal med following for symptom management and goals of care see above     Plan/Recommendations  - Continue current plan of care   - consult / for informational visit with [facility]   - if patient does not transition to home hospice - l f/u in outpatient pal med clinic - scheduled for 10/20/23 11 AM virtual visit as patient's request   - patient has DNR order- pal med will meet with patient family in the AM to discuss LaPOST and initiate completion     Dr. Siegel  primary team notified of the above.      Thank you for consult and opportunity to participate in Mrs. Cortés's care.         I will follow-up with patient. Please contact us if you have any additional questions.    Subjective:     Chief Complaint:   Chief Complaint   Patient presents with    Shortness of Breath     Arrived  via EMS from home with c/o SOB. Hx of COPD.     COPD       HPI:   HPI obtained from chart review    As per H&P Ms. Fam is a 74 yo lady with PMH of:  COPD c/b centrilobular emphysema, recurrent pneumonia, pHTN, former smoker, GERD, Afib on diltiazem , and anxiety  She presented to  [facility]  ED  with chief complaint of shortness of breath over the past week.  In addition she reports congestion and non productive cough.  Previously hospitalized in August for  COPD exacerbation that required a prolonged steroid course.     No c/o  fevers, chills, N/V, chest pain, new weakness, new fatigue. At baseline she is on 3L NC with dyspnea on exertion.     In ED, given O2, nebs, mag and ceftriaxone. CXR with no evidence of PNA. No leukocytosos. VBG with pH 7.34, CO2 60.6. Pt admitted for treatment of COPD exacerbation.     Admitted to hospital medicine for further management    Pal med consulted for symptom management- dyspnea.        Hospital Course:  No notes on file    Interval History: good relief with use of oral morphine solution, anticipating transition to hospice care,  hospice education provided         Past Surgical History:   Procedure Laterality Date    BRONCHOSCOPY WITH FLUOROSCOPY N/A 10/28/2019    Procedure: BRONCHOSCOPY, WITH FLUOROSCOPY;  Surgeon: Brooklynn Ruiz MD;  Location[facility] OR [address] [place] [address];  Service: Endoscopy;  Laterality: N/A;    HAND SURGERY         Review of patient's allergies indicates:   Allergen Reactions    Albuterol     Epinephrine     Ipratropium        Medications:  Continuous Infusions:  Scheduled Meds:   ascorbic acid (vitamin C)  500 mg Oral BID    azelastine  1 spray Nasal BID    busPIRone  5 mg Oral BID    calcium carbonate  500 mg Oral Daily    citalopram  20 mg Oral Daily    diltiaZEM  120 mg Oral Daily    enoxparin  40 mg Subcutaneous Daily    fluticasone furoate-vilanteroL  1 puff Inhalation Daily    guaiFENesin  600 mg Oral BID    meloxicam  7.5 mg Oral Daily     oxybutynin  5 mg Oral Daily    polyethylene glycol  17 g Oral Daily    predniSONE  40 mg Oral Daily    senna-docusate 8.6-50 mg  1 tablet Oral BID    sodium chloride 3%  4 mL Nebulization TID    tiotropium bromide  2 puff Inhalation Daily     PRN Meds:acetaminophen, dextrose 10%, dextrose 10%, glucagon (human recombinant), glucose, glucose, influenza 65up-adj, insulin aspart U-100, levalbuterol, lorazepam, melatonin, morphine, ondansetron, sodium chloride, sodium chloride 0.9%    Family History       Problem Relation (Age of Onset)    Colon cancer Mother, Father    Macular degeneration Mother    Stroke Father          Tobacco Use    Smoking status: Former     Current packs/day: 0.00     Average packs/day: 1 pack/day for 36.0 years (36.0 ttl pk-yrs)     Types: Cigarettes     Start date: 1980     Quit date: 2016     Years since quittin.1    Smokeless tobacco: Never    Tobacco comments:     pt states she does not remember date   Substance and Sexual Activity    Alcohol use: Yes     Alcohol/week: 2.0 standard drinks of alcohol     Types: 2 Glasses of wine per week     Comment: 1-2 glasses a day     Drug use: No    Sexual activity: Yes     Partners: Male       Review of Systems   Constitutional:  Positive for fatigue.   HENT:  Negative for congestion.    Respiratory:  Positive for cough and shortness of breath.    Gastrointestinal: Negative.    Musculoskeletal: Negative.    Skin:  Positive for pallor.   Neurological:  Positive for weakness.   Psychiatric/Behavioral:  The patient is nervous/anxious.      Objective:     Vital Signs (Most Recent):  Temp: 97.7 °F (36.5 °C) (10/16/23 161)  Pulse: 95 (10/16/23 161)  Resp: 18 (10/16/23 1658)  BP: (!) 148/67 (10/16/23 161)  SpO2: (!) 94 % (10/16/23 161) Vital Signs (24h Range):  Temp:  [97.4 °F (36.3 °C)-98.3 °F (36.8 °C)] 97.7 °F (36.5 °C)  Pulse:  [74-99] 95  Resp:  [17-22] 18  SpO2:  [94 %-97 %] 94 %  BP: (135-156)/(64-69) 148/67     Weight: 59.8  kg (131 lb 13.4 oz)  Body mass index is 20.05 kg/m².       Physical Exam  Vitals and nursing note reviewed.   Constitutional:       General: She is not in acute distress.  Cardiovascular:      Rate and Rhythm: Normal rate and regular rhythm.   Pulmonary:      Comments: Tachypnea,  labored, supplemental oxygen   Skin:     General: Skin is warm and dry.   Neurological:      Mental Status: She is alert.      Motor: Weakness present.   Psychiatric:         Behavior: Behavior normal.         Thought Content: Thought content normal.         Judgment: Judgment normal.      Comments: Appears anxious           Review of Symptoms      Symptom Assessment (ESAS 0-10 Scale)  Pain:  0  Dyspnea:  0  Anxiety:  0  Nausea:  0  Depression:  0  Anorexia:  0  Fatigue:  0  Insomnia:  0  Restlessness:  0  Agitation:  0     CAM / Delirium:  Negative  Constipation:  Negative  Diarrhea:  Negative    Anxiety:  Is nervous/anxious    Bowel Management Plan (BMP):  Yes      Pain Assessment:  OME in 24 hours:  0  Location(s):      Living Arrangements:  Lives with spouse    Psychosocial/Cultural:   See Palliative Psychosocial Note: Yes   45 years, together for 55 yrs,   and mother,one son, 2 grandchildren - granddaughters.  Enjoyed shopping and loves being with family.  Continues to live to see her granddaughters   **Primary  to Follow**  Palliative Care  Consult: Yes    Spiritual:  F - Myranda and Belief:  Sabianist   A - Address in Care:  [place] to seeing  and having  visits. Eucharistic  following         Advance Care Planning  Advance Directives:   Living Will: No        Oral Declaration: No    LaPOST: No    Do Not Resuscitate Status: No    Medical Power of : No        Oral Declaration: No      Decision Making:  Patient answered questions and Family answered questions  Goals of Care: What is most important right now is to focus on symptom/pain control. Accordingly, we have  decided that the best plan to meet the patient's goals includes continuing with treatment.         Significant Labs: All pertinent labs within the past 24 hours have been reviewed.  CBC:   Recent Labs   Lab 10/16/23  0251   WBC 8.23   HGB 12.5   HCT 38.9   MCV 95          BMP:  Recent Labs   Lab 10/16/23  0251   GLU 89      K 4.1   CL 94*   CO2 35*   BUN 16   CREATININE 0.5   CALCIUM 9.1   MG 2.1       LFT:  Lab Results   Component Value Date    AST 17 10/16/2023    ALKPHOS 58 10/16/2023    BILITOT 0.4 10/16/2023     Albumin:   Albumin   Date Value Ref Range Status   10/16/2023 3.2 (L) 3.5 - 5.2 g/dL Final     Protein:   Total Protein   Date Value Ref Range Status   10/16/2023 5.3 (L) 6.0 - 8.4 g/dL Final     Lactic acid:   Lab Results   Component Value Date    LACTATE 1.0 09/20/2022    LACTATE 1.1 07/02/2018       Significant Imaging: I have reviewed all pertinent imaging results/findings within the past 24 hours.  10/9/23 CXR no acute processes findings suggestive of underlying COPD/empjysema    20 mins spent in advanced care planning     Shilpa Arzate, CNS  Palliative Medicine  [facility] - Int[facility] Care ([place] [id number])

## 2023-10-16 NOTE — ASSESSMENT & PLAN NOTE
Patient with Paroxysmal (<7 days) atrial fibrillation which is controlled currently with Calcium Channel Blocker. Patient is currently in sinus rhythm.[id number] Score: 2.  Eliquis contraindicated due to ocular condition per cards.    -- Continue home dilt

## 2023-10-16 NOTE — ASSESSMENT & PLAN NOTE
Palliative medicine  Follow up  for symptom management - dyspnea as discussed with primary team Dr Chin .  for Mrs. Fam a 72 yo lady with pmh of COPD who [place] [facility] with chief complaint of shortness of breath.   Admitted to hospital medicine with COPD exacerbation.  At time of this encounter the patient is awake, alert and oriented x3,  No acute distress, supplemental oxygen in use.   is at bedside.     Advance Care Planning     - no ACP documents received   - Mrs. Fam appears decisional   - next of kin for medical decisions should she loose this ability is:   kieran Fam [phone]  - DNR per primary team     Goals of Care  - pal med APRN  Patient,   And son at bedside.  Pal med re- introduced   - - Mrs. Fam reports she has been having COPD for several years now and is aware it is progressing as the shortness has gotten worse.  She reports not knowing what  Her prognosis is or knowing what she should expect.   -  states most important is to have better management of the shortness of breath.  Reports his wife lives to spend more time with their baby granddaughters - 13 month old and 3 month old   - Mrs. Fam reports good effectiveness with use of oral morphine solution as needed for dyspnea.   - over the weekend patient and family made decision to consider hospice care.  Mrs. Fam states having good symptom management will also increase her quality of life.    - Hospice education provided and will reinforce as needed.  Primary team  - to provide hospice resources.   - Today patient expressed interest in [facility] hospice - referrals to be placed.                Symptom Management   Dyspnea   - related to COPD  - at rest and on exertion  - 6/10 intensity   - use of oral morphine solution has been effective -   As only required two doses in last 24 hrs.   - mostly bed or wheelchair bound   - discussed strategies to reduce  shortness of breath  Use of fan at bedside, bundling activity to avoid exertion and breathlessness    Recommendations  -  Continue oral morphine solution 2.5 mg by mouth every 4 hrs as needed for dyspnea     Anxiety   - reports generalized anxiety that I worsened when feeling short of breath   - continue current medical management     COPD with Acute Exacerbation/Acute on Chronic Hypoxic Respiratory Failure/ Afib with RVR/ UTI  - managed per primary team and specialty consultants  - continue current medical management   - patient considering home hospice    - pal med following for symptom management and goals of care see above     Plan/Recommendations  - Continue current plan of care   - consult / for informational visit with [facility]   - if patient does not transition to home hospice - l f/u in outpatient pal med clinic - scheduled for 10/20/23 11 AM virtual visit as patient's request   - patient has DNR order- pal med will meet with patient family in the AM to discuss LaPOST and initiate completion     Dr. Siegel  primary team notified of the above.      Thank you for consult and opportunity to participate in Mrs. Cortés's care.

## 2023-10-16 NOTE — SUBJECTIVE & OBJECTIVE
Interval History: good relief with use of oral morphine solution, anticipating transition to hospice care,  hospice education provided         Past Surgical History:   Procedure Laterality Date    BRONCHOSCOPY WITH FLUOROSCOPY N/A 10/28/2019    Procedure: BRONCHOSCOPY, WITH FLUOROSCOPY;  Surgeon: Brooklynn Ruiz MD;  Location: [facility] OR [address] [place] [address];  Service: Endoscopy;  Laterality: N/A;    HAND SURGERY         Review of patient's allergies indicates:   Allergen Reactions    Albuterol     Epinephrine     Ipratropium        Medications:  Continuous Infusions:  Scheduled Meds:   ascorbic acid (vitamin C)  500 mg Oral BID    azelastine  1 spray Nasal BID    busPIRone  5 mg Oral BID    calcium carbonate  500 mg Oral Daily    citalopram  20 mg Oral Daily    diltiaZEM  120 mg Oral Daily    enoxparin  40 mg Subcutaneous Daily    fluticasone furoate-vilanteroL  1 puff Inhalation Daily    guaiFENesin  600 mg Oral BID    meloxicam  7.5 mg Oral Daily    oxybutynin  5 mg Oral Daily    polyethylene glycol  17 g Oral Daily    predniSONE  40 mg Oral Daily    senna-docusate 8.6-50 mg  1 tablet Oral BID    sodium chloride 3%  4 mL Nebulization TID    tiotropium bromide  2 puff Inhalation Daily     PRN Meds:acetaminophen, dextrose 10%, dextrose 10%, glucagon (human recombinant), glucose, glucose, influenza 65up-adj, insulin aspart U-100, levalbuterol, lorazepam, melatonin, morphine, ondansetron, sodium chloride, sodium chloride 0.9%    Family History       Problem Relation (Age of Onset)    Colon cancer Mother, Father    Macular degeneration Mother    Stroke Father          Tobacco Use    Smoking status: Former     Current packs/day: 0.00     Average packs/day: 1 pack/day for 36.0 years (36.0 ttl pk-yrs)     Types: Cigarettes     Start date: 1980     Quit date: 2016     Years since quittin.1    Smokeless tobacco: Never    Tobacco comments:     pt states she does not remember date   Substance and Sexual Activity    Alcohol use:  Yes     Alcohol/week: 2.0 standard drinks of alcohol     Types: 2 Glasses of wine per week     Comment: 1-2 glasses a day     Drug use: No    Sexual activity: Yes     Partners: Male       Review of Systems   Constitutional:  Positive for fatigue.   HENT:  Negative for congestion.    Respiratory:  Positive for cough and shortness of breath.    Gastrointestinal: Negative.    Musculoskeletal: Negative.    Skin:  Positive for pallor.   Neurological:  Positive for weakness.   Psychiatric/Behavioral:  The patient is nervous/anxious.      Objective:     Vital Signs (Most Recent):  Temp: 97.7 °F (36.5 °C) (10/16/23 1610)  Pulse: 95 (10/16/23 1610)  Resp: 18 (10/16/23 1658)  BP: (!) 148/67 (10/16/23 1610)  SpO2: (!) 94 % (10/16/23 1610) Vital Signs (24h Range):  Temp:  [97.4 °F (36.3 °C)-98.3 °F (36.8 °C)] 97.7 °F (36.5 °C)  Pulse:  [74-99] 95  Resp:  [17-22] 18  SpO2:  [94 %-97 %] 94 %  BP: (135-156)/(64-69) 148/67     Weight: 59.8 kg (131 lb 13.4 oz)  Body mass index is 20.05 kg/m².       Physical Exam  Vitals and nursing note reviewed.   Constitutional:       General: She is not in acute distress.  Cardiovascular:      Rate and Rhythm: Normal rate and regular rhythm.   Pulmonary:      Comments: Tachypnea,  labored, supplemental oxygen   Skin:     General: Skin is warm and dry.   Neurological:      Mental Status: She is alert.      Motor: Weakness present.   Psychiatric:         Behavior: Behavior normal.         Thought Content: Thought content normal.         Judgment: Judgment normal.      Comments: Appears anxious           Review of Symptoms      Symptom Assessment (ESAS 0-10 Scale)  Pain:  0  Dyspnea:  0  Anxiety:  0  Nausea:  0  Depression:  0  Anorexia:  0  Fatigue:  0  Insomnia:  0  Restlessness:  0  Agitation:  0     CAM / Delirium:  Negative  Constipation:  Negative  Diarrhea:  Negative    Anxiety:  Is nervous/anxious    Bowel Management Plan (BMP):  Yes      Pain Assessment:  OME in 24 hours:   0  Location(s):      Living Arrangements:  Lives with spouse    Psychosocial/Cultural:   See Palliative Psychosocial Note: Yes   45 years, together for 55 yrs,   and mother,one son, 2 grandchildren - granddaughters.  Enjoyed shopping and loves being with family.  Continues to live to see her granddaughters   **Primary  to Follow**  Palliative Care  Consult: Yes    Spiritual:  F - Myranda and Belief:  Caodaism   A - Address in Care:  [place] to seeing  and having  visits. Eucharistic  following         Advance Care Planning   Advance Directives:   Living Will: No        Oral Declaration: No    LaPOST: No    Do Not Resuscitate Status: No    Medical Power of : No        Oral Declaration: No      Decision Making:  Patient answered questions and Family answered questions  Goals of Care: What is most important right now is to focus on symptom/pain control. Accordingly, we have decided that the best plan to meet the patient's goals includes continuing with treatment.         Significant Labs: All pertinent labs within the past 24 hours have been reviewed.  CBC:   Recent Labs   Lab 10/16/23  0251   WBC 8.23   HGB 12.5   HCT 38.9   MCV 95          BMP:  Recent Labs   Lab 10/16/23  0251   GLU 89      K 4.1   CL 94*   CO2 35*   BUN 16   CREATININE 0.5   CALCIUM 9.1   MG 2.1       LFT:  Lab Results   Component Value Date    AST 17 10/16/2023    ALKPHOS 58 10/16/2023    BILITOT 0.4 10/16/2023     Albumin:   Albumin   Date Value Ref Range Status   10/16/2023 3.2 (L) 3.5 - 5.2 g/dL Final     Protein:   Total Protein   Date Value Ref Range Status   10/16/2023 5.3 (L) 6.0 - 8.4 g/dL Final     Lactic acid:   Lab Results   Component Value Date    LACTATE 1.0 09/20/2022    LACTATE 1.1 07/02/2018       Significant Imaging: I have reviewed all pertinent imaging results/findings within the past 24 hours.  10/9/23 CXR no acute processes findings suggestive  of underlying COPD/empjysema

## 2023-10-16 NOTE — PROGRESS NOTES
[facility] - Intensive Care ([address] Medicine  Progress Note    Patient Name: Estefani Fam  MRN: [id number]  Patient Class: IP- Inpatient   Admission Date: 10/9/2023  Length of Stay: 6 days  Attending Physician: Pollo Siegel MD  Primary Care Provider: Dustin Khan MD        Subjective:     Principal Problem:<principal problem not specified>        HPI:  Ms. Fam is a 74 y/o F with PMHx of COPD c/b centrilobular emphysema, recurrent pneumonia, pHTN, former smoker, GERD, Afib on diltiazem not AC, and anxiety who presented to [facility] ED for shortness of breath in the last week. During this time she felt congested with a cough but had not produced sputum. She was previously hospitalized in August for an exacerbation and she required a prolonged steroid course. She denied fevers, chills, N/V, chest pain, new weakness, new fatigue. At baseline she is on 3L NC with dyspnea on exertion.    In ED, given O2, nebs, mag and ceftriaxone. CXR with no evidence of PNA. No leukocytosos. VBG with pH 7.34, CO2 60.6. Pt admitted for treatment of COPD exacerbation.       Overview/Hospital Course:  Patient admitted for a COPD exacerbation. Started on Zosyn in the setting of recent hospitalization and IV abx. Oxygen weaned to maintain SpO2 88-92%. Scheduled duonebs w/ PRN available in addition to IV SoluMedrol. Hyperglycemia 2/2 steroids on sliding scale insulin. Antibiotics, steroids and breathing treatments de-escalated as tolerated. Palliative consulted for patient's dyspnea and agreeable to oral morphine.      Interval History: No acute events overnight. Remains HDS. Labs reviewed; stable. Will continue to monitor; anticipate dc tomorrow.     Review of Systems   Constitutional:  Negative for chills and fever.   Respiratory:  Positive for cough and shortness of breath. Negative for chest tightness and wheezing.    Cardiovascular:  Negative for chest pain, palpitations and leg swelling.   Gastrointestinal:   Negative for abdominal distention, constipation, diarrhea, nausea and vomiting.   Genitourinary:  Negative for dysuria.   Musculoskeletal:  Negative for back pain.   Neurological:  Negative for dizziness and headaches.   Psychiatric/Behavioral:  Negative for agitation and confusion.      Objective:     Vital Signs (Most Recent):  Temp: 98.3 °F (36.8 °C) (10/16/23 1122)  Pulse: 86 (10/16/23 1122)  Resp: 18 (10/16/23 1122)  BP: 135/64 (10/16/23 1122)  SpO2: 96 % (10/16/23 1122) Vital Signs (24h Range):  Temp:  [97.4 °F (36.3 °C)-98.7 °F (37.1 °C)] 98.3 °F (36.8 °C)  Pulse:  [] 86  Resp:  [17-22] 18  SpO2:  [92 %-97 %] 96 %  BP: (135-156)/(62-69) 135/64     Weight: 59.8 kg (131 lb 13.4 oz)  Body mass index is 20.05 kg/m².    Intake/Output Summary (Last 24 hours) at 10/16/2023 1349  Last data filed at 10/16/2023 0642  Gross per 24 hour   Intake 240 ml   Output 700 ml   Net -460 ml         Physical Exam  Constitutional:       General: She is not in acute distress.  HENT:      Head: Normocephalic and atraumatic.      Nose: Nose normal.      Comments: Nasal canula in place     Mouth/Throat:      Mouth: Mucous membranes are dry.      Pharynx: Oropharynx is clear.      Comments: Lips pursed  Eyes:      General:         Right eye: No discharge.         Left eye: No discharge.      Extraocular Movements: Extraocular movements intact.      Conjunctiva/sclera: Conjunctivae normal.   Cardiovascular:      Rate and Rhythm: Normal rate and regular rhythm.      Heart sounds: No murmur heard.  Pulmonary:      Effort: Respiratory distress present.      Breath sounds: No wheezing.      Comments: Aeration improved  Chest:      Chest wall: No tenderness.   Abdominal:      General: Abdomen is flat.      Palpations: Abdomen is soft.      Tenderness: There is no abdominal tenderness.   Musculoskeletal:         General: No swelling. Normal range of motion.      Right lower leg: No edema.      Left lower leg: No edema.   Skin:      "General: Skin is warm and dry.      Coloration: Skin is not pale.   Neurological:      General: No focal deficit present.      Mental Status: She is alert. Mental status is at baseline.             Significant Labs: All pertinent labs within the past 24 hours have been reviewed.    Significant Imaging: I have reviewed all pertinent imaging results/findings within the past 24 hours.      Assessment/Plan:      Acute on chronic respiratory failure with hypoxia and hypercapnia  Patient with Hypercapnic and Hypoxic Respiratory failure which is Acute on chronic.  she is on home oxygen at 2.5-3 LPM. Supplemental oxygen was provided and noted-       .   Signs/symptoms of respiratory failure include- tachypnea, increased work of breathing and respiratory distress. Contributing diagnoses includes - COPD Labs and images were reviewed. Patient Has not had a recent ABG. Will treat underlying causes and adjust management of respiratory failure.      -- See "COPD with acute exacerbation"      Abnormal finding on urinalysis  Pt requesting UA on admission. Denied dysuria but endorsed increased frequency. However, pt with hx of OAB. UA with bacteria and 3+ LEs. Cultures with no predominant bacteria.    -- Was on abx for COPD exacerbation    Atrial fibrillation with RVR  Patient with Paroxysmal (<7 days) atrial fibrillation which is controlled currently with Calcium Channel Blocker. Patient is currently in sinus rhythm.[id number] Score: 2.  Eliquis contraindicated due to ocular condition per cards.    -- Continue home dilt    Chronic obstructive pulmonary disease with acute exacerbation  On COPD pathway. Wears 2.5 to 3L NC at home. At baseline, mobility is limited by SOB and is unable to walk ten steps without feeling SOB. VBG with pCO2 61.     S/p methylpred and IV mag in ED    -- Finished 5 days of Abx  -- De-escalating to PO prednisone  -- Levalbuterol nebs q4h PRN while awake  -- Mucinex, chest PT  -- Home Breo  -- Supplemental O2 " "for SaO2 > 88%   -- Pt saw Dr. Ruiz outpatient, turned to palliative care  -- Palliative consulted for evaluation of oral morphine, appreciate recs    Former heavy tobacco smoker  Former smoker. 36 py history.    Acute on chronic respiratory failure with hypoxia  Patient with Hypercapnic and Hypoxic Respiratory failure which is Acute on chronic.  she is on home oxygen at 2.5-3 LPM. Supplemental oxygen was provided and noted-      .   Signs/symptoms of respiratory failure include- tachypnea, increased work of breathing and respiratory distress. Contributing diagnoses includes - COPD Labs and images were reviewed. Patient Has not had a recent ABG. Will treat underlying causes and adjust management of respiratory failure.     -- See "COPD with acute exacerbation"      VTE Risk Mitigation (From admission, onward)         Ordered     Place sequential compression device  Until discontinued         10/10/23 1818     enoxaparin injection 40 mg  Daily         10/09/23 1923                Discharge Planning   RENARD: 10/16/2023     Code Status: Full Code   Is the patient medically ready for discharge?:     Reason for patient still in hospital (select all that apply): Patient trending condition  Discharge Plan A: Home Health                  Jael Starr MD  Department of Hospital Medicine   [facility] - Intensive Care ([place] [id number])    "

## 2023-10-16 NOTE — SUBJECTIVE & OBJECTIVE
Interval History: No acute events overnight. Remains HDS. Labs reviewed; stable. Will continue to monitor; anticipate dc tomorrow.     Review of Systems   Constitutional:  Negative for chills and fever.   Respiratory:  Positive for cough and shortness of breath. Negative for chest tightness and wheezing.    Cardiovascular:  Negative for chest pain, palpitations and leg swelling.   Gastrointestinal:  Negative for abdominal distention, constipation, diarrhea, nausea and vomiting.   Genitourinary:  Negative for dysuria.   Musculoskeletal:  Negative for back pain.   Neurological:  Negative for dizziness and headaches.   Psychiatric/Behavioral:  Negative for agitation and confusion.      Objective:     Vital Signs (Most Recent):  Temp: 98.3 °F (36.8 °C) (10/16/23 1122)  Pulse: 86 (10/16/23 1122)  Resp: 18 (10/16/23 1122)  BP: 135/64 (10/16/23 1122)  SpO2: 96 % (10/16/23 1122) Vital Signs (24h Range):  Temp:  [97.4 °F (36.3 °C)-98.7 °F (37.1 °C)] 98.3 °F (36.8 °C)  Pulse:  [] 86  Resp:  [17-22] 18  SpO2:  [92 %-97 %] 96 %  BP: (135-156)/(62-69) 135/64     Weight: 59.8 kg (131 lb 13.4 oz)  Body mass index is 20.05 kg/m².    Intake/Output Summary (Last 24 hours) at 10/16/2023 1349  Last data filed at 10/16/2023 0642  Gross per 24 hour   Intake 240 ml   Output 700 ml   Net -460 ml         Physical Exam  Constitutional:       General: She is not in acute distress.  HENT:      Head: Normocephalic and atraumatic.      Nose: Nose normal.      Comments: Nasal canula in place     Mouth/Throat:      Mouth: Mucous membranes are dry.      Pharynx: Oropharynx is clear.      Comments: Lips pursed  Eyes:      General:         Right eye: No discharge.         Left eye: No discharge.      Extraocular Movements: Extraocular movements intact.      Conjunctiva/sclera: Conjunctivae normal.   Cardiovascular:      Rate and Rhythm: Normal rate and regular rhythm.      Heart sounds: No murmur heard.  Pulmonary:      Effort: Respiratory  distress present.      Breath sounds: No wheezing.      Comments: Aeration improved  Chest:      Chest wall: No tenderness.   Abdominal:      General: Abdomen is flat.      Palpations: Abdomen is soft.      Tenderness: There is no abdominal tenderness.   Musculoskeletal:         General: No swelling. Normal range of motion.      Right lower leg: No edema.      Left lower leg: No edema.   Skin:     General: Skin is warm and dry.      Coloration: Skin is not pale.   Neurological:      General: No focal deficit present.      Mental Status: She is alert. Mental status is at baseline.             Significant Labs: All pertinent labs within the past 24 hours have been reviewed.    Significant Imaging: I have reviewed all pertinent imaging results/findings within the past 24 hours.

## 2023-10-16 NOTE — PLAN OF CARE
CHW met with patient/family at bedside. Patient experience rounding completed and reviewed the following.     Do you know your discharge plan? Yes    If yes, what is the plan? (Home, w spouse)     Have you discussed your needs and preferences with your SW/CM? Yes     If you are discharging home, do you have help at home? Yes   Do you think you will need help additional at home at discharge? No     Do you currently have difficulty keeping up with bills, affording medicine or buying food? No    Assigned SW/CM notified of any patient/family needs or concerns. Appropriate resources provided to address patient's needs.  No needs/concerns   FOLLOW-UP VISIT    Patient name: Fredis Ugarte  Date: 12/17/20      Subjective:       Patient ID: Fredis Ugarte is a 51 y.o. male.    Chief Complaint: No chief complaint on file.    HPI     Since last visit 10/2020 - recurrence of disease    10/23/20 - MRI  -  4.1 x 4.5 x 2.5 cm homogeneously enhancing, spiculated mass centered within the inferior aspect of the right rectus abdominus muscle    11/9/20 - BIOPSY - path: colon ca mets    11/16/20 - PET/CT - In the abdomen and pelvis, there is a hypermetabolic 3.2 x 3.5 cm soft tissue mass in the inferior aspect of the right rectus abdominus muscle (axial series 2, image 238) with SUV max of 7.1.    Today 12/17/20 - here to review recurrence of disease and plan  Reviewed imaging  Reviewed surgery plan at length per Dr. Mittal.  Reviewed chemo plan per tumor board adjuvant FOLFIRI.   Pain continues severe with a few percocet per day, still better than prior to botox. Lots of BTP and cant sleep  Anxious about plan, understandable      To review - 12/28/20 - surgery planned - reopen remove rectus mus where it inserts pubic bone, shave pubic bone, ortho screws in bone and put mesh behind it and rebuild abdominal wall - 4-6 weeks to be 100%. Drains for a week.         See detailed oncology history below, updated with most recent scans, labs, pertinent medical history.   Oncology History   Adenocarcinoma of colon   9/26/2017 Initial Diagnosis    Adenocarcinoma of colon    Oncologic history:  5/2017 - passing blood for 3 mo - BRB. Occasionally difficult with BM, diarrhea, constipation which was a change. No ab pain.   8/1/17 - CT a/p - Circumferential mass sigmoid colon causing significant luminal narrowing measuring approximately 2.6 X 2.8cm highly suggestive of malignancy with no evidence of metastatic disease to abdomen or pelvis.   8/1/17 he underwent colonoscopy that confirmed lesion at 20cm endophytic and ulcerated, 1/2 of colon lumen.   8/2/17 - hemicolectomy -   "                    Pathology: low grade adenocarcinoma invading through muscularis propria into subserosal adipose tissue, neg margins, no LVI or PNI, no tumor deposits. 12 LN negative for tumor. Final pathologic stage pT3 pN0 - stage II. THI, intact.  8/10/17 CBC shows Hg 12.1g/dL, CMP noted and unremarkable. CEA is 2.2.  9/2017 - post-operative infections, required inpatient and outpatient I&D at incision site  9/27/17 - initial med onc eval  9/27/17 - CEA<0.3, ferritin 251, CBC, CMP good  10/27/17 - CT chest - PRIYANK (to complete staging)  12/18/17 - CEA <0.3, ferritin 163. CBC/CMP good  4/16/18 - CT C/A/P - PRIYANK, ferritin >100, CEA <0.3, Hg 16.3g/dL, CMP and CBC WNL  8/20/18- CEA 0.6  , ferritin. BM abnormal, referral back to GI.      9/27/18 - colonoscopy - biopsy of ulcer at anastomosis - pathology consis with adenocarcinoma  10/8/18 - CT c/a/p - PRIYANK   10/9/18 - med onc follow-up. Refer back to surgery.   10/22/18 - hemicolectomy              Pathology: pT3 pN1a w/ 1 of 16 LN positive for malignancy. Well to mod differentiated. No tumor deposits. R0 resection.              Primary tumor 1.5cm in size invading to subserosal fat but not to serosa. LVI noted. No PNI noted.   11/12/18  - med onc follow-up  12/3/18: C1D1 FOLFOX  12/5/18 - N/V, diarrhea, "miserable" symptoms per patient  12/6/18 - call with continued nausea, dirrhea better  12/9/18 - call with mucositis symptoms.   12/10/18: Tox check per MD                D8 and he reports multiple complaints and SE from chemo.      Day 1 - jaw pain, redness to feet and hands - this all resolved in 24hrs. also nausea, headache.   Day 2 - vomiting and constipation - dulcolax - then diarrhea started on D3  Day 3 - diarrhea severe that AM and took IVF and nausea med IV that helped at that time, this nausea recurred that night.  Day 4 - diarrhea better after imodium  Day 5 - took zofran and pheenrgan 25mg every 5 hours, this helped but not resolved it and needed decadron " "also.  Day 6 - mouth sores and tongue swelling developed. Felt "colon on fire", would "burp acid", more painful. Called on call doctor and got chlorhexidine  Day 7 - constipation 2 days again so he took MOM and had diarrhea 8 episodes.  12/10/18  D8 and he is feeling much better, he feels this is first improved day but normal. Extreme sensitivity to smell. Still mild nausea, no vomiting. Energy mild better. Pain with port when sleeping.     12/17/18 - C2D1 and reports continues to feel unwell. WBC 1.0k, plts 108k  D10 Started with diarrhea again and took imodium, lomotil, took a few days for relief.   Ativan helping nausea but it will not resolve. Minimal vomiting. Eating only soup. Lost weight. Some pain with swallowing.  New LUE swelling that started after sleeping and has been worsening.   No fever. No mouth sores.  Continues with pain, diffuse body aches and abdomen worse than chronic continuous pain. No blood in stool. Diarrhea is light colored. Urinating is okay.      12/17/18 - U/S LUE - Positive study. Occlusive thrombus within the left internal jugular, subclavian and axillary veins.  lovenox today in chemo and then start xarelto, discuss w/patient in infusion.   12/17/18 - DPD testing - resulted to us ~1/3/18 negative for enzyme deficiency.  12/19/18 - WBC 1.5k, hg 12.8g/dL, plts 118k  12/26/18 - wbc 27.4K, Hg 12.3g/dL, plts 109k  1/7/19 -  presents for follow-up and consider cycle 2 FOLFOX  1/14/19: tox check - feeling much better after dose reductions. still constipation and severe abd pain due to constipation. Neuropathy stable, persistent.  1/22/19 - C3 1800mg/m2 at actual body weight and BSA 2.75   2/5/19 - C4              Feels okay today but only for last 3 days. Prior to this felt unwell from chemo starting on day 6, severe fatigue and severe body aches diffuse, not specific pain in one place. In the bed. He stops dex supp on Saturday which is 5 and attributes the sudden severe symptoms to this. " "This then lasts a few days of feeling "miserable". No mouth sores with mouth wash, no N/V/D/C which is a huge improvement, constipation is improved due to daily laxative and stool softener not prn. Also right neck pain this cycle but this resolved. Previous had been left. No inc swelling to area of chronic DVT LUE. Urination okay. Ab pain is okay.      Neuropathy continues. Better in hands, continues in feet constant after one cycle of oxali in December and he doesn't feel improving. Labs are good and reviewed with pts, cytopenias have thankfully not recurred.      2/19/19: C5D1 - delayed to 2/26/19  On 2/19/19 - felt very unwell after last cycle with higher dose and this has lasted to today,headache ongoing, back pain, severe fatigue. Neck tightening feeling he got when chemo was "higher dose" before that had resolved and now recurs. He is eating but sometimes hurts to swallow. No fever. No diarrhea. +nausea. Labs are good and reviewed. He has felt quite miserable and wants to go back down to prior dose which is very reasonable. neurontin did help his neuropathy greatly so he is happy about that.      3/12/19 C6D1  Feeling okay today. SE with last cycle did recur but he does feel more tolerable and last shorter duration at this dose compared to last. Pain chest neck head recurred and resolved with pain meds and time. No change.      3/26/19: C7D1 today:   Feeling well today with minimal pain at this time. He states that the dilaudid is helping. He finds he is doing better now that the dose have been reduced on the 5FU MD had advised the port be removed due and a new one placed and pt is refusing at this time.          4/9/19: C8D1 - reports severe fatigue last cycle and nausea which was previously controlled and now recurring despite meds. QUINTERO continues and mild worse. Pain after chemo continues but dilaudid helping greatly so he feels this isn't major issue any longer.      He is reluctant to continue chemo due to " "feeling "miserable" but wants to continue as well to "get it done". We reviewed labs are good overall.      4/30/19: C9D1- He feels better than he has ever felt and contributes it to the lower 5FU dose and taking decadron scheduled. He had enough energy last weekend to go fishing. Minimal pain. Neuropathy slight, not worsening. Taking gabapentin 300mg at night which is helping to decrease daytime drowsiness.      5/14/19: today is C10D1. Severe fatigue - he skipped IVF because he felt well and now attributes this to feeling unwell this cycle - last cycle was much improved.      5/28/19: C11D1 today. Pt is feeling well today. He states that he had enough energy to go fishing over the weekend and felt like the break he had was much needed.       6/11/19: patient feeling well but contacted by dentist 6/10/10 about abscess tooth and need to have pulled this week. He reports today had it drained 6/10 and on abx with relief of pain. He otherwise is feeling well and scans 6/10/19 show PRIYANK which is great news. He reports continued severe fatigue with chemo that lasts longer each cycle. He is due for last cycle of chemo today but due to dental infection, long discussion about timing of last cycle chemo, omit versus delay, long term plan for surveilancce, risk for recurrence, mgmt of anticoag and DVT/mediport. cancel last cycle.     9/24/19 - today patient presents for follow-up now in surveillance.   He is feeling continually better overall now that he is not on chemotherapy  9/2019 - Cscope - PRIYANK  12/2019-CT chest abdomen pelvis PRIYANK  2/10/20 - CT a/p for acute pain - Fatty liver, Diverticulosis but no diverticulitis, Few small periportal nodes unchanged. PRIYANK (malig)  3/2020 - cbc, cmp, cea, ferritin, iron profile good  3/23/20 - telemed MD follow-up  7/1/20 - ferritin WNL, iron profile WNL, A1c is 6.2%, testosterone low 170ng/mL  9/29/20 - CT ca//ap - PRIYANK. Cbc, cmp, cea good.   10/5/20 - message from PT, patient continues " with severe abdominal/pelvic pain, physical therapy has helped, suppositories have helped.   10/7/20 - med onc followup          I have reviewed my initial consult note with detailed PMH/ROS from initial encounter and all following progress notes.   Past medical, surgical, family, and social history were reviewed today and there are no changes of note unless mentioned in HPI.     MEDS and ALLERGIES were reviewed with patient and meds reconciled.     Fatigue  stable    Weight/appetite  stable    Constitutional  no F/C or SOI   Pain  severe    Cardiovascular  no change in CP or palpitations   Respiratory  no change in SOB or cough   GI  No new abdominal pain or major change in BM   Bleeding  No new bleeding   Extremities  No new edema           Objective:      There were no vitals taken for this visit.  Wt Readings from Last 30 Encounters:   11/09/20 (!) 142.9 kg (315 lb)   10/26/20 (!) 142.9 kg (315 lb)   10/16/20 (!) 148.3 kg (326 lb 15.1 oz)   10/07/20 (!) 147.7 kg (325 lb 9.9 oz)   07/02/20 (!) 139.7 kg (307 lb 15.7 oz)   09/24/19 (!) 143.4 kg (316 lb 2.2 oz)   06/26/19 (!) 141 kg (310 lb 12.8 oz)   06/24/19 (!) 140.6 kg (310 lb)   06/21/19 (!) 142.7 kg (314 lb 9.6 oz)   06/19/19 (!) 143.2 kg (315 lb 9.6 oz)   06/11/19 (!) 141.4 kg (311 lb 12.8 oz)   06/05/19 (!) 140.6 kg (310 lb)   06/03/19 (!) 141.6 kg (312 lb 3.2 oz)   05/30/19 (!) 144.2 kg (317 lb 12.8 oz)   05/28/19 (!) 144.2 kg (318 lb)   05/28/19 (!) 143.6 kg (316 lb 9.6 oz)   05/22/19 (!) 144.5 kg (318 lb 9.6 oz)   05/20/19 (!) 143.8 kg (317 lb)   05/14/19 (!) 142.3 kg (313 lb 12.8 oz)   05/02/19 (!) 142.9 kg (315 lb)   04/30/19 (!) 143.6 kg (316 lb 9.6 oz)   04/30/19 (!) 143.1 kg (315 lb 6.4 oz)   04/22/19 (!) 141.3 kg (311 lb 9.6 oz)   04/18/19 (!) 143.7 kg (316 lb 12.8 oz)   04/16/19 (!) 144.4 kg (318 lb 6.4 oz)   04/09/19 (!) 142.1 kg (313 lb 3.2 oz)   04/03/19 (!) 143.6 kg (316 lb 9.6 oz)   04/01/19 (!) 144.2 kg (317 lb 12.8 oz)   03/29/19 (!) 144.7  "kg (319 lb)   03/28/19 (!) 146.4 kg (322 lb 12.8 oz)     General: Well-developed/ well nourished; comfortable; A&O x 3 sitting down  HEENT: Normocephalic, atraumatic, anicteric sclera, no nasal discharge    NECK: no visible goiter  Respiratory: respirations are visibly unlabored; no audible wheezing  Cariovascular: no edema  Abdominal: non-distended  Musculoskeletal: visible Joints not swollen  Skin: no visible rashes or lesions  Neurological: grossly intact       LABS good    Assessment:       1. Adenocarcinoma of colon    2. Class 3 severe obesity without serious comorbidity with body mass index (BMI) of 40.0 to 44.9 in adult, unspecified obesity type    3. Fatigue, unspecified type    4. Depression, unspecified depression type    5. Cancer associated pain      Cancer Staging  Adenocarcinoma of colon  Staging form: Colon and Rectum, AJCC 7th Edition  - Clinical stage from 9/26/2017: Stage IIA (T3, N0, M0) - Unsigned  - Clinical: No stage assigned - Unsigned  - Pathologic stage from 11/12/2018: Stage IIIB (T3, N1a, cM0) - Unsigned      ECOG 1-2     50yo male with history of stage II colon cancer 2017 then had recurrence the following year, stage III, 2018.     Multiple side effects/complications with chemo and completed this 5/2019, see history above.     Now with recurrence localized site - plan for resection and will be "PRIYANK" and have adjuvant chemo    This is very challenging with his personal history poor tolerance of surgery and chemo.   Reviewed folfox and his side effects, he still has severe neuropathy gabapentin is helping.  Reviewed folfiri recs from tumor board which is appropriate standard plan and GI SE of concern high risk for adverse effect.   reviewed strata is pending will followup this up.    This will be complex tretent plan to deliver    Would plan to start chemo February and will work on location and near his home if possible.          Plan:       Diagnoses and all orders for this " visit:    Adenocarcinoma of colon    Class 3 severe obesity without serious comorbidity with body mass index (BMI) of 40.0 to 44.9 in adult, unspecified obesity type    Fatigue, unspecified type    Depression, unspecified depression type    Cancer associated pain  -     morphine (MS CONTIN) 15 MG 12 hr tablet; Take 1 tablet (15 mg total) by mouth 2 (two) times daily.            Ms contin 15mg BID and will inc as needed  Cont percocet    Surgery 12/28    He needs Letter that says stage 4 cancer, will require complex surgery with prolonged recovery as well as chemotherapy. he is applying for disability.    MD vis 3 weeks after surgery. Can be telemedicine 40 minutes.     Await strata results    Exercise/nutrition important, reviewed    Continue current medications, reviewed.      Important to keep follow-up with PCP     Discussed plan above in great detail with patient and all questions answered to their satisfaction. Proceed with plan above. Greater than 50% of visit today was spent in discussing condition and continued treatment plan, counseling, education, for 40 minutes.            Kinga Lee M.D.  Hematology Oncology  [facility]

## 2023-10-17 PROBLEM — J44.1 COPD EXACERBATION: Status: RESOLVED | Noted: 2023-10-13 | Resolved: 2023-10-17

## 2023-10-17 LAB
ALBUMIN SERPL BCP-MCNC: 3.2 G/DL (ref 3.5–5.2)
ALP SERPL-CCNC: 64 U/L (ref 55–135)
ALT SERPL W/O P-5'-P-CCNC: 32 U/L (ref 10–44)
ANION GAP SERPL CALC-SCNC: 7 MMOL/L (ref 8–16)
AST SERPL-CCNC: 19 U/L (ref 10–40)
BASOPHILS # BLD AUTO: 0.02 K/UL (ref 0–0.2)
BASOPHILS NFR BLD: 0.2 % (ref 0–1.9)
BILIRUB SERPL-MCNC: 0.3 MG/DL (ref 0.1–1)
BUN SERPL-MCNC: 19 MG/DL (ref 8–23)
CALCIUM SERPL-MCNC: 9.1 MG/DL (ref 8.7–10.5)
CHLORIDE SERPL-SCNC: 95 MMOL/L (ref 95–110)
CO2 SERPL-SCNC: 33 MMOL/L (ref 23–29)
CREAT SERPL-MCNC: 0.5 MG/DL (ref 0.5–1.4)
DIFFERENTIAL METHOD: ABNORMAL
EOSINOPHIL # BLD AUTO: 0.7 K/UL (ref 0–0.5)
EOSINOPHIL NFR BLD: 7.3 % (ref 0–8)
ERYTHROCYTE [DISTWIDTH] IN BLOOD BY AUTOMATED COUNT: 12.7 % (ref 11.5–14.5)
EST. GFR  (NO RACE VARIABLE): >60 ML/MIN/1.73 M^2
GLUCOSE SERPL-MCNC: 93 MG/DL (ref 70–110)
HCT VFR BLD AUTO: 38.9 % (ref 37–48.5)
HGB BLD-MCNC: 12.4 G/DL (ref 12–16)
IMM GRANULOCYTES # BLD AUTO: 0.06 K/UL (ref 0–0.04)
IMM GRANULOCYTES NFR BLD AUTO: 0.6 % (ref 0–0.5)
LYMPHOCYTES # BLD AUTO: 2.4 K/UL (ref 1–4.8)
LYMPHOCYTES NFR BLD: 23.9 % (ref 18–48)
MAGNESIUM SERPL-MCNC: 2 MG/DL (ref 1.6–2.6)
MCH RBC QN AUTO: 30 PG (ref 27–31)
MCHC RBC AUTO-ENTMCNC: 31.9 G/DL (ref 32–36)
MCV RBC AUTO: 94 FL (ref 82–98)
MONOCYTES # BLD AUTO: 1.1 K/UL (ref 0.3–1)
MONOCYTES NFR BLD: 11.5 % (ref 4–15)
NEUTROPHILS # BLD AUTO: 5.6 K/UL (ref 1.8–7.7)
NEUTROPHILS NFR BLD: 56.5 % (ref 38–73)
NRBC BLD-RTO: 0 /100 WBC
PHOSPHATE SERPL-MCNC: 3.9 MG/DL (ref 2.7–4.5)
PLATELET # BLD AUTO: 353 K/UL (ref 150–450)
PMV BLD AUTO: 8.8 FL (ref 9.2–12.9)
POCT GLUCOSE: 109 MG/DL (ref 70–110)
POCT GLUCOSE: 178 MG/DL (ref 70–110)
POCT GLUCOSE: 202 MG/DL (ref 70–110)
POCT GLUCOSE: 93 MG/DL (ref 70–110)
POTASSIUM SERPL-SCNC: 3.4 MMOL/L (ref 3.5–5.1)
PROT SERPL-MCNC: 5.5 G/DL (ref 6–8.4)
RBC # BLD AUTO: 4.14 M/UL (ref 4–5.4)
SODIUM SERPL-SCNC: 135 MMOL/L (ref 136–145)
WBC # BLD AUTO: 9.91 K/UL (ref 3.9–12.7)

## 2023-10-17 PROCEDURE — 94640 AIRWAY INHALATION TREATMENT: CPT

## 2023-10-17 PROCEDURE — 25000242 PHARM REV CODE 250 ALT 637 W/ HCPCS

## 2023-10-17 PROCEDURE — 99233 SBSQ HOSP IP/OBS HIGH 50: CPT | Mod: ,,, | Performed by: CLINICAL NURSE SPECIALIST

## 2023-10-17 PROCEDURE — 94761 N-INVAS EAR/PLS OXIMETRY MLT: CPT

## 2023-10-17 PROCEDURE — 84100 ASSAY OF PHOSPHORUS: CPT

## 2023-10-17 PROCEDURE — 99497 PR ADVNCD CARE PLAN 30 MIN: ICD-10-PCS | Mod: ,,, | Performed by: CLINICAL NURSE SPECIALIST

## 2023-10-17 PROCEDURE — 99900035 HC TECH TIME PER 15 MIN (STAT)

## 2023-10-17 PROCEDURE — 80053 COMPREHEN METABOLIC PANEL: CPT

## 2023-10-17 PROCEDURE — 99233 PR SUBSEQUENT HOSPITAL CARE,LEVL III: ICD-10-PCS | Mod: ,,, | Performed by: CLINICAL NURSE SPECIALIST

## 2023-10-17 PROCEDURE — 99232 SBSQ HOSP IP/OBS MODERATE 35: CPT | Mod: ,,, | Performed by: INTERNAL MEDICINE

## 2023-10-17 PROCEDURE — 63600175 PHARM REV CODE 636 W HCPCS

## 2023-10-17 PROCEDURE — 25000242 PHARM REV CODE 250 ALT 637 W/ HCPCS: Performed by: STUDENT IN AN ORGANIZED HEALTH CARE EDUCATION/TRAINING PROGRAM

## 2023-10-17 PROCEDURE — 27000646 HC AEROBIKA DEVICE

## 2023-10-17 PROCEDURE — 25000003 PHARM REV CODE 250

## 2023-10-17 PROCEDURE — 99232 PR SUBSEQUENT HOSPITAL CARE,LEVL II: ICD-10-PCS | Mod: ,,, | Performed by: INTERNAL MEDICINE

## 2023-10-17 PROCEDURE — 21400001 HC TELEMETRY ROOM

## 2023-10-17 PROCEDURE — 85025 COMPLETE CBC W/AUTO DIFF WBC: CPT

## 2023-10-17 PROCEDURE — 94668 MNPJ CHEST WALL SBSQ: CPT

## 2023-10-17 PROCEDURE — 99497 ADVNCD CARE PLAN 30 MIN: CPT | Mod: ,,, | Performed by: CLINICAL NURSE SPECIALIST

## 2023-10-17 PROCEDURE — 94664 DEMO&/EVAL PT USE INHALER: CPT

## 2023-10-17 PROCEDURE — 27000221 HC OXYGEN, UP TO 24 HOURS

## 2023-10-17 PROCEDURE — 25000003 PHARM REV CODE 250: Performed by: STUDENT IN AN ORGANIZED HEALTH CARE EDUCATION/TRAINING PROGRAM

## 2023-10-17 PROCEDURE — 83735 ASSAY OF MAGNESIUM: CPT

## 2023-10-17 PROCEDURE — 36415 COLL VENOUS BLD VENIPUNCTURE: CPT

## 2023-10-17 RX ORDER — POTASSIUM CHLORIDE 20 MEQ/1
20 TABLET, EXTENDED RELEASE ORAL ONCE
Status: COMPLETED | OUTPATIENT
Start: 2023-10-17 | End: 2023-10-17

## 2023-10-17 RX ORDER — FLUTICASONE PROPIONATE 50 MCG
2 SPRAY, SUSPENSION (ML) NASAL DAILY
Status: DISCONTINUED | OUTPATIENT
Start: 2023-10-17 | End: 2023-10-18 | Stop reason: HOSPADM

## 2023-10-17 RX ADMIN — LEVALBUTEROL 1.25 MG: 1.25 SOLUTION, CONCENTRATE RESPIRATORY (INHALATION) at 02:10

## 2023-10-17 RX ADMIN — CALCIUM CARBONATE (ANTACID) CHEW TAB 500 MG 500 MG: 500 CHEW TAB at 08:10

## 2023-10-17 RX ADMIN — MORPHINE SULFATE 2.5 MG: 10 SOLUTION ORAL at 06:10

## 2023-10-17 RX ADMIN — DILTIAZEM HYDROCHLORIDE 120 MG: 120 CAPSULE, COATED, EXTENDED RELEASE ORAL at 08:10

## 2023-10-17 RX ADMIN — ENOXAPARIN SODIUM 40 MG: 40 INJECTION SUBCUTANEOUS at 05:10

## 2023-10-17 RX ADMIN — MORPHINE SULFATE 2.5 MG: 10 SOLUTION ORAL at 08:10

## 2023-10-17 RX ADMIN — OXYBUTYNIN CHLORIDE 5 MG: 5 TABLET, EXTENDED RELEASE ORAL at 08:10

## 2023-10-17 RX ADMIN — POTASSIUM CHLORIDE 20 MEQ: 1500 TABLET, EXTENDED RELEASE ORAL at 02:10

## 2023-10-17 RX ADMIN — PREDNISONE 40 MG: 20 TABLET ORAL at 08:10

## 2023-10-17 RX ADMIN — POLYETHYLENE GLYCOL 3350 17 G: 17 POWDER, FOR SOLUTION ORAL at 08:10

## 2023-10-17 RX ADMIN — Medication 6 MG: at 08:10

## 2023-10-17 RX ADMIN — SODIUM CHLORIDE SOLN NEBU 3% 4 ML: 3 NEBU SOLN at 08:10

## 2023-10-17 RX ADMIN — TIOTROPIUM BROMIDE INHALATION SPRAY 2 PUFF: 3.12 SPRAY, METERED RESPIRATORY (INHALATION) at 08:10

## 2023-10-17 RX ADMIN — MORPHINE SULFATE 2.5 MG: 10 SOLUTION ORAL at 10:10

## 2023-10-17 RX ADMIN — SENNOSIDES AND DOCUSATE SODIUM 1 TABLET: 50; 8.6 TABLET ORAL at 08:10

## 2023-10-17 RX ADMIN — GUAIFENESIN 600 MG: 600 TABLET, EXTENDED RELEASE ORAL at 08:10

## 2023-10-17 RX ADMIN — FLUTICASONE PROPIONATE 100 MCG: 50 SPRAY, METERED NASAL at 08:10

## 2023-10-17 RX ADMIN — Medication 500 MG: at 08:10

## 2023-10-17 RX ADMIN — SODIUM CHLORIDE SOLN NEBU 3% 4 ML: 3 NEBU SOLN at 02:10

## 2023-10-17 RX ADMIN — AZELASTINE 137 MCG: 1 SPRAY, METERED NASAL at 10:10

## 2023-10-17 RX ADMIN — LEVALBUTEROL 1.25 MG: 1.25 SOLUTION, CONCENTRATE RESPIRATORY (INHALATION) at 08:10

## 2023-10-17 RX ADMIN — MELOXICAM 7.5 MG: 7.5 TABLET ORAL at 08:10

## 2023-10-17 RX ADMIN — BUSPIRONE HYDROCHLORIDE 5 MG: 5 TABLET ORAL at 08:10

## 2023-10-17 RX ADMIN — MORPHINE SULFATE 2.5 MG: 10 SOLUTION ORAL at 03:10

## 2023-10-17 RX ADMIN — CITALOPRAM HYDROBROMIDE 20 MG: 20 TABLET ORAL at 08:10

## 2023-10-17 RX ADMIN — AZELASTINE 137 MCG: 1 SPRAY, METERED NASAL at 08:10

## 2023-10-17 RX ADMIN — FLUTICASONE FUROATE AND VILANTEROL TRIFENATATE 1 PUFF: 100; 25 POWDER RESPIRATORY (INHALATION) at 08:10

## 2023-10-17 NOTE — PROGRESS NOTES
[facility] - Intensive Care ([place] [address])  Palliative Medicine  Progress Note    Patient Name: Estefani Fam  MRN: [id number]  Admission Date: 10/9/2023  Hospital Length of Stay: 7 days  Code Status: DNR   Attending Provider: Pollo Siegel MD  Consulting Provider: STEPHAN Henao  Primary Care Physician: Dustin Khan MD  Principal Problem:<principal problem not specified>    Patient information was obtained from patient, past medical records and primary team.      Assessment/Plan:     Palliative Care  Palliative care encounter  Palliative medicine  Follow up  for symptom management - dyspnea as discussed with primary team Dr Chin .  for Mrs. Fam a 74 yo lady with pmh of COPD who presented to [facility] with chief complaint of shortness of breath.   Admitted to hospital medicine with COPD exacerbation.  At time of this encounter the patient is awake, alert and oriented x3,  No acute distress, supplemental oxygen in use.  Mrs. Fam reports good relieve of dsypnea with oral morphine solution.      Advance Care Planning     - no ACP documents received   - Mrs. Fam appears decisional   - next of kin for medical decisions should she loose this ability is:   kieran Fam [phone]  - DNR per primary team   - LaPOST discussed with full understanding voiced by patient and family.    - LaPOST initiated and to be filed by the attending     Goals of Care  - pal med APRN and [employer] returned to bedside.   - No change in previous goals of care - will transition to hospice care.   - She has questions about continuing medications specifically denosumab- Prolia.  Recommended speaking to hospice representative regarding specific medications    - Requesting informational visit with [facility] hosp[facility]  - - Hospice education  Reinforced.    - emotional support provided               Symptom Management   Dyspnea   - related to COPD  - at rest and on exertion  -  supplemental oxygen in use   - sats 95%  - use of oral morphine solution has been effective -   - intensity 5/10   As only required two doses in last 24 hrs.   - mostly bed or wheelchair bound   - discussed strategies to reduce shortness of breath  Use of fan at bedside, bundling activity to avoid exertion and breathlessness    Recommendations  -  Continue oral morphine solution 2.5 mg by mouth every 4 hrs as needed for dyspnea     Anxiety   - reports generalized anxiety that I worsened when feeling short of breath   - continue current medical management     COPD with Acute Exacerbation/Acute on Chronic Hypoxic Respiratory Failure/ Afib with RVR/ UTI  - managed per primary team and specialty consultants  - continue current medical management   - patient considering home hospice    - pal med following for symptom management and goals of care see above     Plan/Recommendations  - Continue current plan of care   - consult / for informational visit with [facility]   - if patient does not transition to home hospice - l f/u in outpatient pal med clinic - scheduled for 10/20/23 11 AM virtual visit as patient's request   -Primary attending to sign and submit  LaPOST.     Dr. Siegel  primary team notified of the above.      Thank you for consult and opportunity to participate in Mrs. Cortés's care.         I will follow-up with patient. Please contact us if you have any additional questions.    Subjective:     Chief Complaint:   Chief Complaint   Patient presents with    Shortness of Breath     Arrived via EMS from home with c/o SOB. Hx of COPD.     COPD       HPI:   HPI obtained from chart review    As per H&P Ms. Fam is a 74 yo lady with PMH of:  COPD c/b centrilobular emphysema, recurrent pneumonia, pHTN, former smoker, GERD, Afib on diltiazem , and anxiety  She presented to  [facility]  ED  with chief complaint of shortness of breath over the past week.  In addition she reports congestion  and non productive cough.  Previously hospitalized in August for  COPD exacerbation that required a prolonged steroid course.     No c/o  fevers, chills, N/V, chest pain, new weakness, new fatigue. At baseline she is on 3L NC with dyspnea on exertion.     In ED, given O2, nebs, mag and ceftriaxone. CXR with no evidence of PNA. No leukocytosos. VBG with pH 7.34, CO2 60.6. Pt admitted for treatment of COPD exacerbation.     Admitted to hospital medicine for further management    Pal med consulted for symptom management- dyspnea.        Hospital Course:  No notes on file    Interval History: no adverse events over night, dyspnea improving,  elected home with hospice     Past Medical History:   Diagnosis Date    Cataract     COPD (chronic obstructive pulmonary disease)     COVID-19     History of COVID-19 1/17/2021    Still with mild dyspnea. Encouraged return to pulmonary rehab Recommend scheduling vaccination when appointment is available.     History of retinal hemorrhage     Lobar pneumonia 11/14/2021    Recurrent in RLL, no endobronchial lesion Needs speech evaluation and MBSS for recurrent aspiration in setting of dysphagia  Will need pulmonary rehab at [facility].    Pathological fracture due to osteoporosis 7/6/2020    Retinal histoplasmosis     Secondary pulmonary arterial hypertension 7/3/2018    Secondary to emphysema and chronic respiratory failure, mild.       Past Surgical History:   Procedure Laterality Date    BRONCHOSCOPY WITH FLUOROSCOPY N/A 10/28/2019    Procedure: BRONCHOSCOPY, WITH FLUOROSCOPY;  Surgeon: Brooklynn Ruiz MD;  Location[facility] OR [address] [place] [address];  Service: Endoscopy;  Laterality: N/A;    HAND SURGERY         Review of patient's allergies indicates:   Allergen Reactions    Albuterol     Epinephrine     Ipratropium        Medications:  Continuous Infusions:  Scheduled Meds:   ascorbic acid (vitamin C)  500 mg Oral BID    azelastine  1 spray Nasal BID    busPIRone  5 mg Oral BID     calcium carbonate  500 mg Oral Daily    citalopram  20 mg Oral Daily    diltiaZEM  120 mg Oral Daily    enoxparin  40 mg Subcutaneous Daily    fluticasone furoate-vilanteroL  1 puff Inhalation Daily    fluticasone propionate  2 spray Each Nostril Daily    guaiFENesin  600 mg Oral BID    meloxicam  7.5 mg Oral Daily    oxybutynin  5 mg Oral Daily    polyethylene glycol  17 g Oral Daily    predniSONE  40 mg Oral Daily    senna-docusate 8.6-50 mg  1 tablet Oral BID    sodium chloride 3%  4 mL Nebulization TID    tiotropium bromide  2 puff Inhalation Daily     PRN Meds:acetaminophen, dextrose 10%, dextrose 10%, glucagon (human recombinant), glucose, glucose, influenza 65up-adj, insulin aspart U-100, levalbuterol, lorazepam, melatonin, morphine, ondansetron, sodium chloride, sodium chloride 0.9%    Family History       Problem Relation (Age of Onset)    Colon cancer Mother, Father    Macular degeneration Mother    Stroke Father          Tobacco Use    Smoking status: Former     Current packs/day: 0.00     Average packs/day: 1 pack/day for 36.0 years (36.0 ttl pk-yrs)     Types: Cigarettes     Start date: 1980     Quit date: 2016     Years since quittin.1    Smokeless tobacco: Never    Tobacco comments:     pt states she does not remember date   Substance and Sexual Activity    Alcohol use: Yes     Alcohol/week: 2.0 standard drinks of alcohol     Types: 2 Glasses of wine per week     Comment: 1-2 glasses a day     Drug use: No    Sexual activity: Yes     Partners: Male       Review of Systems   Constitutional:  Positive for fatigue.   HENT:  Negative for congestion.    Respiratory:  Positive for cough and shortness of breath.    Gastrointestinal: Negative.    Musculoskeletal: Negative.    Skin:  Positive for pallor.   Neurological:  Positive for weakness.   Psychiatric/Behavioral:  The patient is nervous/anxious.      Objective:     Vital Signs (Most Recent):  Temp: 98.5 °F (36.9 °C)  (10/17/23 1132)  Pulse: 87 (10/17/23 1132)  Resp: 18 (10/17/23 1132)  BP: (!) 142/68 (10/17/23 1132)  SpO2: 95 % (10/17/23 1132) Vital Signs (24h Range):  Temp:  [97.7 °F (36.5 °C)-98.5 °F (36.9 °C)] 98.5 °F (36.9 °C)  Pulse:  [] 87  Resp:  [17-20] 18  SpO2:  [93 %-99 %] 95 %  BP: (135-168)/(67-75) 142/68     Weight: 59.8 kg (131 lb 13.4 oz)  Body mass index is 20.05 kg/m².       Physical Exam  Vitals and nursing note reviewed.   Constitutional:       General: She is not in acute distress.  Cardiovascular:      Rate and Rhythm: Normal rate and regular rhythm.   Pulmonary:      Comments: Tachypnea,  labored, supplemental oxygen   Skin:     General: Skin is warm and dry.   Neurological:      Mental Status: She is alert.      Motor: Weakness present.   Psychiatric:         Behavior: Behavior normal.         Thought Content: Thought content normal.         Judgment: Judgment normal.      Comments: Appears anxious           Review of Symptoms      Symptom Assessment (ESAS 0-10 Scale)  Pain:  0  Dyspnea:  0  Anxiety:  0  Nausea:  0  Depression:  0  Anorexia:  0  Fatigue:  0  Insomnia:  0  Restlessness:  0  Agitation:  0     CAM / Delirium:  Negative  Constipation:  Negative  Diarrhea:  Negative    Anxiety:  Is nervous/anxious    Bowel Management Plan (BMP):  Yes      Pain Assessment:  OME in 24 hours:  0  Location(s):      Living Arrangements:  Lives with spouse    Psychosocial/Cultural:   See Palliative Psychosocial Note: Yes   45 years, together for 55 yrs,   and mother,one son, 2 grandchildren - granddaughters.  Enjoyed shopping and loves being with family.  Continues to live to see her granddaughters   **Primary  to Follow**  Palliative Care  Consult: Yes    Spiritual:  F - Myranda and Belief:  Baptism   A - Address in Care:  [place] to seeing  and having  visits. Eucharistic  following         Advance Care Planning  Advance Directives:   Living  Will: No        Oral Declaration: No    LaPOST: No    Do Not Resuscitate Status: No    Medical Power of : No        Oral Declaration: No      Decision Making:  Patient answered questions and Family answered questions  Goals of Care: What is most important right now is to focus on symptom/pain control. Accordingly, we have decided that the best plan to meet the patient's goals includes continuing with treatment.         Significant Labs: All pertinent labs within the past 24 hours have been reviewed.  CBC:   Recent Labs   Lab 10/17/23  0226   WBC 9.91   HGB 12.4   HCT 38.9   MCV 94          BMP:  Recent Labs   Lab 10/17/23  0226   GLU 93   *   K 3.4*   CL 95   CO2 33*   BUN 19   CREATININE 0.5   CALCIUM 9.1   MG 2.0       LFT:  Lab Results   Component Value Date    AST 19 10/17/2023    ALKPHOS 64 10/17/2023    BILITOT 0.3 10/17/2023     Albumin:   Albumin   Date Value Ref Range Status   10/17/2023 3.2 (L) 3.5 - 5.2 g/dL Final     Protein:   Total Protein   Date Value Ref Range Status   10/17/2023 5.5 (L) 6.0 - 8.4 g/dL Final     Lactic acid:   Lab Results   Component Value Date    LACTATE 1.0 09/20/2022    LACTATE 1.1 07/02/2018       Significant Imaging: I have reviewed all pertinent imaging results/findings within the past 24 hours.  10/9/23 CXR no acute processes findings suggestive of underlying COPD/empjysema      25 mins spent in advanced care planning and completion of ACP documents     Shilpa Arzate, CNS  Palliative Medicine  [facility] ([place] [id number])

## 2023-10-17 NOTE — ASSESSMENT & PLAN NOTE
Patient with Paroxysmal (<7 days) atrial fibrillation which is controlled currently with Calcium Channel Blocker. Patient is currently in sinus rhythm.[id number] Score: 2.  Eliquis contraindicated due to ocular condition per cards.    In Afib 10/14 with spontaneous conversion the same day     -- Telemetry  -- Continue home dilt

## 2023-10-17 NOTE — SUBJECTIVE & OBJECTIVE
Interval History: no adverse events over night, dyspnea improving,  elected home with hospice     Past Medical History:   Diagnosis Date    Cataract     COPD (chronic obstructive pulmonary disease)     COVID-19     History of COVID-19 1/17/2021    Still with mild dyspnea. Encouraged return to pulmonary rehab Recommend scheduling vaccination when appointment is available.     History of retinal hemorrhage     Lobar pneumonia 11/14/2021    Recurrent in RLL, no endobronchial lesion Needs speech evaluation and MBSS for recurrent aspiration in setting of dysphagia  Will need pulmonary rehab at [facility].    Pathological fracture due to osteoporosis 7/6/2020    Retinal histoplasmosis     Secondary pulmonary arterial hypertension 7/3/2018    Secondary to emphysema and chronic respiratory failure, mild.       Past Surgical History:   Procedure Laterality Date    BRONCHOSCOPY WITH FLUOROSCOPY N/A 10/28/2019    Procedure: BRONCHOSCOPY, WITH FLUOROSCOPY;  Surgeon: Brooklynn Ruiz MD;  Location: [facility] OR [address] [place] [address];  Service: Endoscopy;  Laterality: N/A;    HAND SURGERY         Review of patient's allergies indicates:   Allergen Reactions    Albuterol     Epinephrine     Ipratropium        Medications:  Continuous Infusions:  Scheduled Meds:   ascorbic acid (vitamin C)  500 mg Oral BID    azelastine  1 spray Nasal BID    busPIRone  5 mg Oral BID    calcium carbonate  500 mg Oral Daily    citalopram  20 mg Oral Daily    diltiaZEM  120 mg Oral Daily    enoxparin  40 mg Subcutaneous Daily    fluticasone furoate-vilanteroL  1 puff Inhalation Daily    fluticasone propionate  2 spray Each Nostril Daily    guaiFENesin  600 mg Oral BID    meloxicam  7.5 mg Oral Daily    oxybutynin  5 mg Oral Daily    polyethylene glycol  17 g Oral Daily    predniSONE  40 mg Oral Daily    senna-docusate 8.6-50 mg  1 tablet Oral BID    sodium chloride 3%  4 mL Nebulization TID    tiotropium bromide  2 puff Inhalation Daily     PRN Meds:acetaminophen, dextrose  10%, dextrose 10%, glucagon (human recombinant), glucose, glucose, influenza 65up-adj, insulin aspart U-100, levalbuterol, lorazepam, melatonin, morphine, ondansetron, sodium chloride, sodium chloride 0.9%    Family History       Problem Relation (Age of Onset)    Colon cancer Mother, Father    Macular degeneration Mother    Stroke Father          Tobacco Use    Smoking status: Former     Current packs/day: 0.00     Average packs/day: 1 pack/day for 36.0 years (36.0 ttl pk-yrs)     Types: Cigarettes     Start date: 1980     Quit date: 2016     Years since quittin.1    Smokeless tobacco: Never    Tobacco comments:     pt states she does not remember date   Substance and Sexual Activity    Alcohol use: Yes     Alcohol/week: 2.0 standard drinks of alcohol     Types: 2 Glasses of wine per week     Comment: 1-2 glasses a day     Drug use: No    Sexual activity: Yes     Partners: Male       Review of Systems   Constitutional:  Positive for fatigue.   HENT:  Negative for congestion.    Respiratory:  Positive for cough and shortness of breath.    Gastrointestinal: Negative.    Musculoskeletal: Negative.    Skin:  Positive for pallor.   Neurological:  Positive for weakness.   Psychiatric/Behavioral:  The patient is nervous/anxious.      Objective:     Vital Signs (Most Recent):  Temp: 98.5 °F (36.9 °C) (10/17/23 1132)  Pulse: 87 (10/17/23 1132)  Resp: 18 (10/17/23 1132)  BP: (!) 142/68 (10/17/23 1132)  SpO2: 95 % (10/17/23 1132) Vital Signs (24h Range):  Temp:  [97.7 °F (36.5 °C)-98.5 °F (36.9 °C)] 98.5 °F (36.9 °C)  Pulse:  [] 87  Resp:  [17-20] 18  SpO2:  [93 %-99 %] 95 %  BP: (135-168)/(67-75) 142/68     Weight: 59.8 kg (131 lb 13.4 oz)  Body mass index is 20.05 kg/m².       Physical Exam  Vitals and nursing note reviewed.   Constitutional:       General: She is not in acute distress.  Cardiovascular:      Rate and Rhythm: Normal rate and regular rhythm.   Pulmonary:      Comments: Tachypnea,  labored,  supplemental oxygen   Skin:     General: Skin is warm and dry.   Neurological:      Mental Status: She is alert.      Motor: Weakness present.   Psychiatric:         Behavior: Behavior normal.         Thought Content: Thought content normal.         Judgment: Judgment normal.      Comments: Appears anxious           Review of Symptoms      Symptom Assessment (ESAS 0-10 Scale)  Pain:  0  Dyspnea:  0  Anxiety:  0  Nausea:  0  Depression:  0  Anorexia:  0  Fatigue:  0  Insomnia:  0  Restlessness:  0  Agitation:  0     CAM / Delirium:  Negative  Constipation:  Negative  Diarrhea:  Negative    Anxiety:  Is nervous/anxious    Bowel Management Plan (BMP):  Yes      Pain Assessment:  OME in 24 hours:  0  Location(s):      Living Arrangements:  Lives with spouse    Psychosocial/Cultural:   See Palliative Psychosocial Note: Yes   45 years, together for 55 yrs,   and mother,one son, 2 grandchildren - granddaughters.  Enjoyed shopping and loves being with family.  Continues to live to see her granddaughters   **Primary  to Follow**  Palliative Care  Consult: Yes    Spiritual:  F - Myranda and Belief:  Anglican   A - Address in Care:  [place] to seeing  and having  visits. Eucharistic  following         Advance Care Planning   Advance Directives:   Living Will: No        Oral Declaration: No    LaPOST: No    Do Not Resuscitate Status: No    Medical Power of : No        Oral Declaration: No      Decision Making:  Patient answered questions and Family answered questions  Goals of Care: What is most important right now is to focus on symptom/pain control. Accordingly, we have decided that the best plan to meet the patient's goals includes continuing with treatment.         Significant Labs: All pertinent labs within the past 24 hours have been reviewed.  CBC:   Recent Labs   Lab 10/17/23  0226   WBC 9.91   HGB 12.4   HCT 38.9   MCV 94           BMP:  Recent Labs   Lab 10/17/23  0226   GLU 93   *   K 3.4*   CL 95   CO2 33*   BUN 19   CREATININE 0.5   CALCIUM 9.1   MG 2.0       LFT:  Lab Results   Component Value Date    AST 19 10/17/2023    ALKPHOS 64 10/17/2023    BILITOT 0.3 10/17/2023     Albumin:   Albumin   Date Value Ref Range Status   10/17/2023 3.2 (L) 3.5 - 5.2 g/dL Final     Protein:   Total Protein   Date Value Ref Range Status   10/17/2023 5.5 (L) 6.0 - 8.4 g/dL Final     Lactic acid:   Lab Results   Component Value Date    LACTATE 1.0 09/20/2022    LACTATE 1.1 07/02/2018       Significant Imaging: I have reviewed all pertinent imaging results/findings within the past 24 hours.  10/9/23 CXR no acute processes findings suggestive of underlying COPD/empjysema

## 2023-10-17 NOTE — PROGRESS NOTES
[facility] - Intensive Care ([address] Medicine  Progress Note    Patient Name: Estefani Fam  MRN: [id number]  Patient Class: IP- Inpatient   Admission Date: 10/9/2023  Length of Stay: 7 days  Attending Physician: Pollo Siegel MD  Primary Care Provider: Dustin Khan MD    Subjective:     Principal Problem: COPD exacerbation    HPI:  Ms. Fam is a 74 y/o F with PMHx of COPD c/b centrilobular emphysema, recurrent pneumonia, pHTN, former smoker, GERD, Afib on diltiazem not AC, and anxiety who presented to [facility] ED for shortness of breath in the last week. During this time she felt congested with a cough but had not produced sputum. She was previously hospitalized in August for an exacerbation and she required a prolonged steroid course. She denied fevers, chills, N/V, chest pain, new weakness, new fatigue. At baseline she is on 3L NC with dyspnea on exertion.    In ED, given O2, nebs, mag and ceftriaxone. CXR with no evidence of PNA. No leukocytosos. VBG with pH 7.34, CO2 60.6. Pt admitted for treatment of COPD exacerbation.       Overview/Hospital Course:  Patient admitted for a COPD exacerbation. Started on Zosyn in the setting of recent hospitalization and IV abx. Oxygen weaned to maintain SpO2 88-92%. Scheduled duonebs w/ PRN available in addition to IV SoluMedrol. Hyperglycemia 2/2 steroids on sliding scale insulin. Antibiotics, steroids and breathing treatments de-escalated as tolerated. Palliative consulted for patient's dyspnea and agreeable to oral morphine. Patient considering hospice; [facility] referred.      Interval History: NAEON. On 3L NC. Developed some post-nasal drip. Feels like a 6 out of 10 and does not feel ready for discharge. Patient is considering [facility] hospice.     Review of Systems   Constitutional:  Negative for chills and fever.   Respiratory:  Positive for cough and shortness of breath. Negative for chest tightness and wheezing.    Cardiovascular:   Negative for chest pain, palpitations and leg swelling.   Gastrointestinal:  Negative for abdominal distention, constipation, diarrhea, nausea and vomiting.   Genitourinary:  Negative for dysuria.   Musculoskeletal:  Negative for back pain.   Neurological:  Negative for dizziness and headaches.   Psychiatric/Behavioral:  Negative for agitation and confusion.      Objective:     Vital Signs (Most Recent):  Temp: 98.5 °F (36.9 °C) (10/17/23 1132)  Pulse: 87 (10/17/23 1132)  Resp: 18 (10/17/23 1132)  BP: (!) 142/68 (10/17/23 1132)  SpO2: 95 % (10/17/23 1132) Vital Signs (24h Range):  Temp:  [97.7 °F (36.5 °C)-98.5 °F (36.9 °C)] 98.5 °F (36.9 °C)  Pulse:  [] 87  Resp:  [17-20] 18  SpO2:  [93 %-99 %] 95 %  BP: (135-168)/(67-75) 142/68     Weight: 59.8 kg (131 lb 13.4 oz)  Body mass index is 20.05 kg/m².    Intake/Output Summary (Last 24 hours) at 10/17/2023 1403  Last data filed at 10/17/2023 1049  Gross per 24 hour   Intake 240 ml   Output 900 ml   Net -660 ml         Physical Exam  Constitutional:       General: She is not in acute distress.  HENT:      Head: Normocephalic and atraumatic.      Nose: Nose normal.      Comments: Nasal canula in place     Mouth/Throat:      Mouth: Mucous membranes are dry.      Pharynx: Oropharynx is clear.      Comments: Lips pursed  Eyes:      General:         Right eye: No discharge.         Left eye: No discharge.      Extraocular Movements: Extraocular movements intact.      Conjunctiva/sclera: Conjunctivae normal.   Cardiovascular:      Rate and Rhythm: Normal rate and regular rhythm.      Heart sounds: No murmur heard.  Pulmonary:      Effort: Respiratory distress present.      Breath sounds: No wheezing.      Comments: Aeration improved  Chest:      Chest wall: No tenderness.   Abdominal:      General: Abdomen is flat.      Palpations: Abdomen is soft.      Tenderness: There is no abdominal tenderness.   Musculoskeletal:         General: No swelling. Normal range of motion.  "     Right lower leg: No edema.      Left lower leg: No edema.   Skin:     General: Skin is warm and dry.      Coloration: Skin is not pale.   Neurological:      General: No focal deficit present.      Mental Status: She is alert. Mental status is at baseline.             Significant Labs: All pertinent labs within the past 24 hours have been reviewed.    Significant Imaging: I have reviewed all pertinent imaging results/findings within the past 24 hours.      Assessment/Plan:      Advanced care planning/counseling discussion        Acute on chronic respiratory failure with hypoxia and hypercapnia  Patient with Hypercapnic and Hypoxic Respiratory failure which is Acute on chronic.  she is on home oxygen at 2.5-3 LPM. Supplemental oxygen was provided and noted-       .   Signs/symptoms of respiratory failure include- tachypnea, increased work of breathing and respiratory distress. Contributing diagnoses includes - COPD Labs and images were reviewed. Patient Has not had a recent ABG. Will treat underlying causes and adjust management of respiratory failure.      -- See "COPD with acute exacerbation"    Dyspnea  2/2 COPD  See "palliative care encounter"    Anxiety  Continue home med    Palliative care encounter  Started PO morphine for dyspnea.  Considering dispo home vs hospice with [facility]      Abnormal finding on urinalysis  Pt requesting UA on admission. Denied dysuria but endorsed increased frequency. However, pt with hx of OAB. UA with bacteria and 3+ LEs. Cultures with no predominant bacteria.    -- Was on abx for COPD exacerbation    Atrial fibrillation with RVR  Patient with Paroxysmal (<7 days) atrial fibrillation which is controlled currently with Calcium Channel Blocker. Patient is currently in sinus rhythm.[id number] Score: 2.  Eliquis contraindicated due to ocular condition per cards.    In Afib 10/14 with spontaneous conversion the same day     -- Telemetry  -- Continue home dilt    Chronic " "obstructive pulmonary disease with acute exacerbation  On COPD pathway. Wears 2.5 to 3L NC at home. At baseline, mobility is limited by SOB and is unable to walk ten steps without feeling SOB. VBG with pCO2 61.     S/p methylpred and IV mag in ED    -- Finished 5 days of Abx  -- PO prednisone  -- Levalbuterol nebs q4h PRN while awake  -- Mucinex, chest PT  -- Home Breo  -- Supplemental O2 for SaO2 > 88%   -- Pt saw Dr. Ruiz outpatient, turned to palliative care  -- Palliative consulted for evaluation of oral morphine  -- Discussing hospice with [facility]    Former heavy tobacco smoker  Former smoker. 36 py history.    Acute on chronic respiratory failure with hypoxia  Patient with Hypercapnic and Hypoxic Respiratory failure which is Acute on chronic.  she is on home oxygen at 2.5-3 LPM. Supplemental oxygen was provided and noted-      .   Signs/symptoms of respiratory failure include- tachypnea, increased work of breathing and respiratory distress. Contributing diagnoses includes - COPD Labs and images were reviewed. Patient Has not had a recent ABG. Will treat underlying causes and adjust management of respiratory failure.     -- See "COPD with acute exacerbation"      VTE Risk Mitigation (From admission, onward)           Ordered     Place sequential compression device  Until discontinued         10/10/23 1818     enoxaparin injection 40 mg  Daily         10/09/23 1923                    Discharge Planning   RENARD: 10/18/2023     Code Status: DNR   Is the patient medically ready for discharge?:     Reason for patient still in hospital (select all that apply): Treatment and Pending disposition  Discharge Plan A: Hospice/home        Melany Chin MD  Department of Hospital Medicine   [facility] - Intensive Care ([place] [id number])    "

## 2023-10-17 NOTE — NURSING
"Pt reported feeling like she "catched a cold". Pt reported loose clear nasal drainage. Pt decided to take her Azelastine nasal spray dose tonight which she has been refusing a couple of times since yesterday. Pt does appear to have nasal drip and dyspnea. Pt requested for her PRN Morphine. Pt feels uncomfortable from blowing her nose a lot. Med Team 1 informed via phone call.   "

## 2023-10-17 NOTE — ASSESSMENT & PLAN NOTE
Palliative medicine  Follow up  for symptom management - dyspnea as discussed with primary team Dr Chin .  for Mrs. Fam a 72 yo lady with pmh of COPD who presented to [facility] with chief complaint of shortness of breath.   Admitted to hospital medicine with COPD exacerbation.  At time of this encounter the patient is awake, alert and oriented x3,  No acute distress, supplemental oxygen in use.  Mrs. Fam reports good relieve of dsypnea with oral morphine solution.      Advance Care Planning     - no ACP documents received   - Mrs. Fam appears decisional   - next of kin for medical decisions should she loose this ability is:   kieran Fam [phone]  - DNR per primary team   - LaPOST discussed with full understanding voiced by patient and family.    - LaPOST initiated and to be filed by the attending     Goals of Care  - pal med APRN and [employer] returned to bedside.   - No change in previous goals of care - will transition to hospice care.   - She has questions about continuing medications specifically denosumab- Prolia.  Recommended speaking to hospice representative regarding specific medications    - Requesting informational visit with [facility] and [facility]  - - Hospice education  Reinforced.    - emotional support provided                Symptom Management   Dyspnea   - related to COPD  - at rest and on exertion  - supplemental oxygen in use   - sats 95%  - use of oral morphine solution has been effective -   - intensity 5/10   As only required two doses in last 24 hrs.   - mostly bed or wheelchair bound   - discussed strategies to reduce shortness of breath  Use of fan at bedside, bundling activity to avoid exertion and breathlessness    Recommendations  -  Continue oral morphine solution 2.5 mg by mouth every 4 hrs as needed for dyspnea     Anxiety   - reports generalized anxiety that I worsened when feeling short of breath   - continue current medical  management     COPD with Acute Exacerbation/Acute on Chronic Hypoxic Respiratory Failure/ Afib with RVR/ UTI  - managed per primary team and specialty consultants  - continue current medical management   - patient considering home hospice    - pal med following for symptom management and goals of care see above     Plan/Recommendations  - Continue current plan of care   - consult / for informational visit with [facility]   - if patient does not transition to home hospice - l f/u in outpatient pal med clinic - scheduled for 10/20/23 11 AM virtual visit as patient's request   -Primary attending to sign and submit  LaPOST.     Dr. Siegel  primary team notified of the above.      Thank you for consult and opportunity to participate in Mrs. Cortés's care.

## 2023-10-17 NOTE — PLAN OF CARE
Advance Care Planning   [facility] - Intensive Care ([facility]  Palliative Care       Patient Name: Estefani Fam  MRN: [id number]  Admission Date: 10/9/2023  Hospital Length of Stay: 7 days  Code Status: DNR   Attending Provider: Pollo Siegel MD  Palliative Care Provider: STEPHAN Grijalva   Primary Care Physician: Dustin Khan MD  Principal Problem:<principal problem not specified>       Palliative provider and this  visited patient in room. Patient's son Osvaldo arrived shortly after. Patient signed electronic lapost form. Patient and Osvaldo stated they are interested in speaking with reps from [facility] and [facility]. Patient wants to know if she can continue her bi-annual injection, have her specific inhalers, and attend her eye doctor appointment next month. This  informed CHRIS Prajapati , patient's preference for hospice agencies.  will follow for support.     Star Beal LCSW  Palliative Medicine

## 2023-10-17 NOTE — PLAN OF CARE
Problem: Adult Inpatient Plan of Care  Goal: Plan of Care Review  Outcome: Ongoing, Progressing  Goal: Optimal Comfort and Wellbeing  Outcome: Ongoing, Progressing     Problem: Adjustment to Illness COPD (Chronic Obstructive Pulmonary Disease)  Goal: Optimal Chronic Illness Coping  Outcome: Ongoing, Progressing     Problem: Functional Ability Impaired COPD (Chronic Obstructive Pulmonary Disease)  Goal: Optimal Level of Functional [place]  Outcome: Ongoing, Progressing     Problem: Respiratory Compromise COPD (Chronic Obstructive Pulmonary Disease)  Goal: Effective Oxygenation and Ventilation  Outcome: Ongoing, Progressing     Problem: Skin Injury Risk Increased  Goal: Skin Health and Integrity  Outcome: Ongoing, Progressing     Problem: Fall Injury Risk  Goal: Absence of Fall and Fall-Related Injury  Outcome: Ongoing, Progressing

## 2023-10-17 NOTE — ASSESSMENT & PLAN NOTE
On COPD pathway. Wears 2.5 to 3L NC at home. At baseline, mobility is limited by SOB and is unable to walk ten steps without feeling SOB. VBG with pCO2 61.     S/p methylpred and IV mag in ED    -- Finished 5 days of Abx  -- PO prednisone  -- Levalbuterol nebs q4h PRN while awake  -- Mucinex, chest PT  -- Home Breo  -- Supplemental O2 for SaO2 > 88%   -- Pt saw Dr. Ruiz outpatient, turned to palliative care  -- Palliative consulted for evaluation of oral morphine  -- Discussing hospice with [facility]

## 2023-10-17 NOTE — PLAN OF CARE
[facility] - Intensive Care ([facility])  Discharge Reassessment    Primary Care Provider: Dustin Khan MD    Expected Discharge Date: 10/18/2023    Reassessment (most recent)       Discharge Reassessment - 10/17/23 1356          Discharge Reassessment    Assessment Type Discharge Planning Reassessment (P)      Did the patient's condition or plan change since previous assessment? Yes (P)      Discharge Plan discussed with: Patient (P)      Communicated RENARD with patient/caregiver Yes (P)      Discharge Plan A Hospice/home (P)      Discharge Plan B Home Health (P)      Transition of Care Barriers None (P)      Why the patient remains in the hospital Requires continued medical care (P)         Post-Acute Status    Post-Acute Authorization Hospice (P)      Hospice Status Referrals Sent (P)      Coverage MEDICARE - MEDICARE PART A & B (P)                                        KURTIS Parham, LMSW  [facility]  Case Management  Ext. [phone]

## 2023-10-17 NOTE — SUBJECTIVE & OBJECTIVE
Interval History: NAEON. On 3L NC. Developed some post-nasal drip. Feels like a 6 out of 10 and does not feel ready for discharge. Patient is considering [facility].     Review of Systems   Constitutional:  Negative for chills and fever.   Respiratory:  Positive for cough and shortness of breath. Negative for chest tightness and wheezing.    Cardiovascular:  Negative for chest pain, palpitations and leg swelling.   Gastrointestinal:  Negative for abdominal distention, constipation, diarrhea, nausea and vomiting.   Genitourinary:  Negative for dysuria.   Musculoskeletal:  Negative for back pain.   Neurological:  Negative for dizziness and headaches.   Psychiatric/Behavioral:  Negative for agitation and confusion.      Objective:     Vital Signs (Most Recent):  Temp: 98.5 °F (36.9 °C) (10/17/23 1132)  Pulse: 87 (10/17/23 1132)  Resp: 18 (10/17/23 1132)  BP: (!) 142/68 (10/17/23 1132)  SpO2: 95 % (10/17/23 1132) Vital Signs (24h Range):  Temp:  [97.7 °F (36.5 °C)-98.5 °F (36.9 °C)] 98.5 °F (36.9 °C)  Pulse:  [] 87  Resp:  [17-20] 18  SpO2:  [93 %-99 %] 95 %  BP: (135-168)/(67-75) 142/68     Weight: 59.8 kg (131 lb 13.4 oz)  Body mass index is 20.05 kg/m².    Intake/Output Summary (Last 24 hours) at 10/17/2023 1403  Last data filed at 10/17/2023 1049  Gross per 24 hour   Intake 240 ml   Output 900 ml   Net -660 ml         Physical Exam  Constitutional:       General: She is not in acute distress.  HENT:      Head: Normocephalic and atraumatic.      Nose: Nose normal.      Comments: Nasal canula in place     Mouth/Throat:      Mouth: Mucous membranes are dry.      Pharynx: Oropharynx is clear.      Comments: Lips pursed  Eyes:      General:         Right eye: No discharge.         Left eye: No discharge.      Extraocular Movements: Extraocular movements intact.      Conjunctiva/sclera: Conjunctivae normal.   Cardiovascular:      Rate and Rhythm: Normal rate and regular rhythm.      Heart sounds: No murmur  heard.  Pulmonary:      Effort: Respiratory distress present.      Breath sounds: No wheezing.      Comments: Aeration improved  Chest:      Chest wall: No tenderness.   Abdominal:      General: Abdomen is flat.      Palpations: Abdomen is soft.      Tenderness: There is no abdominal tenderness.   Musculoskeletal:         General: No swelling. Normal range of motion.      Right lower leg: No edema.      Left lower leg: No edema.   Skin:     General: Skin is warm and dry.      Coloration: Skin is not pale.   Neurological:      General: No focal deficit present.      Mental Status: She is alert. Mental status is at baseline.             Significant Labs: All pertinent labs within the past 24 hours have been reviewed.    Significant Imaging: I have reviewed all pertinent imaging results/findings within the past 24 hours.

## 2023-10-18 VITALS
BODY MASS INDEX: 19.98 KG/M2 | RESPIRATION RATE: 20 BRPM | DIASTOLIC BLOOD PRESSURE: 59 MMHG | TEMPERATURE: 98 F | WEIGHT: 131.81 LBS | OXYGEN SATURATION: 95 % | SYSTOLIC BLOOD PRESSURE: 134 MMHG | HEIGHT: 68 IN | HEART RATE: 89 BPM

## 2023-10-18 LAB
ALBUMIN SERPL BCP-MCNC: 3.2 G/DL (ref 3.5–5.2)
ALP SERPL-CCNC: 59 U/L (ref 55–135)
ALT SERPL W/O P-5'-P-CCNC: 27 U/L (ref 10–44)
ANION GAP SERPL CALC-SCNC: 6 MMOL/L (ref 8–16)
AST SERPL-CCNC: 16 U/L (ref 10–40)
BASOPHILS # BLD AUTO: 0.02 K/UL (ref 0–0.2)
BASOPHILS # BLD AUTO: 0.02 K/UL (ref 0–0.2)
BASOPHILS NFR BLD: 0.2 % (ref 0–1.9)
BASOPHILS NFR BLD: 0.2 % (ref 0–1.9)
BILIRUB SERPL-MCNC: 0.3 MG/DL (ref 0.1–1)
BUN SERPL-MCNC: 13 MG/DL (ref 8–23)
CALCIUM SERPL-MCNC: 9.1 MG/DL (ref 8.7–10.5)
CHLORIDE SERPL-SCNC: 99 MMOL/L (ref 95–110)
CO2 SERPL-SCNC: 33 MMOL/L (ref 23–29)
CREAT SERPL-MCNC: 0.5 MG/DL (ref 0.5–1.4)
DIFFERENTIAL METHOD: ABNORMAL
DIFFERENTIAL METHOD: ABNORMAL
EOSINOPHIL # BLD AUTO: 0.1 K/UL (ref 0–0.5)
EOSINOPHIL # BLD AUTO: 0.2 K/UL (ref 0–0.5)
EOSINOPHIL NFR BLD: 1.3 % (ref 0–8)
EOSINOPHIL NFR BLD: 1.7 % (ref 0–8)
ERYTHROCYTE [DISTWIDTH] IN BLOOD BY AUTOMATED COUNT: 12.7 % (ref 11.5–14.5)
ERYTHROCYTE [DISTWIDTH] IN BLOOD BY AUTOMATED COUNT: 12.8 % (ref 11.5–14.5)
EST. GFR  (NO RACE VARIABLE): >60 ML/MIN/1.73 M^2
GLUCOSE SERPL-MCNC: 98 MG/DL (ref 70–110)
HCT VFR BLD AUTO: 38.6 % (ref 37–48.5)
HCT VFR BLD AUTO: 38.9 % (ref 37–48.5)
HGB BLD-MCNC: 12.4 G/DL (ref 12–16)
HGB BLD-MCNC: 12.5 G/DL (ref 12–16)
IMM GRANULOCYTES # BLD AUTO: 0.06 K/UL (ref 0–0.04)
IMM GRANULOCYTES # BLD AUTO: 0.08 K/UL (ref 0–0.04)
IMM GRANULOCYTES NFR BLD AUTO: 0.6 % (ref 0–0.5)
IMM GRANULOCYTES NFR BLD AUTO: 0.8 % (ref 0–0.5)
LYMPHOCYTES # BLD AUTO: 1.1 K/UL (ref 1–4.8)
LYMPHOCYTES # BLD AUTO: 1.3 K/UL (ref 1–4.8)
LYMPHOCYTES NFR BLD: 11 % (ref 18–48)
LYMPHOCYTES NFR BLD: 12.1 % (ref 18–48)
MAGNESIUM SERPL-MCNC: 2.1 MG/DL (ref 1.6–2.6)
MCH RBC QN AUTO: 29.9 PG (ref 27–31)
MCH RBC QN AUTO: 30 PG (ref 27–31)
MCHC RBC AUTO-ENTMCNC: 32.1 G/DL (ref 32–36)
MCHC RBC AUTO-ENTMCNC: 32.1 G/DL (ref 32–36)
MCV RBC AUTO: 93 FL (ref 82–98)
MCV RBC AUTO: 93 FL (ref 82–98)
MONOCYTES # BLD AUTO: 1.3 K/UL (ref 0.3–1)
MONOCYTES # BLD AUTO: 1.4 K/UL (ref 0.3–1)
MONOCYTES NFR BLD: 12.4 % (ref 4–15)
MONOCYTES NFR BLD: 13.3 % (ref 4–15)
NEUTROPHILS # BLD AUTO: 7.6 K/UL (ref 1.8–7.7)
NEUTROPHILS # BLD AUTO: 7.8 K/UL (ref 1.8–7.7)
NEUTROPHILS NFR BLD: 73 % (ref 38–73)
NEUTROPHILS NFR BLD: 73.4 % (ref 38–73)
NRBC BLD-RTO: 0 /100 WBC
NRBC BLD-RTO: 0 /100 WBC
PHOSPHATE SERPL-MCNC: 3.1 MG/DL (ref 2.7–4.5)
PLATELET # BLD AUTO: 331 K/UL (ref 150–450)
PLATELET # BLD AUTO: 338 K/UL (ref 150–450)
PMV BLD AUTO: 8.5 FL (ref 9.2–12.9)
PMV BLD AUTO: 8.6 FL (ref 9.2–12.9)
POCT GLUCOSE: 114 MG/DL (ref 70–110)
POCT GLUCOSE: 125 MG/DL (ref 70–110)
POCT GLUCOSE: 87 MG/DL (ref 70–110)
POTASSIUM SERPL-SCNC: 4.1 MMOL/L (ref 3.5–5.1)
PROT SERPL-MCNC: 5.6 G/DL (ref 6–8.4)
RBC # BLD AUTO: 4.15 M/UL (ref 4–5.4)
RBC # BLD AUTO: 4.17 M/UL (ref 4–5.4)
SODIUM SERPL-SCNC: 138 MMOL/L (ref 136–145)
WBC # BLD AUTO: 10.38 K/UL (ref 3.9–12.7)
WBC # BLD AUTO: 10.69 K/UL (ref 3.9–12.7)

## 2023-10-18 PROCEDURE — 99239 PR HOSPITAL DISCHARGE DAY,>30 MIN: ICD-10-PCS | Mod: GC,,, | Performed by: INTERNAL MEDICINE

## 2023-10-18 PROCEDURE — 25000242 PHARM REV CODE 250 ALT 637 W/ HCPCS

## 2023-10-18 PROCEDURE — 99900035 HC TECH TIME PER 15 MIN (STAT)

## 2023-10-18 PROCEDURE — 25000003 PHARM REV CODE 250

## 2023-10-18 PROCEDURE — 36415 COLL VENOUS BLD VENIPUNCTURE: CPT

## 2023-10-18 PROCEDURE — 84100 ASSAY OF PHOSPHORUS: CPT

## 2023-10-18 PROCEDURE — 85025 COMPLETE CBC W/AUTO DIFF WBC: CPT | Mod: 91

## 2023-10-18 PROCEDURE — 83735 ASSAY OF MAGNESIUM: CPT

## 2023-10-18 PROCEDURE — 99239 HOSP IP/OBS DSCHRG MGMT >30: CPT | Mod: GC,,, | Performed by: INTERNAL MEDICINE

## 2023-10-18 PROCEDURE — 27000221 HC OXYGEN, UP TO 24 HOURS

## 2023-10-18 PROCEDURE — 85025 COMPLETE CBC W/AUTO DIFF WBC: CPT | Performed by: INTERNAL MEDICINE

## 2023-10-18 PROCEDURE — 63600175 PHARM REV CODE 636 W HCPCS: Performed by: FAMILY MEDICINE

## 2023-10-18 PROCEDURE — 63600175 PHARM REV CODE 636 W HCPCS

## 2023-10-18 PROCEDURE — 94640 AIRWAY INHALATION TREATMENT: CPT

## 2023-10-18 PROCEDURE — 94761 N-INVAS EAR/PLS OXIMETRY MLT: CPT

## 2023-10-18 PROCEDURE — 25000003 PHARM REV CODE 250: Performed by: STUDENT IN AN ORGANIZED HEALTH CARE EDUCATION/TRAINING PROGRAM

## 2023-10-18 PROCEDURE — 25000242 PHARM REV CODE 250 ALT 637 W/ HCPCS: Performed by: STUDENT IN AN ORGANIZED HEALTH CARE EDUCATION/TRAINING PROGRAM

## 2023-10-18 PROCEDURE — 94664 DEMO&/EVAL PT USE INHALER: CPT

## 2023-10-18 PROCEDURE — 80053 COMPREHEN METABOLIC PANEL: CPT

## 2023-10-18 PROCEDURE — 90694 VACC AIIV4 NO PRSRV 0.5ML IM: CPT | Performed by: FAMILY MEDICINE

## 2023-10-18 PROCEDURE — 36415 COLL VENOUS BLD VENIPUNCTURE: CPT | Performed by: INTERNAL MEDICINE

## 2023-10-18 PROCEDURE — G0008 ADMIN INFLUENZA VIRUS VAC: HCPCS | Performed by: FAMILY MEDICINE

## 2023-10-18 PROCEDURE — 90471 IMMUNIZATION ADMIN: CPT | Performed by: FAMILY MEDICINE

## 2023-10-18 RX ORDER — ASCORBIC ACID 500 MG
500 TABLET ORAL 2 TIMES DAILY
Qty: 60 TABLET | Refills: 0 | Status: SHIPPED | OUTPATIENT
Start: 2023-10-18

## 2023-10-18 RX ORDER — PREDNISONE 10 MG/1
TABLET ORAL
Qty: 13 TABLET | Refills: 0 | Status: SHIPPED | OUTPATIENT
Start: 2023-10-19 | End: 2023-10-28

## 2023-10-18 RX ORDER — PREDNISONE 20 MG/1
20 TABLET ORAL DAILY
Status: DISCONTINUED | OUTPATIENT
Start: 2023-10-18 | End: 2023-10-18 | Stop reason: HOSPADM

## 2023-10-18 RX ORDER — OXYBUTYNIN CHLORIDE 5 MG/1
5 TABLET, EXTENDED RELEASE ORAL DAILY
Qty: 30 TABLET | Refills: 11 | Status: SHIPPED | OUTPATIENT
Start: 2023-10-18 | End: 2024-10-17

## 2023-10-18 RX ORDER — MORPHINE SULFATE ORAL SOLUTION 10 MG/5ML
2.5 SOLUTION ORAL EVERY 4 HOURS PRN
Qty: 30 ML | Refills: 0 | Status: SHIPPED | OUTPATIENT
Start: 2023-10-18

## 2023-10-18 RX ORDER — CITALOPRAM 20 MG/1
20 TABLET, FILM COATED ORAL DAILY
Qty: 90 TABLET | Refills: 3 | Status: SHIPPED | OUTPATIENT
Start: 2023-10-18 | End: 2024-10-17

## 2023-10-18 RX ORDER — PREDNISONE 10 MG/1
10 TABLET ORAL DAILY
Qty: 5 TABLET | Refills: 0 | Status: SHIPPED | OUTPATIENT
Start: 2023-10-23 | End: 2023-10-18 | Stop reason: HOSPADM

## 2023-10-18 RX ORDER — PREDNISONE 20 MG/1
20 TABLET ORAL DAILY
Qty: 4 TABLET | Refills: 0 | Status: SHIPPED | OUTPATIENT
Start: 2023-10-19 | End: 2023-10-18 | Stop reason: SDUPTHER

## 2023-10-18 RX ADMIN — CITALOPRAM HYDROBROMIDE 20 MG: 20 TABLET ORAL at 08:10

## 2023-10-18 RX ADMIN — SENNOSIDES AND DOCUSATE SODIUM 1 TABLET: 50; 8.6 TABLET ORAL at 08:10

## 2023-10-18 RX ADMIN — FLUTICASONE PROPIONATE 100 MCG: 50 SPRAY, METERED NASAL at 08:10

## 2023-10-18 RX ADMIN — LORAZEPAM 1 MG: 2 INJECTION INTRAMUSCULAR; INTRAVENOUS at 02:10

## 2023-10-18 RX ADMIN — AZELASTINE 137 MCG: 1 SPRAY, METERED NASAL at 08:10

## 2023-10-18 RX ADMIN — CALCIUM CARBONATE (ANTACID) CHEW TAB 500 MG 500 MG: 500 CHEW TAB at 08:10

## 2023-10-18 RX ADMIN — DILTIAZEM HYDROCHLORIDE 120 MG: 120 CAPSULE, COATED, EXTENDED RELEASE ORAL at 08:10

## 2023-10-18 RX ADMIN — SODIUM CHLORIDE SOLN NEBU 3% 4 ML: 3 NEBU SOLN at 08:10

## 2023-10-18 RX ADMIN — INFLUENZA A VIRUS A/VICTORIA/4897/2022 IVR-238 (H1N1) ANTIGEN (FORMALDEHYDE INACTIVATED), INFLUENZA A VIRUS A/DARWIN/6/2021 IVR-227 (H3N2) ANTIGEN (FORMALDEHYDE INACTIVATED), INFLUENZA B VIRUS B/AUSTRIA/1359417/2021 BVR-26 ANTIGEN (FORMALDEHYDE INACTIVATED), INFLUENZA B VIRUS B/PHUKET/3073/2013 BVR-1B ANTIGEN (FORMALDEHYDE INACTIVATED) 0.5 ML: 15; 15; 15; 15 INJECTION, SUSPENSION INTRAMUSCULAR at 09:10

## 2023-10-18 RX ADMIN — BUSPIRONE HYDROCHLORIDE 5 MG: 5 TABLET ORAL at 08:10

## 2023-10-18 RX ADMIN — MELOXICAM 7.5 MG: 7.5 TABLET ORAL at 08:10

## 2023-10-18 RX ADMIN — PREDNISONE 20 MG: 20 TABLET ORAL at 08:10

## 2023-10-18 RX ADMIN — MORPHINE SULFATE 2.5 MG: 10 SOLUTION ORAL at 12:10

## 2023-10-18 RX ADMIN — GUAIFENESIN 600 MG: 600 TABLET, EXTENDED RELEASE ORAL at 08:10

## 2023-10-18 RX ADMIN — Medication 500 MG: at 08:10

## 2023-10-18 RX ADMIN — LEVALBUTEROL 1.25 MG: 1.25 SOLUTION, CONCENTRATE RESPIRATORY (INHALATION) at 08:10

## 2023-10-18 RX ADMIN — POLYETHYLENE GLYCOL 3350 17 G: 17 POWDER, FOR SOLUTION ORAL at 08:10

## 2023-10-18 RX ADMIN — MORPHINE SULFATE 2.5 MG: 10 SOLUTION ORAL at 07:10

## 2023-10-18 RX ADMIN — TIOTROPIUM BROMIDE INHALATION SPRAY 2 PUFF: 3.12 SPRAY, METERED RESPIRATORY (INHALATION) at 08:10

## 2023-10-18 NOTE — DISCHARGE SUMMARY
[facility] - Intensive Care ([place] [address])  [facility] Medicine  Discharge Summary      Patient Name: Estefani Fam  MRN: [id number]  GALO: [id number]  Patient Class: IP- Inpatient  Admission Date: 10/9/2023  Hospital Length of Stay: 8 days  Discharge Date and Time:  10/18/2023 8:55 AM  Attending Physician: Pollo Siegel MD   Discharging Provider: Melany Chin MD  Primary Care Provider: Dustin hKan MD  [place] [facility] [place] [place] Melany Chin MD  Primary Care Team[place] [facility]    HPI:   Ms. Fam is a 72 y/o F with PMHx of COPD c/b centrilobular emphysema, recurrent pneumonia, pHTN, former smoker, GERD, Afib on diltiazem not AC, and anxiety who [place] [facility] [place] shortness of breath in the last week. During this time she felt congested with a cough but had not produced sputum. She was previously hospitalized in August for an exacerbation and she required a prolonged steroid course. She denied fevers, chills, N/V, chest pain, new weakness, new fatigue. At baseline she is on 3L NC with dyspnea on exertion.    In ED, given O2, nebs, mag and ceftriaxone. CXR with no evidence of PNA. No leukocytosos. VBG with pH 7.34, CO2 60.6. Pt admitted for treatment of COPD exacerbation.       * No surgery found *      Hospital Course:   Patient admitted for a COPD exacerbation. Started on Zosyn in the setting of recent hospitalization and IV abx. Oxygen weaned to maintain SpO2 88-92%. Scheduled duonebs w/ PRN available in addition to IV SoluMedrol. Hyperglycemia 2/2 steroids on sliding scale insulin. Antibiotics, steroids and breathing treatments de-escalated as tolerated. Palliative consulted for patient's dyspnea and agreeable to oral morphine. Patient considering hospice; outpatient palliative appointment scheduled.        Review of Systems   Constitutional:  Negative for chills and fever.   Respiratory:  Positive for cough and shortness of breath. Negative for chest tightness and wheezing.     Cardiovascular:  Negative for chest pain, palpitations and leg swelling.   Gastrointestinal:  Negative for abdominal distention, constipation, diarrhea, nausea and vomiting.   Genitourinary:  Negative for dysuria.   Musculoskeletal:  Negative for back pain.   Neurological:  Negative for dizziness and headaches.   Psychiatric/Behavioral:  Negative for agitation and confusion.          Physical Exam  Constitutional:       General: She is not in acute distress.  HENT:      Head: Normocephalic and atraumatic.      Nose: Nose normal.      Comments: Nasal canula in place     Mouth/Throat:      Mouth: Mucous membranes are dry.      Pharynx: Oropharynx is clear.      Comments: Lips pursed  Eyes:      General:         Right eye: No discharge.         Left eye: No discharge.      Extraocular Movements: Extraocular movements intact.      Conjunctiva/sclera: Conjunctivae normal.   Cardiovascular:      Rate and Rhythm: Normal rate and regular rhythm.      Heart sounds: No murmur heard.  Pulmonary:      Effort: Respiratory distress present.      Breath sounds: No wheezing.      Comments: Aeration improved  Chest:      Chest wall: No tenderness.   Abdominal:      General: Abdomen is flat.      Palpations: Abdomen is soft.      Tenderness: There is no abdominal tenderness.   Musculoskeletal:         General: No swelling. Normal range of motion.      Right lower leg: No edema.      Left lower leg: No edema.   Skin:     General: Skin is warm and dry.      Coloration: Skin is not pale.   Neurological:      General: No focal deficit present.      Mental Status: She is alert. Mental status is at baseline.       Goals of Care Treatment Preferences:  Code Status: DNR    Health care agent:  Osvaldo Fam ()  Health care agent number: No value filed.       LaPOST: Yes  What is most important right now is to focus on symptom/pain control.  Accordingly, we have decided that the best plan to meet the patient's goals includes continuing  with treatment.      Consults:   Consults (From admission, onward)          Status Ordering Provider     [facility]  Once        Provider:  (Not yet assigned)    Completed ABA MCGINNIS     Inpatient consult to Palliative Care  Once        Provider:  (Not yet assigned)    Completed WINNIE CABRERA new Assessment & Plan notes have been filed under this hospital service since the last note was generated.  Service: Hospital Medicine    Final Active Diagnoses:    Diagnosis Date Noted POA    PRINCIPAL PROBLEM:  Chronic obstructive pulmonary disease with acute exacerbation [J44.1] 12/28/2018 Yes    Palliative care encounter [Z51.5] 10/13/2023 Not Applicable    Anxiety [F41.9] 10/13/2023 Unknown    Goals of care, counseling/discussion [Z71.89] 10/13/2023 Not Applicable    Dyspnea [R06.00] 10/13/2023 Unknown    Acute on chronic respiratory failure with hypoxia and hypercapnia [J96.21, J96.22] 10/13/2023 Unknown    Advanced care planning/counseling discussion [Z71.89] 10/13/2023 Not Applicable    Abnormal finding on urinalysis [R82.90] 10/12/2023 Yes    Atrial fibrillation with RVR [I48.91] 08/15/2023 Yes    Former heavy tobacco smoker [Z87.891] 07/02/2018 Not Applicable     Chronic    Acute on chronic respiratory failure with hypoxia [J96.21] 07/02/2018 Yes      Problems Resolved During this Admission:    Diagnosis Date Noted Date Resolved POA    COPD exacerbation [J44.1] 10/13/2023 10/17/2023 Yes    GERD (gastroesophageal reflux disease) [K21.9] 04/04/2022 10/09/2023 Yes     Chronic       Discharged Condition: stable    Disposition: Home or Self CareHome with palliative care follow up    Patient Instructions:      Ambulatory referral/consult to Outpatient Case Management   Referral Priority: Routine Referral Type: Consultation   Referral Reason: Specialty Services Required   Number of Visits Requested: 1       Significant Diagnostic Studies: N/A    Pending Diagnostic Studies:        None           Medications:  Reconciled Home Medications:      Medication List        START taking these medications      morphine 10 mg/5 mL solution  Take 1.3 mLs (2.6 mg total) by mouth every 4 (four) hours as needed (for dyspnea; recmomended by palliative care for severe COPD).     oxybutynin 5 MG Tr24  Commonly known as: DITROPAN-XL  Take 1 tablet (5 mg total) by mouth once daily.  Replaces: solifenacin 5 MG tablet     * predniSONE 10 MG tablet  Commonly known as: DELTASONE  Take 2 tablets (20 mg total) by mouth once daily for 4 days, THEN 1 tablet (10 mg total) once daily for 5 days.  Start taking on: October 19, 2023     * predniSONE 10 MG tablet  Commonly known as: DELTASONE  Take 1 tablet (10 mg total) by mouth once daily.  Start taking on: October 23, 2023           * This list has 2 medication(s) that are the same as other medications prescribed for you. Read the directions carefully, and ask your doctor or other care provider to review them with you.                CHANGE how you take these medications      ascorbic acid (vitamin C) 500 MG tablet  Commonly known as: VITAMIN C  Take 1 tablet (500 mg total) by mouth 2 (two) times daily.  What changed: when to take this     citalopram 20 MG tablet  Commonly known as: CeleXA  Take 1 tablet (20 mg total) by mouth once daily.  What changed: Another medication with the same name was removed. Continue taking this medication, and follow the directions you see here.            CONTINUE taking these medications      azelastine 137 mcg (0.1 %) nasal spray  Commonly known as: ASTELIN  1 spray (137 mcg total) by Nasal route 2 (two) times daily.     busPIRone 5 MG Tab  Commonly known as: BUSPAR  Take 1 tablet (5 mg total) by mouth 2 (two) times daily.     calcium carbonate 600 mg calcium (1,500 mg) Tab  Commonly known as: OS-STEPHANIE  Take 600 mg by mouth once daily.     CORICIDIN HBP COUGH AND COLD ORAL  Take 1 tablet by mouth daily as needed (Cold symptoms).      diltiaZEM 120 MG Cdcr  Commonly known as: DILACOR XR  Take 1 capsule (120 mg total) by mouth once daily.     fluticasone furoate-vilanteroL 200-25 mcg/dose Dsdv diskus inhaler  Commonly known as: BREO ELLIPTA  INHALE 1 PUFF INTO THE LUNGS DAILY     GUAIFENESIN DM ORAL  Take 1 tablet by mouth daily as needed (Congestion).     INCRUSE ELLIPTA 62.5 mcg/actuation inhalation capsule  Generic drug: umeclidinium  Inhale 62.5 mcg into the lungs once daily.     levalbuterol 0.63 mg/3 mL nebulizer solution  Commonly known as: XOPENEX  USE 1 VIAL IN NEBULIZER EVERY 8 HOURS AS NEEDED FOR WHEEZE Strength: 0.63 mg/3 mL     melatonin 3 mg tablet  Commonly known as: MELATIN  Take 2 tablets (6 mg total) by mouth nightly as needed for Insomnia.     multivit-iron-min-folic acid 3,500-18-0.4 unit-mg-mg Chew  Commonly known as: CENTRUM  Take 1 tablet by mouth once daily. Take Levofloxacin antibiotic at least 2 hours before multivitamin.     pantoprazole 40 MG tablet  Commonly known as: PROTONIX  Take 1 tablet (40 mg total) by mouth once daily.     pulse oximeter device  Commonly known as: pulse oximeter  by Apply Externally route 2 (two) times a day. Use twice daily at 8 AM and 3 PM and [facility] the [facility] as directed.     REFRESH OPHT  Place 1 drop into both eyes daily as needed (Dry eye).     sodium chloride 0.65 % nasal spray  Commonly known as: OCEAN  1 spray by Nasal route 2 (two) times daily.     UNABLE TO FIND  Omega XL - Take 1 tablet by mouth twice daily.            STOP taking these medications      hydrOXYzine pamoate 25 MG Cap  Commonly known as: VISTARIL     solifenacin 5 MG tablet  Commonly known as: VESICARE  Replaced by: oxybutynin 5 MG Tr24              Indwelling Lines/Drains at time of discharge:   Lines/Drains/Airways       Drain  Duration             Female External Urinary Catheter 10/09/23 2026 8 days                    Time spent on the discharge of patient: 45 minutes         Melany Chin  MD  Department of Hospital Medicine  [facility] - Intensive Care ([place]-[facility])

## 2023-10-18 NOTE — PLAN OF CARE
10/18/23 1239   Post-Acute Status   Post-Acute Authorization Hospice   Hospice Status Set-up Complete/Auth obtained  ([facility] hospice/home)   Coverage MEDICARE - MEDICARE PART A & B   Discharge Plan   Discharge Plan A Hospice/home   Discharge Plan B Home Health     SW received notification that [facility] hospice accepts patient for home hospice care. SW confirmed that N[facility] ([facility] hospice) met with the patient and family at bedside to review hospice services. SW sent hospice orders via careport. SW met with patient and spouse at the bedside to review dc plan and patient/family agreeable. Home hospice set-up complete.        SW will continue to follow.                  KURTIS Parham, LMSW  [facility]  Case Management  Ext. [phone]

## 2023-10-18 NOTE — ASSESSMENT & PLAN NOTE
On COPD pathway. Wears 2.5 to 3L NC at home. At baseline, mobility is limited by SOB and is unable to walk ten steps without feeling SOB. VBG with pCO2 61.     S/p methylpred and IV mag in ED    -- Finished 5 days of Abx  -- Home inhalers  -- Home O2  -- PRN PO morphine  -- PO prednisone taper  -- Palliative appointment 10/20 re: GOC, low-dose steroids, azithromycin

## 2023-10-18 NOTE — ASSESSMENT & PLAN NOTE
Patient with Paroxysmal (<7 days) atrial fibrillation which is controlled currently with Calcium Channel Blocker. Patient is currently in sinus rhythm.[id number] Score: 2.  Eliquis contraindicated due to ocular condition per cards.    In Afib 10/14 with spontaneous conversion the same day     -- Telemetry showed NSR since 10/14  -- Continue home dilt

## 2023-10-18 NOTE — PLAN OF CARE
Problem: Adult Inpatient Plan of Care  Goal: Plan of Care Review  Outcome: Ongoing, Progressing  Goal: Patient-Specific Goal (Individualized)  Outcome: Ongoing, Progressing  Goal: Absence of Hospital-Acquired Illness or Injury  Outcome: Ongoing, Progressing  Goal: Optimal Comfort and Wellbeing  Outcome: Ongoing, Progressing  Goal: Readiness for Transition of Care  Outcome: Ongoing, Progressing     Problem: Adjustment to Illness COPD (Chronic Obstructive Pulmonary Disease)  Goal: Optimal Chronic Illness Coping  Outcome: Ongoing, Progressing     Problem: Functional Ability Impaired COPD (Chronic Obstructive Pulmonary Disease)  Goal: Optimal Level of Functional [place]  Outcome: Ongoing, Progressing     Problem: Infection COPD (Chronic Obstructive Pulmonary Disease)  Goal: Absence of Infection Signs and Symptoms  Outcome: Ongoing, Progressing     Problem: Oral Intake Inadequate COPD (Chronic Obstructive Pulmonary Disease)  Goal: Improved Nutrition Intake  Outcome: Ongoing, Progressing     Problem: Respiratory Compromise COPD (Chronic Obstructive Pulmonary Disease)  Goal: Effective Oxygenation and Ventilation  Outcome: Ongoing, Progressing     Problem: Skin Injury Risk Increased  Goal: Skin Health and Integrity  Outcome: Ongoing, Progressing     Problem: Fall Injury Risk  Goal: Absence of Fall and Fall-Related Injury  Outcome: Ongoing, Progressing     Problem: Coping Ineffective  Goal: Effective Coping  Outcome: Ongoing, Progressing

## 2023-10-18 NOTE — ASSESSMENT & PLAN NOTE
Started PO morphine for dyspnea.  Considering dispo home vs home hospice; palliative appointment 10/20

## 2023-10-18 NOTE — PLAN OF CARE
[facility]  Department of Hospital Medicine  [address] [place] [address]  [phone] after hours  [phone] fax    HOSPICE  ORDERS    10/18/2023    Admit to Hospice:  Home Service   Diagnoses:   Active Hospital Problems    Diagnosis  POA    *Chronic obstructive pulmonary disease with acute exacerbation [J44.1]  Yes    Palliative care encounter [Z51.5]  Not Applicable    Anxiety [F41.9]  Unknown    Goals of care, counseling/discussion [Z71.89]  Not Applicable    Dyspnea [R06.00]  Unknown    Acute on chronic respiratory failure with hypoxia and hypercapnia [J96.21, J96.22]  Unknown    Advanced care planning/counseling discussion [Z71.89]  Not Applicable    Abnormal finding on urinalysis [R82.90]  Yes    Atrial fibrillation with RVR [I48.91]  Yes    Former heavy tobacco smoker [Z87.891]  Not Applicable     Chronic    Acute on chronic respiratory failure with hypoxia [J96.21]  Yes      Resolved Hospital Problems    Diagnosis Date Resolved POA    COPD exacerbation [J44.1] 10/17/2023 Yes    GERD (gastroesophageal reflux disease) [K21.9] 10/09/2023 Yes     Chronic       Hospice Qualifying Diagnoses: Very severe COPD       Patient has a life expectancy < 6 months due to:  Primary Hospice Diagnosis: COPD   Comorbid Conditions Contributing to Decline: recurrent pneumonia, retinal histoplasmosis, pathological fracture 2/2 osteoporosis, pulmonary arterial hypertension    Vital Signs: Routine per Hospice Protocol.    Code Status: DNR    Allergies:   Review of patient's allergies indicates:   Allergen Reactions    Albuterol     Epinephrine     Ipratropium        Diet: IDDSI 7 or per hospice    Activities: As tolerated    Goals of Care Treatment Preferences:  Code Status: DNR    Health care agent:  Osvaldo Fam ()  Health care agent number: No value filed.       LaPOST: Yes  What is most important right now is to focus on symptom/pain control.  Accordingly, we have  decided that the best plan to meet the patient's goals includes continuing with treatment.      Nursing: Per Hospice Routine.    Oxygen: 3L NC with escalation per hospice director    Other Miscellaneous Care: N/A      Medications:      Medication List        START taking these medications      morphine 10 mg/5 mL solution  Take 1.3 mLs (2.6 mg total) by mouth every 4 (four) hours as needed (for dyspnea; recmomended by palliative care for severe COPD).     oxybutynin 5 MG Tr24  Commonly known as: DITROPAN-XL  Take 1 tablet (5 mg total) by mouth once daily.  Replaces: solifenacin 5 MG tablet     * predniSONE 10 MG tablet  Commonly known as: DELTASONE  Take 2 tablets (20 mg total) by mouth once daily for 4 days, THEN 1 tablet (10 mg total) once daily for 5 days.  Start taking on: October 19, 2023     * predniSONE 10 MG tablet  Commonly known as: DELTASONE  Take 1 tablet (10 mg total) by mouth once daily.  Start taking on: October 23, 2023           * This list has 2 medication(s) that are the same as other medications prescribed for you. Read the directions carefully, and ask your doctor or other care provider to review them with you.                CHANGE how you take these medications      ascorbic acid (vitamin C) 500 MG tablet  Commonly known as: VITAMIN C  Take 1 tablet (500 mg total) by mouth 2 (two) times daily.  What changed: when to take this     citalopram 20 MG tablet  Commonly known as: CeleXA  Take 1 tablet (20 mg total) by mouth once daily.  What changed: Another medication with the same name was removed. Continue taking this medication, and follow the directions you see here.            CONTINUE taking these medications      azelastine 137 mcg (0.1 %) nasal spray  Commonly known as: ASTELIN  1 spray (137 mcg total) by Nasal route 2 (two) times daily.     busPIRone 5 MG Tab  Commonly known as: BUSPAR  Take 1 tablet (5 mg total) by mouth 2 (two) times daily.     calcium carbonate 600 mg calcium (1,500 mg)  Tab  Commonly known as: OS-STEPHANIE  Take 600 mg by mouth once daily.     CORICIDIN HBP COUGH AND COLD ORAL  Take 1 tablet by mouth daily as needed (Cold symptoms).     diltiaZEM 120 MG Cdcr  Commonly known as: DILACOR XR  Take 1 capsule (120 mg total) by mouth once daily.     fluticasone furoate-vilanteroL 200-25 mcg/dose Dsdv diskus inhaler  Commonly known as: BREO ELLIPTA  INHALE 1 PUFF INTO THE LUNGS DAILY     GUAIFENESIN DM ORAL  Take 1 tablet by mouth daily as needed (Congestion).     INCRUSE ELLIPTA 62.5 mcg/actuation inhalation capsule  Generic drug: umeclidinium  Inhale 62.5 mcg into the lungs once daily.     levalbuterol 0.63 mg/3 mL nebulizer solution  Commonly known as: XOPENEX  USE 1 VIAL IN NEBULIZER EVERY 8 HOURS AS NEEDED FOR WHEEZE Strength: 0.63 mg/3 mL     melatonin 3 mg tablet  Commonly known as: MELATIN  Take 2 tablets (6 mg total) by mouth nightly as needed for Insomnia.     multivit-iron-min-folic acid 3,500-18-0.4 unit-mg-mg Chew  Commonly known as: CENTRUM  Take 1 tablet by mouth once daily. Take Levofloxacin antibiotic at least 2 hours before multivitamin.     pantoprazole 40 MG tablet  Commonly known as: PROTONIX  Take 1 tablet (40 mg total) by mouth once daily.     pulse oximeter device  Commonly known as: pulse oximeter  by Apply Externally route 2 (two) times a day. Use twice daily at 8 AM and 3 PM and record the value in MyChart as directed.     REFRESH OPHT  Place 1 drop into both eyes daily as needed (Dry eye).     sodium chloride 0.65 % nasal spray  Commonly known as: OCEAN  1 spray by Nasal route 2 (two) times daily.     UNABLE TO FIND  Omega XL - Take 1 tablet by mouth twice daily.            STOP taking these medications      hydrOXYzine pamoate 25 MG Cap  Commonly known as: VISTARIL     solifenacin 5 MG tablet  Commonly known as: VESICARE  Replaced by: oxybutynin 5 MG Tr24            Future Orders:  Hospice Medical Director may dictate new orders for comfortable care measures & sign  death certificate.    _________________________________  Melany Chin MD  10/18/2023

## 2023-10-18 NOTE — PLAN OF CARE
SW placed [facility] orders for patient transport to home address. Patient and family notified at bedside and agreeable to dc plan. Transport scheduled for 10/18 3pm.    [facility] will continue to follow.                 KURTIS Parham, LMSW  [facility]  Case Management  Ext. [phone]

## 2023-10-19 ENCOUNTER — OUTPATIENT CASE MANAGEMENT (OUTPATIENT)
Dept: ADMINISTRATIVE | Facility: OTHER | Age: 73
End: 2023-10-19
Payer: MEDICARE

## 2023-10-19 ENCOUNTER — PATIENT OUTREACH (OUTPATIENT)
Dept: ADMINISTRATIVE | Facility: CLINIC | Age: 73
End: 2023-10-19
Payer: MEDICARE

## 2023-10-19 NOTE — PLAN OF CARE
[facility] - Intensive Care ([facility])  Discharge Final Note    Primary Care Provider: Dustin Khan MD    Expected Discharge Date: 10/18/2023    Final Discharge Note (most recent)       Final Note - 10/19/23 0948          Final Note    Assessment Type Final Discharge Note (P)      Anticipated Discharge Disposition Hospice/Home (P)    [facility]    What phone number can be called within the next 1-3 days to see how you are doing after discharge? [phone] (P)         Post-Acute Status    Post-Acute Authorization Hospice (P)      Hospice Status Set-up Complete/Auth obtained (P)      Coverage MEDICARE - MEDICARE PART A & B (P)                      Important Message from Medicare  Important Message from Medicare regarding Discharge Appeal Rights: Given to patient/caregiver, Explained to patient/caregiver, Signed/date by patient/caregiver     Date IMM was signed: 10/16/23  Time IMM was signed: 0812      Patient dc w/ [facility] hospice/home.                    KURTIS Parham, LMSW  [facility]  Case Management  Ext. [phone]

## 2023-10-19 NOTE — PROGRESS NOTES
SW received a referral on the above patient. Per chart review. Patient discharged to [facility]/home. On 10/18/2023. Patient doesn't meet criteria for OPCM. SW will close case as needs are being met by others.

## 2023-10-31 ENCOUNTER — EXTERNAL CHRONIC CARE MANAGEMENT (OUTPATIENT)
Dept: PRIMARY CARE CLINIC | Facility: CLINIC | Age: 73
End: 2023-10-31
Payer: MEDICARE

## 2023-10-31 PROCEDURE — 99490 CHRNC CARE MGMT STAFF 1ST 20: CPT | Mod: GW,S$PBB,, | Performed by: INTERNAL MEDICINE

## 2023-10-31 PROCEDURE — 99490 PR CHRONIC CARE MGMT, 1ST 20 MIN: ICD-10-PCS | Mod: GW,S$PBB,, | Performed by: INTERNAL MEDICINE

## 2023-10-31 PROCEDURE — 99490 CHRNC CARE MGMT STAFF 1ST 20: CPT | Mod: PBBFAC | Performed by: INTERNAL MEDICINE

## 2023-11-20 PROBLEM — Z99.81 CHRONIC RESPIRATORY FAILURE WITH HYPOXIA, ON HOME OXYGEN THERAPY: Chronic | Status: RESOLVED | Noted: 2019-03-23 | Resolved: 2023-11-20

## 2023-11-20 PROBLEM — J96.11 CHRONIC RESPIRATORY FAILURE WITH HYPOXIA, ON HOME OXYGEN THERAPY: Chronic | Status: RESOLVED | Noted: 2019-03-23 | Resolved: 2023-11-20

## 2023-11-30 ENCOUNTER — EXTERNAL CHRONIC CARE MANAGEMENT (OUTPATIENT)
Dept: PRIMARY CARE CLINIC | Facility: CLINIC | Age: 73
End: 2023-11-30
Payer: MEDICARE

## 2023-11-30 PROCEDURE — 99490 CHRNC CARE MGMT STAFF 1ST 20: CPT | Mod: PBBFAC | Performed by: INTERNAL MEDICINE

## 2023-11-30 PROCEDURE — 99490 CHRNC CARE MGMT STAFF 1ST 20: CPT | Mod: GW,S$PBB,, | Performed by: INTERNAL MEDICINE

## 2023-11-30 PROCEDURE — 99490 PR CHRONIC CARE MGMT, 1ST 20 MIN: ICD-10-PCS | Mod: GW,S$PBB,, | Performed by: INTERNAL MEDICINE

## 2023-12-27 DIAGNOSIS — J20.9 COPD (CHRONIC OBSTRUCTIVE PULMONARY DISEASE) WITH ACUTE BRONCHITIS: ICD-10-CM

## 2023-12-27 DIAGNOSIS — J44.0 COPD (CHRONIC OBSTRUCTIVE PULMONARY DISEASE) WITH ACUTE BRONCHITIS: ICD-10-CM

## 2023-12-27 NOTE — TELEPHONE ENCOUNTER
----- Message from Gaby Sellers sent at 12/27/2023  3:22 PM CST -----  Contact: adrián  Requesting an RX refill or new RX.  Is this a refill or new RX:   RX name and strength umeclidinium (INCRUSE ELLIPTA) 62.5 mcg/actuation inhalation capsule  Is this a 30 day or 90 day RX:   Pharmacy name and phone #       [id number] - [address]  [address]  [place] [address]  Phone: [phone] Fax: [phone]

## 2023-12-28 NOTE — TELEPHONE ENCOUNTER
Refill Routing Note   Medication(s) are not appropriate for processing by [facility] for the following reason(s):        ED/Hospital Visit since last OV with provider: 10/9/23 due to COPD exacerbation    [facility] action(s):  Defer               Appointments  past 12m or future 3m with PCP    Date Provider   Last Visit   9/5/2023 Dustin Khan MD   Next Visit   Visit date not found Dustin Khan MD   ED visits in past 90 days: 0        Note composed:5:02 PM 12/28/2023

## 2023-12-28 NOTE — TELEPHONE ENCOUNTER
Refill request routed to [facility] Review Pool for Pharmacist Review.

## 2023-12-29 RX ORDER — LEVALBUTEROL INHALATION SOLUTION 0.63 MG/3ML
SOLUTION RESPIRATORY (INHALATION)
Qty: 750 ML | Refills: 0 | Status: SHIPPED | OUTPATIENT
Start: 2023-12-29

## 2023-12-29 RX ORDER — UMECLIDINIUM 62.5 UG/1
62.5 AEROSOL, POWDER ORAL DAILY
Qty: 90 EACH | Refills: 0 | Status: SHIPPED | OUTPATIENT
Start: 2023-12-29

## 2023-12-31 ENCOUNTER — EXTERNAL CHRONIC CARE MANAGEMENT (OUTPATIENT)
Dept: PRIMARY CARE CLINIC | Facility: CLINIC | Age: 73
End: 2023-12-31
Payer: MEDICARE

## 2023-12-31 PROCEDURE — 99490 CHRNC CARE MGMT STAFF 1ST 20: CPT | Mod: PBBFAC | Performed by: INTERNAL MEDICINE

## 2023-12-31 PROCEDURE — 99490 CHRNC CARE MGMT STAFF 1ST 20: CPT | Mod: GW,S$PBB,, | Performed by: INTERNAL MEDICINE

## 2024-01-02 DIAGNOSIS — J44.0 COPD (CHRONIC OBSTRUCTIVE PULMONARY DISEASE) WITH ACUTE BRONCHITIS: ICD-10-CM

## 2024-01-02 DIAGNOSIS — J20.9 COPD (CHRONIC OBSTRUCTIVE PULMONARY DISEASE) WITH ACUTE BRONCHITIS: ICD-10-CM

## 2024-01-02 NOTE — TELEPHONE ENCOUNTER
No care due was identified.  [facility] Embedded Care Due Messages. Reference number: [id number].   1/02/2024 1:28:10 PM CST

## 2024-01-03 RX ORDER — LEVALBUTEROL INHALATION SOLUTION 0.63 MG/3ML
SOLUTION RESPIRATORY (INHALATION)
Qty: 750 ML | Refills: 0 | OUTPATIENT
Start: 2024-01-03

## 2024-01-03 NOTE — TELEPHONE ENCOUNTER
Refill Decision Note   Estefani Fam  is requesting a refill authorization.  Brief Assessment and Rationale for Refill:  Quick Discontinue     Medication Therapy Plan:         Comments:     Note composed:7:08 AM 01/03/2024

## 2024-01-08 PROBLEM — J96.21 ACUTE ON CHRONIC RESPIRATORY FAILURE WITH HYPOXIA: Status: RESOLVED | Noted: 2018-07-02 | Resolved: 2024-01-08

## 2024-01-15 PROBLEM — J96.22 ACUTE ON CHRONIC RESPIRATORY FAILURE WITH HYPOXIA AND HYPERCAPNIA: Status: RESOLVED | Noted: 2023-10-13 | Resolved: 2024-01-15

## 2024-01-15 PROBLEM — J96.21 ACUTE ON CHRONIC RESPIRATORY FAILURE WITH HYPOXIA AND HYPERCAPNIA: Status: RESOLVED | Noted: 2023-10-13 | Resolved: 2024-01-15

## 2024-01-31 ENCOUNTER — EXTERNAL CHRONIC CARE MANAGEMENT (OUTPATIENT)
Dept: PRIMARY CARE CLINIC | Facility: CLINIC | Age: 74
End: 2024-01-31
Payer: MEDICARE

## 2024-01-31 PROCEDURE — 99490 CHRNC CARE MGMT STAFF 1ST 20: CPT | Mod: S$PBB,GW,, | Performed by: INTERNAL MEDICINE

## 2024-01-31 PROCEDURE — 99490 CHRNC CARE MGMT STAFF 1ST 20: CPT | Mod: PBBFAC | Performed by: INTERNAL MEDICINE

## 2024-02-14 NOTE — SUBJECTIVE & OBJECTIVE
Interval History: NAEON. Received breathing treatment with symptomatic improvement.     Review of Systems   Constitutional:  Negative for chills and fever.   Respiratory:  Positive for shortness of breath and wheezing. Negative for chest tightness.    Cardiovascular:  Negative for chest pain, palpitations and leg swelling.   Gastrointestinal:  Negative for abdominal distention, constipation, diarrhea, nausea and vomiting.   Genitourinary:  Negative for dysuria.   Musculoskeletal:  Negative for back pain.   Neurological:  Negative for dizziness and headaches.   Psychiatric/Behavioral:  Negative for agitation and confusion.      Objective:     Vital Signs (Most Recent):  Temp: 98.8 °F (37.1 °C) (10/10/23 1250)  Pulse: 103 (10/10/23 1250)  Resp: (!) 22 (10/10/23 1141)  BP: (!) 126/58 (10/10/23 1250)  SpO2: 95 % (10/10/23 1250) Vital Signs (24h Range):  Temp:  [98 °F (36.7 °C)-98.9 °F (37.2 °C)] 98.8 °F (37.1 °C)  Pulse:  [] 103  Resp:  [16-28] 22  SpO2:  [95 %-100 %] 95 %  BP: (126-148)/(58-80) 126/58     Weight: 57.2 kg (126 lb)  Body mass index is 19.16 kg/m².    Intake/Output Summary (Last 24 hours) at 10/10/2023 5715  Last data filed at 10/9/2023 2217  Gross per 24 hour   Intake 50 ml   Output --   Net 50 ml         Physical Exam  Constitutional:       General: She is not in acute distress.  HENT:      Head: Normocephalic and atraumatic.      Nose: Nose normal.      Comments: Nasal canula in place     Mouth/Throat:      Mouth: Mucous membranes are dry.      Pharynx: Oropharynx is clear.      Comments: Lips pursed  Eyes:      General:         Right eye: No discharge.         Left eye: No discharge.      Extraocular Movements: Extraocular movements intact.      Conjunctiva/sclera: Conjunctivae normal.   Cardiovascular:      Rate and Rhythm: Normal rate and regular rhythm.      Heart sounds: No murmur heard.  Pulmonary:      Effort: Respiratory distress present.      Breath sounds: Wheezing present.   Chest:       Chest wall: No tenderness.   Abdominal:      General: Abdomen is flat.      Palpations: Abdomen is soft.      Tenderness: There is no abdominal tenderness.   Musculoskeletal:         General: No swelling. Normal range of motion.      Right lower leg: No edema.      Left lower leg: No edema.   Skin:     General: Skin is warm and dry.      Coloration: Skin is pale.   Neurological:      General: No focal deficit present.      Mental Status: She is alert. Mental status is at baseline.             Significant Labs: All pertinent labs within the past 24 hours have been reviewed.    Significant Imaging: I have reviewed all pertinent imaging results/findings within the past 24 hours.   General

## 2024-02-29 ENCOUNTER — EXTERNAL CHRONIC CARE MANAGEMENT (OUTPATIENT)
Dept: PRIMARY CARE CLINIC | Facility: CLINIC | Age: 74
End: 2024-02-29
Payer: MEDICARE

## 2024-02-29 PROCEDURE — 99490 CHRNC CARE MGMT STAFF 1ST 20: CPT | Mod: S$PBB,GW,, | Performed by: INTERNAL MEDICINE

## 2024-02-29 PROCEDURE — 99490 CHRNC CARE MGMT STAFF 1ST 20: CPT | Mod: PBBFAC | Performed by: INTERNAL MEDICINE

## 2024-06-05 NOTE — PLAN OF CARE
"[facility] at [facility]   OFFICE NOTE      Marcelina Verma  1999  PCP: Belkis Navarro APRN    SUBJECTIVE:   Marcelina Verma is a 24 y.o. female seen for a follow up visit regarding the following:     CC:Dysautonomia     HPI:   Pleasant 25-year-old female presents today for follow-up regarding vasovagal symptoms.  She reports that since last seen by our office she has been trying to work on a regular basis.  She gets up 5 AM she lives weights.  Sometimes she is lifting weights when she bends over like doing squats or lifting up a bar she gets lightheaded.  This has been going to her is because a lot of her friends say that this is abnormal.  She is not having chest pain or gerry syncope events.  She has had no heart failure symptoms.  She previous had negative workup with normal echocardiogram and negative monitor 2022.  She feels she may have \"POTS\" as she read online about the symptoms sound similar to what she has.  She does try to drink a water she avoids excessive caffeine.    Cardiac PMH: (Old records have been reviewed and summarized below)  Vasovagal symptoms  Normal Echocardiogram 11/2022  Monitor  10/31/2022 Average HR 77, Max . HR 45.   Seasonal allergies  Iron deficiency- Iron 27 (11/9/2022)  Mild HLD,     Past Medical History, Past Surgical History, Family history, Social History, and Medications were all reviewed with the patient today and updated as necessary.       Current Outpatient Medications:     drospirenone-ethinyl estradiol (ILSA,GIANVI) 3-0.02 MG per tablet, Take 1 tablet by mouth Daily., Disp: , Rfl:     ferrous sulfate 325 (65 FE) MG tablet, Take 1 tablet by mouth As Needed., Disp: , Rfl:     loratadine (Claritin) 10 MG tablet, Take 1 tablet by mouth Daily., Disp: 30 tablet, Rfl: 11    vitamin D (ERGOCALCIFEROL) 1.25 MG (52986 UT) capsule capsule, Take 1 capsule by mouth 1 (One) Time Per Week., Disp: 12 capsule, Rfl: 1    bisoprolol (ZEBeta) 5 " [facility] ([place] [address])      HOME HEALTH ORDERS  FACE TO FACE ENCOUNTER    Patient Name: Estefani Fam  YOB: 1950    PCP: Primary Doctor No   PCP Address: None  PCP Phone Number: None  PCP Fax: None    Encounter Date: 4/3/22    Admit to Home Health    Diagnoses:  Active Hospital Problems    Diagnosis  POA    *Chronic obstructive pulmonary disease with acute exacerbation [J44.1]  Yes    GERD (gastroesophageal reflux disease) [K21.9]  Yes     Chronic    Centrilobular emphysema [J43.2]  Yes     Chronic     Hospitalized 5/2021  Continue incruse, BREO, NAC and zmax TIW for control.  Albuterol for rescue and prevention    Should restart pulmonary rehab.  Consult for Muslim will be placed once patient completes home health.  Instructed to contact us via Embedded Chat.    Did not tolerate Daliresp 250 mg daily.             Chronic respiratory failure with hypoxia, on home oxygen therapy [J96.11, Z99.81]  Not Applicable     Chronic     Continues to benefit from supplemental oxygen at 3L/M      Secondary pulmonary arterial hypertension [I27.21]  Yes     Chronic     Secondary to emphysema and chronic respiratory failure, mild.        Resolved Hospital Problems    Diagnosis Date Resolved POA    Acute respiratory failure with hypoxia and hypercarbia [J96.01, J96.02] 04/05/2022 Yes       Follow Up Appointments:  Future Appointments   Date Time Provider Department Center   4/22/2022  2:15 PM INJECTION [facility] AMB INF [facility]   4/28/2022  2:30 PM Dustin Khan MD [facility]       Allergies:  Review of patient's allergies indicates:   Allergen Reactions    Albuterol     Ipratropium analogues      Difficulty breathing       Medications: Review discharge medications with patient and family and provide education.    Current Facility-Administered Medications   Medication Dose Route Frequency Provider Last Rate Last Admin    acetaminophen tablet 650 mg  650 mg Oral Q4H PRN Arbrittaney SUAZO  "MG tablet, Take 0.5 tablets by mouth Daily., Disp: 30 tablet, Rfl: 6    sodium chloride 1 g tablet, Take 1 tablet by mouth 3 (Three) Times a Day., Disp: 30 tablet, Rfl: 3      No Known Allergies      PHYSICAL EXAM:    /72 (BP Location: Right arm, Patient Position: Sitting)   Pulse 66   Ht 175.3 cm (69\")   Wt 69.4 kg (153 lb)   SpO2 98%   BMI 22.59 kg/m²        Wt Readings from Last 5 Encounters:   06/05/24 69.4 kg (153 lb)   01/17/24 68.9 kg (152 lb)   03/06/23 68.8 kg (151 lb 9.6 oz)   02/06/23 68.9 kg (152 lb)   01/09/23 68.5 kg (151 lb)       BP Readings from Last 5 Encounters:   06/05/24 120/72   01/17/24 122/84   03/06/23 100/68   02/06/23 110/62   01/09/23 90/68       General appearance - Alert, well appearing, and in no distress   Mental status - Affect appropriate to mood.  Eyes - Sclerae anicteric,  ENMT - Hearing grossly normal bilaterally, Dental hygiene good.  Neck - Carotids upstroke normal bilaterally, no bruits, no JVD.  Resp - Clear to auscultation, no wheezes, rales or rhonchi, symmetric air entry.  Heart - Normal rate, regular rhythm, normal S1, S2, no murmurs, rubs, clicks or gallops.  GI - Soft, nontender, nondistended, no masses or organomegaly.  Neurological - Grossly intact - normal speech, no focal findings  Musculoskeletal - No joint tenderness, deformity or swelling, no muscular tenderness noted.  Extremities - Peripheral pulses normal, no pedal edema, no clubbing or cyanosis.  Skin - Normal coloration and turgor.  Psych -  oriented to person, place, and time.    Medical problems and test results were reviewed with the patient today.       ASSESSMENT   1. Dysautonomia    2. Marginal SBP    3.  Previous normal echocardiogram and normal Holter monitor.     PLAN  Symptoms suggest dysautonomia, possible vasovagal symptoms with lifting weights.  We recommend that she increase her hydration Gatorade Powerade add salt tablets or diet.  In addition instead doing weights on a regular " Stephanie, DO   650 mg at 04/06/22 1301    bisacodyL EC tablet 5 mg  5 mg Oral Daily PRN Emre Crawley MD   5 mg at 04/06/22 1301    calcium carbonate 200 mg calcium (500 mg) chewable tablet 500 mg  500 mg Oral TID PRN Emre Crawley MD        dextromethorphan-guaiFENesin  mg/5 ml liquid 5 mL  5 mL Oral Q4H PRN Arup GABRIELE Brown, DO   5 mL at 04/08/22 0836    enoxaparin injection 40 mg  40 mg Subcutaneous Daily Arup GABRIELE Brown, DO   40 mg at 04/07/22 1802    fluticasone furoate-vilanteroL 200-25 mcg/dose diskus inhaler 1 puff  1 puff Inhalation Daily Emre Crawley MD   1 puff at 04/08/22 0820    fluticasone propionate 50 mcg/actuation nasal spray 50 mcg  1 spray Each Nostril Daily Arbrittaney Brown, DO        levalbuterol nebulizer solution 0.63 mg  0.63 mg Nebulization Q8H Arup GABRIELE Brown, DO   0.63 mg at 04/08/22 0818    levalbuterol nebulizer solution 1.25 mg  1.25 mg Nebulization Q4H PRN Arbrittaney Brown, DO        melatonin tablet 6 mg  6 mg Oral Nightly PRN Arbrittaney Brown, DO        mupirocin 2 % ointment   Nasal BID Emre Crawley MD   Given at 04/08/22 0833    ondansetron injection 4 mg  4 mg Intravenous Q6H PRN Arbrittaney Brown, DO        pantoprazole EC tablet 40 mg  40 mg Oral Daily Arup GABRIELE Brown, DO   40 mg at 04/08/22 0831    polyethylene glycol packet 17 g  17 g Oral Daily Arup GABRIELE Brown, DO   17 g at 04/07/22 0830    sodium chloride 0.9% flush 3 mL  3 mL Intravenous PRN Arbrittaney Brown, DO        tiotropium bromide 2.5 mcg/actuation inhaler 2 puff  2 puff Inhalation Daily Emre Crawley MD   2 puff at 04/08/22 0819     Current Discharge Medication List      START taking these medications    Details   dextromethorphan-guaiFENesin  mg/5 ml (ROBITUSSIN-DM)  mg/5 mL liquid Take 5 mLs by mouth every 4 (four) hours as needed (Cough).  Qty: 118 mL, Refills: 0      predniSONE (DELTASONE) 20 MG tablet Take 2 tablets (40 mg total) by mouth once daily. for 1 day  Qty: 2 tablet, Refills: 0          CONTINUE these medications which have CHANGED    Details   azithromycin (Z-JERSEY) 250 MG tablet Take 1 tablet (250 mg total) by mouth every Mon, Wed, Fri.  Qty: 36 tablet, Refills: 3         CONTINUE these medications which have NOT CHANGED    Details   acetylcysteine 600 mg Cap Take 1 capsule (600 mg total) by mouth 2 (two) times daily.  Qty: 90 capsule, Refills: 3      ascorbic acid, vitamin C, (VITAMIN C) 500 MG tablet Take 500 mg by mouth 2 (two) times daily.       azelastine (ASTELIN) 137 mcg (0.1 %) nasal spray INHALE 1 SPRAY BY NASAL ROUTE TWICE DAILY OR AS DIRECTED  Qty: 30 mL, Refills: 3      BREO ELLIPTA 200-25 mcg/dose DsDv diskus inhaler INHALE 1 PUFF INTO THE LUNGS DAILY  Qty: 60 each, Refills: 11      calcium carbonate (OS-STEPHANIE) 600 mg calcium (1,500 mg) Tab Take 1 tablet (600 mg total) by mouth once daily. Take Levofloxacin antibiotic at least 2 hours before calcium.  Refills: 0      fluticasone propionate (FLONASE) 50 mcg/actuation nasal spray INSTILL 1 SPRAY IN EACH NOSTRIL EVERY DAY  Qty: 16 g, Refills: 0      levalbuterol (XOPENEX) 0.63 mg/3 mL nebulizer solution USE 1 VIAL IN NEBULIZER EVERY 8 HOURS AS NEEDED FOR WHEEZE  Qty: 750 mL, Refills: 2    Associated Diagnoses: COPD (chronic obstructive pulmonary disease) with acute bronchitis      MULTIVIT-IRON-MIN-FOLIC ACID 3,500-18-0.4 UNIT-MG-MG ORAL CHEW Take 1 tablet by mouth once daily. Take Levofloxacin antibiotic at least 2 hours before multivitamin.  Refills: 0      pantoprazole (PROTONIX) 40 MG tablet Take 1 tablet (40 mg total) by mouth once daily.  Qty: 30 tablet, Refills: 11      pulse oximeter (PULSE OXIMETER) device by Apply Externally route 2 (two) times a day. Use twice daily at 8 AM and 3 PM and record the value in [employer] as directed.  Qty: 1 each, Refills: 0    Comments: This is a NO CHARGE item.  Please override price to zero.  DO NOT PRINT.  NORMAL MODE e-PRESCRIBE ONLY.      sodium chloride (OCEAN) 0.65 % nasal spray 1 spray by  basis try to do more cardio on a regular basis where she is keeping heart rate on average 1 3150 bpm for 30 minutes a day for 3 to 4 days a week.  We discussed briefly talking about taking Zebeta possibly if she feels like her heart rate still an issue with lifting weights she would like to try conservatively the salt tablets and switch to more cardio and fluids initially.  She return to follow-up in 2 months or sooner as needed.        6/5/2024  11:55 EDT  Electronically signed by JASON Landin, 06/05/24, 12:02 PM EDT.    Nasal route 2 (two) times daily.  Qty: 88 mL, Refills: 12      umeclidinium (INCRUSE ELLIPTA) 62.5 mcg/actuation inhalation capsule Inhale 62.5 mcg into the lungs once daily.  Qty: 90 each, Refills: 3         STOP taking these medications       furosemide (LASIX) 20 MG tablet Comments:   Reason for Stopping:                 I have seen and examined this patient within the last 30 days. My clinical findings that support the need for the home health skilled services and home bound status are the following:no   Weakness/numbness causing balance and gait disturbance due to COPD Exacerbation making it taxing to leave home.     Diet:   regular diet    Labs:  Report Lab results to PCP.    Referrals/ Consults  Physical Therapy to evaluate and treat. Evaluate for home safety and equipment needs; Establish/upgrade home exercise program. Perform / instruct on therapeutic exercises, gait training, transfer training, and Range of Motion.  Occupational Therapy to evaluate and treat. Evaluate home environment for safety and equipment needs. Perform/Instruct on transfers, ADL training, ROM, and therapeutic exercises.    Activities:   activity as tolerated    Nursing:   Agency to admit patient within 24 hours of hospital discharge unless specified on physician order or at patient request    SN to complete comprehensive assessment including routine vital signs. Instruct on disease process and s/s of complications to report to MD. Review/verify medication list sent home with the patient at time of discharge  and instruct patient/caregiver as needed. Frequency may be adjusted depending on start of care date.     Skilled nurse to perform up to 3 visits PRN for symptoms related to diagnosis    Notify MD if SBP > 160 or < 90; DBP > 90 or < 50; HR > 120 or < 50; Temp > 101; O2 < 88%;     Ok to schedule additional visits based on staff availability and patient request on consecutive days within the home health episode.    When multiple  disciplines ordered:    Start of Care occurs on Sunday - Wednesday schedule remaining discipline evaluations as ordered on separate consecutive days following the start of care.    Thursday SOC -schedule subsequent evaluations Friday and Monday the following week.     Friday - Saturday SOC - schedule subsequent discipline evaluations on consecutive days starting Monday of the following week.    For all post-discharge communication and subsequent orders please contact patient's primary care physician.     Miscellaneous   Home Oxygen:  No change    Home Health Aide:  Physical Therapy Three times weekly and Occupational Therapy Three times weekly    Wound Care Orders  no    I certify that this patient is confined to her home and needs physical therapy and occupational therapy.

## 2024-06-10 ENCOUNTER — PATIENT MESSAGE (OUTPATIENT)
Dept: INTERNAL MEDICINE | Facility: CLINIC | Age: 74
End: 2024-06-10
Payer: MEDICARE

## 2024-09-03 DIAGNOSIS — Z71.89 COMPLEX CARE COORDINATION: ICD-10-CM

## 2024-09-25 DIAGNOSIS — Z00.00 ENCOUNTER FOR MEDICARE ANNUAL WELLNESS EXAM: ICD-10-CM

## 2024-10-29 NOTE — TELEPHONE ENCOUNTER
Mrs Fam called C/O sob, and coughing up phelgm , states this stated 4 days ago so she started on a Zithromax-zpak 250mg and takes the last pill today. She is not on Prednisone at this time and is using her nebulizer with albuterol TID. I told her the Zithromax will last in her system 10 days and she could use her nebulizer 4 times a days till she gets over this URI. Mrs Fam will call back if there is no improvement. Cammy Rucker LPN.    Occupational Therapy    Visit Type: treatment  SUBJECTIVE  11ST OT note:    Patient states \"okay, so I just need to make sure everyone is on the same page with what I need\"    RN Tracy villalobos session, patient seated in wheelchair all needs in reach, call light in hand. Family present for teaching session (mother, daughter, neighbor)  Patient agrees to participate in therapy this date.  Patient / Family Goal: return home    Pain   Patient reports pain is not an issue/concern.    OBJECTIVE     Cognitive Status   Affect/Behavior    - cooperative and pleasant  Orientation    - Oriented to: person, place, time and situation  Functional Communication   - Overall Communication Status: within functional limits    Patient Activity Tolerance: 1 to 1 activity to rest         Bed Mobility  - Supine to sit: modified independent  - Sit to supine: modified independent    Patient completes bed mobility at overall modified independence with increased time.    Transfers  Assistive devices: gait belt  -  Slide board       - supervision    Patient requires supervision for safety to complete all functional transfers with increased time. Patient's family for teaching session. Patient completes slide board transfer from wheelchair to bed with increased time at overall supervision for safety. Patient is aware of TTWB BLE, however noted with increased WB during transfer. Much increased time spent addressing importance of maintaining TTWB per restriction on RLE due to current stable fx, however as KVNG Irizarry stated, increased use could displace.   Patient family present and with questions, increased time spent addressing and educating on assisting patient as needed. Patient continues to be unsafe to complete car transfer, aware wheelchair van to complete transport and family/friends  Discussed height differences between surfaces and patient ability to transfer to 5 inch higher surface with slideboard. Anything higher patient would need  physical assist to complete.   Discussed current inability to complete car transfers as patient's family only has SUVs. Patient's friend stated they have access to a lower height vehicle. Encouraged patient to complete slideboard to transfers in/out of car with home therapy prior to completing with family. Discussed need for WC van transport currently which patient is agreeable to.         Activities of Daily Living (ADLs)    Increased time spent discussing transfers on/off toilet at home and potential need for drop arm commode if WC not able to fit in bathroom. Patient receptive. Patient's mother states she may be able obtain a commode from a friend however unsure if it is drop arm. Discussed resources of [employer] or [employer] as options for DME or ordering new drop arm commode from [employer].   Patient reports no concern with completing dressing tasks, aware of importance of maintaining BLE WB during all tasks. Patient reports purchasing tub transfer bench, increased time spent addressing safe use on/off and family will assist as needed. Discussed slideboard vs lateral scoot transfers on/off extended tub bench in bathroom.           Education:   - Present and ready to learn: patient  Education provided during session:  - Results of above outlined education: Needs reinforcement    ASSESSMENT   Progress: progressing toward goals  Interfering components: cognitive deficits and decreased activity tolerance    Discharge needs based on today's assessment:  - Current level of function: slightly below baseline level of function  - Therapy needs at discharge: intensive daily therapy  - Activities of daily living (ADLs) requiring support at discharge: bathing, toileting, dressing, ambulation, transfers and bed mobility  - Instrumental activities of daily living (IADLs) requiring support at discharge: home management  - Impairments that require further therapy intervention: pain, strength, activity tolerance, cognition,  balance and safety awareness  AM-PAC  - Prior Level of Function: IND/MOD I ([facility] [address] 22-24)       Key: MOD A=moderate assistance, IND/MOD I=independent/modified independent  - Generalized Current Level of Function     - Current Self-Cares: 17       Scoring Key= >21 Modified Independent; 20-21 Supervision; 18-19 Minimal assist; 13-18 Moderate assist; 9-12 Max assist; <9 Total assist    Patient remains below baseline with all functional transfers, mobility and self cares. Patient reports family/friends are able to assist with all cares as needed. Patient requires continue skilled OT while admitted to increased independence in preparation for discharge home.        PLAN (while hospitalized)  Suggestions for next session as indicated:   ADls from recliner, standing tolerance/balance, use pivot disc for transfers    OT Frequency: 3-5 x per week      PT/OT Mobility Equipment for Discharge: needs WC  Agreement to plan and goals: patient agrees with goals and treatment plan      GOALS  Review Date: 11/5/2024  Short Term Goals:   One handed self cares  Stand pivots towards right  W/c level transfers to toilet or tub  Long Term Goals: (to be met by time of discharge from hospital)  State precautions: Patient able to state precautions independent.  Maintain precautions: Patient able to maintain precautions independent.  Feeding: Patient will complete feeding tasks modified independent.  Grooming: Patient will complete grooming tasks modified independent.  Upper body dressing: Patient will complete upper body dressing modified independent.  Lower body dressing: Patient will complete lower body dressing modified independent.  Toileting: Patient will complete toileting modified independent.  Bathing: Patient will complete bathingmodified independent Tub/shower transfer: Patient will complete tub/shower transfer with modified independent.   Home setting transfer: Patient will complete home setting transfers with modified independent.      Documented in the chart in the following areas: Assessment/Plan.    Patient at End of Session:   Location: in wheelchair  Safety measures: call light within reach  Handoff to: nurse, family/caregiver and therapist      Patient progress, plan of care and goals discussed between providing therapist and assistant.  Assistant to continue with current plan of care, current goals are appropriate, patient progressing towards set goals  Assistant's note sent to overseeing therapist for review and co-signature.        Therapy procedure time and total treatment time can be found documented on the Time Entry flowsheet

## 2025-01-16 DIAGNOSIS — Z78.0 MENOPAUSE: ICD-10-CM

## 2025-01-30 DIAGNOSIS — Z00.00 ENCOUNTER FOR MEDICARE ANNUAL WELLNESS EXAM: ICD-10-CM

## 2025-02-06 NOTE — CONSULTS
Pt calling in, reports she is having diarrhea after every meal about 4x a day. She is still on antibx for h pylori with pepto. No pain or blood/ black stools.     She is eating bland light diet and staying hydrated.     I did explain to pt diarrhea can happen while on antibx. Pt asking if there is anything she can take to help the diarrhea- aside from pepto she is already on.    [facility] ([place] [id number])  Pulmonology  Consult Note    Patient Name: Estefani Fam  MRN: [id number]  Admission Date: 4/3/2022  Hospital Length of Stay: 0 days  Code Status: Full Code  Attending Physician: Emre Crawley MD  Primary Care Provider: Primary Doctor No   Principal Problem: Chronic obstructive pulmonary disease with acute exacerbation    Inpatient consult to Pulmonology  Consult performed by: Annette Jessica MD  Consult ordered by: Taz Brown DO        Subjective:     HPI:  Estefani Fam is a 71 year old woman with COPD, chronic hypoxic respiratory failure on home oxygen 3L NC, who presented via EMS for acute respiratory distress. Patient has had worsening SOB, wheezing, increased clear sputum production over the last 2 days at home. She was recently admitted from 2/14 to 3/1 for COPD exacerbation with concern for a R lobar pneumonia. Since discharge she has been undergoing home chest physiotherapy twice a week. Remains rather debilitated and unable to perform most ADLs. Unclear what triggered this decompensation; patient denies fevers chills or being around sick contacts. She is vaccinated against COVID influenza and pneumococcus. She used to smoke (~50 pack years) but quit 5 years ago. Patient thinks she might have reacted to increased pollen in the air, but denies having seasonal allergies. Her home regimen includes levalbuterol TID as needed, Breo Ellipta daily, Incruse Ellipta daily, azithromycin 3x per week. She sees Dr Ruiz in Pulmonary clinic.     In the ED, patient was initially on 15L NRB. Initial VBG showed pH 7.24 and CO2 85. Patint was placed on BiPAP, given solumedrol, levalbuterol nebs x3, 1L NS bolus then Lasix 20mg. Repeat VBG with pH 7.286, CO2 improved to 69.4. Patient was weaned to 3LNC this morning with VBG pH 7.28 and CO2 63. Patient admitted to hospital medicine. Pulmonology consulted due to her having COPD exacerbation within the last 90 days.       Past  Medical History:   Diagnosis Date    Cataract     COPD (chronic obstructive pulmonary disease)     COVID-19     History of COVID-19 2021    Still with mild dyspnea. Encouraged return to pulmonary rehab Recommend scheduling vaccination when appointment is available.     History of retinal hemorrhage     Pathological fracture due to osteoporosis 2020    Retinal histoplasmosis     Secondary pulmonary arterial hypertension 7/3/2018    Secondary to emphysema and chronic respiratory failure, mild.       Past Surgical History:   Procedure Laterality Date    BRONCHOSCOPY WITH FLUOROSCOPY N/A 10/28/2019    Procedure: BRONCHOSCOPY, WITH FLUOROSCOPY;  Surgeon: Brooklynn Ruiz MD;  Location: [facility] OR [address] [place] [address];  Service: Endoscopy;  Laterality: N/A;    HAND SURGERY         Review of patient's allergies indicates:   Allergen Reactions    Albuterol     Ipratropium analogues      Difficulty breathing       Family History       Problem Relation (Age of Onset)    Colon cancer Mother, Father    Macular degeneration Mother    Stroke Father          Tobacco Use    Smoking status: Former Smoker     Packs/day: 1.00     Years: 36.00     Pack years: 36.00     Types: Cigarettes     Quit date: 2016     Years since quittin.6    Smokeless tobacco: Never Used   Substance and Sexual Activity    Alcohol use: Yes     Alcohol/week: 2.0 standard drinks     Types: 2 Glasses of wine per week     Comment: 1-2 glasses a day     Drug use: No    Sexual activity: Yes         Review of Systems   Constitutional:  Positive for activity change.   HENT: Negative.     Respiratory:  Positive for cough, chest tightness, shortness of breath and wheezing.    Cardiovascular: Negative.    Gastrointestinal: Negative.    Genitourinary: Negative.    Musculoskeletal: Negative.    Skin: Negative.    Neurological: Negative.    Psychiatric/Behavioral: Negative.     Objective:     Vital Signs (Most Recent):  Temp: 97.3 °F (36.3 °C) (22  0206)  Pulse: 100 (04/04/22 0730)  Resp: 19 (04/04/22 0445)  BP: (!) 140/69 (04/04/22 0206)  SpO2: (!) 94 % (04/04/22 0730)   Vital Signs (24h Range):  Temp:  [97.3 °F (36.3 °C)-99.1 °F (37.3 °C)] 97.3 °F (36.3 °C)  Pulse:  [] 100  Resp:  [16-28] 19  SpO2:  [93 %-100 %] 94 %  BP: (139-176)/(63-94) 140/69     Weight: 63.5 kg (140 lb)  Body mass index is 21.29 kg/m².      Intake/Output Summary (Last 24 hours) at 4/4/2022 0809  Last data filed at 4/4/2022 0146  Gross per 24 hour   Intake 200 ml   Output 200 ml   Net 0 ml       Physical Exam  Vitals reviewed.   Cardiovascular:      Rate and Rhythm: Regular rhythm. Tachycardia present.      Pulses: Normal pulses.      Heart sounds: Normal heart sounds.   Pulmonary:      Effort: Pulmonary effort is normal. No respiratory distress.      Breath sounds: No stridor. No wheezing, rhonchi or rales.      Comments: Weaned from 4L to 3LNC  Musculoskeletal:      Right lower leg: Edema (1+) present.      Left lower leg: Edema (trace) present.   Skin:     General: Skin is warm and dry.      Findings: Erythema (venous stasis skin changes of bilateral LE) present.   Neurological:      General: No focal deficit present.      Mental Status: She is alert.   Psychiatric:         Mood and Affect: Mood normal.       Vents:  Oxygen Concentration (%): 35 (04/03/22 2102)    Lines/Drains/Airways       Peripheral Intravenous Line  Duration                  Peripheral IV - Single Lumen 04/03/22 2005 18 G Left Wrist <1 day                    Significant Labs:    CBC/Anemia Profile:  Recent Labs   Lab 04/03/22 2041   WBC 7.64   HGB 13.4   HCT 42.9      MCV 91   RDW 14.1        Chemistries:  Recent Labs   Lab 04/03/22 2041 04/04/22  0556    138   K 3.8 3.7    96   CO2 30* 33*   BUN 8 9   CREATININE 0.6 0.6   CALCIUM 9.2 8.7   ALBUMIN 3.7  --    PROT 7.4  --    BILITOT 0.2  --    ALKPHOS 90  --    ALT 16  --    AST 21  --    MG  --  2.2   PHOS  --  2.5*       All pertinent  labs within the past 24 hours have been reviewed.    Significant Imaging:   I have reviewed all pertinent imaging results/findings within the past 24 hours.    Assessment/Plan:     * Chronic obstructive pulmonary disease with acute exacerbation    Acute respiratory failure with hypoxia and hypercarbia    Centrilobular emphysema  71 year old woman with severe COPD GOLD stage D, admitted for exacerbation initially requiring BiPAP though now comfortable on home O2 of 3LNC. Recently admitted from 2/14 to 3/1 for the same, with concern for R lobar pneumonia.  Unclear trigger for this event, ?allergies, does not appear to be an infective exacerbation and now at baseline.     Plan  - Resume daily inhaled LABA/ICS/LAMA  - Scheduled VENKATA  - Continue empiric levofloxacin  - Azithromycin 3x per week  - Steroids for 5 days  - Pulmonary hygiene: chest physiotherapy, consider Acapella device  - Referral to pulmonary rehab and clinic on discharge           Thank you for your consult. I will sign off. Please contact us if you have any additional questions.     Annette Jessica MD  Pulmonology  [facility] ([place])

## 2025-06-13 NOTE — PLAN OF CARE
[facility]  Initial Discharge Assessment       Primary Care Provider: Dustin Khan MD    Admission Diagnosis: COPD exacerbation [J44.1]    Admission Date: 8/10/2023  Expected Discharge Date: 8/12/2023         Payor: MEDICARE / Plan: MEDICARE PART A & B / Product Type: Government /     Extended Emergency Contact Information  Primary Emergency Contact: Miguelangel Quinn  Address: [address] [place] [id number] [place]  Mobile Phone: [phone]  Relation: Spouse  Secondary Emergency Contact: MIGUELANGEL QIUNN IV [facility]  Mobile Phone: [phone]  Relation: Son    Discharge Plan A: Home with family  Discharge Plan B: Home with family, Home Health      [facility] [place] [address]. - [place] - [address] [facility]  [address] [facility] [address]  Phone: [phone] Fax: [phone]    [id number] - [place], [place] - [facility] [address] AT [facility]  [address] [place] [address]  Phone: [phone] Fax: [phone]               SW completed Discharge Planning Assessment with patient via bedside. Discharge planning booklet given to patient/family and whiteboard updated with RENARD and phone #. All questions answered.    Patient reported that her  will provide transportation upon discharge.     Patient reported that she lives at home with her , and prior to hospitalization she was independent with her ADL's. Patient reported that she uses home O2. Patient has a hospital bed and nebulizer. Patient reported that she is not on dialysis and does not go to a Coumadin clinic.      Patient lives in a two story home with three steps to enter.       Mary Lowe LMSW  [facility]  Ext. [phone]   show Wheelchair/Stroller